# Patient Record
Sex: MALE | Race: WHITE | NOT HISPANIC OR LATINO | Employment: OTHER | URBAN - METROPOLITAN AREA
[De-identification: names, ages, dates, MRNs, and addresses within clinical notes are randomized per-mention and may not be internally consistent; named-entity substitution may affect disease eponyms.]

---

## 2021-01-15 DIAGNOSIS — Z23 NEED FOR VACCINATION: ICD-10-CM

## 2022-03-06 ENCOUNTER — HOSPITAL ENCOUNTER (INPATIENT)
Facility: MEDICAL CENTER | Age: 72
LOS: 4 days | DRG: 190 | End: 2022-03-11
Attending: EMERGENCY MEDICINE | Admitting: STUDENT IN AN ORGANIZED HEALTH CARE EDUCATION/TRAINING PROGRAM
Payer: MEDICARE

## 2022-03-06 ENCOUNTER — APPOINTMENT (OUTPATIENT)
Dept: RADIOLOGY | Facility: MEDICAL CENTER | Age: 72
DRG: 190 | End: 2022-03-06
Attending: EMERGENCY MEDICINE
Payer: MEDICARE

## 2022-03-06 DIAGNOSIS — I10 ESSENTIAL HYPERTENSION: ICD-10-CM

## 2022-03-06 DIAGNOSIS — J44.1 COPD EXACERBATION (HCC): ICD-10-CM

## 2022-03-06 DIAGNOSIS — J96.01 ACUTE RESPIRATORY FAILURE WITH HYPOXIA (HCC): ICD-10-CM

## 2022-03-06 DIAGNOSIS — E87.1 HYPONATREMIA: ICD-10-CM

## 2022-03-06 DIAGNOSIS — J44.1 ACUTE EXACERBATION OF CHRONIC OBSTRUCTIVE PULMONARY DISEASE (COPD) (HCC): ICD-10-CM

## 2022-03-06 DIAGNOSIS — R09.02 HYPOXIA: ICD-10-CM

## 2022-03-06 LAB
BASOPHILS # BLD AUTO: 0.2 % (ref 0–1.8)
BASOPHILS # BLD: 0.02 K/UL (ref 0–0.12)
EKG IMPRESSION: NORMAL
EOSINOPHIL # BLD AUTO: 0.03 K/UL (ref 0–0.51)
EOSINOPHIL NFR BLD: 0.3 % (ref 0–6.9)
ERYTHROCYTE [DISTWIDTH] IN BLOOD BY AUTOMATED COUNT: 43.2 FL (ref 35.9–50)
HCT VFR BLD AUTO: 47.9 % (ref 42–52)
HGB BLD-MCNC: 17 G/DL (ref 14–18)
IMM GRANULOCYTES # BLD AUTO: 0.06 K/UL (ref 0–0.11)
IMM GRANULOCYTES NFR BLD AUTO: 0.5 % (ref 0–0.9)
LACTATE BLD-SCNC: 3.1 MMOL/L (ref 0.5–2)
LYMPHOCYTES # BLD AUTO: 0.82 K/UL (ref 1–4.8)
LYMPHOCYTES NFR BLD: 7 % (ref 22–41)
MCH RBC QN AUTO: 31.7 PG (ref 27–33)
MCHC RBC AUTO-ENTMCNC: 35.5 G/DL (ref 33.7–35.3)
MCV RBC AUTO: 89.4 FL (ref 81.4–97.8)
MONOCYTES # BLD AUTO: 0.89 K/UL (ref 0–0.85)
MONOCYTES NFR BLD AUTO: 7.6 % (ref 0–13.4)
NEUTROPHILS # BLD AUTO: 9.87 K/UL (ref 1.82–7.42)
NEUTROPHILS NFR BLD: 84.4 % (ref 44–72)
NRBC # BLD AUTO: 0 K/UL
NRBC BLD-RTO: 0 /100 WBC
PLATELET # BLD AUTO: 241 K/UL (ref 164–446)
PMV BLD AUTO: 9.5 FL (ref 9–12.9)
RBC # BLD AUTO: 5.36 M/UL (ref 4.7–6.1)
WBC # BLD AUTO: 11.7 K/UL (ref 4.8–10.8)

## 2022-03-06 PROCEDURE — 87150 DNA/RNA AMPLIFIED PROBE: CPT

## 2022-03-06 PROCEDURE — 93005 ELECTROCARDIOGRAM TRACING: CPT | Performed by: EMERGENCY MEDICINE

## 2022-03-06 PROCEDURE — 80053 COMPREHEN METABOLIC PANEL: CPT

## 2022-03-06 PROCEDURE — 96375 TX/PRO/DX INJ NEW DRUG ADDON: CPT

## 2022-03-06 PROCEDURE — 87040 BLOOD CULTURE FOR BACTERIA: CPT | Mod: 91

## 2022-03-06 PROCEDURE — 99285 EMERGENCY DEPT VISIT HI MDM: CPT

## 2022-03-06 PROCEDURE — 85025 COMPLETE CBC W/AUTO DIFF WBC: CPT

## 2022-03-06 PROCEDURE — 83930 ASSAY OF BLOOD OSMOLALITY: CPT

## 2022-03-06 PROCEDURE — 83605 ASSAY OF LACTIC ACID: CPT

## 2022-03-06 PROCEDURE — 94660 CPAP INITIATION&MGMT: CPT

## 2022-03-06 PROCEDURE — 83880 ASSAY OF NATRIURETIC PEPTIDE: CPT

## 2022-03-06 PROCEDURE — 700111 HCHG RX REV CODE 636 W/ 250 OVERRIDE (IP): Performed by: EMERGENCY MEDICINE

## 2022-03-06 PROCEDURE — 83735 ASSAY OF MAGNESIUM: CPT

## 2022-03-06 PROCEDURE — 84100 ASSAY OF PHOSPHORUS: CPT

## 2022-03-06 PROCEDURE — 700101 HCHG RX REV CODE 250: Performed by: EMERGENCY MEDICINE

## 2022-03-06 PROCEDURE — 94640 AIRWAY INHALATION TREATMENT: CPT

## 2022-03-06 PROCEDURE — 87077 CULTURE AEROBIC IDENTIFY: CPT

## 2022-03-06 PROCEDURE — 36415 COLL VENOUS BLD VENIPUNCTURE: CPT

## 2022-03-06 PROCEDURE — 84145 PROCALCITONIN (PCT): CPT

## 2022-03-06 PROCEDURE — 84484 ASSAY OF TROPONIN QUANT: CPT

## 2022-03-06 RX ORDER — IPRATROPIUM BROMIDE AND ALBUTEROL SULFATE 2.5; .5 MG/3ML; MG/3ML
3 SOLUTION RESPIRATORY (INHALATION) ONCE
Status: COMPLETED | OUTPATIENT
Start: 2022-03-07 | End: 2022-03-06

## 2022-03-06 RX ORDER — METHYLPREDNISOLONE SODIUM SUCCINATE 125 MG/2ML
125 INJECTION, POWDER, LYOPHILIZED, FOR SOLUTION INTRAMUSCULAR; INTRAVENOUS ONCE
Status: DISCONTINUED | OUTPATIENT
Start: 2022-03-07 | End: 2022-03-06

## 2022-03-06 RX ORDER — METHYLPREDNISOLONE SODIUM SUCCINATE 125 MG/2ML
125 INJECTION, POWDER, LYOPHILIZED, FOR SOLUTION INTRAMUSCULAR; INTRAVENOUS ONCE
Status: COMPLETED | OUTPATIENT
Start: 2022-03-07 | End: 2022-03-06

## 2022-03-06 RX ADMIN — METHYLPREDNISOLONE SODIUM SUCCINATE 125 MG: 125 INJECTION, POWDER, FOR SOLUTION INTRAMUSCULAR; INTRAVENOUS at 23:38

## 2022-03-06 RX ADMIN — IPRATROPIUM BROMIDE AND ALBUTEROL SULFATE 3 ML: 2.5; .5 SOLUTION RESPIRATORY (INHALATION) at 23:41

## 2022-03-06 ASSESSMENT — PULMONARY FUNCTION TESTS: EPAP_CMH2O: 10

## 2022-03-07 ENCOUNTER — APPOINTMENT (OUTPATIENT)
Dept: CARDIOLOGY | Facility: MEDICAL CENTER | Age: 72
DRG: 190 | End: 2022-03-07
Attending: STUDENT IN AN ORGANIZED HEALTH CARE EDUCATION/TRAINING PROGRAM
Payer: MEDICARE

## 2022-03-07 ENCOUNTER — APPOINTMENT (OUTPATIENT)
Dept: RADIOLOGY | Facility: MEDICAL CENTER | Age: 72
DRG: 190 | End: 2022-03-07
Attending: EMERGENCY MEDICINE
Payer: MEDICARE

## 2022-03-07 PROBLEM — L30.9 DERMATITIS: Status: ACTIVE | Noted: 2022-03-07

## 2022-03-07 PROBLEM — J44.1 COPD EXACERBATION (HCC): Status: ACTIVE | Noted: 2022-03-07

## 2022-03-07 PROBLEM — I10 ESSENTIAL HYPERTENSION: Status: ACTIVE | Noted: 2022-03-07

## 2022-03-07 PROBLEM — G93.40 ENCEPHALOPATHY: Status: ACTIVE | Noted: 2022-03-07

## 2022-03-07 PROBLEM — E87.1 HYPONATREMIA: Status: ACTIVE | Noted: 2022-03-07

## 2022-03-07 PROBLEM — E66.9 OBESITY: Status: ACTIVE | Noted: 2022-03-07

## 2022-03-07 PROBLEM — J96.01 ACUTE RESPIRATORY FAILURE WITH HYPOXIA (HCC): Status: ACTIVE | Noted: 2022-03-07

## 2022-03-07 PROBLEM — F10.20 ALCOHOLISM (HCC): Status: ACTIVE | Noted: 2022-03-07

## 2022-03-07 LAB
ALBUMIN SERPL BCP-MCNC: 4.3 G/DL (ref 3.2–4.9)
ALBUMIN/GLOB SERPL: 1 G/DL
ALP SERPL-CCNC: 88 U/L (ref 30–99)
ALT SERPL-CCNC: 23 U/L (ref 2–50)
ANION GAP SERPL CALC-SCNC: 16 MMOL/L (ref 7–16)
ANION GAP SERPL CALC-SCNC: 17 MMOL/L (ref 7–16)
APPEARANCE UR: CLEAR
AST SERPL-CCNC: 43 U/L (ref 12–45)
BACTERIA #/AREA URNS HPF: NEGATIVE /HPF
BASE EXCESS BLDA CALC-SCNC: -4 MMOL/L (ref -4–3)
BASOPHILS # BLD AUTO: 0.1 % (ref 0–1.8)
BASOPHILS # BLD: 0.02 K/UL (ref 0–0.12)
BILIRUB SERPL-MCNC: 1.2 MG/DL (ref 0.1–1.5)
BILIRUB UR QL STRIP.AUTO: NEGATIVE
BODY TEMPERATURE: ABNORMAL CENTIGRADE
BUN SERPL-MCNC: 4 MG/DL (ref 8–22)
BUN SERPL-MCNC: 6 MG/DL (ref 8–22)
CALCIUM SERPL-MCNC: 8.9 MG/DL (ref 8.5–10.5)
CALCIUM SERPL-MCNC: 9.1 MG/DL (ref 8.5–10.5)
CHLORIDE SERPL-SCNC: 80 MMOL/L (ref 96–112)
CHLORIDE SERPL-SCNC: 81 MMOL/L (ref 96–112)
CHOLEST SERPL-MCNC: 121 MG/DL (ref 100–199)
CO2 SERPL-SCNC: 20 MMOL/L (ref 20–33)
CO2 SERPL-SCNC: 21 MMOL/L (ref 20–33)
COLOR UR: YELLOW
CREAT SERPL-MCNC: 0.36 MG/DL (ref 0.5–1.4)
CREAT SERPL-MCNC: 0.47 MG/DL (ref 0.5–1.4)
EOSINOPHIL # BLD AUTO: 0 K/UL (ref 0–0.51)
EOSINOPHIL NFR BLD: 0 % (ref 0–6.9)
EPI CELLS #/AREA URNS HPF: NEGATIVE /HPF
ERYTHROCYTE [DISTWIDTH] IN BLOOD BY AUTOMATED COUNT: 43 FL (ref 35.9–50)
EST. AVERAGE GLUCOSE BLD GHB EST-MCNC: 114 MG/DL
FLUAV RNA SPEC QL NAA+PROBE: NEGATIVE
FLUBV RNA SPEC QL NAA+PROBE: NEGATIVE
GLOBULIN SER CALC-MCNC: 4.1 G/DL (ref 1.9–3.5)
GLUCOSE SERPL-MCNC: 105 MG/DL (ref 65–99)
GLUCOSE SERPL-MCNC: 99 MG/DL (ref 65–99)
GLUCOSE UR STRIP.AUTO-MCNC: NEGATIVE MG/DL
HBA1C MFR BLD: 5.6 % (ref 4–5.6)
HCO3 BLDA-SCNC: 21 MMOL/L (ref 17–25)
HCT VFR BLD AUTO: 44.7 % (ref 42–52)
HDLC SERPL-MCNC: 68 MG/DL
HGB BLD-MCNC: 16 G/DL (ref 14–18)
HYALINE CASTS #/AREA URNS LPF: ABNORMAL /LPF
IMM GRANULOCYTES # BLD AUTO: 0.07 K/UL (ref 0–0.11)
IMM GRANULOCYTES NFR BLD AUTO: 0.5 % (ref 0–0.9)
KETONES UR STRIP.AUTO-MCNC: 15 MG/DL
LACTATE BLD-SCNC: 1 MMOL/L (ref 0.5–2)
LACTATE BLD-SCNC: 1.3 MMOL/L (ref 0.5–2)
LACTATE BLD-SCNC: 1.5 MMOL/L (ref 0.5–2)
LACTATE BLD-SCNC: 2 MMOL/L (ref 0.5–2)
LACTATE BLD-SCNC: 2.8 MMOL/L (ref 0.5–2)
LDLC SERPL CALC-MCNC: 46 MG/DL
LEUKOCYTE ESTERASE UR QL STRIP.AUTO: NEGATIVE
LV EJECT FRACT  99904: 70
LV EJECT FRACT MOD 2C 99903: 61.86
LV EJECT FRACT MOD 4C 99902: 73.59
LV EJECT FRACT MOD BP 99901: 68.18
LYMPHOCYTES # BLD AUTO: 0.23 K/UL (ref 1–4.8)
LYMPHOCYTES NFR BLD: 1.7 % (ref 22–41)
MAGNESIUM SERPL-MCNC: 1.8 MG/DL (ref 1.5–2.5)
MCH RBC QN AUTO: 31.8 PG (ref 27–33)
MCHC RBC AUTO-ENTMCNC: 35.8 G/DL (ref 33.7–35.3)
MCV RBC AUTO: 88.9 FL (ref 81.4–97.8)
MICRO URNS: ABNORMAL
MONOCYTES # BLD AUTO: 0.24 K/UL (ref 0–0.85)
MONOCYTES NFR BLD AUTO: 1.8 % (ref 0–13.4)
NEUTROPHILS # BLD AUTO: 13.02 K/UL (ref 1.82–7.42)
NEUTROPHILS NFR BLD: 95.9 % (ref 44–72)
NITRITE UR QL STRIP.AUTO: NEGATIVE
NRBC # BLD AUTO: 0 K/UL
NRBC BLD-RTO: 0 /100 WBC
NT-PROBNP SERPL IA-MCNC: 378 PG/ML (ref 0–125)
OSMOLALITY SERPL: 260 MOSM/KG H2O (ref 278–298)
OSMOLALITY UR: 294 MOSM/KG H2O (ref 300–900)
PCO2 BLDA: 37.7 MMHG (ref 26–37)
PH BLDA: 7.36 [PH] (ref 7.4–7.5)
PH UR STRIP.AUTO: 5.5 [PH] (ref 5–8)
PHOSPHATE SERPL-MCNC: 3.5 MG/DL (ref 2.5–4.5)
PLATELET # BLD AUTO: 230 K/UL (ref 164–446)
PMV BLD AUTO: 9.2 FL (ref 9–12.9)
PO2 BLDA: 175.5 MMHG (ref 64–87)
POTASSIUM SERPL-SCNC: 4.4 MMOL/L (ref 3.6–5.5)
POTASSIUM SERPL-SCNC: 4.4 MMOL/L (ref 3.6–5.5)
PROCALCITONIN SERPL-MCNC: <0.05 NG/ML
PROT SERPL-MCNC: 8.4 G/DL (ref 6–8.2)
PROT UR QL STRIP: 30 MG/DL
RBC # BLD AUTO: 5.03 M/UL (ref 4.7–6.1)
RBC # URNS HPF: ABNORMAL /HPF
RBC UR QL AUTO: NEGATIVE
RSV RNA SPEC QL NAA+PROBE: NEGATIVE
SAO2 % BLDA: 99.1 % (ref 93–99)
SARS-COV-2 RNA RESP QL NAA+PROBE: NOTDETECTED
SODIUM SERPL-SCNC: 117 MMOL/L (ref 135–145)
SODIUM SERPL-SCNC: 118 MMOL/L (ref 135–145)
SODIUM SERPL-SCNC: 118 MMOL/L (ref 135–145)
SODIUM SERPL-SCNC: 122 MMOL/L (ref 135–145)
SODIUM SERPL-SCNC: 122 MMOL/L (ref 135–145)
SODIUM UR-SCNC: <20 MMOL/L
SP GR UR STRIP.AUTO: 1.01
SPECIMEN SOURCE: NORMAL
TRIGL SERPL-MCNC: 35 MG/DL (ref 0–149)
TROPONIN T SERPL-MCNC: 26 NG/L (ref 6–19)
UROBILINOGEN UR STRIP.AUTO-MCNC: 0.2 MG/DL
WBC # BLD AUTO: 13.6 K/UL (ref 4.8–10.8)
WBC #/AREA URNS HPF: ABNORMAL /HPF

## 2022-03-07 PROCEDURE — 84295 ASSAY OF SERUM SODIUM: CPT

## 2022-03-07 PROCEDURE — 700111 HCHG RX REV CODE 636 W/ 250 OVERRIDE (IP): Performed by: STUDENT IN AN ORGANIZED HEALTH CARE EDUCATION/TRAINING PROGRAM

## 2022-03-07 PROCEDURE — 83935 ASSAY OF URINE OSMOLALITY: CPT

## 2022-03-07 PROCEDURE — 94640 AIRWAY INHALATION TREATMENT: CPT

## 2022-03-07 PROCEDURE — 770000 HCHG ROOM/CARE - INTERMEDIATE ICU *

## 2022-03-07 PROCEDURE — 93306 TTE W/DOPPLER COMPLETE: CPT | Mod: 26 | Performed by: INTERNAL MEDICINE

## 2022-03-07 PROCEDURE — 94664 DEMO&/EVAL PT USE INHALER: CPT

## 2022-03-07 PROCEDURE — 83605 ASSAY OF LACTIC ACID: CPT

## 2022-03-07 PROCEDURE — 700101 HCHG RX REV CODE 250: Performed by: STUDENT IN AN ORGANIZED HEALTH CARE EDUCATION/TRAINING PROGRAM

## 2022-03-07 PROCEDURE — C9803 HOPD COVID-19 SPEC COLLECT: HCPCS | Performed by: STUDENT IN AN ORGANIZED HEALTH CARE EDUCATION/TRAINING PROGRAM

## 2022-03-07 PROCEDURE — 700102 HCHG RX REV CODE 250 W/ 637 OVERRIDE(OP): Performed by: INTERNAL MEDICINE

## 2022-03-07 PROCEDURE — 700111 HCHG RX REV CODE 636 W/ 250 OVERRIDE (IP): Performed by: EMERGENCY MEDICINE

## 2022-03-07 PROCEDURE — 99223 1ST HOSP IP/OBS HIGH 75: CPT | Mod: 25,AI | Performed by: STUDENT IN AN ORGANIZED HEALTH CARE EDUCATION/TRAINING PROGRAM

## 2022-03-07 PROCEDURE — A9270 NON-COVERED ITEM OR SERVICE: HCPCS | Performed by: STUDENT IN AN ORGANIZED HEALTH CARE EDUCATION/TRAINING PROGRAM

## 2022-03-07 PROCEDURE — 700105 HCHG RX REV CODE 258: Performed by: STUDENT IN AN ORGANIZED HEALTH CARE EDUCATION/TRAINING PROGRAM

## 2022-03-07 PROCEDURE — 700111 HCHG RX REV CODE 636 W/ 250 OVERRIDE (IP): Performed by: HOSPITALIST

## 2022-03-07 PROCEDURE — 700102 HCHG RX REV CODE 250 W/ 637 OVERRIDE(OP): Performed by: HOSPITALIST

## 2022-03-07 PROCEDURE — 94669 MECHANICAL CHEST WALL OSCILL: CPT

## 2022-03-07 PROCEDURE — 700117 HCHG RX CONTRAST REV CODE 255: Performed by: STUDENT IN AN ORGANIZED HEALTH CARE EDUCATION/TRAINING PROGRAM

## 2022-03-07 PROCEDURE — 0241U HCHG SARS-COV-2 COVID-19 NFCT DS RESP RNA 4 TRGT MIC: CPT

## 2022-03-07 PROCEDURE — A9270 NON-COVERED ITEM OR SERVICE: HCPCS | Performed by: INTERNAL MEDICINE

## 2022-03-07 PROCEDURE — 93306 TTE W/DOPPLER COMPLETE: CPT

## 2022-03-07 PROCEDURE — 99291 CRITICAL CARE FIRST HOUR: CPT | Performed by: HOSPITALIST

## 2022-03-07 PROCEDURE — 80061 LIPID PANEL: CPT

## 2022-03-07 PROCEDURE — 87086 URINE CULTURE/COLONY COUNT: CPT

## 2022-03-07 PROCEDURE — 82803 BLOOD GASES ANY COMBINATION: CPT

## 2022-03-07 PROCEDURE — 84300 ASSAY OF URINE SODIUM: CPT

## 2022-03-07 PROCEDURE — 96365 THER/PROPH/DIAG IV INF INIT: CPT

## 2022-03-07 PROCEDURE — 700102 HCHG RX REV CODE 250 W/ 637 OVERRIDE(OP): Performed by: STUDENT IN AN ORGANIZED HEALTH CARE EDUCATION/TRAINING PROGRAM

## 2022-03-07 PROCEDURE — 83036 HEMOGLOBIN GLYCOSYLATED A1C: CPT

## 2022-03-07 PROCEDURE — 80048 BASIC METABOLIC PNL TOTAL CA: CPT

## 2022-03-07 PROCEDURE — 71045 X-RAY EXAM CHEST 1 VIEW: CPT

## 2022-03-07 PROCEDURE — 81001 URINALYSIS AUTO W/SCOPE: CPT

## 2022-03-07 PROCEDURE — 85025 COMPLETE CBC W/AUTO DIFF WBC: CPT

## 2022-03-07 PROCEDURE — 96366 THER/PROPH/DIAG IV INF ADDON: CPT

## 2022-03-07 PROCEDURE — A9270 NON-COVERED ITEM OR SERVICE: HCPCS | Performed by: HOSPITALIST

## 2022-03-07 RX ORDER — POLYETHYLENE GLYCOL 3350 17 G/17G
1 POWDER, FOR SOLUTION ORAL
Status: DISCONTINUED | OUTPATIENT
Start: 2022-03-07 | End: 2022-03-11 | Stop reason: HOSPADM

## 2022-03-07 RX ORDER — MULTIVIT-MIN/FA/LYCOPEN/LUTEIN .4-300-25
1 TABLET ORAL DAILY
COMMUNITY
End: 2022-09-12

## 2022-03-07 RX ORDER — BISACODYL 10 MG
10 SUPPOSITORY, RECTAL RECTAL
Status: DISCONTINUED | OUTPATIENT
Start: 2022-03-07 | End: 2022-03-11 | Stop reason: HOSPADM

## 2022-03-07 RX ORDER — FOLIC ACID 1 MG/1
1 TABLET ORAL EVERY EVENING
Status: COMPLETED | OUTPATIENT
Start: 2022-03-07 | End: 2022-03-10

## 2022-03-07 RX ORDER — LORAZEPAM 0.5 MG/1
0.5 TABLET ORAL EVERY 4 HOURS PRN
Status: DISCONTINUED | OUTPATIENT
Start: 2022-03-07 | End: 2022-03-10

## 2022-03-07 RX ORDER — GUAIFENESIN/DEXTROMETHORPHAN 100-10MG/5
10 SYRUP ORAL EVERY 6 HOURS PRN
Status: DISCONTINUED | OUTPATIENT
Start: 2022-03-07 | End: 2022-03-11 | Stop reason: HOSPADM

## 2022-03-07 RX ORDER — LORAZEPAM 2 MG/1
4 TABLET ORAL
Status: DISCONTINUED | OUTPATIENT
Start: 2022-03-07 | End: 2022-03-10

## 2022-03-07 RX ORDER — BUDESONIDE AND FORMOTEROL FUMARATE DIHYDRATE 160; 4.5 UG/1; UG/1
1 AEROSOL RESPIRATORY (INHALATION) EVERY 12 HOURS
Status: DISCONTINUED | OUTPATIENT
Start: 2022-03-07 | End: 2022-03-11 | Stop reason: HOSPADM

## 2022-03-07 RX ORDER — LORAZEPAM 2 MG/ML
0.5 INJECTION INTRAMUSCULAR EVERY 4 HOURS PRN
Status: DISCONTINUED | OUTPATIENT
Start: 2022-03-07 | End: 2022-03-10

## 2022-03-07 RX ORDER — CHOLECALCIFEROL (VITAMIN D3) 125 MCG
5 CAPSULE ORAL NIGHTLY PRN
Status: DISCONTINUED | OUTPATIENT
Start: 2022-03-07 | End: 2022-03-11 | Stop reason: HOSPADM

## 2022-03-07 RX ORDER — OXYCODONE HYDROCHLORIDE 5 MG/1
5 TABLET ORAL
Status: DISCONTINUED | OUTPATIENT
Start: 2022-03-07 | End: 2022-03-11 | Stop reason: HOSPADM

## 2022-03-07 RX ORDER — PANTOPRAZOLE SODIUM 40 MG/1
40 TABLET, DELAYED RELEASE ORAL DAILY
COMMUNITY
End: 2022-09-12

## 2022-03-07 RX ORDER — IPRATROPIUM BROMIDE AND ALBUTEROL SULFATE 2.5; .5 MG/3ML; MG/3ML
3 SOLUTION RESPIRATORY (INHALATION)
Status: DISCONTINUED | OUTPATIENT
Start: 2022-03-07 | End: 2022-03-07

## 2022-03-07 RX ORDER — LORAZEPAM 2 MG/ML
1.5 INJECTION INTRAMUSCULAR
Status: DISCONTINUED | OUTPATIENT
Start: 2022-03-07 | End: 2022-03-10

## 2022-03-07 RX ORDER — AMOXICILLIN 250 MG
2 CAPSULE ORAL 2 TIMES DAILY
Status: DISCONTINUED | OUTPATIENT
Start: 2022-03-07 | End: 2022-03-11 | Stop reason: HOSPADM

## 2022-03-07 RX ORDER — IPRATROPIUM BROMIDE AND ALBUTEROL SULFATE 2.5; .5 MG/3ML; MG/3ML
3 SOLUTION RESPIRATORY (INHALATION)
Status: DISCONTINUED | OUTPATIENT
Start: 2022-03-07 | End: 2022-03-10

## 2022-03-07 RX ORDER — ONDANSETRON 2 MG/ML
4 INJECTION INTRAMUSCULAR; INTRAVENOUS ONCE
Status: COMPLETED | OUTPATIENT
Start: 2022-03-07 | End: 2022-03-07

## 2022-03-07 RX ORDER — METHYLPREDNISOLONE SODIUM SUCCINATE 40 MG/ML
40 INJECTION, POWDER, LYOPHILIZED, FOR SOLUTION INTRAMUSCULAR; INTRAVENOUS DAILY
Status: DISCONTINUED | OUTPATIENT
Start: 2022-03-07 | End: 2022-03-07

## 2022-03-07 RX ORDER — AMLODIPINE BESYLATE 5 MG/1
5 TABLET ORAL DAILY
Status: ON HOLD | COMMUNITY
End: 2022-03-11 | Stop reason: SDUPTHER

## 2022-03-07 RX ORDER — ONDANSETRON 4 MG/1
4 TABLET, ORALLY DISINTEGRATING ORAL EVERY 4 HOURS PRN
Status: DISCONTINUED | OUTPATIENT
Start: 2022-03-07 | End: 2022-03-11 | Stop reason: HOSPADM

## 2022-03-07 RX ORDER — AZITHROMYCIN 250 MG/1
500 TABLET, FILM COATED ORAL EVERY EVENING
Status: COMPLETED | OUTPATIENT
Start: 2022-03-07 | End: 2022-03-09

## 2022-03-07 RX ORDER — LORAZEPAM 1 MG/1
1 TABLET ORAL EVERY 4 HOURS PRN
Status: DISCONTINUED | OUTPATIENT
Start: 2022-03-07 | End: 2022-03-10

## 2022-03-07 RX ORDER — METHYLPREDNISOLONE SODIUM SUCCINATE 40 MG/ML
40 INJECTION, POWDER, LYOPHILIZED, FOR SOLUTION INTRAMUSCULAR; INTRAVENOUS DAILY
Status: DISCONTINUED | OUTPATIENT
Start: 2022-03-07 | End: 2022-03-09

## 2022-03-07 RX ORDER — LISINOPRIL 10 MG/1
10 TABLET ORAL DAILY
Status: DISCONTINUED | OUTPATIENT
Start: 2022-03-07 | End: 2022-03-07

## 2022-03-07 RX ORDER — LORAZEPAM 2 MG/ML
2 INJECTION INTRAMUSCULAR
Status: DISCONTINUED | OUTPATIENT
Start: 2022-03-07 | End: 2022-03-10

## 2022-03-07 RX ORDER — SODIUM CHLORIDE 1 G/1
1 TABLET ORAL
Status: DISCONTINUED | OUTPATIENT
Start: 2022-03-07 | End: 2022-03-11 | Stop reason: HOSPADM

## 2022-03-07 RX ORDER — VITAMIN E 268 MG
360 CAPSULE ORAL DAILY
COMMUNITY
End: 2022-04-02

## 2022-03-07 RX ORDER — LORAZEPAM 2 MG/ML
0.5 INJECTION INTRAMUSCULAR ONCE
Status: DISPENSED | OUTPATIENT
Start: 2022-03-07 | End: 2022-03-08

## 2022-03-07 RX ORDER — LORAZEPAM 2 MG/1
2 TABLET ORAL
Status: DISCONTINUED | OUTPATIENT
Start: 2022-03-07 | End: 2022-03-10

## 2022-03-07 RX ORDER — LORAZEPAM 2 MG/ML
1 INJECTION INTRAMUSCULAR
Status: DISCONTINUED | OUTPATIENT
Start: 2022-03-07 | End: 2022-03-10

## 2022-03-07 RX ORDER — FUROSEMIDE 10 MG/ML
20 INJECTION INTRAMUSCULAR; INTRAVENOUS
Status: DISCONTINUED | OUTPATIENT
Start: 2022-03-07 | End: 2022-03-11 | Stop reason: HOSPADM

## 2022-03-07 RX ORDER — DEXMEDETOMIDINE HYDROCHLORIDE 4 UG/ML
.1-1.5 INJECTION INTRAVENOUS CONTINUOUS
Status: DISCONTINUED | OUTPATIENT
Start: 2022-03-07 | End: 2022-03-08

## 2022-03-07 RX ORDER — GAUZE BANDAGE 2" X 2"
100 BANDAGE TOPICAL EVERY EVENING
Status: COMPLETED | OUTPATIENT
Start: 2022-03-07 | End: 2022-03-10

## 2022-03-07 RX ORDER — HYDROMORPHONE HYDROCHLORIDE 1 MG/ML
0.25 INJECTION, SOLUTION INTRAMUSCULAR; INTRAVENOUS; SUBCUTANEOUS
Status: DISCONTINUED | OUTPATIENT
Start: 2022-03-07 | End: 2022-03-10

## 2022-03-07 RX ORDER — ONDANSETRON 2 MG/ML
4 INJECTION INTRAMUSCULAR; INTRAVENOUS EVERY 4 HOURS PRN
Status: DISCONTINUED | OUTPATIENT
Start: 2022-03-07 | End: 2022-03-11 | Stop reason: HOSPADM

## 2022-03-07 RX ORDER — ALBUTEROL SULFATE 90 UG/1
1-2 AEROSOL, METERED RESPIRATORY (INHALATION) EVERY 4 HOURS PRN
Status: ON HOLD | COMMUNITY
End: 2022-03-11 | Stop reason: SDUPTHER

## 2022-03-07 RX ORDER — AMMONIUM LACTATE 12 G/100G
LOTION TOPICAL 2 TIMES DAILY
Status: DISCONTINUED | OUTPATIENT
Start: 2022-03-07 | End: 2022-03-11 | Stop reason: HOSPADM

## 2022-03-07 RX ORDER — ACETAMINOPHEN 325 MG/1
650 TABLET ORAL EVERY 6 HOURS PRN
Status: DISCONTINUED | OUTPATIENT
Start: 2022-03-07 | End: 2022-03-11 | Stop reason: HOSPADM

## 2022-03-07 RX ORDER — LABETALOL HYDROCHLORIDE 5 MG/ML
10 INJECTION, SOLUTION INTRAVENOUS EVERY 4 HOURS PRN
Status: DISCONTINUED | OUTPATIENT
Start: 2022-03-07 | End: 2022-03-11 | Stop reason: HOSPADM

## 2022-03-07 RX ORDER — OXYCODONE HYDROCHLORIDE 5 MG/1
2.5 TABLET ORAL
Status: DISCONTINUED | OUTPATIENT
Start: 2022-03-07 | End: 2022-03-11 | Stop reason: HOSPADM

## 2022-03-07 RX ORDER — HYDROCHLOROTHIAZIDE 25 MG/1
25 TABLET ORAL DAILY
Status: DISCONTINUED | OUTPATIENT
Start: 2022-03-07 | End: 2022-03-07

## 2022-03-07 RX ADMIN — ONDANSETRON 4 MG: 2 INJECTION INTRAMUSCULAR; INTRAVENOUS at 01:22

## 2022-03-07 RX ADMIN — Medication: at 16:15

## 2022-03-07 RX ADMIN — DEXMEDETOMIDINE 0.2 MCG/KG/HR: 200 INJECTION, SOLUTION INTRAVENOUS at 01:22

## 2022-03-07 RX ADMIN — FOLIC ACID 1 MG: 1 TABLET ORAL at 17:04

## 2022-03-07 RX ADMIN — HUMAN ALBUMIN MICROSPHERES AND PERFLUTREN 3 ML: 10; .22 INJECTION, SOLUTION INTRAVENOUS at 12:59

## 2022-03-07 RX ADMIN — BUDESONIDE AND FORMOTEROL FUMARATE DIHYDRATE 1 PUFF: 160; 4.5 AEROSOL RESPIRATORY (INHALATION) at 17:04

## 2022-03-07 RX ADMIN — HYDROCHLOROTHIAZIDE 25 MG: 25 TABLET ORAL at 05:07

## 2022-03-07 RX ADMIN — ENOXAPARIN SODIUM 40 MG: 40 INJECTION SUBCUTANEOUS at 17:04

## 2022-03-07 RX ADMIN — IPRATROPIUM BROMIDE AND ALBUTEROL SULFATE 3 ML: 2.5; .5 SOLUTION RESPIRATORY (INHALATION) at 02:10

## 2022-03-07 RX ADMIN — METHYLPREDNISOLONE SODIUM SUCCINATE 40 MG: 40 INJECTION, POWDER, FOR SOLUTION INTRAMUSCULAR; INTRAVENOUS at 05:07

## 2022-03-07 RX ADMIN — SODIUM CHLORIDE 3 G: 900 INJECTION INTRAVENOUS at 23:34

## 2022-03-07 RX ADMIN — SODIUM CHLORIDE 1 G: 1 TABLET ORAL at 12:35

## 2022-03-07 RX ADMIN — Medication 100 MG: at 17:04

## 2022-03-07 RX ADMIN — FUROSEMIDE 20 MG: 10 INJECTION INTRAMUSCULAR; INTRAVENOUS at 16:15

## 2022-03-07 RX ADMIN — Medication: at 10:00

## 2022-03-07 RX ADMIN — GUAIFENESIN AND DEXTROMETHORPHAN 10 ML: 100; 10 SYRUP ORAL at 12:35

## 2022-03-07 RX ADMIN — IPRATROPIUM BROMIDE AND ALBUTEROL SULFATE 3 ML: 2.5; .5 SOLUTION RESPIRATORY (INHALATION) at 21:02

## 2022-03-07 RX ADMIN — Medication 5 MG: at 20:43

## 2022-03-07 RX ADMIN — IPRATROPIUM BROMIDE AND ALBUTEROL SULFATE 3 ML: 2.5; .5 SOLUTION RESPIRATORY (INHALATION) at 23:01

## 2022-03-07 RX ADMIN — THERA TABS 1 TABLET: TAB at 17:04

## 2022-03-07 RX ADMIN — AZITHROMYCIN DIHYDRATE 500 MG: 250 TABLET, FILM COATED ORAL at 17:04

## 2022-03-07 RX ADMIN — BUDESONIDE AND FORMOTEROL FUMARATE DIHYDRATE 1 PUFF: 160; 4.5 AEROSOL RESPIRATORY (INHALATION) at 08:45

## 2022-03-07 RX ADMIN — IPRATROPIUM BROMIDE AND ALBUTEROL SULFATE 3 ML: 2.5; .5 SOLUTION RESPIRATORY (INHALATION) at 18:27

## 2022-03-07 RX ADMIN — LORAZEPAM 0.5 MG: 2 INJECTION INTRAMUSCULAR; INTRAVENOUS at 21:18

## 2022-03-07 RX ADMIN — IPRATROPIUM BROMIDE AND ALBUTEROL SULFATE 3 ML: 2.5; .5 SOLUTION RESPIRATORY (INHALATION) at 10:23

## 2022-03-07 RX ADMIN — IPRATROPIUM BROMIDE AND ALBUTEROL SULFATE 3 ML: 2.5; .5 SOLUTION RESPIRATORY (INHALATION) at 06:30

## 2022-03-07 RX ADMIN — FUROSEMIDE 20 MG: 10 INJECTION INTRAMUSCULAR; INTRAVENOUS at 05:07

## 2022-03-07 RX ADMIN — SODIUM CHLORIDE 1 G: 1 TABLET ORAL at 16:15

## 2022-03-07 ASSESSMENT — LIFESTYLE VARIABLES
TOTAL SCORE: 2
ALCOHOL_USE: YES
ANXIETY: *
TREMOR: TREMOR NOT VISIBLE BUT CAN BE FELT, FINGERTIP TO FINGERTIP
ANXIETY: MILDLY ANXIOUS
HEADACHE, FULLNESS IN HEAD: NOT PRESENT
HAVE YOU EVER FELT YOU SHOULD CUT DOWN ON YOUR DRINKING: YES
VISUAL DISTURBANCES: NOT PRESENT
TOTAL SCORE: 9
AGITATION: SOMEWHAT MORE THAN NORMAL ACTIVITY
HEADACHE, FULLNESS IN HEAD: NOT PRESENT
SUBSTANCE_ABUSE: 1
CONSUMPTION TOTAL: INCOMPLETE
TOTAL SCORE: 4
ORIENTATION AND CLOUDING OF SENSORIUM: ORIENTED AND CAN DO SERIAL ADDITIONS
TREMOR: NO TREMOR
VISUAL DISTURBANCES: NOT PRESENT
NAUSEA AND VOMITING: NO NAUSEA AND NO VOMITING
VISUAL DISTURBANCES: NOT PRESENT
PAROXYSMAL SWEATS: NO SWEAT VISIBLE
NAUSEA AND VOMITING: NO NAUSEA AND NO VOMITING
NAUSEA AND VOMITING: NO NAUSEA AND NO VOMITING
HEADACHE, FULLNESS IN HEAD: NOT PRESENT
AGITATION: NORMAL ACTIVITY
AUDITORY DISTURBANCES: NOT PRESENT
ANXIETY: MODERATELY ANXIOUS OR GUARDED, SO ANXIETY IS INFERRED
ORIENTATION AND CLOUDING OF SENSORIUM: ORIENTED AND CAN DO SERIAL ADDITIONS
AUDITORY DISTURBANCES: NOT PRESENT
TOTAL SCORE: 2
EVER_SMOKED: YES
ORIENTATION AND CLOUDING OF SENSORIUM: ORIENTED AND CAN DO SERIAL ADDITIONS
EVER HAD A DRINK FIRST THING IN THE MORNING TO STEADY YOUR NERVES TO GET RID OF A HANGOVER: YES
AUDITORY DISTURBANCES: NOT PRESENT
AGITATION: MODERATELY FIDGETY AND RESTLESS
AGITATION: *
EVER FELT BAD OR GUILTY ABOUT YOUR DRINKING: NO
HEADACHE, FULLNESS IN HEAD: NOT PRESENT
ORIENTATION AND CLOUDING OF SENSORIUM: ORIENTED AND CAN DO SERIAL ADDITIONS
HAVE PEOPLE ANNOYED YOU BY CRITICIZING YOUR DRINKING: NO
ANXIETY: MILDLY ANXIOUS
DOES PATIENT WANT TO STOP DRINKING: NO
ON A TYPICAL DAY WHEN YOU DRINK ALCOHOL HOW MANY DRINKS DO YOU HAVE: 10
AVERAGE NUMBER OF DAYS PER WEEK YOU HAVE A DRINK CONTAINING ALCOHOL: 7
TOTAL SCORE: 2
PAROXYSMAL SWEATS: NO SWEAT VISIBLE
VISUAL DISTURBANCES: NOT PRESENT
PAROXYSMAL SWEATS: NO SWEAT VISIBLE
TREMOR: NO TREMOR
NAUSEA AND VOMITING: NO NAUSEA AND NO VOMITING
AUDITORY DISTURBANCES: NOT PRESENT
TOTAL SCORE: 1
TREMOR: NO TREMOR
PAROXYSMAL SWEATS: NO SWEAT VISIBLE
TOTAL SCORE: 2

## 2022-03-07 ASSESSMENT — ENCOUNTER SYMPTOMS
BLURRED VISION: 0
BRUISES/BLEEDS EASILY: 0
WHEEZING: 0
NAUSEA: 0
SINUS PAIN: 0
FEVER: 0
DIZZINESS: 0
ORTHOPNEA: 1
DEPRESSION: 0
DOUBLE VISION: 0
PALPITATIONS: 0
WHEEZING: 1
MUSCULOSKELETAL NEGATIVE: 1
EYES NEGATIVE: 1
HEADACHES: 0
BLOOD IN STOOL: 0
VOMITING: 0
COUGH: 1
INSOMNIA: 0
CONSTIPATION: 0
SHORTNESS OF BREATH: 1
SPUTUM PRODUCTION: 1
CHILLS: 0
WEAKNESS: 0
FOCAL WEAKNESS: 0
HEARTBURN: 0
NERVOUS/ANXIOUS: 1
NECK PAIN: 0
SORE THROAT: 0
NEUROLOGICAL NEGATIVE: 1
ABDOMINAL PAIN: 0
LOSS OF CONSCIOUSNESS: 0
CARDIOVASCULAR NEGATIVE: 1
FLANK PAIN: 0
DIARRHEA: 0
BACK PAIN: 0
GASTROINTESTINAL NEGATIVE: 1

## 2022-03-07 ASSESSMENT — PATIENT HEALTH QUESTIONNAIRE - PHQ9
2. FEELING DOWN, DEPRESSED, IRRITABLE, OR HOPELESS: NOT AT ALL
SUM OF ALL RESPONSES TO PHQ9 QUESTIONS 1 AND 2: 0
1. LITTLE INTEREST OR PLEASURE IN DOING THINGS: NOT AT ALL

## 2022-03-07 ASSESSMENT — COPD QUESTIONNAIRES
HAVE YOU SMOKED AT LEAST 100 CIGARETTES IN YOUR ENTIRE LIFE: YES
DO YOU EVER COUGH UP ANY MUCUS OR PHLEGM?: NO/ONLY WITH OCCASIONAL COLDS OR INFECTIONS
DURING THE PAST 4 WEEKS HOW MUCH DID YOU FEEL SHORT OF BREATH: NONE/LITTLE OF THE TIME
COPD SCREENING SCORE: 4

## 2022-03-07 ASSESSMENT — FIBROSIS 4 INDEX
FIB4 SCORE: 2.64
FIB4 SCORE: 2.77

## 2022-03-07 ASSESSMENT — PULMONARY FUNCTION TESTS: EPAP_CMH2O: 8

## 2022-03-07 ASSESSMENT — PAIN DESCRIPTION - PAIN TYPE
TYPE: ACUTE PAIN
TYPE: ACUTE PAIN

## 2022-03-07 NOTE — ED NOTES
Handoff report given to Cortney RUANO. Pt transported to [facility] with RN and tech on NRB with full O2 tank and on monitor.

## 2022-03-07 NOTE — ED PROVIDER NOTES
ED Provider Note        Primary care provider: Paolo Rubin M.D. (Inactive)    I verified that the patient was wearing a mask and I was wearing appropriate PPE every time I entered the room. The patient's mask was on the patient at all times during my encounter except for a brief view of the oropharynx.      CHIEF COMPLAINT  Chief Complaint   Patient presents with   • Shortness of Breath     BIBA for SOB. Per EMS complaint started at 2130. Pt's O2 was in the 80's for EMS. Hx of COPD.        HPI  Juan Manuel Bass is a 71 y.o. male who presents to the Emergency Department with chief complaint of shortness of breath.  Called by EMS for ground-level fall patient was found on the floor in his place of living skin tear to left shoulder is profoundly hypoxic O2 in the low 80s with sonorous respirations.  EMS placed him on BiPAP given 1 dose of albuterol neb patient had improved aeration in route but still having profound increased work of breathing.  States has been having trouble breathing for several days.  He denies fevers or chills he has had a cough he denies head injury or loss of consciousness during this fall history of present illness is otherwise limited by patient's respiratory distress and critical presentation.  Patient is able to verbalize that he is not sure if he would want to be placed on a breathing machine he would not want compressions for CPR.    REVIEW OF SYSTEMS  As per HPI otherwise limited by critical presentation/respiratory distress    PAST MEDICAL HISTORY   has a past medical history of Chronic airway obstruction, not elsewhere classified and Hypertension.    SURGICAL HISTORY  patient denies any surgical history    SOCIAL HISTORY  Social History     Tobacco Use   • Smoking status: Current Every Day Smoker     Packs/day: 1.00     Years: 4.00     Pack years: 4.00      Social History     Substance and Sexual Activity   Drug Use Not on file       FAMILY HISTORY  Non-Contributory    CURRENT  "MEDICATIONS  Home Medications    **Home medications have not yet been reviewed for this encounter**         ALLERGIES  Allergies   Allergen Reactions   • Tetanus Toxoid Hives       PHYSICAL EXAM  VITAL SIGNS: Pulse 98   Temp 36.3 °C (97.4 °F) (Temporal)   Resp (!) 42   Ht 1.778 m (5' 10\")   Wt (!) 136 kg (300 lb)   SpO2 99%   BMI 43.05 kg/m²   Pulse ox interpretation: I interpret this pulse ox as normal.  Constitutional: Alert and oriented x 3, Distress  HEENT: Atraumatic normocephalic, pupils are equal round, extraocular movements are intact. The nares is clear, external ears are normal, mouth shows moist mucous membranes  Neck: no obvious JVD or tracheal deviation  Cardiovascular: Regular rate and rhythm no murmur rub or gallop   Thorax & Lungs: No respiratory distress, no wheezes rales or rhonchi, No chest tenderness.   GI: Soft nontender nondistended positive bowel sounds, no peritoneal signs  Skin: Warm dry no obvious acute rash or lesion  Musculoskeletal: Moving all extremities with normal range strength, no acute  deformity  Neurologic: Cranial nerves III through XII are grossly intact, no sensory deficit, no cerebellar dysfunction   Psychiatric: Appropriate affect for situation at this time      DIAGNOSTIC STUDIES / PROCEDURES  LABS    Results for orders placed or performed during the hospital encounter of 03/06/22   LACTIC ACID   Result Value Ref Range    Lactic Acid 3.1 (H) 0.5 - 2.0 mmol/L   LACTIC ACID   Result Value Ref Range    Lactic Acid 2.8 (H) 0.5 - 2.0 mmol/L   CBC WITH DIFFERENTIAL   Result Value Ref Range    WBC 11.7 (H) 4.8 - 10.8 K/uL    RBC 5.36 4.70 - 6.10 M/uL    Hemoglobin 17.0 14.0 - 18.0 g/dL    Hematocrit 47.9 42.0 - 52.0 %    MCV 89.4 81.4 - 97.8 fL    MCH 31.7 27.0 - 33.0 pg    MCHC 35.5 (H) 33.7 - 35.3 g/dL    RDW 43.2 35.9 - 50.0 fL    Platelet Count 241 164 - 446 K/uL    MPV 9.5 9.0 - 12.9 fL    Neutrophils-Polys 84.40 (H) 44.00 - 72.00 %    Lymphocytes 7.00 (L) 22.00 - 41.00 " %    Monocytes 7.60 0.00 - 13.40 %    Eosinophils 0.30 0.00 - 6.90 %    Basophils 0.20 0.00 - 1.80 %    Immature Granulocytes 0.50 0.00 - 0.90 %    Nucleated RBC 0.00 /100 WBC    Neutrophils (Absolute) 9.87 (H) 1.82 - 7.42 K/uL    Lymphs (Absolute) 0.82 (L) 1.00 - 4.80 K/uL    Monos (Absolute) 0.89 (H) 0.00 - 0.85 K/uL    Eos (Absolute) 0.03 0.00 - 0.51 K/uL    Baso (Absolute) 0.02 0.00 - 0.12 K/uL    Immature Granulocytes (abs) 0.06 0.00 - 0.11 K/uL    NRBC (Absolute) 0.00 K/uL   COMP METABOLIC PANEL   Result Value Ref Range    Sodium 117 (LL) 135 - 145 mmol/L    Potassium 4.4 3.6 - 5.5 mmol/L    Chloride 80 (L) 96 - 112 mmol/L    Co2 21 20 - 33 mmol/L    Anion Gap 16.0 7.0 - 16.0    Glucose 99 65 - 99 mg/dL    Bun 4 (L) 8 - 22 mg/dL    Creatinine 0.47 (L) 0.50 - 1.40 mg/dL    Calcium 9.1 8.5 - 10.5 mg/dL    AST(SGOT) 43 12 - 45 U/L    ALT(SGPT) 23 2 - 50 U/L    Alkaline Phosphatase 88 30 - 99 U/L    Total Bilirubin 1.2 0.1 - 1.5 mg/dL    Albumin 4.3 3.2 - 4.9 g/dL    Total Protein 8.4 (H) 6.0 - 8.2 g/dL    Globulin 4.1 (H) 1.9 - 3.5 g/dL    A-G Ratio 1.0 g/dL   URINALYSIS    Specimen: Urine   Result Value Ref Range    Color Yellow     Character Clear     Specific Gravity 1.013 <1.035    Ph 5.5 5.0 - 8.0    Glucose Negative Negative mg/dL    Ketones 15 (A) Negative mg/dL    Protein 30 (A) Negative mg/dL    Bilirubin Negative Negative    Urobilinogen, Urine 0.2 Negative    Nitrite Negative Negative    Leukocyte Esterase Negative Negative    Occult Blood Negative Negative    Micro Urine Req Microscopic    Magnesium   Result Value Ref Range    Magnesium 1.8 1.5 - 2.5 mg/dL   Troponin   Result Value Ref Range    Troponin T 26 (H) 6 - 19 ng/L   proBrain Natriuretic Peptide, NT   Result Value Ref Range    NT-proBNP 378 (H) 0 - 125 pg/mL   Arterial Blood Gas   Result Value Ref Range    Ph 7.36 (L) 7.40 - 7.50    Pco2 37.7 (H) 26.0 - 37.0 mmHg    Po2 175.5 (H) 64.0 - 87.0 mmHg    O2 Saturation 99.1 (H) 93.0 - 99.0 %     Hco3 21 17 - 25 mmol/L    Base Excess -4 -4 - 3 mmol/L    Body Temp see below Centigrade   ESTIMATED GFR   Result Value Ref Range    GFR If African American >60 >60 mL/min/1.73 m 2    GFR If Non African American >60 >60 mL/min/1.73 m 2   URINE SODIUM RANDOM   Result Value Ref Range    Sodium, Urine -per volume <20 mmol/L   OSMOLALITY URINE   Result Value Ref Range    Osmolality Urine 294 (L) 300 - 900 mOsm/kg H2O   OSMOLALITY SERUM   Result Value Ref Range    Osmolality Serum 260 (L) 278 - 298 mOsm/kg H2O   PROCALCITONIN   Result Value Ref Range    Procalcitonin <0.05 <0.25 ng/mL   PHOSPHORUS   Result Value Ref Range    Phosphorus 3.5 2.5 - 4.5 mg/dL   URINE MICROSCOPIC (W/UA)   Result Value Ref Range    WBC 0-2 (A) /hpf    RBC 2-5 (A) /hpf    Bacteria Negative None /hpf    Epithelial Cells Negative /hpf    Hyaline Cast 0-2 /lpf   EKG   Result Value Ref Range    Report       [facility] Emergency Dept.    Test Date:  2022  Pt Name:    BRIDGETTE VARNER                  Department: ER  MRN:        [id number]                      Room:        02  Gender:     Male                         Technician: [id number]  :        1950                   Requested By:KIERRA GAFFNEY  Order #:    [id number]                    Reading MD: KIERRA GAFFNEY MD    Measurements  Intervals                                Axis  Rate:       99                           P:          -89  GA:         151                          QRS:        69  QRSD:       104                          T:          57  QT:         351  QTc:        450    Interpretive Statements  Limited by artifact  Sinus rhythm  RSR' in V1 or V2, right VCD or RVH  Compared to ECG 2011 18:28:39  No significant changes  Electronically Signed On 3-6-2022 23:44:25 PST by KIERRA GAFFNEY MD         All labs reviewed by me.      RADIOLOGY  DX-CHEST-PORTABLE (1 VIEW)   Final Result      1.  Faint bibasilar opacities consistent with  "atelectasis and or pneumonitis.      EC-ECHOCARDIOGRAM COMPLETE W/O CONT    (Results Pending)     Bedside echocardiogram shows normal ejection fractions no pulmonary B-lines.    COURSE & MEDICAL DECISION MAKING  Pertinent Labs & Imaging studies reviewed. (See chart for details)    11:42 PM - Patient seen and examined at bedside.     Patient noted to have slightly elevated blood pressure likely circumstantial secondary to presenting complaint. Referred to primary care physician for further evaluation.      Medical Decision Making: Patient presents in acute respiratory failure is on BiPAP by EMS.  Patient was showing vast improvement with BiPAP however was having a very difficult time tolerating it.  Patient was discontinued off BiPAP initiated on high flow oxygen.  His physical and history consistent with acute COPD.  Patient was given a DuoNeb given 125 Solu-Medrol.  His breathing is consistently improving he refuses to go back on BiPAP he has a sodium of 117.  I discussed at length with patient and patient request to be made DNR/DNI does not want to be intubated does not want chest compressions should his heart stop.  Patient is however still requiring high flow oxygen to maintain saturations and has dangerously low sodium.  I discussed this with intensivist Dr. Lau and patient has been deemed appropriate for the intermediate care unit.  Patient will be admitted under the care of Dr. Aiken.  Admitted in guarded condition.    /85   Pulse 82   Temp 36.3 °C (97.4 °F) (Temporal)   Resp (!) 21   Ht 1.778 m (5' 10\")   Wt (!) 135 kg (298 lb 8.1 oz)   SpO2 93%   BMI 42.83 kg/m²         FINAL IMPRESSION  1. Acute exacerbation of chronic obstructive pulmonary disease (COPD) (HCC) Active   2. Hyponatremia Active   3. Hypoxia Active   4. COPD exacerbation (HCC)    5.  Respiratory failure    This dictation has been created using voice recognition software and/or scribes. The accuracy of the dictation is limited " by the abilities of the software and the expertise of the scribes. I expect there may be some errors of grammar and possibly content. I made every attempt to manually correct the errors within my dictation. However, errors related to voice recognition software and/or scribes may still exist and should be interpreted within the appropriate context.

## 2022-03-07 NOTE — ED NOTES
"Pt vomited x1, RN and ERP to bedside. Pt again stating that he does not want to be intubated. Pt also states he does not want the high flow NC on either. Not pulling off at this time. Pt educated that it is helping him and that he could die without it. Pt states \"good\". ERP aware.   Orders for zofran obtained.   "

## 2022-03-07 NOTE — PROGRESS NOTES
Med rec completed per patient at bedside and patient's pharmacy, [facility] on [facility] ([phone]).  Allergies reviewed with patient.  No outpatient antibiotics in the last 30 days.    Per [facility], patient is prescribed Trelegy Ellipta 100-62.5-25 with directions to inhale 1 puff once per day. Patient reports using 2-3 puffs once per day depending on how short of breath he feels.

## 2022-03-07 NOTE — CONSULTS
Critical Care Consultation    Date of consult: 3/7/2022    Referring Physician  Archie Aiken D.O.    Reason for Consultation  Acute hypoxemic respiratory failure    History of Presenting Illness  71 y.o. male who presented 3/6/2022 with history of COPD, presented to ED with oxygen in the 70s required BiPAP initially however patient did not tolerate BiPAP, received Solu-Medrol and DuoNebs and breathing began to improve was consulted for significant work of breathing.  Patient was not tolerating BiPAP and was switched to high flow with oxygen of 93, still with significant work of breathing.  Patient vomited significantly after BiPAP was removed.     However discussion with ED physician after I been consulted patient stated he wanted to be DNR DNI.  When I went to discuss with patient explained to him that he had significant COPD, and low sodium, asked patient if he had worsening respiratory distress to the point he may die would he want to trial the BiPAP again.  Patient adamantly denied stating he does not want to wear that machine he was not comfortable and he would not do that and would rather be comfortable if he was worsening.     Discussed case with hospitalist, patient does have hyponatremia however no neurologic symptoms, likely hypervolemia (patient with protuberant belly and some edema that appears chronic in lower extremities, ?  Right heart failure with his significant COPD, and elevated BNP- likely lower than would be due to pts obesity ) versus SIADH urine lytes have been collected prior, feel appropriate for IMC admission at this time with every 4 hours sodium checks.  With treatment for COPD.  Agreed appropriate for IMC at this time    Consider palliative care and possible comfort care if respiratory status worsens significantly and patient not wanting to pursue BiPAP or intubation.     Code Status  DNAR/DNI    Review of Systems  ROS shortness of breath(improved since admitted), cough sputum  production.     Past Medical History   has a past medical history of Chronic airway obstruction, not elsewhere classified and Hypertension.    Surgical History   has no past surgical history on file.    Family History  family history includes Heart Disease in his father; No Known Problems in his mother.    Social History   reports that he quit smoking about 2 years ago. He has a 4.00 pack-year smoking history. He does not have any smokeless tobacco history on file. He reports previous alcohol use. He reports previous drug use.    Medications  Home Medications    **Home medications have not yet been reviewed for this encounter**       Current Facility-Administered Medications   Medication Dose Route Frequency Provider Last Rate Last Admin   • ipratropium-albuterol (DUONEB) nebulizer solution  3 mL Nebulization Q4HRS (RT) Aura Lau M.D.   3 mL at 03/07/22 0210   • ipratropium-albuterol (DUONEB) nebulizer solution  3 mL Nebulization Q2HRS PRN (RT) Aura Lau M.D.       • methylPREDNISolone (SOLU-MEDROL) 40 MG injection 40 mg  40 mg Intravenous DAILY Aura Lau M.D.       • dexmedetomidine (Precedex) 400 mcg/100mL D5W premix infusion  0.1-1.5 mcg/kg/hr Intravenous Continuous Aura Lau M.D. 6.8 mL/hr at 03/07/22 0258 0.2 mcg/kg/hr at 03/07/22 0258   • LORazepam (ATIVAN) injection 0.5 mg  0.5 mg Intravenous Once Ger Spicer M.D.       • fluticasone-salmeterol (Advair) 100-50 MCG/DOSE inhaler 1 Puff  1 Puff Inhalation Q12HRS Archie Aiken D.O.       • hydroCHLOROthiazide (HYDRODIURIL) tablet 25 mg  25 mg Oral DAILY Archie Aiken D.O.       • Respiratory Therapy Consult   Nebulization Continuous RT Archie Aiken D.O.       • senna-docusate (PERICOLACE or SENOKOT S) 8.6-50 MG per tablet 2 Tablet  2 Tablet Oral BID Archie Aiken D.O.        And   • polyethylene glycol/lytes (MIRALAX) PACKET 1 Packet  1 Packet Oral QDAY PRN Archie Aiken D.O.        And   •  magnesium hydroxide (MILK OF MAGNESIA) suspension 30 mL  30 mL Oral QDAY PRN Archie Aiken D.O.        And   • bisacodyl (DULCOLAX) suppository 10 mg  10 mg Rectal QDAY PRN Archie Aiken D.O.       • enoxaparin (LOVENOX) inj 40 mg  40 mg Subcutaneous DAILY Archie Aiken D.O.       • acetaminophen (Tylenol) tablet 650 mg  650 mg Oral Q6HRS PRN Archie Aiken D.O.       • Pharmacy Consult Request ...Pain Management Review 1 Each  1 Each Other PHARMACY TO DOSE Archie Aiken D.O.       • oxyCODONE immediate-release (ROXICODONE) tablet 2.5 mg  2.5 mg Oral Q3HRS PRN Archie Aiken D.O.        Or   • oxyCODONE immediate-release (ROXICODONE) tablet 5 mg  5 mg Oral Q3HRS PRN Archie Aiken D.O.        Or   • HYDROmorphone (Dilaudid) injection 0.25 mg  0.25 mg Intravenous Q3HRS PRN Archie Aiken D.O.       • labetalol (NORMODYNE/TRANDATE) injection 10 mg  10 mg Intravenous Q4HRS PRN Archie Aiken D.O.       • ondansetron (ZOFRAN) syringe/vial injection 4 mg  4 mg Intravenous Q4HRS PRN Archie Aiken D.O.       • ondansetron (ZOFRAN ODT) dispertab 4 mg  4 mg Oral Q4HRS PRN Archie iAken D.O.       • guaiFENesin dextromethorphan (ROBITUSSIN DM) 100-10 MG/5ML syrup 10 mL  10 mL Oral Q6HRS PRN Archie Aiken, D.O.       • LORazepam (ATIVAN) injection 0.5 mg  0.5 mg Intravenous Q4HRS PRN Archie Aiken, D.O.           Allergies  Allergies   Allergen Reactions   • Tetanus Toxoid Hives       Vital Signs last 24 hours  Temp:  [36.3 °C (97.4 °F)] 36.3 °C (97.4 °F)  Pulse:  [82-98] 82  Resp:  [19-58] 21  BP: (142-172)/(80-98) 150/85  SpO2:  [93 %-99 %] 93 %    Physical Exam  Physical Exam   Patient awake speaking in full sentences though is having some respiratory distress respiratory rate in the 30s  Decreased breath sounds in left lung base, wheezing throughout  Protuberant abdomen  Legs with some edema not pitting appears chronic venous stasis with hyperkeratosis    Fluids  No  intake or output data in the 24 hours ending 03/07/22 0321    Laboratory  Recent Results (from the past 48 hour(s))   LACTIC ACID    Collection Time: 03/06/22 11:30 PM   Result Value Ref Range    Lactic Acid 3.1 (H) 0.5 - 2.0 mmol/L   CBC WITH DIFFERENTIAL    Collection Time: 03/06/22 11:30 PM   Result Value Ref Range    WBC 11.7 (H) 4.8 - 10.8 K/uL    RBC 5.36 4.70 - 6.10 M/uL    Hemoglobin 17.0 14.0 - 18.0 g/dL    Hematocrit 47.9 42.0 - 52.0 %    MCV 89.4 81.4 - 97.8 fL    MCH 31.7 27.0 - 33.0 pg    MCHC 35.5 (H) 33.7 - 35.3 g/dL    RDW 43.2 35.9 - 50.0 fL    Platelet Count 241 164 - 446 K/uL    MPV 9.5 9.0 - 12.9 fL    Neutrophils-Polys 84.40 (H) 44.00 - 72.00 %    Lymphocytes 7.00 (L) 22.00 - 41.00 %    Monocytes 7.60 0.00 - 13.40 %    Eosinophils 0.30 0.00 - 6.90 %    Basophils 0.20 0.00 - 1.80 %    Immature Granulocytes 0.50 0.00 - 0.90 %    Nucleated RBC 0.00 /100 WBC    Neutrophils (Absolute) 9.87 (H) 1.82 - 7.42 K/uL    Lymphs (Absolute) 0.82 (L) 1.00 - 4.80 K/uL    Monos (Absolute) 0.89 (H) 0.00 - 0.85 K/uL    Eos (Absolute) 0.03 0.00 - 0.51 K/uL    Baso (Absolute) 0.02 0.00 - 0.12 K/uL    Immature Granulocytes (abs) 0.06 0.00 - 0.11 K/uL    NRBC (Absolute) 0.00 K/uL   COMP METABOLIC PANEL    Collection Time: 03/06/22 11:30 PM   Result Value Ref Range    Sodium 117 (LL) 135 - 145 mmol/L    Potassium 4.4 3.6 - 5.5 mmol/L    Chloride 80 (L) 96 - 112 mmol/L    Co2 21 20 - 33 mmol/L    Anion Gap 16.0 7.0 - 16.0    Glucose 99 65 - 99 mg/dL    Bun 4 (L) 8 - 22 mg/dL    Creatinine 0.47 (L) 0.50 - 1.40 mg/dL    Calcium 9.1 8.5 - 10.5 mg/dL    AST(SGOT) 43 12 - 45 U/L    ALT(SGPT) 23 2 - 50 U/L    Alkaline Phosphatase 88 30 - 99 U/L    Total Bilirubin 1.2 0.1 - 1.5 mg/dL    Albumin 4.3 3.2 - 4.9 g/dL    Total Protein 8.4 (H) 6.0 - 8.2 g/dL    Globulin 4.1 (H) 1.9 - 3.5 g/dL    A-G Ratio 1.0 g/dL   Magnesium    Collection Time: 03/06/22 11:30 PM   Result Value Ref Range    Magnesium 1.8 1.5 - 2.5 mg/dL   Troponin     Collection Time: 22 11:30 PM   Result Value Ref Range    Troponin T 26 (H) 6 - 19 ng/L   proBrain Natriuretic Peptide, NT    Collection Time: 22 11:30 PM   Result Value Ref Range    NT-proBNP 378 (H) 0 - 125 pg/mL   ESTIMATED GFR    Collection Time: 22 11:30 PM   Result Value Ref Range    GFR If African American >60 >60 mL/min/1.73 m 2    GFR If Non African American >60 >60 mL/min/1.73 m 2   OSMOLALITY SERUM    Collection Time: 22 11:30 PM   Result Value Ref Range    Osmolality Serum 260 (L) 278 - 298 mOsm/kg H2O   PROCALCITONIN    Collection Time: 22 11:30 PM   Result Value Ref Range    Procalcitonin <0.05 <0.25 ng/mL   PHOSPHORUS    Collection Time: 22 11:30 PM   Result Value Ref Range    Phosphorus 3.5 2.5 - 4.5 mg/dL   EKG    Collection Time: 22 11:36 PM   Result Value Ref Range    Report       [facility] Emergency Dept.    Test Date:  2022  Pt Name:    BRIDGETTE VARNER                  Department: ER  MRN:        [id number]                      Room:       [facility]  Gender:     Male                         Technician: [id number]  :        1950                   Requested By:KIERRA GAFFNEY  Order #:    [id number]                    Reading MD: KIERRA GAFFNEY MD    Measurements  Intervals                                Axis  Rate:       99                           P:          -89  CO:         151                          QRS:        69  QRSD:       104                          T:          57  QT:         351  QTc:        450    Interpretive Statements  Limited by artifact  Sinus rhythm  RSR' in V1 or V2, right VCD or RVH  Compared to ECG 2011 18:28:39  No significant changes  Electronically Signed On 3-6-2022 23:44:25 PST by KIERRA GAFFNEY MD     LACTIC ACID    Collection Time: 22 12:34 AM   Result Value Ref Range    Lactic Acid 2.8 (H) 0.5 - 2.0 mmol/L   Arterial Blood Gas    Collection Time: 22 12:34 AM    Result Value Ref Range    Ph 7.36 (L) 7.40 - 7.50    Pco2 37.7 (H) 26.0 - 37.0 mmHg    Po2 175.5 (H) 64.0 - 87.0 mmHg    O2 Saturation 99.1 (H) 93.0 - 99.0 %    Hco3 21 17 - 25 mmol/L    Base Excess -4 -4 - 3 mmol/L    Body Temp see below Centigrade   URINALYSIS    Collection Time: 03/07/22  2:33 AM    Specimen: Urine   Result Value Ref Range    Color Yellow     Character Clear     Specific Gravity 1.013 <1.035    Ph 5.5 5.0 - 8.0    Glucose Negative Negative mg/dL    Ketones 15 (A) Negative mg/dL    Protein 30 (A) Negative mg/dL    Bilirubin Negative Negative    Urobilinogen, Urine 0.2 Negative    Nitrite Negative Negative    Leukocyte Esterase Negative Negative    Occult Blood Negative Negative    Micro Urine Req Microscopic    URINE SODIUM RANDOM    Collection Time: 03/07/22  2:33 AM   Result Value Ref Range    Sodium, Urine -per volume <20 mmol/L   URINE MICROSCOPIC (W/UA)    Collection Time: 03/07/22  2:33 AM   Result Value Ref Range    WBC 0-2 (A) /hpf    RBC 2-5 (A) /hpf    Bacteria Negative None /hpf    Epithelial Cells Negative /hpf    Hyaline Cast 0-2 /lpf       Imaging  DX-CHEST-PORTABLE (1 VIEW)   Final Result      1.  Faint bibasilar opacities consistent with atelectasis and or pneumonitis.      EC-ECHOCARDIOGRAM COMPLETE W/O CONT    (Results Pending)       Assessment/Plan  COPD exacerbation  Significant COPD exacerbation requiring BiPAP initially, however patient did not tolerate BiPAP  Long conversation with patient he is DNR/DNI, and does not want a retrial BiPAP if respiratory status worsens  Understands that this means that we would transition to more of a comfort measures, patient was awake and alert and able to make own decisions, and showed understanding (normal pH on VBG)  Admit to IMC as patient with significant respiratory distress, but would not want BiPAP.   Continue Solu-Medrol, Zithromax, DuoNebs  Covid test ordered (patient not vaccinated)  Pro-Elia negative does not appear to have  pneumonia  Not significantly tachycardic doubt PE at this time.     Hyponatremia:  Patient does not appear hypovolemic, no signs of sepsis, and hypertensive  Legs with what appears to be chronic edema, with chronic venous stasis keratosis, chest x-ray with some evidence of mild edema, with possible pleural effusion versus atelectasis, and abdomen protuberant? Ascites  Patient possibly with right heart failure leading to fluid overload  BNP elevated at 378 (which in patients with obesity is often lower than actual number)  Urine sodium less than 20 so rules out SIADH.   Lasix 20 twice daily ordered-adjust per response by hospitalist  Every 4 hour sodiums, if trending in the correct direction and this is from fluid overload, can likely extend out time as this may be a slow improvement  -If worsening or neurologic symptoms and needs closer monitoring in ICU consider reconsult    Lactic acidosis  Lactic acid improving, likely due to respiratory distress hypoxia and continuous albuterol that was used previously.   No signs of sepsis pro-Elia negative      The patient remains critically ill.  Critical care time = 25 minutes in directly providing and coordinating critical care and extensive data review.  No time overlap and excludes procedures.

## 2022-03-07 NOTE — ED NOTES
Pt medicated per MAR. Pt remains on HFNC, RR in the 30-40s, labored breathing. On monitor, call light within reach.

## 2022-03-07 NOTE — PROGRESS NOTES
[facility] Medicine Daily Progress Note    Date of Service  3/7/2022    Chief Complaint  Juan Manuel Bass is a 71 y.o. male admitted 3/6/2022 with the patient reporting a fall at home, calling 911, found hypoxic    Hospital Course  This is a 71-year-old male with a past medical history of COPD, significant alcohol abuse, failure to thrive and poor overall medical care, reportedly had a fall at home, was calling EMS and was found with oxygenation in the 80s, EMS placed the patient on BiPAP and gave albuterol, had some improved aeration and rhabdo but arrived in the emergency room still with significant amount of dyspnea and increased work of breathing.  Patient stated that he had difficulty breathing for several days, he denied fevers or chills, the patient denied loss of consciousness, denied head injury, the patient was clear that he would not want chest compressions or intubation.  He carries a diagnosis of hypertension as well, he reports having quit tobacco 5 years ago.  Patient has a 40-pack-year history of tobacco use, the patient is unvaccinated against COVID-19.  He refuses vaccination.  The patient had some agitation was placed on a Precedex drip in the emergency room.  The patient was found with significant hyponatremia, does not appear to be symptomatic from that, he apparently is a heavy beer drinker.  Admits to up to 12 beers daily, admission medication reconciliation reviewed, question medical compliance, the patient with severe lower extremity dermatitis, unkempt feet and skin.  Patient with slight leukocytosis, chemistry with significant hyponatremia initially 117, improving/resolving lactic acidosis, hemoglobin A1c 5.6, appears to be hypervolemic hyponatremia.  COVID-19 negative, low procalcitonin.  Chest x-ray with faint basilar opacities, atelectasis likely, echocardiogram with ejection fraction of 70%, no significant other wall motion abnormality or valvular abnormalities noted.    Interval Problem  Update  Patient seen and examined today.  Data, Medication data reviewed.  Case discussed with nursing as available.  Plan of Care reviewed with patient and notified of changes.  3/7 the patient is afebrile, heart rate in the 80s to 90s, respiration in the mid 20s, the patient is on 40 L, 35% FiO2, blood pressure is in the 120s to 150s, initially in the morning on Precedex, the patient appears forgetful, he feels better he states, he does have some respiratory difficulty and mild productive cough, no respiratory distress though.    I have personally seen and examined the patient at bedside. I discussed the plan of care with patient.    Consultants/Specialty  critical care, pulmonary    Code Status  DNAR/DNI    Disposition  Patient is not medically cleared for discharge.   Anticipate discharge to to home with close outpatient follow-up.  I have placed the appropriate orders for post-discharge needs.    Review of Systems  Review of Systems   Constitutional: Positive for malaise/fatigue.   HENT: Negative.    Eyes: Negative.    Respiratory: Positive for cough and shortness of breath.    Cardiovascular: Negative.    Gastrointestinal: Negative.    Genitourinary: Negative.    Musculoskeletal: Negative.    Skin: Positive for rash.        Lower extremity dermatitis, poor skin care   Neurological: Negative.    Endo/Heme/Allergies: Negative.    Psychiatric/Behavioral: Positive for substance abuse. The patient is nervous/anxious.    All other systems reviewed and are negative.       Physical Exam  Temp:  [36.3 °C (97.4 °F)-36.6 °C (97.8 °F)] 36.6 °C (97.8 °F)  Pulse:  [76-98] 85  Resp:  [14-58] 20  BP: (135-172)/(67-98) 140/92  SpO2:  [91 %-99 %] 91 %    Physical Exam  Vitals and nursing note reviewed.   Constitutional:       Appearance: He is well-developed. He is morbidly obese. He is ill-appearing.      Interventions: Nasal cannula in place.      Comments: Elderly male, unkempt   HENT:      Head: Normocephalic.   Eyes:       Pupils: Pupils are equal, round, and reactive to light.   Neck:      Comments: Large neck circumference  Cardiovascular:      Rate and Rhythm: Normal rate and regular rhythm.      Heart sounds: Normal heart sounds.   Pulmonary:      Effort: Pulmonary effort is normal.      Breath sounds: Rhonchi and rales present.   Abdominal:      General: Bowel sounds are normal.      Palpations: Abdomen is soft.   Genitourinary:     Penis: Normal.       Rectum: Normal.   Musculoskeletal:         General: Normal range of motion.      Cervical back: Normal range of motion.   Skin:     General: Skin is warm.      Findings: Erythema and rash present.      Comments: Bilateral lower extremity dermatitis, flaking skin, poor nail care  Patient was resisting nurses to take of old bandages and assess   Neurological:      Mental Status: He is oriented to person, place, and time. He is lethargic.         Fluids    Intake/Output Summary (Last 24 hours) at 3/7/2022 0743  Last data filed at 3/7/2022 0600  Gross per 24 hour   Intake 300 ml   Output 900 ml   Net -600 ml       Laboratory  Recent Labs     03/06/22  2330 03/07/22  0355   WBC 11.7* 13.6*   RBC 5.36 5.03   HEMOGLOBIN 17.0 16.0   HEMATOCRIT 47.9 44.7   MCV 89.4 88.9   MCH 31.7 31.8   MCHC 35.5* 35.8*   RDW 43.2 43.0   PLATELETCT 241 230   MPV 9.5 9.2     Recent Labs     03/06/22  2330 03/07/22  0355   SODIUM 117* 118*   POTASSIUM 4.4 4.4   CHLORIDE 80* 81*   CO2 21 20   GLUCOSE 99 105*   BUN 4* 6*   CREATININE 0.47* 0.36*   CALCIUM 9.1 8.9             Recent Labs     03/07/22  0355   TRIGLYCERIDE 35   HDL 68   LDL 46       Imaging  EC-ECHOCARDIOGRAM COMPLETE W/ CONT   Final Result      DX-CHEST-PORTABLE (1 VIEW)   Final Result      1.  Faint bibasilar opacities consistent with atelectasis and or pneumonitis.           Assessment/Plan  * COPD exacerbation (HCC)- (present on admission)  Assessment & Plan  Pulmonary medicine consulting  BiPAP trial in ED and patient unable to tolerate,  transition to high flow nasal cannula  On Precedex/Ativan for agitation, wean as possible  Solu-Medrol 40 mg IV daily x5 days  RT, incentive spirometry  Bronchodilators  Robitussin-DM for cough with sputum production  Azithromycin      Acute respiratory failure with hypoxia (HCC)  Assessment & Plan  Secondary to COPD  Pulmonary consulting  Wean oxygen as possible  Currently on high flow nasal cannula, being titrated    Encephalopathy  Assessment & Plan  With some agitation,  Trial to wean off Precedex    Dermatitis  Assessment & Plan  Severe, bilateral lower extremities, wound care orders placed  Emollients  Consider topical steroids    Alcoholism (HCC)  Assessment & Plan  Vitamins, supportive care  Monitor for alcohol withdrawal    Hyponatremia- (present on admission)  Assessment & Plan  Lower specific gravity  Likely hypervolemic hyponatremia, question beer portal myranda  Continue with diuresis, strict ins and outs, daily weights  No mental status changes/tonic-clonic like activity however due to sodium level we will place seizure precautions      Essential hypertension- (present on admission)  Assessment & Plan  2 g sodium restricted diet  Reinstitute medication regimen as indicated, avoid medication contributing to hyponatremia   Diuresis    Obesity- (present on admission)  Assessment & Plan  Strong recommendation for follow-up outpatient PCP for lifestyle modification      Plan  Continue with aggressive pulmonary treatment including diuresis  Titrating high flow nasal cannula  Agitation control hopefully able to come off Precedex  Sodium correction slowly  Vitamin support  CIWA  Wound care  See orders  Medically complex high risk patient  Consider palliative care if the patient does not significantly improve    The patient is critically ill with acute severe respiratory failure requiring high flow nasal cannula which is being titrated actively, critical care time 35 minutes    VTE prophylaxis: enoxaparin  ppx    I have performed a physical exam and reviewed and updated ROS and Plan today (3/7/2022). In review of yesterday's note (3/6/2022), there are no changes except as documented above.      Please note that this dictation was created using voice recognition software. I have made every reasonable attempt to correct obvious errors, but I expect that there are errors of grammar and possibly context that I did not discover before finalizing the note.

## 2022-03-07 NOTE — ED NOTES
Lab called with critical result of  at 0028. Critical lab result read back to LAB.   Dr. Spicer notified of critical lab result at 0029.  Critical lab result read back by Dr. Spicer.

## 2022-03-07 NOTE — DISCHARGE PLANNING
Medical Social Work    Referral: Acute Medical Patient    Intervention: Pt is a 71 year old male brought in by WILLY from home for SOB.  Pt is Juan Manuel Shelia (: 1950.  Pt is alert and oriented at this time.  Per chart pt's emergency contact is his son, Jose ([phone]).    Plan: SW will follow as needed.

## 2022-03-07 NOTE — DIETARY
NUTRITION SERVICES: BMI - Pt with BMI >40 (=Body mass index is 42.83 kg/m².), Class III obesity. Weight loss counseling not appropriate in acute care setting.   RECOMMEND - If appropriate at DC please refer to outpatient services for weight management.

## 2022-03-07 NOTE — ASSESSMENT & PLAN NOTE
Unknown chronicity  Etiology unclear as urine lytes done 24hrs after admission making interpretation dificult  Drinks 6-8 beers and approx 1 litre water  Had been on HCTZ  Does appear to be responding to lasix and fluid restriction  DC IV lasix  Liberalize to 2 litre restriction  130

## 2022-03-07 NOTE — ASSESSMENT & PLAN NOTE
Reports he is on trelegy at home with PRN albuterol, however med rec appears inconsistent with this. PFT's pending 6/2022.  -Agree w/ tx for COPD exacerbation, IV steroids for now until O2 requirements and clinical status improves  -Add azithromycin  -Pulmonary toilet  -Mobilize as tolerated, up for all meals  -ICS/LABA/LAMA on discharge, likely w/ severe COPD and debility, anticipate SNF  -Outpatient pulmonary clinic follow-up with PFT's

## 2022-03-07 NOTE — CARE PLAN
The patient is Stable - Low risk of patient condition declining or worsening    Shift Goals  Clinical Goals: breath better    Progress made toward(s) clinical / shift goals:    Problem: Knowledge Deficit - Standard  Goal: Patient and family/care givers will demonstrate understanding of plan of care, disease process/condition, diagnostic tests and medications  Outcome: Not Progressing     Problem: Knowledge Deficit - COPD  Goal: Patient/significant other demonstrates understanding of disease process, utilization of the Action Plan, medications and discharge instruction  Outcome: Not Progressing     Problem: Risk for Infection - COPD  Goal: Patient will remain free from signs and symptoms of infection  Outcome: Not Progressing     Problem: Risk for Aspiration  Goal: Patient's risk for aspiration will be absent or decrease  Outcome: Not Progressing     Problem: Self Care  Goal: Patient will have the ability to perform ADLs independently or with assistance (bathe, groom, dress, toilet and feed)  Outcome: Not Progressing       Patient is not progressing towards the following goals:      Problem: Knowledge Deficit - Standard  Goal: Patient and family/care givers will demonstrate understanding of plan of care, disease process/condition, diagnostic tests and medications  Outcome: Not Progressing     Problem: Knowledge Deficit - COPD  Goal: Patient/significant other demonstrates understanding of disease process, utilization of the Action Plan, medications and discharge instruction  Outcome: Not Progressing     Problem: Risk for Infection - COPD  Goal: Patient will remain free from signs and symptoms of infection  Outcome: Not Progressing     Problem: Risk for Aspiration  Goal: Patient's risk for aspiration will be absent or decrease  Outcome: Not Progressing     Problem: Self Care  Goal: Patient will have the ability to perform ADLs independently or with assistance (bathe, groom, dress, toilet and feed)  Outcome: Not  Progressing

## 2022-03-07 NOTE — H&P
Hospital Medicine History & Physical Note    Date of Service  3/7/2022    Primary Care Physician  No primary care provider on file.    Consultants  Intensivist: Aura Lau M.D.     Code Status  DNAR/DNI    Chief Complaint  Chief Complaint   Patient presents with   • Shortness of Breath     BIBA for SOB. Per EMS complaint started at 2130. Pt's O2 was in the 80's for EMS. Hx of COPD.        History of Presenting Illness  Juan Manuel Bass is a 71 y.o. male who presented 3/6/2022 with complaints of for the past few hours.  Patient states he has a history of COPD and is not on home oxygen.  EMS arrived at his house and noted patient to be hypoxic with SPO2 in the 80s.  He admitted to dyspnea on exertion, cough with sputum production, dyspnea at rest.  He denies use of tobacco use currently and states he quit smoking several years ago.  He was trialed on BiPAP in ED and difficult to maintain on in ED and doesn't want to wear this. He was then placed on HFNC and tolerated better with adequate SPO2. He states he is not vaccinated for Covid 19 and refuses to get the vaccination. He admits to cough with sputum production however denies anosmia, ageusia, fevers, chills, constipation or diarrhea, melena, hematochezia, dysuria, hematuria, incoordination, vertigo, ischemic visual changes.    Vital signs at time of presentation were as follows: 97.4, 98, 42, 166/97, 99% BiPAP.  Patient was downgraded to 40 L on high flow nasal cannula with adequate SPO2.  He was given Precedex drip for some agitation and subsequently some Ativan for his tachypnea.    Chest x-ray performed and reveals faint bibasilar opacities consistent with atelectasis and/or pneumonitis.    Lactic acid initially 3.1, troponin of 26 and CBC reveals possible hemoconcentrated labs versus polycythemia with WBC of 11.7, hemoglobin 17.0/hematocrit 47.9 with MCV of 89.4 and platelet count of 241.    CMP reveals moderate to severe hyponatremia of 117 unknown chronicity  since last labs on chart since 2013.  Otherwise poor nutritional status consistent with low BUN/creatinine at 4 and 0.47 respectively.  Total protein elevated 8.4 otherwise relatively unremarkable.  proBNP elevated at 378.    Intensivist consulted for admission.  Hospitalist consulted for [facility] admission.  Patient agreeable to DO NOT RESUSCITATE DO NOT INTUBATE CODE STATUS at time of evaluation alert and oriented x4.  He will be optimized in ED and subsequent transferred to IM for further optimization of medical management.  Patient given steroids/breathing treatment in ED.    I discussed the plan of care with patient.    Review of Systems  Review of Systems   Constitutional: Negative for chills and fever.   HENT: Negative for congestion and sore throat.    Eyes: Negative for blurred vision and double vision.   Respiratory: Positive for cough, sputum production and shortness of breath. Negative for wheezing.    Cardiovascular: Negative for chest pain and palpitations.   Gastrointestinal: Negative for abdominal pain, blood in stool, constipation, diarrhea, heartburn, melena, nausea and vomiting.   Genitourinary: Negative for dysuria and frequency.   Musculoskeletal: Negative for back pain and neck pain.   Skin: Negative for itching and rash.   Neurological: Negative for focal weakness, loss of consciousness, weakness and headaches.   Endo/Heme/Allergies: Negative for environmental allergies. Does not bruise/bleed easily.   Psychiatric/Behavioral: Negative for depression. The patient does not have insomnia.        Past Medical History   has a past medical history of Chronic airway obstruction, not elsewhere classified and Hypertension.    Surgical History   has no past surgical history on file.     Family History  family history includes Heart Disease in his father; No Known Problems in his mother.   Family history reviewed with patient. There is no family history that is pertinent to the chief complaint.     Social  History   reports that he quit smoking about 2 years ago. He has a 4.00 pack-year smoking history. He does not have any smokeless tobacco history on file. He reports previous alcohol use. He reports previous drug use.    Allergies  Allergies   Allergen Reactions   • Tetanus Toxoid Hives       Medications  Prior to Admission Medications   Prescriptions Last Dose Informant Patient Reported? Taking?   fluticasone-salmeterol (ADVAIR) 100-50 MCG/DOSE AEPB   Yes No   Sig: Inhale 1 Puff by mouth every 12 hours.   hydrochlorothiazide (HYDRODIURIL) 25 MG TABS   Yes No   Sig: Take 25 mg by mouth every day.   hydrocodone-acetaminophen (NORCO) 7.5-325 MG per tablet   Yes No   Sig: Take 1-2 Tabs by mouth every 6 hours as needed.   ibuprofen (MOTRIN) 800 MG TABS   Yes No   Sig: Take 800 mg by mouth every 8 hours as needed.   lisinopril (PRINIVIL) 10 MG TABS   Yes No   Sig: Take 10 mg by mouth every day.      Facility-Administered Medications: None       Physical Exam  Temp:  [36.3 °C (97.4 °F)] 36.3 °C (97.4 °F)  Pulse:  [89-98] 97  Resp:  [27-58] 33  BP: (142-166)/(80-98) 142/98  SpO2:  [93 %-99 %] 94 %  Blood Pressure : 142/98   Temperature: 36.3 °C (97.4 °F)   Pulse: 97   Respiration: (!) 33   Pulse Oximetry: 94 %       Physical Exam  Vitals reviewed.   Constitutional:       General: He is in acute distress.      Appearance: He is obese. He is ill-appearing. He is not toxic-appearing or diaphoretic.   HENT:      Head: Normocephalic and atraumatic.      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  Mild bleeding from mass on pinna     Mouth/Throat:      Mouth: Mucous membranes are moist.      Pharynx: Oropharynx is clear. No oropharyngeal exudate or posterior oropharyngeal erythema.   Eyes:      General: No scleral icterus.     Extraocular Movements: Extraocular movements intact.      Conjunctiva/sclera: Conjunctivae normal.      Pupils: Pupils are equal,  round, and reactive to light.   Neck:      Vascular: No carotid bruit.   Cardiovascular:      Rate and Rhythm: Normal rate and regular rhythm.      Pulses: Normal pulses.      Heart sounds: Normal heart sounds. No murmur heard.    No friction rub. No gallop.   Pulmonary:      Effort: Respiratory distress present.      Breath sounds: Wheezing present. No rhonchi or rales.      Comments: Decreased breath sounds left lung base  Abdominal:      General: Bowel sounds are normal. There is no distension.      Palpations: Abdomen is soft. There is no mass.      Tenderness: There is no abdominal tenderness. There is no guarding or rebound.      Hernia: No hernia is present.      Comments: Protuberant abdomen, NABS x4 quadrants, no abdominal masses appreciated, no organomegaly, negative Rovsing, negative McBurney's, negative Pickering sign.   Musculoskeletal:         General: Normal range of motion.      Cervical back: Normal range of motion and neck supple. No muscular tenderness.      Right lower leg: No edema.      Left lower leg: No edema.   Lymphadenopathy:      Cervical: No cervical adenopathy.   Skin:     General: Skin is warm and dry.      Capillary Refill: Capillary refill takes less than 2 seconds.      Coloration: Skin is not pale.      Findings: No erythema or rash.      Comments: Extensive bilateral hyperkeratosis lower extremities with onychomycoses on nails   Neurological:      General: No focal deficit present.      Mental Status: He is alert and oriented to person, place, and time. Mental status is at baseline.      Cranial Nerves: No cranial nerve deficit.      Sensory: No sensory deficit.      Motor: No weakness.   Psychiatric:         Mood and Affect: Mood normal.         Behavior: Behavior normal.         Thought Content: Thought content normal.         Judgment: Judgment normal.         Laboratory:  Recent Labs     03/06/22  2330   WBC 11.7*   RBC 5.36   HEMOGLOBIN 17.0   HEMATOCRIT 47.9   MCV 89.4   MCH  31.7   MCHC 35.5*   RDW 43.2   PLATELETCT 241   MPV 9.5     Recent Labs     03/06/22  2330   SODIUM 117*   POTASSIUM 4.4   CHLORIDE 80*   CO2 21   GLUCOSE 99   BUN 4*   CREATININE 0.47*   CALCIUM 9.1     Recent Labs     03/06/22  2330   ALTSGPT 23   ASTSGOT 43   ALKPHOSPHAT 88   TBILIRUBIN 1.2   GLUCOSE 99         Recent Labs     03/06/22  2330   NTPROBNP 378*         Recent Labs     03/06/22  2330   TROPONINT 26*     I reviewed and interpreted the above twelve-lead EKG do appreciate artifact V1 and disagree with right bundle branch block appreciated per read.  Significant artifact V3 doubt STEMI, no other ischemic changes appreciated, sinus tachycardia appreciated and QTC within normal limits.  Good R wave progression in precordial leads.    Imaging:  DX-CHEST-PORTABLE (1 VIEW)   Final Result      1.  Faint bibasilar opacities consistent with atelectasis and or pneumonitis.      EC-ECHOCARDIOGRAM COMPLETE W/O CONT    (Results Pending)       I reviewed and interpreted the above chest x-ray and do appreciate bibasilar atelectasis as seen above.    Assessment/Plan:  I anticipate this patient will require at least two midnights for appropriate medical management, necessitating inpatient admission.    * COPD exacerbation (HCC)- (present on admission)  Assessment & Plan  Intensivist consulted and recommends IMCU admission  BiPAP trial in ED and patient unable to tolerate, transition to high flow nasal cannula  On Precedex/Ativan for agitation  Solu-Medrol 40 mg IV daily x5 days  Chest x-ray as above  incentive spirometer for bilateral atelectasis  Duo nebs ordered  Robitussin-DM for cough with sputum production      Hyponatremia- (present on admission)  Assessment & Plan  Urinalysis to determine specific gravity  No overt hypervolemia per physical examination  Urine electrolytes/urine sodium/urine osmolality to determine type of hyponatremia and suspect SIADH, hold lisinopril  Repeat BMP in a.m.  Goal not to increase  sodium levels greater than 4 to 6 mEq within 24-hour.  No mental status changes/tonic-clonic like activity however due to sodium level we will place seizure precautions      Essential hypertension- (present on admission)  Assessment & Plan  2 g sodium restricted diet  Held lisinopril secondary for hyponatremia  As needed antihypertensives ordered  Cardiac diet  Continue hydrochlorothiazide 25 mg p.o. daily    Obesity- (present on admission)  Assessment & Plan  Strong recommendation for follow-up outpatient PCP for lifestyle modification including diet and exercise regiment of at least 30 min of brisk cardiovascular exercise 3-5 times weekly.  Lipid panel ordered and pending  Hemoglobin A1c ordered and pending        VTE prophylaxis: enoxaparin ppx

## 2022-03-07 NOTE — CONSULTS
Pulmonary Consult Note    Date of consult: 3/7/2022  Resident: Juan R Burgos M.D.  Attending:  Dr. Martines    Referring Physician  Cody Hoffman M.D.    Reason for Consultation  Shortness of Breath (BIBA for SOB. Per EMS complaint started at 2130. Pt's O2 was in the 80's for EMS. Hx of COPD. )      History of Present Illness  Juan Manuel Bass is a 71 y.o. male with a PMHx significant for presumed COPD with pending PFT's, alcohol abuse, who presented on 3/6/2022 with shortness of breath for 1 day, admitted for severe hyponatremia and COPD exacerbation.    Patient states that he was in his usual state of health until 1 day prior to admission when he felt short of breath, and his cough began bringing up tan sputum (normally chronic cough without sputum). He has also had wheezing. He is compliant with his trelegy and normally uses albuterol inhaler about once per day, however last few days has been using 3-4. No chest pain, no sick contacts, no new pets, no fever, no chills. He does admit to mild orthopnea. Quit smoking ~5 years ago, 40 pack year history. There is documented 2 doses of COVID vaccination however patient denies ever being vaccinated. He reports his normal exercise tolerance is <1 block of ambulation    In ED placed on HFNC after refusal of BiPAP. Required some sedation for agitation.    In addition he states that he drinks 6+ beers per day, does not eat consistently and may go days without eating real food.    Code Status  DNR/DNI    Past Medical History   has a past medical history of Chronic airway obstruction, not elsewhere classified and Hypertension.    Past Surgical History   has no past surgical history on file.    Medications  Home Medications     Reviewed by Cortney Arellano R.N. (Registered Nurse) on 03/07/22 at 0604  Med List Status: Not Addressed   Medication Last Dose Status   fluticasone-salmeterol (ADVAIR) 100-50 MCG/DOSE AEPB 3/6/2022 Active   hydrochlorothiazide (HYDRODIURIL) 25 MG TABS  3/6/2022 Active   hydrocodone-acetaminophen (NORCO) 7.5-325 MG per tablet  Active   ibuprofen (MOTRIN) 800 MG TABS 3/6/2022 Active   lisinopril (PRINIVIL) 10 MG TABS 3/6/2022 Active                Allergies  Allergies   Allergen Reactions   • Tetanus Toxoid Hives       Social History   reports that he quit smoking about 2 years ago. He has a 4.00 pack-year smoking history. He does not have any smokeless tobacco history on file. He reports previous alcohol use. He reports previous drug use.     Family History  family history includes Heart Disease in his father; No Known Problems in his mother.    Review of Systems  Review of Systems   Constitutional: Negative for chills and fever.   HENT: Positive for congestion. Negative for sinus pain.    Eyes: Negative for blurred vision and double vision.   Respiratory: Positive for cough, sputum production, shortness of breath and wheezing.    Cardiovascular: Positive for orthopnea and leg swelling (chronic). Negative for chest pain and palpitations.   Gastrointestinal: Negative for abdominal pain, heartburn and vomiting.   Genitourinary: Negative for dysuria and flank pain.   Neurological: Negative for dizziness and loss of consciousness.       Vital Signs last 24 hours  Temp:  [36.3 °C (97.4 °F)-36.6 °C (97.8 °F)] 36.6 °C (97.8 °F)  Pulse:  [76-98] 85  Resp:  [14-58] 20  BP: (135-172)/(67-98) 140/92  SpO2:  [91 %-99 %] 91 %  O2 therapy: Pulse Oximetry: 91 %, O2 (LPM): 40, O2 Delivery Device: Heated High Flow Nasal Cannula    Fluids    Intake/Output Summary (Last 24 hours) at 3/7/2022 0946  Last data filed at 3/7/2022 0600  Gross per 24 hour   Intake 300 ml   Output 900 ml   Net -600 ml       Physical Exam  Physical Exam  Constitutional:       General: He is not in acute distress.     Appearance: He is obese. He is ill-appearing.   HENT:      Head: Normocephalic and atraumatic.      Left Ear: External ear normal.      Ears:      Comments: R ear lesion, chronic     Nose: Nose  normal. No congestion.      Mouth/Throat:      Mouth: Mucous membranes are moist.      Pharynx: Oropharynx is clear.   Eyes:      General: No scleral icterus.     Extraocular Movements: Extraocular movements intact.   Neck:      Comments: Left shoulder scab  Cardiovascular:      Rate and Rhythm: Regular rhythm. Tachycardia present.      Pulses: Normal pulses.      Heart sounds: No murmur heard.  Pulmonary:      Effort: Respiratory distress (pursed lip breathing) present.      Breath sounds: Rhonchi (significant rhonchi bilat) present. No wheezing or rales.      Comments: Diminished breath sounds bilaterally  Abdominal:      General: There is distension.      Tenderness: There is no abdominal tenderness. There is no guarding.   Musculoskeletal:         General: Swelling and deformity (bandaged shins bilat with skin changes) present.      Cervical back: Normal range of motion and neck supple. No rigidity.      Right lower leg: Edema present.      Left lower leg: Edema present.      Comments: Quite debilitated, unable to sit up from recumbent position   Skin:     General: Skin is warm and dry.      Capillary Refill: Capillary refill takes less than 2 seconds.   Neurological:      General: No focal deficit present.      Mental Status: He is oriented to person, place, and time.         Laboratory  Recent Labs     03/06/22 2330 03/07/22 0355   WBC 11.7* 13.6*   NEUTSPOLYS 84.40* 95.90*   LYMPHOCYTES 7.00* 1.70*   MONOCYTES 7.60 1.80   EOSINOPHILS 0.30 0.00   BASOPHILS 0.20 0.10   ASTSGOT 43  --    ALTSGPT 23  --    ALKPHOSPHAT 88  --    TBILIRUBIN 1.2  --      Recent Labs     03/06/22 2330 03/07/22 0355 03/07/22  0815   SODIUM 117* 118* 118*   POTASSIUM 4.4 4.4  --    CHLORIDE 80* 81*  --    CO2 21 20  --    BUN 4* 6*  --    CREATININE 0.47* 0.36*  --    MAGNESIUM 1.8  --   --    PHOSPHORUS 3.5  --   --    CALCIUM 9.1 8.9  --      Recent Labs     03/06/22 2330 03/07/22 0355   ALTSGPT 23  --    ASTSGOT 43  --     ALKPHOSPHAT 88  --    TBILIRUBIN 1.2  --    GLUCOSE 99 105*     Recent Labs     03/07/22  0034   [id number] 7.36*   [id number] 37.7*   BIQAZ077D 175.5*   KLGI0KOA 99.1*   ARTHCO3 21   ARTBE -4       Imaging  DX-CHEST-PORTABLE (1 VIEW)   Final Result      1.  Faint bibasilar opacities consistent with atelectasis and or pneumonitis.      EC-ECHOCARDIOGRAM COMPLETE W/O CONT    (Results Pending)       Problem List  Principal Problem:    COPD exacerbation (HCC) POA: Yes  Active Problems:    Obesity POA: Yes    Essential hypertension POA: Yes    Hyponatremia POA: Yes  Resolved Problems:    * No resolved hospital problems. *      Assessment and Plan    71M with significant smoking history admitted for hyponatremia and presumed COPD exacerbation. He has convincing history with increased cough, change in sputum color, worsening shortness of breath and weakness.    * COPD exacerbation (HCC)- (present on admission)  Assessment & Plan  Reports he is on trelegy at home with PRN albuterol, however med rec appears inconsistent with this. PFT's pending 6/2022.  -Agree w/ tx for COPD exacerbation, IV steroids for now until O2 requirements and clinical status improves  -Add azithromycin  -Pulmonary toilet  -Mobilize as tolerated, up for all meals  -ICS/LABA/LAMA on discharge, likely w/ severe COPD and debility, anticipate SNF  -Outpatient pulmonary clinic follow-up with PFT's    Hyponatremia- (present on admission)  Assessment & Plan  Likely beer potomania, +/- HCTZ?

## 2022-03-07 NOTE — PROGRESS NOTES
4 Eyes Skin Assessment Completed by Cortney RUANO , RN and ALDEN Bonilla.    Head Scab  Ears Redness and Discoloration and scab  Nose Redness  Mouth WDL  Neck Redness and Blanching  Breast/Chest Redness and Abrasion  Shoulder Blades Redness and Blanching , excoriated   Spine Bruising  (R) Arm/Elbow/Hand Bruising  (L) Arm/Elbow/Hand Bruising  Abdomen WDL  Groin Redness and Rash  Scrotum/Coccyx/Buttocks Redness and Moisture Fissure, Pt refused to be fully assessed, turn to side.   (R) Leg Redness, Scab, Swelling, Shiny, Weeping and Edema  (L) Leg Redness, Scab, Swelling, Shiny and Edema  (R) Heel/Foot/Toe Redness and Scab  (L) Heel/Foot/Toe Redness and Scab          Devices In Places ECG, Blood Pressure Cuff and Pulse Ox      Interventions In Place Pillows, Q2 Turns and Heels Loaded W/Pillows        Pictures Uploaded Into Epic N/A  Wound Consult Placed N/A  RN Wound Prevention Protocol Ordered Yes

## 2022-03-07 NOTE — PROGRESS NOTES
Narcisa from Lab called with critical result of Na 118 at 04:44. Critical lab result read back to Narcisa.   Dr. Lau notified of critical lab result at 0446.  Critical lab result read back by Dr. Lau.

## 2022-03-07 NOTE — ASSESSMENT & PLAN NOTE
Pulmonary medicine consulting  BiPAP trial in ED and now on HFNC  Start po steroid taper  Cont inhaled steroids  BD's and O2/RT protocols  O2 requirement improving daily

## 2022-03-07 NOTE — DISCHARGE PLANNING
Care Transition Team Discharge Planning    Anticipated Discharge Disposition: wait medical clearance to assess for d/c needs    Action: Lsw attended rounds. Pt lives alone and was admitted for ETOH w/d. Pt reports he drinks 8-10 beers per day. ER placed pt on high flow nasal canula. Pt had increased agitation and anxiety. Pt has old dirty dressings on his legs, and he has refused to allow the medical team to remove them. MD suggests they soak them, and a wound care team consult has been completed. Palliative order has been entered. Pt has and Echo pending.       Barriers to Discharge: medical stability    Plan: Lsw will continue to follow, and assist w/ d/c planning.

## 2022-03-08 LAB
ALBUMIN SERPL BCP-MCNC: 3.6 G/DL (ref 3.2–4.9)
ALBUMIN/GLOB SERPL: 1 G/DL
ALP SERPL-CCNC: 61 U/L (ref 30–99)
ALT SERPL-CCNC: 20 U/L (ref 2–50)
ANION GAP SERPL CALC-SCNC: 11 MMOL/L (ref 7–16)
AST SERPL-CCNC: 40 U/L (ref 12–45)
BASOPHILS # BLD AUTO: 0 % (ref 0–1.8)
BASOPHILS # BLD: 0 K/UL (ref 0–0.12)
BILIRUB SERPL-MCNC: 0.7 MG/DL (ref 0.1–1.5)
BUN SERPL-MCNC: 10 MG/DL (ref 8–22)
CALCIUM SERPL-MCNC: 8.9 MG/DL (ref 8.5–10.5)
CHLORIDE SERPL-SCNC: 83 MMOL/L (ref 96–112)
CO2 SERPL-SCNC: 27 MMOL/L (ref 20–33)
CREAT SERPL-MCNC: 0.52 MG/DL (ref 0.5–1.4)
EOSINOPHIL # BLD AUTO: 0 K/UL (ref 0–0.51)
EOSINOPHIL NFR BLD: 0 % (ref 0–6.9)
ERYTHROCYTE [DISTWIDTH] IN BLOOD BY AUTOMATED COUNT: 43.8 FL (ref 35.9–50)
GLOBULIN SER CALC-MCNC: 3.5 G/DL (ref 1.9–3.5)
GLUCOSE SERPL-MCNC: 144 MG/DL (ref 65–99)
HCT VFR BLD AUTO: 42.4 % (ref 42–52)
HGB BLD-MCNC: 14.9 G/DL (ref 14–18)
IMM GRANULOCYTES # BLD AUTO: 0.04 K/UL (ref 0–0.11)
IMM GRANULOCYTES NFR BLD AUTO: 0.5 % (ref 0–0.9)
LACTATE BLD-SCNC: 1.4 MMOL/L (ref 0.5–2)
LYMPHOCYTES # BLD AUTO: 0.52 K/UL (ref 1–4.8)
LYMPHOCYTES NFR BLD: 6.6 % (ref 22–41)
MAGNESIUM SERPL-MCNC: 1.9 MG/DL (ref 1.5–2.5)
MCH RBC QN AUTO: 31.6 PG (ref 27–33)
MCHC RBC AUTO-ENTMCNC: 35.1 G/DL (ref 33.7–35.3)
MCV RBC AUTO: 90 FL (ref 81.4–97.8)
MONOCYTES # BLD AUTO: 0.8 K/UL (ref 0–0.85)
MONOCYTES NFR BLD AUTO: 10.2 % (ref 0–13.4)
NEUTROPHILS # BLD AUTO: 6.5 K/UL (ref 1.82–7.42)
NEUTROPHILS NFR BLD: 82.7 % (ref 44–72)
NRBC # BLD AUTO: 0 K/UL
NRBC BLD-RTO: 0 /100 WBC
PHOSPHATE SERPL-MCNC: 3.5 MG/DL (ref 2.5–4.5)
PLATELET # BLD AUTO: 201 K/UL (ref 164–446)
PMV BLD AUTO: 9.5 FL (ref 9–12.9)
POTASSIUM SERPL-SCNC: 3.9 MMOL/L (ref 3.6–5.5)
PROT SERPL-MCNC: 7.1 G/DL (ref 6–8.2)
RBC # BLD AUTO: 4.71 M/UL (ref 4.7–6.1)
SODIUM SERPL-SCNC: 121 MMOL/L (ref 135–145)
SODIUM SERPL-SCNC: 123 MMOL/L (ref 135–145)
SODIUM SERPL-SCNC: 124 MMOL/L (ref 135–145)
SODIUM SERPL-SCNC: 126 MMOL/L (ref 135–145)
SODIUM SERPL-SCNC: 126 MMOL/L (ref 135–145)
SODIUM SERPL-SCNC: 127 MMOL/L (ref 135–145)
WBC # BLD AUTO: 7.9 K/UL (ref 4.8–10.8)

## 2022-03-08 PROCEDURE — 700102 HCHG RX REV CODE 250 W/ 637 OVERRIDE(OP): Performed by: INTERNAL MEDICINE

## 2022-03-08 PROCEDURE — 85025 COMPLETE CBC W/AUTO DIFF WBC: CPT

## 2022-03-08 PROCEDURE — 700111 HCHG RX REV CODE 636 W/ 250 OVERRIDE (IP): Performed by: STUDENT IN AN ORGANIZED HEALTH CARE EDUCATION/TRAINING PROGRAM

## 2022-03-08 PROCEDURE — 94640 AIRWAY INHALATION TREATMENT: CPT

## 2022-03-08 PROCEDURE — 84100 ASSAY OF PHOSPHORUS: CPT

## 2022-03-08 PROCEDURE — 770000 HCHG ROOM/CARE - INTERMEDIATE ICU *

## 2022-03-08 PROCEDURE — 99233 SBSQ HOSP IP/OBS HIGH 50: CPT | Performed by: HOSPITALIST

## 2022-03-08 PROCEDURE — A9270 NON-COVERED ITEM OR SERVICE: HCPCS | Performed by: STUDENT IN AN ORGANIZED HEALTH CARE EDUCATION/TRAINING PROGRAM

## 2022-03-08 PROCEDURE — A9270 NON-COVERED ITEM OR SERVICE: HCPCS | Performed by: HOSPITALIST

## 2022-03-08 PROCEDURE — 700102 HCHG RX REV CODE 250 W/ 637 OVERRIDE(OP): Performed by: STUDENT IN AN ORGANIZED HEALTH CARE EDUCATION/TRAINING PROGRAM

## 2022-03-08 PROCEDURE — 94669 MECHANICAL CHEST WALL OSCILL: CPT

## 2022-03-08 PROCEDURE — 83605 ASSAY OF LACTIC ACID: CPT

## 2022-03-08 PROCEDURE — A9270 NON-COVERED ITEM OR SERVICE: HCPCS | Performed by: INTERNAL MEDICINE

## 2022-03-08 PROCEDURE — 700101 HCHG RX REV CODE 250: Performed by: STUDENT IN AN ORGANIZED HEALTH CARE EDUCATION/TRAINING PROGRAM

## 2022-03-08 PROCEDURE — 84295 ASSAY OF SERUM SODIUM: CPT | Mod: 91

## 2022-03-08 PROCEDURE — 700102 HCHG RX REV CODE 250 W/ 637 OVERRIDE(OP): Performed by: HOSPITALIST

## 2022-03-08 PROCEDURE — 700105 HCHG RX REV CODE 258: Performed by: STUDENT IN AN ORGANIZED HEALTH CARE EDUCATION/TRAINING PROGRAM

## 2022-03-08 PROCEDURE — 80053 COMPREHEN METABOLIC PANEL: CPT

## 2022-03-08 PROCEDURE — 700111 HCHG RX REV CODE 636 W/ 250 OVERRIDE (IP): Performed by: HOSPITALIST

## 2022-03-08 PROCEDURE — 83735 ASSAY OF MAGNESIUM: CPT

## 2022-03-08 RX ADMIN — IPRATROPIUM BROMIDE AND ALBUTEROL SULFATE 3 ML: 2.5; .5 SOLUTION RESPIRATORY (INHALATION) at 13:05

## 2022-03-08 RX ADMIN — BENZOCAINE AND MENTHOL 1 LOZENGE: 15; 3.6 LOZENGE ORAL at 04:45

## 2022-03-08 RX ADMIN — Medication: at 17:09

## 2022-03-08 RX ADMIN — SODIUM CHLORIDE 3 G: 900 INJECTION INTRAVENOUS at 11:27

## 2022-03-08 RX ADMIN — IPRATROPIUM BROMIDE AND ALBUTEROL SULFATE 3 ML: 2.5; .5 SOLUTION RESPIRATORY (INHALATION) at 06:39

## 2022-03-08 RX ADMIN — SODIUM CHLORIDE 1 G: 1 TABLET ORAL at 16:56

## 2022-03-08 RX ADMIN — IPRATROPIUM BROMIDE AND ALBUTEROL SULFATE 3 ML: 2.5; .5 SOLUTION RESPIRATORY (INHALATION) at 09:50

## 2022-03-08 RX ADMIN — LORAZEPAM 0.5 MG: 2 INJECTION INTRAMUSCULAR; INTRAVENOUS at 21:52

## 2022-03-08 RX ADMIN — BUDESONIDE AND FORMOTEROL FUMARATE DIHYDRATE 1 PUFF: 160; 4.5 AEROSOL RESPIRATORY (INHALATION) at 16:55

## 2022-03-08 RX ADMIN — THERA TABS 1 TABLET: TAB at 16:56

## 2022-03-08 RX ADMIN — FUROSEMIDE 20 MG: 10 INJECTION INTRAMUSCULAR; INTRAVENOUS at 05:33

## 2022-03-08 RX ADMIN — SODIUM CHLORIDE 3 G: 900 INJECTION INTRAVENOUS at 16:56

## 2022-03-08 RX ADMIN — ENOXAPARIN SODIUM 40 MG: 40 INJECTION SUBCUTANEOUS at 05:33

## 2022-03-08 RX ADMIN — BUDESONIDE AND FORMOTEROL FUMARATE DIHYDRATE 1 PUFF: 160; 4.5 AEROSOL RESPIRATORY (INHALATION) at 05:33

## 2022-03-08 RX ADMIN — SODIUM CHLORIDE 3 G: 900 INJECTION INTRAVENOUS at 05:33

## 2022-03-08 RX ADMIN — IPRATROPIUM BROMIDE AND ALBUTEROL SULFATE 3 ML: 2.5; .5 SOLUTION RESPIRATORY (INHALATION) at 23:00

## 2022-03-08 RX ADMIN — AZITHROMYCIN DIHYDRATE 500 MG: 250 TABLET, FILM COATED ORAL at 16:55

## 2022-03-08 RX ADMIN — SODIUM CHLORIDE 3 G: 900 INJECTION INTRAVENOUS at 23:00

## 2022-03-08 RX ADMIN — FUROSEMIDE 20 MG: 10 INJECTION INTRAMUSCULAR; INTRAVENOUS at 16:56

## 2022-03-08 RX ADMIN — IPRATROPIUM BROMIDE AND ALBUTEROL SULFATE 3 ML: 2.5; .5 SOLUTION RESPIRATORY (INHALATION) at 18:34

## 2022-03-08 RX ADMIN — SODIUM CHLORIDE 1 G: 1 TABLET ORAL at 11:26

## 2022-03-08 RX ADMIN — Medication 100 MG: at 16:55

## 2022-03-08 RX ADMIN — FOLIC ACID 1 MG: 1 TABLET ORAL at 16:56

## 2022-03-08 RX ADMIN — Medication: at 05:34

## 2022-03-08 RX ADMIN — ENOXAPARIN SODIUM 40 MG: 40 INJECTION SUBCUTANEOUS at 16:55

## 2022-03-08 RX ADMIN — Medication 5 MG: at 19:41

## 2022-03-08 RX ADMIN — IPRATROPIUM BROMIDE AND ALBUTEROL SULFATE 3 ML: 2.5; .5 SOLUTION RESPIRATORY (INHALATION) at 01:54

## 2022-03-08 RX ADMIN — SODIUM CHLORIDE 1 G: 1 TABLET ORAL at 08:42

## 2022-03-08 RX ADMIN — METHYLPREDNISOLONE SODIUM SUCCINATE 40 MG: 40 INJECTION, POWDER, FOR SOLUTION INTRAMUSCULAR; INTRAVENOUS at 05:34

## 2022-03-08 ASSESSMENT — LIFESTYLE VARIABLES
ANXIETY: MILDLY ANXIOUS
TREMOR: NO TREMOR
AUDITORY DISTURBANCES: NOT PRESENT
AGITATION: SOMEWHAT MORE THAN NORMAL ACTIVITY
ANXIETY: *
AUDITORY DISTURBANCES: NOT PRESENT
HEADACHE, FULLNESS IN HEAD: NOT PRESENT
NAUSEA AND VOMITING: NO NAUSEA AND NO VOMITING
AGITATION: *
PAROXYSMAL SWEATS: BARELY PERCEPTIBLE SWEATING. PALMS MOIST
VISUAL DISTURBANCES: NOT PRESENT
HEADACHE, FULLNESS IN HEAD: NOT PRESENT
PAROXYSMAL SWEATS: BARELY PERCEPTIBLE SWEATING. PALMS MOIST
TOTAL SCORE: 4
NAUSEA AND VOMITING: NO NAUSEA AND NO VOMITING
PAROXYSMAL SWEATS: NO SWEAT VISIBLE
TOTAL SCORE: 2
NAUSEA AND VOMITING: NO NAUSEA AND NO VOMITING
PAROXYSMAL SWEATS: NO SWEAT VISIBLE
TOTAL SCORE: 4
ORIENTATION AND CLOUDING OF SENSORIUM: ORIENTED AND CAN DO SERIAL ADDITIONS
NAUSEA AND VOMITING: NO NAUSEA AND NO VOMITING
ORIENTATION AND CLOUDING OF SENSORIUM: ORIENTED AND CAN DO SERIAL ADDITIONS
AGITATION: *
VISUAL DISTURBANCES: NOT PRESENT
VISUAL DISTURBANCES: NOT PRESENT
TREMOR: NO TREMOR
AUDITORY DISTURBANCES: NOT PRESENT
ANXIETY: *
VISUAL DISTURBANCES: NOT PRESENT
HEADACHE, FULLNESS IN HEAD: NOT PRESENT
AGITATION: *
TREMOR: NO TREMOR
TOTAL SCORE: 6
ANXIETY: MILDLY ANXIOUS
ORIENTATION AND CLOUDING OF SENSORIUM: ORIENTED AND CAN DO SERIAL ADDITIONS
ORIENTATION AND CLOUDING OF SENSORIUM: ORIENTED AND CAN DO SERIAL ADDITIONS
HEADACHE, FULLNESS IN HEAD: NOT PRESENT
TREMOR: NO TREMOR
AUDITORY DISTURBANCES: NOT PRESENT

## 2022-03-08 ASSESSMENT — ENCOUNTER SYMPTOMS
COUGH: 1
SHORTNESS OF BREATH: 1
DOUBLE VISION: 0
SORE THROAT: 0
NAUSEA: 0
HEADACHES: 0
VOMITING: 0
DIZZINESS: 0
BLURRED VISION: 0
FEVER: 0
CHILLS: 0
PALPITATIONS: 0
ABDOMINAL PAIN: 0
LOSS OF CONSCIOUSNESS: 0
BACK PAIN: 0
DIARRHEA: 0

## 2022-03-08 ASSESSMENT — FIBROSIS 4 INDEX: FIB4 SCORE: 3.16

## 2022-03-08 ASSESSMENT — PAIN DESCRIPTION - PAIN TYPE
TYPE: ACUTE PAIN

## 2022-03-08 NOTE — ASSESSMENT & PLAN NOTE
Severe, bilateral lower extremities, wound care orders placed  Emollients  Consider topical steroids

## 2022-03-08 NOTE — DISCHARGE PLANNING
Care Transition Team Assessment  Pt states his support system is both of his neighbors on this face sheet as emergency contacts-Lloyd and Nelia Shook. Their numbers are listed on face sheet.    Lsw spoke to pt to acquire information enclosed. Pt lives home alone at a mobile home park, address verified on face sheet. Pt's supports are listed above and verified on face sheet.    Pt's PCP is Dr Wynn at [facility]. Lsw requested PFA please add the PCP to the face sheet.    Pt's preferred pharmacy is [facility] on [address] [facility] [address].    Pt's only DMe at home is breathing treatments.    Pt is fully independent w/ I/ADLs and drives his own vehicle.    Pt reports he has no substance abuse issues. He drinks 3-4 beers per day and has no need for rehab.        Information Source  Orientation Level: Oriented X4  Information Given By: Patient  Informant's Name: Juan Manuel  Who is responsible for making decisions for patient? : Patient    Readmission Evaluation  Is this a readmission?: No (last admission [facility], many yrs ago)    Elopement Risk  Legal Hold: No  Ambulatory or Self Mobile in Wheelchair: No-Not an Elopement Risk  Elopement Risk: Not at Risk for Elopement    Interdisciplinary Discharge Planning  Primary Care Physician: Dr Peterson Amin MD  Lives with - Patient's Self Care Capacity: Alone and Able to Care For Self  Support Systems: Friends / Neighbors  Housing / Facility: Mobile Home  Do You Take your Prescribed Medications Regularly: Yes  Durable Medical Equipment:  (breathing treatments at home)    Discharge Preparedness  What is your plan after discharge?: Uncertain - pending medical team collaboration  What are your discharge supports?: Other (comment) (neighbors Lloyd and Nelia Shook w/ #s on face sheet)  Prior Functional Level: Ambulatory,Drives Self,Independent with Activities of Daily Living,Independent with Medication Management    Functional Assesment  Prior Functional Level: Ambulatory,Drives Self,Independent  with Activities of Daily Living,Independent with Medication Management    Finances  Prescription Coverage:  (uses pharmacy [facility])    Vision / Hearing Impairment  Vision Impairment : No  Hearing Impairment : No              Domestic Abuse  Have you ever been the victim of abuse or violence?: No  Physical Abuse or Sexual Abuse: No  Verbal Abuse or Emotional Abuse: No  Possible Abuse/Neglect Reported to:: Not Applicable    Psychological Assessment  History of Substance Abuse:  (indicates drinks 3-4 beers daily, no abuse, no needs)  History of Psychiatric Problems: No         Anticipated Discharge Information  Discharge Disposition: Discharged to home/self care (01)  Discharge Address: home address: [address] [address]

## 2022-03-08 NOTE — PROGRESS NOTES
[facility] Medicine Daily Progress Note    Date of Service  3/8/2022    Chief Complaint  Juan Manuel Bass is a 71 y.o. male admitted 3/6/2022 with SOB    Hospital Course  70yo PMHx COPD, HTN  CC SOB per EMS 80s on RA at home.  In ED requiring HFNC.  Lab showing Na 117, Creat 0.47.  Admitted to [facility] on BiPAP and precedex with Dx of resp failure/COPD exacerbation    Interval Problem Update  Patient states he is feeling better today.  Feels like his breathing is almost back to normal.  Does note that is not as strong as he would consider is normal.  But denies any chest pain.  Does have a mild cough.    Sinus   -120s  On 30 L/ 30% HFNC    I have personally seen and examined the patient at bedside. I discussed the plan of care with patient, bedside RN and pharmacy.    Consultants/Specialty      Code Status  DNAR/DNI    Disposition  Patient is not medically cleared for discharge.   Anticipate discharge to to home with close outpatient follow-up.  I have placed the appropriate orders for post-discharge needs.    Review of Systems  Review of Systems   Constitutional: Negative for chills and fever.   HENT: Negative for nosebleeds and sore throat.    Eyes: Negative for blurred vision and double vision.   Respiratory: Positive for cough and shortness of breath.    Cardiovascular: Negative for chest pain, palpitations and leg swelling.   Gastrointestinal: Negative for abdominal pain, diarrhea, nausea and vomiting.   Genitourinary: Negative for dysuria and urgency.   Musculoskeletal: Negative for back pain.   Skin: Positive for rash.   Neurological: Negative for dizziness, loss of consciousness and headaches.        Physical Exam  Temp:  [35.9 °C (96.6 °F)-36.4 °C (97.5 °F)] 36.4 °C (97.5 °F)  Pulse:  [76-99] 88  Resp:  [13-30] 22  BP: (101-158)/(53-92) 123/63  SpO2:  [87 %-97 %] 95 %    Physical Exam  Vitals reviewed.   Constitutional:       General: He is not in acute distress.     Appearance: He is well-developed. He  is obese. He is not diaphoretic.   HENT:      Head: Normocephalic and atraumatic.   Eyes:      Conjunctiva/sclera: Conjunctivae normal.   Neck:      Vascular: No JVD.   Cardiovascular:      Rate and Rhythm: Normal rate.      Heart sounds: No murmur heard.    No gallop.   Pulmonary:      Effort: Pulmonary effort is normal. No respiratory distress.      Breath sounds: No stridor. Rales present. No wheezing.   Abdominal:      Palpations: Abdomen is soft.      Tenderness: There is no abdominal tenderness. There is no guarding or rebound.   Musculoskeletal:      Right lower leg: Edema present.      Left lower leg: Edema present.   Skin:     General: Skin is warm and dry.      Findings: Rash present.      Comments: Confluent macular rash with extensive lichenification B lower legs to proximal shins   Neurological:      Mental Status: He is alert and oriented to person, place, and time.   Psychiatric:         Thought Content: Thought content normal.         Fluids    Intake/Output Summary (Last 24 hours) at 3/8/2022 0621  Last data filed at 3/8/2022 0600  Gross per 24 hour   Intake 1013.56 ml   Output 1575 ml   Net -561.44 ml       Laboratory  Recent Labs     03/06/22  2330 03/07/22  0355 03/08/22  0226   WBC 11.7* 13.6* 7.9   RBC 5.36 5.03 4.71   HEMOGLOBIN 17.0 16.0 14.9   HEMATOCRIT 47.9 44.7 42.4   MCV 89.4 88.9 90.0   MCH 31.7 31.8 31.6   MCHC 35.5* 35.8* 35.1   RDW 43.2 43.0 43.8   PLATELETCT 241 230 201   MPV 9.5 9.2 9.5     Recent Labs     03/06/22  2330 03/07/22  0355 03/07/22  0815 03/07/22  1815 03/07/22  2221 03/08/22  0226   SODIUM 117* 118*   < > 122* 122* 121*   POTASSIUM 4.4 4.4  --   --   --  3.9   CHLORIDE 80* 81*  --   --   --  83*   CO2 21 20  --   --   --  27   GLUCOSE 99 105*  --   --   --  144*   BUN 4* 6*  --   --   --  10   CREATININE 0.47* 0.36*  --   --   --  0.52   CALCIUM 9.1 8.9  --   --   --  8.9    < > = values in this interval not displayed.             Recent Labs     03/07/22  3264    TRIGLYCERIDE 35   HDL 68   LDL 46       Imaging  EC-ECHOCARDIOGRAM COMPLETE W/ CONT   Final Result      DX-CHEST-PORTABLE (1 VIEW)   Final Result      1.  Faint bibasilar opacities consistent with atelectasis and or pneumonitis.           Assessment/Plan  * COPD exacerbation (HCC)- (present on admission)  Assessment & Plan  Pulmonary medicine consulting  BiPAP trial in ED and now on HFNC  Cont IV and inhaled steroids  BD's and O2/RT protocols      Acute respiratory failure with hypoxia (HCC)  Assessment & Plan  Secondary to COPD  Pulmonary consulting  Wean oxygen as possible  Currently on high flow nasal cannula, being titrated    Encephalopathy  Assessment & Plan  Pt is now relaxed, oriented and interactive  resolved    Dermatitis  Assessment & Plan  Severe, bilateral lower extremities, wound care orders placed  Emollients  Consider topical steroids    Alcoholism (HCC)  Assessment & Plan  Vitamins, supportive care  Monitor for alcohol withdrawal    Hyponatremia- (present on admission)  Assessment & Plan  Unknown chronicity  Etiology unclear as urine lytes done 24hrs after admission making interpretation dificult  Does appear to be responding to lasix and fluid restriction  Continue with diuresis, strict ins and outs, daily weights  126 at present so less likely to Sz      Essential hypertension- (present on admission)  Assessment & Plan  2 g sodium restricted diet  On HCTZ as outpt which has been stopped due to HypoNa      Obesity- (present on admission)  Assessment & Plan  Strong recommendation for follow-up outpatient PCP for lifestyle modification           VTE prophylaxis: enoxaparin ppx    I have performed a physical exam and reviewed and updated ROS and Plan today (3/8/2022). In review of yesterday's note (3/7/2022), there are no changes except as documented above.

## 2022-03-08 NOTE — PROGRESS NOTES
"Assumed care of PT A&O 4. Pt resting in bed with no signs of labored breathing. On 3L O2. Tele monitor in place, cardiac rhythm being monitored. Call light within reach, bed in lowest position, upper bed rails up. Pt was updated on plan of care for the day . Will continue to monitor.     Pt has been educated on importance of keeping O2 on, has removed O2 mask 3x since 1900. When educating pt on importance of keeping O2 on so that we do not have to go back to [facility], pt stated \"I don't care\"   "

## 2022-03-08 NOTE — CARE PLAN
Problem: Bronchoconstriction  Goal: Improve in air movement and diminished wheezing  Description: Target End Date:  2 to 3 days    1.  Implement inhaled treatments  2.  Evaluate and manage medication effects  Outcome: Progressing     Problem: Bronchopulmonary Hygiene  Goal: Increase mobilization of retained secretions  Description: Target End Date:  2 to 3 days    1.  Perform bronchopulmonary therapy as indicated by assessment  2.  Perform airway suctioning  3.  Perform actions to maintain patient airway  Outcome: Progressing     Problem: Humidified High Flow Nasal Cannula  Goal: Maintain adequate oxygenation dependent on patient condition  Description: Target End Date:  resolve prior to discharge or when underlying condition is resolved/stabilized    1.  Implement humidified high flow oxygen therapy  2.  Titrate high flow oxygen to maintain appropriate SpO2  Outcome: Progressing

## 2022-03-08 NOTE — CONSULTS
"Reason for PC Consult: Advance Care Planning    Consulted by: Dr. Lau    Assessment:  General:   71 y.o. male with history of COPD. Presented to ED on 3/6 with oxygen in the 70s required BiPAP initially however patient did not tolerate BiPAP. Patient was not tolerating BiPAP and was switched to high flow with oxygen, still with significant work of breathing. Per notes, discussion with ED physician after I been consulted patient stated he wanted to be DNR DNI. Pt was hyponatremic. Per Dr. Lau's note, \"right heart failure with his significant COPD, and elevated BNP- likely lower than would be due to pts obesity ) versus SIADH urine lytes have been collected prior, feel appropriate for IMC admission at this time with every 4 hours sodium checks.  With treatment for COPD.  Agreed appropriate for IMC at this time. Consider palliative care and possible comfort care if respiratory status worsens significantly and patient not wanting to pursue BiPAP or intubation.\"      Dyspnea: Yes  Last BM: 22  Pain: No  Depression: Mood appropriate for situation  Dementia: No    Spiritual:  Is Advent or spirituality important for coping with this illness? No  Has a  or spiritual provider visit been requested? No    Palliative Performance Scale: 60%    Advance Directive: created at this encounter.   DPOA: pt named his friend Lloyd as DPOA, Lloyd's wife Nelia as alternate, and friend Kennedi as second alternate.   POLST: created at this encounter. POLST reflects DNR, selective treatment, and artifical nutrition \"as a bridge to get better.\"     Code Status: DNR/DNI-confirmed during this discussion.    Social: pt lives at home alone. Pt's wife  a few years ago. Pt does not have relationship with his three adult children who live in [place]. Pt's support system includes his friends, Lloyd, Nelia, and Kennedi.     Outcome:  Introduced self and role of Palliative Care to patient at bedside. Assessed pt's understanding " "of current medical status, overall health picture, and options for future care. Pt has good insight into hospital admission and COPD. Pt says \"I know it'll kill me.\" Pt reports that his breathing feels better compared to admission. Pt does not wear baseline oxygen, but has noticed that shortness of breath impacts his daily function. He hasn't noticed a change in ADL tolerance, but pt isn't able to same amount of yard work or other activities. PC RN discussed the chronic and progressive nature of COPD, pt verbalizes understanding.     Explored pt's values, beliefs, and preferences in order to identify GOC. At this time, pt's friend Lloyd arries at bedside and joins discussion. Pt hopes to wean from HFNC and return home. Pt is comfortable living alone and doesn't feel he needs extra help at this point. Pt reports that his wife  and it has been a difficult time for him since her passing, however he has support from close friends in town. PC RN queries previously expressed healthcare wishes or healthcare documents such as an Advance Directive. Pt has not completed an AD in the past, but would like to do so. Pt named his friend Lloyd as DPOA, Lloyd's wife Nelia as alternate, and friend Kennedi as second alternate. Pt chose #2,3,5 on statements of desires, meaning that he would not want life-prolonging treatments to be provided, if there was no reasonable hope of recovery, long-term survival, or if the patient was in an irreversible coma. Patient would also want DPOA-HC to consider quality of life, relief of suffering, and preservation of function.     Discussed code status in detail including mechanical ventilation and what resuscitation looks like. Pt confirms prior decision of DNR/DNI. Pt states \"why would I want to anguish in a hospital?\" PC RN suggested POLST creation, pt is agreeable. POLST reflects DNR, selective treatment, and artifical nutrition \"as a bridge to get better.\" PC RN explains hospice as future " "care option while explaining POLST. PC RN describes the extra layer of support, symptom management, and focusing on quality of life. Pt is aware and thanks PC RN for information.     Spiritual visit offered, pt laughs and say \"my Evangel[employer] is [employer].\" Active listening, reflection, reminiscing, validation & normalization, and empathic support utilized throughout this encounter.  All questions answered.  PC contact information given.       Updated: CARLOS RUANO and Dr. Davies    Plan: PC RN will follow and support. AD to be notarized.     Recommendations: I do not recommend hospice as pt is pursuing selective treatment in hospital setting.     *Recommendation does not provide clinical appropriateness for hospice nor does it provide that the patient would or would not qualify for hospice services.*    Thank you for allowing Palliative Care to participate in this patient's care. Please feel free to call [phone] with any questions or concerns.  "

## 2022-03-09 LAB
BACTERIA UR CULT: NORMAL
MAGNESIUM SERPL-MCNC: 1.9 MG/DL (ref 1.5–2.5)
PHOSPHATE SERPL-MCNC: 2.8 MG/DL (ref 2.5–4.5)
SIGNIFICANT IND 70042: NORMAL
SITE SITE: NORMAL
SODIUM SERPL-SCNC: 129 MMOL/L (ref 135–145)
SOURCE SOURCE: NORMAL

## 2022-03-09 PROCEDURE — 94640 AIRWAY INHALATION TREATMENT: CPT

## 2022-03-09 PROCEDURE — 700111 HCHG RX REV CODE 636 W/ 250 OVERRIDE (IP): Performed by: HOSPITALIST

## 2022-03-09 PROCEDURE — 83735 ASSAY OF MAGNESIUM: CPT

## 2022-03-09 PROCEDURE — 700102 HCHG RX REV CODE 250 W/ 637 OVERRIDE(OP): Performed by: STUDENT IN AN ORGANIZED HEALTH CARE EDUCATION/TRAINING PROGRAM

## 2022-03-09 PROCEDURE — 700105 HCHG RX REV CODE 258: Performed by: STUDENT IN AN ORGANIZED HEALTH CARE EDUCATION/TRAINING PROGRAM

## 2022-03-09 PROCEDURE — 700101 HCHG RX REV CODE 250: Performed by: STUDENT IN AN ORGANIZED HEALTH CARE EDUCATION/TRAINING PROGRAM

## 2022-03-09 PROCEDURE — 94669 MECHANICAL CHEST WALL OSCILL: CPT

## 2022-03-09 PROCEDURE — A9270 NON-COVERED ITEM OR SERVICE: HCPCS | Performed by: HOSPITALIST

## 2022-03-09 PROCEDURE — A9270 NON-COVERED ITEM OR SERVICE: HCPCS | Performed by: STUDENT IN AN ORGANIZED HEALTH CARE EDUCATION/TRAINING PROGRAM

## 2022-03-09 PROCEDURE — 700102 HCHG RX REV CODE 250 W/ 637 OVERRIDE(OP): Performed by: HOSPITALIST

## 2022-03-09 PROCEDURE — 700111 HCHG RX REV CODE 636 W/ 250 OVERRIDE (IP): Performed by: STUDENT IN AN ORGANIZED HEALTH CARE EDUCATION/TRAINING PROGRAM

## 2022-03-09 PROCEDURE — 99233 SBSQ HOSP IP/OBS HIGH 50: CPT | Performed by: HOSPITALIST

## 2022-03-09 PROCEDURE — 700102 HCHG RX REV CODE 250 W/ 637 OVERRIDE(OP): Performed by: INTERNAL MEDICINE

## 2022-03-09 PROCEDURE — 84295 ASSAY OF SERUM SODIUM: CPT

## 2022-03-09 PROCEDURE — 84100 ASSAY OF PHOSPHORUS: CPT

## 2022-03-09 PROCEDURE — 770000 HCHG ROOM/CARE - INTERMEDIATE ICU *

## 2022-03-09 PROCEDURE — 94664 DEMO&/EVAL PT USE INHALER: CPT

## 2022-03-09 PROCEDURE — A9270 NON-COVERED ITEM OR SERVICE: HCPCS | Performed by: INTERNAL MEDICINE

## 2022-03-09 RX ORDER — PREDNISONE 20 MG/1
40 TABLET ORAL DAILY
Status: DISCONTINUED | OUTPATIENT
Start: 2022-03-09 | End: 2022-03-10

## 2022-03-09 RX ADMIN — BUDESONIDE AND FORMOTEROL FUMARATE DIHYDRATE 1 PUFF: 160; 4.5 AEROSOL RESPIRATORY (INHALATION) at 17:24

## 2022-03-09 RX ADMIN — IPRATROPIUM BROMIDE AND ALBUTEROL SULFATE 3 ML: 2.5; .5 SOLUTION RESPIRATORY (INHALATION) at 10:31

## 2022-03-09 RX ADMIN — SODIUM CHLORIDE 1 G: 1 TABLET ORAL at 08:23

## 2022-03-09 RX ADMIN — THERA TABS 1 TABLET: TAB at 17:25

## 2022-03-09 RX ADMIN — FUROSEMIDE 20 MG: 10 INJECTION INTRAMUSCULAR; INTRAVENOUS at 05:19

## 2022-03-09 RX ADMIN — Medication 100 MG: at 17:25

## 2022-03-09 RX ADMIN — AZITHROMYCIN DIHYDRATE 500 MG: 250 TABLET, FILM COATED ORAL at 17:25

## 2022-03-09 RX ADMIN — METHYLPREDNISOLONE SODIUM SUCCINATE 40 MG: 40 INJECTION, POWDER, FOR SOLUTION INTRAMUSCULAR; INTRAVENOUS at 05:19

## 2022-03-09 RX ADMIN — IPRATROPIUM BROMIDE AND ALBUTEROL SULFATE 3 ML: 2.5; .5 SOLUTION RESPIRATORY (INHALATION) at 06:42

## 2022-03-09 RX ADMIN — SENNOSIDES AND DOCUSATE SODIUM 2 TABLET: 50; 8.6 TABLET ORAL at 17:25

## 2022-03-09 RX ADMIN — ENOXAPARIN SODIUM 40 MG: 40 INJECTION SUBCUTANEOUS at 17:26

## 2022-03-09 RX ADMIN — PREDNISONE 40 MG: 20 TABLET ORAL at 17:24

## 2022-03-09 RX ADMIN — Medication 5 MG: at 19:04

## 2022-03-09 RX ADMIN — IPRATROPIUM BROMIDE AND ALBUTEROL SULFATE 3 ML: 2.5; .5 SOLUTION RESPIRATORY (INHALATION) at 22:20

## 2022-03-09 RX ADMIN — FUROSEMIDE 20 MG: 10 INJECTION INTRAMUSCULAR; INTRAVENOUS at 17:56

## 2022-03-09 RX ADMIN — IPRATROPIUM BROMIDE AND ALBUTEROL SULFATE 3 ML: 2.5; .5 SOLUTION RESPIRATORY (INHALATION) at 03:00

## 2022-03-09 RX ADMIN — Medication: at 05:19

## 2022-03-09 RX ADMIN — FOLIC ACID 1 MG: 1 TABLET ORAL at 17:24

## 2022-03-09 RX ADMIN — SODIUM CHLORIDE 1 G: 1 TABLET ORAL at 17:25

## 2022-03-09 RX ADMIN — Medication: at 17:26

## 2022-03-09 RX ADMIN — ENOXAPARIN SODIUM 40 MG: 40 INJECTION SUBCUTANEOUS at 05:19

## 2022-03-09 RX ADMIN — BUDESONIDE AND FORMOTEROL FUMARATE DIHYDRATE 1 PUFF: 160; 4.5 AEROSOL RESPIRATORY (INHALATION) at 05:20

## 2022-03-09 RX ADMIN — SODIUM CHLORIDE 3 G: 900 INJECTION INTRAVENOUS at 05:19

## 2022-03-09 RX ADMIN — SODIUM CHLORIDE 1 G: 1 TABLET ORAL at 12:15

## 2022-03-09 RX ADMIN — IPRATROPIUM BROMIDE AND ALBUTEROL SULFATE 3 ML: 2.5; .5 SOLUTION RESPIRATORY (INHALATION) at 18:08

## 2022-03-09 ASSESSMENT — LIFESTYLE VARIABLES
CONSUMPTION TOTAL: POSITIVE
VISUAL DISTURBANCES: NOT PRESENT
AGITATION: SOMEWHAT MORE THAN NORMAL ACTIVITY
HAVE YOU EVER FELT YOU SHOULD CUT DOWN ON YOUR DRINKING: YES
TOTAL SCORE: 2
VISUAL DISTURBANCES: NOT PRESENT
ANXIETY: MILDLY ANXIOUS
AGITATION: SOMEWHAT MORE THAN NORMAL ACTIVITY
ANXIETY: *
EVER FELT BAD OR GUILTY ABOUT YOUR DRINKING: NO
ON A TYPICAL DAY WHEN YOU DRINK ALCOHOL HOW MANY DRINKS DO YOU HAVE: 10
EVER HAD A DRINK FIRST THING IN THE MORNING TO STEADY YOUR NERVES TO GET RID OF A HANGOVER: YES
AUDITORY DISTURBANCES: NOT PRESENT
TOTAL SCORE: 3
TREMOR: NO TREMOR
TREMOR: NO TREMOR
TOTAL SCORE: 2
TOTAL SCORE: 2
PAROXYSMAL SWEATS: NO SWEAT VISIBLE
ORIENTATION AND CLOUDING OF SENSORIUM: ORIENTED AND CAN DO SERIAL ADDITIONS
AVERAGE NUMBER OF DAYS PER WEEK YOU HAVE A DRINK CONTAINING ALCOHOL: 7
AUDITORY DISTURBANCES: NOT PRESENT
ALCOHOL_USE: YES
HOW MANY TIMES IN THE PAST YEAR HAVE YOU HAD 5 OR MORE DRINKS IN A DAY: 365
TOTAL SCORE: 2
DOES PATIENT WANT TO STOP DRINKING: NO
HAVE PEOPLE ANNOYED YOU BY CRITICIZING YOUR DRINKING: NO
PAROXYSMAL SWEATS: NO SWEAT VISIBLE
NAUSEA AND VOMITING: NO NAUSEA AND NO VOMITING
NAUSEA AND VOMITING: NO NAUSEA AND NO VOMITING
ORIENTATION AND CLOUDING OF SENSORIUM: ORIENTED AND CAN DO SERIAL ADDITIONS
HEADACHE, FULLNESS IN HEAD: NOT PRESENT
HEADACHE, FULLNESS IN HEAD: NOT PRESENT

## 2022-03-09 ASSESSMENT — ENCOUNTER SYMPTOMS
HEADACHES: 0
DIZZINESS: 0
CHILLS: 0
BACK PAIN: 0
SORE THROAT: 0
NAUSEA: 0
DOUBLE VISION: 0
DIARRHEA: 0
VOMITING: 0
BLURRED VISION: 0
COUGH: 1
LOSS OF CONSCIOUSNESS: 0
SHORTNESS OF BREATH: 1
PALPITATIONS: 0
ABDOMINAL PAIN: 0
FEVER: 0

## 2022-03-09 ASSESSMENT — PAIN DESCRIPTION - PAIN TYPE
TYPE: ACUTE PAIN

## 2022-03-09 ASSESSMENT — FIBROSIS 4 INDEX: FIB4 SCORE: 3.16

## 2022-03-09 NOTE — DOCUMENTATION QUERY
"                                                                         [facility]                                                                       Query Response Note      PATIENT:               BRIDGETTE VARNER  ACCT #:                  [id number]  MRN:                     [id number]  :                      1950  ADMIT DATE:       3/6/2022 11:33 PM  DISCH DATE:          RESPONDING  PROVIDER #:        [id number]           QUERY TEXT:    Pulmonary edema is documented in the Pulmonary Consult on 3/7. Please further specify the chronicity and type:    NOTE:  If an appropriate response is not listed below, please respond with a new note.          The patient's Clinical Indicators include:  \"Acute hypoxemia respiratory failure- 2/2 pulmonary edema and COPD exacerbation\" is documented in the Pulmonary Consult on 3/7. CXR on admission noted \"Faint bibasilar opacities consistent with atelectasis and or pneumonitis.\" Troponin T: 26, NT-proBNP: 378 on admission. U/S Cardiac ECHO on admission noted mild LVH, normal LVSF, normal RVSF, normal regional wall motion, and an LVEF of 70%.     Treatments include: Lasix 20mg IV, CXR, and U/S Cardiac ECHO.    Risk factors include: dx Acute Respiratory Failure w/Hypoxia 2/2 AECOPD, dx Fluid Overload, and hx HTN + R-Heart Failure.    Thank you,  Joshua Diaz RN, BSN  Clinical   Connect via [employer]  Options provided:   -- Acute Pulmonary Edema, Non-Cardiogenic   -- Chronic Pulmonary Edema, Non-Cardiogenic   -- Other explanation of acuity and etiology, Please specify   -- Unable to determine      Query created by: Joshua Diaz on 3/9/2022 10:29 AM    RESPONSE TEXT:    Unable to determine          Electronically signed by:  FANI DIAZ MD 3/9/2022 3:52 PM              "

## 2022-03-09 NOTE — WOUND TEAM
[facility]  Inpatient Services  Wound and Skin Care Brief Evaluation    Admission Date: 3/6/2022     Last order of IP CONSULT TO WOUND CARE was found on 3/7/2022 from Hospital Encounter on 3/6/2022     HPI, PMH, SH: Reviewed    Chief Complaint   Patient presents with   • Shortness of Breath     BIBA for SOB. Per EMS complaint started at 2130. Pt's O2 was in the 80's for EMS. Hx of COPD.      Diagnosis: COPD exacerbation (HCC) [J44.1]    Unit where seen by Wound Team: [id number]     Wound consult placed regarding BLE. Chart and images reviewed. This discussed with bedside RN. This RN in to assess patient. Pt pleasant and agreeable. BLE red and flaking, amlactin already ordered. Non-selectively debrided with moist warm washcloth and no rinse foam soap. No pressure injuries or advanced wound care needs identified. Wound consult completed. No further follow up unless indicated and consulted.

## 2022-03-09 NOTE — CARE PLAN
The patient is Watcher - Medium risk of patient condition declining or worsening    Shift Goals  Clinical Goals: Keep O2 on  Patient Goals: Sleep  Family Goals: NA    Progress made toward(s) clinical / shift goals:    Problem: Knowledge Deficit - COPD  Goal: Patient/significant other demonstrates understanding of disease process, utilization of the Action Plan, medications and discharge instruction  Outcome: Progressing     Problem: Ineffective Airway Clearance  Goal: Patient will maintain patent airway with clear/clearing breath sounds  Outcome: Progressing       Patient is not progressing towards the following goals:

## 2022-03-09 NOTE — PROGRESS NOTES
"Hospital Medicine Daily Progress Note    Date of Service  3/9/2022    Chief Complaint  Juan Manuel Bass is a 71 y.o. male admitted 3/6/2022 with SOB    Hospital Course  70yo PMHx COPD, HTN  CC SOB per EMS 80s on RA at home.  In ED requiring HFNC.  Lab showing Na 117, Creat 0.47.  Admitted to [facility] on BiPAP and precedex with Dx of resp failure/COPD exacerbation    Interval Problem Update  Patient states he is feeling better today.  Breathing is \"okay\" however he notes that he does feel weaker than normal.  He did get up to the chair, and feels that he was able to do it on his own, but the RN notes that he needed some help with the safety.  He would normally be ambulatory through the house but not community distances.    Patient's friend and power of  was at bedside, discussed treatment plan and prognosis as well as discharge considerations with him.    Sinus   -120s  On 40 L/ 30% HFNC: Does not like mask  U OP 1.75 L, total -890 mL last 24 hours and 2.45 L from admission    I have personally seen and examined the patient at bedside. I discussed the plan of care with patient, bedside RN and pharmacy.    Consultants/Specialty      Code Status  DNAR/DNI    Disposition  Patient is not medically cleared for discharge.   Anticipate discharge to to home with close outpatient follow-up.  I have placed the appropriate orders for post-discharge needs.    Review of Systems  Review of Systems   Constitutional: Negative for chills and fever.   HENT: Negative for nosebleeds and sore throat.    Eyes: Negative for blurred vision and double vision.   Respiratory: Positive for cough and shortness of breath.    Cardiovascular: Negative for chest pain, palpitations and leg swelling.   Gastrointestinal: Negative for abdominal pain, diarrhea, nausea and vomiting.   Genitourinary: Negative for dysuria and urgency.   Musculoskeletal: Negative for back pain.   Skin: Positive for rash.   Neurological: Negative for dizziness, " loss of consciousness and headaches.        Physical Exam  Temp:  [36.1 °C (97 °F)-36.7 °C (98.1 °F)] 36.7 °C (98.1 °F)  Pulse:  [] 104  Resp:  [13-25] 23  BP: (112-160)/(61-97) 123/97  SpO2:  [91 %-97 %] 95 %    Physical Exam  Vitals reviewed.   Constitutional:       General: He is not in acute distress.     Appearance: He is well-developed. He is obese. He is not diaphoretic.   HENT:      Head: Normocephalic and atraumatic.   Eyes:      Conjunctiva/sclera: Conjunctivae normal.   Neck:      Vascular: No JVD.   Cardiovascular:      Rate and Rhythm: Normal rate.      Heart sounds: No murmur heard.    No gallop.   Pulmonary:      Effort: Pulmonary effort is normal. No respiratory distress.      Breath sounds: No stridor. No wheezing or rales.      Comments: No adventitial sounds however very poor air movement  Abdominal:      Palpations: Abdomen is soft.      Tenderness: There is no abdominal tenderness. There is no guarding or rebound.   Musculoskeletal:      Right lower leg: Edema present.      Left lower leg: Edema present.   Skin:     General: Skin is warm and dry.      Findings: Rash present.      Comments: Confluent macular rash with extensive lichenification B lower legs to proximal shins   Neurological:      Mental Status: He is alert and oriented to person, place, and time.   Psychiatric:         Thought Content: Thought content normal.         Fluids    Intake/Output Summary (Last 24 hours) at 3/9/2022 1558  Last data filed at 3/9/2022 1200  Gross per 24 hour   Intake 650 ml   Output 1500 ml   Net -850 ml       Laboratory  Recent Labs     03/06/22  2330 03/07/22  0355 03/08/22  0226   WBC 11.7* 13.6* 7.9   RBC 5.36 5.03 4.71   HEMOGLOBIN 17.0 16.0 14.9   HEMATOCRIT 47.9 44.7 42.4   MCV 89.4 88.9 90.0   MCH 31.7 31.8 31.6   MCHC 35.5* 35.8* 35.1   RDW 43.2 43.0 43.8   PLATELETCT 241 230 201   MPV 9.5 9.2 9.5     Recent Labs     03/06/22  2330 03/07/22  0355 03/07/22  0815 03/08/22  0226 03/08/22  0552  03/08/22  1805 03/08/22  2207 03/09/22  0525   SODIUM 117* 118*   < > 121*   < > 126* 127* 129*   POTASSIUM 4.4 4.4  --  3.9  --   --   --   --    CHLORIDE 80* 81*  --  83*  --   --   --   --    CO2 21 20  --  27  --   --   --   --    GLUCOSE 99 105*  --  144*  --   --   --   --    BUN 4* 6*  --  10  --   --   --   --    CREATININE 0.47* 0.36*  --  0.52  --   --   --   --    CALCIUM 9.1 8.9  --  8.9  --   --   --   --     < > = values in this interval not displayed.             Recent Labs     03/07/22  0355   TRIGLYCERIDE 35   HDL 68   LDL 46       Imaging  EC-ECHOCARDIOGRAM COMPLETE W/ CONT   Final Result      DX-CHEST-PORTABLE (1 VIEW)   Final Result      1.  Faint bibasilar opacities consistent with atelectasis and or pneumonitis.           Assessment/Plan  * COPD exacerbation (HCC)- (present on admission)  Assessment & Plan  Pulmonary medicine consulting  BiPAP trial in ED and now on HFNC  Cont IV and inhaled steroids  BD's and O2/RT protocols      Acute respiratory failure with hypoxia (HCC)  Assessment & Plan  Secondary to COPD  Pulmonary consulting  Wean oxygen as possible  Currently on high flow nasal cannula, being titrated    Encephalopathy  Assessment & Plan  Pt is now relaxed, oriented and interactive  resolved    Dermatitis  Assessment & Plan  Severe, bilateral lower extremities, wound care orders placed  Emollients  Consider topical steroids    Alcoholism (HCC)  Assessment & Plan  Vitamins, supportive care  Monitor for alcohol withdrawal    Hyponatremia- (present on admission)  Assessment & Plan  Unknown chronicity  Etiology unclear as urine lytes done 24hrs after admission making interpretation dificult  Does appear to be responding to lasix and fluid restriction  Continue with diuresis, strict ins and outs, daily weights  126 at present so less likely to Sz      Essential hypertension- (present on admission)  Assessment & Plan  2 g sodium restricted diet  On HCTZ as outpt which has been stopped due  to HypoNa      Obesity- (present on admission)  Assessment & Plan  Strong recommendation for follow-up outpatient PCP for lifestyle modification           VTE prophylaxis: enoxaparin ppx    I have performed a physical exam and reviewed and updated ROS and Plan today (3/9/2022). In review of yesterday's note (3/8/2022), there are no changes except as documented above.

## 2022-03-09 NOTE — CARE PLAN
Problem: Bronchopulmonary Hygiene  Goal: Increase mobilization of retained secretions  Description: Target End Date:  2 to 3 days    1.  Perform bronchopulmonary therapy as indicated by assessment  2.  Perform airway suctioning  3.  Perform actions to maintain patient airway  Outcome: Progressing     Problem: Humidified High Flow Nasal Cannula  Goal: Maintain adequate oxygenation dependent on patient condition  Description: Target End Date:  resolve prior to discharge or when underlying condition is resolved/stabilized    1.  Implement humidified high flow oxygen therapy  2.  Titrate high flow oxygen to maintain appropriate SpO2  Outcome: Progressing

## 2022-03-10 LAB
ANION GAP SERPL CALC-SCNC: 10 MMOL/L (ref 7–16)
BACTERIA BLD CULT: ABNORMAL
BUN SERPL-MCNC: 12 MG/DL (ref 8–22)
CALCIUM SERPL-MCNC: 8.8 MG/DL (ref 8.5–10.5)
CHLORIDE SERPL-SCNC: 90 MMOL/L (ref 96–112)
CO2 SERPL-SCNC: 30 MMOL/L (ref 20–33)
CREAT SERPL-MCNC: 0.41 MG/DL (ref 0.5–1.4)
GLUCOSE SERPL-MCNC: 145 MG/DL (ref 65–99)
MAGNESIUM SERPL-MCNC: 1.9 MG/DL (ref 1.5–2.5)
PHOSPHATE SERPL-MCNC: 3.1 MG/DL (ref 2.5–4.5)
POTASSIUM SERPL-SCNC: 3.7 MMOL/L (ref 3.6–5.5)
SIGNIFICANT IND 70042: ABNORMAL
SITE SITE: ABNORMAL
SODIUM SERPL-SCNC: 130 MMOL/L (ref 135–145)
SOURCE SOURCE: ABNORMAL

## 2022-03-10 PROCEDURE — A9270 NON-COVERED ITEM OR SERVICE: HCPCS | Performed by: HOSPITALIST

## 2022-03-10 PROCEDURE — 770001 HCHG ROOM/CARE - MED/SURG/GYN PRIV*

## 2022-03-10 PROCEDURE — 700111 HCHG RX REV CODE 636 W/ 250 OVERRIDE (IP): Performed by: HOSPITALIST

## 2022-03-10 PROCEDURE — 700102 HCHG RX REV CODE 250 W/ 637 OVERRIDE(OP): Performed by: INTERNAL MEDICINE

## 2022-03-10 PROCEDURE — 99232 SBSQ HOSP IP/OBS MODERATE 35: CPT | Performed by: HOSPITALIST

## 2022-03-10 PROCEDURE — A9270 NON-COVERED ITEM OR SERVICE: HCPCS | Performed by: STUDENT IN AN ORGANIZED HEALTH CARE EDUCATION/TRAINING PROGRAM

## 2022-03-10 PROCEDURE — A9270 NON-COVERED ITEM OR SERVICE: HCPCS | Performed by: INTERNAL MEDICINE

## 2022-03-10 PROCEDURE — 94669 MECHANICAL CHEST WALL OSCILL: CPT

## 2022-03-10 PROCEDURE — 700102 HCHG RX REV CODE 250 W/ 637 OVERRIDE(OP): Performed by: HOSPITALIST

## 2022-03-10 PROCEDURE — 84100 ASSAY OF PHOSPHORUS: CPT

## 2022-03-10 PROCEDURE — 700101 HCHG RX REV CODE 250: Performed by: HOSPITALIST

## 2022-03-10 PROCEDURE — 700111 HCHG RX REV CODE 636 W/ 250 OVERRIDE (IP): Performed by: STUDENT IN AN ORGANIZED HEALTH CARE EDUCATION/TRAINING PROGRAM

## 2022-03-10 PROCEDURE — 83735 ASSAY OF MAGNESIUM: CPT

## 2022-03-10 PROCEDURE — 80048 BASIC METABOLIC PNL TOTAL CA: CPT

## 2022-03-10 PROCEDURE — 700101 HCHG RX REV CODE 250: Performed by: STUDENT IN AN ORGANIZED HEALTH CARE EDUCATION/TRAINING PROGRAM

## 2022-03-10 PROCEDURE — 700102 HCHG RX REV CODE 250 W/ 637 OVERRIDE(OP): Performed by: STUDENT IN AN ORGANIZED HEALTH CARE EDUCATION/TRAINING PROGRAM

## 2022-03-10 PROCEDURE — 94640 AIRWAY INHALATION TREATMENT: CPT

## 2022-03-10 RX ORDER — IPRATROPIUM BROMIDE AND ALBUTEROL SULFATE 2.5; .5 MG/3ML; MG/3ML
3 SOLUTION RESPIRATORY (INHALATION)
Status: DISCONTINUED | OUTPATIENT
Start: 2022-03-10 | End: 2022-03-11 | Stop reason: HOSPADM

## 2022-03-10 RX ORDER — LORAZEPAM 2 MG/ML
0.5 INJECTION INTRAMUSCULAR ONCE
Status: COMPLETED | OUTPATIENT
Start: 2022-03-10 | End: 2022-03-10

## 2022-03-10 RX ORDER — PREDNISONE 20 MG/1
20 TABLET ORAL DAILY
Status: DISCONTINUED | OUTPATIENT
Start: 2022-03-11 | End: 2022-03-11 | Stop reason: HOSPADM

## 2022-03-10 RX ADMIN — SENNOSIDES AND DOCUSATE SODIUM 2 TABLET: 50; 8.6 TABLET ORAL at 06:00

## 2022-03-10 RX ADMIN — Medication: at 17:36

## 2022-03-10 RX ADMIN — IPRATROPIUM BROMIDE AND ALBUTEROL SULFATE 3 ML: 2.5; .5 SOLUTION RESPIRATORY (INHALATION) at 18:31

## 2022-03-10 RX ADMIN — FUROSEMIDE 20 MG: 10 INJECTION INTRAMUSCULAR; INTRAVENOUS at 06:00

## 2022-03-10 RX ADMIN — PREDNISONE 40 MG: 20 TABLET ORAL at 06:00

## 2022-03-10 RX ADMIN — BUDESONIDE AND FORMOTEROL FUMARATE DIHYDRATE 1 PUFF: 160; 4.5 AEROSOL RESPIRATORY (INHALATION) at 06:01

## 2022-03-10 RX ADMIN — SODIUM CHLORIDE 1 G: 1 TABLET ORAL at 17:42

## 2022-03-10 RX ADMIN — FUROSEMIDE 20 MG: 10 INJECTION INTRAMUSCULAR; INTRAVENOUS at 17:36

## 2022-03-10 RX ADMIN — SENNOSIDES AND DOCUSATE SODIUM 2 TABLET: 50; 8.6 TABLET ORAL at 17:37

## 2022-03-10 RX ADMIN — SODIUM CHLORIDE 1 G: 1 TABLET ORAL at 12:55

## 2022-03-10 RX ADMIN — LORAZEPAM 0.5 MG: 2 INJECTION INTRAMUSCULAR; INTRAVENOUS at 20:10

## 2022-03-10 RX ADMIN — IPRATROPIUM BROMIDE AND ALBUTEROL SULFATE 3 ML: 2.5; .5 SOLUTION RESPIRATORY (INHALATION) at 03:12

## 2022-03-10 RX ADMIN — OXYCODONE 2.5 MG: 5 TABLET ORAL at 19:37

## 2022-03-10 RX ADMIN — Medication 100 MG: at 17:37

## 2022-03-10 RX ADMIN — Medication 5 MG: at 19:38

## 2022-03-10 RX ADMIN — BUDESONIDE AND FORMOTEROL FUMARATE DIHYDRATE 1 PUFF: 160; 4.5 AEROSOL RESPIRATORY (INHALATION) at 17:35

## 2022-03-10 RX ADMIN — IPRATROPIUM BROMIDE AND ALBUTEROL SULFATE 3 ML: 2.5; .5 SOLUTION RESPIRATORY (INHALATION) at 06:13

## 2022-03-10 RX ADMIN — IPRATROPIUM BROMIDE AND ALBUTEROL SULFATE 3 ML: 2.5; .5 SOLUTION RESPIRATORY (INHALATION) at 09:38

## 2022-03-10 RX ADMIN — ENOXAPARIN SODIUM 40 MG: 40 INJECTION SUBCUTANEOUS at 06:01

## 2022-03-10 RX ADMIN — SODIUM CHLORIDE 1 G: 1 TABLET ORAL at 09:50

## 2022-03-10 RX ADMIN — ENOXAPARIN SODIUM 40 MG: 40 INJECTION SUBCUTANEOUS at 17:36

## 2022-03-10 RX ADMIN — Medication: at 06:01

## 2022-03-10 RX ADMIN — THERA TABS 1 TABLET: TAB at 17:37

## 2022-03-10 RX ADMIN — FOLIC ACID 1 MG: 1 TABLET ORAL at 17:37

## 2022-03-10 RX ADMIN — IPRATROPIUM BROMIDE AND ALBUTEROL SULFATE 3 ML: 2.5; .5 SOLUTION RESPIRATORY (INHALATION) at 14:46

## 2022-03-10 ASSESSMENT — ENCOUNTER SYMPTOMS
BACK PAIN: 0
DIARRHEA: 0
PALPITATIONS: 0
COUGH: 0
NAUSEA: 0
DOUBLE VISION: 0
BLURRED VISION: 0
FEVER: 0
LOSS OF CONSCIOUSNESS: 0
VOMITING: 0
ABDOMINAL PAIN: 0
CHILLS: 0
DIZZINESS: 0
HEADACHES: 0
SHORTNESS OF BREATH: 1

## 2022-03-10 ASSESSMENT — LIFESTYLE VARIABLES
AGITATION: NORMAL ACTIVITY
AUDITORY DISTURBANCES: NOT PRESENT
TOTAL SCORE: 0
VISUAL DISTURBANCES: NOT PRESENT
VISUAL DISTURBANCES: NOT PRESENT
PAROXYSMAL SWEATS: NO SWEAT VISIBLE
AUDITORY DISTURBANCES: NOT PRESENT
VISUAL DISTURBANCES: NOT PRESENT
ANXIETY: NO ANXIETY (AT EASE)
AUDITORY DISTURBANCES: NOT PRESENT
ORIENTATION AND CLOUDING OF SENSORIUM: ORIENTED AND CAN DO SERIAL ADDITIONS
AGITATION: NORMAL ACTIVITY
VISUAL DISTURBANCES: NOT PRESENT
TOTAL SCORE: 0
HEADACHE, FULLNESS IN HEAD: NOT PRESENT
TREMOR: NO TREMOR
ANXIETY: NO ANXIETY (AT EASE)
ANXIETY: NO ANXIETY (AT EASE)
ORIENTATION AND CLOUDING OF SENSORIUM: ORIENTED AND CAN DO SERIAL ADDITIONS
TOTAL SCORE: 0
TREMOR: NO TREMOR
HEADACHE, FULLNESS IN HEAD: NOT PRESENT
NAUSEA AND VOMITING: NO NAUSEA AND NO VOMITING
ORIENTATION AND CLOUDING OF SENSORIUM: ORIENTED AND CAN DO SERIAL ADDITIONS
NAUSEA AND VOMITING: NO NAUSEA AND NO VOMITING
HEADACHE, FULLNESS IN HEAD: NOT PRESENT
PAROXYSMAL SWEATS: NO SWEAT VISIBLE
NAUSEA AND VOMITING: NO NAUSEA AND NO VOMITING
AGITATION: NORMAL ACTIVITY
AGITATION: NORMAL ACTIVITY
TREMOR: NO TREMOR
NAUSEA AND VOMITING: NO NAUSEA AND NO VOMITING
PAROXYSMAL SWEATS: NO SWEAT VISIBLE
TREMOR: NO TREMOR
ANXIETY: NO ANXIETY (AT EASE)
TOTAL SCORE: 0
PAROXYSMAL SWEATS: NO SWEAT VISIBLE
AUDITORY DISTURBANCES: NOT PRESENT
HEADACHE, FULLNESS IN HEAD: NOT PRESENT
ORIENTATION AND CLOUDING OF SENSORIUM: ORIENTED AND CAN DO SERIAL ADDITIONS

## 2022-03-10 ASSESSMENT — PATIENT HEALTH QUESTIONNAIRE - PHQ9
1. LITTLE INTEREST OR PLEASURE IN DOING THINGS: NOT AT ALL
SUM OF ALL RESPONSES TO PHQ9 QUESTIONS 1 AND 2: 0
2. FEELING DOWN, DEPRESSED, IRRITABLE, OR HOPELESS: NOT AT ALL

## 2022-03-10 ASSESSMENT — PAIN DESCRIPTION - PAIN TYPE
TYPE: ACUTE PAIN

## 2022-03-10 ASSESSMENT — FIBROSIS 4 INDEX: FIB4 SCORE: 3.16

## 2022-03-10 NOTE — FACE TO FACE
"Face to Face Note  -  Durable Medical Equipment    Jose Corcoran D.O. - NPI: [id number]  I certify that this patient is under my care and that they had a durable medical equipment(DME)face to face encounter by myself that meets the physician DME face-to-face encounter requirements with this patient on:    Date of encounter:   Patient:                    MRN:                       YOB: 2022  Juan Manuel Bass  [id number]  1950     The encounter with the patient was in whole, or in part, for the following medical condition, which is the primary reason for durable medical equipment:  CHF and COPD    I certify that, based on my findings, the following durable medical equipment is medically necessary:  Oxygen.    HOME O2 Saturation Measurements:(Values must be present for Home Oxygen orders)  Room air sat at rest: 86  Room air sat with amb: 79  With liters of O2: 3, O2 sat at rest with O2: 92  With Liters of O2: 3, O2 sat with amb with O2 : 94  Is the patient mobile?: Yes    My Clinical findings support the need for the above equipment due to:  Hypoxia    Supporting Symptoms: The patient requires supplemental oxygen, as the following interventions have been tried with limited or no improvement: \"Bronchodilators and/or steroid inhalers, \"Positive expiratory pressure therapies, \"Oral and/or IV steroids and \"Ambulation with oximetry    If patient feels more short of breath, they can go up to 6 liters per minute and contact healthcare provider.  "

## 2022-03-10 NOTE — DISCHARGE PLANNING
Care Transition Team Discharge Planning    Anticipated Discharge Disposition: d/c home w/ O2    Action: Lsw spoke to MD and pt can d/c home today or tomorrow.    Pt will need O2 at d/c.    Benjiw researched pt's medical insurance in resource spreadsheet on  [employer]. It states the only contracted DME o2 company is [employer].    Lsw completed choice for [employer] and faxed to DPA. Lsw left original choice form on DPA's desk.      Lsw explained to DPa as above.    Lsw sent text to Md to request order and f2f. We need to get RN to walk and enter sats first.     Barriers to Discharge: order and face to face, referral faxed, acceptance, delivery, transport    Plan: LSw will continue to follow, and assist w/ d/c planning.

## 2022-03-10 NOTE — PROGRESS NOTES
Bedside report received from nurse. Assumed care of pt. Pt resting comfortably in bed. A/Ox4, VSS,  All needs met. POC reviewed and white board updated. Tele box on. Call light in reach. Bed locked in lowest position with 2 upper bed rails up. Mild new numbness in LLE. Pulses intact, cap refill<3s, sensation otherwise intact. Leg repositioned.

## 2022-03-10 NOTE — CARE PLAN
"The patient is Watcher - Medium risk of patient condition declining or worsening    Shift Goals  Clinical Goals: Wean O2  Patient Goals: \"Get to sleep at a reasonable time\"  Family Goals: NA    Progress made toward(s) clinical / shift goals:    Problem: Knowledge Deficit - Standard  Goal: Patient and family/care givers will demonstrate understanding of plan of care, disease process/condition, diagnostic tests and medications  Outcome: Progressing     Problem: Knowledge Deficit - COPD  Goal: Patient/significant other demonstrates understanding of disease process, utilization of the Action Plan, medications and discharge instruction  Outcome: Progressing       Patient is not progressing towards the following goals:      "

## 2022-03-10 NOTE — PROGRESS NOTES
[facility] Medicine Daily Progress Note    Date of Service  3/10/2022    Chief Complaint  Juan Manuel Bass is a 71 y.o. male admitted 3/6/2022 with SOB    Hospital Course  72yo PMHx COPD, HTN  CC SOB per EMS 80s on RA at home.  In ED requiring HFNC.  Lab showing Na 117, Creat 0.47.  Admitted to [facility] on BiPAP and precedex with Dx of resp failure/COPD exacerbation    Interval Problem Update  Patient states he is feeling better today.  No SOB at rest or when walking to the bathroom.  Cough is less as well    Sinus 80s  -150s  On 3 LNC  U OP 1.75 L, total -1025 mL last 24 hours and 3.45 L from admission    I have personally seen and examined the patient at bedside. I discussed the plan of care with patient, bedside RN and pharmacy.    Consultants/Specialty      Code Status  DNAR/DNI    Disposition  Patient is not medically cleared for discharge.   Anticipate discharge to to home with close outpatient follow-up.  I have placed the appropriate orders for post-discharge needs.    Review of Systems  Review of Systems   Constitutional: Negative for chills and fever.   Eyes: Negative for blurred vision and double vision.   Respiratory: Positive for shortness of breath. Negative for cough.    Cardiovascular: Negative for chest pain, palpitations and leg swelling.   Gastrointestinal: Negative for abdominal pain, diarrhea, nausea and vomiting.   Genitourinary: Negative for dysuria and urgency.   Musculoskeletal: Negative for back pain.   Skin: Positive for rash.   Neurological: Negative for dizziness, loss of consciousness and headaches.        Physical Exam  Temp:  [35.8 °C (96.5 °F)-36.7 °C (98.1 °F)] 35.8 °C (96.5 °F)  Pulse:  [] 84  Resp:  [13-27] 20  BP: (111-166)/(65-97) 133/76  SpO2:  [93 %-98 %] 97 %    Physical Exam  Vitals reviewed.   Constitutional:       General: He is not in acute distress.     Appearance: He is well-developed. He is obese. He is not diaphoretic.   HENT:      Head: Normocephalic and  atraumatic.   Eyes:      Conjunctiva/sclera: Conjunctivae normal.   Neck:      Vascular: No JVD.   Cardiovascular:      Rate and Rhythm: Normal rate.      Heart sounds: No murmur heard.    No gallop.   Pulmonary:      Effort: Pulmonary effort is normal. No respiratory distress.      Breath sounds: No stridor. No wheezing or rales.      Comments: No adventitial sounds however very poor air movement  Abdominal:      Palpations: Abdomen is soft.      Tenderness: There is no abdominal tenderness. There is no guarding or rebound.   Musculoskeletal:      Right lower leg: Edema present.      Left lower leg: Edema present.   Skin:     General: Skin is warm and dry.      Findings: Rash present.      Comments: Confluent macular rash with extensive lichenification B lower legs to proximal shins   Neurological:      Mental Status: He is alert and oriented to person, place, and time.      Gait: Gait normal.   Psychiatric:         Thought Content: Thought content normal.         Fluids    Intake/Output Summary (Last 24 hours) at 3/10/2022 1407  Last data filed at 3/10/2022 1300  Gross per 24 hour   Intake 760 ml   Output 2325 ml   Net -1565 ml       Laboratory  Recent Labs     03/08/22  0226   WBC 7.9   RBC 4.71   HEMOGLOBIN 14.9   HEMATOCRIT 42.4   MCV 90.0   MCH 31.6   MCHC 35.1   RDW 43.8   PLATELETCT 201   MPV 9.5     Recent Labs     03/08/22  0226 03/08/22  0552 03/08/22  2207 03/09/22  0525 03/10/22  0340   SODIUM 121*   < > 127* 129* 130*   POTASSIUM 3.9  --   --   --  3.7   CHLORIDE 83*  --   --   --  90*   CO2 27  --   --   --  30   GLUCOSE 144*  --   --   --  145*   BUN 10  --   --   --  12   CREATININE 0.52  --   --   --  0.41*   CALCIUM 8.9  --   --   --  8.8    < > = values in this interval not displayed.                   Imaging  EC-ECHOCARDIOGRAM COMPLETE W/ CONT   Final Result      DX-CHEST-PORTABLE (1 VIEW)   Final Result      1.  Faint bibasilar opacities consistent with atelectasis and or pneumonitis.            Assessment/Plan  * COPD exacerbation (HCC)- (present on admission)  Assessment & Plan  Pulmonary medicine consulting  BiPAP trial in ED and now on HFNC  Start po steroid taper  Cont inhaled steroids  BD's and O2/RT protocols  O2 requirement improving daily    Acute respiratory failure with hypoxia (HCC)  Assessment & Plan  Secondary to COPD  Pulmonary consulting  Wean oxygen as possible  Now on NC O2    Encephalopathy  Assessment & Plan  Pt is now relaxed, oriented and interactive  resolved    Dermatitis  Assessment & Plan  Severe, bilateral lower extremities, wound care orders placed  Emollients  Consider topical steroids    Alcoholism (HCC)  Assessment & Plan  Vitamins, supportive care  Monitor for alcohol withdrawal    Hyponatremia- (present on admission)  Assessment & Plan  Unknown chronicity  Etiology unclear as urine lytes done 24hrs after admission making interpretation dificult  Drinks 6-8 beers and approx 1 litre water  Had been on HCTZ  Does appear to be responding to lasix and fluid restriction  DC IV lasix  Liberalize to 2 litre restriction  130      Essential hypertension- (present on admission)  Assessment & Plan  2 g sodium restricted diet  On HCTZ as outpt which has been stopped due to HypoNa      Obesity- (present on admission)  Assessment & Plan  Strong recommendation for follow-up outpatient PCP for lifestyle modification           VTE prophylaxis: enoxaparin ppx    I have performed a physical exam and reviewed and updated ROS and Plan today (3/10/2022). In review of yesterday's note (3/9/2022), there are no changes except as documented above.

## 2022-03-10 NOTE — CARE PLAN
Problem: Knowledge Deficit - Standard  Goal: Patient and family/care givers will demonstrate understanding of plan of care, disease process/condition, diagnostic tests and medications  Outcome: Progressing     Problem: Knowledge Deficit - COPD  Goal: Patient/significant other demonstrates understanding of disease process, utilization of the Action Plan, medications and discharge instruction  Outcome: Progressing     Problem: Self Care  Goal: Patient will have the ability to perform ADLs independently or with assistance (bathe, groom, dress, toilet and feed)  Outcome: Progressing     Problem: Fall Risk  Goal: Patient will remain free from falls  Outcome: Progressing     Problem: Pain - Standard  Goal: Alleviation of pain or a reduction in pain to the patient’s comfort goal  Outcome: Progressing   The patient is Stable - Low risk of patient condition declining or worsening    Shift Goals  Clinical Goals: wean O2, diuresis,fluid restriction  Patient Goals: rest, bathe  Family Goals: na    Progress made toward(s) clinical / shift goals:  mobilize and wean O2, bathe    Patient is not progressing towards the following goals:

## 2022-03-10 NOTE — DISCHARGE PLANNING
Received Choice form at 1127  Agency/Facility Name: [facility]  Referral sent per Choice form at 1128    1510- Spoke To: Tosha  Agency/Facility Name: [facility]  Plan or Request: referral received and will go into processing soon.  Per Tosha, please update order.    9809- Spoke To: Tosha  Agency/Facility Name: [facility]  Plan or Request: Tosha has questions about bi pap and pt having a qualifying diagnosis.  Trevon horvath

## 2022-03-10 NOTE — DISCHARGE PLANNING
Care Transition Team Discharge Planning    Anticipated Discharge Disposition: d/c home w/ O2 tomorrow AM    Action: Lsw spoke to Md and pt's friend can p/u pt tomorrow, Fri 3.11.22 any time after 9am.    Lsw updated DPA as above, and requested we get DME [facility] to get us an ETA on delivery of DME.     Barriers to Discharge: receipt of portable o2 tank at bedside for d/c home Friday, tomorrow    Plan: Lsw will continue to follow, and assist w/ d/c planning.

## 2022-03-11 VITALS
WEIGHT: 293.21 LBS | BODY MASS INDEX: 41.98 KG/M2 | HEIGHT: 70 IN | HEART RATE: 98 BPM | OXYGEN SATURATION: 93 % | TEMPERATURE: 97.7 F | SYSTOLIC BLOOD PRESSURE: 133 MMHG | DIASTOLIC BLOOD PRESSURE: 90 MMHG | RESPIRATION RATE: 18 BRPM

## 2022-03-11 PROCEDURE — 700111 HCHG RX REV CODE 636 W/ 250 OVERRIDE (IP): Performed by: HOSPITALIST

## 2022-03-11 PROCEDURE — A9270 NON-COVERED ITEM OR SERVICE: HCPCS | Performed by: HOSPITALIST

## 2022-03-11 PROCEDURE — 94640 AIRWAY INHALATION TREATMENT: CPT

## 2022-03-11 PROCEDURE — A9270 NON-COVERED ITEM OR SERVICE: HCPCS | Performed by: STUDENT IN AN ORGANIZED HEALTH CARE EDUCATION/TRAINING PROGRAM

## 2022-03-11 PROCEDURE — 700102 HCHG RX REV CODE 250 W/ 637 OVERRIDE(OP): Performed by: HOSPITALIST

## 2022-03-11 PROCEDURE — 700101 HCHG RX REV CODE 250: Performed by: HOSPITALIST

## 2022-03-11 PROCEDURE — 94669 MECHANICAL CHEST WALL OSCILL: CPT

## 2022-03-11 PROCEDURE — 99239 HOSP IP/OBS DSCHRG MGMT >30: CPT | Performed by: HOSPITALIST

## 2022-03-11 PROCEDURE — 700102 HCHG RX REV CODE 250 W/ 637 OVERRIDE(OP): Performed by: STUDENT IN AN ORGANIZED HEALTH CARE EDUCATION/TRAINING PROGRAM

## 2022-03-11 PROCEDURE — 700111 HCHG RX REV CODE 636 W/ 250 OVERRIDE (IP): Performed by: STUDENT IN AN ORGANIZED HEALTH CARE EDUCATION/TRAINING PROGRAM

## 2022-03-11 RX ORDER — ALBUTEROL SULFATE 90 UG/1
1-2 AEROSOL, METERED RESPIRATORY (INHALATION) EVERY 4 HOURS PRN
Qty: 1 EACH | Refills: 10 | Status: SHIPPED | OUTPATIENT
Start: 2022-03-11

## 2022-03-11 RX ORDER — AMLODIPINE BESYLATE 5 MG/1
5 TABLET ORAL DAILY
Qty: 30 TABLET | Refills: 0 | Status: SHIPPED | OUTPATIENT
Start: 2022-03-11 | End: 2022-09-12

## 2022-03-11 RX ORDER — SODIUM CHLORIDE 1 G/1
1 TABLET ORAL
Qty: 90 TABLET | Refills: 3 | Status: ON HOLD | OUTPATIENT
Start: 2022-03-11 | End: 2022-03-28 | Stop reason: SDUPTHER

## 2022-03-11 RX ADMIN — Medication: at 05:11

## 2022-03-11 RX ADMIN — FUROSEMIDE 20 MG: 10 INJECTION INTRAMUSCULAR; INTRAVENOUS at 05:11

## 2022-03-11 RX ADMIN — SODIUM CHLORIDE 1 G: 1 TABLET ORAL at 08:20

## 2022-03-11 RX ADMIN — SENNOSIDES AND DOCUSATE SODIUM 2 TABLET: 50; 8.6 TABLET ORAL at 05:11

## 2022-03-11 RX ADMIN — IPRATROPIUM BROMIDE AND ALBUTEROL SULFATE 3 ML: 2.5; .5 SOLUTION RESPIRATORY (INHALATION) at 07:33

## 2022-03-11 RX ADMIN — ENOXAPARIN SODIUM 40 MG: 40 INJECTION SUBCUTANEOUS at 05:11

## 2022-03-11 RX ADMIN — PREDNISONE 20 MG: 20 TABLET ORAL at 05:11

## 2022-03-11 RX ADMIN — BUDESONIDE AND FORMOTEROL FUMARATE DIHYDRATE 1 PUFF: 160; 4.5 AEROSOL RESPIRATORY (INHALATION) at 05:11

## 2022-03-11 ASSESSMENT — PAIN DESCRIPTION - PAIN TYPE
TYPE: ACUTE PAIN

## 2022-03-11 NOTE — DISCHARGE PLANNING
Agency/Facility Name: [facility]  Plan or Request: per right fax, pt has a high deductible and will have to pay monthly out of pocket.  DPA to call Tosha winchester [facility] this morning regarding 02 and insurance .  Trevon Leon notified      0925- [facility] approved, 02 brought to bedside.

## 2022-03-11 NOTE — DISCHARGE SUMMARY
Discharge Summary    CHIEF COMPLAINT ON ADMISSION  Chief Complaint   Patient presents with   • Shortness of Breath     BIBA for SOB. Per EMS complaint started at 2130. Pt's O2 was in the 80's for EMS. Hx of COPD.        Reason for Admission  EMS     Admission Date  3/6/2022    CODE STATUS  Prior    HPI & HOSPITAL COURSE  This is a 71 y.o. male here with a previous medical history of nonoxygen dependent COPD, and hypertension.    Patient presented for evaluation on March 7 with complaint of shortness of breath.  He was brought in by EMS who found him to be 80% on room air when they found him.  In the ED, the patient required BiPAP to maintain saturations, and was therefore admitted to the [facility].  Initial labs included a lactic acid of 3.1, troponin XX 6, white blood cell count 1.7, hemoglobin 17.0.  Sodium was also significantly low at 117.  We do not have comparison labs prior to 2013 so unknown chronicity in terms of his hyponatremia.  Renal function was preserved with creatinine of 0.47, potassium is in the normal range.    In the IMCU, the patient was treated with IV steroids and aggressive bronchodilator therapy as well as some diuresis based on volume overload noted on his chest x-ray.  He responded well to these interventions, and on the day of discharge is down to 3 L nasal cannula.  Pulmonology did see the patient, and recommended patient follow-up for further evaluation of his COPD.    As regards his hyponatremia, his urine sodium came back as less than 20, osmolality 294, serum osmolality was in the range of 280.  As it happens the patient drinks about 6-8 beers daily, and another 1 or 2 quarts of water.  Patient does take hydrochlorothiazide as an outpatient, and have been taking it up until the day of admission therefore interpretation of these labs is compromised.  He was treated by stopping his hydrochlorothiazide, and placed on a 1500 mL fluid restriction with p.o. salt, and responded to these  interventions with sodium in the 130s on discharge.  This will need to be followed up.    On the day of discharge the patient is in good spirits and eager to go home.  He states he feels like he is breathing normally.  I did get up and walk with the patient into the hallway and back and he was able to do so independently.  He did maintain his saturations well on 3 L.  No notes on file    Therefore, he is discharged in good and stable condition to home with close outpatient follow-up.    The patient met 2-midnight criteria for an inpatient stay at the time of discharge.    Discharge Date  3/11/2022    FOLLOW UP ITEMS POST DISCHARGE  Follow up on Hyponatremia    DISCHARGE DIAGNOSES  Principal Problem:    COPD exacerbation (HCC) POA: Yes  Active Problems:    Obesity POA: Yes    Essential hypertension POA: Yes    Hyponatremia POA: Yes    Alcoholism (HCC) POA: Unknown    Dermatitis POA: Unknown    Encephalopathy POA: Unknown    Acute respiratory failure with hypoxia (HCC) POA: Unknown  Resolved Problems:    * No resolved hospital problems. *      FOLLOW UP  No future appointments.  Peterson Amin M.D.  [address] [id number] [phone]    Call in 1 week        MEDICATIONS ON DISCHARGE     Medication List      START taking these medications      Instructions   sodium chloride 1 GM Tabs  Commonly known as: SALT   Take 1 Tablet by mouth 3 times a day with meals.  Dose: 1 g        CONTINUE taking these medications      Instructions   albuterol 108 (90 Base) MCG/ACT Aers inhalation aerosol   Inhale 1-2 Puffs every four hours as needed for Shortness of Breath.  Dose: 1-2 Puff     amLODIPine 5 MG Tabs  Commonly known as: NORVASC   Take 1 Tablet by mouth every day.  Dose: 5 mg     Centrum Silver Adult 50+ Tabs   Take 1 Tablet by mouth every day.  Dose: 1 Tablet     pantoprazole 40 MG Tbec  Commonly known as: PROTONIX   Take 40 mg by mouth every day.  Dose: 40 mg     Trelegy Ellipta 100-62.5-25 MCG/INH  Aepb inhalation  Generic drug: Fluticasone-Umeclidin-Vilant   Inhale 2-3 Inhalation every morning.  Dose: 2-3 Inhalation     VITAMIN D3 PO   Take 1 Capsule by mouth every day.  Dose: 1 Capsule     vitamin E 180 MG (400 UNIT) Caps   Take 360 mg by mouth every day. 2 capsules = 360 mg (800 units).  Dose: 360 mg        STOP taking these medications    hydroCHLOROthiazide 25 MG Tabs  Commonly known as: HYDRODIURIL            Allergies  Allergies   Allergen Reactions   • Tetanus Toxoid Hives       DIET  No orders of the defined types were placed in this encounter.      ACTIVITY  As tolerated.  Weight bearing as tolerated    CONSULTATIONS      PROCEDURES      LABORATORY  Lab Results   Component Value Date    SODIUM 130 (L) 03/10/2022    POTASSIUM 3.7 03/10/2022    CHLORIDE 90 (L) 03/10/2022    CO2 30 03/10/2022    GLUCOSE 145 (H) 03/10/2022    BUN 12 03/10/2022    CREATININE 0.41 (L) 03/10/2022        Lab Results   Component Value Date    WBC 7.9 03/08/2022    HEMOGLOBIN 14.9 03/08/2022    HEMATOCRIT 42.4 03/08/2022    PLATELETCT 201 03/08/2022        Total time of the discharge process exceeds 36 minutes.  Most of that time spent with the pt reviewing DC instructions, medications and follow up issues

## 2022-03-11 NOTE — PROGRESS NOTES
Dc instructions provided to pt, pt to follow up with PCP  In one week. Pt signed instructions. Will be p icking up new meds on his way home today. Pt dc via wheelchair with CNA with his oxygen. ekg stickers and IVs removed.

## 2022-03-11 NOTE — DISCHARGE PLANNING
Care Transition Team Discharge Planning    Anticipated Discharge Disposition: d/c home w/ O2    Action: Lsw received update from DPA. The DMe o2 company indicates pt's medical insurance, [employer], has a high deductible for the O2. He will have to pay $150..00 per month.    Lsw sent text to MD to update as above and ask if pt would need to O2 for longer term or not.     No deductible has been paid this year, and pt owes $1,500.00 for his insurance this year.    Lsw met w/ pt at bedside to update as above, and provide pamphlet for inogen portable o2 if pt is able to afford this. The insurance will start all over the beginning of each year w/ deductible.    Barriers to Discharge: affordability d/t deductible needing to be paid down 1st    Plan: Lsw will continue to follow, and assist w/ d/c planning.

## 2022-03-11 NOTE — PROGRESS NOTES
12 hour chart check    Have a great shift!      MS    SR 70-80s  [facility]  rPVC  .20/.10/.36

## 2022-03-11 NOTE — PROGRESS NOTES
Oxygen tanks delivered to bedside. Pt called friend Lloyd to inquire about a ride home. Friend will be available to  pt tomorrow 3/11 at 0900.

## 2022-03-11 NOTE — DISCHARGE INSTRUCTIONS
Discharge Instructions    Discharged to home by car with friend. Discharged via wheelchair, hospital escort: Yes.  Special equipment needed: Oxygen    Be sure to schedule a follow-up appointment with your primary care doctor or any specialists as instructed.     Discharge Plan:   Influenza Vaccine Indication: Patient Refuses    I understand that a diet low in cholesterol, fat, and sodium is recommended for good health. Unless I have been given specific instructions below for another diet, I accept this instruction as my diet prescription.   Other diet: Cardiac      COPD Patient Discharge Instructions    • Diet  o Follow a low fat, low cholesterol, low salt diet unless instructed otherwise by your Doctor. Read the labels on the back of food products and track your intake of fat, cholesterol and salt.  • No smoking  o Discontinuing smoking will have the biggest impact on preventing progression of disease.  o To participate in [employer]’s Quit Tobacco Program, call [phone] or visit [email or web address]  • Oxygen  o If your doctor has order that you wear oxygen at home, it is important to wear it as ordered.  o Do not smoke, vape, or use e-cigarettes with oxygen on.  o Store in an appropriate location: upright in its delarosa or laid flat down, away from open flames and stoves.   o Do not use oil-based creams and moisturizers (ie: petroleum products, oil-based lip moisturizers) or aerosol sprays (ie: hair sprays or deodorants) when using your oxygen equipment.  o Be careful with tubing placement to prevent yourself and others from tripping.  • Medications  o Refer to your personalized Action Plan to manage your symptoms.  • Warning signs of an exacerbation  o Breathing fast and shallow, worsening shortness of breath (like you just finished exercising)  o Chest tightness  o Increases in sputum production  o Changes in sputum color  o Lower oxygen levels than baseline  • When to call your doctor  o If the warning  signs of an exacerbation do not improve  o Refer to your personalized Action Plan   • Pulmonary Rehab  o Your doctor has ordered you a referral to Pulmonary Rehab. Call [phone] to schedule an appointment  • Attend your follow-up appointment with your PCP and/or Pulmonologist  • Remote Monitoring: At the direction of the remote monitoring on-call provider, you may increase your oxygen by 2 liters above your baseline.     See the educational handout provided by your COPD Navigator for more information. This also explains more about COPD, symptoms of an exacerbation, and some of the tests that you have undergone.    · Is patient discharged on Warfarin / Coumadin?   · no      Depression / Suicide Risk    As you are discharged from this [facility] facility, it is important to learn how to keep safe from harming yourself.    Recognize the warning signs:  · Abrupt changes in personality, positive or negative- including increase in energy   · Giving away possessions  · Change in eating patterns- significant weight changes-  positive or negative  · Change in sleeping patterns- unable to sleep or sleeping all the time   · Unwillingness or inability to communicate  · Depression  · Unusual sadness, discouragement and loneliness  · Talk of wanting to die  · Neglect of personal appearance   · Rebelliousness- reckless behavior  · Withdrawal from people/activities they love  · Confusion- inability to concentrate     If you or a loved one observes any of these behaviors or has concerns about self-harm, here's what you can do:  · Talk about it- your feelings and reasons for harming yourself  · Remove any means that you might use to hurt yourself (examples: pills, rope, extension cords, firearm)  · Get professional help from the community (Mental Health, Substance Abuse, psychological counseling)  · Do not be alone:Call your Safe Contact- someone whom you trust who will be there for you.  · Call your local CRISIS HOTLINE [phone]  "or [phone]  · Call your local Children's Mobile Crisis Response Team [place] (530) 560-5038 or www.[employer]  · Call the toll free National Suicide Prevention Hotlines   · National Suicide Prevention Lifeline 908-539-[place]WV (7575)  · National Hope Line Network 800-SUICIDE (686-6047)        MY COPD ACTION PLAN     It is recommended that patients and physicians /healthcare providers complete this action plan together. This plan should be discussed at each physician visit and updated as needed.    The green, yellow and red zones show groups of symptoms of COPD. This list of symptoms is not comprehensive, and you may experience other symptoms. In the \"Actions\" column, your healthcare provider has recommended actions for you to take based on your symptoms.    Patient Name: Juan Manuel Bass   YOB: 1950   Last Updated on:     Green Zone:  I am doing well today Actions   •  Usual activitiy and exercise level •  Take daily medications   •  Usual amounts of cough and phlegm/mucus •  Use oxygen as prescribed   •  Sleep well at night •  Continue regular exercise/diet plan   •  Appetite is good •  At all times avoid cigarette smoke, inhaled irritants     Daily Medications (these medications are taken every day):   Fluticasone and Umeclidinium and Vilanterol (Trelogy) 1 Puff Once daily     Additional Information:  Please remember to RINSE MOUTH after taking this medicine    Yellow Zone:  I am having a bad day or a COPD flare Actions   •  More breathless than usual •  Continue daily medications   •  I have less energy for my daily activities •  Use quick relief inhaler as ordered   •  Increased or thicker phlegm/mucus •  Use oxygen as prescribed   •  Using quick relief inhaler/nebulizer more often •  Get plenty of rest   •  Swelling of ankles more than usual •  Use pursed lip breathing   •  More coughing than usual •  At all times avoid cigarette smoke, inhaled irritants   •  I feel like I have a \"chest " "cold\"   •  Poor sleep and my symptoms woke me up   •  My appetite is not good   •  My medicine is not helping    •  Call provider immediately if symptoms don’t improve     Continue daily medications, add rescue medications:   Albuterol 2 Puffs         Medications to be used during a flare up, (as Discussed with Provider):           Additional Information:  Take as directed by your Doctor and use the spacer    Red Zone:  I need urgent medical care Actions   •  Severe shortness of breath even at rest •  Call 911 or seek medical care immediately   •  Not able to do any activity because of breathing    •  Fever or shaking chills    •  Feeling confused or very drowsy     •  Chest pains    •  Coughing up blood            ABOUT YOUR VISIT TODAY  You have a viral syndrome which may include symptoms like muscle aches, fevers, chills, runny nose, cough, sneezing, sore throat, vomiting or diarrhea.  One of the potential viruses you may have is SARS-CoV-2, the virus that causes COVID-19.  You may also have a different viral infection such as the common cold or flu.  Most patients with COVID-19 have mild symptoms and recover on their own. Resting, staying hydrated, and sleeping are typically helpful.  You are well enough to go home and treat your symptoms with oral fluids, and over the counter medicines as needed for fevers, cough, pain, etc.        COVID-19 TESTING & RESULTS    • If COVID-19 testing was performed, the results will not be available for up to 4 days.  Please DO NOT CONTACT THE EMERGENCY DEPARTMENT, HOSPITAL OR CLINIC FOR RESULTS OF THIS TEST.  If you have MyChart, the results and accompanying instructions will be sent to you electronically.  • You should know that the nasopharyngeal SARS-COV-2 PCR test is 70-75% sensitive, however very specific, which means a risk for false negative result. This means you could test negative, but still have COVID-19.     o If your COVID-19 test is positive, you will be contacted " by a member of the [facility] facility COVID team with your results and will have the opportunity to set up a virtual (Zoom) visit to get your questions answered, work notes written, and address family concerns.      o If your COVID-19 test is negative, you will not necessarily be contacted directly by a [facility] provider but will be able to find the results online on [employer] ([email or web address]).      o If you have not received your result within 5 days, you can call the hotline at [phone] to receive your results.    o If your results are negative, you will receive a letter via [employer] with instructions about when to return to work,  or school.    ADVICE FOR YOU    • As advised by the Centers for Disease Control and Prevention (CDC), please isolate yourself for at least 10 days since the onset of your symptoms AND one day without a fever without the use of fever reducing medications AND with improved symptoms while awaiting your test results.  If your test results are negative AND you meet the criteria as listed in [employer], you may discontinue your isolation prior to 10 days.  • Restrict activities outside your home. Do not go to work, school, public areas, or restaurants. Avoid using public transportation, ride-sharing, or taxis.  • If your symptoms worsen or you need a return to work note please call the COVID line [phone].  • If you become sicker (even if your initial COVID test is negative), have difficulty breathing, or chest pain, or you are unable to eat or drink enough, or have severe vomiting, diarrhea or weakness, you may need to go to the Emergency Department or contact your clinic provider for re-evaluation.  o If you need care and are coming into the hospital or one of our clinics, inform the healthcare facility by phone that you have been tested for COVID-19 prior to entry.  o Put on a facemask before you enter the facility or before emergency services arrive if  you have called 911.    o Unless you have severe difficulty breathing you will be expected to wear your mask throughout your hospital/clinic visit in order to protect our staff and other employees.  • Postpone all elective medical appointments within your isolation period, including but not limited to physical therapy, dental, chiropractic, routine check-ups, etc., until cleared by the [facility] department, or your personal physician. If you have any upcoming elective appointments, please contact that office directly in order to cancel, reschedule or be evaluated for a potential virtual visit.    IF YOU LIVE WITH OTHERS    • Please have ALL household members quarantine until your test results are back.  They should not go into public, to  or school, or go to work.  If any of your household members need a work note,  please have them schedule a visit by calling the [facility] at [phone]. If you have further questions for your doctor, you may also schedule a visit with your Primary Care Physician.  • If living with others, try to have your own bathroom and bedroom if possible.  Please try and wipe down high-touch surfaces (counters, tabletops, doorknobs, bathroom fixtures, toilets, phones, keyboards, tablets, and bedside tables) at least twice a day. Avoid sharing personal household items. You should not share dishes, drinking glasses, cups, eating utensils, towels, or bedding with other people in your home. After using these items, they should be washed thoroughly with soap and water.  Also, clean any surfaces that may have blood, stool, or body fluids on them. Use a household cleaning spray or wipe, according to the label instructions. Labels contain instructions for safe and effective use of the cleaning product including precautions you should take when applying the product, such as wearing gloves and making sure you have good ventilation during use of the product.   • Clean your  hands often. Wash your hands often with soap and water for at least 20 seconds. If soap and water are not available, clean your hands with an alcohol-based hand  that contains at least 70% alcohol, covering all surfaces of your hands and rubbing them together until they feel dry. Soap and water should be used if hands are visibly dirty. Avoid touching your eyes, nose, and mouth with unwashed hands.   • Cover your coughs and sneezes.    Please see the resources below for more information    • CDC Corona Website https://www.cdc.gov/coronavirus/2019-ncov/index.html  • General Information https://www.cdc.gov/coronavirus/2019-ncov/faq.html   • [facility]: [phone]  • [facility] [phone]  • For asymptomatic testing for yourself and your family, go to [facility].care for testing at [facility].      ISOLATE  Please isolate yourself until you have received the results of your COVID test. If you are negative at that time (and your symptoms do not include loss of sense of taste or smell and you have had no close or household contacts with COVID-19) and have had at least one day of improved symptoms without the use of medications to reduce fever, you can leave isolation.  If your symptoms persist at the time of your negative covid test, please continue to isolate yourself and call the COVID line for further guidance.      Until you receive your COVID test results and have shown improvement in your symptoms, for your health and safety, we kindly advise you to postpone all elective medical appointments, including but not limited to physical therapy, dental, chiropractic, routine check-ups, etc., until cleared by the healthline, local health department, or your personal physician. If you have any upcoming elective appointments, please contact that office directly in order to cancel, reschedule or be evaluated for a potential virtual visit. (Staff: please check  "Appointment Desk and make notes if visits fall within isolation time-frame)    Please note-  If you had an exposure to someone who tested positive for COVID and then you developed symptoms, even with a negative test, you must \"presume\" you are positive and isolate for 10 days after your symptoms onset.     Please do not allow any visitors. Isolate yourself at your home. No social gatherings, no public places (I.e [employer],  centers,shopping or other public areas).  Do not shake hands. Please avoid close contact like hugging or kissing.  If living with others, try and consolidate to your own bathroom and bedroom if possible, try and wipe down hard surfaces twice a day.      Please call the COVID-19 hotline if you develop increased SOB or worsening symptoms or proceed to the Emergency Department.  Please let the Emergency Department, 1 providers or any other healthcare providers know that you have a COVID test pending before you seek care.    Please wear a mask in public after you have been taken off isolation, and please follow [facility] and [facility] guidelines for public masking.       "

## 2022-03-11 NOTE — PROGRESS NOTES
Bedside report received and patient care assumed. Pt is resting in bed, A&O4, with no complaints of pain, and is on 3L nasal canula. All fall precautions are in place, belongings at bedside table.  Pt was updated on POC, no questions or concerns. Pt educated on use of call light for assistance. Will continue to monitor.

## 2022-03-12 ENCOUNTER — APPOINTMENT (OUTPATIENT)
Dept: RADIOLOGY | Facility: MEDICAL CENTER | Age: 72
DRG: 189 | End: 2022-03-12
Attending: EMERGENCY MEDICINE
Payer: MEDICARE

## 2022-03-12 ENCOUNTER — HOSPITAL ENCOUNTER (INPATIENT)
Facility: MEDICAL CENTER | Age: 72
LOS: 4 days | DRG: 189 | End: 2022-03-17
Attending: EMERGENCY MEDICINE | Admitting: STUDENT IN AN ORGANIZED HEALTH CARE EDUCATION/TRAINING PROGRAM
Payer: MEDICARE
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
## 2022-03-12 DIAGNOSIS — R06.00 DYSPNEA, UNSPECIFIED TYPE: ICD-10-CM

## 2022-03-12 DIAGNOSIS — R53.81 DEBILITY: ICD-10-CM

## 2022-03-12 DIAGNOSIS — J96.22 ACUTE ON CHRONIC RESPIRATORY FAILURE WITH HYPOXIA AND HYPERCAPNIA (HCC): ICD-10-CM

## 2022-03-12 DIAGNOSIS — J96.21 ACUTE ON CHRONIC RESPIRATORY FAILURE WITH HYPOXIA AND HYPERCAPNIA (HCC): ICD-10-CM

## 2022-03-12 DIAGNOSIS — R65.10 SIRS (SYSTEMIC INFLAMMATORY RESPONSE SYNDROME) (HCC): ICD-10-CM

## 2022-03-12 DIAGNOSIS — J44.1 COPD EXACERBATION (HCC): ICD-10-CM

## 2022-03-12 DIAGNOSIS — K21.9 GASTROESOPHAGEAL REFLUX DISEASE, UNSPECIFIED WHETHER ESOPHAGITIS PRESENT: ICD-10-CM

## 2022-03-12 DIAGNOSIS — G93.40 ENCEPHALOPATHY: ICD-10-CM

## 2022-03-12 LAB
ALBUMIN SERPL BCP-MCNC: 3.9 G/DL (ref 3.2–4.9)
ALBUMIN/GLOB SERPL: 1.2 G/DL
ALP SERPL-CCNC: 63 U/L (ref 30–99)
ALT SERPL-CCNC: 59 U/L (ref 2–50)
ANION GAP SERPL CALC-SCNC: 13 MMOL/L (ref 7–16)
AST SERPL-CCNC: 68 U/L (ref 12–45)
BACTERIA BLD CULT: NORMAL
BASOPHILS # BLD AUTO: 0.1 % (ref 0–1.8)
BASOPHILS # BLD: 0.02 K/UL (ref 0–0.12)
BILIRUB SERPL-MCNC: 1.7 MG/DL (ref 0.1–1.5)
BUN SERPL-MCNC: 14 MG/DL (ref 8–22)
CALCIUM SERPL-MCNC: 9.5 MG/DL (ref 8.5–10.5)
CHLORIDE SERPL-SCNC: 85 MMOL/L (ref 96–112)
CO2 SERPL-SCNC: 27 MMOL/L (ref 20–33)
CREAT SERPL-MCNC: 0.55 MG/DL (ref 0.5–1.4)
EKG IMPRESSION: NORMAL
EOSINOPHIL # BLD AUTO: 0.15 K/UL (ref 0–0.51)
EOSINOPHIL NFR BLD: 1.1 % (ref 0–6.9)
ERYTHROCYTE [DISTWIDTH] IN BLOOD BY AUTOMATED COUNT: 45.4 FL (ref 35.9–50)
GLOBULIN SER CALC-MCNC: 3.3 G/DL (ref 1.9–3.5)
GLUCOSE SERPL-MCNC: 90 MG/DL (ref 65–99)
HCT VFR BLD AUTO: 49.2 % (ref 42–52)
HGB BLD-MCNC: 16.6 G/DL (ref 14–18)
IMM GRANULOCYTES # BLD AUTO: 0.09 K/UL (ref 0–0.11)
IMM GRANULOCYTES NFR BLD AUTO: 0.7 % (ref 0–0.9)
LACTATE BLD-SCNC: 1.9 MMOL/L (ref 0.5–2)
LYMPHOCYTES # BLD AUTO: 1.19 K/UL (ref 1–4.8)
LYMPHOCYTES NFR BLD: 8.8 % (ref 22–41)
MAGNESIUM SERPL-MCNC: 1.7 MG/DL (ref 1.5–2.5)
MCH RBC QN AUTO: 31.2 PG (ref 27–33)
MCHC RBC AUTO-ENTMCNC: 33.7 G/DL (ref 33.7–35.3)
MCV RBC AUTO: 92.5 FL (ref 81.4–97.8)
MONOCYTES # BLD AUTO: 1.04 K/UL (ref 0–0.85)
MONOCYTES NFR BLD AUTO: 7.7 % (ref 0–13.4)
NEUTROPHILS # BLD AUTO: 10.96 K/UL (ref 1.82–7.42)
NEUTROPHILS NFR BLD: 81.6 % (ref 44–72)
NRBC # BLD AUTO: 0 K/UL
NRBC BLD-RTO: 0 /100 WBC
PLATELET # BLD AUTO: 237 K/UL (ref 164–446)
PMV BLD AUTO: 9.4 FL (ref 9–12.9)
POTASSIUM SERPL-SCNC: 3.5 MMOL/L (ref 3.6–5.5)
PROCALCITONIN SERPL-MCNC: <0.05 NG/ML
PROT SERPL-MCNC: 7.2 G/DL (ref 6–8.2)
RBC # BLD AUTO: 5.32 M/UL (ref 4.7–6.1)
SIGNIFICANT IND 70042: NORMAL
SITE SITE: NORMAL
SODIUM SERPL-SCNC: 125 MMOL/L (ref 135–145)
SOURCE SOURCE: NORMAL
TROPONIN T SERPL-MCNC: 31 NG/L (ref 6–19)
WBC # BLD AUTO: 13.5 K/UL (ref 4.8–10.8)

## 2022-03-12 PROCEDURE — 85025 COMPLETE CBC W/AUTO DIFF WBC: CPT

## 2022-03-12 PROCEDURE — 81003 URINALYSIS AUTO W/O SCOPE: CPT

## 2022-03-12 PROCEDURE — 36415 COLL VENOUS BLD VENIPUNCTURE: CPT

## 2022-03-12 PROCEDURE — 99285 EMERGENCY DEPT VISIT HI MDM: CPT

## 2022-03-12 PROCEDURE — 83605 ASSAY OF LACTIC ACID: CPT

## 2022-03-12 PROCEDURE — 71045 X-RAY EXAM CHEST 1 VIEW: CPT

## 2022-03-12 PROCEDURE — 80053 COMPREHEN METABOLIC PANEL: CPT

## 2022-03-12 PROCEDURE — 83735 ASSAY OF MAGNESIUM: CPT

## 2022-03-12 PROCEDURE — 93005 ELECTROCARDIOGRAM TRACING: CPT

## 2022-03-12 PROCEDURE — 87040 BLOOD CULTURE FOR BACTERIA: CPT

## 2022-03-12 PROCEDURE — 84484 ASSAY OF TROPONIN QUANT: CPT

## 2022-03-12 PROCEDURE — 84145 PROCALCITONIN (PCT): CPT

## 2022-03-12 PROCEDURE — 85610 PROTHROMBIN TIME: CPT

## 2022-03-12 RX ORDER — SODIUM CHLORIDE 9 MG/ML
1000 INJECTION, SOLUTION INTRAVENOUS ONCE
Status: DISCONTINUED | OUTPATIENT
Start: 2022-03-12 | End: 2022-03-12

## 2022-03-12 ASSESSMENT — FIBROSIS 4 INDEX: FIB4 SCORE: 3.16

## 2022-03-13 ENCOUNTER — APPOINTMENT (OUTPATIENT)
Dept: RADIOLOGY | Facility: MEDICAL CENTER | Age: 72
DRG: 189 | End: 2022-03-13
Attending: INTERNAL MEDICINE
Payer: MEDICARE

## 2022-03-13 PROBLEM — A41.9 SEPSIS (HCC): Status: ACTIVE | Noted: 2022-03-13

## 2022-03-13 PROBLEM — J96.20 ACUTE ON CHRONIC RESPIRATORY FAILURE (HCC): Status: ACTIVE | Noted: 2022-03-07

## 2022-03-13 LAB
ANION GAP SERPL CALC-SCNC: 10 MMOL/L (ref 7–16)
ANION GAP SERPL CALC-SCNC: 11 MMOL/L (ref 7–16)
ANION GAP SERPL CALC-SCNC: 12 MMOL/L (ref 7–16)
APPEARANCE UR: CLEAR
BILIRUB UR QL STRIP.AUTO: NEGATIVE
BUN SERPL-MCNC: 15 MG/DL (ref 8–22)
BUN SERPL-MCNC: 15 MG/DL (ref 8–22)
BUN SERPL-MCNC: 17 MG/DL (ref 8–22)
CALCIUM SERPL-MCNC: 9 MG/DL (ref 8.5–10.5)
CALCIUM SERPL-MCNC: 9.1 MG/DL (ref 8.5–10.5)
CALCIUM SERPL-MCNC: 9.3 MG/DL (ref 8.5–10.5)
CHLORIDE SERPL-SCNC: 88 MMOL/L (ref 96–112)
CHLORIDE SERPL-SCNC: 89 MMOL/L (ref 96–112)
CHLORIDE SERPL-SCNC: 90 MMOL/L (ref 96–112)
CO2 SERPL-SCNC: 26 MMOL/L (ref 20–33)
CO2 SERPL-SCNC: 27 MMOL/L (ref 20–33)
CO2 SERPL-SCNC: 29 MMOL/L (ref 20–33)
COLOR UR: YELLOW
CREAT SERPL-MCNC: 0.51 MG/DL (ref 0.5–1.4)
CREAT SERPL-MCNC: 0.56 MG/DL (ref 0.5–1.4)
CREAT SERPL-MCNC: 0.62 MG/DL (ref 0.5–1.4)
GLUCOSE SERPL-MCNC: 181 MG/DL (ref 65–99)
GLUCOSE SERPL-MCNC: 200 MG/DL (ref 65–99)
GLUCOSE SERPL-MCNC: 224 MG/DL (ref 65–99)
GLUCOSE UR STRIP.AUTO-MCNC: NEGATIVE MG/DL
INR PPP: 1.05 (ref 0.87–1.13)
KETONES UR STRIP.AUTO-MCNC: NEGATIVE MG/DL
LEUKOCYTE ESTERASE UR QL STRIP.AUTO: NEGATIVE
MICRO URNS: NORMAL
NITRITE UR QL STRIP.AUTO: NEGATIVE
PH UR STRIP.AUTO: 7 [PH] (ref 5–8)
POTASSIUM SERPL-SCNC: 3.9 MMOL/L (ref 3.6–5.5)
POTASSIUM SERPL-SCNC: 4.1 MMOL/L (ref 3.6–5.5)
POTASSIUM SERPL-SCNC: 4.1 MMOL/L (ref 3.6–5.5)
PROT UR QL STRIP: NEGATIVE MG/DL
PROTHROMBIN TIME: 13.4 SEC (ref 12–14.6)
RBC UR QL AUTO: NEGATIVE
SODIUM SERPL-SCNC: 127 MMOL/L (ref 135–145)
SODIUM SERPL-SCNC: 127 MMOL/L (ref 135–145)
SODIUM SERPL-SCNC: 128 MMOL/L (ref 135–145)
SP GR UR STRIP.AUTO: 1
UROBILINOGEN UR STRIP.AUTO-MCNC: 0.2 MG/DL

## 2022-03-13 PROCEDURE — 71260 CT THORAX DX C+: CPT | Mod: ME

## 2022-03-13 PROCEDURE — 70450 CT HEAD/BRAIN W/O DYE: CPT | Mod: MG

## 2022-03-13 PROCEDURE — A9270 NON-COVERED ITEM OR SERVICE: HCPCS | Performed by: STUDENT IN AN ORGANIZED HEALTH CARE EDUCATION/TRAINING PROGRAM

## 2022-03-13 PROCEDURE — 97161 PT EVAL LOW COMPLEX 20 MIN: CPT

## 2022-03-13 PROCEDURE — 94760 N-INVAS EAR/PLS OXIMETRY 1: CPT

## 2022-03-13 PROCEDURE — 700101 HCHG RX REV CODE 250: Performed by: EMERGENCY MEDICINE

## 2022-03-13 PROCEDURE — 99223 1ST HOSP IP/OBS HIGH 75: CPT | Mod: AI | Performed by: STUDENT IN AN ORGANIZED HEALTH CARE EDUCATION/TRAINING PROGRAM

## 2022-03-13 PROCEDURE — 700105 HCHG RX REV CODE 258: Performed by: EMERGENCY MEDICINE

## 2022-03-13 PROCEDURE — 99221 1ST HOSP IP/OBS SF/LOW 40: CPT | Performed by: INTERNAL MEDICINE

## 2022-03-13 PROCEDURE — 700102 HCHG RX REV CODE 250 W/ 637 OVERRIDE(OP): Performed by: STUDENT IN AN ORGANIZED HEALTH CARE EDUCATION/TRAINING PROGRAM

## 2022-03-13 PROCEDURE — 700111 HCHG RX REV CODE 636 W/ 250 OVERRIDE (IP): Performed by: EMERGENCY MEDICINE

## 2022-03-13 PROCEDURE — 770020 HCHG ROOM/CARE - TELE (206)

## 2022-03-13 PROCEDURE — 36415 COLL VENOUS BLD VENIPUNCTURE: CPT

## 2022-03-13 PROCEDURE — 94640 AIRWAY INHALATION TREATMENT: CPT

## 2022-03-13 PROCEDURE — 96374 THER/PROPH/DIAG INJ IV PUSH: CPT

## 2022-03-13 PROCEDURE — 80048 BASIC METABOLIC PNL TOTAL CA: CPT | Mod: 91

## 2022-03-13 PROCEDURE — 700111 HCHG RX REV CODE 636 W/ 250 OVERRIDE (IP): Performed by: STUDENT IN AN ORGANIZED HEALTH CARE EDUCATION/TRAINING PROGRAM

## 2022-03-13 PROCEDURE — 700101 HCHG RX REV CODE 250: Performed by: STUDENT IN AN ORGANIZED HEALTH CARE EDUCATION/TRAINING PROGRAM

## 2022-03-13 PROCEDURE — 96375 TX/PRO/DX INJ NEW DRUG ADDON: CPT

## 2022-03-13 PROCEDURE — 93005 ELECTROCARDIOGRAM TRACING: CPT | Performed by: INTERNAL MEDICINE

## 2022-03-13 PROCEDURE — 700117 HCHG RX CONTRAST REV CODE 255: Performed by: EMERGENCY MEDICINE

## 2022-03-13 RX ORDER — ONDANSETRON 4 MG/1
4 TABLET, ORALLY DISINTEGRATING ORAL EVERY 4 HOURS PRN
Status: DISCONTINUED | OUTPATIENT
Start: 2022-03-13 | End: 2022-03-13

## 2022-03-13 RX ORDER — DOXYCYCLINE 100 MG/1
100 TABLET ORAL EVERY 12 HOURS
Status: DISCONTINUED | OUTPATIENT
Start: 2022-03-13 | End: 2022-03-13

## 2022-03-13 RX ORDER — IPRATROPIUM BROMIDE AND ALBUTEROL SULFATE 2.5; .5 MG/3ML; MG/3ML
3 SOLUTION RESPIRATORY (INHALATION)
Status: DISCONTINUED | OUTPATIENT
Start: 2022-03-13 | End: 2022-03-13

## 2022-03-13 RX ORDER — CEFTRIAXONE 2 G/1
2 INJECTION, POWDER, FOR SOLUTION INTRAMUSCULAR; INTRAVENOUS ONCE
Status: COMPLETED | OUTPATIENT
Start: 2022-03-13 | End: 2022-03-13

## 2022-03-13 RX ORDER — METHYLPREDNISOLONE SODIUM SUCCINATE 125 MG/2ML
125 INJECTION, POWDER, LYOPHILIZED, FOR SOLUTION INTRAMUSCULAR; INTRAVENOUS ONCE
Status: COMPLETED | OUTPATIENT
Start: 2022-03-13 | End: 2022-03-13

## 2022-03-13 RX ORDER — PREDNISONE 20 MG/1
20 TABLET ORAL DAILY
Status: DISCONTINUED | OUTPATIENT
Start: 2022-03-13 | End: 2022-03-13

## 2022-03-13 RX ORDER — POLYETHYLENE GLYCOL 3350 17 G/17G
1 POWDER, FOR SOLUTION ORAL
Status: DISCONTINUED | OUTPATIENT
Start: 2022-03-13 | End: 2022-03-13

## 2022-03-13 RX ORDER — AMOXICILLIN 250 MG
2 CAPSULE ORAL 2 TIMES DAILY
Status: DISCONTINUED | OUTPATIENT
Start: 2022-03-13 | End: 2022-03-13

## 2022-03-13 RX ORDER — PREDNISONE 20 MG/1
40 TABLET ORAL DAILY
Status: DISCONTINUED | OUTPATIENT
Start: 2022-03-14 | End: 2022-03-15

## 2022-03-13 RX ORDER — LABETALOL HYDROCHLORIDE 5 MG/ML
10 INJECTION, SOLUTION INTRAVENOUS EVERY 4 HOURS PRN
Status: DISCONTINUED | OUTPATIENT
Start: 2022-03-13 | End: 2022-03-17 | Stop reason: HOSPADM

## 2022-03-13 RX ORDER — HYDROCHLOROTHIAZIDE 25 MG/1
25 TABLET ORAL DAILY
Status: ON HOLD | COMMUNITY
End: 2022-03-14

## 2022-03-13 RX ORDER — METOPROLOL TARTRATE 1 MG/ML
5 INJECTION, SOLUTION INTRAVENOUS
Status: DISCONTINUED | OUTPATIENT
Start: 2022-03-13 | End: 2022-03-17 | Stop reason: HOSPADM

## 2022-03-13 RX ORDER — OXYCODONE HYDROCHLORIDE 5 MG/1
5 TABLET ORAL
Status: DISCONTINUED | OUTPATIENT
Start: 2022-03-13 | End: 2022-03-17 | Stop reason: HOSPADM

## 2022-03-13 RX ORDER — IPRATROPIUM BROMIDE AND ALBUTEROL SULFATE 2.5; .5 MG/3ML; MG/3ML
3 SOLUTION RESPIRATORY (INHALATION)
Status: DISCONTINUED | OUTPATIENT
Start: 2022-03-13 | End: 2022-03-14

## 2022-03-13 RX ORDER — GUAIFENESIN/DEXTROMETHORPHAN 100-10MG/5
10 SYRUP ORAL EVERY 6 HOURS PRN
Status: DISCONTINUED | OUTPATIENT
Start: 2022-03-13 | End: 2022-03-17 | Stop reason: HOSPADM

## 2022-03-13 RX ORDER — BUDESONIDE AND FORMOTEROL FUMARATE DIHYDRATE 160; 4.5 UG/1; UG/1
1 AEROSOL RESPIRATORY (INHALATION) EVERY 12 HOURS
Status: DISCONTINUED | OUTPATIENT
Start: 2022-03-13 | End: 2022-03-13

## 2022-03-13 RX ORDER — SODIUM CHLORIDE 1 G/1
1 TABLET ORAL
Status: DISCONTINUED | OUTPATIENT
Start: 2022-03-13 | End: 2022-03-17 | Stop reason: HOSPADM

## 2022-03-13 RX ORDER — ONDANSETRON 2 MG/ML
4 INJECTION INTRAMUSCULAR; INTRAVENOUS EVERY 4 HOURS PRN
Status: DISCONTINUED | OUTPATIENT
Start: 2022-03-13 | End: 2022-03-13

## 2022-03-13 RX ORDER — AZITHROMYCIN 250 MG/1
500 TABLET, FILM COATED ORAL DAILY
Status: COMPLETED | OUTPATIENT
Start: 2022-03-14 | End: 2022-03-15

## 2022-03-13 RX ORDER — BISACODYL 10 MG
10 SUPPOSITORY, RECTAL RECTAL
Status: DISCONTINUED | OUTPATIENT
Start: 2022-03-13 | End: 2022-03-13

## 2022-03-13 RX ORDER — ONDANSETRON 2 MG/ML
4 INJECTION INTRAMUSCULAR; INTRAVENOUS EVERY 4 HOURS PRN
Status: DISCONTINUED | OUTPATIENT
Start: 2022-03-13 | End: 2022-03-17 | Stop reason: HOSPADM

## 2022-03-13 RX ORDER — POLYETHYLENE GLYCOL 3350 17 G/17G
1 POWDER, FOR SOLUTION ORAL
Status: DISCONTINUED | OUTPATIENT
Start: 2022-03-13 | End: 2022-03-17 | Stop reason: HOSPADM

## 2022-03-13 RX ORDER — BISACODYL 10 MG
10 SUPPOSITORY, RECTAL RECTAL
Status: DISCONTINUED | OUTPATIENT
Start: 2022-03-13 | End: 2022-03-17 | Stop reason: HOSPADM

## 2022-03-13 RX ORDER — AZITHROMYCIN 500 MG/5ML
500 INJECTION, POWDER, LYOPHILIZED, FOR SOLUTION INTRAVENOUS ONCE
Status: COMPLETED | OUTPATIENT
Start: 2022-03-13 | End: 2022-03-13

## 2022-03-13 RX ORDER — AZITHROMYCIN 500 MG/1
500 INJECTION, POWDER, LYOPHILIZED, FOR SOLUTION INTRAVENOUS ONCE
Status: DISCONTINUED | OUTPATIENT
Start: 2022-03-13 | End: 2022-03-13

## 2022-03-13 RX ORDER — AMOXICILLIN 250 MG
2 CAPSULE ORAL 2 TIMES DAILY
Status: DISCONTINUED | OUTPATIENT
Start: 2022-03-13 | End: 2022-03-17 | Stop reason: HOSPADM

## 2022-03-13 RX ORDER — ACETAMINOPHEN 325 MG/1
650 TABLET ORAL EVERY 6 HOURS PRN
Status: DISCONTINUED | OUTPATIENT
Start: 2022-03-13 | End: 2022-03-17 | Stop reason: HOSPADM

## 2022-03-13 RX ORDER — OXYCODONE HYDROCHLORIDE 5 MG/1
2.5 TABLET ORAL
Status: DISCONTINUED | OUTPATIENT
Start: 2022-03-13 | End: 2022-03-17 | Stop reason: HOSPADM

## 2022-03-13 RX ORDER — FUROSEMIDE 10 MG/ML
20 INJECTION INTRAMUSCULAR; INTRAVENOUS
Status: DISCONTINUED | OUTPATIENT
Start: 2022-03-13 | End: 2022-03-13

## 2022-03-13 RX ORDER — POTASSIUM CHLORIDE 20 MEQ/1
40 TABLET, EXTENDED RELEASE ORAL ONCE
Status: COMPLETED | OUTPATIENT
Start: 2022-03-13 | End: 2022-03-13

## 2022-03-13 RX ORDER — AMMONIUM LACTATE 12 G/100G
LOTION TOPICAL 2 TIMES DAILY
Status: DISCONTINUED | OUTPATIENT
Start: 2022-03-13 | End: 2022-03-17 | Stop reason: HOSPADM

## 2022-03-13 RX ORDER — CHOLECALCIFEROL (VITAMIN D3) 125 MCG
5 CAPSULE ORAL NIGHTLY PRN
Status: DISCONTINUED | OUTPATIENT
Start: 2022-03-13 | End: 2022-03-17 | Stop reason: HOSPADM

## 2022-03-13 RX ORDER — FLUTICASONE PROPIONATE 44 UG/1
2 AEROSOL, METERED RESPIRATORY (INHALATION) DAILY
Status: DISCONTINUED | OUTPATIENT
Start: 2022-03-13 | End: 2022-03-17 | Stop reason: HOSPADM

## 2022-03-13 RX ORDER — ACETAMINOPHEN 325 MG/1
650 TABLET ORAL EVERY 6 HOURS PRN
Status: DISCONTINUED | OUTPATIENT
Start: 2022-03-13 | End: 2022-03-13

## 2022-03-13 RX ORDER — ONDANSETRON 4 MG/1
4 TABLET, ORALLY DISINTEGRATING ORAL EVERY 4 HOURS PRN
Status: DISCONTINUED | OUTPATIENT
Start: 2022-03-13 | End: 2022-03-17 | Stop reason: HOSPADM

## 2022-03-13 RX ADMIN — IPRATROPIUM BROMIDE AND ALBUTEROL SULFATE 3 ML: 2.5; .5 SOLUTION RESPIRATORY (INHALATION) at 20:36

## 2022-03-13 RX ADMIN — Medication 5 MG: at 21:36

## 2022-03-13 RX ADMIN — IPRATROPIUM BROMIDE AND ALBUTEROL SULFATE 3 ML: 2.5; .5 SOLUTION RESPIRATORY (INHALATION) at 08:00

## 2022-03-13 RX ADMIN — AZITHROMYCIN MONOHYDRATE 500 MG: 500 INJECTION, POWDER, LYOPHILIZED, FOR SOLUTION INTRAVENOUS at 03:44

## 2022-03-13 RX ADMIN — Medication: at 16:51

## 2022-03-13 RX ADMIN — UMECLIDINIUM BROMIDE AND VILANTEROL TRIFENATATE 1 PUFF: 62.5; 25 POWDER RESPIRATORY (INHALATION) at 05:36

## 2022-03-13 RX ADMIN — IPRATROPIUM BROMIDE AND ALBUTEROL SULFATE 3 ML: 2.5; .5 SOLUTION RESPIRATORY (INHALATION) at 11:08

## 2022-03-13 RX ADMIN — POTASSIUM CHLORIDE 40 MEQ: 1500 TABLET, EXTENDED RELEASE ORAL at 03:23

## 2022-03-13 RX ADMIN — IPRATROPIUM BROMIDE AND ALBUTEROL SULFATE 3 ML: 2.5; .5 SOLUTION RESPIRATORY (INHALATION) at 22:56

## 2022-03-13 RX ADMIN — IOHEXOL 100 ML: 350 INJECTION, SOLUTION INTRAVENOUS at 00:30

## 2022-03-13 RX ADMIN — SODIUM CHLORIDE 1 G: 1 TABLET ORAL at 05:39

## 2022-03-13 RX ADMIN — FUROSEMIDE 20 MG: 10 INJECTION, SOLUTION INTRAMUSCULAR; INTRAVENOUS at 05:47

## 2022-03-13 RX ADMIN — SENNOSIDES AND DOCUSATE SODIUM 2 TABLET: 50; 8.6 TABLET ORAL at 05:39

## 2022-03-13 RX ADMIN — SODIUM CHLORIDE 1 G: 1 TABLET ORAL at 16:48

## 2022-03-13 RX ADMIN — DOXYCYCLINE 100 MG: 100 TABLET, FILM COATED ORAL at 01:36

## 2022-03-13 RX ADMIN — OXYCODONE 5 MG: 5 TABLET ORAL at 05:39

## 2022-03-13 RX ADMIN — SODIUM CHLORIDE 1 G: 1 TABLET ORAL at 13:25

## 2022-03-13 RX ADMIN — IPRATROPIUM BROMIDE AND ALBUTEROL SULFATE 3 ML: 2.5; .5 SOLUTION RESPIRATORY (INHALATION) at 15:54

## 2022-03-13 RX ADMIN — Medication 5 MG: at 05:39

## 2022-03-13 RX ADMIN — Medication: at 05:41

## 2022-03-13 RX ADMIN — IPRATROPIUM BROMIDE AND ALBUTEROL SULFATE 3 ML: 2.5; .5 SOLUTION RESPIRATORY (INHALATION) at 01:33

## 2022-03-13 RX ADMIN — PREDNISONE 20 MG: 20 TABLET ORAL at 08:28

## 2022-03-13 RX ADMIN — FLUTICASONE PROPIONATE 88 MCG: 44 AEROSOL, METERED RESPIRATORY (INHALATION) at 05:34

## 2022-03-13 RX ADMIN — ENOXAPARIN SODIUM 150 MG: 150 INJECTION SUBCUTANEOUS at 03:25

## 2022-03-13 RX ADMIN — OXYCODONE 5 MG: 5 TABLET ORAL at 21:36

## 2022-03-13 RX ADMIN — CEFTRIAXONE SODIUM 2 G: 2 INJECTION, POWDER, FOR SOLUTION INTRAMUSCULAR; INTRAVENOUS at 01:33

## 2022-03-13 RX ADMIN — METHYLPREDNISOLONE SODIUM SUCCINATE 125 MG: 125 INJECTION, POWDER, FOR SOLUTION INTRAMUSCULAR; INTRAVENOUS at 01:33

## 2022-03-13 ASSESSMENT — COPD QUESTIONNAIRES
DURING THE PAST 4 WEEKS HOW MUCH DID YOU FEEL SHORT OF BREATH: SOME OF THE TIME
COPD SCREENING SCORE: 6
DO YOU EVER COUGH UP ANY MUCUS OR PHLEGM?: NO/ONLY WITH OCCASIONAL COLDS OR INFECTIONS
HAVE YOU SMOKED AT LEAST 100 CIGARETTES IN YOUR ENTIRE LIFE: YES

## 2022-03-13 ASSESSMENT — COGNITIVE AND FUNCTIONAL STATUS - GENERAL
MOBILITY SCORE: 17
HELP NEEDED FOR BATHING: A LITTLE
CLIMB 3 TO 5 STEPS WITH RAILING: A LOT
TOILETING: A LITTLE
STANDING UP FROM CHAIR USING ARMS: A LOT
WALKING IN HOSPITAL ROOM: A LITTLE
DRESSING REGULAR LOWER BODY CLOTHING: A LITTLE
SUGGESTED CMS G CODE MODIFIER MOBILITY: CK
MOBILITY SCORE: 18
MOVING TO AND FROM BED TO CHAIR: A LOT
DAILY ACTIVITIY SCORE: 20
TURNING FROM BACK TO SIDE WHILE IN FLAT BAD: A LOT
MOVING TO AND FROM BED TO CHAIR: A LOT
SUGGESTED CMS G CODE MODIFIER MOBILITY: CK
DRESSING REGULAR UPPER BODY CLOTHING: A LITTLE
CLIMB 3 TO 5 STEPS WITH RAILING: A LITTLE
TURNING FROM BACK TO SIDE WHILE IN FLAT BAD: A LITTLE
SUGGESTED CMS G CODE MODIFIER DAILY ACTIVITY: CJ

## 2022-03-13 ASSESSMENT — PAIN DESCRIPTION - PAIN TYPE
TYPE: ACUTE PAIN

## 2022-03-13 ASSESSMENT — LIFESTYLE VARIABLES
TREMOR: NO TREMOR
EVER FELT BAD OR GUILTY ABOUT YOUR DRINKING: NO
HOW MANY TIMES IN THE PAST YEAR HAVE YOU HAD 5 OR MORE DRINKS IN A DAY: 200
HAVE YOU EVER FELT YOU SHOULD CUT DOWN ON YOUR DRINKING: NO
HEADACHE, FULLNESS IN HEAD: NOT PRESENT
AUDITORY DISTURBANCES: NOT PRESENT
NAUSEA AND VOMITING: NO NAUSEA AND NO VOMITING
CONSUMPTION TOTAL: POSITIVE
DOES PATIENT WANT TO STOP DRINKING: NO
TOTAL SCORE: 0
PAROXYSMAL SWEATS: NO SWEAT VISIBLE
VISUAL DISTURBANCES: NOT PRESENT
EVER HAD A DRINK FIRST THING IN THE MORNING TO STEADY YOUR NERVES TO GET RID OF A HANGOVER: YES
TOTAL SCORE: 1
ORIENTATION AND CLOUDING OF SENSORIUM: ORIENTED AND CAN DO SERIAL ADDITIONS
ALCOHOL_USE: YES
AGITATION: NORMAL ACTIVITY
ANXIETY: NO ANXIETY (AT EASE)
HAVE PEOPLE ANNOYED YOU BY CRITICIZING YOUR DRINKING: NO
ON A TYPICAL DAY WHEN YOU DRINK ALCOHOL HOW MANY DRINKS DO YOU HAVE: 6
TOTAL SCORE: 1
EVER_SMOKED: YES
TOTAL SCORE: 1
AVERAGE NUMBER OF DAYS PER WEEK YOU HAVE A DRINK CONTAINING ALCOHOL: 7

## 2022-03-13 ASSESSMENT — ENCOUNTER SYMPTOMS
MUSCULOSKELETAL NEGATIVE: 1
PSYCHIATRIC NEGATIVE: 1
BACK PAIN: 1
CARDIOVASCULAR NEGATIVE: 1
GASTROINTESTINAL NEGATIVE: 1
FALLS: 1
SHORTNESS OF BREATH: 1
COUGH: 1
WEAKNESS: 1
EYES NEGATIVE: 1
WHEEZING: 1

## 2022-03-13 ASSESSMENT — GAIT ASSESSMENTS
ASSISTIVE DEVICE: FRONT WHEEL WALKER
DISTANCE (FEET): 15
GAIT LEVEL OF ASSIST: CONTACT GUARD ASSIST
DEVIATION: INCREASED BASE OF SUPPORT

## 2022-03-13 ASSESSMENT — FIBROSIS 4 INDEX: FIB4 SCORE: 2.65

## 2022-03-13 NOTE — ED PROVIDER NOTES
ED Provider Note    CHIEF COMPLAINT  Chief Complaint   Patient presents with   • Shortness of Breath     Pt came to the ER from home c/o SOB. Per EMS, pt was just recently discharged from [facility] with a home O2 at 3L NC continuously. Pt currently at 4L NC satting at 96%. EMS also stated that pt is unable to care for himself at home. Pt lives in a camper/ trailer alone. Pt denies any pain at this time. Pt AOX4.   • Fall       HPI  Juan Manuel Bass is a 71 y.o. male who presents for evaluation after falling from his chair in his home tonight and being unable to get up off of the ground due to shortness of breath and generalized weakness.  Patient notes he was sitting in his chair and he does not know exactly how he fell.  He notes no chest pain but notes shortness of breath which has worsened since his recent discharge yesterday after being treated for a COPD exacerbation for approximately 5 days.  Patient notes he is on home oxygen continuously and feels like he can no longer take care of himself at home as he lives alone.    REVIEW OF SYSTEMS  Constitutional: No fevers or chills  Skin: Multiple areas of bruising to back, flank, shoulder, and buttocks  HEENT: No sore throat, runny nose, sores, trouble swallowing, trouble speaking.  Neck: No neck pain, stiffness, or masses.  Chest: No pain or rashes  Pulm: Shortness of breath with cough.  No pain with deep inspiration  Gastrointestinal: No nausea, vomiting, diarrhea, or abdominal pain.  Genitourinary: No dysuria or hematuria  Musculoskeletal: No pain, new swelling, weakness  Neurologic: No numbness or tingling to extremities.  No confusion or disorientation.  Heme: No bleeding or bruising problems.   Immuno: No hx of recurrent infections    PAST FAM HISTORY  Family History   Problem Relation Age of Onset   • No Known Problems Mother    • Heart Disease Father        PAST MEDICAL HISTORY   has a past medical history of Chronic airway obstruction, not elsewhere classified and  "Hypertension.    SOCIAL HISTORY  Social History     Tobacco Use   • Smoking status: Former Smoker     Packs/day: 1.00     Years: 4.00     Pack years: 4.00     Quit date: 3/7/2020     Years since quittin.0   • Smokeless tobacco: Not on file   Substance and Sexual Activity   • Alcohol use: Not Currently   • Drug use: Not Currently   • Sexual activity: Not on file       SURGICAL HISTORY  patient denies any surgical history    CURRENT MEDICATIONS  Home Medications     Reviewed by Leidy Rizzo R.N. (Registered Nurse) on 22 at 2126  Med List Status: Partial   Medication Last Dose Status   albuterol 108 (90 Base) MCG/ACT Aero Soln inhalation aerosol  Active   amLODIPine (NORVASC) 5 MG Tab  Active   Cholecalciferol (VITAMIN D3 PO)  Active   Fluticasone-Umeclidin-Vilant (TRELEGY ELLIPTA) 100-62.5-25 MCG/INH AEROSOL POWDER, BREATH ACTIVATED inhalation  Active   Multiple Vitamins-Minerals (CENTRUM SILVER ADULT 50+) Tab  Active   pantoprazole (PROTONIX) 40 MG Tablet Delayed Response  Active   sodium chloride (SALT) 1 GM Tab  Active   vitamin E 180 MG (400 UNIT) Cap  Active                ALLERGIES  Allergies   Allergen Reactions   • Tetanus Toxoid Hives       PHYSICAL EXAM  VITAL SIGNS: /82   Pulse 95   Temp 36.3 °C (97.3 °F) (Temporal)   Resp 20   Ht 1.778 m (5' 10\")   Wt (!) 136 kg (300 lb)   SpO2 97%   BMI 43.05 kg/m²    Gen: Awake, appears tired but otherwise alert and appropriate.  Breathing heavily.  HEENT: No signs of trauma, Bilateral external ears normal, Nose normal. Conjunctiva normal, Non-icteric.   Neck:  No tenderness, Supple, No masses  Lymphatic: No cervical lymphadenopathy noted.   Cardiovascular: Tachycardia with irregular rhythm, no murmurs.  Capillary refill less than 3 seconds to all extremities, 2+ distal pulses.  Thorax & Lungs: Diminished breath sounds bilaterally, tachypnea  Abdomen: Bowel sounds normal, Soft, No tenderness, No masses, No pulsatile masses. No Guarding or " rebound  Skin: Warm, Dry, No erythema, No rash noted to exposed areas.   Back: No specific tenderness but multiple large areas of ecchymosis as pictured below.  Extremities: Intact distal pulses, brawny edema, flaking skin, with erythema as pictured below.  Neurologic: Alert , no facial droop, grossly normal coordination and strength to upper extremities.  Limited ability to move lower extremities due to debility and obesity  Psychiatric: Affect pleasant                  LABS  Results for orders placed or performed during the hospital encounter of 03/12/22   CBC with Differential   Result Value Ref Range    WBC 13.5 (H) 4.8 - 10.8 K/uL    RBC 5.32 4.70 - 6.10 M/uL    Hemoglobin 16.6 14.0 - 18.0 g/dL    Hematocrit 49.2 42.0 - 52.0 %    MCV 92.5 81.4 - 97.8 fL    MCH 31.2 27.0 - 33.0 pg    MCHC 33.7 33.7 - 35.3 g/dL    RDW 45.4 35.9 - 50.0 fL    Platelet Count 237 164 - 446 K/uL    MPV 9.4 9.0 - 12.9 fL    Neutrophils-Polys 81.60 (H) 44.00 - 72.00 %    Lymphocytes 8.80 (L) 22.00 - 41.00 %    Monocytes 7.70 0.00 - 13.40 %    Eosinophils 1.10 0.00 - 6.90 %    Basophils 0.10 0.00 - 1.80 %    Immature Granulocytes 0.70 0.00 - 0.90 %    Nucleated RBC 0.00 /100 WBC    Neutrophils (Absolute) 10.96 (H) 1.82 - 7.42 K/uL    Lymphs (Absolute) 1.19 1.00 - 4.80 K/uL    Monos (Absolute) 1.04 (H) 0.00 - 0.85 K/uL    Eos (Absolute) 0.15 0.00 - 0.51 K/uL    Baso (Absolute) 0.02 0.00 - 0.12 K/uL    Immature Granulocytes (abs) 0.09 0.00 - 0.11 K/uL    NRBC (Absolute) 0.00 K/uL   Comp Metabolic Panel   Result Value Ref Range    Sodium 125 (L) 135 - 145 mmol/L    Potassium 3.5 (L) 3.6 - 5.5 mmol/L    Chloride 85 (L) 96 - 112 mmol/L    Co2 27 20 - 33 mmol/L    Anion Gap 13.0 7.0 - 16.0    Glucose 90 65 - 99 mg/dL    Bun 14 8 - 22 mg/dL    Creatinine 0.55 0.50 - 1.40 mg/dL    Calcium 9.5 8.5 - 10.5 mg/dL    AST(SGOT) 68 (H) 12 - 45 U/L    ALT(SGPT) 59 (H) 2 - 50 U/L    Alkaline Phosphatase 63 30 - 99 U/L    Total Bilirubin 1.7 (H) 0.1 - 1.5  mg/dL    Albumin 3.9 3.2 - 4.9 g/dL    Total Protein 7.2 6.0 - 8.2 g/dL    Globulin 3.3 1.9 - 3.5 g/dL    A-G Ratio 1.2 g/dL   LACTIC ACID   Result Value Ref Range    Lactic Acid 1.9 0.5 - 2.0 mmol/L   PROCALCITONIN   Result Value Ref Range    Procalcitonin <0.05 <0.25 ng/mL   MAGNESIUM   Result Value Ref Range    Magnesium 1.7 1.5 - 2.5 mg/dL   TROPONIN   Result Value Ref Range    Troponin T 31 (H) 6 - 19 ng/L   ESTIMATED GFR   Result Value Ref Range    GFR If African American >60 >60 mL/min/1.73 m 2    GFR If Non African American >60 >60 mL/min/1.73 m 2   EKG   Result Value Ref Range    Report       [facility] Emergency Dept.    Test Date:  2022  Pt Name:    BRIDGETTE VARNER                  Department: ER  MRN:        [id number]                      Room:       [facility]  Gender:     Male                         Technician: [id number]  :        1950                   Requested By:ER TRIAGE PROTOCOL  Order #:    [id number]                    Reading MD:    Measurements  Intervals                                Axis  Rate:       107                          P:  OH:                                      QRS:        50  QRSD:       96                           T:          32  QT:         348  QTc:        465    Interpretive Statements  ATRIAL FIBRILLATION  RSR' IN V1 OR V2, RIGHT VCD OR RVH  Compared to ECG 2022 23:36:16  Sinus rhythm no longer present         RADIOLOGY  CT-CHEST,ABDOMEN,PELVIS WITH   Final Result      1.  No evidence of thoracic, abdominal or pelvic organ injury.      2.  Bibasilar atelectasis with minimal right pleural effusion.      3.   Fatty liver.      4.  Possible gallstones.      5.  Diverticulosis.      DX-CHEST-PORTABLE (1 VIEW)   Final Result      Bibasilar atelectasis.          COURSE & MEDICAL DECISION MAKING  Patient arrives from home after falling and being unable to get up.  Patient is unclear about the cause of his fall however he is noted to be extremely  debilitated and unable to even sit up on his own in the [facility].  This was also noted in his previous admission and discharge note.  Patient had to call EMS due to increasing shortness of breath and inability to move off of the floor.  He notably has rather impressive areas of ecchymosis to the right posterior shoulder, right flank, and buttocks.  He is not clear on when this happened however it was not present on his last admission.  It does not appear to be causing him distress and he states the areas are nontender.  Will perform screening labs and chest x-ray but will likely need to scan him to ensure that there is no internal injuries or retroperitoneal injuries from his fall.  Regardless of these results, the patient will clearly need to be in a rehab facility or skilled nursing as he is unsafe to go home.  Patient states understanding of this and agrees with this plan.    12:33 AM  Patient is requiring more supplemental oxygen in the form of a Ventimask at this point.  Unclear if this represents worsening of COPD or a new onset pneumonia.  Patient does meet criteria for SIRS and he does have a possible source of infection in the lungs.  Patient will be treated empirically with antibiotics and admitted to the hospital service.  He does not appear to have any internal injuries from his fall.    FINAL IMPRESSION  1. SIRS (systemic inflammatory response syndrome) (HCC)    2. Fall, initial encounter    3. Dyspnea, unspecified type    4. Debility        Electronically signed by: Mohit Nails M.D., 3/12/2022 9:30 PM

## 2022-03-13 NOTE — PROGRESS NOTES
[facility] Medicine Daily Progress Note    Date of Service  3/13/2022    Chief Complaint  Juan Manuel Bass is a 71 y.o. male admitted 3/12/2022 with shortness of breath and fall.    Hospital Course  This is a 70 y/o M with PMHX of COPD, Alcohol dependence who was admitted on 3/113/22 after presenting to ER for SOB and fall. He was recently here in the hops[facility]ital for hyponatremia and COPD exacerbation and was discharged home on 3/11/22 with home O2. Now patient comes back after having a fall at home and feeling SOB. Here in ER found to have a sodium of 125 and as admitted for further treatment     Interval Problem Update  Patient seen and examined, sitting up in chair for breakfast. Still feeling SOB, and weak, no acute evens overnight   Hyponatremia cont on fluid restriction and salt tabs  Monitor on tele , ct head pendng as patient not sure if he lost conscious  PT/OT hari      I have personally seen and examined the patient at bedside. I discussed the plan of care with patient and bedside RN.    Consultants/Specialty  None     Code Status  DNAR/DNI    Disposition  Patient is not medically cleared for discharge.   Anticipate discharge to TBD .  I have placed the appropriate orders for post-discharge needs.    Review of Systems  ROS     Physical Exam  Temp:  [36.3 °C (97.3 °F)] 36.3 °C (97.3 °F)  Pulse:  [] 85  Resp:  [18-23] 20  BP: (124-150)/(78-99) 127/84  SpO2:  [91 %-97 %] 91 %    Physical Exam    Fluids    Intake/Output Summary (Last 24 hours) at 3/13/2022 1137  Last data filed at 3/13/2022 0900  Gross per 24 hour   Intake 360 ml   Output 3620 ml   Net -3260 ml       Laboratory  Recent Labs     03/12/22 2129   WBC 13.5*   RBC 5.32   HEMOGLOBIN 16.6   HEMATOCRIT 49.2   MCV 92.5   MCH 31.2   MCHC 33.7   RDW 45.4   PLATELETCT 237   MPV 9.4     Recent Labs     03/12/22 2129 03/13/22  0933   SODIUM 125* 128*   POTASSIUM 3.5* 4.1   CHLORIDE 85* 88*   CO2 27 29   GLUCOSE 90 224*   BUN 14 15   CREATININE 0.55 0.56    CALCIUM 9.5 9.3     Recent Labs     03/12/22 2129   INR 1.05               Imaging  CT-CHEST,ABDOMEN,PELVIS WITH   Final Result      1.  No evidence of thoracic, abdominal or pelvic organ injury.      2.  Bibasilar atelectasis with minimal right pleural effusion.      3.   Fatty liver.      4.  Possible gallstones.      5.  Diverticulosis.      DX-CHEST-PORTABLE (1 VIEW)   Final Result      Bibasilar atelectasis.      CT-HEAD W/O    (Results Pending)        Assessment/Plan  * Hyponatremia- (present on admission)  Assessment & Plan  Na 125 on arrival   continue  On slat tablets and also fluid restriction   Monitor with q6 hr bmp     Sepsis (HCC)- (present on admission)  Assessment & Plan  This is Sepsis Present on admission  SIRS criteria identified on my evaluation include: Leukocytosis, with WBC greater than 12,000  Source is unk  Sepsis protocol initiated  Fluid resuscitation ordered per protocol  IV antibiotics           Acute on chronic respiratory failure (HCC)  Assessment & Plan  Oxygen per protocol  duonebs  RT  Incentive spirometry  Ambulate with assistance    Essential hypertension- (present on admission)  Assessment & Plan  Continue his outpatient regimen   Antihypertensives prn    COPD exacerbation (HCC)- (present on admission)  Assessment & Plan  - prednisone 40 mg  - Duonebs and O2 therapy as needed  - Incentive spirometry  - Azithro 500mg PO qd       VTE prophylaxis: enoxaparin ppx    I have performed a physical exam and reviewed and updated ROS and Plan today (3/13/2022). In review of yesterday's note (3/12/2022), there are no changes except as documented above.

## 2022-03-13 NOTE — ED NOTES
Pt sleeping at this time. No apparent distress noted with equal rise and fall of chest wall. VS stable and will continue to monitor pt.

## 2022-03-13 NOTE — ED NOTES
Handoff report given to Edin RUANO.    Pt transferred to Room T813/02 with RN in stable condition.

## 2022-03-13 NOTE — ED NOTES
"Pt resting quietly in [facility]. Noted moderate work of breathing but pt stated breathing is \"okay\" with supplemental O2 at 4L NC. Pt denies any pain at this time. Noted BLE redness and swelling. Noted bruising on left side abdominal area and small skin tear on right arm; pt unable to recall where he got them from. Pt is currently in no apparent distress. VS stable and will continue to monitor pt.  "

## 2022-03-13 NOTE — ED NOTES
Pt sleeping but easily woken up with verbal stimuli. Noted mild work of breathing; however, no apparent distress noted at this time. Pt denies any new complaints. VS stable and will continue to monitor pt.

## 2022-03-13 NOTE — PROGRESS NOTES
4 Eyes Skin Assessment Completed by ALDEN Jesus and ALDEN Jesus.    Head Bruising and Redness  Ears Redness - Scab inner Right Ear  Nose Redness  Mouth WDL  Neck Redness and Bruising  Breast/Chest Bruising, Excoriation and Edema  Shoulder Blades WDL  Spine Redness  (R) Arm/Elbow/Hand Bruising and Abrasion  (L) Arm/Elbow/Hand Bruising  Abdomen Redness and Bruising  Groin Redness  Scrotum/Coccyx/Buttocks Redness  (R) Leg Redness and Bruising  (L) Leg Redness and Bruising  (R) Heel/Foot/Toe Redness, Discoloration, Boggy, Swelling and Rash  (L) Heel/Foot/Toe Redness, Boggy, Swelling, Scab and Edema          Devices In Places Tele Box, Pulse Ox and Oxy Mask      Interventions In Place Barrier Cream and Pressure Redistribution Mattress    Possible Skin Injury Yes    Pictures Uploaded Into Epic No, needs to be completed  Wound Consult Placed N/A  RN Wound Prevention Protocol Ordered No

## 2022-03-13 NOTE — PROGRESS NOTES
Monitor Summary     Rhythm: SR    Rate:  84    Ectopy: N/A    Monitor Strip     AL .14  QRS .07  QT .33

## 2022-03-13 NOTE — ASSESSMENT & PLAN NOTE
Hyponatremia with a sodium of 125 on admission, suspect this is partly due to his alcohol use   Has improved with fluid restriction and salt tabs  -Sodium stable >130  -We will liberate fluid restriction as he does appears dehydrated and may be having symptoms of orthostatic hypotension, now improved  -Continue to monitor

## 2022-03-13 NOTE — ASSESSMENT & PLAN NOTE
Recent admission for COPD exacerbation, this does appear to be improved  -On 2 to 3 L of supplemental oxygen  - prednisone 40 mg x5 days  - Duonebs and O2 therapy as needed  - Incentive spirometry  -Completed 3-day course of azithromycin

## 2022-03-13 NOTE — CONSULTS
Pulmonary Consultation    Date of consult: 3/13/2022    Referring Physician  Kurt Calle M.D.    Reason for Consultation  COPD exacerbation    History of Presenting Illness  71 y.o. male who presented 3/12/2022 with worsening SOB and weakness  Hx of obesity BMI 42, O2 dependence (new since 3/11 discharge - 3 L)  with clinical dx of COPD, etoh abuse 8 beers a day  Quit smoking 5 yrs ago 40 pk year  He was discharged from the hospital last week on 3/11  for COPD exacerbation (No prev PFTs). Last visit required ICU stay as needed bipap.    This admission pt says he tripped in his RV and could not lift himself up so he called his friend who called an ambulance. He was a little SOB but could not describe.   on admission and RR 22 with 4 l oxygen to maintain sats 95%  Na was 125    Code Status  DNAR/DNI    Review of Systems  Review of Systems   Constitutional: Positive for malaise/fatigue.   HENT: Negative.    Eyes: Negative.    Respiratory: Positive for cough, shortness of breath and wheezing.    Cardiovascular: Negative.    Gastrointestinal: Negative.    Genitourinary: Negative.    Musculoskeletal: Negative.    Skin: Negative.    Neurological: Positive for weakness.   Endo/Heme/Allergies: Negative.    Psychiatric/Behavioral: Negative.        Past Medical History   has a past medical history of Chronic airway obstruction, not elsewhere classified and Hypertension.    Surgical History   has no past surgical history on file.    Family History  family history includes Heart Disease in his father; No Known Problems in his mother.    Social History   reports that he quit smoking about 2 years ago. He has a 4.00 pack-year smoking history. He does not have any smokeless tobacco history on file. He reports previous alcohol use. He reports previous drug use.    Medications  Home Medications     Reviewed by Castillo Richardson (Pharmacy Tech) on 03/13/22 at 0128  Med List Status: Complete   Medication Last Dose Status    albuterol 108 (90 Base) MCG/ACT Aero Soln inhalation aerosol 3/12/2022 Active   amLODIPine (NORVASC) 5 MG Tab 3/12/2022 Active   Ascorbic Acid (VITAMIN C PO) 3/12/2022 Active   Cholecalciferol (VITAMIN D3 PO) 3/12/2022 Active   Fluticasone-Umeclidin-Vilant (TRELEGY ELLIPTA) 100-62.5-25 MCG/INH AEROSOL POWDER, BREATH ACTIVATED inhalation 3/12/2022 Active   hydroCHLOROthiazide (HYDRODIURIL) 25 MG Tab 3/12/2022 Active   Multiple Vitamins-Minerals (CENTRUM SILVER ADULT 50+) Tab 3/12/2022 Active   pantoprazole (PROTONIX) 40 MG Tablet Delayed Response 3/12/2022 Active   sodium chloride (SALT) 1 GM Tab 3/12/2022 Active   vitamin E 180 MG (400 UNIT) Cap 3/12/2022 Active              Current Facility-Administered Medications   Medication Dose Route Frequency Provider Last Rate Last Admin   • ammonium lactate (LAC-HYDRIN) 12 % lotion   Topical BID Lindsay Rees M.D.   Given at 03/13/22 0541   • predniSONE (DELTASONE) tablet 20 mg  20 mg Oral DAILY Lindsay Rees M.D.   20 mg at 03/13/22 0828   • senna-docusate (PERICOLACE or SENOKOT S) 8.6-50 MG per tablet 2 Tablet  2 Tablet Oral BID Lindsay Rees M.D.   2 Tablet at 03/13/22 0539    And   • polyethylene glycol/lytes (MIRALAX) PACKET 1 Packet  1 Packet Oral QDAY PRN Lindsay Rese M.D.        And   • magnesium hydroxide (MILK OF MAGNESIA) suspension 30 mL  30 mL Oral QDAY PRN Lindsay Rees M.D.        And   • bisacodyl (DULCOLAX) suppository 10 mg  10 mg Rectal QDAY PRN Lindsay Rees M.D.       • sodium chloride (SALT) tablet 1 g  1 g Oral TID WITH MEALS Lindsay Rees M.D.   1 g at 03/13/22 0539   • acetaminophen (Tylenol) tablet 650 mg  650 mg Oral Q6HRS PRN Lindsay C. Cabrera, M.D.       • benzocaine-menthol (Cepacol) lozenge 1 Lozenge  1 Lozenge Mouth/Throat Q2HRS PRN Lindsay Rees M.D.       • guaiFENesin dextromethorphan (ROBITUSSIN DM) 100-10 MG/5ML syrup 10 mL  10 mL Oral Q6HRS PRN Lindsay Rees M.D.       • labetalol  (NORMODYNE/TRANDATE) injection 10 mg  10 mg Intravenous Q4HRS PRN Lindsay Rees M.D.       • melatonin tablet 5 mg  5 mg Oral HS PRN Lindsay Rees M.D.   5 mg at 03/13/22 0539   • ondansetron (ZOFRAN ODT) dispertab 4 mg  4 mg Oral Q4HRS PRN Lindsay Rees M.D.       • ondansetron (ZOFRAN) syringe/vial injection 4 mg  4 mg Intravenous Q4HRS PRN Lindsay Rees M.D.       • oxyCODONE immediate-release (ROXICODONE) tablet 2.5 mg  2.5 mg Oral Q3HRS PRN Lindsay Rees M.D.        Or   • oxyCODONE immediate-release (ROXICODONE) tablet 5 mg  5 mg Oral Q3HRS PRN Lindsay Rees M.D.   5 mg at 03/13/22 0539   • Respiratory Therapy Consult   Nebulization Continuous RT Lindsay Rees M.D.       • ipratropium-albuterol (DUONEB) nebulizer solution  3 mL Nebulization Q4HRS (RT) Lindsay Rees M.D.   3 mL at 03/13/22 0800   • ipratropium-albuterol (DUONEB) nebulizer solution  3 mL Nebulization Q2HRS PRN (RT) Lindsay Rees M.D.       • cefTRIAXone (Rocephin) syringe 2 g  2 g Intravenous Q24HRS Lindsay Rees M.D.       • enoxaparin (LOVENOX) injection 150 mg  1 mg/kg Subcutaneous Q12HRS Lindsay Rees M.D.   150 mg at 03/13/22 0325   • Metoprolol Tartrate (LOPRESSOR) injection 5 mg  5 mg Intravenous Q5 MIN PRN Lindsay Rees M.D.       • umeclidinium-vilanterol (ANORO ELLIPTA) inhaler 1 Puff  1 Puff Inhalation DAILY Lindsay Rees M.D.   1 Puff at 03/13/22 0536   • fluticasone (FLOVENT HFA) 44 MCG/ACT inhaler 88 mcg  2 Puff Inhalation DAILY Lindsay Rees M.D.   88 mcg at 03/13/22 0534   • doxycycline monohydrate (ADOXA) tablet 100 mg  100 mg Oral Q12HRS Lindsay Rees M.D.           Allergies  Allergies   Allergen Reactions   • Tetanus Toxoid Hives       Vital Signs last 24 hours  Temp:  [36.3 °C (97.3 °F)] 36.3 °C (97.3 °F)  Pulse:  [] 90  Resp:  [18-23] 20  BP: (124-150)/(78-99) 127/84  SpO2:  [91 %-97 %] 91 %    Physical Exam  Physical Exam  Constitutional:       Appearance:  He is obese.      Comments: Pursed lip breathing    HENT:      Mouth/Throat:      Mouth: Mucous membranes are moist.   Eyes:      Extraocular Movements: Extraocular movements intact.      Pupils: Pupils are equal, round, and reactive to light.   Cardiovascular:      Rate and Rhythm: Normal rate.   Pulmonary:      Breath sounds: No wheezing or rales.      Comments: Diminished b/l but =  Skin:     Findings: Erythema present.      Comments: Erythema b/l lower extremities from shin down with desquamation of his skin   Neurological:      General: No focal deficit present.      Mental Status: He is oriented to person, place, and time.   Psychiatric:         Mood and Affect: Mood normal.         Behavior: Behavior normal.         Thought Content: Thought content normal.         Judgment: Judgment normal.         Fluids    Intake/Output Summary (Last 24 hours) at 3/13/2022 1051  Last data filed at 3/13/2022 0900  Gross per 24 hour   Intake 360 ml   Output 3620 ml   Net -3260 ml       Laboratory  Recent Results (from the past 48 hour(s))   CBC with Differential    Collection Time: 03/12/22  9:29 PM   Result Value Ref Range    WBC 13.5 (H) 4.8 - 10.8 K/uL    RBC 5.32 4.70 - 6.10 M/uL    Hemoglobin 16.6 14.0 - 18.0 g/dL    Hematocrit 49.2 42.0 - 52.0 %    MCV 92.5 81.4 - 97.8 fL    MCH 31.2 27.0 - 33.0 pg    MCHC 33.7 33.7 - 35.3 g/dL    RDW 45.4 35.9 - 50.0 fL    Platelet Count 237 164 - 446 K/uL    MPV 9.4 9.0 - 12.9 fL    Neutrophils-Polys 81.60 (H) 44.00 - 72.00 %    Lymphocytes 8.80 (L) 22.00 - 41.00 %    Monocytes 7.70 0.00 - 13.40 %    Eosinophils 1.10 0.00 - 6.90 %    Basophils 0.10 0.00 - 1.80 %    Immature Granulocytes 0.70 0.00 - 0.90 %    Nucleated RBC 0.00 /100 WBC    Neutrophils (Absolute) 10.96 (H) 1.82 - 7.42 K/uL    Lymphs (Absolute) 1.19 1.00 - 4.80 K/uL    Monos (Absolute) 1.04 (H) 0.00 - 0.85 K/uL    Eos (Absolute) 0.15 0.00 - 0.51 K/uL    Baso (Absolute) 0.02 0.00 - 0.12 K/uL    Immature Granulocytes (abs)  0.09 0.00 - 0.11 K/uL    NRBC (Absolute) 0.00 K/uL   Comp Metabolic Panel    Collection Time: 03/12/22  9:29 PM   Result Value Ref Range    Sodium 125 (L) 135 - 145 mmol/L    Potassium 3.5 (L) 3.6 - 5.5 mmol/L    Chloride 85 (L) 96 - 112 mmol/L    Co2 27 20 - 33 mmol/L    Anion Gap 13.0 7.0 - 16.0    Glucose 90 65 - 99 mg/dL    Bun 14 8 - 22 mg/dL    Creatinine 0.55 0.50 - 1.40 mg/dL    Calcium 9.5 8.5 - 10.5 mg/dL    AST(SGOT) 68 (H) 12 - 45 U/L    ALT(SGPT) 59 (H) 2 - 50 U/L    Alkaline Phosphatase 63 30 - 99 U/L    Total Bilirubin 1.7 (H) 0.1 - 1.5 mg/dL    Albumin 3.9 3.2 - 4.9 g/dL    Total Protein 7.2 6.0 - 8.2 g/dL    Globulin 3.3 1.9 - 3.5 g/dL    A-G Ratio 1.2 g/dL   PROCALCITONIN    Collection Time: 03/12/22  9:29 PM   Result Value Ref Range    Procalcitonin <0.05 <0.25 ng/mL   MAGNESIUM    Collection Time: 03/12/22  9:29 PM   Result Value Ref Range    Magnesium 1.7 1.5 - 2.5 mg/dL   TROPONIN    Collection Time: 03/12/22  9:29 PM   Result Value Ref Range    Troponin T 31 (H) 6 - 19 ng/L   ESTIMATED GFR    Collection Time: 03/12/22  9:29 PM   Result Value Ref Range    GFR If African American >60 >60 mL/min/1.73 m 2    GFR If Non African American >60 >60 mL/min/1.73 m 2   Prothrombin time (INR)    Collection Time: 03/12/22  9:29 PM   Result Value Ref Range    PT 13.4 12.0 - 14.6 sec    INR 1.05 0.87 - 1.13   Urinalysis    Collection Time: 03/12/22  9:29 PM    Specimen: Blood   Result Value Ref Range    Color Yellow     Character Clear     Specific Gravity 1.004 <1.035    Ph 7.0 5.0 - 8.0    Glucose Negative Negative mg/dL    Ketones Negative Negative mg/dL    Protein Negative Negative mg/dL    Bilirubin Negative Negative    Urobilinogen, Urine 0.2 Negative    Nitrite Negative Negative    Leukocyte Esterase Negative Negative    Occult Blood Negative Negative    Micro Urine Req see below    EKG    Collection Time: 03/12/22  9:45 PM   Result Value Ref Range    Report       [facility]  Emergency Dept.    Test Date:  2022  Pt Name:    BRIDGETTE VARNER                  Department: ER  MRN:        [id number]                      Room:        20  Gender:     Male                         Technician: [id number]  :        1950                   Requested By:ER TRIAGE PROTOCOL  Order #:    [id number]                    Reading MD:    Measurements  Intervals                                Axis  Rate:       107                          P:  ME:                                      QRS:        50  QRSD:       96                           T:          32  QT:         348  QTc:        465    Interpretive Statements  ATRIAL FIBRILLATION  RSR' IN V1 OR V2, RIGHT VCD OR RVH  Compared to ECG 2022 23:36:16  Sinus rhythm no longer present     LACTIC ACID    Collection Time: 22  9:59 PM   Result Value Ref Range    Lactic Acid 1.9 0.5 - 2.0 mmol/L   Basic Metabolic Panel    Collection Time: 22  9:33 AM   Result Value Ref Range    Sodium 128 (L) 135 - 145 mmol/L    Potassium 4.1 3.6 - 5.5 mmol/L    Chloride 88 (L) 96 - 112 mmol/L    Co2 29 20 - 33 mmol/L    Glucose 224 (H) 65 - 99 mg/dL    Bun 15 8 - 22 mg/dL    Creatinine 0.56 0.50 - 1.40 mg/dL    Calcium 9.3 8.5 - 10.5 mg/dL    Anion Gap 11.0 7.0 - 16.0   ESTIMATED GFR    Collection Time: 22  9:33 AM   Result Value Ref Range    GFR If African American >60 >60 mL/min/1.73 m 2    GFR If Non African American >60 >60 mL/min/1.73 m 2       Imaging  CXR personally reviewed which showed b/l atelectasis at the bases  CT scan personally reviewed which showed small left pleural effusion, b/l atelectasis and also subtle emphysematous changes    Assessment/Plan  #COPD  Unfortunately this is a readmission as was admitted 3/6 to 3/11 with COPD exacerbation  This admission appears to be due to weakness and hyponatremia  Not clear that he is currently in exacerbation as pt states his breathing at baseline  Pt unable to self care and quite weak  Na is being  corrected and also PT/OT  His O2 requirement is at 4 l but likely can titrate down to previous discharge of 3l to target sats 89-92%    #Clinical MICHOACANO  Does have STOP BANG score of 5 high risk of MICHOACANO  With underlying emphysema 40% overlap with MICHOACANO  Naps and has excessive daytime somnolence  Will need a sleep study  Trial of autocpap nocturnally may help his overall dyspnea - will order  Reviewed with patient

## 2022-03-13 NOTE — H&P
Hospital Medicine History & Physical Note    Date of Service  3/13/2022    Primary Care Physician  Peterson Amin M.D.    Consultants      Code Status  Prior    Chief Complaint  Chief Complaint   Patient presents with   • Shortness of Breath     Pt came to the ER from home c/o SOB. Per EMS, pt was just recently discharged from [facility] with a home O2 at 3L NC continuously. Pt currently at 4L NC satting at 96%. EMS also stated that pt is unable to care for himself at home. Pt lives in a camper/ trailer alone. Pt denies any pain at this time. Pt AOX4.   • Fall       History of Presenting Illness  Juan Manuel Bass is a 71 y.o. morbidly obese male with a past medical hx of oxygen dependent COPD, EtOH dependence and drinks 8 beers daily who presented 3/12/2022 following DC from [facility] on 02/11/22 with increasing shortness of breath and inability to care for himself.  Patient seen to have diffuse ecchymosis throughout his back and does not known how the ecchymosis occurred, but states that he fell at home and was unable to get up for hours following the fall due to weakness and shortness of breath.     Notable findings include: , RR 22, requiring 4L oxygen NC, Na 125, K 3.5, AST/ALT 68/59, T. Bili 1.7, Troponin 31, WBC 13.5    DC on the 11th FEB    CT abd showing: .  No evidence of thoracic, abdominal or pelvic organ injury.     2.  Bibasilar atelectasis with minimal right pleural effusion.     3.   Fatty liver.     4.  Possible gallstones.     5.  Diverticulosis.    Chest xray showing: Bibasilar atelectasis.    EKG showing Afib with RVR      I discussed the plan of care with patient.    Review of Systems  Review of Systems   Respiratory: Positive for shortness of breath.    Musculoskeletal: Positive for back pain, falls and joint pain.   All other systems reviewed and are negative.      Past Medical History   has a past medical history of Chronic airway obstruction, not elsewhere classified and Hypertension. ETOH  dependence    Surgical History   has no past surgical history on file.     Family History  family history includes Heart Disease in his father; No Known Problems in his mother.   Family history reviewed with patient. There is no family history that is pertinent to the chief complaint.     Social History   reports that he quit smoking about 2 years ago. He has a 4.00 pack-year smoking history. He does not have any smokeless tobacco history on file. He reports previous alcohol use. He reports previous drug use.    Allergies  Allergies   Allergen Reactions   • Tetanus Toxoid Hives       Medications  Prior to Admission Medications   Prescriptions Last Dose Informant Patient Reported? Taking?   Cholecalciferol (VITAMIN D3 PO)  Patient Yes No   Sig: Take 1 Capsule by mouth every day.   Fluticasone-Umeclidin-Vilant (TRELEGY ELLIPTA) 100-62.5-25 MCG/INH AEROSOL POWDER, BREATH ACTIVATED inhalation   No No   Sig: Inhale 2-3 Inhalation every morning.   Multiple Vitamins-Minerals (CENTRUM SILVER ADULT 50+) Tab  Patient Yes No   Sig: Take 1 Tablet by mouth every day.   albuterol 108 (90 Base) MCG/ACT Aero Soln inhalation aerosol   No No   Sig: Inhale 1-2 Puffs every four hours as needed for Shortness of Breath.   amLODIPine (NORVASC) 5 MG Tab   No No   Sig: Take 1 Tablet by mouth every day.   pantoprazole (PROTONIX) 40 MG Tablet Delayed Response  Patient's Home Pharmacy Yes No   Sig: Take 40 mg by mouth every day.   sodium chloride (SALT) 1 GM Tab   No No   Sig: Take 1 Tablet by mouth 3 times a day with meals.   vitamin E 180 MG (400 UNIT) Cap  Patient Yes No   Sig: Take 360 mg by mouth every day. 2 capsules = 360 mg (800 units).      Facility-Administered Medications: None       Physical Exam  Temp:  [36.3 °C (97.3 °F)] 36.3 °C (97.3 °F)  Pulse:  [] 90  Resp:  [20-23] 23  BP: (138-150)/(82-99) 144/92  SpO2:  [94 %-97 %] 95 %  Blood Pressure : 144/92   Temperature: 36.3 °C (97.3 °F)   Pulse: 90   Respiration: (!) 23    Pulse Oximetry: 95 %       Physical Exam     Constitutional: acute on chronic respiratory failure  HENT: Normocephalic, no obvious evidence of acute trauma.  Eyes: No scleral icterus. Normal conjunctiva   Neck: Comfortable movement without any obvious restriction in the range of motion.  Cardiovascular: Upon ascultation I appreciate a regular heart rhythm and a normal rate with no murmurs, rubs or gallops  Thorax & Lungs: acute on chronic respiratory distress. Reduced breath sounds in all lung fields.   there is no obvious chest wall tenderness. I appreciate reduced air movement throughout.   Abdomen: The abdomen is not visibly distended. Upon palpation, I find it to be without tenderness.  No mass appreciated.  Skin: ecchymosis diffusely throughout back  Extremities/Musculoskeletal: no lower extremity edema with no asymmetry.  Neurologic: Alert & oriented. No focal deficits observed.   Psychiatric: Normal affect appropriate for the clinical situation.    Laboratory:  Recent Labs     03/12/22 2129   WBC 13.5*   RBC 5.32   HEMOGLOBIN 16.6   HEMATOCRIT 49.2   MCV 92.5   MCH 31.2   MCHC 33.7   RDW 45.4   PLATELETCT 237   MPV 9.4     Recent Labs     03/10/22  0340 03/12/22  2129   SODIUM 130* 125*   POTASSIUM 3.7 3.5*   CHLORIDE 90* 85*   CO2 30 27   GLUCOSE 145* 90   BUN 12 14   CREATININE 0.41* 0.55   CALCIUM 8.8 9.5     Recent Labs     03/10/22  0340 03/12/22  2129   ALTSGPT  --  59*   ASTSGOT  --  68*   ALKPHOSPHAT  --  63   TBILIRUBIN  --  1.7*   GLUCOSE 145* 90         No results for input(s): NTPROBNP in the last 72 hours.      Recent Labs     03/12/22 2129   TROPONINT 31*       Imaging:  CT-CHEST,ABDOMEN,PELVIS WITH   Final Result      1.  No evidence of thoracic, abdominal or pelvic organ injury.      2.  Bibasilar atelectasis with minimal right pleural effusion.      3.   Fatty liver.      4.  Possible gallstones.      5.  Diverticulosis.      DX-CHEST-PORTABLE (1 VIEW)   Final Result      Bibasilar  atelectasis.          Assessment/Plan:  I anticipate this patient will require at least two midnights for appropriate medical management, necessitating inpatient admission.    Sepsis (HCC)- (present on admission)  Assessment & Plan  This is Sepsis Present on admission  SIRS criteria identified on my evaluation include: Leukocytosis, with WBC greater than 12,000  Source is unk  Sepsis protocol initiated  Fluid resuscitation ordered per protocol  IV antibiotics         Acute on chronic respiratory failure (HCC)  Assessment & Plan  Oxygen per protocol  duonebs  RT  Incentive spirometry  Ambulate with assistance    Hyponatremia- (present on admission)  Assessment & Plan  Na 125 on arrival   continue  To monitor    Essential hypertension- (present on admission)  Assessment & Plan  Continue home meds  Antihypertensives prn    COPD exacerbation (HCC)- (present on admission)  Assessment & Plan  - Solumedrol 125mg x1 -> 40mg BID   - Duonebs and O2 therapy as needed  - Incentive spirometry  - Azithro 500mg PO qd      VTE prophylaxis: SCDs/TEDs

## 2022-03-13 NOTE — ASSESSMENT & PLAN NOTE
Positive SIRS on admission without overt signs of infectious etiology, likely reactive to fall and shortness of breath  No antibiotics  SIRS has since resolved

## 2022-03-13 NOTE — THERAPY
"Physical Therapy   Initial Evaluation     Patient Name: Juan Manuel Bass  Age:  71 y.o., Sex:  male  Medical Record #: [id number]  Today's Date: 3/13/2022     Precautions  Precautions: Fall Risk    Assessment  Patient is 71 y.o. male that presented to acute with complaint of SOB and inability to care for himself. PMHx significant for obesity, COPD, ETOH. He presented to PT with decreased activity tolerance which is limiting his ability to safely perform mobility at [facility]. He mobilized as detailed below. He was primarily limited by SOB and increased WOB and resulting fatigue. At current level recommend placement. Will follow.    Plan    Recommend Physical Therapy 2 times per week until therapy goals are met for the following treatments:  Bed Mobility, Community Re-integration, Equipment, Gait Training, Manual Therapy, Neuro Re-Education / Balance, Self Care/Home Evaluation, Stair Training, Therapeutic Activities, and Therapeutic Exercises    DC Equipment Recommendations: Unable to determine at this time,Front-Wheel Walker  Discharge Recommendations: Recommend post-acute placement for additional physical therapy services prior to discharge home       Subjective    \"I was down for over an hour. Did you see that bruise on my back? It took a few people to get me up.\"     Objective       03/13/22 0952   Total Time Spent   Total Time Spent (Mins) 21   Charge Group   PT Evaluation PT Evaluation Low   Initial Contact Note    Initial Contact Note Order Received and Verified, Physical Therapy Evaluation in Progress with Full Report to Follow.   Precautions   Precautions Fall Risk   Vitals   O2 (LPM) 3.5   O2 Delivery Device Silicone Nasal Cannula   Pain 0 - 10 Group   Therapist Pain Assessment   (no pain complaint during session)   Prior Living Situation   Prior Services None   Housing / Facility Motor Home   Steps Into Home 3   Steps In Home 1   Rail   (grab bar at door)   Equipment Owned None   Lives with - Patient's Self Care " Capacity Alone and Able to Care For Self   Comments Patient reported several friends live nearby but with limited ability to assist   Prior Level of Functional Mobility   Bed Mobility Independent   Transfer Status Independent   Ambulation Independent   Distance Ambulation (Feet)   (community)   Assistive Devices Used None   Stairs Independent   History of Falls   History of Falls Yes  (prior to admit)   Cognition    Cognition / Consciousness WDL   Level of Consciousness Alert   Comments pleasant, cooperative, poor insight   Passive ROM Lower Body   Passive ROM Lower Body WDL   Comments not formally tested, WFL for mobility   Active ROM Lower Body    Active ROM Lower Body  WDL   Comments not formally tested, WFL for mobility   Strength Lower Body   Lower Body Strength  WDL   Comments as above   Sensation Lower Body   Lower Extremity Sensation   Not Tested   Lower Body Muscle Tone   Lower Body Muscle Tone  WDL   Neurological Concerns   Neurological Concerns No   Coordination Lower Body    Coordination Lower Body  WDL   Balance Assessment   Sitting Balance (Static) Fair +   Sitting Balance (Dynamic) Fair   Standing Balance (Static) Fair   Standing Balance (Dynamic) Fair   Weight Shift Sitting Fair   Weight Shift Standing Fair   Comments with FWW, no overt LOB   Gait Analysis   Gait Level Of Assist Contact Guard Assist   Assistive Device Front Wheel Walker   Distance (Feet) 15   # of Times Distance was Traveled 2   Deviation Increased Base Of Support  (forward flexed posture)   # of Stairs Climbed 0   Weight Bearing Status no restrictions   Vision Deficits Impacting Mobility NT   Comments limited by fatigue and increased WOB   Bed Mobility    Supine to Sit   (NT, in chair prior to session)   Sit to Supine Supervised   Scooting Supervised  (with bed features)   Functional Mobility   Sit to Stand Supervised   Bed, Chair, Wheelchair Transfer Supervised   Toilet Transfers   (stood at toilet)   Transfer Method Stand Step    How much difficulty does the patient currently have...   Turning over in bed (including adjusting bedclothes, sheets and blankets)? 2   Sitting down on and standing up from a chair with arms (e.g., wheelchair, bedside commode, etc.) 4   Moving from lying on back to sitting on the side of the bed? 2   How much help from another person does the patient currently need...   Moving to and from a bed to a chair (including a wheelchair)? 4   Need to walk in a hospital room? 3   Climbing 3-5 steps with a railing? 2   6 clicks Mobility Score 17   Activity Tolerance   Sitting in Chair in chair prior to session   Sitting Edge of Bed 3 min   Standing 5-6 min   Comments limited by SOB/increased WOB   Edema / Skin Assessment   Edema / Skin  Not Assessed   Patient / Family Goals    Patient / Family Goal #1 go home   Short Term Goals    Short Term Goal # 1 Patient will move supine<>sitting without bed features with supervision within 6tx in order to get in/out of bed   Short Term Goal # 2 Patient will ambulate 50ft with supervision within 6tx in order to access environment   Short Term Goal # 3 Patient will ascend/descend 3 steps with supervision within 6tx in order to enter/exit home   Education Group   Education Provided Role of Physical Therapist   Role of Physical Therapist Patient Response Patient;Acceptance;Explanation;Verbal Demonstration   Problem List    Problems Impaired Bed Mobility;Impaired Transfers;Impaired Ambulation;Decreased Activity Tolerance   Anticipated Discharge Equipment and Recommendations   DC Equipment Recommendations Unable to determine at this time;Front-Wheel Walker   Discharge Recommendations Recommend post-acute placement for additional physical therapy services prior to discharge home   Interdisciplinary Plan of Care Collaboration   IDT Collaboration with  Nursing   Patient Position at End of Therapy In Bed;Call Light within Reach;Tray Table within Reach;Phone within Reach   Collaboration Comments  RN aware of visit, response   Session Information   Date / Session Number  3/13-1 (1/2, 3/19)

## 2022-03-13 NOTE — ASSESSMENT & PLAN NOTE
Acute on chronic respiratory failure secondary to COPD, this is improved, currently does not appear to be in acute exacerbation, complete the 5-day prednisone course that was ordered  Pulmonary does suspect underlying sleep apnea, will need outpatient follow-up  michelle  RT  Incentive spirometry  Ambulate with assistance

## 2022-03-13 NOTE — CARE PLAN
Problem: Knowledge Deficit - Standard  Goal: Patient and family/care givers will demonstrate understanding of plan of care, disease process/condition, diagnostic tests and medications  Outcome: Progressing     Problem: Knowledge Deficit - COPD  Goal: Patient/significant other demonstrates understanding of disease process, utilization of the Action Plan, medications and discharge instruction  Outcome: Progressing

## 2022-03-13 NOTE — RESPIRATORY CARE
COPD EDUCATION by COPD CLINICAL EDUCATOR  3/13/2022 at 4:19 PM by Melissa Galicia RRT     Patient seen for COPD exacerbation with 30 day readmission. IP Pulmonary team to see. Discussion included: determination of the factors that led to their current hospital admission, reiteration of the Action Plan and steps they can take to avoid an exacerbation, proper medication technique, and importance of obtaining a doctors appointment when they start feeling ill. Patient agrees that he might need to go to IP Rehab or a SNF upon discharge to regain his strength. Reminded patient that he needs to take care of his breathing so he can get his strength back and get back home.     COPD Screen  COPD Risk Screening  Do you have a history of COPD?: Yes  Do you have a Pulmonologist?: No  COPD Population Screener  During the past 4 weeks, how much did you feel short of breath?: Some of the time  Do you ever cough up any mucus or phlegm?: No/only with occasional colds or infections  In the past 12 months, you do less than you used to because of your breathing problems: Agree  Have you smoked at least 100 cigarettes in your entire life?: Yes  How old are you?: 60+  COPD Screening Score: 6  COPD Coordinator Not Recommended: Yes    COPD Assessment  COPD Clinical Specialists ONLY  COPD Education Initiated: Yes--30 Day Readmission (IP Pulmonary notified, seen at bedside, went over respiratory meds and using O2, may need to go to IP Rehab or SNF upon discharge.)    PFT Results    No results found for: PFT    Meds to Beds  Would the patient like to opt in for Bedside Medication Delivery at Discharge?: Yes, interested     MY COPD ACTION PLAN     It is recommended that patients and physicians /healthcare providers complete this action plan together. This plan should be discussed at each physician visit and updated as needed.    The green, yellow and red zones show groups of symptoms of COPD. This list of symptoms is not comprehensive, and you may  "experience other symptoms. In the \"Actions\" column, your healthcare provider has recommended actions for you to take based on your symptoms.    Patient Name: Juan Manuel Bass   YOB: 1950   Last Updated on:     Green Zone:  I am doing well today Actions   •  Usual activitiy and exercise level •  Take daily medications   •  Usual amounts of cough and phlegm/mucus •  Use oxygen as prescribed   •  Sleep well at night •  Continue regular exercise/diet plan   •  Appetite is good •  At all times avoid cigarette smoke, inhaled irritants     Daily Medications (these medications are taken every day):   Fluticasone and Umeclidinium and Vilanterol (Trelogy) 1 Puff Once daily     Additional Information:  Please remember to RINSE MOUTH after taking this medicine    Yellow Zone:  I am having a bad day or a COPD flare Actions   •  More breathless than usual •  Continue daily medications   •  I have less energy for my daily activities •  Use quick relief inhaler as ordered   •  Increased or thicker phlegm/mucus •  Use oxygen as prescribed   •  Using quick relief inhaler/nebulizer more often •  Get plenty of rest   •  Swelling of ankles more than usual •  Use pursed lip breathing   •  More coughing than usual •  At all times avoid cigarette smoke, inhaled irritants   •  I feel like I have a \"chest cold\"   •  Poor sleep and my symptoms woke me up   •  My appetite is not good   •  My medicine is not helping    •  Call provider immediately if symptoms don’t improve     Continue daily medications, add rescue medications:   Albuterol 2 Puffs         Medications to be used during a flare up, (as Discussed with Provider):           Additional Information:  Take as directed by your Doctor and use the spacer    Red Zone:  I need urgent medical care Actions   •  Severe shortness of breath even at rest •  Call 911 or seek medical care immediately   •  Not able to do any activity because of breathing    •  Fever or shaking chills    •  " Feeling confused or very drowsy     •  Chest pains    •  Coughing up blood

## 2022-03-14 ENCOUNTER — PATIENT OUTREACH (OUTPATIENT)
Dept: HEALTH INFORMATION MANAGEMENT | Facility: OTHER | Age: 72
End: 2022-03-14
Payer: MEDICARE

## 2022-03-14 LAB
ALBUMIN SERPL BCP-MCNC: 3.6 G/DL (ref 3.2–4.9)
ALBUMIN/GLOB SERPL: 1.3 G/DL
ALP SERPL-CCNC: 51 U/L (ref 30–99)
ALT SERPL-CCNC: 45 U/L (ref 2–50)
ANION GAP SERPL CALC-SCNC: 12 MMOL/L (ref 7–16)
ANION GAP SERPL CALC-SCNC: 8 MMOL/L (ref 7–16)
ANION GAP SERPL CALC-SCNC: 8 MMOL/L (ref 7–16)
ANION GAP SERPL CALC-SCNC: 9 MMOL/L (ref 7–16)
AST SERPL-CCNC: 38 U/L (ref 12–45)
BASOPHILS # BLD AUTO: 0.1 % (ref 0–1.8)
BASOPHILS # BLD: 0.01 K/UL (ref 0–0.12)
BILIRUB SERPL-MCNC: 1.2 MG/DL (ref 0.1–1.5)
BUN SERPL-MCNC: 15 MG/DL (ref 8–22)
BUN SERPL-MCNC: 15 MG/DL (ref 8–22)
BUN SERPL-MCNC: 16 MG/DL (ref 8–22)
BUN SERPL-MCNC: 16 MG/DL (ref 8–22)
CALCIUM SERPL-MCNC: 8.9 MG/DL (ref 8.5–10.5)
CALCIUM SERPL-MCNC: 9.1 MG/DL (ref 8.5–10.5)
CALCIUM SERPL-MCNC: 9.3 MG/DL (ref 8.5–10.5)
CALCIUM SERPL-MCNC: 9.3 MG/DL (ref 8.5–10.5)
CHLORIDE SERPL-SCNC: 90 MMOL/L (ref 96–112)
CHLORIDE SERPL-SCNC: 90 MMOL/L (ref 96–112)
CHLORIDE SERPL-SCNC: 92 MMOL/L (ref 96–112)
CHLORIDE SERPL-SCNC: 92 MMOL/L (ref 96–112)
CHOLEST SERPL-MCNC: 124 MG/DL (ref 100–199)
CO2 SERPL-SCNC: 29 MMOL/L (ref 20–33)
CO2 SERPL-SCNC: 31 MMOL/L (ref 20–33)
CO2 SERPL-SCNC: 34 MMOL/L (ref 20–33)
CO2 SERPL-SCNC: 34 MMOL/L (ref 20–33)
CREAT SERPL-MCNC: 0.54 MG/DL (ref 0.5–1.4)
CREAT SERPL-MCNC: 0.58 MG/DL (ref 0.5–1.4)
CREAT SERPL-MCNC: 0.63 MG/DL (ref 0.5–1.4)
CREAT SERPL-MCNC: 0.67 MG/DL (ref 0.5–1.4)
EKG IMPRESSION: NORMAL
EOSINOPHIL # BLD AUTO: 0.01 K/UL (ref 0–0.51)
EOSINOPHIL NFR BLD: 0.1 % (ref 0–6.9)
ERYTHROCYTE [DISTWIDTH] IN BLOOD BY AUTOMATED COUNT: 46.4 FL (ref 35.9–50)
GLOBULIN SER CALC-MCNC: 2.8 G/DL (ref 1.9–3.5)
GLUCOSE SERPL-MCNC: 127 MG/DL (ref 65–99)
GLUCOSE SERPL-MCNC: 129 MG/DL (ref 65–99)
GLUCOSE SERPL-MCNC: 132 MG/DL (ref 65–99)
GLUCOSE SERPL-MCNC: 145 MG/DL (ref 65–99)
HCT VFR BLD AUTO: 43.6 % (ref 42–52)
HDLC SERPL-MCNC: 54 MG/DL
HGB BLD-MCNC: 14.5 G/DL (ref 14–18)
IMM GRANULOCYTES # BLD AUTO: 0.04 K/UL (ref 0–0.11)
IMM GRANULOCYTES NFR BLD AUTO: 0.5 % (ref 0–0.9)
LDLC SERPL CALC-MCNC: 60 MG/DL
LYMPHOCYTES # BLD AUTO: 1.1 K/UL (ref 1–4.8)
LYMPHOCYTES NFR BLD: 13.1 % (ref 22–41)
MCH RBC QN AUTO: 30.9 PG (ref 27–33)
MCHC RBC AUTO-ENTMCNC: 33.3 G/DL (ref 33.7–35.3)
MCV RBC AUTO: 93 FL (ref 81.4–97.8)
MONOCYTES # BLD AUTO: 1.18 K/UL (ref 0–0.85)
MONOCYTES NFR BLD AUTO: 14.1 % (ref 0–13.4)
NEUTROPHILS # BLD AUTO: 6.04 K/UL (ref 1.82–7.42)
NEUTROPHILS NFR BLD: 72.1 % (ref 44–72)
NRBC # BLD AUTO: 0 K/UL
NRBC BLD-RTO: 0 /100 WBC
PLATELET # BLD AUTO: 220 K/UL (ref 164–446)
PMV BLD AUTO: 9.6 FL (ref 9–12.9)
POTASSIUM SERPL-SCNC: 3.2 MMOL/L (ref 3.6–5.5)
POTASSIUM SERPL-SCNC: 3.8 MMOL/L (ref 3.6–5.5)
POTASSIUM SERPL-SCNC: 4 MMOL/L (ref 3.6–5.5)
POTASSIUM SERPL-SCNC: 4.1 MMOL/L (ref 3.6–5.5)
PROT SERPL-MCNC: 6.4 G/DL (ref 6–8.2)
RBC # BLD AUTO: 4.69 M/UL (ref 4.7–6.1)
SODIUM SERPL-SCNC: 130 MMOL/L (ref 135–145)
SODIUM SERPL-SCNC: 131 MMOL/L (ref 135–145)
SODIUM SERPL-SCNC: 134 MMOL/L (ref 135–145)
SODIUM SERPL-SCNC: 134 MMOL/L (ref 135–145)
TRIGL SERPL-MCNC: 52 MG/DL (ref 0–149)
WBC # BLD AUTO: 8.4 K/UL (ref 4.8–10.8)

## 2022-03-14 PROCEDURE — A9270 NON-COVERED ITEM OR SERVICE: HCPCS | Performed by: STUDENT IN AN ORGANIZED HEALTH CARE EDUCATION/TRAINING PROGRAM

## 2022-03-14 PROCEDURE — 97165 OT EVAL LOW COMPLEX 30 MIN: CPT

## 2022-03-14 PROCEDURE — 700102 HCHG RX REV CODE 250 W/ 637 OVERRIDE(OP): Performed by: INTERNAL MEDICINE

## 2022-03-14 PROCEDURE — 93010 ELECTROCARDIOGRAM REPORT: CPT | Performed by: INTERNAL MEDICINE

## 2022-03-14 PROCEDURE — 99232 SBSQ HOSP IP/OBS MODERATE 35: CPT | Performed by: INTERNAL MEDICINE

## 2022-03-14 PROCEDURE — 94669 MECHANICAL CHEST WALL OSCILL: CPT

## 2022-03-14 PROCEDURE — A9270 NON-COVERED ITEM OR SERVICE: HCPCS | Performed by: INTERNAL MEDICINE

## 2022-03-14 PROCEDURE — 80053 COMPREHEN METABOLIC PANEL: CPT

## 2022-03-14 PROCEDURE — 36415 COLL VENOUS BLD VENIPUNCTURE: CPT

## 2022-03-14 PROCEDURE — 700101 HCHG RX REV CODE 250: Performed by: STUDENT IN AN ORGANIZED HEALTH CARE EDUCATION/TRAINING PROGRAM

## 2022-03-14 PROCEDURE — 770020 HCHG ROOM/CARE - TELE (206)

## 2022-03-14 PROCEDURE — 94760 N-INVAS EAR/PLS OXIMETRY 1: CPT

## 2022-03-14 PROCEDURE — 80048 BASIC METABOLIC PNL TOTAL CA: CPT

## 2022-03-14 PROCEDURE — 700111 HCHG RX REV CODE 636 W/ 250 OVERRIDE (IP): Performed by: INTERNAL MEDICINE

## 2022-03-14 PROCEDURE — 94640 AIRWAY INHALATION TREATMENT: CPT

## 2022-03-14 PROCEDURE — 80061 LIPID PANEL: CPT

## 2022-03-14 PROCEDURE — 85025 COMPLETE CBC W/AUTO DIFF WBC: CPT

## 2022-03-14 PROCEDURE — 700102 HCHG RX REV CODE 250 W/ 637 OVERRIDE(OP): Performed by: STUDENT IN AN ORGANIZED HEALTH CARE EDUCATION/TRAINING PROGRAM

## 2022-03-14 RX ORDER — IPRATROPIUM BROMIDE AND ALBUTEROL SULFATE 2.5; .5 MG/3ML; MG/3ML
3 SOLUTION RESPIRATORY (INHALATION)
Status: DISCONTINUED | OUTPATIENT
Start: 2022-03-14 | End: 2022-03-16

## 2022-03-14 RX ORDER — PREDNISONE 20 MG/1
40 TABLET ORAL DAILY
Qty: 6 TABLET | Refills: 0 | Status: SHIPPED
Start: 2022-03-14 | End: 2022-03-17

## 2022-03-14 RX ORDER — AZITHROMYCIN 250 MG/1
TABLET, FILM COATED ORAL
Status: ACTIVE
Start: 2022-03-14 | End: 2022-03-14

## 2022-03-14 RX ORDER — IPRATROPIUM BROMIDE AND ALBUTEROL SULFATE 2.5; .5 MG/3ML; MG/3ML
3 SOLUTION RESPIRATORY (INHALATION)
Status: DISCONTINUED | OUTPATIENT
Start: 2022-03-14 | End: 2022-03-17 | Stop reason: HOSPADM

## 2022-03-14 RX ADMIN — Medication: at 05:07

## 2022-03-14 RX ADMIN — Medication 5 MG: at 21:06

## 2022-03-14 RX ADMIN — ENOXAPARIN SODIUM 40 MG: 40 INJECTION SUBCUTANEOUS at 05:03

## 2022-03-14 RX ADMIN — IPRATROPIUM BROMIDE AND ALBUTEROL SULFATE 3 ML: 2.5; .5 SOLUTION RESPIRATORY (INHALATION) at 12:24

## 2022-03-14 RX ADMIN — IPRATROPIUM BROMIDE AND ALBUTEROL SULFATE 3 ML: 2.5; .5 SOLUTION RESPIRATORY (INHALATION) at 17:10

## 2022-03-14 RX ADMIN — UMECLIDINIUM BROMIDE AND VILANTEROL TRIFENATATE 1 PUFF: 62.5; 25 POWDER RESPIRATORY (INHALATION) at 05:06

## 2022-03-14 RX ADMIN — SODIUM CHLORIDE 1 G: 1 TABLET ORAL at 09:03

## 2022-03-14 RX ADMIN — ENOXAPARIN SODIUM 40 MG: 40 INJECTION SUBCUTANEOUS at 16:41

## 2022-03-14 RX ADMIN — SENNOSIDES AND DOCUSATE SODIUM 2 TABLET: 50; 8.6 TABLET ORAL at 05:03

## 2022-03-14 RX ADMIN — PREDNISONE 40 MG: 20 TABLET ORAL at 09:04

## 2022-03-14 RX ADMIN — SODIUM CHLORIDE 1 G: 1 TABLET ORAL at 14:04

## 2022-03-14 RX ADMIN — FLUTICASONE PROPIONATE 88 MCG: 44 AEROSOL, METERED RESPIRATORY (INHALATION) at 05:07

## 2022-03-14 RX ADMIN — AZITHROMYCIN DIHYDRATE 500 MG: 250 TABLET, FILM COATED ORAL at 04:53

## 2022-03-14 RX ADMIN — IPRATROPIUM BROMIDE AND ALBUTEROL SULFATE 3 ML: 2.5; .5 SOLUTION RESPIRATORY (INHALATION) at 21:20

## 2022-03-14 RX ADMIN — SENNOSIDES AND DOCUSATE SODIUM 2 TABLET: 50; 8.6 TABLET ORAL at 16:41

## 2022-03-14 RX ADMIN — SODIUM CHLORIDE 1 G: 1 TABLET ORAL at 16:41

## 2022-03-14 RX ADMIN — Medication: at 16:41

## 2022-03-14 RX ADMIN — IPRATROPIUM BROMIDE AND ALBUTEROL SULFATE 3 ML: 2.5; .5 SOLUTION RESPIRATORY (INHALATION) at 08:23

## 2022-03-14 ASSESSMENT — ENCOUNTER SYMPTOMS
VOMITING: 0
HEADACHES: 0
DOUBLE VISION: 0
CHILLS: 0
ABDOMINAL PAIN: 0
PHOTOPHOBIA: 0
MYALGIAS: 0
NECK PAIN: 0
PALPITATIONS: 0
NAUSEA: 0
DIZZINESS: 1
COUGH: 0

## 2022-03-14 ASSESSMENT — COGNITIVE AND FUNCTIONAL STATUS - GENERAL
SUGGESTED CMS G CODE MODIFIER DAILY ACTIVITY: CK
HELP NEEDED FOR BATHING: A LITTLE
PERSONAL GROOMING: A LITTLE
DAILY ACTIVITIY SCORE: 19
TOILETING: A LITTLE
DRESSING REGULAR UPPER BODY CLOTHING: A LITTLE
DRESSING REGULAR LOWER BODY CLOTHING: A LITTLE

## 2022-03-14 ASSESSMENT — FIBROSIS 4 INDEX: FIB4 SCORE: 1.83

## 2022-03-14 ASSESSMENT — ACTIVITIES OF DAILY LIVING (ADL): TOILETING: INDEPENDENT

## 2022-03-14 NOTE — DOCUMENTATION QUERY
[facility]                                                                       Query Response Note      PATIENT:               BRIDGETTE VARNER  ACCT #:                  [id number]  MRN:                     [id number]  :                      1950  ADMIT DATE:       3/12/2022 9:13 PM  DISCH DATE:          RESPONDING  PROVIDER #:        [id number]           QUERY TEXT:    The diagnosis of sepsis has been documented in the H/P and progress note without a source of infection.  The only SIRS criteria present is the elevated WBC. Please provide additional clinical indicators/SIRS criteria supportive of the documented diagnosis of sepsis.     Note: If an appropriate response is not listed below, please respond with a new note.          The patient's Clinical Indicators include:  ED- 71 y.o. male who presents for evaluation after falling from his chair in his home tonight   - on  home oxygen continuously   - 3/12 WBC 13.5  - SIRS, dyspnea, debility    H/P- T36.3, P 90, R 23, /92, O2 95%  - acute on chronic respiratory failure  - Sepsis POA- leukocytosis, WBC greater than 12K, source is unknown      Thank you,  T. Gerri Boss RN, BSN, CCDS  Connect via Voalte  Options provided:   -- Sepsis was determined to not exist.   -- Sepsis was present on admission, with supporting evidence of ______ (please specify).   -- Unable to clinically determine.   -- Other (please specify) ___________________________      Query created by: Francoise Boss on 3/14/2022 10:43 AM    RESPONSE TEXT:    Sepsis was determined to not exist.          Electronically signed by:  CRISTA LÓPEZ MD 3/14/2022 11:47 AM

## 2022-03-14 NOTE — THERAPY
"Occupational Therapy   Initial Evaluation     Patient Name: Juan Manuel Bass  Age:  71 y.o., Sex:  male  Medical Record #: [id number]  Today's Date: 3/14/2022     Precautions  Precautions: (P) Fall Risk    Assessment  Patient is 71 y.o. male who presents to acute for SOB and GLF in his trailer, EMTs stated that pt was unable to care for himself. Pt required min A for toileting, standing grooming and functional mobility w/ FWW. Pt appears to have poor insight into deficits. Recommend post acute placement.     Plan    Recommend Occupational Therapy 3 times per week until therapy goals are met for the following treatments:  Adaptive Equipment, Neuro Re-Education / Balance, Self Care/Activities of Daily Living, Therapeutic Activities, and Therapeutic Exercises.    DC Equipment Recommendations: (P) Unable to determine at this time  Discharge Recommendations: (P) Recommend post-acute placement for additional occupational therapy services prior to discharge home     Subjective    \"I am usually a pretty independent clint\"     Objective       03/14/22 1152   Total Time Spent   Total Time Spent (Mins) 24   Charge Group   OT Evaluation OT Evaluation Low   Initial Contact Note    Initial Contact Note Order Received and Verified, Occupational Therapy Evaluation in Progress with Full Report to Follow.   Prior Living Situation   Prior Services None   Housing / Facility Motor Home   Bathroom Set up Bathtub / Shower Combination   Equipment Owned None   Lives with - Patient's Self Care Capacity Alone and Able to Care For Self   Comments Pt reports he has one set of neighbors who help him with groceries. Per chart those neighbors are the ones who found him on the floor.   Prior Level of ADL Function   Self Feeding Independent   Grooming / Hygiene Independent   Bathing Independent   Dressing Independent   Toileting Independent   Prior Level of IADL Function   Medication Management Independent   Laundry Independent   Kitchen Mobility Independent "   Finances Independent   Home Management Independent   Shopping Requires Assist   Prior Level Of Mobility Independent Without Device in Community;Independent Without Device in Home   Driving / Transportation Driving Independent   Occupation (Pre-Hospital Vocational) Not Employed   Precautions   Precautions Fall Risk   Vitals   O2 (LPM) 3   O2 Delivery Device Nasal Cannula   Vitals Comments desaturated to the low 80s when mobilizing to the bathroom, inreased to 4L for mobilization, once back in chair was >90% on 3L   Pain 0 - 10 Group   Therapist Pain Assessment Post Activity Pain Same as Prior to Activity;Nurse Notified  (no c/o pain during session)   Cognition    Cognition / Consciousness WDL   Level of Consciousness Alert   Safety Awareness Impaired;Impulsive   Comments pleasent, cooperative, poor insight into deficits   Active ROM Upper Body   Active ROM Upper Body  WDL   Strength Upper Body   Upper Body Strength  WDL   Coordination Upper Body   Coordination WDL   Balance Assessment   Sitting Balance (Static) Fair   Sitting Balance (Dynamic) Fair   Standing Balance (Static) Fair   Standing Balance (Dynamic) Fair -   Weight Shift Sitting Fair   Weight Shift Standing Fair   Comments w/ FWW   Bed Mobility    Supine to Sit Supervised   Sit to Supine   (NT in chair post)   Scooting Supervised   ADL Assessment   Grooming Standing;Contact Guard Assist   Lower Body Dressing Minimal Assist   Toileting Minimal Assist  (standing voiding at toilet)   How much help from another person does the patient currently need...   Putting on and taking off regular lower body clothing? 3   Bathing (including washing, rinsing, and drying)? 3   Toileting, which includes using a toilet, bedpan, or urinal? 3   Putting on and taking off regular upper body clothing? 3   Taking care of personal grooming such as brushing teeth? 3   Eating meals? 4   6 Clicks Daily Activity Score 19   Functional Mobility   Sit to Stand Minimal Assist   Bed,  Chair, Wheelchair Transfer Minimal Assist   Transfer Method Stand Step   Mobility within room and bathroom w/ FWW   Activity Tolerance   Sitting in Chair 10+ min (up post)   Sitting Edge of Bed 5 min   Standing 8 min total   Comments limited by SOB   Patient / Family Goals   Patient / Family Goal #1 To be independent again   Short Term Goals   Short Term Goal # 1 Pt will demo LB dressing w/ SPV   Short Term Goal # 2 Pt will demo standing grooming w/ SPV   Short Term Goal # 3 Pt will demo toilet txf w/ SPV   Education Group   Role of Occupational Therapist Patient Response Patient;Acceptance;Demonstration;Explanation;Action Demonstration;Verbal Demonstration   Problem List   Problem List Decreased Active Daily Living Skills;Decreased Homemaking Skills;Decreased Functional Mobility;Decreased Activity Tolerance;Impaired Postural Control / Balance;Safety Awareness Deficits / Cognition   Anticipated Discharge Equipment and Recommendations   DC Equipment Recommendations Unable to determine at this time   Discharge Recommendations Recommend post-acute placement for additional occupational therapy services prior to discharge home   Interdisciplinary Plan of Care Collaboration   IDT Collaboration with  Nursing   Patient Position at End of Therapy Seated;Chair Alarm On;Call Light within Reach;Tray Table within Reach;Phone within Reach;Bed Alarm On   Collaboration Comments report given   Session Information   Date / Session Number  3/14, 1 (1/3, 3/20)   Priority 2

## 2022-03-14 NOTE — DISCHARGE PLANNING
Received Choice form at 0945  Agency/Facility Name: [facility]  Referral sent per Choice form @ [phone]  Ag[facility]   Spoke To: Sravani  Outcome: Pt is accepted at both [facility] however no beds will be available at Health system until Wednesday or possibly later in the week. DELIO Howard verified Pt would like to wait for original choice of Health system to have available bed.    DPA updated liazaida Lassiter who will contact this DPA with updates regarding SNF bed status.

## 2022-03-14 NOTE — PROGRESS NOTES
Assuming patient care of T- 813. Report received bedside by Chema RUANO. Patient visually assessed and updated on POC.     Bed is locked and in lowest position. Bed alarm checked for proper functioning and is currently on.  All obstacles clear from floor and personal belongings at bedside.    RN call button is w/in reach and education provided on proper use.     Medications, labs and orders reviewed.

## 2022-03-14 NOTE — DISCHARGE PLANNING
Anticipated Discharge Disposition: SNF    Action: LSW spoke with pt about SNF placement. Pt agreeable with sending referrals to whichever facilities are contracted with his insurance, however his first choice is [facility] and second choice is [facility].    LSW faxed SNF choice to Lars GAMBOA.    Addendum @[phone]  Pt accepted at [facility] and [facility]. LSW met with pt at bedside to provide update. Pt reported his first choice is for [facility] and he requested [facility] submit for insurance auth. LSW notified Lars GAMBOA.    Barriers to Discharge: SNF insurance auth and bed.    Plan: Care coordination will follow up with SNF referrals.    Care Transition Team Assessment    LSW met with pt at bedside to complete assessment. Pt A&Ox4 and able to verify the information on the face sheet. Pt lives alone in a trailer that has 3 steps to enter and two steps within the home. Pt is independent with most ADLs and IADLs. Pt's main support are his friends and JEAN-PAUL Bailey. Lloyd assists the pt with shopping, transportation, and occasionally brings the pt meals. Pt uses 3L O2 at baseline supplied by [employer].    Pt is retired and receives SSI of $1,484/month. Pt reported his space rent is $500/month. Pt has a history of etoh and drinks 8 beers/day. No MH concerns. Pt has ACP documents scanned into his chart.    Information Source  Orientation Level: Oriented X4  Information Given By: Patient  Informant's Name: Juan Manuel Bass  Who is responsible for making decisions for patient? : Patient    Readmission Evaluation  Is this a readmission?: Yes - unplanned readmission    Elopement Risk  Legal Hold: No    Interdisciplinary Discharge Planning  Lives with - Patient's Self Care Capacity: Alone and Able to Care For Self  Patient or legal guardian wants to designate a caregiver: No  Housing / Facility: Motor Home  Prior Services: None  Durable Medical Equipment: Home Oxygen    Discharge Preparedness  What is your plan after  discharge?: Skilled nursing facility  What are your discharge supports?: Other (comment) (friends)  Prior Functional Level: Ambulatory,Needs Assist with Activities of Daily Living,Independent with Medication Management  Difficulity with ADLs: None  Difficulity with IADLs: Driving    Functional Assesment  Prior Functional Level: Ambulatory,Needs Assist with Activities of Daily Living,Independent with Medication Management    Finances  Financial Barriers to Discharge: No  Prescription Coverage: Yes    Advance Directive  Advance Directive?: DPOA for Health Care,POLST  Durable Power of  Name and Contact : Maurice Lopez    Domestic Abuse  Have you ever been the victim of abuse or violence?: No  Physical Abuse or Sexual Abuse: No  Verbal Abuse or Emotional Abuse: No  Possible Abuse/Neglect Reported to:: Not Applicable    Psychological Assessment  History of Substance Abuse: Alcohol  History of Psychiatric Problems: No  Non-compliant with Treatment: No  Newly Diagnosed Illness: No    Discharge Risks or Barriers  Discharge risks or barriers?: Lives alone, no community support,Substance abuse  Patient risk factors: Lives alone and no community support,Substance abuse    Anticipated Discharge Information  Discharge Disposition: D/T to SNF with Medicare cert in anticipation of skilled care (03)

## 2022-03-14 NOTE — DISCHARGE PLANNING
Agency/Facility Name: [facility]  Spoke To: Sravani  Outcome: Per Sravani, there are no beds available today, and she said theyd most likely be available until next Wednesday 3/23.      @[id number]  Agency/Facility Name: [facility]  Spoke To: Sravani  Outcome: Per Sravani, she can start the insurance auth. For the pt.

## 2022-03-14 NOTE — DIETARY
NUTRITION SERVICES: BMI - Pt with BMI >40 (=Body mass index is 42.13 kg/m².), Class III obesity. Weight loss counseling not appropriate in acute care setting. RECOMMEND - If appropriate at DC please refer to outpatient nutrition services for weight management.     RD available PRN.

## 2022-03-14 NOTE — PROGRESS NOTES
Nelia, patient's next door neighbor called and gave information.  Patient lives alone and has been relying on Nelia and her  more and more recently. Frequent falls due to ETOH abuse. She voiced concern that both her and spouse work and cannot supervise Juan Manuel. States he uses a roller chair to get around in his mobile home.

## 2022-03-14 NOTE — PROGRESS NOTES
[facility] Medicine Daily Progress Note    Date of Service  3/14/2022    Chief Complaint  Juan Manuel Bass is a 71 y.o. male admitted 3/12/2022 with shortness of breath and fall.    Hospital Course  This is a 70 y/o M with PMHX of COPD, Alcohol dependence who was admitted on 3/113/22 after presenting to ER for SOB and fall. He was recently here in the hops[p[ital for hyponatremia and COPD exacerbation and was discharged home on 3/11/22 with home O2. Now patient comes back after having a fall at home and feeling SOB. Here in ER found to have a sodium of 125 and as admitted for further treatment     Interval Problem Update  Patient seen and examined, feels weak and dizzy this AM  weak, no acute evens overnight   Hyponatremia cont on fluid restriction and salt tabs improving   PT/OT hari      I have personally seen and examined the patient at bedside. I discussed the plan of care with patient and bedside RN.    Consultants/Specialty  None     Code Status  DNAR/DNI    Disposition  Patient is not medically cleared for discharge.   Anticipate discharge to to skilled nursing facility.  I have placed the appropriate orders for post-discharge needs.    Review of Systems  Review of Systems   Constitutional: Positive for malaise/fatigue. Negative for chills.   Eyes: Negative for double vision and photophobia.   Respiratory: Negative for cough.    Cardiovascular: Negative for chest pain and palpitations.   Gastrointestinal: Negative for abdominal pain, nausea and vomiting.   Genitourinary: Negative for dysuria, frequency and urgency.   Musculoskeletal: Negative for myalgias and neck pain.   Neurological: Positive for dizziness. Negative for headaches.        Physical Exam  Temp:  [36.1 °C (97 °F)-36.8 °C (98.2 °F)] 36.8 °C (98.2 °F)  Pulse:  [75-99] 84  Resp:  [18-21] 18  BP: (116-144)/() 130/74  SpO2:  [88 %-95 %] 94 %    Physical Exam  Vitals and nursing note reviewed.   Constitutional:       Appearance: He is obese.       Comments: Pursed lip breathing    HENT:      Mouth/Throat:      Mouth: Mucous membranes are moist.   Eyes:      General: No scleral icterus.        Right eye: No discharge.         Left eye: No discharge.      Extraocular Movements: Extraocular movements intact.      Pupils: Pupils are equal, round, and reactive to light.   Cardiovascular:      Rate and Rhythm: Normal rate.   Pulmonary:      Breath sounds: No wheezing or rales.      Comments: Diminished b/l but =  Abdominal:      General: There is no distension.      Tenderness: There is no abdominal tenderness. There is no guarding or rebound.   Skin:     Comments: Erythema b/l lower extremities from shin down with desquamation of his skin   Neurological:      General: No focal deficit present.      Mental Status: He is oriented to person, place, and time.   Psychiatric:         Mood and Affect: Mood normal.         Behavior: Behavior normal.         Thought Content: Thought content normal.         Judgment: Judgment normal.         Fluids    Intake/Output Summary (Last 24 hours) at 3/14/2022 1337  Last data filed at 3/14/2022 1200  Gross per 24 hour   Intake 650 ml   Output 875 ml   Net -225 ml       Laboratory  Recent Labs     03/12/22 2129 03/14/22  0021   WBC 13.5* 8.4   RBC 5.32 4.69*   HEMOGLOBIN 16.6 14.5   HEMATOCRIT 49.2 43.6   MCV 92.5 93.0   MCH 31.2 30.9   MCHC 33.7 33.3*   RDW 45.4 46.4   PLATELETCT 237 220   MPV 9.4 9.6     Recent Labs     03/14/22  0021 03/14/22  0548 03/14/22  1146   SODIUM 130* 134* 134*   POTASSIUM 3.2* 3.8 4.0   CHLORIDE 90* 92* 92*   CO2 31 34* 34*   GLUCOSE 145* 129* 127*   BUN 16 15 16   CREATININE 0.58 0.63 0.67   CALCIUM 8.9 9.3 9.3     Recent Labs     03/12/22 2129   INR 1.05         Recent Labs     03/14/22  0021   TRIGLYCERIDE 52   HDL 54   LDL 60       Imaging  CT-HEAD W/O   Final Result      1.  No evidence of acute cerebral infarction, hemorrhage or mass lesion.   2.  Odontogenic disease.      CT-CHEST,ABDOMEN,PELVIS  WITH   Final Result      1.  No evidence of thoracic, abdominal or pelvic organ injury.      2.  Bibasilar atelectasis with minimal right pleural effusion.      3.   Fatty liver.      4.  Possible gallstones.      5.  Diverticulosis.      DX-CHEST-PORTABLE (1 VIEW)   Final Result      Bibasilar atelectasis.           Assessment/Plan  * Hyponatremia- (present on admission)  Assessment & Plan  continue  On slat tablets and also fluid restriction   Monitor with q6 hr bmp   Improving     Sepsis (HCC)  Assessment & Plan  Positive SIRS signs less likely sepsis         Acute on chronic respiratory failure (HCC)  Assessment & Plan  Oxygen per protocol  duonebs  RT  Incentive spirometry  Ambulate with assistance    Essential hypertension- (present on admission)  Assessment & Plan  Continue his outpatient regimen   Antihypertensives prn    COPD exacerbation (HCC)- (present on admission)  Assessment & Plan  - prednisone 40 mg  - Duonebs and O2 therapy as needed  - Incentive spirometry  - Azithro 500mg PO qd       VTE prophylaxis: enoxaparin ppx    I have performed a physical exam and reviewed and updated ROS and Plan today (3/14/2022). In review of yesterday's note (3/13/2022), there are no changes except as documented above.

## 2022-03-14 NOTE — PROGRESS NOTES
Monitor Summary     Rhythm: SR    Rate:      Ectopy: PAC    Monitor Strip     ID .17  QRS .08  QT .34

## 2022-03-14 NOTE — DISCHARGE SUMMARY
Discharge Summary    CHIEF COMPLAINT ON ADMISSION  Chief Complaint   Patient presents with   • Shortness of Breath     Pt came to the ER from home c/o SOB. Per EMS, pt was just recently discharged from [facility] with a home O2 at 3L NC continuously. Pt currently at 4L NC satting at 96%. EMS also stated that pt is unable to care for himself at home. Pt lives in a camper/ trailer alone. Pt denies any pain at this time. Pt AOX4.   • Fall       Reason for Admission  Sepsis (HCC)     CODE STATUS  DNAR/DNI    HPI & HOSPITAL COURSE  This is a 71-year-old male with past medical history of chronic obstructive pulmonary disease, hypertension and alcohol dependence who was recently hospitalized for COPD exacerbation and discharged home with home health he presented back to the hospital on 3/12/2022 with continued shortness of breath, weakness and ground-level fall.  On admission patient was found to have hyponatremia with a sodium of 125.  He was requiring 2 to 3 L of oxygen which was consistent with his baseline on his previous discharge.  Patient was started on fluid restriction and salt tabs for treatment of his hyponatremia as this was thought to be secondary to hypervolemia.  His sodium has since improved.  He was seen by physical and Occupational Therapy both of which recommended postacute therapy.  Patient will be transferred to skilled nursing facility for ongoing therapy needs.  He is to maintain on salt tablets, sodium has been stable.  He continues to require oxygen which he may need up upon discharge he will need pulmonary follow-up.    The patient met 2-midnight criteria for an inpatient stay at the time of discharge.      FOLLOW UP ITEMS POST DISCHARGE  Follow sodium  Therapy needs  Respiratory needs, recommend follow-up with pulmonology for PFTs and possible sleep study    DISCHARGE DIAGNOSES  Principal Problem:    Hyponatremia POA: Yes  Active Problems:    COPD exacerbation (HCC) POA: Yes    Essential hypertension  POA: Yes    Alcoholism (HCC) POA: Yes    Acute on chronic respiratory failure (HCC) POA: Unknown  Resolved Problems:    * No resolved hospital problems. *      FOLLOW UP  No future appointments.  Peterson Amin M.D.  [address] [address]  [place] [id number] [phone]    Call  Please call your primary care provider to schedule a hospital follow up. Thank you.     [id number] [facility]  [place] [id number]  [phone]          MEDICATIONS ON DISCHARGE     Medication List      START taking these medications      Instructions   melatonin 5 mg Tabs   Take 1 Tablet by mouth at bedtime as needed (for sleep).  Dose: 5 mg     omeprazole 20 MG delayed-release capsule  Start taking on: March 18, 2022  Commonly known as: PRILOSEC   Take 1 Capsule by mouth every day.  Dose: 20 mg     predniSONE 20 MG Tabs  Commonly known as: DELTASONE   Take 2 Tablets by mouth every day for 1 day.  Dose: 40 mg        CONTINUE taking these medications      Instructions   albuterol 108 (90 Base) MCG/ACT Aers inhalation aerosol   Inhale 1-2 Puffs every four hours as needed for Shortness of Breath.  Dose: 1-2 Puff     amLODIPine 5 MG Tabs  Commonly known as: NORVASC   Take 1 Tablet by mouth every day.  Dose: 5 mg     Centrum Silver Adult 50+ Tabs   Take 1 Tablet by mouth every day.  Dose: 1 Tablet     pantoprazole 40 MG Tbec  Commonly known as: PROTONIX   Take 40 mg by mouth every day.  Dose: 40 mg     sodium chloride 1 GM Tabs  Commonly known as: SALT   Take 1 Tablet by mouth 3 times a day with meals.  Dose: 1 g     Trelegy Ellipta 100-62.5-25 MCG/INH Aepb inhalation  Generic drug: Fluticasone-Umeclidin-Vilant   Inhale 2-3 Inhalation every morning.  Dose: 2-3 Inhalation     VITAMIN C PO   Take 1 Tablet by mouth every day.  Dose: 1 Tablet     VITAMIN D3 PO   Take 1 Capsule by mouth every day.  Dose: 1 Capsule     vitamin E 180 MG (400 UNIT) Caps   Take 360 mg by mouth every day. 2 capsules = 360 mg (800  units).  Dose: 360 mg        STOP taking these medications    hydroCHLOROthiazide 25 MG Tabs  Commonly known as: HYDRODIURIL            Allergies  Allergies   Allergen Reactions   • Tetanus Toxoid Hives       DIET  Orders Placed This Encounter   Procedures   • Diet Order Diet: Cardiac; Fluid modifications: (optional): 2000 ml Fluid Restriction     Standing Status:   Standing     Number of Occurrences:   1     Order Specific Question:   Diet:     Answer:   Cardiac [6]     Order Specific Question:   Fluid modifications: (optional)     Answer:   2000 ml Fluid Restriction [11]       ACTIVITY  As tolerated.  Weight bearing as tolerated    LINES, DRAINS, AND WOUNDS  This is an automated list. Peripheral IVs will be removed prior to discharge.  Peripheral IV 03/12/22 18 G Right Antecubital (Active)   Site Assessment Leaking;Intact 03/13/22 2100   Dressing Type Transparent 03/13/22 2100   Line Status Flushed;Scrubbed the hub prior to access;Saline locked;Blood return noted 03/13/22 2100   Dressing Status Clean;Dry;Intact 03/13/22 2100   Dressing Intervention Initial dressing 03/13/22 2100   Date Primary Tubing Changed 03/13/22 03/13/22 0900   Date Secondary Tubing Changed 03/13/22 03/13/22 0900   [facility] Grading [facility] 0 03/13/22 2100   Phlebitis Scale ([facility] Only) 0 03/13/22 2100          Peripheral IV 03/12/22 18 G Right Antecubital (Active)   Site Assessment Leaking;Intact 03/13/22 2100   Dressing Type Transparent 03/13/22 2100   Line Status Flushed;Scrubbed the hub prior to access;Saline locked;Blood return noted 03/13/22 2100   Dressing Status Clean;Dry;Intact 03/13/22 2100   Dressing Intervention Initial dressing 03/13/22 2100   Date Primary Tubing Changed 03/13/22 03/13/22 0900   Date Secondary Tubing Changed 03/13/22 03/13/22 0900   Infiltration Grading ([facility]) 0 03/13/22 2100   Ph[facility]bitis Scale [facility]) 0 03/13/22 2100               MENTAL STATUS ON TRANSFER     Patient is alert and  oriented x3 at his cognitive baseline       CONSULTATIONS  Pulmonology     PROCEDURES  None     LABORATORY  Lab Results   Component Value Date    SODIUM 132 (L) 03/17/2022    POTASSIUM 3.7 03/17/2022    CHLORIDE 95 (L) 03/17/2022    CO2 28 03/17/2022    GLUCOSE 112 (H) 03/17/2022    BUN 11 03/17/2022    CREATININE 0.49 (L) 03/17/2022        Lab Results   Component Value Date    WBC 8.4 03/14/2022    HEMOGLOBIN 14.5 03/14/2022    HEMATOCRIT 43.6 03/14/2022    PLATELETCT 220 03/14/2022        Total time of the discharge process exceeds 35 minutes.

## 2022-03-15 LAB
ANION GAP SERPL CALC-SCNC: 10 MMOL/L (ref 7–16)
ANION GAP SERPL CALC-SCNC: 11 MMOL/L (ref 7–16)
ANION GAP SERPL CALC-SCNC: 8 MMOL/L (ref 7–16)
BUN SERPL-MCNC: 14 MG/DL (ref 8–22)
BUN SERPL-MCNC: 15 MG/DL (ref 8–22)
BUN SERPL-MCNC: 16 MG/DL (ref 8–22)
CALCIUM SERPL-MCNC: 8.9 MG/DL (ref 8.5–10.5)
CALCIUM SERPL-MCNC: 8.9 MG/DL (ref 8.5–10.5)
CALCIUM SERPL-MCNC: 9.5 MG/DL (ref 8.5–10.5)
CHLORIDE SERPL-SCNC: 91 MMOL/L (ref 96–112)
CHLORIDE SERPL-SCNC: 92 MMOL/L (ref 96–112)
CHLORIDE SERPL-SCNC: 93 MMOL/L (ref 96–112)
CO2 SERPL-SCNC: 30 MMOL/L (ref 20–33)
CO2 SERPL-SCNC: 31 MMOL/L (ref 20–33)
CO2 SERPL-SCNC: 32 MMOL/L (ref 20–33)
CREAT SERPL-MCNC: 0.47 MG/DL (ref 0.5–1.4)
CREAT SERPL-MCNC: 0.49 MG/DL (ref 0.5–1.4)
CREAT SERPL-MCNC: 0.55 MG/DL (ref 0.5–1.4)
GFR SERPLBLD CREATININE-BSD FMLA CKD-EPI: 106 ML/MIN/1.73 M 2
GFR SERPLBLD CREATININE-BSD FMLA CKD-EPI: 109 ML/MIN/1.73 M 2
GFR SERPLBLD CREATININE-BSD FMLA CKD-EPI: 111 ML/MIN/1.73 M 2
GLUCOSE SERPL-MCNC: 119 MG/DL (ref 65–99)
GLUCOSE SERPL-MCNC: 131 MG/DL (ref 65–99)
GLUCOSE SERPL-MCNC: 139 MG/DL (ref 65–99)
POTASSIUM SERPL-SCNC: 3.4 MMOL/L (ref 3.6–5.5)
POTASSIUM SERPL-SCNC: 3.7 MMOL/L (ref 3.6–5.5)
POTASSIUM SERPL-SCNC: 4.3 MMOL/L (ref 3.6–5.5)
PROCALCITONIN SERPL-MCNC: 0.05 NG/ML
SODIUM SERPL-SCNC: 131 MMOL/L (ref 135–145)
SODIUM SERPL-SCNC: 133 MMOL/L (ref 135–145)
SODIUM SERPL-SCNC: 134 MMOL/L (ref 135–145)

## 2022-03-15 PROCEDURE — A9270 NON-COVERED ITEM OR SERVICE: HCPCS | Performed by: INTERNAL MEDICINE

## 2022-03-15 PROCEDURE — 700102 HCHG RX REV CODE 250 W/ 637 OVERRIDE(OP): Performed by: STUDENT IN AN ORGANIZED HEALTH CARE EDUCATION/TRAINING PROGRAM

## 2022-03-15 PROCEDURE — 94669 MECHANICAL CHEST WALL OSCILL: CPT

## 2022-03-15 PROCEDURE — 770001 HCHG ROOM/CARE - MED/SURG/GYN PRIV*

## 2022-03-15 PROCEDURE — 700101 HCHG RX REV CODE 250: Performed by: INTERNAL MEDICINE

## 2022-03-15 PROCEDURE — 94640 AIRWAY INHALATION TREATMENT: CPT

## 2022-03-15 PROCEDURE — 80048 BASIC METABOLIC PNL TOTAL CA: CPT | Mod: 91

## 2022-03-15 PROCEDURE — 700102 HCHG RX REV CODE 250 W/ 637 OVERRIDE(OP): Performed by: INTERNAL MEDICINE

## 2022-03-15 PROCEDURE — 36415 COLL VENOUS BLD VENIPUNCTURE: CPT

## 2022-03-15 PROCEDURE — 99232 SBSQ HOSP IP/OBS MODERATE 35: CPT | Performed by: STUDENT IN AN ORGANIZED HEALTH CARE EDUCATION/TRAINING PROGRAM

## 2022-03-15 PROCEDURE — A9270 NON-COVERED ITEM OR SERVICE: HCPCS | Performed by: STUDENT IN AN ORGANIZED HEALTH CARE EDUCATION/TRAINING PROGRAM

## 2022-03-15 PROCEDURE — 700111 HCHG RX REV CODE 636 W/ 250 OVERRIDE (IP): Performed by: INTERNAL MEDICINE

## 2022-03-15 PROCEDURE — 84145 PROCALCITONIN (PCT): CPT

## 2022-03-15 RX ORDER — OMEPRAZOLE 20 MG/1
20 CAPSULE, DELAYED RELEASE ORAL DAILY
Status: DISCONTINUED | OUTPATIENT
Start: 2022-03-15 | End: 2022-03-17 | Stop reason: HOSPADM

## 2022-03-15 RX ORDER — PANTOPRAZOLE SODIUM 40 MG/1
40 TABLET, DELAYED RELEASE ORAL DAILY
Status: DISCONTINUED | OUTPATIENT
Start: 2022-03-15 | End: 2022-03-15

## 2022-03-15 RX ORDER — AMLODIPINE BESYLATE 5 MG/1
5 TABLET ORAL DAILY
Status: DISCONTINUED | OUTPATIENT
Start: 2022-03-15 | End: 2022-03-17 | Stop reason: HOSPADM

## 2022-03-15 RX ORDER — POTASSIUM CHLORIDE 20 MEQ/1
40 TABLET, EXTENDED RELEASE ORAL ONCE
Status: COMPLETED | OUTPATIENT
Start: 2022-03-15 | End: 2022-03-15

## 2022-03-15 RX ORDER — PREDNISONE 20 MG/1
40 TABLET ORAL DAILY
Status: DISCONTINUED | OUTPATIENT
Start: 2022-03-16 | End: 2022-03-17 | Stop reason: HOSPADM

## 2022-03-15 RX ADMIN — IPRATROPIUM BROMIDE AND ALBUTEROL SULFATE 3 ML: 2.5; .5 SOLUTION RESPIRATORY (INHALATION) at 20:17

## 2022-03-15 RX ADMIN — Medication: at 17:47

## 2022-03-15 RX ADMIN — POTASSIUM CHLORIDE 40 MEQ: 1500 TABLET, EXTENDED RELEASE ORAL at 08:20

## 2022-03-15 RX ADMIN — PREDNISONE 40 MG: 20 TABLET ORAL at 08:20

## 2022-03-15 RX ADMIN — IPRATROPIUM BROMIDE AND ALBUTEROL SULFATE 3 ML: 2.5; .5 SOLUTION RESPIRATORY (INHALATION) at 10:37

## 2022-03-15 RX ADMIN — SODIUM CHLORIDE 1 G: 1 TABLET ORAL at 16:19

## 2022-03-15 RX ADMIN — POLYETHYLENE GLYCOL 3350 1 PACKET: 17 POWDER, FOR SOLUTION ORAL at 11:15

## 2022-03-15 RX ADMIN — ENOXAPARIN SODIUM 40 MG: 40 INJECTION SUBCUTANEOUS at 17:45

## 2022-03-15 RX ADMIN — ENOXAPARIN SODIUM 40 MG: 40 INJECTION SUBCUTANEOUS at 04:20

## 2022-03-15 RX ADMIN — UMECLIDINIUM BROMIDE AND VILANTEROL TRIFENATATE 1 PUFF: 62.5; 25 POWDER RESPIRATORY (INHALATION) at 04:20

## 2022-03-15 RX ADMIN — OMEPRAZOLE 20 MG: 20 CAPSULE, DELAYED RELEASE ORAL at 16:19

## 2022-03-15 RX ADMIN — IPRATROPIUM BROMIDE AND ALBUTEROL SULFATE 3 ML: 2.5; .5 SOLUTION RESPIRATORY (INHALATION) at 16:11

## 2022-03-15 RX ADMIN — SODIUM CHLORIDE 1 G: 1 TABLET ORAL at 08:21

## 2022-03-15 RX ADMIN — OXYCODONE 5 MG: 5 TABLET ORAL at 20:34

## 2022-03-15 RX ADMIN — Medication 5 MG: at 20:32

## 2022-03-15 RX ADMIN — FLUTICASONE PROPIONATE 88 MCG: 44 AEROSOL, METERED RESPIRATORY (INHALATION) at 04:20

## 2022-03-15 RX ADMIN — Medication: at 04:20

## 2022-03-15 RX ADMIN — AMLODIPINE BESYLATE 5 MG: 5 TABLET ORAL at 16:19

## 2022-03-15 RX ADMIN — SODIUM CHLORIDE 1 G: 1 TABLET ORAL at 11:15

## 2022-03-15 RX ADMIN — SENNOSIDES AND DOCUSATE SODIUM 2 TABLET: 50; 8.6 TABLET ORAL at 17:45

## 2022-03-15 RX ADMIN — AZITHROMYCIN DIHYDRATE 500 MG: 250 TABLET, FILM COATED ORAL at 04:20

## 2022-03-15 RX ADMIN — SENNOSIDES AND DOCUSATE SODIUM 2 TABLET: 50; 8.6 TABLET ORAL at 04:21

## 2022-03-15 ASSESSMENT — ENCOUNTER SYMPTOMS
NERVOUS/ANXIOUS: 0
VOMITING: 0
WEAKNESS: 1
NAUSEA: 0
COUGH: 0
DEPRESSION: 0
FEVER: 0
ABDOMINAL PAIN: 0
SHORTNESS OF BREATH: 1
BLURRED VISION: 0
SORE THROAT: 0
DOUBLE VISION: 0
MYALGIAS: 0
WHEEZING: 1
DIZZINESS: 1
HEADACHES: 0

## 2022-03-15 ASSESSMENT — PAIN DESCRIPTION - PAIN TYPE: TYPE: ACUTE PAIN

## 2022-03-15 NOTE — DISCHARGE PLANNING
Agency/Facility Name: [facility]  Outcome: Left a voicemail, waiting for a call back.    Agency/Facility Name: [facility]  Outcome: Left a voicemail regarding a request to start process to obtain insurance auth. While waiting for a bed.    @[id number]  Agency/Facility Name: [facility]  Spoke To: Sravani  Outcome: Per Sravani, there are no beds available until Friday at the earliest. SNF will not begin process to obtain auth. Due to bed availability.     @[id number]  Sravani spoke with pt regarding placement at [facility] and will update DPA and LSW on status of auth.

## 2022-03-15 NOTE — FLOWSHEET NOTE
Respiratory Therapy Update    Interdisciplinary Plan of Care-Goals (Indications)  Bronchodilator Indications: History / Diagnosis (03/14/22 2227)       $ PEP/CPT Performed: PEP / Flutter (03/15/22 1040)     $ SVN Performed: Yes (03/15/22 1040)    Cough: Congested;Non Productive;Strong (03/15/22 1608)  Sputum Amount: Unable to Evaluate (03/15/22 1608)  Sputum Color: Unable to Evaluate (03/15/22 1608)  Sputum Consistency: Unable to Evaluate (03/15/22 1608)                  O2 (LPM): 3 (03/15/22 1608)       Breath Sounds  RUL Breath Sounds: Crackles;Expiratory Wheezes (03/15/22 1608)  RML Breath Sounds: Diminished;Crackles (03/15/22 1608)  RLL Breath Sounds: Diminished (03/15/22 1608)  QUE Breath Sounds: Crackles;Expiratory Wheezes (03/15/22 1608)  LLL Breath Sounds: Diminished (03/15/22 1608)      Events/Summary/Plan: SVN/Flutter done in fowlers and tolerated tx well. (03/15/22 1608)

## 2022-03-15 NOTE — CARE PLAN
The patient is Stable - Low risk of patient condition declining or worsening    Shift Goals  Clinical Goals: monitor lab values  Patient Goals: comfrot  Family Goals: na    Progress made toward(s) clinical / shift goals:  Yes    Problem: Knowledge Deficit - Standard  Goal: Patient and family/care givers will demonstrate understanding of plan of care, disease process/condition, diagnostic tests and medications  Outcome: Progressing     Problem: Self Care  Goal: Patient will have the ability to perform ADLs independently or with assistance (bathe, groom, dress, toilet and feed)  Outcome: Progressing     Problem: Optimal Care for Alcohol Withdrawal  Goal: Optimal Care for the alcohol withdrawal patient  Outcome: Progressing     Problem: Respiratory  Goal: Patient will achieve/maintain optimum respiratory ventilation and gas exchange  Outcome: Progressing     Problem: Pain - Standard  Goal: Alleviation of pain or a reduction in pain to the patient’s comfort goal  Outcome: Progressing     Problem: Fall Risk  Goal: Patient will remain free from falls  Outcome: Progressing       Patient is not progressing towards the following goals:

## 2022-03-15 NOTE — PROGRESS NOTES
Monitor Summary  Rhythm: SR/ST  Rate:   Ectopy: F PVC, R Coup, R Bigem, R Trigem  .12 / .06 / .33

## 2022-03-15 NOTE — DOCUMENTATION QUERY
"                                                                         [facility]                                                                       Query Response Note      PATIENT:               BRIDGETTE VARNER  ACCT #:                  [id number]  MRN:                     [id number]  :                      1950  ADMIT DATE:       3/6/2022 11:33 PM  DISCH DATE:        3/11/2022 10:02 AM  RESPONDING  PROVIDER #:        [id number]           QUERY TEXT:    Encephalopathy is documented in the Progress Notes on 3/7. Please specify type.    NOTE:  If an appropriate response is not listed below, please respond with a new note.    The patient's Clinical Indicators include:  \"Encephalopathy- with some agitation, trial to wean off Precedex\" is documented in the Progress Notes on 3/7. WBC: 11.7, Na: 117, Cl: 80, BUN/Cr: 4/0.47, Lactic Acid: 3.1, and RR: 42 on admission.    Treatments include: Unasyn, Azithromycin, Ativan, and Precedex.    Risk factors include: dx Acute Respiratory Failure w/Hypoxia 2/2 AECOPD, dx Hyponatremia, dx Acidosis, and hx EtOH Dependence.    Thank you,  Joshua Diaz RN, BSN  Clinical   Connect via [employer]  Options provided:   -- Metabolic Encephalopathy   -- Other type of encephalopathy, Please specify the type of encephalopathy present   -- Unable to determine      Query created by: Joshua Diaz on 3/9/2022 10:25 AM    RESPONSE TEXT:    Metabolic Encephalopathy          Electronically signed by:  SCOTT AU MD 3/15/2022 4:12 PM              "

## 2022-03-15 NOTE — PROGRESS NOTES
Patient ambulated to bathroom with assistance on 3L NC. Increased work of breathing noted, spo2 monitored and noted to be 50% with a poor waveform. Mild complaints of shortness of breath. Pulse ox exchanged while patient on edge of bed, increased o2 to face mask 10L. Improved spo2, and resolved shortness of breath. Educated patient on slow, intentional movements while continuing to breath during ambulation. Patient acknowledges understanding.

## 2022-03-15 NOTE — HOSPITAL COURSE
This is a 71-year-old male with past medical history of chronic obstructive pulmonary disease, hypertension and alcohol dependence who was recently hospitalized for COPD exacerbation and discharged home with home health he presented back to the hospital on 3/12/2022 with continued shortness of breath, weakness and ground-level fall.  On admission patient was found to have hyponatremia with a sodium of 125.  He was requiring 2 to 3 L of oxygen which was consistent with his baseline on his previous discharge.  Patient was started on fluid restriction and salt tabs for treatment of his hyponatremia as this was thought to be secondary to hypervolemia.  His sodium has since improved.  He was seen by physical and Occupational Therapy both of which recommended postacute therapy which has been ordered and pending.

## 2022-03-15 NOTE — PROGRESS NOTES
Hospital Medicine Daily Progress Note    Date of Service  3/15/2022    Chief Complaint  Juan Manuel Bass is a 71 y.o. male admitted 3/12/2022 with shortness of breath, ground-level fall due to weakness    Hospital Course  This is a 71-year-old male with past medical history of chronic obstructive pulmonary disease, hypertension and alcohol dependence who was recently hospitalized for COPD exacerbation and discharged home with home health he presented back to the hospital on 3/12/2022 with continued shortness of breath, weakness and ground-level fall.  On admission patient was found to have hyponatremia with a sodium of 125.  He was requiring 2 to 3 L of oxygen which was consistent with his baseline on his previous discharge.  Patient was started on fluid restriction and salt tabs for treatment of his hyponatremia as this was thought to be secondary to hypervolemia.  His sodium has since improved.  He was seen by physical and Occupational Therapy both of which recommended postacute therapy which has been ordered and pending.      Interval Problem Update  Patient seen and examined at bedside he is awake and alert sitting in bedside chair he has complaints of dizziness and feeling like he is going to pass out when he stands up.  He still has some shortness of breath that is worse with exertion but otherwise is breathing comfortably at rest.  Patient's vitals are stable he is afebrile he is currently saturating greater than 90% on 3 L of supplemental oxygen  -K low at 3.4, replaced  -Sodium is stable at 134  -Continue 5-day prednisone course for COPD exacerbation  -We will check orthostatics  -We will liberate fluid restriction given his improvement in sodium and overall dehydrated appearance    Patient is pending transfer to skilled nursing facility at this time he is medically clear    I have personally seen and examined the patient at bedside. I discussed the plan of care with patient, bedside RN, charge RN and case  manager.    Consultants/Specialty  pulmonary    Code Status  DNAR/DNI    Disposition  Patient is medically cleared for discharge.   Anticipate discharge to to skilled nursing facility.  I have placed the appropriate orders for post-discharge needs.    Review of Systems  Review of Systems   Constitutional: Positive for malaise/fatigue. Negative for fever.   HENT: Negative for congestion and sore throat.    Eyes: Negative for blurred vision and double vision.   Respiratory: Positive for shortness of breath and wheezing. Negative for cough.    Cardiovascular: Negative for chest pain.   Gastrointestinal: Negative for abdominal pain, nausea and vomiting.   Genitourinary: Negative for dysuria, frequency and urgency.   Musculoskeletal: Positive for joint pain. Negative for myalgias.   Neurological: Positive for dizziness and weakness. Negative for headaches.   Psychiatric/Behavioral: Negative for depression. The patient is not nervous/anxious.         Physical Exam  Temp:  [36.2 °C (97.1 °F)-36.9 °C (98.5 °F)] 36.3 °C (97.3 °F)  Pulse:  [62-92] 72  Resp:  [16-18] 18  BP: (133-154)/(73-86) 142/83  SpO2:  [89 %-97 %] 89 %    Physical Exam  Vitals and nursing note reviewed.   Constitutional:       Appearance: He is obese. He is ill-appearing. He is not toxic-appearing.   HENT:      Head: Normocephalic and atraumatic.      Mouth/Throat:      Mouth: Mucous membranes are dry.   Eyes:      Extraocular Movements: Extraocular movements intact.      Conjunctiva/sclera: Conjunctivae normal.   Cardiovascular:      Rate and Rhythm: Normal rate and regular rhythm.      Heart sounds: No murmur heard.  Pulmonary:      Effort: Pulmonary effort is normal. No respiratory distress.      Comments: Coarse breath sounds, diminished in bases  Abdominal:      General: Bowel sounds are normal. There is distension.      Palpations: Abdomen is soft.   Musculoskeletal:      Right lower leg: Edema present.      Left lower leg: Edema present.   Skin:      Findings: Bruising (Significant bruising to left shoulder and left flank) and erythema (Bilateral shins) present.   Neurological:      General: No focal deficit present.      Mental Status: He is alert and oriented to person, place, and time.   Psychiatric:         Mood and Affect: Mood normal.         Behavior: Behavior normal.         Thought Content: Thought content normal.         Fluids    Intake/Output Summary (Last 24 hours) at 3/15/2022 1253  Last data filed at 3/15/2022 1000  Gross per 24 hour   Intake --   Output 250 ml   Net -250 ml       Laboratory  Recent Labs     03/12/22 2129 03/14/22  0021   WBC 13.5* 8.4   RBC 5.32 4.69*   HEMOGLOBIN 16.6 14.5   HEMATOCRIT 49.2 43.6   MCV 92.5 93.0   MCH 31.2 30.9   MCHC 33.7 33.3*   RDW 45.4 46.4   PLATELETCT 237 220   MPV 9.4 9.6     Recent Labs     03/14/22  1801 03/15/22  0059 03/15/22  0637   SODIUM 131* 133* 134*   POTASSIUM 4.1 3.7 3.4*   CHLORIDE 90* 92* 93*   CO2 29 31 30   GLUCOSE 132* 131* 119*   BUN 15 16 14   CREATININE 0.54 0.49* 0.47*   CALCIUM 9.1 8.9 8.9     Recent Labs     03/12/22 2129   INR 1.05         Recent Labs     03/14/22  0021   TRIGLYCERIDE 52   HDL 54   LDL 60       Imaging  CT-HEAD W/O   Final Result      1.  No evidence of acute cerebral infarction, hemorrhage or mass lesion.   2.  Odontogenic disease.      CT-CHEST,ABDOMEN,PELVIS WITH   Final Result      1.  No evidence of thoracic, abdominal or pelvic organ injury.      2.  Bibasilar atelectasis with minimal right pleural effusion.      3.   Fatty liver.      4.  Possible gallstones.      5.  Diverticulosis.      DX-CHEST-PORTABLE (1 VIEW)   Final Result      Bibasilar atelectasis.           Assessment/Plan  * Hyponatremia- (present on admission)  Assessment & Plan  Hyponatremia with a sodium of 125 on admission, suspect this is partly due to his alcohol use   Has improved with fluid restriction and salt tabs  -Sodium is stable at 134  -We will liberate fluid restriction as he  does appears dehydrated and may be having symptoms of orthostatic hypotension  -Continue to monitor    Sepsis ([facility])  Assessment & Plan  Positive SIRS on admission without overt signs of infectious etiology, likely reactive to fall and shortness of breath  No antibiotics  SIRS has since resolved        Acute on chronic respiratory failure ([facility])  Assessment & Plan  Acute on chronic respiratory failure secondary to COPD, currently does not appear to be in acute exacerbation however will complete the 5-day prednisone course that was ordered  Pulmonary does suspect underlying sleep apnea, will need outpatient follow-up  duonebs  RT  Incentive spirometry  Ambulate with assistance    Alcoholism ([facility])- (present on admission)  Assessment & Plan  History of alcohol use drinks approximately 8 beers daily  -No signs of alcohol withdrawal    Essential hypertension- (present on admission)  Assessment & Plan  Blood pressure is stable continue  Resume home amlodipine  Antihypertensives prn    COPD exacerbation ([facility])- (present on admission)  Assessment & Plan  Recent admission for COPD exacerbation, this does appear to be improved  -On 2 to 3 L of supplemental oxygen  - prednisone 40 mg x5 days  - Duonebs and O2 therapy as needed  - Incentive spirometry  -Completed 3-day course of azithromycin       VTE prophylaxis: enoxaparin ppx    I have performed a physical exam and reviewed and updated ROS and Plan today (3/15/2022). In review of yesterday's note (3/14/2022), there are no changes except as documented above.

## 2022-03-15 NOTE — FLOWSHEET NOTE
Respiratory Therapy Update    Interdisciplinary Plan of Care-Goals (Indications)  Bronchodilator Indications: History / Diagnosis (03/13/22 0100)       $ PEP/CPT Performed: PEP / Flutter (03/14/22 1706)     $ SVN Performed: Yes (03/14/22 1706)    Cough: Congested;Non Productive;Strong (03/14/22 1706)  Sputum Amount: Unable to Evaluate (03/14/22 1706)  Sputum Color: Unable to Evaluate (03/14/22 1224)  Sputum Consistency: Unable to Evaluate (03/14/22 1224)                  O2 (LPM): 3 (03/14/22 1706)       Breath Sounds  RUL Breath Sounds: Crackles;Expiratory Wheezes (03/14/22 1706)  RML Breath Sounds: Crackles;Expiratory Wheezes (03/14/22 1706)  RLL Breath Sounds: Diminished;Fine Crackles (03/14/22 1706)  QUE Breath Sounds: Crackles;Expiratory Wheezes (03/14/22 1706)  LLL Breath Sounds: Diminished (03/14/22 1706)      Events/Summary/Plan: SVN with flutter done in fowlers. (03/14/22 1706)

## 2022-03-16 LAB
ANION GAP SERPL CALC-SCNC: 8 MMOL/L (ref 7–16)
BUN SERPL-MCNC: 13 MG/DL (ref 8–22)
CALCIUM SERPL-MCNC: 8.9 MG/DL (ref 8.5–10.5)
CHLORIDE SERPL-SCNC: 93 MMOL/L (ref 96–112)
CO2 SERPL-SCNC: 30 MMOL/L (ref 20–33)
CREAT SERPL-MCNC: 0.58 MG/DL (ref 0.5–1.4)
GFR SERPLBLD CREATININE-BSD FMLA CKD-EPI: 104 ML/MIN/1.73 M 2
GLUCOSE SERPL-MCNC: 109 MG/DL (ref 65–99)
POTASSIUM SERPL-SCNC: 4.1 MMOL/L (ref 3.6–5.5)
SODIUM SERPL-SCNC: 131 MMOL/L (ref 135–145)

## 2022-03-16 PROCEDURE — 80048 BASIC METABOLIC PNL TOTAL CA: CPT

## 2022-03-16 PROCEDURE — 700111 HCHG RX REV CODE 636 W/ 250 OVERRIDE (IP): Performed by: INTERNAL MEDICINE

## 2022-03-16 PROCEDURE — A9270 NON-COVERED ITEM OR SERVICE: HCPCS | Performed by: STUDENT IN AN ORGANIZED HEALTH CARE EDUCATION/TRAINING PROGRAM

## 2022-03-16 PROCEDURE — 700111 HCHG RX REV CODE 636 W/ 250 OVERRIDE (IP): Performed by: STUDENT IN AN ORGANIZED HEALTH CARE EDUCATION/TRAINING PROGRAM

## 2022-03-16 PROCEDURE — 700102 HCHG RX REV CODE 250 W/ 637 OVERRIDE(OP): Performed by: STUDENT IN AN ORGANIZED HEALTH CARE EDUCATION/TRAINING PROGRAM

## 2022-03-16 PROCEDURE — 94640 AIRWAY INHALATION TREATMENT: CPT

## 2022-03-16 PROCEDURE — 94669 MECHANICAL CHEST WALL OSCILL: CPT

## 2022-03-16 PROCEDURE — 36415 COLL VENOUS BLD VENIPUNCTURE: CPT

## 2022-03-16 PROCEDURE — 700101 HCHG RX REV CODE 250: Performed by: INTERNAL MEDICINE

## 2022-03-16 PROCEDURE — 99232 SBSQ HOSP IP/OBS MODERATE 35: CPT | Performed by: STUDENT IN AN ORGANIZED HEALTH CARE EDUCATION/TRAINING PROGRAM

## 2022-03-16 PROCEDURE — 770001 HCHG ROOM/CARE - MED/SURG/GYN PRIV*

## 2022-03-16 RX ADMIN — IPRATROPIUM BROMIDE AND ALBUTEROL SULFATE 3 ML: 2.5; .5 SOLUTION RESPIRATORY (INHALATION) at 10:25

## 2022-03-16 RX ADMIN — IPRATROPIUM BROMIDE AND ALBUTEROL SULFATE 3 ML: 2.5; .5 SOLUTION RESPIRATORY (INHALATION) at 06:36

## 2022-03-16 RX ADMIN — ENOXAPARIN SODIUM 40 MG: 40 INJECTION SUBCUTANEOUS at 16:54

## 2022-03-16 RX ADMIN — IPRATROPIUM BROMIDE AND ALBUTEROL SULFATE 3 ML: 2.5; .5 SOLUTION RESPIRATORY (INHALATION) at 13:35

## 2022-03-16 RX ADMIN — Medication 5 MG: at 20:44

## 2022-03-16 RX ADMIN — PREDNISONE 40 MG: 20 TABLET ORAL at 08:12

## 2022-03-16 RX ADMIN — AMLODIPINE BESYLATE 5 MG: 5 TABLET ORAL at 06:05

## 2022-03-16 RX ADMIN — ACETAMINOPHEN 650 MG: 325 TABLET, FILM COATED ORAL at 11:03

## 2022-03-16 RX ADMIN — Medication: at 17:06

## 2022-03-16 RX ADMIN — UMECLIDINIUM BROMIDE AND VILANTEROL TRIFENATATE 1 PUFF: 62.5; 25 POWDER RESPIRATORY (INHALATION) at 06:08

## 2022-03-16 RX ADMIN — SODIUM CHLORIDE 1 G: 1 TABLET ORAL at 11:03

## 2022-03-16 RX ADMIN — SENNOSIDES AND DOCUSATE SODIUM 2 TABLET: 50; 8.6 TABLET ORAL at 06:08

## 2022-03-16 RX ADMIN — ENOXAPARIN SODIUM 40 MG: 40 INJECTION SUBCUTANEOUS at 06:08

## 2022-03-16 RX ADMIN — FLUTICASONE PROPIONATE 88 MCG: 44 AEROSOL, METERED RESPIRATORY (INHALATION) at 06:09

## 2022-03-16 RX ADMIN — OMEPRAZOLE 20 MG: 20 CAPSULE, DELAYED RELEASE ORAL at 06:05

## 2022-03-16 RX ADMIN — SODIUM CHLORIDE 1 G: 1 TABLET ORAL at 08:12

## 2022-03-16 RX ADMIN — SENNOSIDES AND DOCUSATE SODIUM 2 TABLET: 50; 8.6 TABLET ORAL at 16:54

## 2022-03-16 RX ADMIN — OXYCODONE 5 MG: 5 TABLET ORAL at 20:44

## 2022-03-16 RX ADMIN — ACETAMINOPHEN 650 MG: 325 TABLET, FILM COATED ORAL at 17:06

## 2022-03-16 RX ADMIN — SODIUM CHLORIDE 1 G: 1 TABLET ORAL at 16:54

## 2022-03-16 RX ADMIN — Medication: at 06:08

## 2022-03-16 ASSESSMENT — ENCOUNTER SYMPTOMS
COUGH: 0
ABDOMINAL PAIN: 0
WEAKNESS: 1
HEADACHES: 0
MYALGIAS: 0
DOUBLE VISION: 0
NERVOUS/ANXIOUS: 0
NAUSEA: 0
FEVER: 0
SHORTNESS OF BREATH: 1
WHEEZING: 1
SORE THROAT: 0
BLURRED VISION: 0
DEPRESSION: 0
VOMITING: 0
DIZZINESS: 0

## 2022-03-16 ASSESSMENT — FIBROSIS 4 INDEX: FIB4 SCORE: 1.83

## 2022-03-16 ASSESSMENT — PAIN DESCRIPTION - PAIN TYPE: TYPE: ACUTE PAIN

## 2022-03-16 NOTE — PROGRESS NOTES
[facility] Medicine Daily Progress Note    Date of Service  3/16/2022    Chief Complaint  Juan Manuel Bass is a 71 y.o. male admitted 3/12/2022 with shortness of breath, ground-level fall due to weakness    Hospital Course  This is a 71-year-old male with past medical history of chronic obstructive pulmonary disease, hypertension and alcohol dependence who was recently hospitalized for COPD exacerbation and discharged home with home health he presented back to the hospital on 3/12/2022 with continued shortness of breath, weakness and ground-level fall.  On admission patient was found to have hyponatremia with a sodium of 125.  He was requiring 2 to 3 L of oxygen which was consistent with his baseline on his previous discharge.  Patient was started on fluid restriction and salt tabs for treatment of his hyponatremia as this was thought to be secondary to hypervolemia.  His sodium has since improved.  He was seen by physical and Occupational Therapy both of which recommended postacute therapy which has been ordered and pending.      Interval Problem Update  Patient seen and examined at bedside, awake and alert, eating breakfast, no complaints. incrased SOB with activity but this is improving, dizziness also improved   - orthostatic vitals normal   - O2 needs stable ~3L   -Sodium at 131  -Continue 5-day prednisone course for COPD exacerbation  -liberate fluid restriction to 2L, pt does feel dehydrated and Na is stabilized, 1L fluid restriction will be hard to maintain for pt at home as well.     Patient is pending transfer to skilled nursing facility at this time he is medically clear    I have personally seen and examined the patient at bedside. I discussed the plan of care with patient, bedside RN, charge RN and .    Consultants/Specialty  pulmonary    Code Status  DNAR/DNI    Disposition  Patient is medically cleared for discharge.   Anticipate discharge to to skilled nursing facility.  I have placed the  appropriate orders for post-discharge needs.    Review of Systems  Review of Systems   Constitutional: Positive for malaise/fatigue. Negative for fever.   HENT: Negative for congestion and sore throat.    Eyes: Negative for blurred vision and double vision.   Respiratory: Positive for shortness of breath and wheezing. Negative for cough.    Cardiovascular: Negative for chest pain.   Gastrointestinal: Negative for abdominal pain, nausea and vomiting.   Genitourinary: Negative for dysuria, frequency and urgency.   Musculoskeletal: Positive for joint pain. Negative for myalgias.   Neurological: Positive for weakness. Negative for dizziness and headaches.   Psychiatric/Behavioral: Negative for depression. The patient is not nervous/anxious.         Physical Exam  Temp:  [36.3 °C (97.3 °F)-36.7 °C (98 °F)] 36.6 °C (97.9 °F)  Pulse:  [66-93] 93  Resp:  [16-20] 18  BP: (136-143)/(73-86) 136/73  SpO2:  [89 %-95 %] 91 %    Physical Exam  Vitals and nursing note reviewed.   Constitutional:       Appearance: He is obese. He is ill-appearing. He is not toxic-appearing.   HENT:      Head: Normocephalic and atraumatic.      Mouth/Throat:      Mouth: Mucous membranes are dry.   Eyes:      Extraocular Movements: Extraocular movements intact.      Conjunctiva/sclera: Conjunctivae normal.   Cardiovascular:      Rate and Rhythm: Normal rate and regular rhythm.      Heart sounds: No murmur heard.  Pulmonary:      Effort: Pulmonary effort is normal. No respiratory distress.      Comments: Coarse breath sounds, diminished in bases  Abdominal:      General: Bowel sounds are normal. There is distension.      Palpations: Abdomen is soft.   Musculoskeletal:      Right lower leg: Edema present.      Left lower leg: Edema present.   Skin:     Findings: Bruising (Significant bruising to left shoulder and left flank) and erythema (Bilateral shins) present.   Neurological:      General: No focal deficit present.      Mental Status: He is alert  and oriented to person, place, and time.   Psychiatric:         Mood and Affect: Mood normal.         Behavior: Behavior normal.         Thought Content: Thought content normal.         Fluids    Intake/Output Summary (Last 24 hours) at 3/16/2022 1027  Last data filed at 3/16/2022 0100  Gross per 24 hour   Intake --   Output 550 ml   Net -550 ml       Laboratory  Recent Labs     03/14/22  0021   WBC 8.4   RBC 4.69*   HEMOGLOBIN 14.5   HEMATOCRIT 43.6   MCV 93.0   MCH 30.9   MCHC 33.3*   RDW 46.4   PLATELETCT 220   MPV 9.6     Recent Labs     03/15/22  0637 03/15/22  1156 03/16/22  0121   SODIUM 134* 131* 131*   POTASSIUM 3.4* 4.3 4.1   CHLORIDE 93* 91* 93*   CO2 30 32 30   GLUCOSE 119* 139* 109*   BUN 14 15 13   CREATININE 0.47* 0.55 0.58   CALCIUM 8.9 9.5 8.9             Recent Labs     03/14/22  0021   TRIGLYCERIDE 52   HDL 54   LDL 60       Imaging  CT-HEAD W/O   Final Result      1.  No evidence of acute cerebral infarction, hemorrhage or mass lesion.   2.  Odontogenic disease.      CT-CHEST,ABDOMEN,PELVIS WITH   Final Result      1.  No evidence of thoracic, abdominal or pelvic organ injury.      2.  Bibasilar atelectasis with minimal right pleural effusion.      3.   Fatty liver.      4.  Possible gallstones.      5.  Diverticulosis.      DX-CHEST-PORTABLE (1 VIEW)   Final Result      Bibasilar atelectasis.           Assessment/Plan  * Hyponatremia- (present on admission)  Assessment & Plan  Hyponatremia with a sodium of 125 on admission, suspect this is partly due to his alcohol use   Has improved with fluid restriction and salt tabs  -Sodium stable >130  -We will liberate fluid restriction as he does appears dehydrated and may be having symptoms of orthostatic hypotension, now improved  -Continue to monitor    Sepsis ([facility])  Assessment & Plan  Positive SIRS on admission without overt signs of infectious etiology, likely reactive to fall and shortness of breath  No antibiotics  SIRS has since resolved    Acute  on chronic respiratory failure ([facility])  Assessment & Plan  Acute on chronic respiratory failure secondary to COPD, this is improved, currently does not appear to be in acute exacerbation, complete the 5-day prednisone course that was ordered  Pulmonary does suspect underlying sleep apnea, will need outpatient follow-up  duonebs  RT  Incentive spirometry  Ambulate with assistance    Alcoholism ([facility])- (present on admission)  Assessment & Plan  History of alcohol use drinks approximately 8 beers daily  -No signs of alcohol withdrawal    Essential hypertension- (present on admission)  Assessment & Plan  Blood pressure is stable continue  Resume home amlodipine  Antihypertensives prn    COPD exacerbation ([facility])- (present on admission)  Assessment & Plan  Recent admission for COPD exacerbation, this does appear to be improved  -On 2 to 3 L of supplemental oxygen  - prednisone 40 mg x5 days  - Duonebs and O2 therapy as needed  - Incentive spirometry  -Completed 3-day course of azithromycin       VTE prophylaxis: enoxaparin ppx    I have performed a physical exam and reviewed and updated ROS and Plan today (3/16/2022). In review of yesterday's note (3/15/2022), there are no changes except as documented above.

## 2022-03-16 NOTE — CARE PLAN
The patient is Stable - Low risk of patient condition declining or worsening    Shift Goals  Clinical Goals: Get to chair every meal  Patient Goals: Be more comfortabl  Family Goals: na    Progress made toward(s) clinical / shift goals:  Patient got out of bed frequently after calling and sat to the chair.    Patient is not progressing towards the following goals:

## 2022-03-16 NOTE — DOCUMENTATION QUERY
[facility]                                                                       Query Response Note      PATIENT:               BRIDGETTE VARNER  ACCT #:                  [id number]  MRN:                     [id number]  :                      1950  ADMIT DATE:       3/12/2022 9:13 PM  DISCH DATE:          RESPONDING  PROVIDER #:        [id number]           QUERY TEXT:    Respiratory Failure is documented in the Medical Record. Please specify the type.  NOTE:  If an appropriate response is not listed below, please respond with a new note.    The patient's Clinical Indicators include:  3/12 ED- home O2 at 3L NC continuously. Pt currently at 4L NC satting at 96%.   - former smoker    3/12 H/P- 71 y.o. morbidly obese male with a past medical hx of oxygen dependent COPD  - COPD exacerbation, POA  - Bibasilar atelectasis with minimal right pleural effusion    3/13 PUL- Clinical MICHOACANO. With underlying emphysema 40% overlap with MICHOACANO     3/13 SpO2:  [91 %-97 %] 91 %   3/14 SpO2:  [88 %-95 %] 94 %       Thank you,  TGal Boss RN, BSN, CCDS  Connect via Voalte  Options provided:   -- Acute on chronic respiratory failure with hypoxia   -- Acute on chronic respiratory failure with hypercapnia   -- Other: please specify   -- Unable to determine      Query created by: Francoise Boss on 3/16/2022 8:16 AM    RESPONSE TEXT:    Acute on chronic respiratory failure with hypoxia          Electronically signed by:  HUBER BRADLEY MD 3/16/2022 9:29 AM

## 2022-03-16 NOTE — DISCHARGE PLANNING
Agency[facility]Facility Name: [facility]  Spoke To: Sravani  Outcome: Auth is still pending, Sravani to contact this DPA with updates regarding obtaining auth and bed availability.

## 2022-03-16 NOTE — PROGRESS NOTES
Assumed care at 1900, bedside report received from Oumou RUANO. Pt. Is not on the monitor. Initial assessment completed, orders reviewed, call light within reach, bed alarm is in use, and hourly rounding in place. POC addressed with patient, no additional questions at this time.

## 2022-03-16 NOTE — CARE PLAN
Problem: Knowledge Deficit - COPD  Goal: Patient/significant other demonstrates understanding of disease process, utilization of the Action Plan, medications and discharge instruction  Outcome: Progressing     Problem: Risk for Infection - COPD  Goal: Patient will remain free from signs and symptoms of infection  Outcome: Progressing     Problem: Skin Integrity  Goal: Skin integrity is maintained or improved  Outcome: Progressing     Problem: Pain - Standard  Goal: Alleviation of pain or a reduction in pain to the patient’s comfort goal  Outcome: Progressing     Problem: Fall Risk  Goal: Patient will remain free from falls  Outcome: Progressing   The patient is Stable - Low risk of patient condition declining or worsening    Shift Goals  Clinical Goals: Get to chair every meal  Patient Goals: Be more comfortabl  Family Goals: na    Progress made toward(s) clinical / shift goals:  Patient medicated per MAR for pain. Patient resting comfortably in bed.     Patient is not progressing towards the following goals:

## 2022-03-17 VITALS
SYSTOLIC BLOOD PRESSURE: 146 MMHG | BODY MASS INDEX: 40.15 KG/M2 | DIASTOLIC BLOOD PRESSURE: 95 MMHG | RESPIRATION RATE: 18 BRPM | WEIGHT: 280.43 LBS | HEIGHT: 70 IN | OXYGEN SATURATION: 92 % | TEMPERATURE: 97.7 F | HEART RATE: 77 BPM

## 2022-03-17 LAB
ANION GAP SERPL CALC-SCNC: 9 MMOL/L (ref 7–16)
BUN SERPL-MCNC: 11 MG/DL (ref 8–22)
CALCIUM SERPL-MCNC: 8.9 MG/DL (ref 8.5–10.5)
CHLORIDE SERPL-SCNC: 95 MMOL/L (ref 96–112)
CO2 SERPL-SCNC: 28 MMOL/L (ref 20–33)
CREAT SERPL-MCNC: 0.49 MG/DL (ref 0.5–1.4)
GFR SERPLBLD CREATININE-BSD FMLA CKD-EPI: 109 ML/MIN/1.73 M 2
GLUCOSE SERPL-MCNC: 112 MG/DL (ref 65–99)
POTASSIUM SERPL-SCNC: 3.7 MMOL/L (ref 3.6–5.5)
SODIUM SERPL-SCNC: 132 MMOL/L (ref 135–145)

## 2022-03-17 PROCEDURE — 700111 HCHG RX REV CODE 636 W/ 250 OVERRIDE (IP): Performed by: INTERNAL MEDICINE

## 2022-03-17 PROCEDURE — 700102 HCHG RX REV CODE 250 W/ 637 OVERRIDE(OP): Performed by: STUDENT IN AN ORGANIZED HEALTH CARE EDUCATION/TRAINING PROGRAM

## 2022-03-17 PROCEDURE — 700111 HCHG RX REV CODE 636 W/ 250 OVERRIDE (IP): Performed by: STUDENT IN AN ORGANIZED HEALTH CARE EDUCATION/TRAINING PROGRAM

## 2022-03-17 PROCEDURE — 80048 BASIC METABOLIC PNL TOTAL CA: CPT

## 2022-03-17 PROCEDURE — A9270 NON-COVERED ITEM OR SERVICE: HCPCS | Performed by: STUDENT IN AN ORGANIZED HEALTH CARE EDUCATION/TRAINING PROGRAM

## 2022-03-17 PROCEDURE — 36415 COLL VENOUS BLD VENIPUNCTURE: CPT

## 2022-03-17 PROCEDURE — 97116 GAIT TRAINING THERAPY: CPT

## 2022-03-17 PROCEDURE — 97530 THERAPEUTIC ACTIVITIES: CPT

## 2022-03-17 PROCEDURE — 94640 AIRWAY INHALATION TREATMENT: CPT

## 2022-03-17 PROCEDURE — 99239 HOSP IP/OBS DSCHRG MGMT >30: CPT | Performed by: STUDENT IN AN ORGANIZED HEALTH CARE EDUCATION/TRAINING PROGRAM

## 2022-03-17 PROCEDURE — 97535 SELF CARE MNGMENT TRAINING: CPT

## 2022-03-17 RX ORDER — PREDNISONE 20 MG/1
40 TABLET ORAL DAILY
Qty: 1 TABLET | Refills: 0 | Status: SHIPPED
Start: 2022-03-17 | End: 2022-03-18

## 2022-03-17 RX ORDER — CHOLECALCIFEROL (VITAMIN D3) 125 MCG
5 CAPSULE ORAL NIGHTLY PRN
Qty: 30 TABLET | Status: ON HOLD
Start: 2022-03-17 | End: 2022-03-24

## 2022-03-17 RX ORDER — OMEPRAZOLE 20 MG/1
20 CAPSULE, DELAYED RELEASE ORAL DAILY
Qty: 30 CAPSULE | Status: ON HOLD
Start: 2022-03-18 | End: 2022-03-24

## 2022-03-17 RX ADMIN — SENNOSIDES AND DOCUSATE SODIUM 2 TABLET: 50; 8.6 TABLET ORAL at 04:04

## 2022-03-17 RX ADMIN — ENOXAPARIN SODIUM 40 MG: 40 INJECTION SUBCUTANEOUS at 04:04

## 2022-03-17 RX ADMIN — PREDNISONE 40 MG: 20 TABLET ORAL at 09:00

## 2022-03-17 RX ADMIN — AMLODIPINE BESYLATE 5 MG: 5 TABLET ORAL at 04:04

## 2022-03-17 RX ADMIN — SODIUM CHLORIDE 1 G: 1 TABLET ORAL at 11:10

## 2022-03-17 RX ADMIN — SODIUM CHLORIDE 1 G: 1 TABLET ORAL at 07:42

## 2022-03-17 RX ADMIN — FLUTICASONE PROPIONATE 88 MCG: 44 AEROSOL, METERED RESPIRATORY (INHALATION) at 04:02

## 2022-03-17 RX ADMIN — Medication: at 04:04

## 2022-03-17 RX ADMIN — OMEPRAZOLE 20 MG: 20 CAPSULE, DELAYED RELEASE ORAL at 04:04

## 2022-03-17 RX ADMIN — UMECLIDINIUM BROMIDE AND VILANTEROL TRIFENATATE 1 PUFF: 62.5; 25 POWDER RESPIRATORY (INHALATION) at 04:02

## 2022-03-17 ASSESSMENT — GAIT ASSESSMENTS
ASSISTIVE DEVICE: FRONT WHEEL WALKER
GAIT LEVEL OF ASSIST: SUPERVISED
DISTANCE (FEET): 15
DEVIATION: BRADYKINETIC;INCREASED BASE OF SUPPORT

## 2022-03-17 ASSESSMENT — COGNITIVE AND FUNCTIONAL STATUS - GENERAL
SUGGESTED CMS G CODE MODIFIER DAILY ACTIVITY: CK
TURNING FROM BACK TO SIDE WHILE IN FLAT BAD: A LITTLE
DRESSING REGULAR LOWER BODY CLOTHING: A LOT
DRESSING REGULAR UPPER BODY CLOTHING: A LITTLE
TOILETING: A LITTLE
MOBILITY SCORE: 18
HELP NEEDED FOR BATHING: A LITTLE
WALKING IN HOSPITAL ROOM: A LITTLE
MOVING FROM LYING ON BACK TO SITTING ON SIDE OF FLAT BED: A LITTLE
PERSONAL GROOMING: A LITTLE
STANDING UP FROM CHAIR USING ARMS: A LITTLE
MOVING TO AND FROM BED TO CHAIR: A LITTLE
CLIMB 3 TO 5 STEPS WITH RAILING: A LITTLE
DAILY ACTIVITIY SCORE: 18
SUGGESTED CMS G CODE MODIFIER MOBILITY: CK

## 2022-03-17 NOTE — CARE PLAN
The patient is Stable - Low risk of patient condition declining or worsening    Shift Goals  Clinical Goals: promote sleep, manage pain  Patient Goals: pain control  Family Goals: na    Progress made toward(s) clinical / shift goals:    Problem: Knowledge Deficit - Standard  Goal: Patient and family/care givers will demonstrate understanding of plan of care, disease process/condition, diagnostic tests and medications  Outcome: Progressing     Problem: Skin Integrity  Goal: Skin integrity is maintained or improved  Outcome: Progressing     Problem: Pain - Standard  Goal: Alleviation of pain or a reduction in pain to the patient’s comfort goal  Outcome: Progressing       Patient is not progressing towards the following goals:

## 2022-03-17 NOTE — CARE PLAN
The patient is Stable - Low risk of patient condition declining or worsening    Shift Goals  Clinical Goals: Get to chair every meal  Patient Goals: Prepare to discharge  Family Goals: na    Progress made toward(s) clinical / shift goals:  Patient out of bed to chair frequently, at a minimum for every meal.    Patient is not progressing towards the following goals:      Problem: Impaired Gas Exchange  Goal: Patient will demonstrate improved ventilation and adequate oxygenation and participate in treatment regimen within the level of ability/situation.  Outcome: Not Progressing     Continued increased work of breathing and use of oxygen.

## 2022-03-17 NOTE — PROGRESS NOTES
Bedside report received from night shift RN. Assumed care at 0700. Pt is A&Ox4. Pt is in bed. Pt denies pain at this time. Pt was updated on plan of care. Pt has call light, personal belongings, and bedside table within reach. Bed is in the lowest position and bed alarm is on. Will continue to monitor

## 2022-03-17 NOTE — DISCHARGE PLANNING
Agency/Facility Name: [facility]  Spoke To: Lindsay  Outcome: Auth has been obtained and Pt will transfer to [facility] today pending transportation, Lindsay to contact this DPA with transport times for this afternoon.     RN CM notified     1035-  Agency/Facility Name: [facility]  Spoke To: Lindsay  Outcome: [facility] has created 1700 transport time via [employer]'. SSN is incorrect in [employer] web site however Lindsay will send correction form.      RN CM notified

## 2022-03-17 NOTE — THERAPY
"Physical Therapy   Daily Treatment     Patient Name: Juan Manuel Bass  Age:  71 y.o., Sex:  male  Medical Record #: [id number]  Today's Date: 3/17/2022     Precautions  Precautions: Fall Risk    Assessment    Pt progressing as expected. Pt required spv for bed mobility, STS, transfers and ambulating 15ft x3 with FWW. Pt required multiple standing rest breaks 2/2 SOB, however, self initiated as good activity pacing and awareness of decreased activity tolerance. Pt very motivated to participate in therapy, performed seated LAQ, marches, and ankle pumps once up in chair. Continue to recommend placement. Will continue to follow while in house    Plan    Continue current treatment plan.    DC Equipment Recommendations: Unable to determine at this time  Discharge Recommendations: Recommend post-acute placement for additional physical therapy services prior to discharge home      Subjective    \"That felt so good to get up\"      Objective     03/17/22 1458   Total Time Spent   Total Time Spent (Mins) 23   Charge Group   Charges  Yes   PT Gait Training 1  (10 min)   PT Therapeutic Activities 1  (13 min)   Precautions   Precautions Fall Risk   Vitals   O2 (LPM) 3   O2 Delivery Device Silicone Nasal Cannula   Vitals Comments Desat to 86% with ambulation, returned within 1 min standing rest break. Pt with good demo of activity pacing and pursed lip breathing   Pain 0 - 10 Group   Therapist Pain Assessment 0;Post Activity Pain Same as Prior to Activity;Nurse Notified   Cognition    Cognition / Consciousness WDL   Level of Consciousness Alert   Comments Pleasant and cooperative. Motivated to ambulate   Balance   Sitting Balance (Static) Fair   Sitting Balance (Dynamic) Fair   Standing Balance (Static) Fair   Standing Balance (Dynamic) Fair -   Weight Shift Sitting Fair   Weight Shift Standing Fair   Skilled Intervention Verbal Cuing   Comments w/ FWW   Gait Analysis   Gait Level Of Assist Supervised   Assistive Device Front Wheel Walker "   Distance (Feet) 15   # of Times Distance was Traveled 3   Deviation Bradykinetic;Increased Base Of Support   # of Stairs Climbed 0   Weight Bearing Status no restrictions   Skilled Intervention Verbal Cuing   Comments limited 2/2 fatigue. Required multiple standing rest breaks 2/2 c/o SOB   Bed Mobility    Supine to Sit Supervised   Sit to Supine   (NT, up in chair post)   Scooting Supervised   Rolling Supervised   Skilled Intervention Verbal Cuing   Comments HOB raised   Functional Mobility   Sit to Stand Supervised   Bed, Chair, Wheelchair Transfer Supervised   Transfer Method Stand Step   Mobility w/ FWW in room   Skilled Intervention Verbal Cuing   How much difficulty does the patient currently have...   Turning over in bed (including adjusting bedclothes, sheets and blankets)? 3   Sitting down on and standing up from a chair with arms (e.g., wheelchair, bedside commode, etc.) 3   Moving from lying on back to sitting on the side of the bed? 3   How much help from another person does the patient currently need...   Moving to and from a bed to a chair (including a wheelchair)? 3   Need to walk in a hospital room? 3   Climbing 3-5 steps with a railing? 3   6 clicks Mobility Score 18   Activity Tolerance   Sitting in Chair >10 min, up post   Sitting Edge of Bed 5 min total   Standing 10 min   Comments limited by SOB   Short Term Goals    Short Term Goal # 1 Patient will move supine<>sitting without bed features with supervision within 6tx in order to get in/out of bed   Goal Outcome # 1 goal not met   Short Term Goal # 2 Patient will ambulate 50ft with supervision within 6tx in order to access environment   Goal Outcome # 2 Goal not met   Short Term Goal # 3 Patient will ascend/descend 3 steps with supervision within 6tx in order to enter/exit home   Goal Outcome # 3 Goal not met   Education Group   Education Provided Role of Physical Therapist   Role of Physical Therapist Patient Response  Patient;Acceptance;Explanation;Verbal Demonstration   Anticipated Discharge Equipment and Recommendations   DC Equipment Recommendations Unable to determine at this time   Discharge Recommendations Recommend post-acute placement for additional physical therapy services prior to discharge home   Interdisciplinary Plan of Care Collaboration   IDT Collaboration with  Nursing   Patient Position at End of Therapy Seated;Chair Alarm On;Call Light within Reach;Tray Table within Reach;Phone within Reach   Collaboration Comments RN updated   Session Information   Date / Session Number  3/17 2 (2/2, 3/19)

## 2022-03-17 NOTE — CARE PLAN
Problem: Knowledge Deficit - Standard  Goal: Patient and family/care givers will demonstrate understanding of plan of care, disease process/condition, diagnostic tests and medications  Outcome: Progressing  Note: Pt's whiteboard is updated, pt has been updated on POC, all questions have been answered at this time       Problem: Fall Risk  Goal: Patient will remain free from falls  Outcome: Progressing  Note: Bed locked in lowest position. Bed alarm on. Treaded socks in use. Call light and belongings within reach. Pt educated to call for assistance. Pt verbalized understanding. Hourly rounding in place.    The patient is Stable - Low risk of patient condition declining or worsening    Shift Goals  Clinical Goals: promote sleep, manage pain  Patient Goals: pain control  Family Goals: na    Progress made toward(s) clinical / shift goals:  ambulation     Patient is not progressing towards the following goals:

## 2022-03-17 NOTE — THERAPY
"Occupational Therapy  Daily Treatment     Patient Name: Juan Manuel Bass  Age:  71 y.o., Sex:  male  Medical Record #: [id number]  Today's Date: 3/17/2022     Precautions  Precautions: (P) Fall Risk    Assessment    Pt seen for OT tx session. Pt making progress towards acute OT goals. Pt required max A for LB dressing, SBA for standing grooming, and min A for toileting. Pt continues to demo impaired balance, functional mobility, and activity tolerance impacting functional independence. Will continue to follow.     Plan    Continue current treatment plan.    DC Equipment Recommendations: (P) Unable to determine at this time  Discharge Recommendations: (P) Recommend post-acute placement for additional occupational therapy services prior to discharge home    Subjective    \"My oxygen is wound up tighter than a   clock!\"     Objective       03/17/22 1001   Total Time Spent   Total Time Spent (Mins) 24   Treatment Charges   Charges Yes   OT Self Care / ADL 2   Precautions   Precautions Fall Risk   Pain 0 - 10 Group   Therapist Pain Assessment Post Activity Pain Same as Prior to Activity;Nurse Notified  (no c/o pain during session)   Cognition    Cognition / Consciousness WDL   Level of Consciousness Alert   Safety Awareness Impaired;Impulsive   Comments pleasent, cooperative, motivated   Active ROM Upper Body   Active ROM Upper Body  WDL   Strength Upper Body   Upper Body Strength  WDL   Balance   Sitting Balance (Static) Fair   Sitting Balance (Dynamic) Fair   Standing Balance (Static) Fair -   Standing Balance (Dynamic) Fair -   Weight Shift Sitting Fair   Weight Shift Standing Fair   Skilled Intervention Verbal Cuing   Comments w/ FWW   Bed Mobility    Supine to Sit   (NT in chair pre/post)   Scooting Supervised   Skilled Intervention Tactile Cuing;Verbal Cuing;Facilitation   Activities of Daily Living   Grooming Standby Assist;Standing  (oral care and washed hands at sink)   Upper Body Dressing Supervision   Lower Body " Dressing Maximal Assist  (socks)   Toileting Minimal Assist  (standing voiding)   Skilled Intervention Tactile Cuing;Verbal Cuing;Facilitation   How much help from another person does the patient currently need...   Putting on and taking off regular lower body clothing? 2   Bathing (including washing, rinsing, and drying)? 3   Toileting, which includes using a toilet, bedpan, or urinal? 3   Putting on and taking off regular upper body clothing? 3   Taking care of personal grooming such as brushing teeth? 3   Eating meals? 4   6 Clicks Daily Activity Score 18   Functional Mobility   Sit to Stand Standby Assist   Bed, Chair, Wheelchair Transfer Contact Guard Assist   Transfer Method Stand Step   Mobility within room and bathroom w/ FWW   Skilled Intervention Verbal Cuing;Tactile Cuing   Activity Tolerance   Sitting in Chair 20+ min (up pre/post)   Sitting Edge of Bed NT   Standing 12 min total   Patient / Family Goals   Patient / Family Goal #1 To be independent again   Goal #1 Outcome Progressing as expected   Short Term Goals   Short Term Goal # 1 Pt will demo LB dressing w/ SPV   Goal Outcome # 1 Progressing as expected   Short Term Goal # 2 Pt will demo standing grooming w/ SPV   Goal Outcome # 2 Progressing as expected   Short Term Goal # 3 Pt will demo toilet txf w/ SPV   Goal Outcome # 3 Progressing as expected   Education Group   Role of Occupational Therapist Patient Response Patient;Acceptance;Demonstration;Explanation;Action Demonstration;Verbal Demonstration   Anticipated Discharge Equipment and Recommendations   DC Equipment Recommendations Unable to determine at this time   Discharge Recommendations Recommend post-acute placement for additional occupational therapy services prior to discharge home   Interdisciplinary Plan of Care Collaboration   IDT Collaboration with  Nursing   Patient Position at End of Therapy Seated;Chair Alarm On;Bed Alarm On;Call Light within Reach;Tray Table within Reach;Phone  within Reach   Collaboration Comments report given   Session Information   Date / Session Number  3/17, 2 (2/3, 3/20)   Priority 2

## 2022-03-17 NOTE — DISCHARGE INSTRUCTIONS
Discharge Instructions    Discharged to [place] by medical transportation with self. Discharged via ambulance, hospital escort: Yes.  Special equipment needed: Not Applicable    Be sure to schedule a follow-up appointment with your primary care doctor or any specialists as instructed.     Discharge Plan:   Diet Plan: Discussed  Activity Level: Discussed  Confirmed Follow up Appointment: Patient to Call and Schedule Appointment  Confirmed Symptoms Management: Discussed  Medication Reconciliation Updated: Yes  Influenza Vaccine Indication: Not indicated: Previously immunized this influenza season and > 8 years of age    I understand that a diet low in cholesterol, fat, and sodium is recommended for good health. Unless I have been given specific instructions below for another diet, I accept this instruction as my diet prescription.   Other diet: Cardiac    Special Instructions: None    · Is patient discharged on Warfarin / Coumadin?   No     Depression / Suicide Risk    As you are discharged from this [facility] facility, it is important to learn how to keep safe from harming yourself.    Recognize the warning signs:  · Abrupt changes in personality, positive or negative- including increase in energy   · Giving away possessions  · Change in eating patterns- significant weight changes-  positive or negative  · Change in sleeping patterns- unable to sleep or sleeping all the time   · Unwillingness or inability to communicate  · Depression  · Unusual sadness, discouragement and loneliness  · Talk of wanting to die  · Neglect of personal appearance   · Rebelliousness- reckless behavior  · Withdrawal from people/activities they love  · Confusion- inability to concentrate     If you or a loved one observes any of these behaviors or has concerns about self-harm, here's what you can do:  · Talk about it- your feelings and reasons for harming yourself  · Remove any means that you might use to hurt yourself (examples:  pills, rope, extension cords, firearm)  · Get professional help from the community (Mental Health, Substance Abuse, psychological counseling)  · Do not be alone:Call your Safe Contact- someone whom you trust who will be there for you.  · Call your local CRISIS HOTLINE 669-3315 or 986-985-5537  · Call your local Children's Mobile Crisis Response Team [place] (051) 085-8718 or www.[employer]Trib  · Call the toll free National Suicide Prevention Hotlines   · National Suicide Prevention Lifeline 924-224-UIDG (6104)  · National Hope Line Network 800-SUICIDE (008-8275)

## 2022-03-17 NOTE — DISCHARGE PLANNING
Anticipated Discharge Disposition: SNF    Action: Has a bed at [facility] with transport arranged at 1700 by [employer]. COBRA packet completed and signed.ALDEN Bowen notified. Left a voicemail for friend, Lloyd and spoke to other friend Nelia to let them know when and where patient is going.     Barriers to Discharge: none.     Plan: DC to SNF at 1700.

## 2022-03-17 NOTE — PROGRESS NOTES
Bedside report received from ALDEN Coello. Patient is alert and oriented x  4, 3 L O2 via NC, medical status. POC discussed. Fall precautions are in place. Call light is in reach.

## 2022-03-18 LAB
BACTERIA BLD CULT: NORMAL
SIGNIFICANT IND 70042: NORMAL
SITE SITE: NORMAL
SOURCE SOURCE: NORMAL

## 2022-03-23 ENCOUNTER — HOSPITAL ENCOUNTER (INPATIENT)
Facility: MEDICAL CENTER | Age: 72
LOS: 5 days | DRG: 641 | End: 2022-03-28
Attending: EMERGENCY MEDICINE | Admitting: STUDENT IN AN ORGANIZED HEALTH CARE EDUCATION/TRAINING PROGRAM
Payer: MEDICARE

## 2022-03-23 ENCOUNTER — APPOINTMENT (OUTPATIENT)
Dept: RADIOLOGY | Facility: MEDICAL CENTER | Age: 72
DRG: 641 | End: 2022-03-23
Attending: EMERGENCY MEDICINE
Payer: MEDICARE

## 2022-03-23 DIAGNOSIS — E87.1 HYPONATREMIA: ICD-10-CM

## 2022-03-23 DIAGNOSIS — R53.1 WEAKNESS: ICD-10-CM

## 2022-03-23 DIAGNOSIS — R53.81 DEBILITY: ICD-10-CM

## 2022-03-23 DIAGNOSIS — M25.561 ACUTE PAIN OF BOTH KNEES: ICD-10-CM

## 2022-03-23 DIAGNOSIS — K21.9 GASTROESOPHAGEAL REFLUX DISEASE, UNSPECIFIED WHETHER ESOPHAGITIS PRESENT: ICD-10-CM

## 2022-03-23 DIAGNOSIS — R09.02 HYPOXEMIA: ICD-10-CM

## 2022-03-23 DIAGNOSIS — M25.562 ACUTE PAIN OF BOTH KNEES: ICD-10-CM

## 2022-03-23 LAB
ALBUMIN SERPL BCP-MCNC: 3.5 G/DL (ref 3.2–4.9)
ALBUMIN/GLOB SERPL: 1.2 G/DL
ALP SERPL-CCNC: 71 U/L (ref 30–99)
ALT SERPL-CCNC: 55 U/L (ref 2–50)
ANION GAP SERPL CALC-SCNC: 15 MMOL/L (ref 7–16)
APPEARANCE UR: CLEAR
AST SERPL-CCNC: 34 U/L (ref 12–45)
BASOPHILS # BLD AUTO: 0.1 % (ref 0–1.8)
BASOPHILS # BLD: 0.01 K/UL (ref 0–0.12)
BILIRUB SERPL-MCNC: 0.6 MG/DL (ref 0.1–1.5)
BILIRUB UR QL STRIP.AUTO: NEGATIVE
BUN SERPL-MCNC: 5 MG/DL (ref 8–22)
CALCIUM SERPL-MCNC: 8.2 MG/DL (ref 8.5–10.5)
CHLORIDE SERPL-SCNC: 90 MMOL/L (ref 96–112)
CO2 SERPL-SCNC: 19 MMOL/L (ref 20–33)
COLOR UR: YELLOW
CREAT SERPL-MCNC: 0.59 MG/DL (ref 0.5–1.4)
EKG IMPRESSION: NORMAL
EOSINOPHIL # BLD AUTO: 0.05 K/UL (ref 0–0.51)
EOSINOPHIL NFR BLD: 0.6 % (ref 0–6.9)
ERYTHROCYTE [DISTWIDTH] IN BLOOD BY AUTOMATED COUNT: 47 FL (ref 35.9–50)
GFR SERPLBLD CREATININE-BSD FMLA CKD-EPI: 103 ML/MIN/1.73 M 2
GLOBULIN SER CALC-MCNC: 2.9 G/DL (ref 1.9–3.5)
GLUCOSE SERPL-MCNC: 99 MG/DL (ref 65–99)
GLUCOSE UR STRIP.AUTO-MCNC: NEGATIVE MG/DL
HCT VFR BLD AUTO: 39.8 % (ref 42–52)
HGB BLD-MCNC: 13.5 G/DL (ref 14–18)
IMM GRANULOCYTES # BLD AUTO: 0.07 K/UL (ref 0–0.11)
IMM GRANULOCYTES NFR BLD AUTO: 0.8 % (ref 0–0.9)
KETONES UR STRIP.AUTO-MCNC: NEGATIVE MG/DL
LEUKOCYTE ESTERASE UR QL STRIP.AUTO: NEGATIVE
LIPASE SERPL-CCNC: 23 U/L (ref 11–82)
LYMPHOCYTES # BLD AUTO: 0.79 K/UL (ref 1–4.8)
LYMPHOCYTES NFR BLD: 9 % (ref 22–41)
MAGNESIUM SERPL-MCNC: 1.8 MG/DL (ref 1.5–2.5)
MCH RBC QN AUTO: 31.5 PG (ref 27–33)
MCHC RBC AUTO-ENTMCNC: 33.9 G/DL (ref 33.7–35.3)
MCV RBC AUTO: 93 FL (ref 81.4–97.8)
MICRO URNS: NORMAL
MONOCYTES # BLD AUTO: 0.71 K/UL (ref 0–0.85)
MONOCYTES NFR BLD AUTO: 8.1 % (ref 0–13.4)
NEUTROPHILS # BLD AUTO: 7.14 K/UL (ref 1.82–7.42)
NEUTROPHILS NFR BLD: 81.4 % (ref 44–72)
NITRITE UR QL STRIP.AUTO: NEGATIVE
NRBC # BLD AUTO: 0 K/UL
NRBC BLD-RTO: 0 /100 WBC
OSMOLALITY SERPL: 281 MOSM/KG H2O (ref 278–298)
PH UR STRIP.AUTO: 5.5 [PH] (ref 5–8)
PHOSPHATE SERPL-MCNC: 2.3 MG/DL (ref 2.5–4.5)
PLATELET # BLD AUTO: 238 K/UL (ref 164–446)
PMV BLD AUTO: 9.5 FL (ref 9–12.9)
POTASSIUM SERPL-SCNC: 4 MMOL/L (ref 3.6–5.5)
PROT SERPL-MCNC: 6.4 G/DL (ref 6–8.2)
PROT UR QL STRIP: NEGATIVE MG/DL
RBC # BLD AUTO: 4.28 M/UL (ref 4.7–6.1)
RBC UR QL AUTO: NEGATIVE
SODIUM SERPL-SCNC: 124 MMOL/L (ref 135–145)
SP GR UR STRIP.AUTO: 1.01
UROBILINOGEN UR STRIP.AUTO-MCNC: 0.2 MG/DL
WBC # BLD AUTO: 8.8 K/UL (ref 4.8–10.8)

## 2022-03-23 PROCEDURE — 700102 HCHG RX REV CODE 250 W/ 637 OVERRIDE(OP): Performed by: STUDENT IN AN ORGANIZED HEALTH CARE EDUCATION/TRAINING PROGRAM

## 2022-03-23 PROCEDURE — 93005 ELECTROCARDIOGRAM TRACING: CPT | Performed by: EMERGENCY MEDICINE

## 2022-03-23 PROCEDURE — 36415 COLL VENOUS BLD VENIPUNCTURE: CPT

## 2022-03-23 PROCEDURE — 99223 1ST HOSP IP/OBS HIGH 75: CPT | Mod: AI | Performed by: STUDENT IN AN ORGANIZED HEALTH CARE EDUCATION/TRAINING PROGRAM

## 2022-03-23 PROCEDURE — 99285 EMERGENCY DEPT VISIT HI MDM: CPT

## 2022-03-23 PROCEDURE — 770004 HCHG ROOM/CARE - ONCOLOGY PRIVATE *

## 2022-03-23 PROCEDURE — 85025 COMPLETE CBC W/AUTO DIFF WBC: CPT

## 2022-03-23 PROCEDURE — 80053 COMPREHEN METABOLIC PANEL: CPT

## 2022-03-23 PROCEDURE — 83735 ASSAY OF MAGNESIUM: CPT

## 2022-03-23 PROCEDURE — 83690 ASSAY OF LIPASE: CPT

## 2022-03-23 PROCEDURE — A9270 NON-COVERED ITEM OR SERVICE: HCPCS | Performed by: STUDENT IN AN ORGANIZED HEALTH CARE EDUCATION/TRAINING PROGRAM

## 2022-03-23 PROCEDURE — 84100 ASSAY OF PHOSPHORUS: CPT

## 2022-03-23 PROCEDURE — 83930 ASSAY OF BLOOD OSMOLALITY: CPT

## 2022-03-23 PROCEDURE — 81003 URINALYSIS AUTO W/O SCOPE: CPT

## 2022-03-23 PROCEDURE — 71045 X-RAY EXAM CHEST 1 VIEW: CPT

## 2022-03-23 RX ORDER — POLYETHYLENE GLYCOL 3350 17 G/17G
1 POWDER, FOR SOLUTION ORAL
Status: DISCONTINUED | OUTPATIENT
Start: 2022-03-23 | End: 2022-03-28 | Stop reason: HOSPADM

## 2022-03-23 RX ORDER — FLUTICASONE PROPIONATE 44 UG/1
2 AEROSOL, METERED RESPIRATORY (INHALATION) DAILY
Status: DISCONTINUED | OUTPATIENT
Start: 2022-03-24 | End: 2022-03-28 | Stop reason: HOSPADM

## 2022-03-23 RX ORDER — IPRATROPIUM BROMIDE AND ALBUTEROL SULFATE 2.5; .5 MG/3ML; MG/3ML
3 SOLUTION RESPIRATORY (INHALATION)
Status: DISCONTINUED | OUTPATIENT
Start: 2022-03-23 | End: 2022-03-28 | Stop reason: HOSPADM

## 2022-03-23 RX ORDER — AMOXICILLIN 250 MG
2 CAPSULE ORAL 2 TIMES DAILY
Status: DISCONTINUED | OUTPATIENT
Start: 2022-03-23 | End: 2022-03-28 | Stop reason: HOSPADM

## 2022-03-23 RX ORDER — SODIUM CHLORIDE 1 G/1
1 TABLET ORAL
Status: DISCONTINUED | OUTPATIENT
Start: 2022-03-24 | End: 2022-03-25

## 2022-03-23 RX ORDER — BISACODYL 10 MG
10 SUPPOSITORY, RECTAL RECTAL
Status: DISCONTINUED | OUTPATIENT
Start: 2022-03-23 | End: 2022-03-28 | Stop reason: HOSPADM

## 2022-03-23 RX ORDER — ONDANSETRON 2 MG/ML
4 INJECTION INTRAMUSCULAR; INTRAVENOUS EVERY 4 HOURS PRN
Status: DISCONTINUED | OUTPATIENT
Start: 2022-03-23 | End: 2022-03-28 | Stop reason: HOSPADM

## 2022-03-23 RX ORDER — ONDANSETRON 4 MG/1
4 TABLET, ORALLY DISINTEGRATING ORAL EVERY 4 HOURS PRN
Status: DISCONTINUED | OUTPATIENT
Start: 2022-03-23 | End: 2022-03-28 | Stop reason: HOSPADM

## 2022-03-23 RX ORDER — CHOLECALCIFEROL (VITAMIN D3) 125 MCG
5 CAPSULE ORAL NIGHTLY PRN
Status: DISCONTINUED | OUTPATIENT
Start: 2022-03-23 | End: 2022-03-28 | Stop reason: HOSPADM

## 2022-03-23 RX ORDER — ACETAMINOPHEN 325 MG/1
650 TABLET ORAL EVERY 6 HOURS PRN
Status: DISCONTINUED | OUTPATIENT
Start: 2022-03-23 | End: 2022-03-28 | Stop reason: HOSPADM

## 2022-03-23 RX ORDER — AMLODIPINE BESYLATE 5 MG/1
5 TABLET ORAL DAILY
Status: DISCONTINUED | OUTPATIENT
Start: 2022-03-24 | End: 2022-03-28 | Stop reason: HOSPADM

## 2022-03-23 RX ORDER — OMEPRAZOLE 20 MG/1
20 CAPSULE, DELAYED RELEASE ORAL DAILY
Status: DISCONTINUED | OUTPATIENT
Start: 2022-03-24 | End: 2022-03-28 | Stop reason: HOSPADM

## 2022-03-23 RX ORDER — LABETALOL HYDROCHLORIDE 5 MG/ML
10 INJECTION, SOLUTION INTRAVENOUS EVERY 4 HOURS PRN
Status: DISCONTINUED | OUTPATIENT
Start: 2022-03-23 | End: 2022-03-28 | Stop reason: HOSPADM

## 2022-03-23 RX ADMIN — SENNOSIDES AND DOCUSATE SODIUM 2 TABLET: 50; 8.6 TABLET ORAL at 22:45

## 2022-03-23 ASSESSMENT — FIBROSIS 4 INDEX
FIB4 SCORE: 1.83
FIB4 SCORE: 1.37

## 2022-03-24 ENCOUNTER — APPOINTMENT (OUTPATIENT)
Dept: RADIOLOGY | Facility: MEDICAL CENTER | Age: 72
DRG: 641 | End: 2022-03-24
Attending: STUDENT IN AN ORGANIZED HEALTH CARE EDUCATION/TRAINING PROGRAM
Payer: MEDICARE

## 2022-03-24 LAB
AMPHET UR QL SCN: NEGATIVE
ANION GAP SERPL CALC-SCNC: 11 MMOL/L (ref 7–16)
ANION GAP SERPL CALC-SCNC: 13 MMOL/L (ref 7–16)
BARBITURATES UR QL SCN: NEGATIVE
BASOPHILS # BLD AUTO: 0.1 % (ref 0–1.8)
BASOPHILS # BLD: 0.01 K/UL (ref 0–0.12)
BENZODIAZ UR QL SCN: NEGATIVE
BUN SERPL-MCNC: 5 MG/DL (ref 8–22)
BUN SERPL-MCNC: 5 MG/DL (ref 8–22)
BZE UR QL SCN: NEGATIVE
CALCIUM SERPL-MCNC: 8.2 MG/DL (ref 8.5–10.5)
CALCIUM SERPL-MCNC: 8.5 MG/DL (ref 8.5–10.5)
CANNABINOIDS UR QL SCN: NEGATIVE
CHLORIDE SERPL-SCNC: 91 MMOL/L (ref 96–112)
CHLORIDE SERPL-SCNC: 93 MMOL/L (ref 96–112)
CO2 SERPL-SCNC: 23 MMOL/L (ref 20–33)
CO2 SERPL-SCNC: 25 MMOL/L (ref 20–33)
CREAT SERPL-MCNC: 0.5 MG/DL (ref 0.5–1.4)
CREAT SERPL-MCNC: 0.57 MG/DL (ref 0.5–1.4)
EOSINOPHIL # BLD AUTO: 0.11 K/UL (ref 0–0.51)
EOSINOPHIL NFR BLD: 1.2 % (ref 0–6.9)
ERYTHROCYTE [DISTWIDTH] IN BLOOD BY AUTOMATED COUNT: 46.5 FL (ref 35.9–50)
GFR SERPLBLD CREATININE-BSD FMLA CKD-EPI: 104 ML/MIN/1.73 M 2
GFR SERPLBLD CREATININE-BSD FMLA CKD-EPI: 109 ML/MIN/1.73 M 2
GLUCOSE BLD STRIP.AUTO-MCNC: 115 MG/DL (ref 65–99)
GLUCOSE SERPL-MCNC: 106 MG/DL (ref 65–99)
GLUCOSE SERPL-MCNC: 95 MG/DL (ref 65–99)
HCT VFR BLD AUTO: 38.3 % (ref 42–52)
HGB BLD-MCNC: 13.3 G/DL (ref 14–18)
IMM GRANULOCYTES # BLD AUTO: 0.06 K/UL (ref 0–0.11)
IMM GRANULOCYTES NFR BLD AUTO: 0.6 % (ref 0–0.9)
LYMPHOCYTES # BLD AUTO: 1.05 K/UL (ref 1–4.8)
LYMPHOCYTES NFR BLD: 11 % (ref 22–41)
MCH RBC QN AUTO: 31.4 PG (ref 27–33)
MCHC RBC AUTO-ENTMCNC: 34.7 G/DL (ref 33.7–35.3)
MCV RBC AUTO: 90.5 FL (ref 81.4–97.8)
METHADONE UR QL SCN: NEGATIVE
MONOCYTES # BLD AUTO: 0.86 K/UL (ref 0–0.85)
MONOCYTES NFR BLD AUTO: 9 % (ref 0–13.4)
NEUTROPHILS # BLD AUTO: 7.47 K/UL (ref 1.82–7.42)
NEUTROPHILS NFR BLD: 78.1 % (ref 44–72)
NRBC # BLD AUTO: 0 K/UL
NRBC BLD-RTO: 0 /100 WBC
OPIATES UR QL SCN: NEGATIVE
OSMOLALITY UR: 101 MOSM/KG H2O (ref 300–900)
OXYCODONE UR QL SCN: NEGATIVE
PCP UR QL SCN: NEGATIVE
PLATELET # BLD AUTO: 246 K/UL (ref 164–446)
PMV BLD AUTO: 9.7 FL (ref 9–12.9)
POTASSIUM SERPL-SCNC: 3.7 MMOL/L (ref 3.6–5.5)
POTASSIUM SERPL-SCNC: 3.8 MMOL/L (ref 3.6–5.5)
PROPOXYPH UR QL SCN: NEGATIVE
RBC # BLD AUTO: 4.23 M/UL (ref 4.7–6.1)
SODIUM SERPL-SCNC: 127 MMOL/L (ref 135–145)
SODIUM SERPL-SCNC: 129 MMOL/L (ref 135–145)
SODIUM UR-SCNC: <20 MMOL/L
WBC # BLD AUTO: 9.6 K/UL (ref 4.8–10.8)

## 2022-03-24 PROCEDURE — 700111 HCHG RX REV CODE 636 W/ 250 OVERRIDE (IP): Performed by: STUDENT IN AN ORGANIZED HEALTH CARE EDUCATION/TRAINING PROGRAM

## 2022-03-24 PROCEDURE — 82962 GLUCOSE BLOOD TEST: CPT

## 2022-03-24 PROCEDURE — 84300 ASSAY OF URINE SODIUM: CPT

## 2022-03-24 PROCEDURE — 700102 HCHG RX REV CODE 250 W/ 637 OVERRIDE(OP): Performed by: INTERNAL MEDICINE

## 2022-03-24 PROCEDURE — 80048 BASIC METABOLIC PNL TOTAL CA: CPT

## 2022-03-24 PROCEDURE — 85025 COMPLETE CBC W/AUTO DIFF WBC: CPT

## 2022-03-24 PROCEDURE — 99232 SBSQ HOSP IP/OBS MODERATE 35: CPT | Performed by: INTERNAL MEDICINE

## 2022-03-24 PROCEDURE — 700102 HCHG RX REV CODE 250 W/ 637 OVERRIDE(OP): Performed by: STUDENT IN AN ORGANIZED HEALTH CARE EDUCATION/TRAINING PROGRAM

## 2022-03-24 PROCEDURE — 83935 ASSAY OF URINE OSMOLALITY: CPT

## 2022-03-24 PROCEDURE — 73560 X-RAY EXAM OF KNEE 1 OR 2: CPT | Mod: RT

## 2022-03-24 PROCEDURE — 36415 COLL VENOUS BLD VENIPUNCTURE: CPT

## 2022-03-24 PROCEDURE — A9270 NON-COVERED ITEM OR SERVICE: HCPCS | Performed by: STUDENT IN AN ORGANIZED HEALTH CARE EDUCATION/TRAINING PROGRAM

## 2022-03-24 PROCEDURE — 80307 DRUG TEST PRSMV CHEM ANLYZR: CPT

## 2022-03-24 PROCEDURE — A9270 NON-COVERED ITEM OR SERVICE: HCPCS | Performed by: INTERNAL MEDICINE

## 2022-03-24 PROCEDURE — 770004 HCHG ROOM/CARE - ONCOLOGY PRIVATE *

## 2022-03-24 RX ORDER — MORPHINE SULFATE 4 MG/ML
4 INJECTION INTRAVENOUS EVERY 4 HOURS PRN
Status: DISCONTINUED | OUTPATIENT
Start: 2022-03-24 | End: 2022-03-28 | Stop reason: HOSPADM

## 2022-03-24 RX ORDER — KETOROLAC TROMETHAMINE 30 MG/ML
15 INJECTION, SOLUTION INTRAMUSCULAR; INTRAVENOUS ONCE
Status: DISCONTINUED | OUTPATIENT
Start: 2022-03-24 | End: 2022-03-24

## 2022-03-24 RX ORDER — HYDROCHLOROTHIAZIDE 25 MG/1
25 TABLET ORAL DAILY
Status: ON HOLD | COMMUNITY
End: 2022-03-24

## 2022-03-24 RX ORDER — POTASSIUM CHLORIDE 20 MEQ/1
20 TABLET, EXTENDED RELEASE ORAL ONCE
Status: COMPLETED | OUTPATIENT
Start: 2022-03-24 | End: 2022-03-24

## 2022-03-24 RX ORDER — HYDROCODONE BITARTRATE AND ACETAMINOPHEN 5; 325 MG/1; MG/1
1-2 TABLET ORAL EVERY 6 HOURS PRN
Status: DISCONTINUED | OUTPATIENT
Start: 2022-03-24 | End: 2022-03-28 | Stop reason: HOSPADM

## 2022-03-24 RX ADMIN — SODIUM CHLORIDE 1 G: 1 TABLET ORAL at 17:40

## 2022-03-24 RX ADMIN — ENOXAPARIN SODIUM 40 MG: 40 INJECTION SUBCUTANEOUS at 05:17

## 2022-03-24 RX ADMIN — POTASSIUM CHLORIDE 20 MEQ: 1500 TABLET, EXTENDED RELEASE ORAL at 09:49

## 2022-03-24 RX ADMIN — AMLODIPINE BESYLATE 5 MG: 5 TABLET ORAL at 05:16

## 2022-03-24 RX ADMIN — HYDROCODONE BITARTRATE AND ACETAMINOPHEN 2 TABLET: 5; 325 TABLET ORAL at 13:34

## 2022-03-24 RX ADMIN — MORPHINE SULFATE 4 MG: 4 INJECTION INTRAVENOUS at 02:30

## 2022-03-24 RX ADMIN — FLUTICASONE PROPIONATE 88 MCG: 44 AEROSOL, METERED RESPIRATORY (INHALATION) at 05:17

## 2022-03-24 RX ADMIN — SENNOSIDES AND DOCUSATE SODIUM 2 TABLET: 50; 8.6 TABLET ORAL at 17:40

## 2022-03-24 RX ADMIN — OMEPRAZOLE 20 MG: 20 CAPSULE, DELAYED RELEASE ORAL at 05:16

## 2022-03-24 RX ADMIN — HYDROCODONE BITARTRATE AND ACETAMINOPHEN 2 TABLET: 5; 325 TABLET ORAL at 05:16

## 2022-03-24 RX ADMIN — SODIUM CHLORIDE 1 G: 1 TABLET ORAL at 09:49

## 2022-03-24 RX ADMIN — SODIUM CHLORIDE 1 G: 1 TABLET ORAL at 13:26

## 2022-03-24 RX ADMIN — HYDROCODONE BITARTRATE AND ACETAMINOPHEN 2 TABLET: 5; 325 TABLET ORAL at 19:48

## 2022-03-24 RX ADMIN — Medication 5 MG: at 00:06

## 2022-03-24 RX ADMIN — UMECLIDINIUM BROMIDE AND VILANTEROL TRIFENATATE 1 PUFF: 62.5; 25 POWDER RESPIRATORY (INHALATION) at 05:17

## 2022-03-24 ASSESSMENT — ENCOUNTER SYMPTOMS
COUGH: 0
DIZZINESS: 0
GASTROINTESTINAL NEGATIVE: 1
DIARRHEA: 0
PSYCHIATRIC NEGATIVE: 1
NERVOUS/ANXIOUS: 0
FEVER: 0
SENSORY CHANGE: 0
HEARTBURN: 0
NAUSEA: 0
SHORTNESS OF BREATH: 0
COUGH: 1
WEAKNESS: 1
ORTHOPNEA: 0
HALLUCINATIONS: 0
BRUISES/BLEEDS EASILY: 1
EYES NEGATIVE: 1
CONSTIPATION: 0
SPEECH CHANGE: 0
CHILLS: 0
SPUTUM PRODUCTION: 0
LOSS OF CONSCIOUSNESS: 0
CARDIOVASCULAR NEGATIVE: 1
BLURRED VISION: 0
VOMITING: 0
NECK PAIN: 0
EYE PAIN: 0
SORE THROAT: 0
MYALGIAS: 1
PALPITATIONS: 0
WEAKNESS: 0
SEIZURES: 0
ABDOMINAL PAIN: 0
SHORTNESS OF BREATH: 1
EYE DISCHARGE: 0
SINUS PAIN: 0
HEADACHES: 0
FALLS: 1
FOCAL WEAKNESS: 0
DEPRESSION: 0

## 2022-03-24 ASSESSMENT — LIFESTYLE VARIABLES
DOES PATIENT WANT TO STOP DRINKING: NO
EVER_SMOKED: YES
TOTAL SCORE: 2
HAVE YOU EVER FELT YOU SHOULD CUT DOWN ON YOUR DRINKING: YES
ON A TYPICAL DAY WHEN YOU DRINK ALCOHOL HOW MANY DRINKS DO YOU HAVE: 5
EVER HAD A DRINK FIRST THING IN THE MORNING TO STEADY YOUR NERVES TO GET RID OF A HANGOVER: YES
HAVE PEOPLE ANNOYED YOU BY CRITICIZING YOUR DRINKING: NO
CONSUMPTION TOTAL: POSITIVE
ALCOHOL_USE: YES
HOW MANY TIMES IN THE PAST YEAR HAVE YOU HAD 5 OR MORE DRINKS IN A DAY: 365
TOTAL SCORE: 2
AVERAGE NUMBER OF DAYS PER WEEK YOU HAVE A DRINK CONTAINING ALCOHOL: 7
TOTAL SCORE: 2
EVER FELT BAD OR GUILTY ABOUT YOUR DRINKING: NO

## 2022-03-24 ASSESSMENT — COPD QUESTIONNAIRES
DURING THE PAST 4 WEEKS HOW MUCH DID YOU FEEL SHORT OF BREATH: SOME OF THE TIME
COPD SCREENING SCORE: 9
HAVE YOU SMOKED AT LEAST 100 CIGARETTES IN YOUR ENTIRE LIFE: YES
DO YOU EVER COUGH UP ANY MUCUS OR PHLEGM?: YES, EVERY DAY

## 2022-03-24 ASSESSMENT — PATIENT HEALTH QUESTIONNAIRE - PHQ9
2. FEELING DOWN, DEPRESSED, IRRITABLE, OR HOPELESS: NOT AT ALL
1. LITTLE INTEREST OR PLEASURE IN DOING THINGS: NOT AT ALL
SUM OF ALL RESPONSES TO PHQ9 QUESTIONS 1 AND 2: 0

## 2022-03-24 ASSESSMENT — COGNITIVE AND FUNCTIONAL STATUS - GENERAL
SUGGESTED CMS G CODE MODIFIER DAILY ACTIVITY: CK
DRESSING REGULAR LOWER BODY CLOTHING: A LOT
MOVING TO AND FROM BED TO CHAIR: A LOT
STANDING UP FROM CHAIR USING ARMS: A LITTLE
WALKING IN HOSPITAL ROOM: A LITTLE
DAILY ACTIVITIY SCORE: 16
MOVING FROM LYING ON BACK TO SITTING ON SIDE OF FLAT BED: A LOT
MOBILITY SCORE: 15
SUGGESTED CMS G CODE MODIFIER MOBILITY: CK
HELP NEEDED FOR BATHING: A LOT
TURNING FROM BACK TO SIDE WHILE IN FLAT BAD: A LITTLE
DRESSING REGULAR UPPER BODY CLOTHING: A LOT
CLIMB 3 TO 5 STEPS WITH RAILING: A LOT
TOILETING: A LOT

## 2022-03-24 ASSESSMENT — PAIN DESCRIPTION - PAIN TYPE
TYPE: ACUTE PAIN

## 2022-03-24 NOTE — ASSESSMENT & PLAN NOTE
Secondary to age and medical related, recently discharged with recommendation to go to SNF and patient stayed only 1 or 2 days prior to leaving.  PT OT consult, recommended home with home health.   consult discussed discharge options with patient, awaiting home health acceptance.

## 2022-03-24 NOTE — PROGRESS NOTES
"[facility] Medicine Daily Progress Note    Date of Service  3/24/2022    Chief Complaint  Juan Manuel Bass is a 71 y.o. male admitted 3/23/2022 with Shortness of breath, weakness    Hospital Course  No notes on file    Mr. Juan Manuel Bass is a 71 y.o. male with a history of COPD on 3L, hypertension, alcohol abuse who presented on 3/23/2022 with shortness of breath, lower extremity swelling, failure to thrive, ground-level fall.  The patient was recently admitted to [facility] from 3/12-17/2022 for a COPD exacerbation and hyponatremia.  He was discharged to [facility] only spent 2 days there because \"they did not do anything\" in terms of rehab.  The patient left the SNF against medical advice to live in his RV by himself.  He has had frequent falls and is unable to care for himself.  The patient fell getting up from his chair prior to admission.  He denies loss of consciousness or head trauma.  He was on the ground for about an hour until his neighbor found him and called EMS.  EMS felt that his living situation was unacceptable as the floor is about to cave in.  On presentation he was found to have hyponatremia and started on fluid restriction and salt tabs.  CXR was concerning for atelectasis.    Interval Problem Update  Patient was seen and examined at bedside.  I have personally reviewed and interpreted vitals, labs, and imaging.    3/24.  Afebrile.  Has been tachypneic but this is improved.  On 4 L nasal cannula.  Denies fevers, chills, chest pains.  States he is short of breath when he moves.  Complains of knee pain and back pain from his fall.  PT/OT eval pending    I have personally seen and examined the patient at bedside. I discussed the plan of care with patient, bedside RN and .    Consultants/Specialty  None    Code Status  DNAR/DNI    Disposition  Patient is not medically cleared for discharge.   Anticipate discharge to to skilled nursing facility, but just left AMA from SNF  I have placed the " appropriate orders for post-discharge needs.    Review of Systems  Review of Systems   Constitutional: Negative for chills and fever.   HENT: Negative for congestion and sore throat.    Eyes: Negative for blurred vision.   Respiratory: Positive for shortness of breath. Negative for cough.    Cardiovascular: Negative for chest pain, palpitations and leg swelling.   Gastrointestinal: Negative for abdominal pain, constipation, diarrhea, nausea and vomiting.   Genitourinary: Negative for dysuria, frequency and urgency.   Musculoskeletal: Positive for falls, joint pain and myalgias.   Skin: Negative for rash.   Neurological: Negative for dizziness, weakness and headaches.   Psychiatric/Behavioral: Negative for depression. The patient is not nervous/anxious.    All other systems reviewed and are negative.       Physical Exam  Temp:  [36.3 °C (97.4 °F)-37 °C (98.6 °F)] 36.3 °C (97.4 °F)  Pulse:  [83-95] 89  Resp:  [22-33] 22  BP: (124-152)/(73-92) 124/77  SpO2:  [95 %-97 %] 95 %    Physical Exam  Vitals and nursing note reviewed.   Constitutional:       Appearance: Normal appearance. He is obese. He is ill-appearing.   HENT:      Head: Normocephalic.      Nose: Nose normal.      Mouth/Throat:      Mouth: Mucous membranes are moist.      Pharynx: Oropharynx is clear.   Eyes:      Extraocular Movements: Extraocular movements intact.      Conjunctiva/sclera: Conjunctivae normal.   Cardiovascular:      Rate and Rhythm: Normal rate and regular rhythm.      Pulses: Normal pulses.      Heart sounds: Normal heart sounds. No murmur heard.    No friction rub. No gallop.   Pulmonary:      Effort: Pulmonary effort is normal. Tachypnea present. No respiratory distress.      Breath sounds: Wheezing present. No rales.   Chest:      Chest wall: No tenderness.   Abdominal:      General: Abdomen is flat. Bowel sounds are normal. There is no distension.      Palpations: Abdomen is soft. There is no mass.      Tenderness: There is no  abdominal tenderness. There is no guarding.   Musculoskeletal:         General: Normal range of motion.      Cervical back: Normal range of motion and neck supple.      Right lower leg: Edema present.      Left lower leg: Edema present.   Skin:     General: Skin is warm.      Capillary Refill: Capillary refill takes less than 2 seconds.      Findings: Bruising present.      Comments: Chronic venous stasis   Neurological:      General: No focal deficit present.      Mental Status: He is alert and oriented to person, place, and time. Mental status is at baseline.      Cranial Nerves: No cranial nerve deficit.      Motor: No weakness.   Psychiatric:         Mood and Affect: Mood normal.         Behavior: Behavior normal.         Fluids    Intake/Output Summary (Last 24 hours) at 3/24/2022 0703  Last data filed at 3/24/2022 0550  Gross per 24 hour   Intake 296 ml   Output --   Net 296 ml       Laboratory  Recent Labs     03/23/22 2020 03/24/22  0040   WBC 8.8 9.6   RBC 4.28* 4.23*   HEMOGLOBIN 13.5* 13.3*   HEMATOCRIT 39.8* 38.3*   MCV 93.0 90.5   MCH 31.5 31.4   MCHC 33.9 34.7   RDW 47.0 46.5   PLATELETCT 238 246   MPV 9.5 9.7     Recent Labs     03/23/22 2020 03/24/22  0040 03/24/22  0601   SODIUM 124* 127* 129*   POTASSIUM 4.0 3.7 3.8   CHLORIDE 90* 91* 93*   CO2 19* 23 25   GLUCOSE 99 95 106*   BUN 5* 5* 5*   CREATININE 0.59 0.57 0.50   CALCIUM 8.2* 8.2* 8.5                   Imaging  DX-KNEE 2- RIGHT   Final Result      Soft tissue swelling. Preserved knee joint. No acute osseous abnormality.      DX-CHEST-PORTABLE (1 VIEW)   Final Result      Subsegmental, linear bibasilar opacities are present, these are likely atelectasis.           Assessment/Plan  * Hyponatremia- (present on admission)  Assessment & Plan  Admission Sodium: 124, up to 129.  Monitor BMp daily.  Check serum osm, urine osm, urine sodium, alcohol, urine drug screen.  Continue fluid restriction and salt tabs.     Debility- (present on  admission)  Assessment & Plan  Secondary to age and medical related, recently discharged with recommendation to go to SNF and patient stayed only 1 or 2 days prior to leaving.  PT OT consult.   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  Not in acute exacerbation.  DuoNebs as needed, oxygen per guidelines.  Continuous pulse ox, RT consult, incentive spirometry    Alcoholism (HCC)- (present on admission)  Assessment & Plan  Monitor for signs of withdrawal    Essential hypertension- (present on admission)  Assessment & Plan  Continue amlodipine    Obesity- (present on admission)  Assessment & Plan  Body mass index is 40.02 kg/m².  Counseled about diet and exercise       VTE prophylaxis: enoxaparin ppx    I have performed a physical exam and reviewed and updated ROS and Plan today (3/24/2022). In review of yesterday's note (3/23/2022), there are no changes except as documented above.

## 2022-03-24 NOTE — DIETARY
NUTRITION SERVICES: BMI - Pt with BMI >40 (=Body mass index is 40.02 kg/m².), Class III obesity.   Per MD notes, patient with some possible volume overload.  Actual BMI may be <40.    Weight loss counseling not appropriate in acute care setting. RECOMMEND - If appropriate at DC please refer to outpatient nutrition services for weight management.

## 2022-03-24 NOTE — HOSPITAL COURSE
"Mr. Bass is a 72 year old male who presented following ground level fall with increasing weakness and back pain.     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    Patient alerted EMS when he fell out of his chair and was unable to get up off the floor. He denies any head injury or loss of consciousness. He does complain of right knee pain and left back and flank pain from fall. He sustained multiple abrasions and large area of bruising to left flank and back area. Otherwise, patient denies any acute issues. Patient was recently discharged to [place] following admission for hyponatremia, COPD exacerbation and weakness. He subsequently left AMA from [facility] stating \"they didn't do anything for me\". Patient will require PT/OT evaluation and recommendations.     In the ED patient found to be hyponatremic at 124. He was mildly tachypneic and hypertensive. CBC and CMP unremarkable. UA negative. Chest x-ray with atelectasis. EKG NSR. Patient admitted for hyponatremia and increasing weakness.   "

## 2022-03-24 NOTE — PROGRESS NOTES
"[facility] Medicine Daily Progress Note    Date of Service  3/24/2022    Chief Complaint  Juan Manuel Bass is a 71 y.o. male admitted 3/23/2022 with GLF, increasing weakness and back pain.     Hospital Course  Mr. Bass is a 72 year old male who presented following ground level fall with increasing weakness and back pain.     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    Patient alerted EMS when he fell out of his chair and was unable to get up off the floor. He denies any head injury or loss of consciousness. He does complain of right knee pain and left back and flank pain from fall. He sustained multiple abrasions and large area of bruising to left flank and back area. Otherwise, patient denies any acute issues. Patient was recently discharged to [facility] following admission for hyponatremia, COPD exacerbation and weakness. He subsequently left AMA from [facility] stating \"they didn't do anything for me\". Patient will require PT/OT evaluation and recommendations.     In the ED patient found to be hyponatremic at 124. He was mildly tachypneic and hypertensive. CBC and CMP unremarkable. UA negative. Chest x-ray with atelectasis. EKG NSR. Patient admitted for hyponatremia and increasing weakness.       Interval Problem Update  3/24: Patient seen and examined. VSS, on home 3-4 L NC. Patient's sodium has returned to baseline, 129. Patient states pain in right knee and left back. Right knee appears swollen, x-rays shows no bony abnormalities. Patient also with large area of bruising on left back/flank area, will have nursing bill to monitor for increase in size. Patient also has chronic flaking and peeling of skin of BLE. Patient states that he was on the floor for an hour before he was able to reach his phone and call EMS. He states the he stood up and just fell, he remembers the event and has no loss of consciousness. He states the he left AMA from SNF " "following his last admission as they \"they didn't do anything for me\". Explained to patient that we will have to await PT/OT evaluation for their recommendation on if he will be able to go home with HH or will require SNF placement. Patent states that he just \"wants someone to check on me every once in a while\". States he is independent with transfers and ADLs.     I have personally seen and examined the patient at bedside. I discussed the plan of care with patient, bedside RN and charge RN.    Consultants/Specialty  None    Code Status  DNAR/DNI    Disposition  Patient is medically cleared pending PT/OT for discharge.   Anticipate discharge to to home with organized home healthcare and close outpatient follow-up.  I have placed the appropriate orders for post-discharge needs.    Review of Systems  Review of Systems   Constitutional: Positive for malaise/fatigue. Negative for chills and fever.   HENT: Negative.    Eyes: Negative.    Respiratory: Positive for cough and shortness of breath. Negative for sputum production.    Cardiovascular: Negative.  Negative for chest pain and palpitations.   Gastrointestinal: Negative.  Negative for abdominal pain, heartburn, nausea and vomiting.   Genitourinary: Negative.    Musculoskeletal: Positive for falls and joint pain.        Right knee.   Skin:        Skin on BLE peeling and flaky.    Neurological: Positive for weakness. Negative for dizziness, focal weakness and headaches.   Endo/Heme/Allergies: Bruises/bleeds easily.   Psychiatric/Behavioral: Negative.         Physical Exam  Temp:  [36.3 °C (97.4 °F)-37 °C (98.6 °F)] 36.5 °C (97.7 °F)  Pulse:  [83-95] 91  Resp:  [22-33] 22  BP: (124-152)/(73-92) 126/78  SpO2:  [93 %-97 %] 93 %    Physical Exam  Vitals and nursing note reviewed.   Constitutional:       Appearance: Normal appearance. He is obese.   HENT:      Head: Normocephalic.      Nose: Nose normal.      Mouth/Throat:      Mouth: Mucous membranes are moist.   Eyes:     "  Extraocular Movements: Extraocular movements intact.   Cardiovascular:      Rate and Rhythm: Normal rate and regular rhythm.      Pulses: Normal pulses.      Heart sounds: Normal heart sounds.   Pulmonary:      Effort: Pulmonary effort is normal.      Breath sounds: Normal breath sounds.   Abdominal:      General: There is no distension.      Palpations: Abdomen is soft.      Tenderness: There is no abdominal tenderness.   Musculoskeletal:         General: Swelling and tenderness present.      Cervical back: Normal range of motion.      Comments: Right knee.   Skin:     Capillary Refill: Capillary refill takes 2 to 3 seconds.      Findings: Bruising present.   Neurological:      General: No focal deficit present.      Mental Status: He is alert and oriented to person, place, and time. Mental status is at baseline.      Motor: Weakness present.         Fluids    Intake/Output Summary (Last 24 hours) at 3/24/2022 1003  Last data filed at 3/24/2022 0735  Gross per 24 hour   Intake 536 ml   Output --   Net 536 ml       Laboratory  Recent Labs     03/23/22  2020 03/24/22  0040   WBC 8.8 9.6   RBC 4.28* 4.23*   HEMOGLOBIN 13.5* 13.3*   HEMATOCRIT 39.8* 38.3*   MCV 93.0 90.5   MCH 31.5 31.4   MCHC 33.9 34.7   RDW 47.0 46.5   PLATELETCT 238 246   MPV 9.5 9.7     Recent Labs     03/23/22 2020 03/24/22  0040 03/24/22  0601   SODIUM 124* 127* 129*   POTASSIUM 4.0 3.7 3.8   CHLORIDE 90* 91* 93*   CO2 19* 23 25   GLUCOSE 99 95 106*   BUN 5* 5* 5*   CREATININE 0.59 0.57 0.50   CALCIUM 8.2* 8.2* 8.5                   Imaging  DX-KNEE 2- RIGHT   Final Result      Soft tissue swelling. Preserved knee joint. No acute osseous abnormality.      DX-CHEST-PORTABLE (1 VIEW)   Final Result      Subsegmental, linear bibasilar opacities are present, these are likely atelectasis.           Assessment/Plan  * Hyponatremia- (present on admission)  Assessment & Plan  Admission Sodium: 124, up to 129.  Monitor BMp daily.  Check serum osm, urine  osm, urine sodium, alcohol, urine drug screen.  Continue fluid restriction and salt tabs.     Debility- (present on admission)  Assessment & Plan  Secondary to age and medical related, recently discharged with recommendation to go to SNF and patient stayed only 1 or 2 days prior to leaving.  PT OT consult.   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  Not in acute exacerbation.  DuoNebs as needed, oxygen per guidelines.  Continuous pulse ox, RT consult, incentive spirometry    Alcoholism (HCC)- (present on admission)  Assessment & Plan  Monitor for signs of withdrawal    Essential hypertension- (present on admission)  Assessment & Plan  Continue amlodipine    Obesity- (present on admission)  Assessment & Plan  Body mass index is 40.02 kg/m².  Counseled about diet and exercise       VTE prophylaxis: enoxaparin ppx    I have performed a physical exam and reviewed and updated ROS and Plan today (3/24/2022). In review of yesterday's note (3/23/2022), there are no changes except as documented above.

## 2022-03-24 NOTE — ASSESSMENT & PLAN NOTE
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  Not in acute exacerbation.  DuoNebs as needed, oxygen per guidelines.  Continuous pulse ox, RT consult, incentive spirometry

## 2022-03-24 NOTE — ED NOTES
Pt voided 200cc of straw colored urine via urinal. Sample sent to lab. Pt turned and repositioned for comfort, tolerated well.

## 2022-03-24 NOTE — PROGRESS NOTES
4 Eyes Skin Assessment Completed by ALDEN Sanchez and ALDEN Shen.    Head WDL  Ears Scab in R ear  Nose WDL  Mouth WDL  Neck WDL  Breast/Chest WDL  Shoulder Blades WDL  Spine WDL  (R) Arm/Elbow/Hand Bruising and Scab  (L) Arm/Elbow/Hand Bruising and Scab  Abdomen WDL  Groin WDL  Scrotum/Coccyx/Buttocks WDL  (R) Leg Redness, Scab, Bruising and Edema Flaky  (L) Leg Redness, Scab, Bruising and Edema Flaky  (R) Heel/Foot/Toe Redness and Edema Flaky  (L) Heel/Foot/Toe Redness and Edema Flaky          Devices In Places Pulse Ox and Nasal Cannula      Interventions In Place Gray Ear Foams, Waffle Overlay, Pillows and Pressure Redistribution Mattress    Possible Skin Injury No    Pictures Uploaded Into Epic Yes  Wound Consult Placed N/A  RN Wound Prevention Protocol Ordered No
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
## 2022-03-24 NOTE — CARE PLAN
Orientation: Alert and oriented times four.  HNT: WDL  Respiratory: 4L NC O2 baseline   O2: 4L [place].   Cardiac: S1S2  GI: Voiding.   : Audible and active bowel sounds  IV: PIV.Clean dry and intact. Flushed.   Skin: Bruising to left back. Outlined by this RN.   VS: Stable  Oob: Pt/Ot consulted.    Pain: Pt c/o of pain 10/10. Norco decreases pain 7/10 pain which he said is tolerable and his goal.   Patient safety: Hourly rounding completed. Call bell within reach and pt educated on assistance. Pt remained free from falls and injury.     12 hour cc complete       Problem: Knowledge Deficit - Standard  Goal: Patient and family/care givers will demonstrate understanding of plan of care, disease process/condition, diagnostic tests and medications  Outcome: Progressing     Problem: Fall Risk  Goal: Patient will remain free from falls  Outcome: Progressing     Problem: Pain - Standard  Goal: Alleviation of pain or a reduction in pain to the patient’s comfort goal  Outcome: Progressing   The patient is Watcher - Medium risk of patient condition declining or worsening    Shift Goals  Clinical Goals: Admit profile  Patient Goals: Sleep    Progress made toward(s) clinical / shift goals:  Pain control    Patient is not progressing towards the following goals:

## 2022-03-24 NOTE — PROGRESS NOTES
Med rec completed historically and home pharmacy ([facility])  Allergies reviewed  No PO antibiotics in the last 30 days

## 2022-03-24 NOTE — H&P
Hospital Medicine History & Physical Note    Date of Service  3/23/2022    Primary Care Physician  Peterson Amin M.D.    Consultants  None    Code Status  Full Code    Chief Complaint  Chief Complaint   Patient presents with   • Shortness of Breath   • Weakness     Inability to care for self   • Back Pain       History of Presenting Illness  Juan Manuel Bass is a 71 y.o. 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  Patient reports he fell at his chair today, denies head injury denies loss of consciousness.  He sustained an injury to his right knee.  He has been having multiple falls and has evidence of bruises in various stages of healing on his spine.  He has no new focal weakness or numbness, no new dyspnea or cough, no chest pain, nausea vomiting, abdominal pain, dysuria or diarrhea.  He was endorsing some urinary hesitancy while in the ED.  Of note he was recently discharged from our hospital after being admitted from 3/12->3/17 for hyponatremia, COPD exacerbation.  He was treated with salt tabs, fluid restriction, antibiotics and prednisone with improvement of his symptoms.  PT OT recommended skilled nursing facility for continued rehabilitation and patient was discharged to SNF, seems the patient only stayed for 2 days before leaving.  Weakness has been progressive, no aggravating or alleviating factors.    In the ED he is afebrile, normal pulse, he is tachypneic and mildly hypertensive, his CBC without leukocytosis, CMP showed hyponatremia 124, Cl 90, normal renal function, AST/ALT mildly elevated 34/55, UA not infectious, chest x-ray shows subsegmental linear bibasilar opacities likely atelectasis.  EKG shows sinus rhythm with multiple PVCs, incomplete right bundle branch block. Patient subsequently referred to hospitalist for admission for  hyponatremia.    I discussed the plan of care with patient, bedside RN and pharmacy.    Review of Systems  Review of Systems   Constitutional: Negative for chills and fever.   HENT: Negative for congestion and sinus pain.    Eyes: Negative for pain and discharge.   Respiratory: Negative for cough and shortness of breath.    Cardiovascular: Negative for chest pain and orthopnea.   Gastrointestinal: Negative for abdominal pain, diarrhea, nausea and vomiting.   Genitourinary: Negative for dysuria and urgency.   Musculoskeletal: Positive for falls. Negative for neck pain.   Neurological: Positive for weakness. Negative for sensory change, speech change, focal weakness, seizures and loss of consciousness.   Psychiatric/Behavioral: Negative for hallucinations. The patient is not nervous/anxious.        Past Medical History   has a past medical history of Chronic airway obstruction, not elsewhere classified and Hypertension.    Surgical History   has no past surgical history on file.     Family History  family history includes Heart Disease in his father; No Known Problems in his mother.       Social History   reports that he quit smoking about 2 years ago. He has a 4.00 pack-year smoking history. He does not have any smokeless tobacco history on file. He reports previous alcohol use. He reports previous drug use.    Allergies  Allergies   Allergen Reactions   • Tetanus Toxoid Hives       Medications  Prior to Admission Medications   Prescriptions Last Dose Informant Patient Reported? Taking?   Ascorbic Acid (VITAMIN C PO)  Patient Yes No   Sig: Take 1 Tablet by mouth every day.   Cholecalciferol (VITAMIN D3 PO)  Patient Yes No   Sig: Take 1 Capsule by mouth every day.   Fluticasone-Umeclidin-Vilant (TRELEGY ELLIPTA) 100-62.5-25 MCG/INH AEROSOL POWDER, BREATH ACTIVATED inhalation  Patient No No   Sig: Inhale 2-3 Inhalation every morning.   Multiple Vitamins-Minerals (CENTRUM SILVER ADULT 50+) Tab  Patient Yes No   Sig:  Take 1 Tablet by mouth every day.   albuterol 108 (90 Base) MCG/ACT Aero Soln inhalation aerosol  Patient No No   Sig: Inhale 1-2 Puffs every four hours as needed for Shortness of Breath.   amLODIPine (NORVASC) 5 MG Tab  Patient No No   Sig: Take 1 Tablet by mouth every day.   melatonin 5 mg Tab   No No   Sig: Take 1 Tablet by mouth at bedtime as needed (for sleep).   omeprazole (PRILOSEC) 20 MG delayed-release capsule   No No   Sig: Take 1 Capsule by mouth every day.   pantoprazole (PROTONIX) 40 MG Tablet Delayed Response  Patient Yes No   Sig: Take 40 mg by mouth every day.   sodium chloride (SALT) 1 GM Tab  Patient No No   Sig: Take 1 Tablet by mouth 3 times a day with meals.   vitamin E 180 MG (400 UNIT) Cap  Patient Yes No   Sig: Take 360 mg by mouth every day. 2 capsules = 360 mg (800 units).      Facility-Administered Medications: None       Physical Exam  Temp:  [37 °C (98.6 °F)] 37 °C (98.6 °F)  Pulse:  [83-95] 93  Resp:  [25-33] 33  BP: (136-152)/(73-88) 152/88  SpO2:  [95 %-97 %] 97 %  Blood Pressure : 152/88   Temperature: 37 °C (98.6 °F)   Pulse: 93   Respiration: (!) 33   Pulse Oximetry: 97 %       Physical Exam  Vitals and nursing note reviewed.   Constitutional:       General: He is not in acute distress.     Appearance: He is obese. He is not toxic-appearing.      Comments: Obese 71-year-old male appears older than stated age alert and conversant only able to speak about 5 word sentences before becoming breathless, no distress, nontoxic appearing   HENT:      Head: Normocephalic and atraumatic.      Nose: Nose normal. No rhinorrhea.      Mouth/Throat:      Mouth: Mucous membranes are moist.      Pharynx: Oropharynx is clear.   Eyes:      General: No scleral icterus.     Extraocular Movements: Extraocular movements intact.      Conjunctiva/sclera: Conjunctivae normal.      Pupils: Pupils are equal, round, and reactive to light.   Cardiovascular:      Rate and Rhythm: Normal rate and regular rhythm.       Pulses: Normal pulses.   Pulmonary:      Effort: Pulmonary effort is normal. No respiratory distress.      Breath sounds: Rales (bibasilar) present. No wheezing or rhonchi.      Comments: Pursed lip breathing, low work of breathing, poor inspiratory effort, poor cough  Abdominal:      General: Bowel sounds are normal.      Palpations: Abdomen is soft.      Tenderness: There is abdominal tenderness (mild generalized). There is no guarding or rebound.      Comments: Protuberant abdomen    Musculoskeletal:         General: Tenderness (right knee mildly tender) present. No deformity. Normal range of motion.      Cervical back: Normal range of motion and neck supple. No rigidity or tenderness.      Right lower leg: No edema.      Left lower leg: No edema.   Skin:     General: Skin is warm and dry.      Capillary Refill: Capillary refill takes less than 2 seconds.      Findings: Bruising and erythema present.      Comments: Redness to b/l lower extremities consistent with venous stasis dermatitis, extensive dried crusting and flaking skin, scattered bruises, right knee without erythema good range of motion   Neurological:      General: No focal deficit present.      Mental Status: He is alert and oriented to person, place, and time. Mental status is at baseline.      Cranial Nerves: No cranial nerve deficit.      Sensory: No sensory deficit.      Motor: No weakness.      Coordination: Coordination normal.   Psychiatric:         Mood and Affect: Mood normal.         Behavior: Behavior normal.         Thought Content: Thought content normal.         Judgment: Judgment normal.         Laboratory:  Recent Labs     03/23/22 2020   WBC 8.8   RBC 4.28*   HEMOGLOBIN 13.5*   HEMATOCRIT 39.8*   MCV 93.0   MCH 31.5   MCHC 33.9   RDW 47.0   PLATELETCT 238   MPV 9.5     Recent Labs     03/23/22 2020   SODIUM 124*   POTASSIUM 4.0   CHLORIDE 90*   CO2 19*   GLUCOSE 99   BUN 5*   CREATININE 0.59   CALCIUM 8.2*     Recent Labs      03/23/22 2020   ALTSGPT 55*   ASTSGOT 34   ALKPHOSPHAT 71   TBILIRUBIN 0.6   LIPASE 23   GLUCOSE 99         No results for input(s): NTPROBNP in the last 72 hours.      No results for input(s): TROPONINT in the last 72 hours.    Imaging:  DX-CHEST-PORTABLE (1 VIEW)   Final Result      Subsegmental, linear bibasilar opacities are present, these are likely atelectasis.          X-Ray:  I have personally reviewed the images and compared with prior images. and My impression is: Linear bibasilar opacities likely atelectasis  EKG:  I have personally reviewed the images and compared with prior images. and My impression is: Sinus rhythm, PVCs, incomplete right bundle branch block,    Assessment/Plan:  I anticipate this patient will require at least two midnights for appropriate medical management, necessitating inpatient admission.    * Hyponatremia- (present on admission)  Assessment & Plan  Admission Sodium: 124   Monitor closely Q6 BMP  No more then 8-10 meq correction in 24hrs to prevent CPM target Na ~132 3/24 @20:00  Check serum osm, urine osm, urine sodium, alcohol, urine drug screen.  No masses noted on CXR          Debility- (present on admission)  Assessment & Plan  Secondary to age and medical related, recently discharged with recommendation to go to SNF and patient stayed only 1 or 2 days prior to leaving.  PT OT consult.   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  Not in acute exacerbation.  DuoNebs as needed, oxygen per guidelines.  Continuous pulse ox, RT consult, incentive spirometry    Alcoholism (HCC)- (present on admission)  Assessment & Plan  Check serum alcohol, monitor for signs of withdrawal    Essential hypertension- (present on admission)  Assessment & Plan  Continue  amlodipine    Obesity- (present on admission)  Assessment & Plan  Strong recommendation for follow-up outpatient PCP for lifestyle modification including diet and exercise regiment of at least 30 min of brisk cardiovascular exercise 3-5 times weekly.      VTE prophylaxis: enoxaparin ppx

## 2022-03-24 NOTE — DISCHARGE PLANNING
Care Transition Team Assessment    CM met with the patient, introduced self, and explained role.  Demographics and insurance information verified.  The patient lives alone in an RV and he states he was indep in all ADLS without the use of adaptive equipment.  He states he has the support of his neighbors Nicolle Amato and Kennedi Castillo.  (He did not have their contact numbers).  The patient has a hx of SNF at [facility].  He denies a hx of HHC.  He has oxygen but he cannot remember the name of the company.  Per chart review, referrals were sent out to [facility] on previous admission.  The patient states he has portable tanks and a concentrator.  The patient states he obtains his rx from [facility] on [address]. PT/OT evals are pending.  The patient states he will not go to a SNF but he is open to HHC. The patient will need transportation upon d/c. CM will continue to follow for post acute recommendations.      Information Source  Orientation Level: Oriented X4  Information Given By: Patient  Who is responsible for making decisions for patient? : Patient         Elopement Risk  Legal Hold: No  Ambulatory or Self Mobile in Wheelchair: No-Not an Elopement Risk  Elopement Risk: Not at Risk for Elopement    Interdisciplinary Discharge Planning  Lives with - Patient's Self Care Capacity: Alone and Unable to Care For Self  Patient or legal guardian wants to designate a caregiver: No  Support Systems: Friends / Neighbors  Housing / Facility: Motor Home  Durable Medical Equipment: Home Oxygen    Discharge Preparedness  What is your plan after discharge?: Home health care  What are your discharge supports?: Other (comment) (sofi)  Prior Functional Level: Independent with Activities of Daily Living  Difficulity with ADLs: Walking    Functional Assesment  Prior Functional Level: Independent with Activities of Daily Living    Finances  Financial Barriers to Discharge: No  Prescription Coverage: Yes    Vision / Hearing  Impairment  Vision Impairment : Yes  Right Eye Vision: Impaired,Wears Glasses  Left Eye Vision: Impaired,Wears Glasses  Hearing Impairment : No              Domestic Abuse  Have you ever been the victim of abuse or violence?: No  Physical Abuse or Sexual Abuse: No  Verbal Abuse or Emotional Abuse: No  Possible Abuse/Neglect Reported to:: Not Applicable         Discharge Risks or Barriers  Discharge risks or barriers?: No    Anticipated Discharge Information  Discharge Disposition: D/T to home under HHA care in anticipation of covered skilled care (06)  Discharge Address: [address] [place] [address]  Discharge Contact Phone Number: [phone]

## 2022-03-24 NOTE — ED PROVIDER NOTES
"ED Provider Note    ER PROVIDER NOTE          CHIEF COMPLAINT  Chief Complaint   Patient presents with   • Shortness of Breath   • Weakness     Inability to care for self   • Back Pain       HPI  Juan Manuel Bass is a 71 y.o. male who presents to the emergency department complaining of fall and generalized weakness.  Patient reports he was recently discharged from the hospital, he slipped out of his chair tonight, and was unable to get back up.  He reports no focal weakness or numbness, just some generalized weakness.  He reports no new shortness of breath but does have some chronic shortness of breath.  No chest pain, no abdominal pain.  He does have some left sided back pain after prior fall, no changes in this and it seems to be getting somewhat better.  No fevers or chills or cough or congestion.  He does state that he is having difficulty urinating but feels the urge to go.  No incontinence, no bowel incontinence or retention    Patient was discharged from the hospital recently to rehab facility although he states he only stayed there for 2 days before he \"had to leave\"    REVIEW OF SYSTEMS  Pertinent positives include weakness. Pertinent negatives include no headache. See HPI for details. All other systems reviewed and are negative.    PAST MEDICAL HISTORY   has a past medical history of Chronic airway obstruction, not elsewhere classified and Hypertension.    SURGICAL HISTORY  patient denies any surgical history    FAMILY HISTORY  Family History   Problem Relation Age of Onset   • No Known Problems Mother    • Heart Disease Father        SOCIAL HISTORY  Social History     Socioeconomic History   • Marital status:    Tobacco Use   • Smoking status: Former Smoker     Packs/day: 1.00     Years: 4.00     Pack years: 4.00     Quit date: 3/7/2020     Years since quittin.0   Substance and Sexual Activity   • Alcohol use: Not Currently   • Drug use: Not Currently      Social History     Substance and Sexual " "Activity   Drug Use Not Currently       CURRENT MEDICATIONS  Home Medications    **Home medications have not yet been reviewed for this encounter**         ALLERGIES  Allergies   Allergen Reactions   • Tetanus Toxoid Hives       PHYSICAL EXAM  VITAL SIGNS: /79   Pulse 95   Temp 37 °C (98.6 °F) (Temporal)   Resp (!) 25   Ht 1.778 m (5' 10\")   Wt (!) 127 kg (279 lb 15.8 oz)   SpO2 95%   BMI 40.17 kg/m²   Pulse ox interpretation: Patient is on 4 L oxygen    Constitutional: Alert in no apparent distress.  HENT: No signs of trauma, Bilateral external ears normal, Nose normal.   Eyes: Pupils are equal and reactive, Conjunctiva normal, Non-icteric.   Neck: Normal range of motion, No tenderness, Supple, No stridor.   Cardiovascular: Regular rate and rhythm, no murmurs.   Thorax & Lungs: Normal breath sounds, No respiratory distress, No wheezing, No chest tenderness.   Abdomen: Bowel sounds normal, Soft, No tenderness, No masses, No pulsatile masses. No peritoneal signs.  Skin: Warm, Dry, No erythema, No rash.   Back: Ecchymosis over the left back no bony tenderness, No CVA tenderness.   Extremities: Intact distal pulses, 1+ BLLE edema with healing wounds to his legs,  No cyanosis, Negative Yuki's sign.  Musculoskeletal: Good range of motion in all major joints. No tenderness to palpation or major deformities noted.   Neurologic: Alert , Normal motor function, Normal sensory function, No focal deficits noted.   Psychiatric: Affect normal, Judgment normal, Mood normal.     DIAGNOSTIC STUDIES / PROCEDURES    EKG  Results for orders placed or performed during the hospital encounter of 03/23/22   CBC WITH DIFFERENTIAL   Result Value Ref Range    WBC 8.8 4.8 - 10.8 K/uL    RBC 4.28 (L) 4.70 - 6.10 M/uL    Hemoglobin 13.5 (L) 14.0 - 18.0 g/dL    Hematocrit 39.8 (L) 42.0 - 52.0 %    MCV 93.0 81.4 - 97.8 fL    MCH 31.5 27.0 - 33.0 pg    MCHC 33.9 33.7 - 35.3 g/dL    RDW 47.0 35.9 - 50.0 fL    Platelet Count 238 164 - " 446 K/uL    MPV 9.5 9.0 - 12.9 fL    Neutrophils-Polys 81.40 (H) 44.00 - 72.00 %    Lymphocytes 9.00 (L) 22.00 - 41.00 %    Monocytes 8.10 0.00 - 13.40 %    Eosinophils 0.60 0.00 - 6.90 %    Basophils 0.10 0.00 - 1.80 %    Immature Granulocytes 0.80 0.00 - 0.90 %    Nucleated RBC 0.00 /100 WBC    Neutrophils (Absolute) 7.14 1.82 - 7.42 K/uL    Lymphs (Absolute) 0.79 (L) 1.00 - 4.80 K/uL    Monos (Absolute) 0.71 0.00 - 0.85 K/uL    Eos (Absolute) 0.05 0.00 - 0.51 K/uL    Baso (Absolute) 0.01 0.00 - 0.12 K/uL    Immature Granulocytes (abs) 0.07 0.00 - 0.11 K/uL    NRBC (Absolute) 0.00 K/uL   COMP METABOLIC PANEL   Result Value Ref Range    Sodium 124 (L) 135 - 145 mmol/L    Potassium 4.0 3.6 - 5.5 mmol/L    Chloride 90 (L) 96 - 112 mmol/L    Co2 19 (L) 20 - 33 mmol/L    Anion Gap 15.0 7.0 - 16.0    Glucose 99 65 - 99 mg/dL    Bun 5 (L) 8 - 22 mg/dL    Creatinine 0.59 0.50 - 1.40 mg/dL    Calcium 8.2 (L) 8.5 - 10.5 mg/dL    AST(SGOT) 34 12 - 45 U/L    ALT(SGPT) 55 (H) 2 - 50 U/L    Alkaline Phosphatase 71 30 - 99 U/L    Total Bilirubin 0.6 0.1 - 1.5 mg/dL    Albumin 3.5 3.2 - 4.9 g/dL    Total Protein 6.4 6.0 - 8.2 g/dL    Globulin 2.9 1.9 - 3.5 g/dL    A-G Ratio 1.2 g/dL   LIPASE   Result Value Ref Range    Lipase 23 11 - 82 U/L   MAGNESIUM   Result Value Ref Range    Magnesium 1.8 1.5 - 2.5 mg/dL   ESTIMATED GFR   Result Value Ref Range    GFR (CKD-EPI) 103 >60 mL/min/1.73 m 2   EKG (NOW)   Result Value Ref Range    Report       [facility] Emergency Dept.    Test Date:  2022  Pt Name:    BRIDGETTE VARNER                  Department: ER  MRN:        [id number]                      Room:       [facility]  Gender:     Male                         Technician: [id number]  :        1950                   Requested By:HUMERA BEYER  Order #:    [id number]                    Reading MD:    Measurements  Intervals                                Axis  Rate:       98                           P:           0  VA:         148                          QRS:        21  QRSD:       106                          T:          24  QT:         356  QTc:        455    Interpretive Statements  SINUS RHYTHM  MULTIPLE PREMATURE COMPLEXES, VENT & SUPRAVEN  INCOMPLETE RIGHT BUNDLE BRANCH BLOCK  ARTIFACT IN LEAD(S) I,II,aVR,aVL,aVF  Compared to ECG 03/13/2022 23:50:46  Incomplete right bundle-branch block now present  Ventricular premature complex(es) no longer present           RADIOLOGY  DX-CHEST-PORTABLE (1 VIEW)   Final Result      Subsegmental, linear bibasilar opacities are present, these are likely atelectasis.        The radiologist's interpretation of all radiological studies have been reviewed and images independently viewed by me.    COURSE & MEDICAL DECISION MAKING  Nursing notes, VS, PMSFHx reviewed in chart.    8:26 PM Patient seen and examined at bedside. Ordered for CBC, CMP, urinalysis, x-ray, ECG, bladder scan to evaluate his symptoms.     10:03 PM  Patient is reevaluated, updated on all results, plan for hospitalization to which he is agreeable    I discussed the case with hospitalist for admission        Decision Making:  This is a 71 y.o. male presenting with generalized weakness, and is found to be once again profoundly hyponatremic.  It is down to 124 from 131, this does appear to be more volume overloaded based on his exam.  As such he will be hospitalized for further care and treatment.  Patient also continues to be hypoxemic and given report from EMS certainly his home environment is suspect for his ability to care for himself.  Patient has been placed in rehab before although it sounds like he left of his own accord    Patient is admitted in guarded condition    FINAL IMPRESSION  1. Hyponatremia    2. Weakness    3. Hypoxemia          The note accurately reflects work and decisions made by me.  Paolo Streeter M.D.  3/23/2022  10:23 PM

## 2022-03-24 NOTE — CARE PLAN
The patient is Stable - Low risk of patient condition declining or worsening    Shift Goals  Clinical Goals: Admit profile  Patient Goals: Sleep    Progress made toward(s) clinical / shift goals: Able to complete patient's admit profile tonight, patient is now sleeping    Problem: Knowledge Deficit - Standard  Goal: Patient and family/care givers will demonstrate understanding of plan of care, disease process/condition, diagnostic tests and medications  Outcome: Progressing     Problem: Fall Risk  Goal: Patient will remain free from falls  Outcome: Progressing     Problem: Pain - Standard  Goal: Alleviation of pain or a reduction in pain to the patient’s comfort goal  Outcome: Progressing

## 2022-03-25 LAB
ANION GAP SERPL CALC-SCNC: 10 MMOL/L (ref 7–16)
BASOPHILS # BLD AUTO: 0.1 % (ref 0–1.8)
BASOPHILS # BLD: 0.01 K/UL (ref 0–0.12)
BUN SERPL-MCNC: 5 MG/DL (ref 8–22)
CALCIUM SERPL-MCNC: 8.8 MG/DL (ref 8.5–10.5)
CHLORIDE SERPL-SCNC: 98 MMOL/L (ref 96–112)
CO2 SERPL-SCNC: 27 MMOL/L (ref 20–33)
CREAT SERPL-MCNC: 0.54 MG/DL (ref 0.5–1.4)
EOSINOPHIL # BLD AUTO: 0.2 K/UL (ref 0–0.51)
EOSINOPHIL NFR BLD: 2.7 % (ref 0–6.9)
ERYTHROCYTE [DISTWIDTH] IN BLOOD BY AUTOMATED COUNT: 50 FL (ref 35.9–50)
GFR SERPLBLD CREATININE-BSD FMLA CKD-EPI: 106 ML/MIN/1.73 M 2
GLUCOSE SERPL-MCNC: 113 MG/DL (ref 65–99)
HCT VFR BLD AUTO: 42.7 % (ref 42–52)
HGB BLD-MCNC: 14.3 G/DL (ref 14–18)
IMM GRANULOCYTES # BLD AUTO: 0.04 K/UL (ref 0–0.11)
IMM GRANULOCYTES NFR BLD AUTO: 0.5 % (ref 0–0.9)
LYMPHOCYTES # BLD AUTO: 1.19 K/UL (ref 1–4.8)
LYMPHOCYTES NFR BLD: 16.3 % (ref 22–41)
MAGNESIUM SERPL-MCNC: 2 MG/DL (ref 1.5–2.5)
MCH RBC QN AUTO: 31.5 PG (ref 27–33)
MCHC RBC AUTO-ENTMCNC: 33.5 G/DL (ref 33.7–35.3)
MCV RBC AUTO: 94.1 FL (ref 81.4–97.8)
MONOCYTES # BLD AUTO: 0.71 K/UL (ref 0–0.85)
MONOCYTES NFR BLD AUTO: 9.8 % (ref 0–13.4)
NEUTROPHILS # BLD AUTO: 5.13 K/UL (ref 1.82–7.42)
NEUTROPHILS NFR BLD: 70.6 % (ref 44–72)
NRBC # BLD AUTO: 0 K/UL
NRBC BLD-RTO: 0 /100 WBC
OSMOLALITY SERPL: 278 MOSM/KG H2O (ref 278–298)
PHOSPHATE SERPL-MCNC: 3.6 MG/DL (ref 2.5–4.5)
PLATELET # BLD AUTO: 266 K/UL (ref 164–446)
PMV BLD AUTO: 9.7 FL (ref 9–12.9)
POTASSIUM SERPL-SCNC: 4.4 MMOL/L (ref 3.6–5.5)
RBC # BLD AUTO: 4.54 M/UL (ref 4.7–6.1)
SODIUM SERPL-SCNC: 135 MMOL/L (ref 135–145)
TSH SERPL DL<=0.005 MIU/L-ACNC: 2.59 UIU/ML (ref 0.38–5.33)
WBC # BLD AUTO: 7.3 K/UL (ref 4.8–10.8)

## 2022-03-25 PROCEDURE — 36415 COLL VENOUS BLD VENIPUNCTURE: CPT

## 2022-03-25 PROCEDURE — 99232 SBSQ HOSP IP/OBS MODERATE 35: CPT | Performed by: INTERNAL MEDICINE

## 2022-03-25 PROCEDURE — 700102 HCHG RX REV CODE 250 W/ 637 OVERRIDE(OP): Performed by: STUDENT IN AN ORGANIZED HEALTH CARE EDUCATION/TRAINING PROGRAM

## 2022-03-25 PROCEDURE — 83930 ASSAY OF BLOOD OSMOLALITY: CPT

## 2022-03-25 PROCEDURE — 97535 SELF CARE MNGMENT TRAINING: CPT

## 2022-03-25 PROCEDURE — 770004 HCHG ROOM/CARE - ONCOLOGY PRIVATE *

## 2022-03-25 PROCEDURE — 84443 ASSAY THYROID STIM HORMONE: CPT

## 2022-03-25 PROCEDURE — A9270 NON-COVERED ITEM OR SERVICE: HCPCS | Performed by: STUDENT IN AN ORGANIZED HEALTH CARE EDUCATION/TRAINING PROGRAM

## 2022-03-25 PROCEDURE — 700111 HCHG RX REV CODE 636 W/ 250 OVERRIDE (IP): Performed by: STUDENT IN AN ORGANIZED HEALTH CARE EDUCATION/TRAINING PROGRAM

## 2022-03-25 PROCEDURE — 80048 BASIC METABOLIC PNL TOTAL CA: CPT

## 2022-03-25 PROCEDURE — 85025 COMPLETE CBC W/AUTO DIFF WBC: CPT

## 2022-03-25 PROCEDURE — 97165 OT EVAL LOW COMPLEX 30 MIN: CPT

## 2022-03-25 PROCEDURE — 84100 ASSAY OF PHOSPHORUS: CPT

## 2022-03-25 PROCEDURE — 97161 PT EVAL LOW COMPLEX 20 MIN: CPT

## 2022-03-25 PROCEDURE — 83735 ASSAY OF MAGNESIUM: CPT

## 2022-03-25 RX ORDER — SODIUM CHLORIDE 1 G/1
1 TABLET ORAL DAILY
Status: DISCONTINUED | OUTPATIENT
Start: 2022-03-26 | End: 2022-03-28 | Stop reason: HOSPADM

## 2022-03-25 RX ADMIN — AMLODIPINE BESYLATE 5 MG: 5 TABLET ORAL at 04:36

## 2022-03-25 RX ADMIN — SENNOSIDES AND DOCUSATE SODIUM 2 TABLET: 50; 8.6 TABLET ORAL at 04:36

## 2022-03-25 RX ADMIN — ENOXAPARIN SODIUM 40 MG: 40 INJECTION SUBCUTANEOUS at 04:36

## 2022-03-25 RX ADMIN — UMECLIDINIUM BROMIDE AND VILANTEROL TRIFENATATE 1 PUFF: 62.5; 25 POWDER RESPIRATORY (INHALATION) at 04:36

## 2022-03-25 RX ADMIN — HYDROCODONE BITARTRATE AND ACETAMINOPHEN 2 TABLET: 5; 325 TABLET ORAL at 21:23

## 2022-03-25 RX ADMIN — OMEPRAZOLE 20 MG: 20 CAPSULE, DELAYED RELEASE ORAL at 04:36

## 2022-03-25 RX ADMIN — FLUTICASONE PROPIONATE 88 MCG: 44 AEROSOL, METERED RESPIRATORY (INHALATION) at 04:37

## 2022-03-25 RX ADMIN — HYDROCODONE BITARTRATE AND ACETAMINOPHEN 2 TABLET: 5; 325 TABLET ORAL at 11:41

## 2022-03-25 RX ADMIN — HYDROCODONE BITARTRATE AND ACETAMINOPHEN 2 TABLET: 5; 325 TABLET ORAL at 04:36

## 2022-03-25 RX ADMIN — ACETAMINOPHEN 650 MG: 325 TABLET, FILM COATED ORAL at 15:11

## 2022-03-25 RX ADMIN — SODIUM CHLORIDE 1 G: 1 TABLET ORAL at 08:20

## 2022-03-25 ASSESSMENT — ENCOUNTER SYMPTOMS
HEADACHES: 0
EYES NEGATIVE: 1
CONSTIPATION: 0
COUGH: 1
PSYCHIATRIC NEGATIVE: 1
WEAKNESS: 0
BRUISES/BLEEDS EASILY: 1
CHILLS: 0
ABDOMINAL PAIN: 0
DIARRHEA: 0
BLURRED VISION: 0
PALPITATIONS: 0
WEAKNESS: 1
CARDIOVASCULAR NEGATIVE: 1
NERVOUS/ANXIOUS: 0
FEVER: 0
HEARTBURN: 0
DIZZINESS: 0
MYALGIAS: 1
FOCAL WEAKNESS: 0
DEPRESSION: 0
SPUTUM PRODUCTION: 0
SORE THROAT: 0
NAUSEA: 0
SHORTNESS OF BREATH: 0
FALLS: 1
COUGH: 0
VOMITING: 0
SHORTNESS OF BREATH: 1
GASTROINTESTINAL NEGATIVE: 1

## 2022-03-25 ASSESSMENT — PAIN DESCRIPTION - PAIN TYPE
TYPE: ACUTE PAIN

## 2022-03-25 ASSESSMENT — COGNITIVE AND FUNCTIONAL STATUS - GENERAL
DAILY ACTIVITIY SCORE: 18
STANDING UP FROM CHAIR USING ARMS: A LITTLE
DRESSING REGULAR UPPER BODY CLOTHING: A LITTLE
PERSONAL GROOMING: A LITTLE
MOVING TO AND FROM BED TO CHAIR: A LOT
SUGGESTED CMS G CODE MODIFIER MOBILITY: CK
MOBILITY SCORE: 16
WALKING IN HOSPITAL ROOM: A LITTLE
DRESSING REGULAR LOWER BODY CLOTHING: A LITTLE
SUGGESTED CMS G CODE MODIFIER DAILY ACTIVITY: CK
CLIMB 3 TO 5 STEPS WITH RAILING: A LITTLE
HELP NEEDED FOR BATHING: A LITTLE
TOILETING: A LITTLE
MOVING FROM LYING ON BACK TO SITTING ON SIDE OF FLAT BED: A LOT
EATING MEALS: A LITTLE
TURNING FROM BACK TO SIDE WHILE IN FLAT BAD: A LITTLE

## 2022-03-25 ASSESSMENT — GAIT ASSESSMENTS
GAIT LEVEL OF ASSIST: CONTACT GUARD ASSIST
ASSISTIVE DEVICE: FRONT WHEEL WALKER
DEVIATION: INCREASED BASE OF SUPPORT;SHUFFLED GAIT
DISTANCE (FEET): 100

## 2022-03-25 NOTE — CARE PLAN
The patient is Stable - Low risk of patient condition declining or worsening    Shift Goals  Clinical Goals: Fluid restrict, monitor labs  Patient Goals: Sleep, pain control    Progress made toward(s) clinical / shift goals: See note below regarding pain control. Patient has not gotten much sleep so far this shift. Will monitor for midnight labs to see sodium levels. Patient frustrated about fluid restrictions, but is compliant (still has some left to get til midnight)    Problem: Knowledge Deficit - Standard  Goal: Patient and family/care givers will demonstrate understanding of plan of care, disease process/condition, diagnostic tests and medications  Outcome: Progressing     Problem: Fall Risk  Goal: Patient will remain free from falls  Outcome: Progressing     Problem: Pain - Standard  Goal: Alleviation of pain or a reduction in pain to the patient’s comfort goal  Outcome: Progressing  Note: Patient reports his pain being a 9 out of 10, only wants to take norco's. Claims morphine doesn't work for him at all

## 2022-03-25 NOTE — PROGRESS NOTES
"[facility] Medicine Daily Progress Note    Date of Service  3/25/2022    Chief Complaint  Juan Manuel Bass is a 71 y.o. male admitted 3/23/2022 with Shortness of breath, weakness    Hospital Course  Mr. Bass is a 72 year old male who presented following ground level fall with increasing weakness and back pain.     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    Patient alerted EMS when he fell out of his chair and was unable to get up off the floor. He denies any head injury or loss of consciousness. He does complain of right knee pain and left back and flank pain from fall. He sustained multiple abrasions and large area of bruising to left flank and back area. Otherwise, patient denies any acute issues. Patient was recently discharged to [facility] following admission for hyponatremia, COPD exacerbation and weakness. He subsequently left AMA from [facility] stating \"they didn't do anything for me\". Patient will require PT/OT evaluation and recommendations.     In the ED patient found to be hyponatremic at 124. He was mildly tachypneic and hypertensive. CBC and CMP unremarkable. UA negative. Chest x-ray with atelectasis. EKG NSR. Patient admitted for hyponatremia and increasing weakness.       Mr. Juan Manuel Bass is a 71 y.o. male with a history of COPD on 3L, hypertension, alcohol abuse who presented on 3/23/2022 with shortness of breath, lower extremity swelling, failure to thrive, ground-level fall.  The patient was recently admitted to [facility] from 3/12-17/2022 for a COPD exacerbation and hyponatremia.  He was discharged to [facility] will only spent 2 days there because \"they did not do anything\" in terms of rehab.  The patient left the SNF against medical advice to live in his RV by himself.  He has had frequent falls and is unable to care for himself.  The patient fell getting up from his chair prior to admission.  He denies loss of consciousness or head " trauma.  He was on the ground for about an hour until his neighbor found him and called EMS.  EMS felt that his living situation was unacceptable as the floor is about to cave in.  On presentation he was found to have hyponatremia and started on fluid restriction and salt tabs.  CXR was concerning for atelectasis.    Interval Problem Update  Patient was seen and examined at bedside.  I have personally reviewed and interpreted vitals, labs, and imaging.    3/24.  Afebrile.  Has been tachypneic but this is improved.  On 4 L nasal cannula.  Denies fevers, chills, chest pains.  States he is short of breath when he moves.  Complains of knee pain and back pain from his fall.  PT/OT eval pending   3/25.  Afebrile.  Tachypnea has resolved.  On 4 L nasal cannula.  Denies fevers, chills, chest pains.  Sodium has normalized.  Upset about fluid restriction.  Does not want placement.  HH ordered.      I have personally seen and examined the patient at bedside. I discussed the plan of care with patient, bedside RN and .    Consultants/Specialty  None    Code Status  DNAR/DNI    Disposition  Patient is not medically cleared for discharge.   Anticipate discharge to to skilled nursing facility, but just left AMA from SNF  I have placed the appropriate orders for post-discharge needs.    Review of Systems  Review of Systems   Constitutional: Negative for chills and fever.   HENT: Negative for congestion and sore throat.    Eyes: Negative for blurred vision.   Respiratory: Positive for shortness of breath. Negative for cough.    Cardiovascular: Negative for chest pain, palpitations and leg swelling.   Gastrointestinal: Negative for abdominal pain, constipation, diarrhea, nausea and vomiting.   Genitourinary: Negative for dysuria, frequency and urgency.   Musculoskeletal: Positive for falls, joint pain and myalgias.   Skin: Negative for rash.   Neurological: Negative for dizziness, weakness and headaches.    Psychiatric/Behavioral: Negative for depression. The patient is not nervous/anxious.    All other systems reviewed and are negative.       Physical Exam  Temp:  [36.1 °C (97 °F)-36.9 °C (98.5 °F)] 36.6 °C (97.9 °F)  Pulse:  [77-92] 77  Resp:  [18-22] 20  BP: (111-130)/(70-78) 130/72  SpO2:  [92 %-97 %] 92 %    Physical Exam  Vitals and nursing note reviewed.   Constitutional:       Appearance: Normal appearance. He is obese. He is ill-appearing.   HENT:      Head: Normocephalic.      Nose: Nose normal.      Mouth/Throat:      Mouth: Mucous membranes are moist.      Pharynx: Oropharynx is clear.   Eyes:      Extraocular Movements: Extraocular movements intact.      Conjunctiva/sclera: Conjunctivae normal.   Cardiovascular:      Rate and Rhythm: Normal rate and regular rhythm.      Pulses: Normal pulses.      Heart sounds: Normal heart sounds. No murmur heard.    No friction rub. No gallop.   Pulmonary:      Effort: Pulmonary effort is normal. Tachypnea present. No respiratory distress.      Breath sounds: Wheezing present. No rales.   Chest:      Chest wall: No tenderness.   Abdominal:      General: Abdomen is flat. Bowel sounds are normal. There is no distension.      Palpations: Abdomen is soft. There is no mass.      Tenderness: There is no abdominal tenderness. There is no guarding.   Musculoskeletal:         General: Normal range of motion.      Cervical back: Normal range of motion and neck supple.      Right lower leg: Edema present.      Left lower leg: Edema present.   Skin:     General: Skin is warm.      Capillary Refill: Capillary refill takes less than 2 seconds.      Findings: Bruising present.      Comments: Chronic venous stasis   Neurological:      General: No focal deficit present.      Mental Status: He is alert and oriented to person, place, and time. Mental status is at baseline.      Cranial Nerves: No cranial nerve deficit.      Motor: No weakness.   Psychiatric:         Mood and Affect: Mood  normal.         Behavior: Behavior normal.         Fluids    Intake/Output Summary (Last 24 hours) at 3/25/2022 0604  Last data filed at 3/25/2022 0402  Gross per 24 hour   Intake 1440 ml   Output 800 ml   Net 640 ml       Laboratory  Recent Labs     03/23/22 2020 03/24/22  0040   WBC 8.8 9.6   RBC 4.28* 4.23*   HEMOGLOBIN 13.5* 13.3*   HEMATOCRIT 39.8* 38.3*   MCV 93.0 90.5   MCH 31.5 31.4   MCHC 33.9 34.7   RDW 47.0 46.5   PLATELETCT 238 246   MPV 9.5 9.7     Recent Labs     03/23/22 2020 03/24/22  0040 03/24/22  0601   SODIUM 124* 127* 129*   POTASSIUM 4.0 3.7 3.8   CHLORIDE 90* 91* 93*   CO2 19* 23 25   GLUCOSE 99 95 106*   BUN 5* 5* 5*   CREATININE 0.59 0.57 0.50   CALCIUM 8.2* 8.2* 8.5                   Imaging  DX-KNEE 2- RIGHT   Final Result      Soft tissue swelling. Preserved knee joint. No acute osseous abnormality.      DX-CHEST-PORTABLE (1 VIEW)   Final Result      Subsegmental, linear bibasilar opacities are present, these are likely atelectasis.           Assessment/Plan  * Hyponatremia- (present on admission)  Assessment & Plan  Admission Sodium: 124, back to normal at 135.  Monitor BMP daily.  Continue fluid restriction and salt tabs.     Debility- (present on admission)  Assessment & Plan  Secondary to age and medical related, recently discharged with recommendation to go to SNF and patient stayed only 1 or 2 days prior to leaving.  PT OT consult.   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  Not in acute exacerbation.  DuoNebs as needed, oxygen per guidelines.  Continuous pulse ox, RT consult, incentive spirometry    Alcoholism (HCC)- (present on admission)  Assessment & Plan  Monitor for signs of withdrawal    Essential hypertension- (present on admission)  Assessment &  Plan  Continue amlodipine    Obesity- (present on admission)  Assessment & Plan  Body mass index is 40.02 kg/m².  Counseled about diet and exercise       VTE prophylaxis: enoxaparin ppx    I have performed a physical exam and reviewed and updated ROS and Plan today (3/25/2022). In review of yesterday's note (3/24/2022), there are no changes except as documented above.

## 2022-03-25 NOTE — PROGRESS NOTES
"[facility] Medicine Daily Progress Note    Date of Service  3/25/2022    Chief Complaint  Juan Manuel Bass is a 71 y.o. male admitted 3/23/2022 with GLF, increasing weakness and back pain.     Hospital Course  Mr. Bass is a 72 year old male who presented following ground level fall with increasing weakness and back pain.     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    Patient alerted EMS when he fell out of his chair and was unable to get up off the floor. He denies any head injury or loss of consciousness. He does complain of right knee pain and left back and flank pain from fall. He sustained multiple abrasions and large area of bruising to left flank and back area. Otherwise, patient denies any acute issues. Patient was recently discharged to [facility] following admission for hyponatremia, COPD exacerbation and weakness. He subsequently left AMA from [facility] stating \"they didn't do anything for me\". Patient will require PT/OT evaluation and recommendations.     In the ED patient found to be hyponatremic at 124. He was mildly tachypneic and hypertensive. CBC and CMP unremarkable. UA negative. Chest x-ray with atelectasis. EKG NSR. Patient admitted for hyponatremia and increasing weakness.       Interval Problem Update  3/24: Patient seen and examined. VSS, on home 3-4 L NC. Patient's sodium has returned to baseline, 129. Patient states pain in right knee and left back. Right knee appears swollen, x-rays shows no bony abnormalities. Patient also with large area of bruising on left back/flank area, will have nursing bill to monitor for increase in size. Patient also has chronic flaking and peeling of skin of BLE. Patient states that he was on the floor for an hour before he was able to reach his phone and call EMS. He states the he stood up and just fell, he remembers the event and has no loss of consciousness. He states the he left AMA from SNF " "following his last admission as they \"they didn't do anything for me\". Explained to patient that we will have to await PT/OT evaluation for their recommendation on if he will be able to go home with HH or will require SNF placement. Patent states that he just \"wants someone to check on me every once in a while\". States he is independent with transfers and ADLs.     3/25: Patient seen and examined. VSS, on home 3 L. Patient's sodium has normalized at 135. Patient upset regarding fluid restriction, will reevaluate. Patient also requesting that staff put lotion on his legs, will speak with RN. Awaiting PT/OT evaluation and recommendation. Patient currently not waiting SNF placement but will accept home health.     I have personally seen and examined the patient at bedside. I discussed the plan of care with patient, bedside RN and charge RN.    Consultants/Specialty  None    Code Status  DNAR/DNI    Disposition  Patient is medically cleared pending PT/OT for discharge.   Anticipate discharge to to home with organized home healthcare and close outpatient follow-up.  I have placed the appropriate orders for post-discharge needs.    Review of Systems  Review of Systems   Constitutional: Negative for chills, fever and malaise/fatigue.   HENT: Negative.    Eyes: Negative.    Respiratory: Positive for cough. Negative for sputum production and shortness of breath.    Cardiovascular: Negative.  Negative for chest pain and palpitations.   Gastrointestinal: Negative.  Negative for abdominal pain, heartburn, nausea and vomiting.   Genitourinary: Negative.    Musculoskeletal: Positive for falls and joint pain.        Right knee.   Skin:        Skin on BLE peeling and flaky.    Neurological: Positive for weakness. Negative for dizziness, focal weakness and headaches.   Endo/Heme/Allergies: Bruises/bleeds easily.   Psychiatric/Behavioral: Negative.         Physical Exam  Temp:  [36.1 °C (97 °F)-36.9 °C (98.5 °F)] 36.2 °C (97.2 " °F)  Pulse:  [77-92] 91  Resp:  [18-20] 18  BP: (111-135)/(70-88) 135/88  SpO2:  [92 %-97 %] 94 %    Physical Exam  Vitals and nursing note reviewed.   Constitutional:       Appearance: Normal appearance. He is obese.   HENT:      Head: Normocephalic.      Nose: Nose normal.      Mouth/Throat:      Mouth: Mucous membranes are moist.   Eyes:      Extraocular Movements: Extraocular movements intact.   Cardiovascular:      Rate and Rhythm: Normal rate and regular rhythm.      Pulses: Normal pulses.      Heart sounds: Normal heart sounds.   Pulmonary:      Effort: Pulmonary effort is normal.      Breath sounds: Normal breath sounds.   Abdominal:      General: There is no distension.      Palpations: Abdomen is soft.      Tenderness: There is no abdominal tenderness.   Musculoskeletal:         General: Swelling and tenderness present.      Cervical back: Normal range of motion.      Comments: Right knee.   Skin:     Capillary Refill: Capillary refill takes 2 to 3 seconds.      Findings: Bruising present.   Neurological:      General: No focal deficit present.      Mental Status: He is alert and oriented to person, place, and time. Mental status is at baseline.      Motor: Weakness present.         Fluids    Intake/Output Summary (Last 24 hours) at 3/25/2022 1041  Last data filed at 3/25/2022 0402  Gross per 24 hour   Intake 1200 ml   Output 800 ml   Net 400 ml       Laboratory  Recent Labs     03/23/22 2020 03/24/22  0040 03/25/22  0740   WBC 8.8 9.6 7.3   RBC 4.28* 4.23* 4.54*   HEMOGLOBIN 13.5* 13.3* 14.3   HEMATOCRIT 39.8* 38.3* 42.7   MCV 93.0 90.5 94.1   MCH 31.5 31.4 31.5   MCHC 33.9 34.7 33.5*   RDW 47.0 46.5 50.0   PLATELETCT 238 246 266   MPV 9.5 9.7 9.7     Recent Labs     03/24/22  0040 03/24/22  0601 03/25/22  0740   SODIUM 127* 129* 135   POTASSIUM 3.7 3.8 4.4   CHLORIDE 91* 93* 98   CO2 23 25 27   GLUCOSE 95 106* 113*   BUN 5* 5* 5*   CREATININE 0.57 0.50 0.54   CALCIUM 8.2* 8.5 8.8                    Imaging  DX-KNEE 2- RIGHT   Final Result      Soft tissue swelling. Preserved knee joint. No acute osseous abnormality.      DX-CHEST-PORTABLE (1 VIEW)   Final Result      Subsegmental, linear bibasilar opacities are present, these are likely atelectasis.           Assessment/Plan  * Hyponatremia- (present on admission)  Assessment & Plan  Admission Sodium: 124, back to normal at 135.  Monitor BMP daily.  Continue fluid restriction and salt tabs.     Debility- (present on admission)  Assessment & Plan  Secondary to age and medical related, recently discharged with recommendation to go to SNF and patient stayed only 1 or 2 days prior to leaving.  PT OT consult.   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  Not in acute exacerbation.  DuoNebs as needed, oxygen per guidelines.  Continuous pulse ox, RT consult, incentive spirometry    Alcoholism (HCC)- (present on admission)  Assessment & Plan  Monitor for signs of withdrawal    Essential hypertension- (present on admission)  Assessment & Plan  Continue amlodipine    Obesity- (present on admission)  Assessment & Plan  Body mass index is 40.02 kg/m².  Counseled about diet and exercise       VTE prophylaxis: enoxaparin ppx    I have performed a physical exam and reviewed and updated ROS and Plan today (3/25/2022). In review of yesterday's note (3/24/2022), there are no changes except as documented above.

## 2022-03-25 NOTE — PROGRESS NOTES
Admission notes reviewed at request of COPD team for an all cause 30 day readmissio from his 3/11 COPD exacerbation admission. Patient currently admitted for hyponatremia and fall. No e/o COPD exacerbation. Needs routine outpatient follow-up with pulmonary and sleep medicine.         Caroline Dumont MD  Pulmonary and Critical Care Medicine  [facility]

## 2022-03-25 NOTE — CARE PLAN
Decrease warfarin dose to 2.5 mg Tue, Sat & 5 mg Mon, Wed, Thurs, Fri, Sun (30 mg/week).  Recheck INR in 1 week.         Orientation: Alert and oriented times four.  HNT: WDL  Respiratory: 4L NC O2 baseline. Rhonchi.  O2: 4L [place]ren[place].   Cardiac: S1S2. DP pulses palpable. Edema BLE.   GI: Voiding.   : Audible and active bowel sounds. One BM today.   IV: PIV.Clean dry and intact. Flushed.   Skin: Bruising to left back. Outlined by this RN. Dry flakey BLE legs.   VS: Stable  Oob: oob with stand by assistance. Pt ambulated to the bathroom. Pt following.   Pain: Pt c/o of pain 10/10. Norco decreases pain 7/10 pain which he said is tolerable and his goal. Pt refuses morphine states it does nothing for him. Pt preferred tylenol and Norco.  Patient safety: Hourly rounding completed. Call bell within reach and pt educated on assistance. Pt remained free from falls and injury.      12 hour cc complete      Problem: Knowledge Deficit - Standard  Goal: Patient and family/care givers will demonstrate understanding of plan of care, disease process/condition, diagnostic tests and medications  Outcome: Progressing     Problem: Fall Risk  Goal: Patient will remain free from falls  Outcome: Progressing     Problem: Pain - Standard  Goal: Alleviation of pain or a reduction in pain to the patient’s comfort goal  Outcome: Progressing     Problem: Skin Integrity  Goal: Skin integrity is maintained or improved  Outcome: Progressing   The patient is Watcher - Medium risk of patient condition declining or worsening    Shift Goals  Clinical Goals: Fluid restrict, monitor labs  Patient Goals: Sleep, pain control    Progress made toward(s) clinical / shift goals:  Pain control    Patient is not progressing towards the following goals:

## 2022-03-25 NOTE — THERAPY
"Physical Therapy   Initial Evaluation     Patient Name: Juan Manuel Bass  Age:  71 y.o., Sex:  male  Medical Record #: [id number]  Today's Date: 3/25/2022     Precautions  Precautions: Fall Risk  Comments: 3L O2 baseline    Assessment  Patient is 71 y.o. male who was admitted with hyponatremia s/p fall at home. Pt was recently admitted and dc'ed to a SNF, where he left after a few days per chart. 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 Pt received in a chair at bedside and eager to ambulate for therapy evaluation. Pt presents with generalized weakness and impaired balance, necessitating need for a FWW for safety with ambulation and transfers. Pt was able to tolerate walking 100ft and completed stair training in a 6\" step x2 with CGA. Pt will benefit from further PT in house to progress and is safe to ambulate with nursing staff as well. Recommending HHPT and a FWW for return home as pt is declining SNF.    Plan    Recommend Physical Therapy 3 times per week until therapy goals are met for the following treatments:  Bed Mobility, Gait Training, Neuro Re-Education / Balance, Stair Training, Therapeutic Activities, and Therapeutic Exercises    DC Equipment Recommendations: Front-Wheel Walker  Discharge Recommendations: Recommend home health for continued physical therapy services       Subjective    \"I'm going to go home from here\"     Objective       03/25/22 1106   Initial Contact Note    Initial Contact Note Order Received and Verified, Physical Therapy Evaluation in Progress with Full Report to Follow.   Precautions   Precautions Fall Risk   Comments 3L O2 baseline   Vitals   O2 (LPM) 3   O2 Delivery Device Silicone Nasal Cannula   Vitals Comments VSS throughout session.   Pain 0 - 10 Group   Therapist Pain Assessment   (Pre-medicated, denied acute pain)   Prior Living Situation   Prior Services Home-Independent  (recently at SNF and left after a couple days)   Housing / Facility Motor Home   Lives with - " Patient's Self Care Capacity Alone and Unable to Care For Self   Comments Pt reports living in a motor home with 3STE and 2 steps to the bedroom. Pt lives alone. No prior use of AD. Reports that he pays a neighbor to assist him at times.   Prior Level of Functional Mobility   Comments Independent PTA, although having trouble with ambulation due to weakness.   History of Falls   History of Falls Yes   Date of Last Fall   (reason for admit)   Cognition    Cognition / Consciousness WDL   Level of Consciousness Alert   Passive ROM Lower Body   Comments WFL   Strength Lower Body   Comments Grossly generalized weakness, likely baseline level. No buckling in standing. Used FWW for safety/support.   Balance Assessment   Sitting Balance (Static) Good   Sitting Balance (Dynamic) Good   Standing Balance (Static) Fair +   Standing Balance (Dynamic) Fair   Weight Shift Sitting Good   Weight Shift Standing Fair   Comments FWW for stability in standing   Gait Analysis   Gait Level Of Assist Contact Guard Assist   Assistive Device Front Wheel Walker   Distance (Feet) 100   # of Times Distance was Traveled 1   Deviation Increased Base Of Support;Shuffled Gait   Comments Provided cues for upright posture, taking breaks as needed, and managing energy level to know when to turn around and head back to the room.   Bed Mobility    Comments Received and returned to chair. Reports supervision, suspect HOB elevated during transfer.   Functional Mobility   Sit to Stand Standby Assist   Bed, Chair, Wheelchair Transfer Standby Assist   Transfer Method Stand Step   How much difficulty does the patient currently have...   Turning over in bed (including adjusting bedclothes, sheets and blankets)? 3   Sitting down on and standing up from a chair with arms (e.g., wheelchair, bedside commode, etc.) 2   Moving from lying on back to sitting on the side of the bed? 2   How much help from another person does the patient currently need...   Moving to and  "from a bed to a chair (including a wheelchair)? 3   Need to walk in a hospital room? 3   Climbing 3-5 steps with a railing? 3   6 clicks Mobility Score 16   Activity Tolerance   Comments Tolerated ambulation >10 min in standing. Received in and returned to chair at bedside.   Patient / Family Goals    Patient / Family Goal #1 \"to go home\"   Short Term Goals    Short Term Goal # 1 Pt will perform sup<>sit with supervision in 6 visits.   Short Term Goal # 2 Pt will perform transfers with FWW and supervision in 6 visits.   Short Term Goal # 3 Pt will ambulate 150ft with FWW and supervision in 6 visits.   Education Group   Education Provided Role of Physical Therapist   Role of Physical Therapist Patient Response Patient;Acceptance;Explanation;Verbal Demonstration   Problem List    Problems Impaired Ambulation;Functional Strength Deficit;Decreased Activity Tolerance   Anticipated Discharge Equipment and Recommendations   DC Equipment Recommendations Front-Wheel Walker   Discharge Recommendations Recommend home health for continued physical therapy services   Interdisciplinary Plan of Care Collaboration   IDT Collaboration with  Nursing;Occupational Therapist   Patient Position at End of Therapy Seated;Chair Alarm On;Call Light within Reach;Tray Table within Reach;Phone within Reach   Collaboration Comments brook WHITING   Session Information   Date / Session Number  3/25- 1(1/3, 3/31)     "

## 2022-03-25 NOTE — DOCUMENTATION QUERY
"                                                                         [facility]                                                                       Query Response Note      PATIENT:               BRIDGETTE VARNER  ACCT #:                  [id number]  MRN:                     [id number]  :                      1950  ADMIT DATE:       3/23/2022 8:12 PM  DISCH DATE:          RESPONDING  PROVIDER #:        [id number]           QUERY TEXT:    Chronic home 02 use is documented in the Medical Record. Please further specify the condition this patient has    NOTE:  If an appropriate response is not listed below, please respond with a new note.    If you have any questions please contact:  Patti Nguyễn RN CDI [facility]  [email or web address]  Patti Nguyễn [address] [employer]      The patient's Clinical Indicators include:  71 year old male  with COPD on 3L home O2, admitted after a fall for hyponatremia.    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  Not in acute exacerbation\"    RN flowsheets pt on 4L with sat's in mid 90's    Risk factors: COPD on home O2  Treatment 4L  Options provided:   -- Chronic Respiratory Failure patient close to baseline oxygen requirements   -- Acute on chronic respiratory failure   -- Unable to determine      Query created by: Patti Nguyễn on 3/24/2022 2:02 PM    RESPONSE TEXT:    Chronic Respiratory Failure patient close to baseline oxygen requirements          Electronically signed by:  CK ODOM MD 3/25/2022 10:31 AM              "

## 2022-03-25 NOTE — FACE TO FACE
Face to Face Note  -  Durable Medical Equipment    Raghavendra Beatty D.O. - NPI: [id number]  I certify that this patient is under my care and that they have had a durable medical equipment(DME)face to face encounter by myself that meets the physician DME face-to-face encounter requirements with this patient on:    Date of encounter:   Patient:                    MRN:                       YOB: 2022  Juan Manuel Bass  [id number]  1950     The encounter with the patient was in whole, or in part, for the following medical condition, which is the primary reason for durable medical equipment:  COPD    I certify that, based on my findings, the following durable medical equipment is medically necessary:  Walkers.    HOME O2 Saturation Measurements:(Values must be present for Home Oxygen orders)         ,     ,         My Clinical findings support the need for the above equipment due to:  Abnormal Gait    Supporting Symptoms:      ------------------------------------------------------------------------------------------------------------------    Face to Face Supporting Documentation - Home Health    The encounter with this patient was in whole or in part the primary reason for home health admission.    Date of encounter:   Patient:                    MRN:                       YOB: 2022  Juan Manuel Bass  [id number]  1950     Home health to see patient for:  Skilled Nursing care for assessment, interventions & education, Physical Therapy evaluation and treatment and Occupational therapy evaluation and treatment    Skilled need for:  Recent Deterioration of Health Status COPD, Falls      Homebound evidenced status by:  Needs the assistance of another person in order to leave the home. Leaving home must require a considerable and taxing effort. There must exist a normal inability to leave the home.    Community Physician to provide follow up care: Peterson Amin M.D.     Optional  Interventions    Wound information & treatment:    Home Infusion Therapy orders:    Line/Drain/Airway:    I certify the face to face encounter for this home care referral meets the CMS requirements and the encounter/clinical assessment with the patient was, in whole, or in part, for the medical condition(s) listed above, which is the primary reason for home health care. Based on my clinical findings: the service(s) are medically necessary, support the need for home health care, and the homebound criteria are met.  I certify that this patient has had a face to face encounter by myself.  Raghavendra Beatty D.O. - NPI: [id number]    *Debility, frailty and advanced age in the absence of an acute deterioration or exacerbation of a condition do not qualify a patient for home health.

## 2022-03-25 NOTE — THERAPY
"Occupational Therapy   Initial Evaluation     Patient Name: Juan Manuel Bass  Age:  71 y.o., Sex:  male  Medical Record #: [id number]  Today's Date: 3/25/2022     Precautions  Precautions: Fall Risk    Assessment  Patient at / near baseline. DC OT    Plan    Recommend Occupational Therapy for Evaluation only.    DC Equipment Recommendations: Front-Wheel Walker (reports SC won't fit in bathroom)  Discharge Recommendations: Recommend home health for continued occupational therapy services        Objective       03/25/22 1131   Prior Living Situation   Comments motor home, alone, 3 NIALL and 2 to access bedroom, single HR. no AE at baseline. mod I ADLs and mobility, Neighbor is paid to assist with LB ADLs and IADLs as needed. 3 L o2 at baseline.   Balance Assessment   Comments FWW, self reports \"at home I stand and wait so I don't get dizzy and drop\"   ADL Assessment   Comments cued for task modifications due to bruising   Functional Mobility   Mobility FWW, has office chair he says he sit in to do most ADLs and for lesiure. \"It's right next to the kitchen and the bathroom\"   Patient / Family Goals   Patient / Family Goal #1 eval only             "

## 2022-03-26 LAB
ANION GAP SERPL CALC-SCNC: 11 MMOL/L (ref 7–16)
BASOPHILS # BLD AUTO: 0.2 % (ref 0–1.8)
BASOPHILS # BLD: 0.01 K/UL (ref 0–0.12)
BUN SERPL-MCNC: 7 MG/DL (ref 8–22)
CALCIUM SERPL-MCNC: 8.5 MG/DL (ref 8.5–10.5)
CHLORIDE SERPL-SCNC: 97 MMOL/L (ref 96–112)
CO2 SERPL-SCNC: 22 MMOL/L (ref 20–33)
CREAT SERPL-MCNC: 0.66 MG/DL (ref 0.5–1.4)
EOSINOPHIL # BLD AUTO: 0.26 K/UL (ref 0–0.51)
EOSINOPHIL NFR BLD: 4 % (ref 0–6.9)
ERYTHROCYTE [DISTWIDTH] IN BLOOD BY AUTOMATED COUNT: 49.7 FL (ref 35.9–50)
GFR SERPLBLD CREATININE-BSD FMLA CKD-EPI: 100 ML/MIN/1.73 M 2
GLUCOSE SERPL-MCNC: 106 MG/DL (ref 65–99)
HCT VFR BLD AUTO: 39.4 % (ref 42–52)
HGB BLD-MCNC: 13.2 G/DL (ref 14–18)
IMM GRANULOCYTES # BLD AUTO: 0.04 K/UL (ref 0–0.11)
IMM GRANULOCYTES NFR BLD AUTO: 0.6 % (ref 0–0.9)
LYMPHOCYTES # BLD AUTO: 1.03 K/UL (ref 1–4.8)
LYMPHOCYTES NFR BLD: 15.8 % (ref 22–41)
MAGNESIUM SERPL-MCNC: 1.9 MG/DL (ref 1.5–2.5)
MCH RBC QN AUTO: 31.4 PG (ref 27–33)
MCHC RBC AUTO-ENTMCNC: 33.5 G/DL (ref 33.7–35.3)
MCV RBC AUTO: 93.6 FL (ref 81.4–97.8)
MONOCYTES # BLD AUTO: 0.55 K/UL (ref 0–0.85)
MONOCYTES NFR BLD AUTO: 8.5 % (ref 0–13.4)
NEUTROPHILS # BLD AUTO: 4.61 K/UL (ref 1.82–7.42)
NEUTROPHILS NFR BLD: 70.9 % (ref 44–72)
NRBC # BLD AUTO: 0 K/UL
NRBC BLD-RTO: 0 /100 WBC
OSMOLALITY SERPL: 275 MOSM/KG H2O (ref 278–298)
PHOSPHATE SERPL-MCNC: 3.6 MG/DL (ref 2.5–4.5)
PLATELET # BLD AUTO: 246 K/UL (ref 164–446)
PMV BLD AUTO: 9.9 FL (ref 9–12.9)
POTASSIUM SERPL-SCNC: 4.1 MMOL/L (ref 3.6–5.5)
RBC # BLD AUTO: 4.21 M/UL (ref 4.7–6.1)
SODIUM SERPL-SCNC: 130 MMOL/L (ref 135–145)
WBC # BLD AUTO: 6.5 K/UL (ref 4.8–10.8)

## 2022-03-26 PROCEDURE — 94664 DEMO&/EVAL PT USE INHALER: CPT

## 2022-03-26 PROCEDURE — A9270 NON-COVERED ITEM OR SERVICE: HCPCS | Performed by: STUDENT IN AN ORGANIZED HEALTH CARE EDUCATION/TRAINING PROGRAM

## 2022-03-26 PROCEDURE — 99232 SBSQ HOSP IP/OBS MODERATE 35: CPT | Performed by: INTERNAL MEDICINE

## 2022-03-26 PROCEDURE — 80048 BASIC METABOLIC PNL TOTAL CA: CPT

## 2022-03-26 PROCEDURE — 84100 ASSAY OF PHOSPHORUS: CPT

## 2022-03-26 PROCEDURE — 700102 HCHG RX REV CODE 250 W/ 637 OVERRIDE(OP): Performed by: INTERNAL MEDICINE

## 2022-03-26 PROCEDURE — 700111 HCHG RX REV CODE 636 W/ 250 OVERRIDE (IP): Performed by: STUDENT IN AN ORGANIZED HEALTH CARE EDUCATION/TRAINING PROGRAM

## 2022-03-26 PROCEDURE — 770004 HCHG ROOM/CARE - ONCOLOGY PRIVATE *

## 2022-03-26 PROCEDURE — A9270 NON-COVERED ITEM OR SERVICE: HCPCS | Performed by: INTERNAL MEDICINE

## 2022-03-26 PROCEDURE — 36415 COLL VENOUS BLD VENIPUNCTURE: CPT

## 2022-03-26 PROCEDURE — 83735 ASSAY OF MAGNESIUM: CPT

## 2022-03-26 PROCEDURE — 83930 ASSAY OF BLOOD OSMOLALITY: CPT

## 2022-03-26 PROCEDURE — 85025 COMPLETE CBC W/AUTO DIFF WBC: CPT

## 2022-03-26 PROCEDURE — 700102 HCHG RX REV CODE 250 W/ 637 OVERRIDE(OP): Performed by: STUDENT IN AN ORGANIZED HEALTH CARE EDUCATION/TRAINING PROGRAM

## 2022-03-26 RX ADMIN — UMECLIDINIUM BROMIDE AND VILANTEROL TRIFENATATE 1 PUFF: 62.5; 25 POWDER RESPIRATORY (INHALATION) at 05:33

## 2022-03-26 RX ADMIN — ENOXAPARIN SODIUM 40 MG: 40 INJECTION SUBCUTANEOUS at 05:32

## 2022-03-26 RX ADMIN — HYDROCODONE BITARTRATE AND ACETAMINOPHEN 2 TABLET: 5; 325 TABLET ORAL at 07:53

## 2022-03-26 RX ADMIN — OMEPRAZOLE 20 MG: 20 CAPSULE, DELAYED RELEASE ORAL at 05:33

## 2022-03-26 RX ADMIN — HYDROCODONE BITARTRATE AND ACETAMINOPHEN 2 TABLET: 5; 325 TABLET ORAL at 21:48

## 2022-03-26 RX ADMIN — ACETAMINOPHEN 650 MG: 325 TABLET, FILM COATED ORAL at 02:32

## 2022-03-26 RX ADMIN — AMLODIPINE BESYLATE 5 MG: 5 TABLET ORAL at 05:33

## 2022-03-26 RX ADMIN — SENNOSIDES AND DOCUSATE SODIUM 2 TABLET: 50; 8.6 TABLET ORAL at 18:31

## 2022-03-26 RX ADMIN — SODIUM CHLORIDE 1 G: 1 TABLET ORAL at 05:32

## 2022-03-26 RX ADMIN — HYDROCODONE BITARTRATE AND ACETAMINOPHEN 2 TABLET: 5; 325 TABLET ORAL at 15:34

## 2022-03-26 RX ADMIN — FLUTICASONE PROPIONATE 88 MCG: 44 AEROSOL, METERED RESPIRATORY (INHALATION) at 05:33

## 2022-03-26 ASSESSMENT — ENCOUNTER SYMPTOMS
BRUISES/BLEEDS EASILY: 1
RESPIRATORY NEGATIVE: 1
DIZZINESS: 0
VOMITING: 0
GASTROINTESTINAL NEGATIVE: 1
CONSTITUTIONAL NEGATIVE: 1
MYALGIAS: 1
DIARRHEA: 0
CONSTIPATION: 0
BLURRED VISION: 0
PALPITATIONS: 0
EYES NEGATIVE: 1
FEVER: 0
HEADACHES: 0
WEAKNESS: 1
SHORTNESS OF BREATH: 1
FALLS: 1
NAUSEA: 0
SORE THROAT: 0
ABDOMINAL PAIN: 0
CARDIOVASCULAR NEGATIVE: 1
PSYCHIATRIC NEGATIVE: 1
DEPRESSION: 0
COUGH: 0
WEAKNESS: 0
NERVOUS/ANXIOUS: 0
CHILLS: 0

## 2022-03-26 ASSESSMENT — PAIN DESCRIPTION - PAIN TYPE
TYPE: ACUTE PAIN

## 2022-03-26 ASSESSMENT — FIBROSIS 4 INDEX: FIB4 SCORE: 1.32

## 2022-03-26 NOTE — DISCHARGE PLANNING
Received Choice form at 1710 3/25  [facility]  Referral sent per Choice form @ 0879 3/26

## 2022-03-26 NOTE — RESPIRATORY CARE
COPD EDUCATION by COPD CLINICAL EDUCATOR  (Phone: [phone])  3/26/2022 at 3:40 PM by Purvi Mcintyre RRT     Patient was interviewed by Respiratory Education team for COPD program. Patient refused COPD program. He was recently here and has been readmitted. Requested IP Pulmonary to review. He has our information packet about lung disease. We discussed his readmission and upcoming referrals. Provided contact information for Pulmonary groups in town.. His insurance is not with in [id number].     COPD Screen  COPD Risk Screening  Do you have a history of COPD?: Yes  Do you have a Pulmonologist?: No  COPD Population Screener  During the past 4 weeks, how much did you feel short of breath?: Some of the time  Do you ever cough up any mucus or phlegm?: Yes, every day  In the past 12 months, you do less than you used to because of your breathing problems: Strongly agree  Have you smoked at least 100 cigarettes in your entire life?: Yes  How old are you?: 60+  COPD Screening Score: 9    COPD Assessment  COPD Clinical Specialists ONLY  COPD Education Initiated: Yes--Short Intervention (Readmission NOT for COPD here for low Na and GLF need SNF but left last AMA Review of meds and Oxygen)  DME Company: Preferred DME  DME Equipment Type: Oxygen 3 lpm  Physician Name: BRIAN Amin provided ph # for pt to followup.  Pulmonologist Name: Has referrral for [facility] or [facility] gave Numbers  Referrals Initiated: Yes  Pulmonary Rehab:  (has information)  Smoking Cessation: No (quit >2 y)  Hospice: N/A  Home Health Care:  (was at SNF will need F/U pending)  [facility]iatric Specialty Group: N/A  Dispatch Health: Declined (gave flyer to consider)  Is this a COPD exacerbation patient?: No  $ Demo/Eval of SVN's, MDI's and Aerosols: Yes (review all his meds and delivery Oxygen safety)    PFT Results    No results found for: PFT    Meds to Beds  Would the patient like to opt in for Bedside Medication Delivery at Discharge?: Yes,  "interested     MY COPD ACTION PLAN     It is recommended that patients and physicians /healthcare providers complete this action plan together. This plan should be discussed at each physician visit and updated as needed.    The green, yellow and red zones show groups of symptoms of COPD. This list of symptoms is not comprehensive, and you may experience other symptoms. In the \"Actions\" column, your healthcare provider has recommended actions for you to take based on your symptoms.    Patient Name: Juan Manuel Bass   YOB: 1950   Last Updated on:     Green Zone:  I am doing well today Actions   •  Usual activitiy and exercise level •  Take daily medications   •  Usual amounts of cough and phlegm/mucus •  Use oxygen as prescribed   •  Sleep well at night •  Continue regular exercise/diet plan   •  Appetite is good •  At all times avoid cigarette smoke, inhaled irritants     Daily Medications (these medications are taken every day):   Fluticasone and Umeclidinium and Vilanterol (Trelogy) 1 Puff Once daily     Additional Information:  Please remember to RINSE MOUTH after taking this medicine    Yellow Zone:  I am having a bad day or a COPD flare Actions   •  More breathless than usual •  Continue daily medications   •  I have less energy for my daily activities •  Use quick relief inhaler as ordered   •  Increased or thicker phlegm/mucus •  Use oxygen as prescribed   •  Using quick relief inhaler/nebulizer more often •  Get plenty of rest   •  Swelling of ankles more than usual •  Use pursed lip breathing   •  More coughing than usual •  At all times avoid cigarette smoke, inhaled irritants   •  I feel like I have a \"chest cold\"   •  Poor sleep and my symptoms woke me up   •  My appetite is not good   •  My medicine is not helping    •  Call provider immediately if symptoms don’t improve     Continue daily medications, add rescue medications:   Albuterol 2 Puffs         Medications to be used during a flare up, " (as Discussed with Provider):           Additional Information:  Take as directed by your Doctor and use the spacer    Red Zone:  I need urgent medical care Actions   •  Severe shortness of breath even at rest •  Call 911 or seek medical care immediately   •  Not able to do any activity because of breathing    •  Fever or shaking chills    •  Feeling confused or very drowsy     •  Chest pains    •  Coughing up blood

## 2022-03-26 NOTE — PROGRESS NOTES
"[facility] Medicine Daily Progress Note    Date of Service  3/26/2022    Chief Complaint  Juan Manuel Bass is a 71 y.o. male admitted 3/23/2022 with GLF, increasing weakness and back pain..    Hospital Course  Mr. Bass is a 72 year old male who presented following ground level fall with increasing weakness and back pain.     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    Patient alerted EMS when he fell out of his chair and was unable to get up off the floor. He denies any head injury or loss of consciousness. He does complain of right knee pain and left back and flank pain from fall. He sustained multiple abrasions and large area of bruising to left flank and back area. Otherwise, patient denies any acute issues. Patient was recently discharged to [facility] following admission for hyponatremia, COPD exacerbation and weakness. He subsequently left AMA from [facility] stating \"they didn't do anything for me\". Patient will require PT/OT evaluation and recommendations.     In the ED patient found to be hyponatremic at 124. He was mildly tachypneic and hypertensive. CBC and CMP unremarkable. UA negative. Chest x-ray with atelectasis. EKG NSR. Patient admitted for hyponatremia and increasing weakness.       Interval Problem Update  3/24: Patient seen and examined. VSS, on home 3-4 L NC. Patient's sodium has returned to baseline, 129. Patient states pain in right knee and left back. Right knee appears swollen, x-rays shows no bony abnormalities. Patient also with large area of bruising on left back/flank area, will have nursing bill to monitor for increase in size. Patient also has chronic flaking and peeling of skin of BLE. Patient states that he was on the floor for an hour before he was able to reach his phone and call EMS. He states the he stood up and just fell, he remembers the event and has no loss of consciousness. He states the he left AMA from SNF " "following his last admission as they \"they didn't do anything for me\". Explained to patient that we will have to await PT/OT evaluation for their recommendation on if he will be able to go home with HH or will require SNF placement. Patent states that he just \"wants someone to check on me every once in a while\". States he is independent with transfers and ADLs.      3/25: Patient seen and examined. VSS, on home 3 L. Patient's sodium has normalized at 135. Patient upset regarding fluid restriction, will reevaluate. Patient also requesting that staff put lotion on his legs, will speak with RN. Awaiting PT/OT evaluation and recommendation. Patient currently not waiting SNF placement but will accept home health.     3/26: Patient seen and examined. VSS, remains on home 3 L. Patient's sodium at 130 this morning which is baseline for patient. PT/OT recommending home with home health and walker. There was some concern from EMS regarding safety of the floor in patient's mobile home, per patient the floor is \"soft\" which is normal. Case management is working on arranging this. Once home health is arranged, patient is medically cleared to discharge home.     I have personally seen and examined the patient at bedside. I discussed the plan of care with patient, family and bedside RN.    Consultants/Specialty  None    Code Status  DNAR/DNI    Disposition  Patient is medically cleared for discharge.   Anticipate discharge to to home with organized home healthcare and close outpatient follow-up.  I have placed the appropriate orders for post-discharge needs.    Review of Systems  Review of Systems   Constitutional: Negative.    HENT: Negative.    Eyes: Negative.    Respiratory: Negative.    Cardiovascular: Negative.    Gastrointestinal: Negative.    Genitourinary: Negative.    Musculoskeletal: Positive for joint pain.        Right knee   Skin:        BLE skin flaking and peeling.   Neurological: Positive for weakness. "   Endo/Heme/Allergies: Bruises/bleeds easily.   Psychiatric/Behavioral: Negative.         Physical Exam  Temp:  [36.2 °C (97.1 °F)-36.7 °C (98.1 °F)] 36.7 °C (98.1 °F)  Pulse:  [80-87] 83  Resp:  [16-19] 18  BP: (132-149)/(64-80) 149/64  SpO2:  [95 %-98 %] 98 %    Physical Exam  Vitals and nursing note reviewed.   Constitutional:       Appearance: Normal appearance. He is obese.   HENT:      Mouth/Throat:      Mouth: Mucous membranes are moist.   Eyes:      Extraocular Movements: Extraocular movements intact.   Cardiovascular:      Rate and Rhythm: Normal rate and regular rhythm.      Pulses: Normal pulses.      Heart sounds: Normal heart sounds.   Pulmonary:      Effort: Pulmonary effort is normal.      Breath sounds: Normal breath sounds.   Abdominal:      Palpations: Abdomen is soft.   Musculoskeletal:         General: Swelling and tenderness present.      Cervical back: Normal range of motion.      Comments: Right knee.   Skin:     General: Skin is warm.      Capillary Refill: Capillary refill takes 2 to 3 seconds.      Findings: Bruising present.      Comments: BLE peeling and flaky.  Left back and flank bruising.    Neurological:      General: No focal deficit present.      Mental Status: He is alert and oriented to person, place, and time. Mental status is at baseline.   Psychiatric:         Mood and Affect: Mood normal.         Thought Content: Thought content normal.         Fluids    Intake/Output Summary (Last 24 hours) at 3/26/2022 1034  Last data filed at 3/26/2022 0600  Gross per 24 hour   Intake 400 ml   Output 600 ml   Net -200 ml       Laboratory  Recent Labs     03/24/22  0040 03/25/22  0740 03/26/22  0055   WBC 9.6 7.3 6.5   RBC 4.23* 4.54* 4.21*   HEMOGLOBIN 13.3* 14.3 13.2*   HEMATOCRIT 38.3* 42.7 39.4*   MCV 90.5 94.1 93.6   MCH 31.4 31.5 31.4   MCHC 34.7 33.5* 33.5*   RDW 46.5 50.0 49.7   PLATELETCT 246 266 246   MPV 9.7 9.7 9.9     Recent Labs     03/24/22  0601 03/25/22  0740 03/26/22  0055    SODIUM 129* 135 130*   POTASSIUM 3.8 4.4 4.1   CHLORIDE 93* 98 97   CO2 25 27 22   GLUCOSE 106* 113* 106*   BUN 5* 5* 7*   CREATININE 0.50 0.54 0.66   CALCIUM 8.5 8.8 8.5                   Imaging  DX-KNEE 2- RIGHT   Final Result      Soft tissue swelling. Preserved knee joint. No acute osseous abnormality.      DX-CHEST-PORTABLE (1 VIEW)   Final Result      Subsegmental, linear bibasilar opacities are present, these are likely atelectasis.           Assessment/Plan  * Hyponatremia- (present on admission)  Assessment & Plan  Returned to baseline.  Monitor BMP daily.  Continue fluid restriction and salt tabs.     Debility- (present on admission)  Assessment & Plan  Secondary to age and medical related, recently discharged with recommendation to go to SNF and patient stayed only 1 or 2 days prior to leaving.  PT OT consult, recommended home with home health.   consult discussed discharge options with patient, awaiting home health acceptance.     Acute on chronic respiratory failure (HCC)- (present on admission)  Assessment & Plan  Secondary to COPD and suspected MICHOACANO, on 3 L home O2 chronically, currently on 3 L with good oxygen saturations.  Not in acute exacerbation.  DuoNebs as needed, oxygen per guidelines.  Continuous pulse ox, RT consult, incentive spirometry    Alcoholism (HCC)- (present on admission)  Assessment & Plan  Monitor for signs of withdrawal    Essential hypertension- (present on admission)  Assessment & Plan  Continue amlodipine    Obesity- (present on admission)  Assessment & Plan  Body mass index is 40.02 kg/m².  Counseled about diet and exercise       VTE prophylaxis: enoxaparin ppx    I have performed a physical exam and reviewed and updated ROS and Plan today (3/26/2022). In review of yesterday's note (3/25/2022), there are no changes except as documented above.

## 2022-03-26 NOTE — DISCHARGE PLANNING
Anticipated Discharge Disposition: home with [facility]    Action: choice obtained and sent to the DPA  (Patient chose [facility])    Barriers to Discharge: medical stability    Plan: weekend CM to f/u on the referral for [facility],

## 2022-03-26 NOTE — PROGRESS NOTES
"[facility] Medicine Daily Progress Note    Date of Service  3/26/2022    Chief Complaint  Juan Manuel Bass is a 71 y.o. male admitted 3/23/2022 with Shortness of breath, weakness    Hospital Course      Mr. Juan Manuel Bass is a 71 y.o. male with a history of COPD on 3L, hypertension, alcohol abuse who presented on 3/23/2022 with shortness of breath, lower extremity swelling, failure to thrive, ground-level fall.  The patient was recently admitted to [facility] from 3/12-17/2022 for a COPD exacerbation and hyponatremia.  He was discharged to [facility] only spent 2 days there because \"they did not do anything\" in terms of rehab.  The patient left the SNF against medical advice to live in his RV by himself.  He has had frequent falls and is unable to care for himself.  The patient fell getting up from his chair prior to admission.  He denies loss of consciousness or head trauma.  He was on the ground for about an hour until his neighbor found him and called EMS.  EMS felt that his living situation was unacceptable as the floor is about to cave in.  On presentation he was found to have hyponatremia and started on fluid restriction and salt tabs.  CXR was concerning for atelectasis.  Knee xray showed swelling, but no acute osseous abnormality.    Interval Problem Update  Patient was seen and examined at bedside.  I have personally reviewed and interpreted vitals, labs, and imaging.    3/24.  Afebrile.  Has been tachypneic but this is improved.  On 4 L nasal cannula.  Denies fevers, chills, chest pains.  States he is short of breath when he moves.  Complains of knee pain and back pain from his fall.  PT/OT eval pending   3/25.  Afebrile.  Tachypnea has resolved.  On 4 L nasal cannula.  Denies fevers, chills, chest pains.  Sodium has normalized.  Upset about fluid restriction.  Does not want placement.  HH ordered.    3/26.  Afebrile.  Stable vitals.  On 2-3 L nasal cannula.  Sodium 130, which is his baseline.  Denies fever, " "chills, chest pain, shortness of breath.  Reports persistent knee pain, left flank pain.  Ecchymosis is improved.  States that he has a \"soft floor\" and his RV is livable.  He wants to go home with home health.    I have personally seen and examined the patient at bedside. I discussed the plan of care with patient, bedside RN and .    Consultants/Specialty  None    Code Status  DNAR/DNI    Disposition  Patient is not medically cleared for discharge.   Anticipate discharge to to skilled nursing facility, but just left AMA from SNF  I have placed the appropriate orders for post-discharge needs.    Review of Systems  Review of Systems   Constitutional: Negative for chills and fever.   HENT: Negative for congestion and sore throat.    Eyes: Negative for blurred vision.   Respiratory: Positive for shortness of breath. Negative for cough.    Cardiovascular: Negative for chest pain, palpitations and leg swelling.   Gastrointestinal: Negative for abdominal pain, constipation, diarrhea, nausea and vomiting.   Genitourinary: Negative for dysuria, frequency and urgency.   Musculoskeletal: Positive for falls, joint pain and myalgias.   Skin: Negative for rash.   Neurological: Negative for dizziness, weakness and headaches.   Psychiatric/Behavioral: Negative for depression. The patient is not nervous/anxious.    All other systems reviewed and are negative.       Physical Exam  Temp:  [36.2 °C (97.1 °F)-36.7 °C (98.1 °F)] 36.7 °C (98.1 °F)  Pulse:  [80-87] 83  Resp:  [16-19] 18  BP: (132-149)/(64-80) 149/64  SpO2:  [95 %-98 %] 98 %    Physical Exam  Vitals and nursing note reviewed.   Constitutional:       Appearance: Normal appearance. He is obese. He is ill-appearing.   HENT:      Head: Normocephalic.      Nose: Nose normal.      Mouth/Throat:      Mouth: Mucous membranes are moist.      Pharynx: Oropharynx is clear.   Eyes:      Extraocular Movements: Extraocular movements intact.      Conjunctiva/sclera: " Conjunctivae normal.   Cardiovascular:      Rate and Rhythm: Normal rate and regular rhythm.      Pulses: Normal pulses.      Heart sounds: Normal heart sounds. No murmur heard.    No friction rub. No gallop.   Pulmonary:      Effort: Pulmonary effort is normal. Tachypnea present. No respiratory distress.      Breath sounds: Wheezing present. No rales.   Chest:      Chest wall: No tenderness.   Abdominal:      General: Abdomen is flat. Bowel sounds are normal. There is no distension.      Palpations: Abdomen is soft. There is no mass.      Tenderness: There is no abdominal tenderness. There is no guarding.   Musculoskeletal:         General: Normal range of motion.      Cervical back: Normal range of motion and neck supple.      Right lower leg: Edema present.      Left lower leg: Edema present.   Skin:     General: Skin is warm.      Capillary Refill: Capillary refill takes less than 2 seconds.      Findings: Bruising present.      Comments: Chronic venous stasis   Neurological:      General: No focal deficit present.      Mental Status: He is alert and oriented to person, place, and time. Mental status is at baseline.      Cranial Nerves: No cranial nerve deficit.      Motor: No weakness.   Psychiatric:         Mood and Affect: Mood normal.         Behavior: Behavior normal.         Fluids    Intake/Output Summary (Last 24 hours) at 3/26/2022 0816  Last data filed at 3/26/2022 0600  Gross per 24 hour   Intake 400 ml   Output 600 ml   Net -200 ml       Laboratory  Recent Labs     03/24/22  0040 03/25/22  0740 03/26/22  0055   WBC 9.6 7.3 6.5   RBC 4.23* 4.54* 4.21*   HEMOGLOBIN 13.3* 14.3 13.2*   HEMATOCRIT 38.3* 42.7 39.4*   MCV 90.5 94.1 93.6   MCH 31.4 31.5 31.4   MCHC 34.7 33.5* 33.5*   RDW 46.5 50.0 49.7   PLATELETCT 246 266 246   MPV 9.7 9.7 9.9     Recent Labs     03/24/22  0601 03/25/22  0740 03/26/22  0055   SODIUM 129* 135 130*   POTASSIUM 3.8 4.4 4.1   CHLORIDE 93* 98 97   CO2 25 27 22   GLUCOSE 106*  113* 106*   BUN 5* 5* 7*   CREATININE 0.50 0.54 0.66   CALCIUM 8.5 8.8 8.5                   Imaging  DX-KNEE 2- RIGHT   Final Result      Soft tissue swelling. Preserved knee joint. No acute osseous abnormality.      DX-CHEST-PORTABLE (1 VIEW)   Final Result      Subsegmental, linear bibasilar opacities are present, these are likely atelectasis.           Assessment/Plan  * Hyponatremia- (present on admission)  Assessment & Plan  Returned to baseline.  Monitor BMP daily.  Continue fluid restriction and salt tabs.     Debility- (present on admission)  Assessment & Plan  Secondary to age and medical related, recently discharged with recommendation to go to SNF and patient stayed only 1 or 2 days prior to leaving.  PT OT consult, recommended home with home health.   consult discussed discharge options with patient, awaiting home health acceptance.     Acute on chronic respiratory failure (HCC)- (present on admission)  Assessment & Plan  Secondary to COPD and suspected MICHOACANO, on 3 L home O2 chronically, currently on 3 L with good oxygen saturations.  Not in acute exacerbation.  DuoNebs as needed, oxygen per guidelines.  Continuous pulse ox, RT consult, incentive spirometry    Alcoholism (HCC)- (present on admission)  Assessment & Plan  Monitor for signs of withdrawal    Essential hypertension- (present on admission)  Assessment & Plan  Continue amlodipine    Obesity- (present on admission)  Assessment & Plan  Body mass index is 39.86 kg/m².  Counseled about diet and exercise       VTE prophylaxis: enoxaparin ppx    I have performed a physical exam and reviewed and updated ROS and Plan today (3/26/2022). In review of yesterday's note (3/25/2022), there are no changes except as documented above.

## 2022-03-26 NOTE — CARE PLAN
Orientation: Alert and oriented times four.  HNT: WDL  Respiratory: 2L NC O2 baseline. Rhonchi.  O2: 2L [place].   Cardiac: S1S2. DP pulses palpable. Edema BLE.   GI: Voiding.   : Audible and active bowel sounds.   IV: PIV.Clean dry and intact. Flushed.   Skin: Bruising to left back. Dry flakey BLE legs.   VS: Stable  Oob: oob with stand by assistance. Pt ambulated to the bathroom. Pt following.   Pain: Pt c/o of pain 10/10. Norco decreases pain 7/10 pain which he said is tolerable and his goal. Pt refuses morphine states it does nothing for him. Pt preferred tylenol and Norco.  Patient safety: Hourly rounding completed. Call bell within reach and pt educated on assistance. Pt remained free from falls and injury.       Problem: Knowledge Deficit - Standard  Goal: Patient and family/care givers will demonstrate understanding of plan of care, disease process/condition, diagnostic tests and medications  Outcome: Progressing     Problem: Fall Risk  Goal: Patient will remain free from falls  Outcome: Progressing     Problem: Pain - Standard  Goal: Alleviation of pain or a reduction in pain to the patient’s comfort goal  Outcome: Progressing     Problem: Skin Integrity  Goal: Skin integrity is maintained or improved  Outcome: Progressing   The patient is Watcher - Medium risk of patient condition declining or worsening    Shift Goals  Clinical Goals: safety, monitor labs, fluid restriction  Patient Goals: Rest, pain control    Progress made toward(s) clinical / shift goals: weAN o2    Patient is not progressing towards the following goals:

## 2022-03-26 NOTE — CARE PLAN
The patient is Watcher - Medium risk of patient condition declining or worsening    Shift Goals  Clinical Goals: safety, monitor labs, fluid restriction  Patient Goals: Rest, pain control    Progress made toward(s) clinical / shift goals:    Patient A&Ox4. Up with one person assist, fall precautions in place. PIV CDI with blood return noted, saline locked. Patient on 3 L O2 via nasal cannula tolerating well,  on and sounding. Patient voiding in urinal. Patient updated on plan. Call light and belongings within reach. Hourly rounding in place.   Problem: Knowledge Deficit - Standard  Goal: Patient and family/care givers will demonstrate understanding of plan of care, disease process/condition, diagnostic tests and medications  Outcome: Progressing  Note: Patient A&Ox4. Patient updated on plan, patient agreeable to plan. All questions answered at this time.   Problem: Fall Risk  Goal: Patient will remain free from falls  Outcome: Progressing  Note: Patient educated on fall risk. Bed alarm on and sounding. Patient calling for assistance appropriately at this time. Patients room close to nurses station, nonskid socks in use. Call light and belongings within reach. Hourly rounding in place.   Problem: Pain - Standard  Goal: Alleviation of pain or a reduction in pain to the patient’s comfort goal  Outcome: Progressing  Note: Patient reports pain. Medicated per MAR. Patient reports relief with intervention. Applied ice to knee, patient reports some relief with intervention.   Problem: Skin Integrity  Goal: Skin integrity is maintained or improved  Outcome: Progressing  Note: Bilateral lower extremity edema noted, with redness, dryness, and cracking. Patient turning self in bed Q2 hours, legs elevated on pillow. Pillows in use for support and positioning.      Patient is not progressing towards the following goals:

## 2022-03-27 LAB
ANION GAP SERPL CALC-SCNC: 11 MMOL/L (ref 7–16)
BASOPHILS # BLD AUTO: 0.2 % (ref 0–1.8)
BASOPHILS # BLD: 0.01 K/UL (ref 0–0.12)
BUN SERPL-MCNC: 6 MG/DL (ref 8–22)
CALCIUM SERPL-MCNC: 8.6 MG/DL (ref 8.5–10.5)
CHLORIDE SERPL-SCNC: 98 MMOL/L (ref 96–112)
CO2 SERPL-SCNC: 26 MMOL/L (ref 20–33)
CREAT SERPL-MCNC: 0.48 MG/DL (ref 0.5–1.4)
EOSINOPHIL # BLD AUTO: 0.22 K/UL (ref 0–0.51)
EOSINOPHIL NFR BLD: 4 % (ref 0–6.9)
ERYTHROCYTE [DISTWIDTH] IN BLOOD BY AUTOMATED COUNT: 50.3 FL (ref 35.9–50)
GFR SERPLBLD CREATININE-BSD FMLA CKD-EPI: 110 ML/MIN/1.73 M 2
GLUCOSE SERPL-MCNC: 117 MG/DL (ref 65–99)
HCT VFR BLD AUTO: 40.3 % (ref 42–52)
HGB BLD-MCNC: 13.2 G/DL (ref 14–18)
IMM GRANULOCYTES # BLD AUTO: 0.03 K/UL (ref 0–0.11)
IMM GRANULOCYTES NFR BLD AUTO: 0.5 % (ref 0–0.9)
LYMPHOCYTES # BLD AUTO: 0.78 K/UL (ref 1–4.8)
LYMPHOCYTES NFR BLD: 14 % (ref 22–41)
MAGNESIUM SERPL-MCNC: 1.9 MG/DL (ref 1.5–2.5)
MCH RBC QN AUTO: 31.4 PG (ref 27–33)
MCHC RBC AUTO-ENTMCNC: 32.8 G/DL (ref 33.7–35.3)
MCV RBC AUTO: 95.7 FL (ref 81.4–97.8)
MONOCYTES # BLD AUTO: 0.52 K/UL (ref 0–0.85)
MONOCYTES NFR BLD AUTO: 9.4 % (ref 0–13.4)
NEUTROPHILS # BLD AUTO: 4 K/UL (ref 1.82–7.42)
NEUTROPHILS NFR BLD: 71.9 % (ref 44–72)
NRBC # BLD AUTO: 0 K/UL
NRBC BLD-RTO: 0 /100 WBC
OSMOLALITY SERPL: 275 MOSM/KG H2O (ref 278–298)
PHOSPHATE SERPL-MCNC: 3.4 MG/DL (ref 2.5–4.5)
PLATELET # BLD AUTO: 245 K/UL (ref 164–446)
PMV BLD AUTO: 10.2 FL (ref 9–12.9)
POTASSIUM SERPL-SCNC: 4.3 MMOL/L (ref 3.6–5.5)
RBC # BLD AUTO: 4.21 M/UL (ref 4.7–6.1)
SODIUM SERPL-SCNC: 135 MMOL/L (ref 135–145)
WBC # BLD AUTO: 5.6 K/UL (ref 4.8–10.8)

## 2022-03-27 PROCEDURE — 80048 BASIC METABOLIC PNL TOTAL CA: CPT

## 2022-03-27 PROCEDURE — A9270 NON-COVERED ITEM OR SERVICE: HCPCS | Performed by: INTERNAL MEDICINE

## 2022-03-27 PROCEDURE — 84100 ASSAY OF PHOSPHORUS: CPT

## 2022-03-27 PROCEDURE — 83930 ASSAY OF BLOOD OSMOLALITY: CPT

## 2022-03-27 PROCEDURE — 770004 HCHG ROOM/CARE - ONCOLOGY PRIVATE *

## 2022-03-27 PROCEDURE — 83735 ASSAY OF MAGNESIUM: CPT

## 2022-03-27 PROCEDURE — 85025 COMPLETE CBC W/AUTO DIFF WBC: CPT

## 2022-03-27 PROCEDURE — A9270 NON-COVERED ITEM OR SERVICE: HCPCS | Performed by: STUDENT IN AN ORGANIZED HEALTH CARE EDUCATION/TRAINING PROGRAM

## 2022-03-27 PROCEDURE — 36415 COLL VENOUS BLD VENIPUNCTURE: CPT

## 2022-03-27 PROCEDURE — 99232 SBSQ HOSP IP/OBS MODERATE 35: CPT | Performed by: INTERNAL MEDICINE

## 2022-03-27 PROCEDURE — 700102 HCHG RX REV CODE 250 W/ 637 OVERRIDE(OP): Performed by: INTERNAL MEDICINE

## 2022-03-27 PROCEDURE — 700111 HCHG RX REV CODE 636 W/ 250 OVERRIDE (IP): Performed by: STUDENT IN AN ORGANIZED HEALTH CARE EDUCATION/TRAINING PROGRAM

## 2022-03-27 PROCEDURE — 700102 HCHG RX REV CODE 250 W/ 637 OVERRIDE(OP): Performed by: STUDENT IN AN ORGANIZED HEALTH CARE EDUCATION/TRAINING PROGRAM

## 2022-03-27 RX ADMIN — OMEPRAZOLE 20 MG: 20 CAPSULE, DELAYED RELEASE ORAL at 05:37

## 2022-03-27 RX ADMIN — SENNOSIDES AND DOCUSATE SODIUM 2 TABLET: 50; 8.6 TABLET ORAL at 16:15

## 2022-03-27 RX ADMIN — UMECLIDINIUM BROMIDE AND VILANTEROL TRIFENATATE 1 PUFF: 62.5; 25 POWDER RESPIRATORY (INHALATION) at 05:36

## 2022-03-27 RX ADMIN — HYDROCODONE BITARTRATE AND ACETAMINOPHEN 2 TABLET: 5; 325 TABLET ORAL at 05:40

## 2022-03-27 RX ADMIN — SODIUM CHLORIDE 1 G: 1 TABLET ORAL at 05:37

## 2022-03-27 RX ADMIN — HYDROCODONE BITARTRATE AND ACETAMINOPHEN 2 TABLET: 5; 325 TABLET ORAL at 17:22

## 2022-03-27 RX ADMIN — ENOXAPARIN SODIUM 40 MG: 40 INJECTION SUBCUTANEOUS at 05:36

## 2022-03-27 RX ADMIN — AMLODIPINE BESYLATE 5 MG: 5 TABLET ORAL at 05:37

## 2022-03-27 RX ADMIN — FLUTICASONE PROPIONATE 88 MCG: 44 AEROSOL, METERED RESPIRATORY (INHALATION) at 05:36

## 2022-03-27 RX ADMIN — ACETAMINOPHEN 650 MG: 325 TABLET, FILM COATED ORAL at 00:34

## 2022-03-27 RX ADMIN — SENNOSIDES AND DOCUSATE SODIUM 2 TABLET: 50; 8.6 TABLET ORAL at 05:37

## 2022-03-27 RX ADMIN — HYDROCODONE BITARTRATE AND ACETAMINOPHEN 2 TABLET: 5; 325 TABLET ORAL at 23:35

## 2022-03-27 ASSESSMENT — PAIN DESCRIPTION - PAIN TYPE
TYPE: ACUTE PAIN

## 2022-03-27 ASSESSMENT — ENCOUNTER SYMPTOMS
NERVOUS/ANXIOUS: 0
NAUSEA: 0
CARDIOVASCULAR NEGATIVE: 1
RESPIRATORY NEGATIVE: 1
PALPITATIONS: 0
EYES NEGATIVE: 1
CONSTIPATION: 0
CHILLS: 0
SHORTNESS OF BREATH: 1
WEAKNESS: 0
VOMITING: 0
DIZZINESS: 0
HEADACHES: 0
ABDOMINAL PAIN: 0
FALLS: 1
COUGH: 0
CONSTITUTIONAL NEGATIVE: 1
DIARRHEA: 0
GASTROINTESTINAL NEGATIVE: 1
BRUISES/BLEEDS EASILY: 1
SHORTNESS OF BREATH: 0
SORE THROAT: 0
DEPRESSION: 0
WEAKNESS: 1
MYALGIAS: 1
PSYCHIATRIC NEGATIVE: 1
BLURRED VISION: 0
FEVER: 0

## 2022-03-27 NOTE — PROGRESS NOTES
"[facility] Medicine Daily Progress Note    Date of Service  3/27/2022    Chief Complaint  Juan Manuel Bass is a 71 y.o. male admitted 3/23/2022 with Shortness of breath, weakness    Hospital Course  Mr. Juan Manuel Bass is a 71 y.o. male with a history of COPD on 3L, hypertension, alcohol abuse who presented on 3/23/2022 with shortness of breath, lower extremity swelling, failure to thrive, ground-level fall.  The patient was recently admitted to [facility] from 3/12-17/2022 for a COPD exacerbation and hyponatremia.  He was discharged to [facility] only spent 2 days there because \"they did not do anything\" in terms of rehab.  The patient left the SNF against medical advice to live in his RV by himself.  He has had frequent falls and is unable to care for himself.  The patient fell getting up from his chair prior to admission.  He denies loss of consciousness or head trauma.  He was on the ground for about an hour until his neighbor found him and called EMS.  EMS felt that his living situation was unacceptable as the floor is about to cave in.  On presentation he was found to have hyponatremia and started on fluid restriction and salt tabs.  CXR was concerning for atelectasis.  Knee xray showed swelling, but no acute osseous abnormality.    Interval Problem Update  Patient was seen and examined at bedside.  I have personally reviewed and interpreted vitals, labs, and imaging.    3/24.  Afebrile.  Has been tachypneic but this is improved.  On 4 L nasal cannula.  Denies fevers, chills, chest pains.  States he is short of breath when he moves.  Complains of knee pain and back pain from his fall.  PT/OT eval pending   3/25.  Afebrile.  Tachypnea has resolved.  On 4 L nasal cannula.  Denies fevers, chills, chest pains.  Sodium has normalized.  Upset about fluid restriction.  Does not want placement.  HH ordered.    3/26.  Afebrile.  Stable vitals.  On 2-3 L nasal cannula.  Sodium 130, which is his baseline.  Denies fever, chills, " "chest pain, shortness of breath.  Reports persistent knee pain, left flank pain.  Ecchymosis is improved.  States that he has a \"soft floor\" and his RV is livable.  He wants to go home with home health.  3/27.  Afebrile.  Stable vitals.  On room air.  Denies fevers, chills, chest pains, shortness of breath.  Ecchymosis on left leg continues to improve.  Sodium 135.  Patient would like removal of fluid restriction.  Counseled extensively about salt and fluid intake.    I have personally seen and examined the patient at bedside. I discussed the plan of care with patient, bedside RN and .    Consultants/Specialty  None    Code Status  DNAR/DNI    Disposition  Patient is not medically cleared for discharge.   Anticipate discharge to to skilled nursing facility, but just left AMA from SNF  I have placed the appropriate orders for post-discharge needs.    Review of Systems  Review of Systems   Constitutional: Negative for chills and fever.   HENT: Negative for congestion and sore throat.    Eyes: Negative for blurred vision.   Respiratory: Positive for shortness of breath. Negative for cough.    Cardiovascular: Negative for chest pain, palpitations and leg swelling.   Gastrointestinal: Negative for abdominal pain, constipation, diarrhea, nausea and vomiting.   Genitourinary: Negative for dysuria, frequency and urgency.   Musculoskeletal: Positive for falls, joint pain and myalgias.   Skin: Negative for rash.   Neurological: Negative for dizziness, weakness and headaches.   Psychiatric/Behavioral: Negative for depression. The patient is not nervous/anxious.    All other systems reviewed and are negative.       Physical Exam  Temp:  [36.3 °C (97.3 °F)-36.7 °C (98.1 °F)] 36.7 °C (98 °F)  Pulse:  [73-88] 88  Resp:  [18-20] 20  BP: (121-149)/(60-77) 124/60  SpO2:  [95 %-98 %] 96 %    Physical Exam  Vitals and nursing note reviewed.   Constitutional:       Appearance: Normal appearance. He is obese. He is " ill-appearing.   HENT:      Head: Normocephalic.      Nose: Nose normal.      Mouth/Throat:      Mouth: Mucous membranes are moist.      Pharynx: Oropharynx is clear.   Eyes:      Extraocular Movements: Extraocular movements intact.      Conjunctiva/sclera: Conjunctivae normal.   Cardiovascular:      Rate and Rhythm: Normal rate and regular rhythm.      Pulses: Normal pulses.      Heart sounds: Normal heart sounds. No murmur heard.    No friction rub. No gallop.   Pulmonary:      Effort: Pulmonary effort is normal. Tachypnea present. No respiratory distress.      Breath sounds: Wheezing present. No rales.   Chest:      Chest wall: No tenderness.   Abdominal:      General: Abdomen is flat. Bowel sounds are normal. There is no distension.      Palpations: Abdomen is soft. There is no mass.      Tenderness: There is no abdominal tenderness. There is no guarding.   Musculoskeletal:         General: Normal range of motion.      Cervical back: Normal range of motion and neck supple.      Right lower leg: Edema present.      Left lower leg: Edema present.   Skin:     General: Skin is warm.      Capillary Refill: Capillary refill takes less than 2 seconds.      Findings: Bruising present.      Comments: Chronic venous stasis   Neurological:      General: No focal deficit present.      Mental Status: He is alert and oriented to person, place, and time. Mental status is at baseline.      Cranial Nerves: No cranial nerve deficit.      Motor: No weakness.   Psychiatric:         Mood and Affect: Mood normal.         Behavior: Behavior normal.         Fluids    Intake/Output Summary (Last 24 hours) at 3/27/2022 0652  Last data filed at 3/27/2022 0352  Gross per 24 hour   Intake 420 ml   Output 1050 ml   Net -630 ml       Laboratory  Recent Labs     03/25/22  0740 03/26/22  0055   WBC 7.3 6.5   RBC 4.54* 4.21*   HEMOGLOBIN 14.3 13.2*   HEMATOCRIT 42.7 39.4*   MCV 94.1 93.6   MCH 31.5 31.4   MCHC 33.5* 33.5*   RDW 50.0 49.7    PLATELETCT 266 246   MPV 9.7 9.9     Recent Labs     03/25/22  0740 03/26/22  0055   SODIUM 135 130*   POTASSIUM 4.4 4.1   CHLORIDE 98 97   CO2 27 22   GLUCOSE 113* 106*   BUN 5* 7*   CREATININE 0.54 0.66   CALCIUM 8.8 8.5                   Imaging  DX-KNEE 2- RIGHT   Final Result      Soft tissue swelling. Preserved knee joint. No acute osseous abnormality.      DX-CHEST-PORTABLE (1 VIEW)   Final Result      Subsegmental, linear bibasilar opacities are present, these are likely atelectasis.           Assessment/Plan  * Hyponatremia- (present on admission)  Assessment & Plan  Returned to baseline.  Monitor BMP daily.  Continue fluid restriction and salt tabs.     Debility- (present on admission)  Assessment & Plan  Secondary to age and medical related, recently discharged with recommendation to go to SNF and patient stayed only 1 or 2 days prior to leaving.  PT OT consult, recommended home with home health.   consult discussed discharge options with patient, awaiting home health acceptance.     Acute on chronic respiratory failure (HCC)- (present on admission)  Assessment & Plan  Secondary to COPD and suspected MICHOACANO, on 3 L home O2 chronically, currently on 3 L with good oxygen saturations.  Not in acute exacerbation.  DuoNebs as needed, oxygen per guidelines.  Continuous pulse ox, RT consult, incentive spirometry    Alcoholism (HCC)- (present on admission)  Assessment & Plan  Monitor for signs of withdrawal    Essential hypertension- (present on admission)  Assessment & Plan  Continue amlodipine    Obesity- (present on admission)  Assessment & Plan  Body mass index is 39.86 kg/m².  Counseled about diet and exercise       VTE prophylaxis: enoxaparin ppx    I have performed a physical exam and reviewed and updated ROS and Plan today (3/27/2022). In review of yesterday's note (3/26/2022), there are no changes except as documented above.

## 2022-03-27 NOTE — PROGRESS NOTES
"[facility] Medicine Daily Progress Note    Date of Service  3/27/2022    Chief Complaint  Juan Manuel Bass is a 71 y.o. male admitted 3/23/2022 with GLF, increasing weakness and back pain..    Hospital Course  Mr. Bass is a 72 year old male who presented following ground level fall with increasing weakness and back pain.     Patient has past medical history of oxygen dependent COPD on 3 L, hypertension, ETOH, and suspected MICHOACNAO.    Patient alerted EMS when he fell out of his chair and was unable to get up off the floor. He denies any head injury or loss of consciousness. He does complain of right knee pain and left back and flank pain from fall. He sustained multiple abrasions and large area of bruising to left flank and back area. Otherwise, patient denies any acute issues. Patient was recently discharged to [facility] following admission for hyponatremia, COPD exacerbation and weakness. He subsequently left AMA from [facility] stating \"they didn't do anything for me\". Patient will require PT/OT evaluation and recommendations.     In the ED patient found to be hyponatremic at 124. He was mildly tachypneic and hypertensive. CBC and CMP unremarkable. UA negative. Chest x-ray with atelectasis. EKG NSR. Patient admitted for hyponatremia and increasing weakness.       Interval Problem Update  3/24: Patient seen and examined. VSS, on home 3-4 L NC. Patient's sodium has returned to baseline, 129. Patient states pain in right knee and left back. Right knee appears swollen, x-rays shows no bony abnormalities. Patient also with large area of bruising on left back/flank area, will have nursing bill to monitor for increase in size. Patient also has chronic flaking and peeling of skin of BLE. Patient states that he was on the floor for an hour before he was able to reach his phone and call EMS. He states the he stood up and just fell, he remembers the event and has no loss of consciousness. He states the he left AMA from SNF " "following his last admission as they \"they didn't do anything for me\". Explained to patient that we will have to await PT/OT evaluation for their recommendation on if he will be able to go home with HH or will require SNF placement. Patent states that he just \"wants someone to check on me every once in a while\". States he is independent with transfers and ADLs.      3/25: Patient seen and examined. VSS, on home 3 L. Patient's sodium has normalized at 135. Patient upset regarding fluid restriction, will reevaluate. Patient also requesting that staff put lotion on his legs, will speak with RN. Awaiting PT/OT evaluation and recommendation. Patient currently not waiting SNF placement but will accept home health.     3/26: Patient seen and examined. VSS, remains on home 3 L. Patient's sodium at 130 this morning which is baseline for patient. PT/OT recommending home with home health and walker. There was some concern from EMS regarding safety of the floor in patient's mobile home, per patient the floor is \"soft\" which is normal. Case management is working on arranging this. Once home health is arranged, patient is medically cleared to discharge home.     3/27: Patient seen and examined. VSS, remains on home 3 L. Patient's sodium 135 this AM. Patient requesting liberation of fluid restriction, will look into this. Awaiting acceptance from home health to discharge home.     I have personally seen and examined the patient at bedside. I discussed the plan of care with patient, family and bedside RN.    Consultants/Specialty  None    Code Status  DNAR/DNI    Disposition  Patient is medically cleared for discharge.   Anticipate discharge to to home with organized home healthcare and close outpatient follow-up.  I have placed the appropriate orders for post-discharge needs.    Review of Systems  Review of Systems   Constitutional: Negative.  Negative for chills and fever.   HENT: Negative.    Eyes: Negative.    Respiratory: " Negative.  Negative for cough and shortness of breath.    Cardiovascular: Negative.  Negative for chest pain and palpitations.   Gastrointestinal: Negative.  Negative for abdominal pain and nausea.   Genitourinary: Negative.    Musculoskeletal: Positive for joint pain.        Right knee   Skin:        BLE skin flaking and peeling.   Neurological: Positive for weakness.   Endo/Heme/Allergies: Bruises/bleeds easily.   Psychiatric/Behavioral: Negative.         Physical Exam  Temp:  [36.1 °C (97 °F)-36.7 °C (98 °F)] 36.1 °C (97 °F)  Pulse:  [73-88] 73  Resp:  [18-20] 18  BP: (116-124)/(60-77) 116/69  SpO2:  [95 %-97 %] 95 %    Physical Exam  Vitals and nursing note reviewed.   Constitutional:       Appearance: Normal appearance. He is obese.   HENT:      Mouth/Throat:      Mouth: Mucous membranes are moist.   Eyes:      Extraocular Movements: Extraocular movements intact.   Cardiovascular:      Rate and Rhythm: Normal rate and regular rhythm.      Pulses: Normal pulses.      Heart sounds: Normal heart sounds.   Pulmonary:      Effort: Pulmonary effort is normal.      Breath sounds: Normal breath sounds.   Abdominal:      Palpations: Abdomen is soft.   Musculoskeletal:         General: Swelling and tenderness present.      Cervical back: Normal range of motion.      Comments: Right knee.   Skin:     General: Skin is warm.      Capillary Refill: Capillary refill takes 2 to 3 seconds.      Findings: Bruising present.      Comments: BLE peeling and flaky.  Left back and flank bruising.    Neurological:      General: No focal deficit present.      Mental Status: He is alert and oriented to person, place, and time. Mental status is at baseline.   Psychiatric:         Mood and Affect: Mood normal.         Thought Content: Thought content normal.         Fluids    Intake/Output Summary (Last 24 hours) at 3/27/2022 1035  Last data filed at 3/27/2022 1000  Gross per 24 hour   Intake 660 ml   Output 1050 ml   Net -390 ml        Laboratory  Recent Labs     03/25/22  0740 03/26/22  0055 03/27/22  0652   WBC 7.3 6.5 5.6   RBC 4.54* 4.21* 4.21*   HEMOGLOBIN 14.3 13.2* 13.2*   HEMATOCRIT 42.7 39.4* 40.3*   MCV 94.1 93.6 95.7   MCH 31.5 31.4 31.4   MCHC 33.5* 33.5* 32.8*   RDW 50.0 49.7 50.3*   PLATELETCT 266 246 245   MPV 9.7 9.9 10.2     Recent Labs     03/25/22  0740 03/26/22  0055 03/27/22  0652   SODIUM 135 130* 135   POTASSIUM 4.4 4.1 4.3   CHLORIDE 98 97 98   CO2 27 22 26   GLUCOSE 113* 106* 117*   BUN 5* 7* 6*   CREATININE 0.54 0.66 0.48*   CALCIUM 8.8 8.5 8.6                   Imaging  DX-KNEE 2- RIGHT   Final Result      Soft tissue swelling. Preserved knee joint. No acute osseous abnormality.      DX-CHEST-PORTABLE (1 VIEW)   Final Result      Subsegmental, linear bibasilar opacities are present, these are likely atelectasis.           Assessment/Plan  * Hyponatremia- (present on admission)  Assessment & Plan  Returned to baseline.  Monitor BMP daily.  Continue fluid restriction and salt tabs.   Consider further liberation of fluid restriction.     Debility- (present on admission)  Assessment & Plan  Secondary to age and medical related, recently discharged with recommendation to go to SNF and patient stayed only 1 or 2 days prior to leaving.  PT OT consult, recommended home with home health.   consult discussed discharge options with patient, awaiting home health acceptance.     Acute on chronic respiratory failure (HCC)- (present on admission)  Assessment & Plan  Secondary to COPD and suspected MICHOACANO, on 3 L home O2 chronically, currently on 3 L with good oxygen saturations.  Not in acute exacerbation.  DuoNebs as needed, oxygen per guidelines.  Continuous pulse ox, RT consult, incentive spirometry    Alcoholism (HCC)- (present on admission)  Assessment & Plan  Monitor for signs of withdrawal    Essential hypertension- (present on admission)  Assessment & Plan  Continue amlodipine    Obesity- (present on  admission)  Assessment & Plan  Body mass index is 39.86 kg/m².  Counseled about diet and exercise       VTE prophylaxis: enoxaparin ppx    I have performed a physical exam and reviewed and updated ROS and Plan today (3/27/2022). In review of yesterday's note (3/26/2022), there are no changes except as documented above.

## 2022-03-28 VITALS
WEIGHT: 277.78 LBS | RESPIRATION RATE: 18 BRPM | SYSTOLIC BLOOD PRESSURE: 140 MMHG | HEIGHT: 70 IN | OXYGEN SATURATION: 95 % | BODY MASS INDEX: 39.77 KG/M2 | TEMPERATURE: 98.4 F | DIASTOLIC BLOOD PRESSURE: 82 MMHG | HEART RATE: 90 BPM

## 2022-03-28 PROBLEM — M25.569 KNEE PAIN: Status: ACTIVE | Noted: 2022-03-28

## 2022-03-28 PROBLEM — K21.9 GERD (GASTROESOPHAGEAL REFLUX DISEASE): Status: ACTIVE | Noted: 2022-03-28

## 2022-03-28 PROCEDURE — 700102 HCHG RX REV CODE 250 W/ 637 OVERRIDE(OP): Performed by: INTERNAL MEDICINE

## 2022-03-28 PROCEDURE — 99239 HOSP IP/OBS DSCHRG MGMT >30: CPT | Performed by: INTERNAL MEDICINE

## 2022-03-28 PROCEDURE — A9270 NON-COVERED ITEM OR SERVICE: HCPCS | Performed by: STUDENT IN AN ORGANIZED HEALTH CARE EDUCATION/TRAINING PROGRAM

## 2022-03-28 PROCEDURE — 700111 HCHG RX REV CODE 636 W/ 250 OVERRIDE (IP): Performed by: STUDENT IN AN ORGANIZED HEALTH CARE EDUCATION/TRAINING PROGRAM

## 2022-03-28 PROCEDURE — 700102 HCHG RX REV CODE 250 W/ 637 OVERRIDE(OP): Performed by: STUDENT IN AN ORGANIZED HEALTH CARE EDUCATION/TRAINING PROGRAM

## 2022-03-28 PROCEDURE — A9270 NON-COVERED ITEM OR SERVICE: HCPCS | Performed by: INTERNAL MEDICINE

## 2022-03-28 RX ORDER — HYDROCODONE BITARTRATE AND ACETAMINOPHEN 5; 325 MG/1; MG/1
1 TABLET ORAL EVERY 6 HOURS PRN
Qty: 20 TABLET | Refills: 0 | Status: SHIPPED | OUTPATIENT
Start: 2022-03-28 | End: 2022-04-02

## 2022-03-28 RX ORDER — SODIUM CHLORIDE 1 G/1
1 TABLET ORAL DAILY
Qty: 30 TABLET | Refills: 0 | Status: SHIPPED | OUTPATIENT
Start: 2022-03-28 | End: 2022-09-12

## 2022-03-28 RX ADMIN — FLUTICASONE PROPIONATE 88 MCG: 44 AEROSOL, METERED RESPIRATORY (INHALATION) at 05:25

## 2022-03-28 RX ADMIN — SODIUM CHLORIDE 1 G: 1 TABLET ORAL at 05:24

## 2022-03-28 RX ADMIN — OMEPRAZOLE 20 MG: 20 CAPSULE, DELAYED RELEASE ORAL at 05:24

## 2022-03-28 RX ADMIN — ENOXAPARIN SODIUM 40 MG: 40 INJECTION SUBCUTANEOUS at 05:24

## 2022-03-28 RX ADMIN — UMECLIDINIUM BROMIDE AND VILANTEROL TRIFENATATE 1 PUFF: 62.5; 25 POWDER RESPIRATORY (INHALATION) at 05:25

## 2022-03-28 RX ADMIN — AMLODIPINE BESYLATE 5 MG: 5 TABLET ORAL at 05:24

## 2022-03-28 RX ADMIN — ACETAMINOPHEN 650 MG: 325 TABLET, FILM COATED ORAL at 05:24

## 2022-03-28 ASSESSMENT — ENCOUNTER SYMPTOMS
FEVER: 0
DIZZINESS: 1
NAUSEA: 0
WEAKNESS: 1
CARDIOVASCULAR NEGATIVE: 1
BRUISES/BLEEDS EASILY: 1
CONSTITUTIONAL NEGATIVE: 1
GASTROINTESTINAL NEGATIVE: 1
COUGH: 0
RESPIRATORY NEGATIVE: 1
ABDOMINAL PAIN: 0
SHORTNESS OF BREATH: 0
EYES NEGATIVE: 1
PSYCHIATRIC NEGATIVE: 1
CHILLS: 0
PALPITATIONS: 0

## 2022-03-28 ASSESSMENT — PAIN DESCRIPTION - PAIN TYPE
TYPE: ACUTE PAIN

## 2022-03-28 NOTE — PROGRESS NOTES
"[facility] Medicine Daily Progress Note    Date of Service  3/28/2022    Chief Complaint  Juan Manuel Bass is a 71 y.o. male admitted 3/23/2022 with GLF, increasing weakness and back pain..    Hospital Course  Mr. Bass is a 72 year old male who presented following ground level fall with increasing weakness and back pain.     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    Patient alerted EMS when he fell out of his chair and was unable to get up off the floor. He denies any head injury or loss of consciousness. He does complain of right knee pain and left back and flank pain from fall. He sustained multiple abrasions and large area of bruising to left flank and back area. Otherwise, patient denies any acute issues. Patient was recently discharged to [facility] [place] following admission for hyponatremia, COPD exacerbation and weakness. He subsequently left AMA from [facility] stating \"they didn't do anything for me\". Patient will require PT/OT evaluation and recommendations.     In the ED patient found to be hyponatremic at 124. He was mildly tachypneic and hypertensive. CBC and CMP unremarkable. UA negative. Chest x-ray with atelectasis. EKG NSR. Patient admitted for hyponatremia and increasing weakness.       Interval Problem Update  3/24: Patient seen and examined. VSS, on home 3-4 L NC. Patient's sodium has returned to baseline, 129. Patient states pain in right knee and left back. Right knee appears swollen, x-rays shows no bony abnormalities. Patient also with large area of bruising on left back/flank area, will have nursing bill to monitor for increase in size. Patient also has chronic flaking and peeling of skin of BLE. Patient states that he was on the floor for an hour before he was able to reach his phone and call EMS. He states the he stood up and just fell, he remembers the event and has no loss of consciousness. He states the he left AMA from SNF " "following his last admission as they \"they didn't do anything for me\". Explained to patient that we will have to await PT/OT evaluation for their recommendation on if he will be able to go home with HH or will require SNF placement. Patent states that he just \"wants someone to check on me every once in a while\". States he is independent with transfers and ADLs.      3/25: Patient seen and examined. VSS, on home 3 L. Patient's sodium has normalized at 135. Patient upset regarding fluid restriction, will reevaluate. Patient also requesting that staff put lotion on his legs, will speak with RN. Awaiting PT/OT evaluation and recommendation. Patient currently not waiting SNF placement but will accept home health.     3/26: Patient seen and examined. VSS, remains on home 3 L. Patient's sodium at 130 this morning which is baseline for patient. PT/OT recommending home with home health and walker. There was some concern from EMS regarding safety of the floor in patient's mobile home, per patient the floor is \"soft\" which is normal. Case management is working on arranging this. Once home health is arranged, patient is medically cleared to discharge home.     3/27: Patient seen and examined. VSS, remains on home 3 L. Patient's sodium 135 this AM. Patient requesting liberation of fluid restriction, will look into this. Awaiting acceptance from home health to discharge home.     3/28: Patient seen and examined. Patient states that he had a dizzy spell last night transferring from the chair to the bed and felt that he almost \"collapsed\". Spoke at length regarding safety at home and recommendations for SNF placement. Patient continues to adamantly decline SNF placement and would like to go home with HH. Will discuss with case management.    I have personally seen and examined the patient at bedside. I discussed the plan of care with patient, family and bedside RN.    Consultants/Specialty  None    Code " Status  DNAR/DNI    Disposition  Patient is medically cleared for discharge.   Anticipate discharge to to home with organized home healthcare and close outpatient follow-up.  I have placed the appropriate orders for post-discharge needs.    Review of Systems  Review of Systems   Constitutional: Negative.  Negative for chills and fever.   HENT: Negative.    Eyes: Negative.    Respiratory: Negative.  Negative for cough and shortness of breath.    Cardiovascular: Negative.  Negative for chest pain and palpitations.   Gastrointestinal: Negative.  Negative for abdominal pain and nausea.   Genitourinary: Negative.    Musculoskeletal: Positive for joint pain.        Right knee   Skin:        BLE skin flaking and peeling.   Neurological: Positive for dizziness and weakness.   Endo/Heme/Allergies: Bruises/bleeds easily.   Psychiatric/Behavioral: Negative.         Physical Exam  Temp:  [36.6 °C (97.8 °F)-36.7 °C (98 °F)] 36.7 °C (98 °F)  Pulse:  [83-95] 83  Resp:  [18-20] 18  BP: (126-138)/(59-81) 131/59  SpO2:  [93 %-99 %] 93 %    Physical Exam  Vitals and nursing note reviewed.   Constitutional:       Appearance: Normal appearance. He is obese.   HENT:      Mouth/Throat:      Mouth: Mucous membranes are moist.   Eyes:      Extraocular Movements: Extraocular movements intact.   Cardiovascular:      Rate and Rhythm: Normal rate and regular rhythm.      Pulses: Normal pulses.      Heart sounds: Normal heart sounds.   Pulmonary:      Effort: Pulmonary effort is normal.      Breath sounds: Normal breath sounds.   Abdominal:      Palpations: Abdomen is soft.   Musculoskeletal:         General: Swelling and tenderness present.      Cervical back: Normal range of motion.      Comments: Right knee.   Skin:     General: Skin is warm.      Capillary Refill: Capillary refill takes 2 to 3 seconds.      Findings: Bruising present.      Comments: BLE peeling and flaky.  Left back and flank bruising, resolving.   Neurological:       General: No focal deficit present.      Mental Status: He is alert and oriented to person, place, and time. Mental status is at baseline.   Psychiatric:         Mood and Affect: Mood normal.         Thought Content: Thought content normal.         Fluids    Intake/Output Summary (Last 24 hours) at 3/28/2022 1008  Last data filed at 3/28/2022 0538  Gross per 24 hour   Intake 220 ml   Output 1300 ml   Net -1080 ml       Laboratory  Recent Labs     03/26/22 0055 03/27/22  0652   WBC 6.5 5.6   RBC 4.21* 4.21*   HEMOGLOBIN 13.2* 13.2*   HEMATOCRIT 39.4* 40.3*   MCV 93.6 95.7   MCH 31.4 31.4   MCHC 33.5* 32.8*   RDW 49.7 50.3*   PLATELETCT 246 245   MPV 9.9 10.2     Recent Labs     03/26/22 0055 03/27/22  0652   SODIUM 130* 135   POTASSIUM 4.1 4.3   CHLORIDE 97 98   CO2 22 26   GLUCOSE 106* 117*   BUN 7* 6*   CREATININE 0.66 0.48*   CALCIUM 8.5 8.6                   Imaging  DX-KNEE 2- RIGHT   Final Result      Soft tissue swelling. Preserved knee joint. No acute osseous abnormality.      DX-CHEST-PORTABLE (1 VIEW)   Final Result      Subsegmental, linear bibasilar opacities are present, these are likely atelectasis.           Assessment/Plan  * Hyponatremia- (present on admission)  Assessment & Plan  Returned to baseline.  Monitor BMP daily.  Continue fluid restriction and salt tabs.       Debility- (present on admission)  Assessment & Plan  Secondary to age and medical related, recently discharged with recommendation to go to SNF and patient stayed only 1 or 2 days prior to leaving.  PT OT consult, recommended home with home health.   consult discussed discharge options with patient, awaiting home health acceptance.     Acute on chronic respiratory failure (HCC)- (present on admission)  Assessment & Plan  Secondary to COPD and suspected MICHOACANO, on 3 L home O2 chronically, currently on 3 L with good oxygen saturations.  Not in acute exacerbation.  DuoNebs as needed, oxygen per guidelines.  Continuous pulse  ox, RT consult, incentive spirometry    Alcoholism (HCC)- (present on admission)  Assessment & Plan  Monitor for signs of withdrawal    Essential hypertension- (present on admission)  Assessment & Plan  Continue amlodipine    Obesity- (present on admission)  Assessment & Plan  Body mass index is 39.86 kg/m².  Counseled about diet and exercise       VTE prophylaxis: enoxaparin ppx    I have performed a physical exam and reviewed and updated ROS and Plan today (3/28/2022). In review of yesterday's note (3/27/2022), there are no changes except as documented above.

## 2022-03-28 NOTE — DISCHARGE PLANNING
DC Transport Scheduled    Received request at: 1524     Transport Company Scheduled:  [employer]      Scheduled Date: 03/28/2022  Scheduled Time: 1800    Destination: [address]     Notified care team of scheduled transport via Voalte.     If there are any changes needed to the DC transportation scheduled, please contact [facility] at ext. [phone] between the hours of 2465-3568 Mon-Fri. If outside those hours, contact the ED Case Manager at ext. [phone].

## 2022-03-28 NOTE — DISCHARGE PLANNING
Agency/Facility Name: [facility]   Spoke To: Amy  Outcome: Will have [place] in admissions call this DPA back.

## 2022-03-28 NOTE — DISCHARGE INSTRUCTIONS
Discharge Instructions per ILEANA Stahl.O.    DIET: Diet Order Diet: Regular; Fluid modifications: (optional): 2000 ml Fluid Restriction    ACTIVITY: As tolerated    A proper diet that is low in grease, fat, and salt, along with 30 minutes of exercise per day will lead to weight loss, and better controlled blood sugar and blood pressure.    DIAGNOSIS: Hyponatremia    Follow up with your Primary Care Provider Peterson Amin M.D. as scheduled or sooner if your symptoms persist or worsen.  Return to Emergency Room for sever chest pain, shortness of breath, signs of a stroke, or any other emergencies.

## 2022-03-28 NOTE — CARE PLAN
The patient is Stable - Low risk of patient condition declining or worsening    Shift Goals  Clinical Goals: Safety, pain control  Patient Goals: Rest, pain control    Progress made toward(s) clinical / shift goals:    Patient A&Ox4. Patient up x1 with FWW, patient refusing mobilization at this time, fall precautions in place. PIV CDI with no blood return noted, saline locked. Patient reports pain, ice packs applied and medicated per MAR. Patient voiding in urinal. 2 L fluid restriction in place. Patient updated on plan. Hourly rounding in place. Call light, personal belongings, and urinal within reach.   Problem: Fall Risk  Goal: Patient will remain free from falls  Outcome: Progressing  Note: Patient educated on fall risk. Call light and belongings within reach. Bed alarm on and sounding. Patient calling appropriately for assistance. Fall precautions in place. Hourly rounding in place.    Problem: Pain - Standard  Goal: Alleviation of pain or a reduction in pain to the patient’s comfort goal  Outcome: Progressing  Note: Patient reports pain ice back applied, patient medicated per MAR. Patient reports relief with interventions. Hourly rounding in place.    Problem: Skin Integrity  Goal: Skin integrity is maintained or improved  Outcome: Progressing  Note: BLE edema noted, skin dry and flaking. Pillows in use to elevate BLE.      Patient is not progressing towards the following goals:

## 2022-03-28 NOTE — CARE PLAN
The patient is Stable - Low risk of patient condition declining or worsening    Shift Goals  Clinical Goals: Safety, pain control  Patient Goals: Rest, pain control    Progress made toward(s) clinical / shift goals:    Problem: Knowledge Deficit - Standard  Goal: Patient and family/care givers will demonstrate understanding of plan of care, disease process/condition, diagnostic tests and medications  Outcome: Progressing     Problem: Fall Risk  Goal: Patient will remain free from falls  Outcome: Progressing     Problem: Pain - Standard  Goal: Alleviation of pain or a reduction in pain to the patient’s comfort goal  Outcome: Progressing     Problem: Skin Integrity  Goal: Skin integrity is maintained or improved  Outcome: Progressing       Patient is not progressing towards the following goals:

## 2022-03-28 NOTE — DISCHARGE SUMMARY
"Discharge Summary    CHIEF COMPLAINT ON ADMISSION  Chief Complaint   Patient presents with   • Shortness of Breath   • Weakness     Inability to care for self   • Back Pain       Reason for Admission  ems     Admission Date  3/23/2022    CODE STATUS  DNAR/DNI    HPI & HOSPITAL COURSE  Mr. Juan Manuel Bass is a 71 y.o. male with a history of COPD on 3L, hypertension, alcohol abuse who presented on 3/23/2022 with shortness of breath, lower extremity swelling, failure to thrive, ground-level fall.  The patient was recently admitted to [facility] from 3/12-17/2022 for a COPD exacerbation and hyponatremia.  He was discharged to [facility] only spent 2 days there because \"they did not do anything\" in terms of rehab.  The patient left the SNF against medical advice to live in his RV by himself.  He has had frequent falls and is unable to care for himself.  The patient fell getting up from his chair prior to admission with resulting significant knee pain and left flank pain.  He denies loss of consciousness or head trauma.  He was on the ground for about an hour until his neighbor found him and called EMS.  EMS felt that his living situation was unacceptable as the floor is about to cave in.  On presentation he was found to have hyponatremia and started on fluid restriction and salt tabs.  CXR was concerning for atelectasis.  Knee xray showed swelling, but no acute osseous abnormality.  PT/OT recommended home health.  Hyponatremia resolved.  Medically stable to discharge home with home health and walker.  Follow-up with primary care as outpatient.      Therefore, he is discharged in fair and stable condition to home with organized home healthcare and close outpatient follow-up.    The patient met 2-midnight criteria for an inpatient stay at the time of discharge.    Discharge Date  3/28/2022    FOLLOW UP ITEMS POST DISCHARGE  None    DISCHARGE DIAGNOSES  Principal Problem:    Hyponatremia POA: Yes  Active Problems:    Obesity " POA: Yes    Essential hypertension POA: Yes    Alcoholism (HCC) POA: Yes    Acute on chronic respiratory failure (HCC) POA: Yes    Debility POA: Yes    Knee pain POA: Unknown    GERD (gastroesophageal reflux disease) POA: Unknown  Resolved Problems:    * No resolved hospital problems. *      FOLLOW UP  [facility]  [address] [facility]  [place] [id number] [phone]        Peterson Amin M.D.  [address] [id number] [phone]            MEDICATIONS ON DISCHARGE     Medication List      START taking these medications      Instructions   HYDROcodone-acetaminophen 5-325 MG Tabs per tablet  Commonly known as: NORCO   Take 1 Tablet by mouth every 6 hours as needed (severe pain) for up to 5 days.  Dose: 1 Tablet        CHANGE how you take these medications      Instructions   sodium chloride 1 GM Tabs  What changed: when to take this  Commonly known as: SALT   Take 1 Tablet by mouth every day.  Dose: 1 g        CONTINUE taking these medications      Instructions   albuterol 108 (90 Base) MCG/ACT Aers inhalation aerosol   Inhale 1-2 Puffs every four hours as needed for Shortness of Breath.  Dose: 1-2 Puff     amLODIPine 5 MG Tabs  Commonly known as: NORVASC   Take 1 Tablet by mouth every day.  Dose: 5 mg     Centrum Silver Adult 50+ Tabs   Take 1 Tablet by mouth every day.  Dose: 1 Tablet     pantoprazole 40 MG Tbec  Commonly known as: PROTONIX   Take 40 mg by mouth every day.  Dose: 40 mg     Trelegy Ellipta 100-62.5-25 MCG/INH Aepb inhalation  Generic drug: Fluticasone-Umeclidin-Vilant   Inhale 2-3 Inhalation every morning.  Dose: 2-3 Inhalation     VITAMIN C PO   Take 1 Tablet by mouth every day.  Dose: 1 Tablet     VITAMIN D3 PO   Take 1 Capsule by mouth every day.  Dose: 1 Capsule     vitamin E 180 MG (400 UNIT) Caps   Take 360 mg by mouth every day. 2 capsules = 360 mg (800 units).  Dose: 360 mg            Allergies  Allergies   Allergen Reactions   • Tetanus Toxoid  Hives       DIET  Orders Placed This Encounter   Procedures   • Diet Order Diet: Regular; Fluid modifications: (optional): 2000 ml Fluid Restriction     Standing Status:   Standing     Number of Occurrences:   1     Order Specific Question:   Diet:     Answer:   Regular [1]     Order Specific Question:   Fluid modifications: (optional)     Answer:   2000 ml Fluid Restriction [11]       ACTIVITY  As tolerated.  Weight bearing as tolerated    CONSULTATIONS  None    PROCEDURES  None    LABORATORY  Lab Results   Component Value Date    SODIUM 135 03/27/2022    POTASSIUM 4.3 03/27/2022    CHLORIDE 98 03/27/2022    CO2 26 03/27/2022    GLUCOSE 117 (H) 03/27/2022    BUN 6 (L) 03/27/2022    CREATININE 0.48 (L) 03/27/2022        Lab Results   Component Value Date    WBC 5.6 03/27/2022    HEMOGLOBIN 13.2 (L) 03/27/2022    HEMATOCRIT 40.3 (L) 03/27/2022    PLATELETCT 245 03/27/2022        I discussed medications and side effects with the patient.  I discussed prognosis and importance of medical compliance with the patient.  I counseled the patient about diet, exercise, weight loss, smoking cessation, and life style modifications.  All questions and concerns have been addressed.  Total time of the discharge process was 37 minutes.

## 2022-03-28 NOTE — DISCHARGE PLANNING
1521: CM spoke with Juan of [facility] and he confirmed acceptance of the referral for a walker.  He is aware the patient is dc'ing today and it will be delivered today.  CM faxed over the request for transportation to [facility] for [facility].  Approved service approval obtained.  The patient is secured with HHC with [facility] and will receive a walker from [facility].      1444: Referral for DME with [facility] is pending.     1405: HHC is secured with [facility].  Choice faxed to the DPA to send referral for walker to [facility].  CM will arrange for transportation to home.     Anticipated Discharge Disposition: HHC    Action: NAZ sent the DPA a request to f/u on the referral for HHC.     Barriers to Discharge: home health.  NAZ was informed the patient cannot d/c without HHC secured.     Plan: CM will f/u on the referral for HHC.

## 2022-03-29 NOTE — PROGRESS NOTES
Discharge Instructions    Discharged to home by medical transportation with self. Discharged via wheelchair, hospital escort: Yes.  Special equipment needed: Oxygen    Be sure to schedule a follow-up appointment with your primary care doctor or any specialists as instructed.     Discharge Plan:   Influenza Vaccine Indication: Patient Refuses    I understand that a diet low in cholesterol, fat, and sodium is recommended for good health. Unless I have been given specific instructions below for another diet, I accept this instruction as my diet prescription.   Other diet: regular    Special Instructions: None    · Is patient discharged on Warfarin / Coumadin?   No     Depression / Suicide Risk    As you are discharged from this Critical access hospital facility, it is important to learn how to keep safe from harming yourself.    Recognize the warning signs:  · Abrupt changes in personality, positive or negative- including increase in energy   · Giving away possessions  · Change in eating patterns- significant weight changes-  positive or negative  · Change in sleeping patterns- unable to sleep or sleeping all the time   · Unwillingness or inability to communicate  · Depression  · Unusual sadness, discouragement and loneliness  · Talk of wanting to die  · Neglect of personal appearance   · Rebelliousness- reckless behavior  · Withdrawal from people/activities they love  · Confusion- inability to concentrate     If you or a loved one observes any of these behaviors or has concerns about self-harm, here's what you can do:  · Talk about it- your feelings and reasons for harming yourself  · Remove any means that you might use to hurt yourself (examples: pills, rope, extension cords, firearm)  · Get professional help from the community (Mental Health, Substance Abuse, psychological counseling)  · Do not be alone:Call your Safe Contact- someone whom you trust who will be there for you.  · Call your local CRISIS HOTLINE 293-9995 or  450-022-7356  · Call your local Children's Mobile Crisis Response Team [place] (327) 918-7404 or www.Marquee.AllSchoolStuff.com  · Call the toll free National Suicide Prevention Hotlines   · National Suicide Prevention Lifeline 919-767-GRQW (4593)  · National [employer] Interactive Line Network 800-SUICIDE (324-3115)

## 2022-03-29 NOTE — DISCHARGE PLANNING
1815 and DME walker has not arrived. This RN called [facility] and spoke with Kathryn to get an ETA on walker delivery. Per CM note, it was confirmed with Juan from [facility]. Kathryn stated Joaquina had been trying to get a hold of patient with no success and they need to talk to him first before delivery can be made. Kathryn stated patient has to call and ask to speak with Joaquina to confirm delivery. Confirmed with Kathryn that walker can be delivered at patients home so he does not miss his ride with [employer]. Number dialed for patient and patient spoke with Joaquina to set up delivery of walker. Per Joaquina pt cannot have walker delivered until he pays overdue balance from oxygen. Pt did not have a credit card on him and Joaquina stated he needed to call in the morning and they would then deliver the walker tomorrow.     This RN spoke with DELIO, Jennie Reeves, who suggested the order be placed through [employer] for a FWW and she will cancel the order through [facility]. This will ensure patient is safe at home with a walker, pt lives alone in a RV. Charge RN placed order in Epic. This RN obtained the FWW from [employer] on T3. This RN sized walker to patients height. Pt left with [employer] to be transported home with FWW

## 2022-03-29 NOTE — PROGRESS NOTES
Patient discharged, reviewed instructions. Informed about pain medication at [facility]. IV removed. Denied any questions

## 2022-03-29 NOTE — DISCHARGE PLANNING
3/28/22 @ 6721    DPA received a voice message from Tosha stating she needed to speak to the pt regarding money owed for O2 and get paid before she could process the walker.      Agency/Facility Name: [facility]  Spoke To: Tosha  Outcome: DPA called and stated the pt discharged yesterday.  Tosha was able to reach the pt and discuss payment for O2.  Pt did receive a walker from [facility].  Jimbo will disregard the walker referral.

## 2022-04-02 ENCOUNTER — HOSPITAL ENCOUNTER (INPATIENT)
Facility: MEDICAL CENTER | Age: 72
LOS: 3 days | DRG: 640 | End: 2022-04-06
Attending: EMERGENCY MEDICINE | Admitting: INTERNAL MEDICINE
Payer: MEDICARE

## 2022-04-02 ENCOUNTER — APPOINTMENT (OUTPATIENT)
Dept: RADIOLOGY | Facility: MEDICAL CENTER | Age: 72
DRG: 640 | End: 2022-04-02
Attending: EMERGENCY MEDICINE
Payer: MEDICARE

## 2022-04-02 DIAGNOSIS — J44.1 COPD EXACERBATION (HCC): ICD-10-CM

## 2022-04-02 DIAGNOSIS — J96.21 ACUTE ON CHRONIC RESPIRATORY FAILURE WITH HYPOXIA AND HYPERCAPNIA (HCC): ICD-10-CM

## 2022-04-02 DIAGNOSIS — E86.0 DEHYDRATION: ICD-10-CM

## 2022-04-02 DIAGNOSIS — E87.1 HYPONATREMIA: ICD-10-CM

## 2022-04-02 DIAGNOSIS — R53.81 DEBILITY: ICD-10-CM

## 2022-04-02 DIAGNOSIS — J44.1 ACUTE EXACERBATION OF CHRONIC OBSTRUCTIVE PULMONARY DISEASE (COPD) (HCC): ICD-10-CM

## 2022-04-02 DIAGNOSIS — I10 ESSENTIAL HYPERTENSION: ICD-10-CM

## 2022-04-02 DIAGNOSIS — J96.21 ACUTE ON CHRONIC RESPIRATORY FAILURE WITH HYPOXIA (HCC): ICD-10-CM

## 2022-04-02 DIAGNOSIS — J96.22 ACUTE ON CHRONIC RESPIRATORY FAILURE WITH HYPOXIA AND HYPERCAPNIA (HCC): ICD-10-CM

## 2022-04-02 DIAGNOSIS — R06.02 SHORTNESS OF BREATH: ICD-10-CM

## 2022-04-02 LAB
ALBUMIN SERPL BCP-MCNC: 3.7 G/DL (ref 3.2–4.9)
ALBUMIN/GLOB SERPL: 1.3 G/DL
ALP SERPL-CCNC: 86 U/L (ref 30–99)
ALT SERPL-CCNC: 15 U/L (ref 2–50)
ANION GAP SERPL CALC-SCNC: 12 MMOL/L (ref 7–16)
APPEARANCE UR: CLEAR
AST SERPL-CCNC: 24 U/L (ref 12–45)
BASOPHILS # BLD AUTO: 0.2 % (ref 0–1.8)
BASOPHILS # BLD: 0.01 K/UL (ref 0–0.12)
BILIRUB SERPL-MCNC: 0.4 MG/DL (ref 0.1–1.5)
BILIRUB UR QL STRIP.AUTO: NEGATIVE
BUN SERPL-MCNC: 4 MG/DL (ref 8–22)
CALCIUM SERPL-MCNC: 8.4 MG/DL (ref 8.5–10.5)
CHLORIDE SERPL-SCNC: 80 MMOL/L (ref 96–112)
CO2 SERPL-SCNC: 22 MMOL/L (ref 20–33)
COLOR UR: YELLOW
CREAT SERPL-MCNC: 0.47 MG/DL (ref 0.5–1.4)
CRP SERPL HS-MCNC: 2.47 MG/DL (ref 0–0.75)
D DIMER PPP IA.FEU-MCNC: 2.77 UG/ML (FEU) (ref 0–0.5)
EKG IMPRESSION: NORMAL
EOSINOPHIL # BLD AUTO: 0.14 K/UL (ref 0–0.51)
EOSINOPHIL NFR BLD: 2.6 % (ref 0–6.9)
ERYTHROCYTE [DISTWIDTH] IN BLOOD BY AUTOMATED COUNT: 47.2 FL (ref 35.9–50)
GFR SERPLBLD CREATININE-BSD FMLA CKD-EPI: 111 ML/MIN/1.73 M 2
GLOBULIN SER CALC-MCNC: 2.9 G/DL (ref 1.9–3.5)
GLUCOSE SERPL-MCNC: 93 MG/DL (ref 65–99)
GLUCOSE UR STRIP.AUTO-MCNC: NEGATIVE MG/DL
HCT VFR BLD AUTO: 38 % (ref 42–52)
HGB BLD-MCNC: 13.1 G/DL (ref 14–18)
IMM GRANULOCYTES # BLD AUTO: 0.06 K/UL (ref 0–0.11)
IMM GRANULOCYTES NFR BLD AUTO: 1.1 % (ref 0–0.9)
INR PPP: 0.96 (ref 0.87–1.13)
KETONES UR STRIP.AUTO-MCNC: NEGATIVE MG/DL
LACTATE BLD-SCNC: 1.6 MMOL/L (ref 0.5–2)
LEUKOCYTE ESTERASE UR QL STRIP.AUTO: NEGATIVE
LIPASE SERPL-CCNC: 15 U/L (ref 11–82)
LYMPHOCYTES # BLD AUTO: 1.17 K/UL (ref 1–4.8)
LYMPHOCYTES NFR BLD: 22 % (ref 22–41)
MAGNESIUM SERPL-MCNC: 1.7 MG/DL (ref 1.5–2.5)
MCH RBC QN AUTO: 31.8 PG (ref 27–33)
MCHC RBC AUTO-ENTMCNC: 34.5 G/DL (ref 33.7–35.3)
MCV RBC AUTO: 92.2 FL (ref 81.4–97.8)
MICRO URNS: NORMAL
MONOCYTES # BLD AUTO: 0.68 K/UL (ref 0–0.85)
MONOCYTES NFR BLD AUTO: 12.8 % (ref 0–13.4)
NEUTROPHILS # BLD AUTO: 3.26 K/UL (ref 1.82–7.42)
NEUTROPHILS NFR BLD: 61.3 % (ref 44–72)
NITRITE UR QL STRIP.AUTO: NEGATIVE
NRBC # BLD AUTO: 0 K/UL
NRBC BLD-RTO: 0 /100 WBC
NT-PROBNP SERPL IA-MCNC: 263 PG/ML (ref 0–125)
PH UR STRIP.AUTO: 5.5 [PH] (ref 5–8)
PLATELET # BLD AUTO: 224 K/UL (ref 164–446)
PMV BLD AUTO: 9.5 FL (ref 9–12.9)
POTASSIUM SERPL-SCNC: 4.6 MMOL/L (ref 3.6–5.5)
PROCALCITONIN SERPL-MCNC: <0.05 NG/ML
PROT SERPL-MCNC: 6.6 G/DL (ref 6–8.2)
PROT UR QL STRIP: NEGATIVE MG/DL
PROTHROMBIN TIME: 12.5 SEC (ref 12–14.6)
RBC # BLD AUTO: 4.12 M/UL (ref 4.7–6.1)
RBC UR QL AUTO: NEGATIVE
SODIUM SERPL-SCNC: 114 MMOL/L (ref 135–145)
SP GR UR STRIP.AUTO: 1
UROBILINOGEN UR STRIP.AUTO-MCNC: 0.2 MG/DL
WBC # BLD AUTO: 5.3 K/UL (ref 4.8–10.8)

## 2022-04-02 PROCEDURE — 99291 CRITICAL CARE FIRST HOUR: CPT

## 2022-04-02 PROCEDURE — 87040 BLOOD CULTURE FOR BACTERIA: CPT

## 2022-04-02 PROCEDURE — 93005 ELECTROCARDIOGRAM TRACING: CPT

## 2022-04-02 PROCEDURE — 81003 URINALYSIS AUTO W/O SCOPE: CPT

## 2022-04-02 PROCEDURE — 83735 ASSAY OF MAGNESIUM: CPT

## 2022-04-02 PROCEDURE — 83605 ASSAY OF LACTIC ACID: CPT

## 2022-04-02 PROCEDURE — 85610 PROTHROMBIN TIME: CPT

## 2022-04-02 PROCEDURE — 85025 COMPLETE CBC W/AUTO DIFF WBC: CPT

## 2022-04-02 PROCEDURE — 80053 COMPREHEN METABOLIC PANEL: CPT

## 2022-04-02 PROCEDURE — 36415 COLL VENOUS BLD VENIPUNCTURE: CPT

## 2022-04-02 PROCEDURE — 83880 ASSAY OF NATRIURETIC PEPTIDE: CPT

## 2022-04-02 PROCEDURE — 86140 C-REACTIVE PROTEIN: CPT

## 2022-04-02 PROCEDURE — 84145 PROCALCITONIN (PCT): CPT

## 2022-04-02 PROCEDURE — A9270 NON-COVERED ITEM OR SERVICE: HCPCS | Performed by: EMERGENCY MEDICINE

## 2022-04-02 PROCEDURE — 96365 THER/PROPH/DIAG IV INF INIT: CPT

## 2022-04-02 PROCEDURE — 83690 ASSAY OF LIPASE: CPT

## 2022-04-02 PROCEDURE — 700111 HCHG RX REV CODE 636 W/ 250 OVERRIDE (IP): Performed by: EMERGENCY MEDICINE

## 2022-04-02 PROCEDURE — 93005 ELECTROCARDIOGRAM TRACING: CPT | Performed by: EMERGENCY MEDICINE

## 2022-04-02 PROCEDURE — 700102 HCHG RX REV CODE 250 W/ 637 OVERRIDE(OP): Performed by: EMERGENCY MEDICINE

## 2022-04-02 PROCEDURE — 71045 X-RAY EXAM CHEST 1 VIEW: CPT

## 2022-04-02 PROCEDURE — 85379 FIBRIN DEGRADATION QUANT: CPT

## 2022-04-02 PROCEDURE — 700105 HCHG RX REV CODE 258: Performed by: EMERGENCY MEDICINE

## 2022-04-02 RX ORDER — AZITHROMYCIN 250 MG/1
500 TABLET, FILM COATED ORAL ONCE
Status: COMPLETED | OUTPATIENT
Start: 2022-04-02 | End: 2022-04-02

## 2022-04-02 RX ADMIN — AZITHROMYCIN MONOHYDRATE 500 MG: 250 TABLET ORAL at 22:49

## 2022-04-02 RX ADMIN — SODIUM CHLORIDE 3 G: 900 INJECTION INTRAVENOUS at 22:50

## 2022-04-02 ASSESSMENT — FIBROSIS 4 INDEX: FIB4 SCORE: 1.33

## 2022-04-03 ENCOUNTER — APPOINTMENT (OUTPATIENT)
Dept: RADIOLOGY | Facility: MEDICAL CENTER | Age: 72
DRG: 640 | End: 2022-04-03
Attending: EMERGENCY MEDICINE
Payer: MEDICARE

## 2022-04-03 PROBLEM — Z71.89 ADVANCED CARE PLANNING/COUNSELING DISCUSSION: Status: ACTIVE | Noted: 2022-04-03

## 2022-04-03 PROBLEM — H93.90 EAR LESION: Status: ACTIVE | Noted: 2022-04-03

## 2022-04-03 PROBLEM — R09.89 SUSPECTED PULMONARY EMBOLISM: Status: ACTIVE | Noted: 2022-04-03

## 2022-04-03 LAB
ALBUMIN SERPL BCP-MCNC: 3.6 G/DL (ref 3.2–4.9)
ALBUMIN/GLOB SERPL: 1.2 G/DL
ALP SERPL-CCNC: 86 U/L (ref 30–99)
ALT SERPL-CCNC: 15 U/L (ref 2–50)
AMPHET UR QL SCN: NEGATIVE
ANION GAP SERPL CALC-SCNC: 11 MMOL/L (ref 7–16)
ANION GAP SERPL CALC-SCNC: 13 MMOL/L (ref 7–16)
ANION GAP SERPL CALC-SCNC: 14 MMOL/L (ref 7–16)
ANION GAP SERPL CALC-SCNC: 14 MMOL/L (ref 7–16)
ANION GAP SERPL CALC-SCNC: 15 MMOL/L (ref 7–16)
AST SERPL-CCNC: 21 U/L (ref 12–45)
BARBITURATES UR QL SCN: NEGATIVE
BENZODIAZ UR QL SCN: NEGATIVE
BILIRUB SERPL-MCNC: 0.5 MG/DL (ref 0.1–1.5)
BUN SERPL-MCNC: 3 MG/DL (ref 8–22)
BUN SERPL-MCNC: 4 MG/DL (ref 8–22)
BUN SERPL-MCNC: 4 MG/DL (ref 8–22)
BUN SERPL-MCNC: 5 MG/DL (ref 8–22)
BUN SERPL-MCNC: 6 MG/DL (ref 8–22)
BZE UR QL SCN: NEGATIVE
CALCIUM SERPL-MCNC: 8.3 MG/DL (ref 8.5–10.5)
CALCIUM SERPL-MCNC: 8.4 MG/DL (ref 8.5–10.5)
CALCIUM SERPL-MCNC: 8.6 MG/DL (ref 8.5–10.5)
CALCIUM SERPL-MCNC: 8.7 MG/DL (ref 8.5–10.5)
CALCIUM SERPL-MCNC: 8.7 MG/DL (ref 8.5–10.5)
CANNABINOIDS UR QL SCN: NEGATIVE
CHLORIDE SERPL-SCNC: 85 MMOL/L (ref 96–112)
CHLORIDE SERPL-SCNC: 87 MMOL/L (ref 96–112)
CHLORIDE SERPL-SCNC: 88 MMOL/L (ref 96–112)
CHLORIDE SERPL-SCNC: 90 MMOL/L (ref 96–112)
CHLORIDE SERPL-SCNC: 90 MMOL/L (ref 96–112)
CO2 SERPL-SCNC: 21 MMOL/L (ref 20–33)
CO2 SERPL-SCNC: 22 MMOL/L (ref 20–33)
CO2 SERPL-SCNC: 23 MMOL/L (ref 20–33)
CREAT SERPL-MCNC: 0.4 MG/DL (ref 0.5–1.4)
CREAT SERPL-MCNC: 0.48 MG/DL (ref 0.5–1.4)
CREAT SERPL-MCNC: 0.48 MG/DL (ref 0.5–1.4)
CREAT SERPL-MCNC: 0.58 MG/DL (ref 0.5–1.4)
CREAT SERPL-MCNC: 0.62 MG/DL (ref 0.5–1.4)
ETHANOL BLD-MCNC: 98.6 MG/DL (ref 0–10)
GFR SERPLBLD CREATININE-BSD FMLA CKD-EPI: 102 ML/MIN/1.73 M 2
GFR SERPLBLD CREATININE-BSD FMLA CKD-EPI: 104 ML/MIN/1.73 M 2
GFR SERPLBLD CREATININE-BSD FMLA CKD-EPI: 110 ML/MIN/1.73 M 2
GFR SERPLBLD CREATININE-BSD FMLA CKD-EPI: 110 ML/MIN/1.73 M 2
GFR SERPLBLD CREATININE-BSD FMLA CKD-EPI: 116 ML/MIN/1.73 M 2
GLOBULIN SER CALC-MCNC: 2.9 G/DL (ref 1.9–3.5)
GLUCOSE SERPL-MCNC: 203 MG/DL (ref 65–99)
GLUCOSE SERPL-MCNC: 227 MG/DL (ref 65–99)
GLUCOSE SERPL-MCNC: 257 MG/DL (ref 65–99)
GLUCOSE SERPL-MCNC: 86 MG/DL (ref 65–99)
GLUCOSE SERPL-MCNC: 95 MG/DL (ref 65–99)
LACTATE BLD-SCNC: 2.1 MMOL/L (ref 0.5–2)
METHADONE UR QL SCN: NEGATIVE
OPIATES UR QL SCN: POSITIVE
OSMOLALITY UR: 125 MOSM/KG H2O (ref 300–900)
OXYCODONE UR QL SCN: NEGATIVE
PCP UR QL SCN: NEGATIVE
POTASSIUM SERPL-SCNC: 4.3 MMOL/L (ref 3.6–5.5)
POTASSIUM SERPL-SCNC: 4.6 MMOL/L (ref 3.6–5.5)
POTASSIUM SERPL-SCNC: 4.7 MMOL/L (ref 3.6–5.5)
POTASSIUM SERPL-SCNC: 5.4 MMOL/L (ref 3.6–5.5)
POTASSIUM SERPL-SCNC: 5.6 MMOL/L (ref 3.6–5.5)
PROPOXYPH UR QL SCN: NEGATIVE
PROT SERPL-MCNC: 6.5 G/DL (ref 6–8.2)
SODIUM SERPL-SCNC: 120 MMOL/L (ref 135–145)
SODIUM SERPL-SCNC: 121 MMOL/L (ref 135–145)
SODIUM SERPL-SCNC: 123 MMOL/L (ref 135–145)
SODIUM SERPL-SCNC: 126 MMOL/L (ref 135–145)
SODIUM SERPL-SCNC: 127 MMOL/L (ref 135–145)
SODIUM UR-SCNC: <20 MMOL/L

## 2022-04-03 PROCEDURE — 71275 CT ANGIOGRAPHY CHEST: CPT | Mod: ME

## 2022-04-03 PROCEDURE — 80048 BASIC METABOLIC PNL TOTAL CA: CPT | Mod: 91

## 2022-04-03 PROCEDURE — 80307 DRUG TEST PRSMV CHEM ANLYZR: CPT

## 2022-04-03 PROCEDURE — 94760 N-INVAS EAR/PLS OXIMETRY 1: CPT

## 2022-04-03 PROCEDURE — 700111 HCHG RX REV CODE 636 W/ 250 OVERRIDE (IP): Performed by: STUDENT IN AN ORGANIZED HEALTH CARE EDUCATION/TRAINING PROGRAM

## 2022-04-03 PROCEDURE — A9270 NON-COVERED ITEM OR SERVICE: HCPCS | Performed by: HOSPITALIST

## 2022-04-03 PROCEDURE — 700117 HCHG RX CONTRAST REV CODE 255: Performed by: EMERGENCY MEDICINE

## 2022-04-03 PROCEDURE — 700102 HCHG RX REV CODE 250 W/ 637 OVERRIDE(OP): Performed by: STUDENT IN AN ORGANIZED HEALTH CARE EDUCATION/TRAINING PROGRAM

## 2022-04-03 PROCEDURE — 770000 HCHG ROOM/CARE - INTERMEDIATE ICU *

## 2022-04-03 PROCEDURE — 84300 ASSAY OF URINE SODIUM: CPT

## 2022-04-03 PROCEDURE — 82077 ASSAY SPEC XCP UR&BREATH IA: CPT

## 2022-04-03 PROCEDURE — A9270 NON-COVERED ITEM OR SERVICE: HCPCS | Performed by: STUDENT IN AN ORGANIZED HEALTH CARE EDUCATION/TRAINING PROGRAM

## 2022-04-03 PROCEDURE — 94640 AIRWAY INHALATION TREATMENT: CPT

## 2022-04-03 PROCEDURE — 83605 ASSAY OF LACTIC ACID: CPT

## 2022-04-03 PROCEDURE — 83935 ASSAY OF URINE OSMOLALITY: CPT

## 2022-04-03 PROCEDURE — 99223 1ST HOSP IP/OBS HIGH 75: CPT | Mod: AI | Performed by: STUDENT IN AN ORGANIZED HEALTH CARE EDUCATION/TRAINING PROGRAM

## 2022-04-03 PROCEDURE — 87086 URINE CULTURE/COLONY COUNT: CPT

## 2022-04-03 PROCEDURE — 700101 HCHG RX REV CODE 250: Performed by: STUDENT IN AN ORGANIZED HEALTH CARE EDUCATION/TRAINING PROGRAM

## 2022-04-03 PROCEDURE — 94669 MECHANICAL CHEST WALL OSCILL: CPT

## 2022-04-03 PROCEDURE — 700105 HCHG RX REV CODE 258

## 2022-04-03 PROCEDURE — 700105 HCHG RX REV CODE 258: Performed by: HOSPITALIST

## 2022-04-03 PROCEDURE — 700111 HCHG RX REV CODE 636 W/ 250 OVERRIDE (IP): Performed by: HOSPITALIST

## 2022-04-03 PROCEDURE — 80053 COMPREHEN METABOLIC PANEL: CPT

## 2022-04-03 PROCEDURE — 700102 HCHG RX REV CODE 250 W/ 637 OVERRIDE(OP): Performed by: HOSPITALIST

## 2022-04-03 RX ORDER — HYDROMORPHONE HYDROCHLORIDE 1 MG/ML
0.5 INJECTION, SOLUTION INTRAMUSCULAR; INTRAVENOUS; SUBCUTANEOUS
Status: DISCONTINUED | OUTPATIENT
Start: 2022-04-03 | End: 2022-04-06 | Stop reason: HOSPADM

## 2022-04-03 RX ORDER — BISACODYL 10 MG
10 SUPPOSITORY, RECTAL RECTAL
Status: DISCONTINUED | OUTPATIENT
Start: 2022-04-03 | End: 2022-04-06 | Stop reason: HOSPADM

## 2022-04-03 RX ORDER — AZITHROMYCIN 500 MG/5ML
500 INJECTION, POWDER, LYOPHILIZED, FOR SOLUTION INTRAVENOUS EVERY 24 HOURS
Status: DISCONTINUED | OUTPATIENT
Start: 2022-04-03 | End: 2022-04-03

## 2022-04-03 RX ORDER — KETOROLAC TROMETHAMINE 30 MG/ML
30 INJECTION, SOLUTION INTRAMUSCULAR; INTRAVENOUS ONCE
Status: COMPLETED | OUTPATIENT
Start: 2022-04-03 | End: 2022-04-03

## 2022-04-03 RX ORDER — OXYCODONE HYDROCHLORIDE 5 MG/1
5 TABLET ORAL
Status: DISCONTINUED | OUTPATIENT
Start: 2022-04-03 | End: 2022-04-03

## 2022-04-03 RX ORDER — IPRATROPIUM BROMIDE AND ALBUTEROL SULFATE 2.5; .5 MG/3ML; MG/3ML
3 SOLUTION RESPIRATORY (INHALATION)
Status: DISCONTINUED | OUTPATIENT
Start: 2022-04-03 | End: 2022-04-06 | Stop reason: HOSPADM

## 2022-04-03 RX ORDER — FLUTICASONE PROPIONATE 44 UG/1
2 AEROSOL, METERED RESPIRATORY (INHALATION) DAILY
Status: DISCONTINUED | OUTPATIENT
Start: 2022-04-03 | End: 2022-04-04

## 2022-04-03 RX ORDER — OXYCODONE HYDROCHLORIDE 10 MG/1
10 TABLET ORAL ONCE
Status: COMPLETED | OUTPATIENT
Start: 2022-04-03 | End: 2022-04-03

## 2022-04-03 RX ORDER — HYDRALAZINE HYDROCHLORIDE 20 MG/ML
10 INJECTION INTRAMUSCULAR; INTRAVENOUS EVERY 4 HOURS PRN
Status: DISCONTINUED | OUTPATIENT
Start: 2022-04-03 | End: 2022-04-06 | Stop reason: HOSPADM

## 2022-04-03 RX ORDER — ACETAMINOPHEN 500 MG
500 TABLET ORAL 3 TIMES DAILY
Status: DISCONTINUED | OUTPATIENT
Start: 2022-04-03 | End: 2022-04-06 | Stop reason: HOSPADM

## 2022-04-03 RX ORDER — IPRATROPIUM BROMIDE AND ALBUTEROL SULFATE 2.5; .5 MG/3ML; MG/3ML
3 SOLUTION RESPIRATORY (INHALATION)
Status: DISCONTINUED | OUTPATIENT
Start: 2022-04-03 | End: 2022-04-04

## 2022-04-03 RX ORDER — IPRATROPIUM BROMIDE AND ALBUTEROL SULFATE 2.5; .5 MG/3ML; MG/3ML
3 SOLUTION RESPIRATORY (INHALATION)
Status: DISCONTINUED | OUTPATIENT
Start: 2022-04-03 | End: 2022-04-03

## 2022-04-03 RX ORDER — DEXTROSE MONOHYDRATE 50 MG/ML
INJECTION, SOLUTION INTRAVENOUS ONCE
Status: COMPLETED | OUTPATIENT
Start: 2022-04-03 | End: 2022-04-03

## 2022-04-03 RX ORDER — ONDANSETRON 2 MG/ML
4 INJECTION INTRAMUSCULAR; INTRAVENOUS EVERY 4 HOURS PRN
Status: DISCONTINUED | OUTPATIENT
Start: 2022-04-03 | End: 2022-04-06 | Stop reason: HOSPADM

## 2022-04-03 RX ORDER — METHYLPREDNISOLONE SODIUM SUCCINATE 125 MG/2ML
62.5 INJECTION, POWDER, LYOPHILIZED, FOR SOLUTION INTRAMUSCULAR; INTRAVENOUS EVERY 12 HOURS
Status: DISCONTINUED | OUTPATIENT
Start: 2022-04-03 | End: 2022-04-03

## 2022-04-03 RX ORDER — GUAIFENESIN/DEXTROMETHORPHAN 100-10MG/5
10 SYRUP ORAL EVERY 6 HOURS PRN
Status: DISCONTINUED | OUTPATIENT
Start: 2022-04-03 | End: 2022-04-06 | Stop reason: HOSPADM

## 2022-04-03 RX ORDER — PREDNISONE 20 MG/1
40 TABLET ORAL DAILY
Status: DISCONTINUED | OUTPATIENT
Start: 2022-04-04 | End: 2022-04-06 | Stop reason: HOSPADM

## 2022-04-03 RX ORDER — ONDANSETRON 4 MG/1
4 TABLET, ORALLY DISINTEGRATING ORAL EVERY 4 HOURS PRN
Status: DISCONTINUED | OUTPATIENT
Start: 2022-04-03 | End: 2022-04-06 | Stop reason: HOSPADM

## 2022-04-03 RX ORDER — CHOLECALCIFEROL (VITAMIN D3) 125 MCG
5 CAPSULE ORAL NIGHTLY
Status: DISCONTINUED | OUTPATIENT
Start: 2022-04-03 | End: 2022-04-06 | Stop reason: HOSPADM

## 2022-04-03 RX ORDER — ALBUTEROL SULFATE 90 UG/1
1-2 AEROSOL, METERED RESPIRATORY (INHALATION) EVERY 4 HOURS PRN
Status: DISCONTINUED | OUTPATIENT
Start: 2022-04-03 | End: 2022-04-03

## 2022-04-03 RX ORDER — AMLODIPINE BESYLATE 10 MG/1
5 TABLET ORAL DAILY
Status: DISCONTINUED | OUTPATIENT
Start: 2022-04-03 | End: 2022-04-06 | Stop reason: HOSPADM

## 2022-04-03 RX ORDER — OXYCODONE HYDROCHLORIDE 5 MG/1
2.5 TABLET ORAL
Status: DISCONTINUED | OUTPATIENT
Start: 2022-04-03 | End: 2022-04-03

## 2022-04-03 RX ORDER — SODIUM CHLORIDE 9 MG/ML
500 INJECTION, SOLUTION INTRAVENOUS ONCE
Status: COMPLETED | OUTPATIENT
Start: 2022-04-03 | End: 2022-04-03

## 2022-04-03 RX ORDER — ACETAMINOPHEN 325 MG/1
650 TABLET ORAL EVERY 6 HOURS PRN
Status: DISCONTINUED | OUTPATIENT
Start: 2022-04-03 | End: 2022-04-06 | Stop reason: HOSPADM

## 2022-04-03 RX ORDER — OXYCODONE HYDROCHLORIDE 5 MG/1
5 TABLET ORAL
Status: DISCONTINUED | OUTPATIENT
Start: 2022-04-03 | End: 2022-04-06 | Stop reason: HOSPADM

## 2022-04-03 RX ORDER — HYDROMORPHONE HYDROCHLORIDE 1 MG/ML
0.25 INJECTION, SOLUTION INTRAMUSCULAR; INTRAVENOUS; SUBCUTANEOUS
Status: DISCONTINUED | OUTPATIENT
Start: 2022-04-03 | End: 2022-04-03

## 2022-04-03 RX ORDER — AMOXICILLIN 250 MG
2 CAPSULE ORAL 2 TIMES DAILY
Status: DISCONTINUED | OUTPATIENT
Start: 2022-04-03 | End: 2022-04-06 | Stop reason: HOSPADM

## 2022-04-03 RX ORDER — POLYETHYLENE GLYCOL 3350 17 G/17G
1 POWDER, FOR SOLUTION ORAL
Status: DISCONTINUED | OUTPATIENT
Start: 2022-04-03 | End: 2022-04-06 | Stop reason: HOSPADM

## 2022-04-03 RX ORDER — METHYLPREDNISOLONE SODIUM SUCCINATE 125 MG/2ML
62.5 INJECTION, POWDER, LYOPHILIZED, FOR SOLUTION INTRAMUSCULAR; INTRAVENOUS EVERY 12 HOURS
Status: COMPLETED | OUTPATIENT
Start: 2022-04-03 | End: 2022-04-03

## 2022-04-03 RX ORDER — PREDNISONE 20 MG/1
40 TABLET ORAL DAILY
Status: DISCONTINUED | OUTPATIENT
Start: 2022-04-04 | End: 2022-04-03

## 2022-04-03 RX ORDER — OXYCODONE HYDROCHLORIDE 10 MG/1
10 TABLET ORAL
Status: DISCONTINUED | OUTPATIENT
Start: 2022-04-03 | End: 2022-04-06 | Stop reason: HOSPADM

## 2022-04-03 RX ADMIN — IPRATROPIUM BROMIDE AND ALBUTEROL SULFATE 3 ML: .5; 3 SOLUTION RESPIRATORY (INHALATION) at 13:21

## 2022-04-03 RX ADMIN — IPRATROPIUM BROMIDE AND ALBUTEROL SULFATE 3 ML: .5; 3 SOLUTION RESPIRATORY (INHALATION) at 20:54

## 2022-04-03 RX ADMIN — AMLODIPINE BESYLATE 5 MG: 10 TABLET ORAL at 05:25

## 2022-04-03 RX ADMIN — Medication 5 MG: at 20:48

## 2022-04-03 RX ADMIN — OXYCODONE 5 MG: 5 TABLET ORAL at 09:16

## 2022-04-03 RX ADMIN — METHYLPREDNISOLONE SODIUM SUCCINATE 62.5 MG: 125 INJECTION, POWDER, FOR SOLUTION INTRAMUSCULAR; INTRAVENOUS at 05:27

## 2022-04-03 RX ADMIN — FLUTICASONE PROPIONATE 88 MCG: 44 AEROSOL, METERED RESPIRATORY (INHALATION) at 05:55

## 2022-04-03 RX ADMIN — GUAIFENESIN SYRUP AND DEXTROMETHORPHAN 10 ML: 100; 10 SYRUP ORAL at 17:41

## 2022-04-03 RX ADMIN — IPRATROPIUM BROMIDE AND ALBUTEROL SULFATE 3 ML: .5; 3 SOLUTION RESPIRATORY (INHALATION) at 10:19

## 2022-04-03 RX ADMIN — SENNOSIDES AND DOCUSATE SODIUM 2 TABLET: 50; 8.6 TABLET ORAL at 05:26

## 2022-04-03 RX ADMIN — OXYCODONE HYDROCHLORIDE 10 MG: 10 TABLET ORAL at 20:49

## 2022-04-03 RX ADMIN — HYDROMORPHONE HYDROCHLORIDE 0.5 MG: 1 INJECTION, SOLUTION INTRAMUSCULAR; INTRAVENOUS; SUBCUTANEOUS at 14:33

## 2022-04-03 RX ADMIN — IPRATROPIUM BROMIDE AND ALBUTEROL SULFATE 3 ML: .5; 3 SOLUTION RESPIRATORY (INHALATION) at 06:55

## 2022-04-03 RX ADMIN — ACETAMINOPHEN 500 MG: 500 TABLET ORAL at 20:49

## 2022-04-03 RX ADMIN — SENNOSIDES AND DOCUSATE SODIUM 2 TABLET: 50; 8.6 TABLET ORAL at 18:00

## 2022-04-03 RX ADMIN — ENOXAPARIN SODIUM 40 MG: 40 INJECTION SUBCUTANEOUS at 05:30

## 2022-04-03 RX ADMIN — ACETAMINOPHEN 500 MG: 500 TABLET ORAL at 10:01

## 2022-04-03 RX ADMIN — OXYCODONE HYDROCHLORIDE 10 MG: 10 TABLET ORAL at 12:15

## 2022-04-03 RX ADMIN — DEXTROSE MONOHYDRATE: 50 INJECTION, SOLUTION INTRAVENOUS at 09:30

## 2022-04-03 RX ADMIN — SODIUM CHLORIDE 500 ML: 9 INJECTION, SOLUTION INTRAVENOUS at 00:18

## 2022-04-03 RX ADMIN — METHYLPREDNISOLONE SODIUM SUCCINATE 62.5 MG: 125 INJECTION, POWDER, FOR SOLUTION INTRAMUSCULAR; INTRAVENOUS at 18:00

## 2022-04-03 RX ADMIN — IPRATROPIUM BROMIDE AND ALBUTEROL SULFATE 3 ML: .5; 3 SOLUTION RESPIRATORY (INHALATION) at 23:37

## 2022-04-03 RX ADMIN — Medication 5 MG: at 03:19

## 2022-04-03 RX ADMIN — ACETAMINOPHEN 500 MG: 500 TABLET ORAL at 14:33

## 2022-04-03 RX ADMIN — HYDROMORPHONE HYDROCHLORIDE 0.25 MG: 1 INJECTION, SOLUTION INTRAMUSCULAR; INTRAVENOUS; SUBCUTANEOUS at 10:53

## 2022-04-03 RX ADMIN — IOHEXOL 80 ML: 350 INJECTION, SOLUTION INTRAVENOUS at 03:00

## 2022-04-03 RX ADMIN — KETOROLAC TROMETHAMINE 30 MG: 30 INJECTION, SOLUTION INTRAMUSCULAR at 10:02

## 2022-04-03 RX ADMIN — OXYCODONE HYDROCHLORIDE 10 MG: 10 TABLET ORAL at 17:40

## 2022-04-03 RX ADMIN — AZITHROMYCIN FOR INJECTION INJECTION, POWDER, LYOPHILIZED, FOR SOLUTION 500 MG: 500 INJECTION INTRAVENOUS at 10:01

## 2022-04-03 ASSESSMENT — LIFESTYLE VARIABLES
EVER FELT BAD OR GUILTY ABOUT YOUR DRINKING: NO
ON A TYPICAL DAY WHEN YOU DRINK ALCOHOL HOW MANY DRINKS DO YOU HAVE: 7
HAVE PEOPLE ANNOYED YOU BY CRITICIZING YOUR DRINKING: NO
CONSUMPTION TOTAL: POSITIVE
HAVE YOU EVER FELT YOU SHOULD CUT DOWN ON YOUR DRINKING: YES
TOTAL SCORE: 2
HOW MANY TIMES IN THE PAST YEAR HAVE YOU HAD 5 OR MORE DRINKS IN A DAY: 365
DOES PATIENT WANT TO STOP DRINKING: NO
ALCOHOL_USE: YES
TOTAL SCORE: 2
TOTAL SCORE: 2
EVER HAD A DRINK FIRST THING IN THE MORNING TO STEADY YOUR NERVES TO GET RID OF A HANGOVER: YES
AVERAGE NUMBER OF DAYS PER WEEK YOU HAVE A DRINK CONTAINING ALCOHOL: 7

## 2022-04-03 ASSESSMENT — ENCOUNTER SYMPTOMS
DIARRHEA: 0
NECK PAIN: 0
CONSTIPATION: 0
WHEEZING: 0
LOSS OF CONSCIOUSNESS: 0
INSOMNIA: 0
PALPITATIONS: 0
VOMITING: 0
DEPRESSION: 0
BLURRED VISION: 0
FEVER: 0
BACK PAIN: 0
HEADACHES: 0
BLOOD IN STOOL: 0
BRUISES/BLEEDS EASILY: 0
FOCAL WEAKNESS: 0
CHILLS: 0
HEARTBURN: 0
WEAKNESS: 0
FALLS: 1
SHORTNESS OF BREATH: 1
NAUSEA: 0
ABDOMINAL PAIN: 0
DOUBLE VISION: 0
SORE THROAT: 0
COUGH: 0

## 2022-04-03 ASSESSMENT — PAIN DESCRIPTION - PAIN TYPE
TYPE: ACUTE PAIN
TYPE: ACUTE PAIN
TYPE: CHRONIC PAIN
TYPE: CHRONIC PAIN;ACUTE PAIN

## 2022-04-03 ASSESSMENT — PATIENT HEALTH QUESTIONNAIRE - PHQ9
SUM OF ALL RESPONSES TO PHQ9 QUESTIONS 1 AND 2: 0
1. LITTLE INTEREST OR PLEASURE IN DOING THINGS: NOT AT ALL
2. FEELING DOWN, DEPRESSED, IRRITABLE, OR HOPELESS: NOT AT ALL

## 2022-04-03 ASSESSMENT — FIBROSIS 4 INDEX: FIB4 SCORE: 1.96

## 2022-04-03 NOTE — PROGRESS NOTES
"Critical Care Medicine   Brief IMCU Acceptance Note    Date of service: 4/3/2022  Time: 1:29 AM    Notified by ERP of 71-year-old male with recurrent hyponatremia.  Sodium is 114.  Patient was admitted early in March of this year for a similar presentation consisting of shortness of breath due to his COPD exacerbation and concommitment hyponatremia.  He was treated with volume restriction and salt tabs with normalization of his sodium.  He was discharged to [facility] however left after 2 days because he did did not feel he was receiving rehabilitation. He again presented on 3/23 for a another COPD exacerbation and hyponatremia.  Again the patient was treated with correction of his sodium and discharged home due to refusal to be placed in skilled nurse facility.  Today the patient fell to the ground and \"could not breathe\" therefore he was brought to the ER and treatment was initiated for his COPD.  I was contacted due to the severity of his hyponatremia for possible IMCU admission.  On evaluation of the patient he has no neurologic changes and denies any at home.  I agree with IMCU placement under the hospitalist service.  Recommend slow correction of sodium at 6 to 8 mmol/L every 24 hours.  Recommend continued medical management of his COPD including steroids and bronchodilators.    [facility] critical [facility] will remain available if needed for any changes.    JuanR Paniagua Jr., D.O.  [facility]    "

## 2022-04-03 NOTE — ED NOTES
Bedside handoff report to CIC RN. Pt transferred out of ED at this time with CIC RN via gurney. Pt is A&Ox4, with stable vital signs and no apparent distress upon transfer. Pt transferred on tele monitor and 3L O2 with adequate O2 volume in bed tank. All paperwork and personal belongings sent with pt to [facility].

## 2022-04-03 NOTE — ED NOTES
Narcisa from Lab called with critical result of Sodium 114 at 2307. Critical lab result read back to Narcisa.   Dr. Denney notified of critical lab result.

## 2022-04-03 NOTE — ED NOTES
Med rec updated and complete. Allergies reviewed. Confirmed name and date of birth.  Pt reports no antibiotic use in last 30 days.  Interviewed pt at bedside.        Home pharmacy [facility] [phone]

## 2022-04-03 NOTE — ASSESSMENT & PLAN NOTE
Continue supplemental O2 to maintain SPO2 greater than 88%  Baseline home oxygen 3 L  CT negative for PE  Secondary to COPD exacerbation

## 2022-04-03 NOTE — CARE PLAN
The patient is Stable - Low risk of patient condition declining or worsening    Shift Goals  Clinical Goals: slow correction of Na, stable vitals, rest  Patient Goals: ALEKSANDAR  Family Goals: ALEKSANDAR    Progress made toward(s) clinical / shift goals:    Problem: Risk for Infection - COPD  Goal: Patient will remain free from signs and symptoms of infection  Outcome: Progressing     Problem: Impaired Gas Exchange  Goal: Patient will demonstrate improved ventilation and adequate oxygenation and participate in treatment regimen within the level of ability/situation.  Outcome: Progressing     Problem: Fall Risk  Goal: Patient will remain free from falls  Outcome: Progressing       Patient is not progressing towards the following goals:  Problem: Knowledge Deficit - Standard  Goal: Patient and family/care givers will demonstrate understanding of plan of care, disease process/condition, diagnostic tests and medications  Outcome: Not Met     Problem: Knowledge Deficit - COPD  Goal: Patient/significant other demonstrates understanding of disease process, utilization of the Action Plan, medications and discharge instruction  Outcome: Not Met     Problem: Self Care  Goal: Patient will have the ability to perform ADLs independently or with assistance (bathe, groom, dress, toilet and feed)  Outcome: Not Met

## 2022-04-03 NOTE — ASSESSMENT & PLAN NOTE
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

## 2022-04-03 NOTE — PROGRESS NOTES
Patient seen and examined today.  Data, Medication data reviewed.  Case discussed with nursing as available.  Plan of Care reviewed with patient and notified of changes.  4/3 the patient feels better, complains of knee and left flank pain, s/p fall  Sodium is correcting too fast, given some free water back  Adjusted pain medication  Full follow-up tomorrow

## 2022-04-03 NOTE — ED NOTES
Pt resting comfortably in [facility]. Call light within reach.    Notified phlebotomist to obtain second set of blood cultures.

## 2022-04-03 NOTE — ASSESSMENT & PLAN NOTE
IV Solu-Medrol has been de-escalated to prednisone 40 mg x 4 days for total of 5 days systemic steroids  Status post Azithromycin   Duo nebs as needed  Continue home medication regimen

## 2022-04-03 NOTE — ASSESSMENT & PLAN NOTE
Presenting sodium was severely low at 114.  Osmolality calculated initially at 234.4 and consistent with hypotonic hypervolemic hyponatremia  Fluid restriction   Likely secondary to beer Potomania,water and unable to quantify daily amount.  Outpatient follow up

## 2022-04-03 NOTE — PROGRESS NOTES
4 Eyes Skin Assessment Completed by ALDEN Grider and ALDEN Paul.    Head WDL  Ears WDL, lesion noted on right ear.  Nose WDL  Mouth WDL  Neck WDL  Breast/Chest WDL  Shoulder Blades WDL  Spine WDL  (R) Arm/Elbow/Hand Bruising and Scab  (L) Arm/Elbow/Hand WDL  Abdomen WDL  Groin WDL  Scrotum/Coccyx/Buttocks Redness and Blanching, swollen scrotum  (R) Leg Redness, Rash, Swelling and Edema  (L) Leg Redness, Rash, Swelling and Edema  (R) Heel/Foot/Toe Redness, Blanching, Swelling and Rash  (L) Heel/Foot/Toe Redness, Blanching, Swelling and Rash          Devices In Places ECG, Blood Pressure Cuff, Pulse Ox, [place], SCD's and Nasal Cannula      Interventions In Place Gray Ear Foams, Sacral Mepilex, Pillows and Low Air Loss Mattress    Possible Skin Injury Yes    Pictures Uploaded Into Epic Yes  Wound Consult Placed N/A  RN Wound Prevention Protocol Ordered Yes

## 2022-04-03 NOTE — ED TRIAGE NOTES
"Chief Complaint   Patient presents with   • Shortness of Breath     Pt states he fell today at home to the ground, called 911 for a lift assist as he couldn't get off the ground. Pt states he fell because he \"couldn't breath.\" PT sounds crackly and junky and appears chronically unwell. Pt normally wears 3L of O2 at home and states he hasn't needed to increase his oxygen. Pt has history of COPD and he \"thinks its getting worse.\"        EMS to triage for above complaint. Pt denies hitting his head, -LOC, -blood thinners.  Educated on triage process, encourage to inform staff of any changes.     /101   Pulse 89   Temp 36.6 °C (97.8 °F) (Temporal)   Resp 18   Ht 1.778 m (5' 10\")   Wt (!) 126 kg (277 lb 12.5 oz)   SpO2 94%   BMI 39.86 kg/m²     "

## 2022-04-03 NOTE — ED PROVIDER NOTES
"  ED Provider Note    CHIEF COMPLAINT  Chief Complaint   Patient presents with   • Shortness of Breath     Pt states he fell today at home to the ground, called 911 for a lift assist as he couldn't get off the ground. Pt states he fell because he \"couldn't breath.\" PT sounds crackly and junky and appears chronically unwell. Pt normally wears 3L of O2 at home and states he hasn't needed to increase his oxygen. Pt has history of COPD and he \"thinks its getting worse.\"        HPI    Primary care provider: Peterson Amin M.D.  Means of arrival: EMS  History obtained from: Patient  History limited by: NOTHING    Juan Manuel Bass is a 71 y.o. male who presents with dyspnea.  The patient has been feeling short of breath over hospital days, he has a known history of COPD and is normally on 3 L of supplemental oxygen at baseline.  He has been feeling more short of breath the last few days with exertion especially.  He has been turning up his oxygen at home, and today he had a fall to the ground.  Denies symptoms.  Denies head trauma.  No chest pain, he just feels short of breath even at rest at this point even on increased oxygen levels.  No fevers.  Has been vaccinated against COVID-19 with no known close COVID contacts.  Many prior episodes that are attributed to COPD flares.  No alleviating measures noted prior to arrival other than increasing his oxygenation via nasal cannula.    REVIEW OF SYSTEMS  Constitutional: Negative for fever or chills.  Positive for fatigue and fall.  HENT: Negative for rhinorrhea or sore throat.    Eyes: Negative for vision changes or discharge.   Respiratory: Positive for cough and shortness of breath.    Cardiovascular: Negative for chest pain or syncope.   Gastrointestinal: Negative for nausea, vomiting, or abdominal pain.   Genitourinary: Negative for dysuria or flank pain.   Musculoskeletal: Negative for back pain or joint pain.   Skin: Negative for itching or rash.   Neurological: Negative for " "sensory or motor changes.   See HPI for further details. All other systems are negative.     PAST MEDICAL HISTORY   has a past medical history of Chronic airway obstruction, not elsewhere classified and Hypertension.    PAST FAMILY HISTORY  Family History   Problem Relation Age of Onset   • No Known Problems Mother    • Heart Disease Father        SOCIAL HISTORY  Social History     Tobacco Use   • Smoking status: Former Smoker     Packs/day: 1.00     Years: 4.00     Pack years: 4.00     Quit date: 3/7/2020     Years since quittin.0   • Smokeless tobacco: Never Used   Substance and Sexual Activity   • Alcohol use: Not Currently   • Drug use: Not Currently   • Sexual activity: Not on file       SURGICAL HISTORY  patient denies any surgical history    CURRENT MEDICATIONS  Home Medications     Reviewed by Castillo Burch (Pharmacy Tech) on 22 at 2240  Med List Status: Complete   Medication Last Dose Status   albuterol 108 (90 Base) MCG/ACT Aero Soln inhalation aerosol 2022 Active   amLODIPine (NORVASC) 5 MG Tab 2022 Active   Fluticasone-Umeclidin-Vilant (TRELEGY ELLIPTA) 100-62.5-25 MCG/INH AEROSOL POWDER, BREATH ACTIVATED inhalation 2022 Active   Multiple Vitamins-Minerals (CENTRUM SILVER ADULT 50+) Tab 2022 Active   pantoprazole (PROTONIX) 40 MG Tablet Delayed Response 2022 Active   sodium chloride (SALT) 1 GM Tab 2022 Active                ALLERGIES  Allergies   Allergen Reactions   • Tetanus Toxoid Hives       PHYSICAL EXAM  VITAL SIGNS: /109   Pulse 80   Temp 36.2 °C (97.1 °F) (Temporal)   Resp 20   Ht 1.778 m (5' 10\")   Wt (!) 131 kg (289 lb 3.9 oz)   SpO2 95%   BMI 41.50 kg/m²    Pulse ox interpretation: On supplemental oxygen via NC, I interpret this pulse ox as normal.  Constitutional: Chronically ill-appearing sitting up in mild distress  HEENT: Normocephalic, atraumatic. Posterior pharynx clear, mucous membranes dry, nasal cannula in place.  Eyes:  EOMI. Normal " sclerae.  Neck: Supple, nontender.  Chest/Pulmonary: Very diminished to ausculation bilaterally, occasional coarse breath sounds and scattered expiratory wheezes.  Cardiovascular: Regular rate and rhythm, subtle systolic murmur.   Abdomen: Soft, nontender; no rebound, guarding, or masses.  Back: No CVA or midline tenderness.   Musculoskeletal: No deformity, trace symmetric lower extremity edema present.  Neuro: Clear speech, normal coordination, no focal weakness.  Psych: Normal mood and affect.  Skin: No rashes, warm and dry.      DIAGNOSTIC STUDIES / PROCEDURES    LABS & EKG  Results for orders placed or performed during the hospital encounter of 04/02/22   CBC with Differential   Result Value Ref Range    WBC 5.3 4.8 - 10.8 K/uL    RBC 4.12 (L) 4.70 - 6.10 M/uL    Hemoglobin 13.1 (L) 14.0 - 18.0 g/dL    Hematocrit 38.0 (L) 42.0 - 52.0 %    MCV 92.2 81.4 - 97.8 fL    MCH 31.8 27.0 - 33.0 pg    MCHC 34.5 33.7 - 35.3 g/dL    RDW 47.2 35.9 - 50.0 fL    Platelet Count 224 164 - 446 K/uL    MPV 9.5 9.0 - 12.9 fL    Neutrophils-Polys 61.30 44.00 - 72.00 %    Lymphocytes 22.00 22.00 - 41.00 %    Monocytes 12.80 0.00 - 13.40 %    Eosinophils 2.60 0.00 - 6.90 %    Basophils 0.20 0.00 - 1.80 %    Immature Granulocytes 1.10 (H) 0.00 - 0.90 %    Nucleated RBC 0.00 /100 WBC    Neutrophils (Absolute) 3.26 1.82 - 7.42 K/uL    Lymphs (Absolute) 1.17 1.00 - 4.80 K/uL    Monos (Absolute) 0.68 0.00 - 0.85 K/uL    Eos (Absolute) 0.14 0.00 - 0.51 K/uL    Baso (Absolute) 0.01 0.00 - 0.12 K/uL    Immature Granulocytes (abs) 0.06 0.00 - 0.11 K/uL    NRBC (Absolute) 0.00 K/uL   Comp Metabolic Panel   Result Value Ref Range    Sodium 114 (LL) 135 - 145 mmol/L    Potassium 4.6 3.6 - 5.5 mmol/L    Chloride 80 (L) 96 - 112 mmol/L    Co2 22 20 - 33 mmol/L    Anion Gap 12.0 7.0 - 16.0    Glucose 93 65 - 99 mg/dL    Bun 4 (L) 8 - 22 mg/dL    Creatinine 0.47 (L) 0.50 - 1.40 mg/dL    Calcium 8.4 (L) 8.5 - 10.5 mg/dL    AST(SGOT) 24 12 - 45 U/L     ALT(SGPT) 15 2 - 50 U/L    Alkaline Phosphatase 86 30 - 99 U/L    Total Bilirubin 0.4 0.1 - 1.5 mg/dL    Albumin 3.7 3.2 - 4.9 g/dL    Total Protein 6.6 6.0 - 8.2 g/dL    Globulin 2.9 1.9 - 3.5 g/dL    A-G Ratio 1.3 g/dL   Lipase   Result Value Ref Range    Lipase 15 11 - 82 U/L   Lactic Acid   Result Value Ref Range    Lactic Acid 1.6 0.5 - 2.0 mmol/L   Lactic Acid   Result Value Ref Range    Lactic Acid 2.1 (H) 0.5 - 2.0 mmol/L   Blood Culture    Specimen: Peripheral; Blood   Result Value Ref Range    Significant Indicator NEG     Source BLD     Site PERIPHERAL     Culture Result No growth after 5 days of incubation.    Blood Culture    Specimen: Peripheral; Blood   Result Value Ref Range    Significant Indicator NEG     Source BLD     Site PERIPHERAL     Culture Result No growth after 5 days of incubation.    Urinalysis    Specimen: Urine   Result Value Ref Range    Color Yellow     Character Clear     Specific Gravity 1.005 <1.035    Ph 5.5 5.0 - 8.0    Glucose Negative Negative mg/dL    Ketones Negative Negative mg/dL    Protein Negative Negative mg/dL    Bilirubin Negative Negative    Urobilinogen, Urine 0.2 Negative    Nitrite Negative Negative    Leukocyte Esterase Negative Negative    Occult Blood Negative Negative    Micro Urine Req see below    Urine Culture (NEW)    Specimen: Urine   Result Value Ref Range    Significant Indicator NEG     Source UR     Site -     Culture Result No growth at 48 hours.    D-Dimer   Result Value Ref Range    D-Dimer Screen 2.77 (H) 0.00 - 0.50 ug/mL (FEU)   CRP Quantitative (Non-Cardiac)   Result Value Ref Range    Stat C-Reactive Protein 2.47 (H) 0.00 - 0.75 mg/dL   Procalcitonin   Result Value Ref Range    Procalcitonin <0.05 <0.25 ng/mL   Magnesium   Result Value Ref Range    Magnesium 1.7 1.5 - 2.5 mg/dL   proBrain Natriuretic Peptide, NT   Result Value Ref Range    NT-proBNP 263 (H) 0 - 125 pg/mL   Prothrombin Time   Result Value Ref Range    PT 12.5 12.0 - 14.6 sec     INR 0.96 0.87 - 1.13   ESTIMATED GFR   Result Value Ref Range    GFR (CKD-EPI) 111 >60 mL/min/1.73 m 2   CMP   Result Value Ref Range    Sodium 120 (L) 135 - 145 mmol/L    Potassium 4.6 3.6 - 5.5 mmol/L    Chloride 85 (L) 96 - 112 mmol/L    Co2 22 20 - 33 mmol/L    Anion Gap 13.0 7.0 - 16.0    Glucose 95 65 - 99 mg/dL    Bun 4 (L) 8 - 22 mg/dL    Creatinine 0.48 (L) 0.50 - 1.40 mg/dL    Calcium 8.3 (L) 8.5 - 10.5 mg/dL    AST(SGOT) 21 12 - 45 U/L    ALT(SGPT) 15 2 - 50 U/L    Alkaline Phosphatase 86 30 - 99 U/L    Total Bilirubin 0.5 0.1 - 1.5 mg/dL    Albumin 3.6 3.2 - 4.9 g/dL    Total Protein 6.5 6.0 - 8.2 g/dL    Globulin 2.9 1.9 - 3.5 g/dL    A-G Ratio 1.2 g/dL   URINE SODIUM RANDOM   Result Value Ref Range    Sodium, Urine -per volume <20 mmol/L   OSMOLALITY URINE   Result Value Ref Range    Osmolality Urine 125 (L) 300 - 900 mOsm/kg H2O   URINE DRUG SCREEN   Result Value Ref Range    Amphetamines Urine Negative Negative    Barbiturates Negative Negative    Benzodiazepines Negative Negative    Cocaine Metabolite Negative Negative    Methadone Negative Negative    Opiates Positive (A) Negative    Oxycodone Negative Negative    Phencyclidine -Pcp Negative Negative    Propoxyphene Negative Negative    Cannabinoid Metab Negative Negative   Basic Metabolic Panel   Result Value Ref Range    Sodium 126 (L) 135 - 145 mmol/L    Potassium 4.3 3.6 - 5.5 mmol/L    Chloride 90 (L) 96 - 112 mmol/L    Co2 21 20 - 33 mmol/L    Glucose 86 65 - 99 mg/dL    Bun 3 (L) 8 - 22 mg/dL    Creatinine 0.40 (L) 0.50 - 1.40 mg/dL    Calcium 8.6 8.5 - 10.5 mg/dL    Anion Gap 15.0 7.0 - 16.0   DIAGNOSTIC ALCOHOL   Result Value Ref Range    Diagnostic Alcohol 98.6 (H) 0.0 - 10.0 mg/dL   ESTIMATED GFR   Result Value Ref Range    GFR (CKD-EPI) 110 >60 mL/min/1.73 m 2   Basic Metabolic Panel   Result Value Ref Range    Sodium 127 (L) 135 - 145 mmol/L    Potassium 4.7 3.6 - 5.5 mmol/L    Chloride 90 (L) 96 - 112 mmol/L    Co2 23 20 - 33 mmol/L     Glucose 203 (H) 65 - 99 mg/dL    Bun 4 (L) 8 - 22 mg/dL    Creatinine 0.48 (L) 0.50 - 1.40 mg/dL    Calcium 8.7 8.5 - 10.5 mg/dL    Anion Gap 14.0 7.0 - 16.0   ESTIMATED GFR   Result Value Ref Range    GFR (CKD-EPI) 116 >60 mL/min/1.73 m 2   Basic Metabolic Panel   Result Value Ref Range    Sodium 121 (L) 135 - 145 mmol/L    Potassium 5.4 3.6 - 5.5 mmol/L    Chloride 88 (L) 96 - 112 mmol/L    Co2 22 20 - 33 mmol/L    Glucose 257 (H) 65 - 99 mg/dL    Bun 5 (L) 8 - 22 mg/dL    Creatinine 0.62 0.50 - 1.40 mg/dL    Calcium 8.4 (L) 8.5 - 10.5 mg/dL    Anion Gap 11.0 7.0 - 16.0   ESTIMATED GFR   Result Value Ref Range    GFR (CKD-EPI) 110 >60 mL/min/1.73 m 2   ESTIMATED GFR   Result Value Ref Range    GFR (CKD-EPI) 102 >60 mL/min/1.73 m 2   Basic Metabolic Panel   Result Value Ref Range    Sodium 123 (L) 135 - 145 mmol/L    Potassium 5.6 (H) 3.6 - 5.5 mmol/L    Chloride 87 (L) 96 - 112 mmol/L    Co2 22 20 - 33 mmol/L    Glucose 227 (H) 65 - 99 mg/dL    Bun 6 (L) 8 - 22 mg/dL    Creatinine 0.58 0.50 - 1.40 mg/dL    Calcium 8.7 8.5 - 10.5 mg/dL    Anion Gap 14.0 7.0 - 16.0   ESTIMATED GFR   Result Value Ref Range    GFR (CKD-EPI) 104 >60 mL/min/1.73 m 2   CBC with Differential   Result Value Ref Range    WBC 4.8 4.8 - 10.8 K/uL    RBC 4.25 (L) 4.70 - 6.10 M/uL    Hemoglobin 13.5 (L) 14.0 - 18.0 g/dL    Hematocrit 38.8 (L) 42.0 - 52.0 %    MCV 91.3 81.4 - 97.8 fL    MCH 31.8 27.0 - 33.0 pg    MCHC 34.8 33.7 - 35.3 g/dL    RDW 47.8 35.9 - 50.0 fL    Platelet Count 227 164 - 446 K/uL    MPV 9.5 9.0 - 12.9 fL    Neutrophils-Polys 89.70 (H) 44.00 - 72.00 %    Lymphocytes 8.90 (L) 22.00 - 41.00 %    Monocytes 1.20 0.00 - 13.40 %    Eosinophils 0.00 0.00 - 6.90 %    Basophils 0.00 0.00 - 1.80 %    Immature Granulocytes 0.20 0.00 - 0.90 %    Nucleated RBC 0.00 /100 WBC    Neutrophils (Absolute) 4.34 1.82 - 7.42 K/uL    Lymphs (Absolute) 0.43 (L) 1.00 - 4.80 K/uL    Monos (Absolute) 0.06 0.00 - 0.85 K/uL    Eos (Absolute) 0.00  0.00 - 0.51 K/uL    Baso (Absolute) 0.00 0.00 - 0.12 K/uL    Immature Granulocytes (abs) 0.01 0.00 - 0.11 K/uL    NRBC (Absolute) 0.00 K/uL   Comp Metabolic Panel   Result Value Ref Range    Sodium 125 (L) 135 - 145 mmol/L    Potassium 4.9 3.6 - 5.5 mmol/L    Chloride 91 (L) 96 - 112 mmol/L    Co2 24 20 - 33 mmol/L    Anion Gap 10.0 7.0 - 16.0    Glucose 233 (H) 65 - 99 mg/dL    Bun 5 (L) 8 - 22 mg/dL    Creatinine 0.49 (L) 0.50 - 1.40 mg/dL    Calcium 8.9 8.5 - 10.5 mg/dL    AST(SGOT) 22 12 - 45 U/L    ALT(SGPT) 17 2 - 50 U/L    Alkaline Phosphatase 83 30 - 99 U/L    Total Bilirubin 0.4 0.1 - 1.5 mg/dL    Albumin 3.7 3.2 - 4.9 g/dL    Total Protein 6.9 6.0 - 8.2 g/dL    Globulin 3.2 1.9 - 3.5 g/dL    A-G Ratio 1.2 g/dL   MAGNESIUM   Result Value Ref Range    Magnesium 2.0 1.5 - 2.5 mg/dL   PHOSPHORUS   Result Value Ref Range    Phosphorus 2.8 2.5 - 4.5 mg/dL   proBrain Natriuretic Peptide, NT   Result Value Ref Range    NT-proBNP 695 (H) 0 - 125 pg/mL   ESTIMATED GFR   Result Value Ref Range    GFR (CKD-EPI) 109 >60 mL/min/1.73 m 2   Basic Metabolic Panel   Result Value Ref Range    Sodium 129 (L) 135 - 145 mmol/L    Potassium 4.7 3.6 - 5.5 mmol/L    Chloride 91 (L) 96 - 112 mmol/L    Co2 27 20 - 33 mmol/L    Glucose 195 (H) 65 - 99 mg/dL    Bun 6 (L) 8 - 22 mg/dL    Creatinine 0.45 (L) 0.50 - 1.40 mg/dL    Calcium 9.3 8.5 - 10.5 mg/dL    Anion Gap 11.0 7.0 - 16.0   ESTIMATED GFR   Result Value Ref Range    GFR (CKD-EPI) 112 >60 mL/min/1.73 m 2   Basic Metabolic Panel   Result Value Ref Range    Sodium 125 (L) 135 - 145 mmol/L    Potassium 4.7 3.6 - 5.5 mmol/L    Chloride 90 (L) 96 - 112 mmol/L    Co2 25 20 - 33 mmol/L    Glucose 218 (H) 65 - 99 mg/dL    Bun 6 (L) 8 - 22 mg/dL    Creatinine 0.48 (L) 0.50 - 1.40 mg/dL    Calcium 9.2 8.5 - 10.5 mg/dL    Anion Gap 10.0 7.0 - 16.0   ESTIMATED GFR   Result Value Ref Range    GFR (CKD-EPI) 110 >60 mL/min/1.73 m 2   Basic Metabolic Panel   Result Value Ref Range    Sodium  128 (L) 135 - 145 mmol/L    Potassium 4.6 3.6 - 5.5 mmol/L    Chloride 92 (L) 96 - 112 mmol/L    Co2 27 20 - 33 mmol/L    Glucose 147 (H) 65 - 99 mg/dL    Bun 6 (L) 8 - 22 mg/dL    Creatinine 0.47 (L) 0.50 - 1.40 mg/dL    Calcium 8.7 8.5 - 10.5 mg/dL    Anion Gap 9.0 7.0 - 16.0   CBC WITHOUT DIFFERENTIAL   Result Value Ref Range    WBC 8.5 4.8 - 10.8 K/uL    RBC 4.01 (L) 4.70 - 6.10 M/uL    Hemoglobin 12.6 (L) 14.0 - 18.0 g/dL    Hematocrit 38.2 (L) 42.0 - 52.0 %    MCV 95.3 81.4 - 97.8 fL    MCH 31.4 27.0 - 33.0 pg    MCHC 33.0 (L) 33.7 - 35.3 g/dL    RDW 51.6 (H) 35.9 - 50.0 fL    Platelet Count 205 164 - 446 K/uL    MPV 9.1 9.0 - 12.9 fL   Basic Metabolic Panel   Result Value Ref Range    Sodium 129 (L) 135 - 145 mmol/L    Potassium 4.3 3.6 - 5.5 mmol/L    Chloride 94 (L) 96 - 112 mmol/L    Co2 28 20 - 33 mmol/L    Glucose 115 (H) 65 - 99 mg/dL    Bun 5 (L) 8 - 22 mg/dL    Creatinine 0.47 (L) 0.50 - 1.40 mg/dL    Calcium 8.4 (L) 8.5 - 10.5 mg/dL    Anion Gap 7.0 7.0 - 16.0   MAGNESIUM   Result Value Ref Range    Magnesium 2.0 1.5 - 2.5 mg/dL   PHOSPHORUS   Result Value Ref Range    Phosphorus 3.0 2.5 - 4.5 mg/dL   proBrain Natriuretic Peptide, NT   Result Value Ref Range    NT-proBNP 541 (H) 0 - 125 pg/mL   ESTIMATED GFR   Result Value Ref Range    GFR (CKD-EPI) 111 >60 mL/min/1.73 m 2   ESTIMATED GFR   Result Value Ref Range    GFR (CKD-EPI) 111 >60 mL/min/1.73 m 2   EKG   Result Value Ref Range    Report       [facility] Emergency Dept.    Test Date:  2022  Pt Name:    BRIDGETTE VARNER                  Department: ER  MRN:        [id number]                      Room:  Gender:     Male                         Technician: [id number]  :        1950                   Requested By:ER TRIAGE PROTOCOL  Order #:    [id number]                    Reading MD: Selvin Denney MD    Measurements  Intervals                                Axis  Rate:       83                           P:          24  OR:          204                          QRS:        17  QRSD:       101                          T:          20  QT:         368  QTc:        434    Interpretive Statements  Sinus rhythm  Abnormal R-wave progression, early transition  Inferior infarct, old  Compared to ECG 2022 21:01:24  Myocardial infarct finding now present  Incomplete right bundle-branch block no longer present  No STEMI  Electronically Signed On 2022 21:57:04 PDT by Selvin Denney MD     EKG   Result Value Ref Range    Report       [facility] Emergency Dept.    Test Date:  2022  Pt Name:    BRIDGETTE VARNER                  Department: ER  MRN:        [id number]                      Room:       [facility]  Gender:     Male                         Technician: [id number]  :        1950                   Requested By:ER TRIAGE PROTOCOL  Order #:    [id number]                    Reading MD: Selvin Denney MD    Measurements  Intervals                                Axis  Rate:       83                           P:          45  CA:         208                          QRS:        26  QRSD:       98                           T:          21  QT:         360  QTc:        423    Interpretive Statements  SINUS RHYTHM  EARLY PRECORDIAL R/S TRANSITION  BASELINE WANDER IN LEAD(S) V3  Compared to ECG 2022 21:34:05  Myocardial infarct finding no longer present  No STEMI, limited by motion  Electronically Signed On 2022 19:19:02 PDT by Selvin Denney MD           RADIOLOGY  CT-CTA CHEST PULMONARY ARTERY W/ RECONS   Final Result      1.  No CT evidence for pulmonary emboli.   2.  Mild bilateral atelectasis.   3.  No pneumonia or pneumothorax.            DX-CHEST-PORTABLE (1 VIEW)   Final Result      1.  Mild bibasilar atelectasis.   2.  No lobar pneumonia or pulmonary edema.   3.  Stable cardiomegaly.          COURSE & MEDICAL DECISION MAKING    This is a 71 y.o. male who presents with dyspnea. Increased o2 requirement,  h/o copd.    Differential Diagnosis includes but is not limited to:  Pneumonia, sepsis, copd, PE    ED Course:  This is a pleasant but unfortunate 71-year-old male with increasing dyspnea, history of COPD and chronic respiratory failure requiring extra oxygen.  Coarse lung sounds plan sepsis protocol, parenteral steroids and antibiotics, and probable admission.    Labs concerning for hyponatremia, could be Legionella so I have already covered with azithromycin for any atypicals.  Although he does not have diarrhea.  Hemodynamically fairly stable other than his hypoxia, but dimer is quite elevated, CTA ordered no evidence of PE.  Patient feeling better after treatments, plan hospitalization, discussed with hospitalist will kindly admit for further work-up and treatment.  Patient hemodynamically stable for admission in guarded condition.    Medications   ampicillin/sulbactam (UNASYN) 3 g in  mL IVPB (0 g Intravenous Stopped 4/2/22 2320)   azithromycin (ZITHROMAX) tablet 500 mg (500 mg Oral Given 4/2/22 2249)   NS (BOLUS) infusion 500 mL (0 mL Intravenous Stopped 4/3/22 0210)   methylPREDNISolone sod succ (SOLU-MEDROL) 125 MG injection 62.5 mg (62.5 mg Intravenous Given 4/3/22 1800)   iohexol (OMNIPAQUE) 350 mg/mL (IV) (80 mL Intravenous Given 4/3/22 0300)   dextrose 5% infusion (0 mL Intravenous Stopped 4/3/22 1000)   ketorolac (TORADOL) injection 30 mg (30 mg Intravenous Given 4/3/22 1002)   oxyCODONE immediate release (ROXICODONE) tablet 10 mg (10 mg Oral Given 4/3/22 1215)       FINAL IMPRESSION  1. Acute on chronic respiratory failure with hypoxia (HCC)    2. Shortness of breath    3. Acute on chronic respiratory failure with hypoxia and hypercapnia (HCC)    4. COPD exacerbation (HCC)    5. Debility    6. Essential hypertension    7. Acute exacerbation of chronic obstructive pulmonary disease (COPD) (HCC)    8. Hyponatremia    9. Dehydration          -ADMIT-    Pertinent Labs & Imaging studies reviewed and  verified by myself, as well as nursing notes and the patient's past medical, family, and social histories (See chart for details).    Portions of this record were made with voice recognition software.  Despite my review, spelling/grammar/context errors may still remain.  Interpretation of this chart should be taken in this context.    Electronically signed by Selvin Denney M.D. on 4/8/2022 at 7:23 PM.

## 2022-04-03 NOTE — H&P
"Hospital Medicine History & Physical Note    Date of Service  4/3/2022    Primary Care Physician  Peterson Amin M.D.    Consultants  Intensivist: Dr. Paniagua    Code Status  Full Code    Chief Complaint  Chief Complaint   Patient presents with   • Shortness of Breath     Pt states he fell today at home to the ground, called 911 for a lift assist as he couldn't get off the ground. Pt states he fell because he \"couldn't breath.\" PT sounds crackly and junky and appears chronically unwell. Pt normally wears 3L of O2 at home and states he hasn't needed to increase his oxygen. Pt has history of COPD and he \"thinks its getting worse.\"        History of Presenting Illness  Juan Manuel Bass is a 71 y.o. male who presented 4/2/2022 with complaints of ground-level fall and shortness of breath.  He contacted 911 as he fell and unable to get off the ground.  He states that he falls very frequently but also drinks between 5-6 [employer] light beers each day and excess amount of water per patient.  He also admits to drinking excessive amounts of fluids and has a history of COPD exacerbation/hyponatremia per his last admission in March 2022.  At that time he was placed on fluid restriction and salt tabs with resolution of hyponatremia.  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  He states he likely fell today from alcohol intoxication denies presyncopal syndrome such as incoordination, vertigo, lightheadedness, diplopia/blurred vision or headache/chest pain.  He does admit to shortness of breath which is chronic for him and on 3 L nasal cannula at home.  States he is fully vaccinated gets COVID-19 and admits to shortness of breath but denies the following: Anosmia, ageusia, fever, chills, cough with sputum production, diarrhea, myalgias.  Also denies nausea, vomiting, constipation, melena, hematochezia, dysuria, hematuria, incoordination vertigo, syncope, loss of " consciousness.  Denies hitting his head with his fall earlier today.    Vital signs at time of presentation were as follows: 97.8, 89, 18, 154/101, 94% 2 L nasal cannula.    Chest x-ray performed and reveals mild bibasilar atelectasis, no lobar pneumonia or pulmonary edema appreciated and stable cardiomegaly.    CBC reveals mild normocytic anemia and otherwise unremarkable.  Lactic acid within normal limits, proBNP at 263 and magnesium within normal limits.  CRP minimally elevated 2.47, lipase of 15 and CMP reveals hyponatremia of 114, hypochloremia of 80, BUN 4, creatinine 0.47 consistent with poor nutritional status and mild hypocalcemia of 8.4.  D-dimer elevated 2.77 and subsequent CTA ordered and currently pending.  UDS/alcohol level, serial BMP, urine sodium/urine osmolality and serum osmolality ordered and pending.  Procalcitonin negative.  Twelve-lead EKG reveals sinus rhythm with early repolarization otherwise no ischemic changes and good QTC and R wave progression.    Intensivist consulted for admission.  Hospitalist consulted for admission.   patient will be admitted to [facility].  He agrees to DO NOT RESUSCITATE DO NOT INTUBATE CODE STATUS at time of evaluation and alert and oriented x3.  He will be optimized in ED and subsequently transferred to [facility] medical bernal floor for further optimization of medical management.    I discussed the plan of care with patient.    Review of Systems  Review of Systems   Constitutional: Positive for malaise/fatigue. Negative for chills and fever.   HENT: Negative for congestion and sore throat.    Eyes: Negative for blurred vision and double vision.   Respiratory: Positive for shortness of breath. Negative for cough and wheezing.    Cardiovascular: Positive for leg swelling. Negative for chest pain and palpitations.   Gastrointestinal: Negative for abdominal pain, blood in stool, constipation, diarrhea, heartburn, melena, nausea and vomiting.   Genitourinary: Negative for  dysuria and frequency.   Musculoskeletal: Positive for falls and joint pain. Negative for back pain and neck pain.   Skin: Negative for itching and rash.   Neurological: Negative for focal weakness, loss of consciousness, weakness and headaches.   Endo/Heme/Allergies: Negative for environmental allergies. Does not bruise/bleed easily.   Psychiatric/Behavioral: Negative for depression. The patient does not have insomnia.        Past Medical History   has a past medical history of Chronic airway obstruction, not elsewhere classified and Hypertension.    Surgical History   has no past surgical history on file.     Family History  family history includes Heart Disease in his father; No Known Problems in his mother.   Family history reviewed with patient. There is no family history that is pertinent to the chief complaint.     Social History   reports that he quit smoking about 2 years ago. He has a 4.00 pack-year smoking history. He has never used smokeless tobacco. He reports previous alcohol use. He reports previous drug use.    Allergies  Allergies   Allergen Reactions   • Tetanus Toxoid Hives       Medications  Prior to Admission Medications   Prescriptions Last Dose Informant Patient Reported? Taking?   Fluticasone-Umeclidin-Vilant (TRELEGY ELLIPTA) 100-62.5-25 MCG/INH AEROSOL POWDER, BREATH ACTIVATED inhalation 4/2/2022 at 1630 Patient No No   Sig: Inhale 2-3 Inhalation every morning.   Multiple Vitamins-Minerals (CENTRUM SILVER ADULT 50+) Tab 4/2/2022 at 1000 Patient Yes No   Sig: Take 1 Tablet by mouth every day.   albuterol 108 (90 Base) MCG/ACT Aero Soln inhalation aerosol 4/2/2022 at 1630 Patient No No   Sig: Inhale 1-2 Puffs every four hours as needed for Shortness of Breath.   amLODIPine (NORVASC) 5 MG Tab 4/2/2022 at 0930 Patient No No   Sig: Take 1 Tablet by mouth every day.   pantoprazole (PROTONIX) 40 MG Tablet Delayed Response 4/2/2022 at 1000 Patient Yes No   Sig: Take 40 mg by mouth every day.    sodium chloride (SALT) 1 GM Tab 4/2/2022 at 1000 Patient No No   Sig: Take 1 Tablet by mouth every day.      Facility-Administered Medications: None       Physical Exam  Temp:  [36.6 °C (97.8 °F)] 36.6 °C (97.8 °F)  Pulse:  [82-89] 85  Resp:  [18-32] 32  BP: (133-154)/() 141/86  SpO2:  [91 %-94 %] 94 %  Blood Pressure : 141/86   Temperature: 36.6 °C (97.8 °F)   Pulse: 85   Respiration: (!) 32   Pulse Oximetry: 94 %       Physical Exam  Vitals reviewed.   Constitutional:       Appearance: Normal appearance. He is obese. He is not toxic-appearing or diaphoretic.   HENT:      Head: Normocephalic and atraumatic.      Ears:      Comments: Right ear pinna concern for squamous cell carcinoma     Mouth/Throat:      Mouth: Mucous membranes are moist.      Pharynx: Oropharynx is clear. No oropharyngeal exudate or posterior oropharyngeal erythema.   Eyes:      General: No scleral icterus.     Extraocular Movements: Extraocular movements intact.      Conjunctiva/sclera: Conjunctivae normal.      Pupils: Pupils are equal, round, and reactive to light.   Neck:      Vascular: No carotid bruit.   Cardiovascular:      Rate and Rhythm: Normal rate and regular rhythm.      Pulses: Normal pulses.      Heart sounds: Normal heart sounds. No murmur heard.    No friction rub. No gallop.   Pulmonary:      Effort: Pulmonary effort is normal.      Breath sounds: Normal breath sounds. No wheezing, rhonchi or rales.   Abdominal:      General: Bowel sounds are normal. There is no distension.      Palpations: Abdomen is soft. There is no mass.      Tenderness: There is no abdominal tenderness. There is no guarding or rebound.      Hernia: No hernia is present.      Comments: Protuberant abdomen, NABS x4, no abdominal masses appreciated, no tenderness to palpation rebound tenderness, negative Pickering's, negative Rovsing, negative McBurney's point.   Musculoskeletal:         General: Swelling present. Normal range of motion.      Cervical  back: Normal range of motion and neck supple. No muscular tenderness.      Right lower leg: Edema (+1 bilateral lower extremity edema) present.      Left lower leg: Edema present.   Lymphadenopathy:      Cervical: No cervical adenopathy.   Skin:     General: Skin is warm and dry.      Capillary Refill: Capillary refill takes less than 2 seconds.      Coloration: Skin is not pale.      Findings: No erythema or rash.      Comments: Hyperkeratosis bilateral lower extremities  Onychomycosis bilateral lower extremities   Neurological:      General: No focal deficit present.      Mental Status: He is alert and oriented to person, place, and time. Mental status is at baseline.      Cranial Nerves: No cranial nerve deficit.      Sensory: No sensory deficit.      Motor: No weakness.   Psychiatric:         Mood and Affect: Mood normal.         Behavior: Behavior normal.         Thought Content: Thought content normal.         Judgment: Judgment normal.         Laboratory:  Recent Labs     04/02/22 2152   WBC 5.3   RBC 4.12*   HEMOGLOBIN 13.1*   HEMATOCRIT 38.0*   MCV 92.2   MCH 31.8   MCHC 34.5   RDW 47.2   PLATELETCT 224   MPV 9.5     Recent Labs     04/02/22 2152   SODIUM 114*   POTASSIUM 4.6   CHLORIDE 80*   CO2 22   GLUCOSE 93   BUN 4*   CREATININE 0.47*   CALCIUM 8.4*     Recent Labs     04/02/22 2152 04/02/22  2222   ALTSGPT 15  --    ASTSGOT 24  --    ALKPHOSPHAT 86  --    TBILIRUBIN 0.4  --    LIPASE  --  15   GLUCOSE 93  --      Recent Labs     04/02/22  2152   INR 0.96     Recent Labs     04/02/22  2222   NTPROBNP 263*     I reviewed and interpreted the above twelve-lead EKG and do appreciate Q waves lead III, inverted T waves lead III no S1, sinus rhythm with early repolarization good QTC and R wave progression in precordial leads.  No right bundle branch block appreciated or sinus tachycardia.        No results for input(s): TROPONINT in the last 72 hours.    Imaging:  DX-CHEST-PORTABLE (1 VIEW)   Final Result       1.  Mild bibasilar atelectasis.   2.  No lobar pneumonia or pulmonary edema.   3.  Stable cardiomegaly.      CT-CTA CHEST PULMONARY ARTERY W/ RECONS    (Results Pending)           Assessment/Plan:  I anticipate this patient will require at least two midnights for appropriate medical management, necessitating inpatient admission.    * Hyponatremia- (present on admission)  Assessment & Plan  Osmolality calculated at 234.4 and consistent with hypotonic hypervolemic hyponatremia  Fluid restriction at this time  Likely secondary to beer Potomania, EtOH level ordered and pending, in conjunction with psychogenic polydipsia as he states he drinks several cups of water and unable to quantify daily amount.  No indication for hypertonic saline infusion at this time without focal neurologic deficits and we will place on seizure precautions and monitor with serial BMP every 4 hour x6 occurrences with goal of not increasing serum sodium greater than 6 to 8 mEq within 24-hour timeframe  Urine electrolytes/urine osmolality ordered and pending      Acute on chronic respiratory failure (HCC)- (present on admission)  Assessment & Plan  Continue supplemental O2 to maintain SPO2 greater than 88%  Baseline home oxygen 3 L  Possible pulmonary embolism with D-dimer elevated and pending CTA      COPD exacerbation (HCC)- (present on admission)  Assessment & Plan  Solu-Medrol 62.5 mg IV twice daily x1 day then prednisone 40 mg x 4 days for total of 5 days systemic steroids  Azithromycin for anti-inflammatory effect  Duo nebs as needed  Continue home medication regimen  Chest x-ray as seen above      Suspected pulmonary embolism- (present on admission)  Assessment & Plan  D-dimer elevated and currently pending CTA  If positive for PE we will initiate systemic anticoagulation and obtain lower extremity Dopplers      Ear lesion- (present on admission)  Assessment & Plan  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 consider  inpatient versus outpatient consultation for excision/biopsy    Debility- (present on admission)  Assessment & Plan  Consider  consultation as patient has difficulties with ADLs and per chart review does not have optimal living conditions and may require placement  Physical therapy ordered    Alcoholism (HCC)- (present on admission)  Assessment & Plan  Cessation counseling commenced at bedside and recommend follow-up in outpatient setting with rehabilitation program    Essential hypertension- (present on admission)  Assessment & Plan  Chart review reveals patient has history of hydrochlorothiazide however not on current medication regiment list  Continue amlodipine 5 mg p.o. daily  As needed antihypertensives on board    Obesity- (present on admission)  Assessment & Plan  Strong recommendation for follow-up outpatient PCP for lifestyle modification including diet and exercise regiment of at least 30 min of brisk cardiovascular exercise 3-5 times weekly.  Recent A1c and lipid panel in chart no indication to repeat at this time    Advanced care planning/counseling discussion- (present on admission)  Assessment & Plan  20-minute discussion at bedside discussing diagnosis/treatment plan and prognosis  CODE STATUS obtained at bedside  Recommend  consultation for potential placement as well as PT      VTE prophylaxis: enoxaparin ppx

## 2022-04-04 PROBLEM — R09.89 SUSPECTED PULMONARY EMBOLISM: Status: RESOLVED | Noted: 2022-04-03 | Resolved: 2022-04-04

## 2022-04-04 LAB
ALBUMIN SERPL BCP-MCNC: 3.7 G/DL (ref 3.2–4.9)
ALBUMIN/GLOB SERPL: 1.2 G/DL
ALP SERPL-CCNC: 83 U/L (ref 30–99)
ALT SERPL-CCNC: 17 U/L (ref 2–50)
ANION GAP SERPL CALC-SCNC: 10 MMOL/L (ref 7–16)
ANION GAP SERPL CALC-SCNC: 10 MMOL/L (ref 7–16)
ANION GAP SERPL CALC-SCNC: 11 MMOL/L (ref 7–16)
AST SERPL-CCNC: 22 U/L (ref 12–45)
BASOPHILS # BLD AUTO: 0 % (ref 0–1.8)
BASOPHILS # BLD: 0 K/UL (ref 0–0.12)
BILIRUB SERPL-MCNC: 0.4 MG/DL (ref 0.1–1.5)
BUN SERPL-MCNC: 5 MG/DL (ref 8–22)
BUN SERPL-MCNC: 6 MG/DL (ref 8–22)
BUN SERPL-MCNC: 6 MG/DL (ref 8–22)
CALCIUM SERPL-MCNC: 8.9 MG/DL (ref 8.5–10.5)
CALCIUM SERPL-MCNC: 9.2 MG/DL (ref 8.5–10.5)
CALCIUM SERPL-MCNC: 9.3 MG/DL (ref 8.5–10.5)
CHLORIDE SERPL-SCNC: 90 MMOL/L (ref 96–112)
CHLORIDE SERPL-SCNC: 91 MMOL/L (ref 96–112)
CHLORIDE SERPL-SCNC: 91 MMOL/L (ref 96–112)
CO2 SERPL-SCNC: 24 MMOL/L (ref 20–33)
CO2 SERPL-SCNC: 25 MMOL/L (ref 20–33)
CO2 SERPL-SCNC: 27 MMOL/L (ref 20–33)
CREAT SERPL-MCNC: 0.45 MG/DL (ref 0.5–1.4)
CREAT SERPL-MCNC: 0.48 MG/DL (ref 0.5–1.4)
CREAT SERPL-MCNC: 0.49 MG/DL (ref 0.5–1.4)
EOSINOPHIL # BLD AUTO: 0 K/UL (ref 0–0.51)
EOSINOPHIL NFR BLD: 0 % (ref 0–6.9)
ERYTHROCYTE [DISTWIDTH] IN BLOOD BY AUTOMATED COUNT: 47.8 FL (ref 35.9–50)
GFR SERPLBLD CREATININE-BSD FMLA CKD-EPI: 109 ML/MIN/1.73 M 2
GFR SERPLBLD CREATININE-BSD FMLA CKD-EPI: 110 ML/MIN/1.73 M 2
GFR SERPLBLD CREATININE-BSD FMLA CKD-EPI: 112 ML/MIN/1.73 M 2
GLOBULIN SER CALC-MCNC: 3.2 G/DL (ref 1.9–3.5)
GLUCOSE SERPL-MCNC: 195 MG/DL (ref 65–99)
GLUCOSE SERPL-MCNC: 218 MG/DL (ref 65–99)
GLUCOSE SERPL-MCNC: 233 MG/DL (ref 65–99)
HCT VFR BLD AUTO: 38.8 % (ref 42–52)
HGB BLD-MCNC: 13.5 G/DL (ref 14–18)
IMM GRANULOCYTES # BLD AUTO: 0.01 K/UL (ref 0–0.11)
IMM GRANULOCYTES NFR BLD AUTO: 0.2 % (ref 0–0.9)
LYMPHOCYTES # BLD AUTO: 0.43 K/UL (ref 1–4.8)
LYMPHOCYTES NFR BLD: 8.9 % (ref 22–41)
MAGNESIUM SERPL-MCNC: 2 MG/DL (ref 1.5–2.5)
MCH RBC QN AUTO: 31.8 PG (ref 27–33)
MCHC RBC AUTO-ENTMCNC: 34.8 G/DL (ref 33.7–35.3)
MCV RBC AUTO: 91.3 FL (ref 81.4–97.8)
MONOCYTES # BLD AUTO: 0.06 K/UL (ref 0–0.85)
MONOCYTES NFR BLD AUTO: 1.2 % (ref 0–13.4)
NEUTROPHILS # BLD AUTO: 4.34 K/UL (ref 1.82–7.42)
NEUTROPHILS NFR BLD: 89.7 % (ref 44–72)
NRBC # BLD AUTO: 0 K/UL
NRBC BLD-RTO: 0 /100 WBC
NT-PROBNP SERPL IA-MCNC: 695 PG/ML (ref 0–125)
PHOSPHATE SERPL-MCNC: 2.8 MG/DL (ref 2.5–4.5)
PLATELET # BLD AUTO: 227 K/UL (ref 164–446)
PMV BLD AUTO: 9.5 FL (ref 9–12.9)
POTASSIUM SERPL-SCNC: 4.7 MMOL/L (ref 3.6–5.5)
POTASSIUM SERPL-SCNC: 4.7 MMOL/L (ref 3.6–5.5)
POTASSIUM SERPL-SCNC: 4.9 MMOL/L (ref 3.6–5.5)
PROT SERPL-MCNC: 6.9 G/DL (ref 6–8.2)
RBC # BLD AUTO: 4.25 M/UL (ref 4.7–6.1)
SODIUM SERPL-SCNC: 125 MMOL/L (ref 135–145)
SODIUM SERPL-SCNC: 125 MMOL/L (ref 135–145)
SODIUM SERPL-SCNC: 129 MMOL/L (ref 135–145)
WBC # BLD AUTO: 4.8 K/UL (ref 4.8–10.8)

## 2022-04-04 PROCEDURE — 94669 MECHANICAL CHEST WALL OSCILL: CPT

## 2022-04-04 PROCEDURE — 700101 HCHG RX REV CODE 250: Performed by: STUDENT IN AN ORGANIZED HEALTH CARE EDUCATION/TRAINING PROGRAM

## 2022-04-04 PROCEDURE — 80053 COMPREHEN METABOLIC PANEL: CPT

## 2022-04-04 PROCEDURE — A9270 NON-COVERED ITEM OR SERVICE: HCPCS | Performed by: HOSPITALIST

## 2022-04-04 PROCEDURE — 94640 AIRWAY INHALATION TREATMENT: CPT

## 2022-04-04 PROCEDURE — 99232 SBSQ HOSP IP/OBS MODERATE 35: CPT | Performed by: HOSPITALIST

## 2022-04-04 PROCEDURE — 84100 ASSAY OF PHOSPHORUS: CPT

## 2022-04-04 PROCEDURE — 700102 HCHG RX REV CODE 250 W/ 637 OVERRIDE(OP): Performed by: HOSPITALIST

## 2022-04-04 PROCEDURE — 700111 HCHG RX REV CODE 636 W/ 250 OVERRIDE (IP): Performed by: STUDENT IN AN ORGANIZED HEALTH CARE EDUCATION/TRAINING PROGRAM

## 2022-04-04 PROCEDURE — 97162 PT EVAL MOD COMPLEX 30 MIN: CPT

## 2022-04-04 PROCEDURE — 99221 1ST HOSP IP/OBS SF/LOW 40: CPT | Mod: GC | Performed by: INTERNAL MEDICINE

## 2022-04-04 PROCEDURE — 80048 BASIC METABOLIC PNL TOTAL CA: CPT

## 2022-04-04 PROCEDURE — 83880 ASSAY OF NATRIURETIC PEPTIDE: CPT

## 2022-04-04 PROCEDURE — A9270 NON-COVERED ITEM OR SERVICE: HCPCS | Performed by: STUDENT IN AN ORGANIZED HEALTH CARE EDUCATION/TRAINING PROGRAM

## 2022-04-04 PROCEDURE — 700102 HCHG RX REV CODE 250 W/ 637 OVERRIDE(OP): Performed by: STUDENT IN AN ORGANIZED HEALTH CARE EDUCATION/TRAINING PROGRAM

## 2022-04-04 PROCEDURE — 85025 COMPLETE CBC W/AUTO DIFF WBC: CPT

## 2022-04-04 PROCEDURE — 94760 N-INVAS EAR/PLS OXIMETRY 1: CPT

## 2022-04-04 PROCEDURE — 700101 HCHG RX REV CODE 250: Performed by: HOSPITALIST

## 2022-04-04 PROCEDURE — 83735 ASSAY OF MAGNESIUM: CPT

## 2022-04-04 PROCEDURE — 770001 HCHG ROOM/CARE - MED/SURG/GYN PRIV*

## 2022-04-04 RX ORDER — IPRATROPIUM BROMIDE AND ALBUTEROL SULFATE 2.5; .5 MG/3ML; MG/3ML
3 SOLUTION RESPIRATORY (INHALATION)
Status: DISCONTINUED | OUTPATIENT
Start: 2022-04-04 | End: 2022-04-06 | Stop reason: HOSPADM

## 2022-04-04 RX ORDER — FLUTICASONE PROPIONATE 44 UG/1
2 AEROSOL, METERED RESPIRATORY (INHALATION)
Status: DISCONTINUED | OUTPATIENT
Start: 2022-04-04 | End: 2022-04-06 | Stop reason: HOSPADM

## 2022-04-04 RX ADMIN — IPRATROPIUM BROMIDE AND ALBUTEROL SULFATE 3 ML: .5; 3 SOLUTION RESPIRATORY (INHALATION) at 07:41

## 2022-04-04 RX ADMIN — FLUTICASONE PROPIONATE 88 MCG: 44 AEROSOL, METERED RESPIRATORY (INHALATION) at 19:37

## 2022-04-04 RX ADMIN — IPRATROPIUM BROMIDE AND ALBUTEROL SULFATE 3 ML: .5; 3 SOLUTION RESPIRATORY (INHALATION) at 11:01

## 2022-04-04 RX ADMIN — ACETAMINOPHEN 500 MG: 500 TABLET ORAL at 19:46

## 2022-04-04 RX ADMIN — PREDNISONE 40 MG: 20 TABLET ORAL at 05:31

## 2022-04-04 RX ADMIN — AMLODIPINE BESYLATE 5 MG: 10 TABLET ORAL at 05:31

## 2022-04-04 RX ADMIN — SENNOSIDES AND DOCUSATE SODIUM 2 TABLET: 50; 8.6 TABLET ORAL at 05:31

## 2022-04-04 RX ADMIN — OXYCODONE HYDROCHLORIDE 10 MG: 10 TABLET ORAL at 19:46

## 2022-04-04 RX ADMIN — IPRATROPIUM BROMIDE AND ALBUTEROL SULFATE 3 ML: .5; 2.5 SOLUTION RESPIRATORY (INHALATION) at 19:37

## 2022-04-04 RX ADMIN — ACETAMINOPHEN 500 MG: 500 TABLET ORAL at 09:45

## 2022-04-04 RX ADMIN — FLUTICASONE PROPIONATE 88 MCG: 44 AEROSOL, METERED RESPIRATORY (INHALATION) at 05:30

## 2022-04-04 RX ADMIN — RIVAROXABAN 10 MG: 10 TABLET, FILM COATED ORAL at 18:06

## 2022-04-04 RX ADMIN — ACETAMINOPHEN 500 MG: 500 TABLET ORAL at 15:04

## 2022-04-04 RX ADMIN — IPRATROPIUM BROMIDE AND ALBUTEROL SULFATE 3 ML: .5; 3 SOLUTION RESPIRATORY (INHALATION) at 15:51

## 2022-04-04 RX ADMIN — IPRATROPIUM BROMIDE AND ALBUTEROL SULFATE 3 ML: .5; 3 SOLUTION RESPIRATORY (INHALATION) at 03:16

## 2022-04-04 RX ADMIN — Medication 5 MG: at 19:46

## 2022-04-04 ASSESSMENT — ENCOUNTER SYMPTOMS
PALPITATIONS: 0
COUGH: 1
PND: 0
DEPRESSION: 0
STRIDOR: 0
LOSS OF CONSCIOUSNESS: 0
NEUROLOGICAL NEGATIVE: 1
WEAKNESS: 0
EYES NEGATIVE: 1
GASTROINTESTINAL NEGATIVE: 1
FEVER: 0
WEIGHT LOSS: 0
SEIZURES: 0
ORTHOPNEA: 0
HEADACHES: 0
CHILLS: 0
DIAPHORESIS: 0
SORE THROAT: 0
HEMOPTYSIS: 0
SENSORY CHANGE: 0
TREMORS: 0
FOCAL WEAKNESS: 0
SHORTNESS OF BREATH: 1
DIZZINESS: 1
SPUTUM PRODUCTION: 1
SPEECH CHANGE: 0
SINUS PAIN: 0
TINGLING: 0
MYALGIAS: 1
NERVOUS/ANXIOUS: 1
WHEEZING: 1
CLAUDICATION: 0

## 2022-04-04 ASSESSMENT — PAIN DESCRIPTION - PAIN TYPE
TYPE: ACUTE PAIN
TYPE: ACUTE PAIN
TYPE: CHRONIC PAIN
TYPE: ACUTE PAIN

## 2022-04-04 ASSESSMENT — COGNITIVE AND FUNCTIONAL STATUS - GENERAL
MOVING TO AND FROM BED TO CHAIR: A LITTLE
CLIMB 3 TO 5 STEPS WITH RAILING: A LOT
SUGGESTED CMS G CODE MODIFIER MOBILITY: CK
MOBILITY SCORE: 17
TURNING FROM BACK TO SIDE WHILE IN FLAT BAD: A LITTLE
STANDING UP FROM CHAIR USING ARMS: A LITTLE
WALKING IN HOSPITAL ROOM: A LITTLE
MOVING FROM LYING ON BACK TO SITTING ON SIDE OF FLAT BED: A LITTLE

## 2022-04-04 ASSESSMENT — GAIT ASSESSMENTS
DEVIATION: DECREASED BASE OF SUPPORT
ASSISTIVE DEVICE: FRONT WHEEL WALKER
GAIT LEVEL OF ASSIST: CONTACT GUARD ASSIST
DISTANCE (FEET): 50

## 2022-04-04 ASSESSMENT — LIFESTYLE VARIABLES: SUBSTANCE_ABUSE: 1

## 2022-04-04 NOTE — PROGRESS NOTES
12 hour chart check    Have a great shift!      MS    SR/ST 80-110s  rPVC  rPAC  .18/.08/.40    Strips not uploading

## 2022-04-04 NOTE — DIETARY
NUTRITION SERVICES: BMI - Pt with BMI >40 (=Body mass index is 42.45 kg/m².), Class III obesity. Weight loss counseling not appropriate in acute care setting.   RECOMMEND - If appropriate at DC please refer to outpatient  services for weight management.

## 2022-04-04 NOTE — CARE PLAN
The patient is Watcher - Medium risk of patient condition declining or worsening    Shift Goals  Clinical Goals: increase serum Na  Patient Goals: pain control  Family Goals: n/a    Problem: Knowledge Deficit - COPD  Goal: Patient/significant other demonstrates understanding of disease process, utilization of the Action Plan, medications and discharge instruction  Outcome: Progressing     Problem: Ineffective Airway Clearance  Goal: Patient will maintain patent airway with clear/clearing breath sounds  Outcome: Progressing     Problem: Skin Integrity  Goal: Skin integrity is maintained or improved  Outcome: Progressing     Problem: Fall Risk  Goal: Patient will remain free from falls  Outcome: Progressing

## 2022-04-04 NOTE — CONSULTS
"Pulmonary Consultation    Date of Service: 4/4/2022    Date of Admission:  4/2/2022  9:31 PM    Consulting Physician: Cody Hoffman M.D.    Chief Complaint:  Shortness of Breath (Pt states he fell today at home to the ground, called 911 for a lift assist as he couldn't get off the ground. Pt states he fell because he \"couldn't breath.\" PT sounds crackly and junky and appears chronically unwell. Pt normally wears 3L of O2 at home and states he hasn't needed to increase his oxygen. Pt has history of COPD and he \"thinks its getting worse.\" )      History of Present Illness: Juan Manuel Bass is a 71 y.o. male with a past medical history of COPD and alcohol abuse, who presented on 4/2/2022 after complaining of a ground level fall and shortness of breath. Patient states that he stood up from a seated position and suddently became short of breath, causing him to fall. Does not know if he had any LOC. He was found down for about one hour until EMS arrived. Patient states that he had a similar incident several weeks ago, resulting in another hospitlization. He does have a significant drinking history. States that he has 5-6 beers a day. Patient does have a history of COPD. States that he uses 3L at home. Denies any worsening shortness of breath, coughing, sputum production or wheezing. He is currently using his baseline oxygen requirement of 3L. Patient does believe that his fall was likely secondary to excessive alchohol intake. Patient denies any presyncopal symptoms. There was no dizziness, vertgo, light headedness, or vision changes. Pulmonary was consulted to evaluate the patient for possible COPD exacerbation.     Review of Systems   Constitutional: Negative for chills, diaphoresis, fever, malaise/fatigue and weight loss.   HENT: Negative for congestion, ear discharge, ear pain, hearing loss, nosebleeds, sinus pain, sore throat and tinnitus.    Respiratory: Positive for cough, sputum production, shortness of breath " "and wheezing. Negative for hemoptysis and stridor.    Cardiovascular: Negative for chest pain, palpitations, orthopnea, claudication, leg swelling and PND.   Neurological: Positive for dizziness. Negative for tingling, tremors, sensory change, speech change, focal weakness, seizures, loss of consciousness, weakness and headaches.   Psychiatric/Behavioral: Positive for substance abuse. Negative for depression.   All other systems reviewed and are negative.      Home Medications     Reviewed by Castillo Burch (Pharmacy Tech) on 22 at 2240  Med List Status: Complete   Medication Last Dose Status   albuterol 108 (90 Base) MCG/ACT Aero Soln inhalation aerosol 2022 Active   amLODIPine (NORVASC) 5 MG Tab 2022 Active   Fluticasone-Umeclidin-Vilant (TRELEGY ELLIPTA) 100-62.5-25 MCG/INH AEROSOL POWDER, BREATH ACTIVATED inhalation 2022 Active   Multiple Vitamins-Minerals (CENTRUM SILVER ADULT 50+) Tab 2022 Active   pantoprazole (PROTONIX) 40 MG Tablet Delayed Response 2022 Active   sodium chloride (SALT) 1 GM Tab 2022 Active                Social History     Tobacco Use   • Smoking status: Former Smoker     Packs/day: 1.00     Years: 4.00     Pack years: 4.00     Quit date: 3/7/2020     Years since quittin.0   • Smokeless tobacco: Never Used   Substance Use Topics   • Alcohol use: Not Currently   • Drug use: Not Currently        Past Medical History:   Diagnosis Date   • Chronic airway obstruction, not elsewhere classified    • Hypertension        No past surgical history on file.    Allergies: Tetanus toxoid    Family History   Problem Relation Age of Onset   • No Known Problems Mother    • Heart Disease Father        Vitals:    22 2114 22 2122 22 2200 22 2302   Height:  1.778 m (5' 10\")     Weight:  (!) 126 kg (277 lb 12.5 oz)     Weight % change since last entry.:  0 %     BP: 154/101  143/84 133/81   Pulse: 89  82 84   BMI (Calculated):  39.86     Resp: 18  (!) " 28 (!) 25   Temp: 36.6 °C (97.8 °F)      TempSrc: Temporal       04/03/22 0000 04/03/22 0259 04/03/22 0400 04/03/22 0600   Height:       Weight:  (!) 134 kg (295 lb 13.7 oz)     Weight % change since last entry.:  6.51 %     BP: 141/86 134/95 152/72 144/76   Pulse: 85 98 93 (!) 101   BMI (Calculated):       Resp: (!) 32 19 13 (!) 30   Temp:  36.1 °C (97 °F) 36.1 °C (97 °F)    TempSrc:  Temporal Temporal     04/03/22 0655 04/03/22 0800 04/03/22 1000 04/03/22 1019   Height:       Weight:       Weight % change since last entry.:       BP:  128/79 138/81    Pulse: (!) 101 (!) 112 (!) 110 (!) 110   BMI (Calculated):       Resp: (!) 22 (!) 27 18 19   Temp:       TempSrc:        04/03/22 1200 04/03/22 1321 04/03/22 1400 04/03/22 1600   Height:       Weight:       Weight % change since last entry.:       BP: 143/82  149/86 142/84   Pulse: (!) 104 (!) 105 (!) 110 (!) 115   BMI (Calculated):       Resp: (!) 23 (!) 27 (!) 25 16   Temp: 36.3 °C (97.3 °F)  36.3 °C (97.4 °F)    TempSrc: Temporal  Temporal     04/03/22 1800 04/03/22 2000 04/03/22 2055 04/03/22 2200   Height:       Weight:       Weight % change since last entry.:       BP: 135/79 136/88  141/78   Pulse: 97 88 90 91   BMI (Calculated):       Resp: (!) 21 20 (!) 24 17   Temp:  36.6 °C (97.8 °F)  36.7 °C (98 °F)   TempSrc:  Temporal  Temporal    04/03/22 2339 04/04/22 0000 04/04/22 0200 04/04/22 0317   Height:       Weight:       Weight % change since last entry.:       BP:  140/69 138/66    Pulse: 100 (!) 101 89 90   BMI (Calculated):       Resp: (!) 22 19 14 18   Temp:  36.7 °C (98 °F) 36.8 °C (98.2 °F)    TempSrc:  Temporal Temporal     04/04/22 0400 04/04/22 0600 04/04/22 0741 04/04/22 0800   Height:       Weight:       Weight % change since last entry.:       BP: 115/66 135/74  153/78   Pulse: 89 89 96 (!) 105   BMI (Calculated):       Resp: 15 18 20 17   Temp: 36.7 °C (98 °F) 36.8 °C (98.3 °F)  36.7 °C (98.1 °F)   TempSrc: Temporal Temporal  Temporal    04/04/22  0900 04/04/22 1000 04/04/22 1101 04/04/22 1200   Height:       Weight:       Weight % change since last entry.:       BP: 148/79 147/70  129/69   Pulse: (!) 109 (!) 104 98 99   BMI (Calculated):       Resp: 14 15 16    Temp:       TempSrc:           Physical Examination  General: NAD  CVS: RRR, S1 and S2 present  Respiratory: Mild wheezing throughout bilateral lung fields  Abdomen/: soft, NT, ND  Extremities: no lower extremity edema      Intake/Output Summary (Last 24 hours) at 4/4/2022 1514  Last data filed at 4/4/2022 1300  Gross per 24 hour   Intake 1710 ml   Output 3475 ml   Net -1765 ml       Recent Labs     04/02/22 2152 04/03/22 0213 04/04/22 0039   WBC 5.3  --  4.8   NEUTSPOLYS 61.30  --  89.70*   LYMPHOCYTES 22.00  --  8.90*   MONOCYTES 12.80  --  1.20   EOSINOPHILS 2.60  --  0.00   BASOPHILS 0.20  --  0.00   ASTSGOT 24 21 22   ALTSGPT 15 15 17   ALKPHOSPHAT 86 86 83   TBILIRUBIN 0.4 0.5 0.4     Recent Labs     04/02/22 2222 04/03/22 0213 04/03/22 1845 04/04/22 0039 04/04/22  1150   SODIUM  --    < > 123* 125* 129*   POTASSIUM  --    < > 5.6* 4.9 4.7   CHLORIDE  --    < > 87* 91* 91*   CO2  --    < > 22 24 27   BUN  --    < > 6* 5* 6*   CREATININE  --    < > 0.58 0.49* 0.45*   MAGNESIUM 1.7  --   --  2.0  --    PHOSPHORUS  --   --   --  2.8  --    CALCIUM  --    < > 8.7 8.9 9.3    < > = values in this interval not displayed.     Recent Labs     04/02/22 2152 04/02/22 2222 04/03/22 0213 04/03/22  0604 04/03/22  1845 04/04/22  0039 04/04/22  1150   ALTSGPT 15  --  15  --   --  17  --    ASTSGOT 24  --  21  --   --  22  --    ALKPHOSPHAT 86  --  86  --   --  83  --    TBILIRUBIN 0.4  --  0.5  --   --  0.4  --    LIPASE  --  15  --   --   --   --   --    GLUCOSE 93  --  95   < > 227* 233* 195*    < > = values in this interval not displayed.         CT-CTA CHEST PULMONARY ARTERY W/ RECONS   Final Result      1.  No CT evidence for pulmonary emboli.   2.  Mild bilateral atelectasis.   3.  No  pneumonia or pneumothorax.            DX-CHEST-PORTABLE (1 VIEW)   Final Result      1.  Mild bibasilar atelectasis.   2.  No lobar pneumonia or pulmonary edema.   3.  Stable cardiomegaly.          Patient Active Problem List   Diagnosis   • COPD exacerbation (HCC)   • Obesity   • Essential hypertension   • Hyponatremia   • Alcoholism (HCC)   • Dermatitis   • Encephalopathy   • Acute on chronic respiratory failure (HCC)   • Sepsis (HCC)   • Debility   • Knee pain   • GERD (gastroesophageal reflux disease)   • Advanced care planning/counseling discussion   • Ear lesion       Assessment and Plan:    Patient is a 71 y.o male w/PMH of COPD, admitted after he experienced a ground level fall, likely secondary to alcohol abuse. Patient was also admitted for acute on chronic respiratory failure, and possible COPD exacerbation. Pulmonology was consulted. Patient was seen this morning. This is not an acute COPD exacerbation. Patients fall likely 2/2 history of alcohol abuse.     Plan:   Patient currently on appropriate inhaler therapy.   Continue Duonebs, Adro Ellipta, and Fluticasone inhaler  Patient will need to establish care with Pulmonology clinic as outpatient.   Recommend that the patient get a carotid ultrasound     Kendall Donis M.D., MD  [facility]

## 2022-04-04 NOTE — PROGRESS NOTES
Bedside report received and patient care assumed. Pt is resting in bed, A&O4, with no complaints of pain, and is on 4L nasal canula.  All fall precautions are in place, belongings at bedside table.  Pt was updated on POC, no questions or concerns. Pt educated on use of call light for assistance. Will continue to monitor.

## 2022-04-04 NOTE — RESPIRATORY CARE
"COPD EDUCATION by COPD CLINICAL EDUCATOR  4/4/2022  at  2:43 PM by Mary Meza, RRT     Patient interviewed by COPD education team.  Patient unable to participate in full program.  A short intervention has been conducted. Patient stated he already has  A comprehensive packet including information about COPD, types of treatments to manage their disease and safe home Oxygen usage.     COPD Screen  COPD Risk Screening  Do you have a history of COPD?: Yes    COPD Assessment  COPD Clinical Specialists ONLY  COPD Education Initiated: Yes--Short Intervention  DME Company: pt believes it is Preferred  DME Equipment Type: oxygen, concentrator  Physician Name: Peterson Amin  Pulmonologist Name: Pulmoanry list given to pt. He left msg at [facility]  Referrals Initiated: Yes  Pulmonary Rehab: Yes (Md placed at admit)  Smoking Cessation: N/A  Palliative Care:  (palliative seen on last admit 3 weeks ago, not ordeed or seen on this visit)  Hospice: N/A  Home Health Care:  (TBD)  [facility] Outreach: N/A  [facility]: N/A  Dispatch Health: Yes (established after last admit)  Private In-Home Care Agency: N/A  Is this a COPD exacerbation patient?:  (IP pulmonary pending)  $ Demo/Eval of SVN's, MDI's and Aerosols:  (pt refused spacer and flutter got one last admit 3 weeks ago)  (OP) Pulmonary Function Testing:  (pt stated he has nebver had one done before, recommended one)    PFT Results    No results found for: PFT    Meds to Beds  Would the patient like to opt in for Bedside Medication Delivery at Discharge?: Yes, interested     MY COPD ACTION PLAN     It is recommended that patients and physicians /healthcare providers complete this action plan together. This plan should be discussed at each physician visit and updated as needed.    The green, yellow and red zones show groups of symptoms of COPD. This list of symptoms is not comprehensive, and you may experience other symptoms. In the \"Actions\" column, " "your healthcare provider has recommended actions for you to take based on your symptoms.    Patient Name: Juan Manuel Bass   YOB: 1950   Last Updated on: 4/4/2022  2:42 PM   Green Zone:  I am doing well today Actions   •  Usual activitiy and exercise level •  Take daily medications   •  Usual amounts of cough and phlegm/mucus •  Use oxygen as prescribed   •  Sleep well at night •  Continue regular exercise/diet plan   •  Appetite is good •  At all times avoid cigarette smoke, inhaled irritants     Daily Medications (these medications are taken every day):   Fluticasone and Umeclidinium and Vilanterol (Trelogy) 1 Puff Once daily     Additional Information:  Please remember to RINSE MOUTH after taking this medicine    Yellow Zone:  I am having a bad day or a COPD flare Actions   •  More breathless than usual •  Continue daily medications   •  I have less energy for my daily activities •  Use quick relief inhaler as ordered   •  Increased or thicker phlegm/mucus •  Use oxygen as prescribed   •  Using quick relief inhaler/nebulizer more often •  Get plenty of rest   •  Swelling of ankles more than usual •  Use pursed lip breathing   •  More coughing than usual •  At all times avoid cigarette smoke, inhaled irritants   •  I feel like I have a \"chest cold\"   •  Poor sleep and my symptoms woke me up   •  My appetite is not good   •  My medicine is not helping    •  Call provider immediately if symptoms don’t improve     Continue daily medications, add rescue medications:   Albuterol 2 Puffs         Medications to be used during a flare up, (as Discussed with Provider):           Additional Information:  Take as directed by your Doctor and use the spacer    Red Zone:  I need urgent medical care Actions   •  Severe shortness of breath even at rest •  Call 911 or seek medical care immediately   •  Not able to do any activity because of breathing    •  Fever or shaking chills    •  Feeling confused or very drowsy   "   •  Chest pains    •  Coughing up blood

## 2022-04-04 NOTE — PROGRESS NOTES
Pt reported to this RN that around 1300 yesterday day shift, one of the EVS workers yanked on his [place] cath and pulled hard. Pt reported feeling sharp pain, but then fell asleep and neglected to tell day RN. This morning, this RN observed pink tinged urine in his Gamboa cath and a small clot in the catheter. This Rn noted that Gamboa was still in place and draining adequately. Pt educated on importance of timely report to RN. Dr. Aiken notified.

## 2022-04-04 NOTE — PROGRESS NOTES
"Hospital Medicine Daily Progress Note    Date of Service  4/4/2022    Chief Complaint  Juan Manuel Bass is a 71 y.o. male admitted 4/2/2022 with Pt stating he fell  at home to the ground, called 911 for a lift assist as he couldn't get off the ground. Pt states he fell because he \"couldn't breath.\" PT sounds crackly and junky and appears chronically unwell. Pt normally wears 3L of O2 at home and states he hasn't needed to increase his oxygen. Pt has history of COPD and he \"thinks its getting worse.\"    Hospital Course  Juan Manuel Bass is a 71 y.o. male who presented 4/2/2022 with complaints of ground-level fall and shortness of breath.  The patient resides in a trailer, He contacted 911 as he fell and unable to get off the ground.  He states that he falls very frequently but also drinks between 5-6 Coors light beers each day and excess amount of water per patient.  He also admits to drinking excessive amounts of fluids and has a history of COPD exacerbation/hyponatremia per his last admission in March 2022.  At that time he was placed on fluid restriction and salt tabs with resolution of hyponatremia.  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 He states he likely fell today from alcohol intoxication denies presyncopal syndrome such as incoordination, vertigo, lightheadedness, diplopia/blurred vision or headache/chest pain.  He does admit to shortness of breath which is chronic for him and on 3 L nasal cannula at home.  States he is fully vaccinated gets COVID-19 and admits to shortness of breath but denies the following: Anosmia, ageusia, fever, chills, cough with sputum production, diarrhea, myalgias.  Also denies nausea, vomiting, constipation, melena, hematochezia, dysuria, hematuria, incoordination vertigo, syncope, loss of consciousness.  Denies hitting his head with his fall earlier today.  The patient post fall complaining of left-sided " posterior rib cage pain, CT of the chest did not show any obvious fractures, as well as bilateral knee bruising.  Interval Problem Update  Patient seen and examined today.  Data, Medication data reviewed.  Case discussed with nursing as available.  Plan of Care reviewed with patient and notified of changes.  4/4 patient feels better, he is ready for breakfast he states, he is on 3 L nasal cannula oxygen, he is slightly tachycardic at 100, afebrile, normotensive, his laboratory data is reviewed, CBC fairly unremarkable, his sodium is slightly increasing slowly after the fairly fast job yesterday,  He has difficulty getting around, PT OT is to work with him to evaluate for additional needs    I have personally seen and examined the patient at bedside. I discussed the plan of care with patient.  Bedside RN    Consultants/Specialty  critical care on intake    Code Status  DNAR/DNI    Disposition  Patient is not medically cleared for discharge.   Anticipate discharge to to home with close outpatient follow-up.  I have placed the appropriate orders for post-discharge needs.    Review of Systems  Review of Systems   Constitutional: Positive for malaise/fatigue.   HENT: Negative.    Eyes: Negative.    Respiratory: Positive for shortness of breath.    Cardiovascular: Positive for chest pain and leg swelling.   Gastrointestinal: Negative.    Genitourinary: Negative.    Musculoskeletal: Positive for joint pain and myalgias.   Skin: Negative.    Neurological: Negative.    Endo/Heme/Allergies: Negative.    Psychiatric/Behavioral: Positive for substance abuse. The patient is nervous/anxious.    All other systems reviewed and are negative.       Physical Exam  Temp:  [36.3 °C (97.4 °F)-36.8 °C (98.3 °F)] 36.7 °C (98.1 °F)  Pulse:  [] 98  Resp:  [14-27] 16  BP: (115-153)/(66-88) 147/70  SpO2:  [90 %-95 %] 94 %    Physical Exam  Vitals and nursing note reviewed.   Constitutional:       Appearance: He is well-developed. He is  ill-appearing.   HENT:      Head: Normocephalic.   Eyes:      Pupils: Pupils are equal, round, and reactive to light.   Neck:      Comments: Large neck circumference  Cardiovascular:      Rate and Rhythm: Normal rate and regular rhythm.      Heart sounds: Normal heart sounds.      Comments: Left posterior chest wall bruising  Pulmonary:      Effort: Pulmonary effort is normal.      Breath sounds: Rhonchi present.   Abdominal:      General: Bowel sounds are normal.      Palpations: Abdomen is soft.   Genitourinary:     Penis: Normal.       Rectum: Normal.   Musculoskeletal:         General: Swelling, tenderness and signs of injury present. Normal range of motion.      Cervical back: Normal range of motion.   Skin:     General: Skin is warm.   Neurological:      Mental Status: He is alert and oriented to person, place, and time.         Fluids    Intake/Output Summary (Last 24 hours) at 4/4/2022 1244  Last data filed at 4/4/2022 0600  Gross per 24 hour   Intake 670 ml   Output 2950 ml   Net -2280 ml       Laboratory  Recent Labs     04/02/22 2152 04/04/22  0039   WBC 5.3 4.8   RBC 4.12* 4.25*   HEMOGLOBIN 13.1* 13.5*   HEMATOCRIT 38.0* 38.8*   MCV 92.2 91.3   MCH 31.8 31.8   MCHC 34.5 34.8   RDW 47.2 47.8   PLATELETCT 224 227   MPV 9.5 9.5     Recent Labs     04/03/22  1330 04/03/22  1845 04/04/22  0039   SODIUM 121* 123* 125*   POTASSIUM 5.4 5.6* 4.9   CHLORIDE 88* 87* 91*   CO2 22 22 24   GLUCOSE 257* 227* 233*   BUN 5* 6* 5*   CREATININE 0.62 0.58 0.49*   CALCIUM 8.4* 8.7 8.9     Recent Labs     04/02/22  2152   INR 0.96               Imaging  CT-CTA CHEST PULMONARY ARTERY W/ RECONS   Final Result      1.  No CT evidence for pulmonary emboli.   2.  Mild bilateral atelectasis.   3.  No pneumonia or pneumothorax.            DX-CHEST-PORTABLE (1 VIEW)   Final Result      1.  Mild bibasilar atelectasis.   2.  No lobar pneumonia or pulmonary edema.   3.  Stable cardiomegaly.           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 consider outpatient consultation for excision/biopsy    Advanced care planning/counseling discussion- (present on admission)  Assessment & Plan  Patient stated DNR/DNI wishes  Recommend  consultation for potential placement as well as PT    Knee pain- (present on admission)  Assessment & Plan  Acute on chronic, s/p fall  No obvious deformity, the patient able to ambulate    Debility- (present on admission)  Assessment & Plan  Consider  consultation as patient has difficulties with ADLs and per chart review does not have optimal living conditions and may require placement  Physical therapy ordered    Acute on chronic respiratory failure (HCC)- (present on admission)  Assessment & Plan  Continue supplemental O2 to maintain SPO2 greater than 88%  Baseline home oxygen 3 L  CT negative for PE      Alcoholism (HCC)- (present on admission)  Assessment & Plan  Cessation advised    Essential hypertension- (present on admission)  Assessment & Plan  Chart review reveals patient has history of hydrochlorothiazide however not on current medication regiment list  Continue amlodipine 5 mg p.o. daily  As needed antihypertensives on board    Obesity- (present on admission)  Assessment & Plan  Strong recommendation for follow-up outpatient PCP for lifestyle modification including diet and exercise regiment of at least 30 min of brisk cardiovascular exercise 3-5 times weekly.  Recent A1c and lipid panel in chart no  indication to repeat at this time    COPD exacerbation (HCC)- (present on admission)  Assessment & Plan  Solu-Medrol 62.5 mg IV twice daily x1 day then prednisone 40 mg x 4 days for total of 5 days systemic steroids  Azithromycin for anti-inflammatory effect  Duo nebs as needed  Continue home medication regimen         Plan  Continue with sodium correction, follow-up sodium levels ordered  COPD treatment  Mobilization, PT OT eval  Discharge disposition evaluation  Pain control  See orders  Medically complex high risk patient    VTE prophylaxis: Xarelto PPx    I have performed a physical exam and reviewed and updated ROS and Plan today (4/4/2022). In review of yesterday's note (4/3/2022), there are no changes except as documented above.      Please note that this dictation was created using voice recognition software. I have made every reasonable attempt to correct obvious errors, but I expect that there are errors of grammar and possibly context that I did not discover before finalizing the note.

## 2022-04-04 NOTE — CARE PLAN
Problem: Bronchoconstriction  Goal: Improve in air movement and diminished wheezing  Description: Target End Date:  2 to 3 days    1.  Implement inhaled treatments  2.  Evaluate and manage medication effects  Outcome: Progressing  Flowsheets (Taken 4/4/2022 1656)  Bronchodilator Goals/Outcome: Patient at Stable Baseline  Bronchodilator Indications: History / Diagnosis     Problem: Bronchopulmonary Hygiene  Goal: Increase mobilization of retained secretions  Description: Target End Date:  2 to 3 days    1.  Perform bronchopulmonary therapy as indicated by assessment  2.  Perform airway suctioning  3.  Perform actions to maintain patient airway  Outcome: Progressing  Flowsheets (Taken 4/4/2022 1656)  Bronchopulmonary Hygiene Indications: Patient with Chronic Pulmonary Disease on Home Frequency  Bronchopulmonary Hygiene Outcomes: Patient at stable baseline       Respiratory Update    Treatment modality: Duoneb, Flutter  Frequency: QID    Pt on home reg of 3L NC.    Pt tolerating current treatments well with no adverse reactions.

## 2022-04-05 LAB
ANION GAP SERPL CALC-SCNC: 7 MMOL/L (ref 7–16)
ANION GAP SERPL CALC-SCNC: 9 MMOL/L (ref 7–16)
BACTERIA UR CULT: NORMAL
BUN SERPL-MCNC: 5 MG/DL (ref 8–22)
BUN SERPL-MCNC: 6 MG/DL (ref 8–22)
CALCIUM SERPL-MCNC: 8.4 MG/DL (ref 8.5–10.5)
CALCIUM SERPL-MCNC: 8.7 MG/DL (ref 8.5–10.5)
CHLORIDE SERPL-SCNC: 92 MMOL/L (ref 96–112)
CHLORIDE SERPL-SCNC: 94 MMOL/L (ref 96–112)
CO2 SERPL-SCNC: 27 MMOL/L (ref 20–33)
CO2 SERPL-SCNC: 28 MMOL/L (ref 20–33)
CREAT SERPL-MCNC: 0.47 MG/DL (ref 0.5–1.4)
CREAT SERPL-MCNC: 0.47 MG/DL (ref 0.5–1.4)
ERYTHROCYTE [DISTWIDTH] IN BLOOD BY AUTOMATED COUNT: 51.6 FL (ref 35.9–50)
GFR SERPLBLD CREATININE-BSD FMLA CKD-EPI: 111 ML/MIN/1.73 M 2
GFR SERPLBLD CREATININE-BSD FMLA CKD-EPI: 111 ML/MIN/1.73 M 2
GLUCOSE SERPL-MCNC: 115 MG/DL (ref 65–99)
GLUCOSE SERPL-MCNC: 147 MG/DL (ref 65–99)
HCT VFR BLD AUTO: 38.2 % (ref 42–52)
HGB BLD-MCNC: 12.6 G/DL (ref 14–18)
MAGNESIUM SERPL-MCNC: 2 MG/DL (ref 1.5–2.5)
MCH RBC QN AUTO: 31.4 PG (ref 27–33)
MCHC RBC AUTO-ENTMCNC: 33 G/DL (ref 33.7–35.3)
MCV RBC AUTO: 95.3 FL (ref 81.4–97.8)
NT-PROBNP SERPL IA-MCNC: 541 PG/ML (ref 0–125)
PHOSPHATE SERPL-MCNC: 3 MG/DL (ref 2.5–4.5)
PLATELET # BLD AUTO: 205 K/UL (ref 164–446)
PMV BLD AUTO: 9.1 FL (ref 9–12.9)
POTASSIUM SERPL-SCNC: 4.3 MMOL/L (ref 3.6–5.5)
POTASSIUM SERPL-SCNC: 4.6 MMOL/L (ref 3.6–5.5)
RBC # BLD AUTO: 4.01 M/UL (ref 4.7–6.1)
SIGNIFICANT IND 70042: NORMAL
SITE SITE: NORMAL
SODIUM SERPL-SCNC: 128 MMOL/L (ref 135–145)
SODIUM SERPL-SCNC: 129 MMOL/L (ref 135–145)
SOURCE SOURCE: NORMAL
WBC # BLD AUTO: 8.5 K/UL (ref 4.8–10.8)

## 2022-04-05 PROCEDURE — 700102 HCHG RX REV CODE 250 W/ 637 OVERRIDE(OP): Performed by: STUDENT IN AN ORGANIZED HEALTH CARE EDUCATION/TRAINING PROGRAM

## 2022-04-05 PROCEDURE — 700101 HCHG RX REV CODE 250: Performed by: HOSPITALIST

## 2022-04-05 PROCEDURE — A9270 NON-COVERED ITEM OR SERVICE: HCPCS | Performed by: STUDENT IN AN ORGANIZED HEALTH CARE EDUCATION/TRAINING PROGRAM

## 2022-04-05 PROCEDURE — 84100 ASSAY OF PHOSPHORUS: CPT

## 2022-04-05 PROCEDURE — 80048 BASIC METABOLIC PNL TOTAL CA: CPT

## 2022-04-05 PROCEDURE — 94640 AIRWAY INHALATION TREATMENT: CPT

## 2022-04-05 PROCEDURE — 700111 HCHG RX REV CODE 636 W/ 250 OVERRIDE (IP): Performed by: STUDENT IN AN ORGANIZED HEALTH CARE EDUCATION/TRAINING PROGRAM

## 2022-04-05 PROCEDURE — 99232 SBSQ HOSP IP/OBS MODERATE 35: CPT | Performed by: HOSPITALIST

## 2022-04-05 PROCEDURE — 770001 HCHG ROOM/CARE - MED/SURG/GYN PRIV*

## 2022-04-05 PROCEDURE — 94669 MECHANICAL CHEST WALL OSCILL: CPT

## 2022-04-05 PROCEDURE — 83880 ASSAY OF NATRIURETIC PEPTIDE: CPT

## 2022-04-05 PROCEDURE — A9270 NON-COVERED ITEM OR SERVICE: HCPCS | Performed by: HOSPITALIST

## 2022-04-05 PROCEDURE — 700102 HCHG RX REV CODE 250 W/ 637 OVERRIDE(OP): Performed by: HOSPITALIST

## 2022-04-05 PROCEDURE — 83735 ASSAY OF MAGNESIUM: CPT

## 2022-04-05 PROCEDURE — 85027 COMPLETE CBC AUTOMATED: CPT

## 2022-04-05 RX ADMIN — RIVAROXABAN 10 MG: 10 TABLET, FILM COATED ORAL at 17:52

## 2022-04-05 RX ADMIN — PREDNISONE 40 MG: 20 TABLET ORAL at 06:00

## 2022-04-05 RX ADMIN — ACETAMINOPHEN 500 MG: 500 TABLET ORAL at 16:24

## 2022-04-05 RX ADMIN — IPRATROPIUM BROMIDE AND ALBUTEROL SULFATE 3 ML: .5; 2.5 SOLUTION RESPIRATORY (INHALATION) at 07:35

## 2022-04-05 RX ADMIN — FLUTICASONE PROPIONATE 88 MCG: 44 AEROSOL, METERED RESPIRATORY (INHALATION) at 07:37

## 2022-04-05 RX ADMIN — OXYCODONE HYDROCHLORIDE 5 MG: 5 TABLET ORAL at 20:31

## 2022-04-05 RX ADMIN — IPRATROPIUM BROMIDE AND ALBUTEROL SULFATE 3 ML: .5; 2.5 SOLUTION RESPIRATORY (INHALATION) at 14:26

## 2022-04-05 RX ADMIN — AMLODIPINE BESYLATE 5 MG: 10 TABLET ORAL at 05:59

## 2022-04-05 RX ADMIN — Medication 5 MG: at 20:31

## 2022-04-05 RX ADMIN — IPRATROPIUM BROMIDE AND ALBUTEROL SULFATE 3 ML: .5; 2.5 SOLUTION RESPIRATORY (INHALATION) at 10:51

## 2022-04-05 RX ADMIN — ACETAMINOPHEN 500 MG: 500 TABLET ORAL at 20:31

## 2022-04-05 RX ADMIN — ACETAMINOPHEN 500 MG: 500 TABLET ORAL at 09:52

## 2022-04-05 RX ADMIN — IPRATROPIUM BROMIDE AND ALBUTEROL SULFATE 3 ML: .5; 2.5 SOLUTION RESPIRATORY (INHALATION) at 19:52

## 2022-04-05 RX ADMIN — FLUTICASONE PROPIONATE 88 MCG: 44 AEROSOL, METERED RESPIRATORY (INHALATION) at 19:52

## 2022-04-05 ASSESSMENT — ENCOUNTER SYMPTOMS
FEVER: 0
CHILLS: 0
ROS GI COMMENTS: TOLERATING A DIET
SHORTNESS OF BREATH: 0

## 2022-04-05 ASSESSMENT — PAIN DESCRIPTION - PAIN TYPE
TYPE: ACUTE PAIN
TYPE: CHRONIC PAIN
TYPE: ACUTE PAIN
TYPE: CHRONIC PAIN
TYPE: ACUTE PAIN

## 2022-04-05 ASSESSMENT — FIBROSIS 4 INDEX: FIB4 SCORE: 1.67

## 2022-04-05 NOTE — PROGRESS NOTES
[facility] Medicine Daily Progress Note    Date of Service  4/5/2022    Chief Complaint  Juan Manuel Bass is a 71 y.o. male admitted 4/2/2022 with shortness of breath    Hospital Course  Mr. Bass is a 71 y.o. male who presented 4/2/2022 with complaints of ground-level fall and shortness of breath.  The patient resides in a trailer, He contacted 911 as he fell and unable to get off the ground.  He states that he falls very frequently but also drinks between 5-6 Coors light beers each day and excess amount of water per patient.  He also admits to drinking excessive amounts of fluids and has a history of COPD exacerbation/hyponatremia per his last admission in March 2022.  At that time he was placed on fluid restriction and salt tabs with resolution of hyponatremia.  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 He states he likely fell today from alcohol intoxication denies presyncopal syndrome such as incoordination, vertigo, lightheadedness, diplopia/blurred vision or headache/chest pain.  He does admit to shortness of breath which is chronic for him and on 3 L nasal cannula at home.  States he is fully vaccinated against COVID-19. His sodium on admit was 114.    Interval Problem Update  4/4 patient feels better, he is ready for breakfast he states, he is on 3 L nasal cannula oxygen, he is slightly tachycardic at 100, afebrile, normotensive, his laboratory data is reviewed, CBC fairly unremarkable, his sodium is slightly increasing slowly after the fairly fast job yesterday,  He has difficulty getting around, PT OT is to work with him to evaluate for additional needs  4/5: Mr. Bass was evaluated and examined in the [facility]. His sodium is 129 today. He remains on 3 liters nasal cannula oxygen. He has been ambulate outside the room with his nurse. He is very adamant that he will not go to a SNF despite his weakness. Home Health has been ordered.    I  have personally seen and examined the patient at bedside. I discussed the plan of care with bedside RN.    Consultants/Specialty  pulmonary    Code Status  DNAR/DNI    Disposition  Patient is not medically cleared for discharge.   Anticipate discharge to to home with close outpatient follow-up on 4/6.  I have placed the appropriate orders for post-discharge needs.    Review of Systems  Review of Systems   Constitutional: Negative for chills and fever.   Respiratory: Negative for shortness of breath.    Cardiovascular: Negative for chest pain.   Gastrointestinal:        Tolerating a diet   All other systems reviewed and are negative.       Physical Exam  Temp:  [35.8 °C (96.5 °F)-36.6 °C (97.8 °F)] 36.4 °C (97.5 °F)  Pulse:  [] 91  Resp:  [14-20] 20  BP: (127-148)/(69-79) 127/70  SpO2:  [94 %-99 %] 94 %    Physical Exam  Vitals and nursing note reviewed.   Constitutional:       General: He is not in acute distress.     Appearance: He is obese.   HENT:      Head: Normocephalic and atraumatic.      Mouth/Throat:      Mouth: Mucous membranes are dry.      Pharynx: Oropharynx is clear.   Eyes:      General: No scleral icterus.     Conjunctiva/sclera: Conjunctivae normal.   Pulmonary:      Breath sounds: No rales.      Comments: 3 liters nasal cannula oxygen  Abdominal:      General: There is distension.      Tenderness: There is no abdominal tenderness.   Musculoskeletal:      Cervical back: Normal range of motion and neck supple.      Right lower leg: No edema.      Left lower leg: No edema.   Skin:     General: Skin is warm and dry.      Coloration: Skin is pale.   Neurological:      General: No focal deficit present.      Mental Status: He is alert and oriented to person, place, and time.   Psychiatric:         Mood and Affect: Mood normal.         Behavior: Behavior normal.         Fluids    Intake/Output Summary (Last 24 hours) at 4/5/2022 0839  Last data filed at 4/5/2022 0700  Gross per 24 hour   Intake 1460  ml   Output 1600 ml   Net -140 ml       Laboratory  Recent Labs     04/02/22  2152 04/04/22  0039 04/05/22  0610   WBC 5.3 4.8 8.5   RBC 4.12* 4.25* 4.01*   HEMOGLOBIN 13.1* 13.5* 12.6*   HEMATOCRIT 38.0* 38.8* 38.2*   MCV 92.2 91.3 95.3   MCH 31.8 31.8 31.4   MCHC 34.5 34.8 33.0*   RDW 47.2 47.8 51.6*   PLATELETCT 224 227 205   MPV 9.5 9.5 9.1     Recent Labs     04/04/22  1807 04/05/22  0030 04/05/22  0610   SODIUM 125* 128* 129*   POTASSIUM 4.7 4.6 4.3   CHLORIDE 90* 92* 94*   CO2 25 27 28   GLUCOSE 218* 147* 115*   BUN 6* 6* 5*   CREATININE 0.48* 0.47* 0.47*   CALCIUM 9.2 8.7 8.4*     Recent Labs     04/02/22 2152   INR 0.96               Imaging  CT-CTA CHEST PULMONARY ARTERY W/ RECONS   Final Result      1.  No CT evidence for pulmonary emboli.   2.  Mild bilateral atelectasis.   3.  No pneumonia or pneumothorax.            DX-CHEST-PORTABLE (1 VIEW)   Final Result      1.  Mild bibasilar atelectasis.   2.  No lobar pneumonia or pulmonary edema.   3.  Stable cardiomegaly.           Assessment/Plan  * Hyponatremia- (present on admission)  Assessment & Plan  Presenting sodium was severely low at 114.  Osmolality calculated initially at 234.4 and consistent with hypotonic hypervolemic hyponatremia  Fluid restriction   Likely secondary to beer Potomania,water and unable to quantify daily amount.  Outpatient follow up      COPD exacerbation (HCC)- (present on admission)  Assessment & Plan  IV Solu-Medrol has been de-escalated to prednisone 40 mg x 4 days for total of 5 days systemic steroids  Status post Azithromycin   Duo nebs as needed  Continue home medication regimen        Advanced care planning/counseling discussion- (present on admission)  Assessment & Plan  Patient stated DNR/DNI wishes      Knee pain- (present on admission)  Assessment & Plan  Acute on chronic, s/p fall  No obvious deformity, the patient able to ambulate    Debility- (present on admission)  Assessment & Plan  He has refused post-acute  placement  Home health ordered    Acute on chronic respiratory failure (HCC)- (present on admission)  Assessment & Plan  Continue supplemental O2 to maintain SPO2 greater than 88%  Baseline home oxygen 3 L  CT negative for PE  Secondary to COPD exacerbation      Alcoholism (HCC)- (present on admission)  Assessment & Plan  Cessation advised    Essential hypertension- (present on admission)  Assessment & Plan  Continue amlodipine 5 mg p.o. 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 In review of yesterday's note (4/4/2022), there are no changes except as documented above.

## 2022-04-05 NOTE — FACE TO FACE
Face to Face Supporting Documentation - Home Health    The encounter with this patient was in whole or in part the primary reason for home health admission.    Date of encounter:   Patient:                    MRN:                       YOB: 2022  Juan Manuel Bass  [id number]  1950     Home health to see patient for:  Skilled Nursing care for assessment, interventions & education    Skilled need for:  Exacerbation of Chronic Disease State COPD    Skilled nursing interventions to include:  Comment: home safety eval and medication management    Homebound status evidenced by:  Need the aid of supportive devices such as crutches, canes, wheelchairs or walkers. Leaving home requires a considerable and taxing effort. There is a normal inability to leave the home.    Community Physician to provide follow up care: Peterson Amin M.D.     Optional Interventions? No      I certify the face to face encounter for this home health care referral meets the CMS requirements and the encounter/clinical assessment with the patient was, in whole, or in part, for the medical condition(s) listed above, which is the primary reason for home health care. Based on my clinical findings: the service(s) are medically necessary, support the need for home health care, and the homebound criteria are met.  I certify that this patient has had a face to face encounter by myself.  Jose Merritt M.D. - NPI: [id number]

## 2022-04-05 NOTE — THERAPY
Physical Therapy   Initial Evaluation     Patient Name: Juan Manuel Bass  Age:  71 y.o., Sex:  male  Medical Record #: [id number]  Today's Date: 4/4/2022     Precautions  Precautions: Fall Risk    Assessment  Patient is 71 y.o. male with a diagnosis of of ground-level fall and shortness of breath.  He contacted 911 as he fell and unable to get off the ground.  He states that he falls very frequently but also drinks between 5-6 Coors light beers each day and excess amount of water per patient.  Additional factors influencing patient status / progress : today, pt needed min A to EOB, min A transfers and cg during ambulation x 50 feet with 3 standing rests to catch breath. Pt is fragile with decreased activity tolerance. At baseline, pt reports rarely leaving home, neighbors do laundry and grocery shopping. PT to follow. .      Plan    Recommend Physical Therapy 3 times per week until therapy goals are met for the following treatments:  Bed Mobility, Gait Training, Neuro Re-Education / Balance, Stair Training, and Therapeutic Activities    DC Equipment Recommendations: Unable to determine at this time  Discharge Recommendations: Recommend post-acute placement for additional physical therapy services prior to discharge home       Objective       04/04/22 1505   Total Time Spent   Total Time Spent (Mins) 35   Charge Group   PT Evaluation PT Evaluation Mod   Initial Contact Note    Initial Contact Note Order Received and Verified, Physical Therapy Evaluation in Progress with Full Report to Follow.   Precautions   Precautions Fall Risk   Vitals   O2 (LPM) 3   O2 Delivery Device Silicone Nasal Cannula   Pain 0 - 10 Group   Therapist Pain Assessment During Activity;Nurse Notified;0   Prior Living Situation   Prior Services None   Housing / Facility Mobile Home   Steps Into Home 3   Steps In Home 1   Rail Right Rail (Steps into Home)   Equipment Owned Front-Wheel Walker   Lives with - Patient's Self Care Capacity Alone and Able to  "Care For Self   Prior Level of Functional Mobility   Bed Mobility Independent   Transfer Status Independent   Ambulation Independent   Distance Ambulation (Feet)   (household, rarely leaves home)   Assistive Devices Used Front-Wheel Walker   Stairs Independent   Comments friends do the grocery shopping, \"zora\" does laundry.   History of Falls   History of Falls Yes   Date of Last Fall   (2 recent falls, no date given)   Cognition    Level of Consciousness Alert   Active ROM Lower Body    Active ROM Lower Body  WDL   Strength Lower Body   Lower Body Strength  X   Comments functional for ambulation, lacks endurance.   Balance Assessment   Sitting Balance (Static) Fair   Sitting Balance (Dynamic) Fair -   Standing Balance (Static) Fair -   Standing Balance (Dynamic) Fair -   Weight Shift Sitting Fair   Weight Shift Standing Fair   Comments with FWW   Gait Analysis   Gait Level Of Assist Contact Guard Assist   Assistive Device Front Wheel Walker   Distance (Feet) 50   # of Times Distance was Traveled 1   Deviation Decreased Base Of Support  (SOB, standing rests to control)   # of Stairs Climbed 0   Weight Bearing Status no restrictions   Bed Mobility    Supine to Sit Minimal Assist   Sit to Supine Minimal Assist   Scooting Supervised   Rolling Minimum Assist to Lt.   Functional Mobility   Sit to Stand Minimal Assist   Bed, Chair, Wheelchair Transfer Minimal Assist   Transfer Method Stand Pivot   How much difficulty does the patient currently have...   Turning over in bed (including adjusting bedclothes, sheets and blankets)? 3   Sitting down on and standing up from a chair with arms (e.g., wheelchair, bedside commode, etc.) 3   Moving from lying on back to sitting on the side of the bed? 3   How much help from another person does the patient currently need...   Moving to and from a bed to a chair (including a wheelchair)? 3   Need to walk in a hospital room? 3   Climbing 3-5 steps with a railing? 2   6 clicks Mobility " Score 17   Activity Tolerance   Standing 5   Short Term Goals    Short Term Goal # 1 Pt will perform bed mobilty with flat bed, no rail with supervision in 6 visits.   Short Term Goal # 2 Pt will transfer with supervision in 6 visits to improve functional indep   Short Term Goal # 3 Pt will ambulate x 125 feet using FWW with sueprvision in 6 visits to improve functional indep.   Short Term Goal # 4 Pt wll negotiate 3 stairs with superviison in 6 vistis to access home.   Education Group   Education Provided Role of Physical Therapist   Role of Physical Therapist Patient Response Patient;Acceptance;Explanation;Verbal Demonstration   Problem List    Problems Impaired Bed Mobility;Impaired Transfers;Impaired Ambulation;Decreased Activity Tolerance   Anticipated Discharge Equipment and Recommendations   DC Equipment Recommendations Unable to determine at this time   Discharge Recommendations Recommend post-acute placement for additional physical therapy services prior to discharge home   Interdisciplinary Plan of Care Collaboration   IDT Collaboration with  Nursing   Patient Position at End of Therapy In Bed;Call Light within Reach;Tray Table within Reach;Phone within Reach   Collaboration Comments nsg updated   Session Information   Date / Session Number  4/4-1 (1/3, 4/10)

## 2022-04-06 VITALS
SYSTOLIC BLOOD PRESSURE: 148 MMHG | DIASTOLIC BLOOD PRESSURE: 109 MMHG | OXYGEN SATURATION: 95 % | BODY MASS INDEX: 41.41 KG/M2 | HEIGHT: 70 IN | HEART RATE: 80 BPM | TEMPERATURE: 97.1 F | WEIGHT: 289.24 LBS | RESPIRATION RATE: 20 BRPM

## 2022-04-06 PROCEDURE — A9270 NON-COVERED ITEM OR SERVICE: HCPCS | Performed by: HOSPITALIST

## 2022-04-06 PROCEDURE — 94640 AIRWAY INHALATION TREATMENT: CPT

## 2022-04-06 PROCEDURE — A9270 NON-COVERED ITEM OR SERVICE: HCPCS | Performed by: STUDENT IN AN ORGANIZED HEALTH CARE EDUCATION/TRAINING PROGRAM

## 2022-04-06 PROCEDURE — 700102 HCHG RX REV CODE 250 W/ 637 OVERRIDE(OP): Performed by: STUDENT IN AN ORGANIZED HEALTH CARE EDUCATION/TRAINING PROGRAM

## 2022-04-06 PROCEDURE — 94669 MECHANICAL CHEST WALL OSCILL: CPT

## 2022-04-06 PROCEDURE — 700101 HCHG RX REV CODE 250: Performed by: HOSPITALIST

## 2022-04-06 PROCEDURE — 97116 GAIT TRAINING THERAPY: CPT

## 2022-04-06 PROCEDURE — 700111 HCHG RX REV CODE 636 W/ 250 OVERRIDE (IP): Performed by: STUDENT IN AN ORGANIZED HEALTH CARE EDUCATION/TRAINING PROGRAM

## 2022-04-06 PROCEDURE — 99239 HOSP IP/OBS DSCHRG MGMT >30: CPT | Performed by: HOSPITALIST

## 2022-04-06 PROCEDURE — 700102 HCHG RX REV CODE 250 W/ 637 OVERRIDE(OP): Performed by: HOSPITALIST

## 2022-04-06 RX ADMIN — PREDNISONE 40 MG: 20 TABLET ORAL at 04:59

## 2022-04-06 RX ADMIN — IPRATROPIUM BROMIDE AND ALBUTEROL SULFATE 3 ML: .5; 2.5 SOLUTION RESPIRATORY (INHALATION) at 06:53

## 2022-04-06 RX ADMIN — AMLODIPINE BESYLATE 5 MG: 10 TABLET ORAL at 05:00

## 2022-04-06 RX ADMIN — OXYCODONE HYDROCHLORIDE 10 MG: 10 TABLET ORAL at 01:06

## 2022-04-06 RX ADMIN — UMECLIDINIUM BROMIDE AND VILANTEROL TRIFENATATE 1 PUFF: 62.5; 25 POWDER RESPIRATORY (INHALATION) at 08:57

## 2022-04-06 RX ADMIN — FLUTICASONE PROPIONATE 88 MCG: 44 AEROSOL, METERED RESPIRATORY (INHALATION) at 08:58

## 2022-04-06 ASSESSMENT — COGNITIVE AND FUNCTIONAL STATUS - GENERAL
TURNING FROM BACK TO SIDE WHILE IN FLAT BAD: UNABLE
SUGGESTED CMS G CODE MODIFIER MOBILITY: CJ
MOBILITY SCORE: 21

## 2022-04-06 ASSESSMENT — PAIN DESCRIPTION - PAIN TYPE
TYPE: CHRONIC PAIN
TYPE: CHRONIC PAIN

## 2022-04-06 ASSESSMENT — GAIT ASSESSMENTS
GAIT LEVEL OF ASSIST: SUPERVISED
ASSISTIVE DEVICE: FRONT WHEEL WALKER
DISTANCE (FEET): 200

## 2022-04-06 ASSESSMENT — FIBROSIS 4 INDEX: FIB4 SCORE: 1.85

## 2022-04-06 NOTE — THERAPY
Physical Therapy   Daily Treatment     Patient Name: Juan Manuel Bass  Age:  71 y.o., Sex:  male  Medical Record #: [id number]  Today's Date: 4/6/2022          Assessment    Rec'd pt alert, agreeable to work w/ PT.  Anxious to d/c home.  He is able to mobilize at spv level and ambulate w/ a fww.  No acute PT needs.  Recommend oob/amb prn w/ nsg and fww.  He denies the need to perform stairs prior to d/c, stating they won't be an issue.    DC Equipment Recommendations: Unable to determine at this time  Discharge Recommendations: Recommend home health for continued physical therapy services        Objective       04/06/22 0816   Balance   Sitting Balance (Static) Fair +   Sitting Balance (Dynamic) Fair +   Standing Balance (Static) Fair +   Standing Balance (Dynamic) Fair +   Weight Shift Sitting Good   Weight Shift Standing Good   Comments w/ fww   Gait Analysis   Gait Level Of Assist Supervised   Assistive Device Front Wheel Walker   Distance (Feet) 200   # of Stairs Climbed   (denies the need)   Bed Mobility    Supine to Sit Supervised   Sit to Supine Supervised   Functional Mobility   Sit to Stand Supervised   Bed, Chair, Wheelchair Transfer Supervised   Short Term Goals    Short Term Goal # 1 Pt will perform bed mobilty with flat bed, no rail with supervision in 6 visits.   Goal Outcome # 1 Goal met   Short Term Goal # 2 Pt will transfer with supervision in 6 visits to improve functional indep   Goal Outcome # 2 Goal met   Short Term Goal # 3 Pt will ambulate x 125 feet using FWW with sueprvision in 6 visits to improve functional indep.   Goal Outcome # 3 Goal met   Short Term Goal # 4 Pt wll negotiate 3 stairs with superviison in 6 vistis to access home.   Goal Outcome # 4   (Pt denies the need prior to d/c, stating they wont be an issue)   Anticipated Discharge Equipment and Recommendations   DC Equipment Recommendations Unable to determine at this time   Discharge Recommendations Recommend home health for continued  physical therapy services

## 2022-04-06 NOTE — DISCHARGE INSTRUCTIONS
Discharge Instructions    Discharged to home by car with friend. Discharged via wheelchair, hospital escort: Yes.  Special equipment needed: Oxygen and Walker    Be sure to schedule a follow-up appointment with your primary care doctor or any specialists as instructed.     Discharge Plan:        I understand that a diet low in cholesterol, fat, and sodium is recommended for good health. Unless I have been given specific instructions below for another diet, I accept this instruction as my diet prescription.   Other diet: Cardiac healthy    Special Instructions: None    · Is patient discharged on Warfarin / Coumadin?   No     Hyponatremia  Hyponatremia is when the amount of salt (sodium) in your blood is too low. When salt levels are low, your body may take in extra water. This can cause swelling throughout the body. The swelling often affects the brain.  What are the causes?  This condition may be caused by:  · Certain medical problems or conditions.  · Vomiting a lot.  · Having watery poop (diarrhea) often.  · Certain medicines or illegal drugs.  · Not having enough water in the body (dehydration).  · Drinking too much water.  · Eating a diet that is low in salt.  · Large burns on your body.  · Too much sweating.  What increases the risk?  You are more likely to get this condition if you:  · Have long-term (chronic) kidney disease.  · Have heart failure.  · Have a medical condition that causes you to have watery poop often.  · Do very hard exercises.  · Take medicines that affect the amount of salt is in your blood.  What are the signs or symptoms?  Symptoms of this condition include:  · Headache.  · Feeling like you may vomit (nausea).  · Vomiting.  · Being very tired (lethargic).  · Muscle weakness and cramps.  · Not wanting to eat as much as normal (loss of appetite).  · Feeling weak or light-headed.  Severe symptoms of this condition include:  · Confusion.  · Feeling restless (agitation).  · Having a fast heart  rate.  · Passing out (fainting).  · Seizures.  · Coma.  How is this treated?  Treatment for this condition depends on the cause. Treatment may include:  · Getting fluids through an IV tube that is put into one of your veins.  · Taking medicines to fix the salt levels in your blood. If medicines are causing the problem, your medicines will need to be changed.  · Limiting how much water or fluid you take in.  · Monitoring in the hospital to watch your symptoms.  Follow these instructions at home:    · Take over-the-counter and prescription medicines only as told by your doctor. Many medicines can make this condition worse. Talk with your doctor about any medicines that you are taking.  · Eat and drink exactly as you are told by your doctor.  ? Eat only the foods you are told to eat.  ? Limit how much fluid you take.  · Do not drink alcohol.  · Keep all follow-up visits as told by your doctor. This is important.  Contact a doctor if:  · You feel more like you may vomit.  · You feel more tired.  · Your headache gets worse.  · You feel more confused.  · You feel weaker.  · Your symptoms go away and then they come back.  · You have trouble following the diet instructions.  Get help right away if:  · You have a seizure.  · You pass out.  · You keep having watery poop.  · You keep vomiting.  Summary  · Hyponatremia is when the amount of salt in your blood is too low.  · When salt levels are low, you can have swelling throughout the body. The swelling mostly affects the brain.  · Treatment depends on the cause. Treatment may include getting IV fluids, medicines, or not drinking as much fluid.  This information is not intended to replace advice given to you by your health care provider. Make sure you discuss any questions you have with your health care provider.  Document Released: 08/29/2012 Document Revised: 03/05/2020 Document Reviewed: 11/21/2019  Elsevier Patient Education © 2020 [employer]      Depression / Suicide  Risk    As you are discharged from this [facility] facility, it is important to learn how to keep safe from harming yourself.    Recognize the warning signs:  · Abrupt changes in personality, positive or negative- including increase in energy   · Giving away possessions  · Change in eating patterns- significant weight changes-  positive or negative  · Change in sleeping patterns- unable to sleep or sleeping all the time   · Unwillingness or inability to communicate  · Depression  · Unusual sadness, discouragement and loneliness  · Talk of wanting to die  · Neglect of personal appearance   · Rebelliousness- reckless behavior  · Withdrawal from people/activities they love  · Confusion- inability to concentrate     If you or a loved one observes any of these behaviors or has concerns about self-harm, here's what you can do:  · Talk about it- your feelings and reasons for harming yourself  · Remove any means that you might use to hurt yourself (examples: pills, rope, extension cords, firearm)  · Get professional help from the community (Mental Health, Substance Abuse, psychological counseling)  · Do not be alone:Call your Safe Contact- someone whom you trust who will be there for you.  · Call your local CRISIS HOTLINE 119-8256 or 384-710-4433  · Call your local Children's Mobile Crisis Response Team [place] (125) 513-6262 or www.[employer]  · Call the toll free National Suicide Prevention Hotlines   · National Suicide Prevention Lifeline 590-354-TSST (4258)  · National Hope Line Network 800-SUICIDE (427-7983)

## 2022-04-06 NOTE — DISCHARGE PLANNING
Care Transition Team Discharge Planning    Anticipated Discharge Disposition: d/c home w/     Action: Lsw noted pt has d/mayra recently from [facility] w/ [facility]. Lsw sent choice to DPA who will ask HH to resume as pt has just d/mayra today.     Barriers to Discharge: none    Plan: pt has d/mayra home to resume  w/ [facility]

## 2022-04-06 NOTE — DISCHARGE SUMMARY
"Discharge Summary    CHIEF COMPLAINT ON ADMISSION  Chief Complaint   Patient presents with   • Shortness of Breath     Pt states he fell today at home to the ground, called 911 for a lift assist as he couldn't get off the ground. Pt states he fell because he \"couldn't breath.\" PT sounds crackly and junky and appears chronically unwell. Pt normally wears 3L of O2 at home and states he hasn't needed to increase his oxygen. Pt has history of COPD and he \"thinks its getting worse.\"        Reason for Admission  GLF     Admission Date  4/2/2022    CODE STATUS  DNAR/DNI    HPI & HOSPITAL COURSE  This is a 71 y.o. male here with shortness of breath.   Mr. Bass is a 71 y.o. male who presented 4/2/2022 with complaints of ground-level fall and shortness of breath.  The patient resides in a trailer, He contacted 911 as he fell and unable to get off the ground.  He states that he falls very frequently but also drinks between 5-6 Coors light beers each day and excess amount of water per patient.  He also admits to drinking excessive amounts of fluids and has a history of COPD exacerbation/hyponatremia per his last admission in March 2022.  At that time he was placed on fluid restriction and salt tabs with resolution of hyponatremia.  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 He states he likely fell today from alcohol intoxication denies presyncopal syndrome such as incoordination, vertigo, lightheadedness, diplopia/blurred vision or headache/chest pain.  He does admit to shortness of breath which is chronic for him and on 3 L nasal cannula at home.  States he is fully vaccinated against COVID-19. His sodium on admit was 114.  During his hospitalization, his sodium levels came up accordingly with volume restriction.  Given his history of oxygen dependent COPD with cough and shortness of breath, pulmonology was consulted and felt that this was not a COPD " exacerbation.  Sodium has maintained in the high 120s was 129 on 4/5/2022.  This appears to be about his baseline.  We had a long discussion about cessation of alcohol specifically the amount of beer that he was drinking.  He is on home salt tabs and can continue these at discharge.  He has been evaluated by therapies and is stable for discharge home.  He has a friend that is coming to pick him up with his home oxygen.  I have ordered home health.    Therefore, he is discharged in good and stable condition to home with close outpatient follow-up.    The patient met 2-midnight criteria for an inpatient stay at the time of discharge.    Discharge Date  4/6    FOLLOW UP ITEMS POST DISCHARGE  No alcohol  He will call Dr. Amin for a prompt hospital follow up appointment and outpatient labs.     DISCHARGE DIAGNOSES  Principal Problem:    Hyponatremia POA: Yes  Active Problems:    COPD exacerbation (HCC) POA: Yes    Obesity POA: Yes    Essential hypertension POA: Yes    Alcoholism (HCC) POA: Yes    Acute on chronic respiratory failure (HCC) POA: Yes    Debility POA: Yes    Knee pain POA: Yes    Advanced care planning/counseling discussion POA: Yes    Ear lesion POA: Yes  Resolved Problems:    Suspected pulmonary embolism POA: Yes      FOLLOW UP  No future appointments.  Peterson Amin M.D.  [address] [id number] [phone]    Schedule an appointment as soon as possible for a visit        MEDICATIONS ON DISCHARGE     Medication List      CONTINUE taking these medications      Instructions   albuterol 108 (90 Base) MCG/ACT Aers inhalation aerosol   Inhale 1-2 Puffs every four hours as needed for Shortness of Breath.  Dose: 1-2 Puff     amLODIPine 5 MG Tabs  Commonly known as: NORVASC   Take 1 Tablet by mouth every day.  Dose: 5 mg     Centrum Silver Adult 50+ Tabs   Take 1 Tablet by mouth every day.  Dose: 1 Tablet     pantoprazole 40 MG Tbec  Commonly known as: PROTONIX   Take 40 mg by mouth  every day.  Dose: 40 mg     sodium chloride 1 GM Tabs  Commonly known as: SALT   Take 1 Tablet by mouth every day.  Dose: 1 g     Trelegy Ellipta 100-62.5-25 MCG/INH Aepb inhalation  Generic drug: Fluticasone-Umeclidin-Vilant   Inhale 2-3 Inhalation every morning.  Dose: 2-3 Inhalation            Allergies  Allergies   Allergen Reactions   • Tetanus Toxoid Hives       DIET  Orders Placed This Encounter   Procedures   • Diet Order Diet: Cardiac; Second Modifier: (optional): 2 Gram Sodium; Fluid modifications: (optional): 1500 ml Fluid Restriction     Standing Status:   Standing     Number of Occurrences:   1     Order Specific Question:   Diet:     Answer:   Cardiac [6]     Order Specific Question:   Second Modifier: (optional)     Answer:   2 Gram Sodium [7]     Order Specific Question:   Fluid modifications: (optional)     Answer:   1500 ml Fluid Restriction [9]       ACTIVITY  As tolerated    CONSULTATIONS  pulmonology    PROCEDURES  Echo:     CONCLUSIONS  No prior study is available for comparison.   The left ventricular ejection fraction is visually estimated to be 70%.  Normal regional wall motion.  No significant valvular disease.     LABORATORY  Lab Results   Component Value Date    SODIUM 129 (L) 04/05/2022    POTASSIUM 4.3 04/05/2022    CHLORIDE 94 (L) 04/05/2022    CO2 28 04/05/2022    GLUCOSE 115 (H) 04/05/2022    BUN 5 (L) 04/05/2022    CREATININE 0.47 (L) 04/05/2022      TSH 2.5  Lab Results   Component Value Date    WBC 8.5 04/05/2022    HEMOGLOBIN 12.6 (L) 04/05/2022    HEMATOCRIT 38.2 (L) 04/05/2022    PLATELETCT 205 04/05/2022    imaging studies:  CT-CTA CHEST PULMONARY ARTERY W/ RECONS   Final Result      1.  No CT evidence for pulmonary emboli.   2.  Mild bilateral atelectasis.   3.  No pneumonia or pneumothorax.            DX-CHEST-PORTABLE (1 VIEW)   Final Result      1.  Mild bibasilar atelectasis.   2.  No lobar pneumonia or pulmonary edema.   3.  Stable cardiomegaly.            Total time of  the discharge process exceeds 34 minutes.

## 2022-04-06 NOTE — DISCHARGE PLANNING
1130- Referral sent per choice to [facility].  1400- per [place]  approved, DPA sent d/c summary.

## 2022-04-06 NOTE — CARE PLAN
Problem: Bronchoconstriction  Goal: Improve in air movement and diminished wheezing  Description: Target End Date:  2 to 3 days    1.  Implement inhaled treatments  2.  Evaluate and manage medication effects  Outcome: Progressing  Flowsheets (Taken 4/4/2022 1656 by Oscar Moore, EDIE)  Bronchodilator Goals/Outcome: Patient at Stable Baseline  Bronchodilator Indications: History / Diagnosis

## 2022-04-07 LAB
BACTERIA BLD CULT: NORMAL
SIGNIFICANT IND 70042: NORMAL
SITE SITE: NORMAL
SOURCE SOURCE: NORMAL

## 2022-04-08 LAB
BACTERIA BLD CULT: NORMAL
EKG IMPRESSION: NORMAL
SIGNIFICANT IND 70042: NORMAL
SITE SITE: NORMAL
SOURCE SOURCE: NORMAL

## 2022-04-09 ENCOUNTER — HOSPITAL ENCOUNTER (OUTPATIENT)
Facility: MEDICAL CENTER | Age: 72
End: 2022-04-10
Attending: EMERGENCY MEDICINE | Admitting: STUDENT IN AN ORGANIZED HEALTH CARE EDUCATION/TRAINING PROGRAM
Payer: MEDICARE

## 2022-04-09 ENCOUNTER — APPOINTMENT (OUTPATIENT)
Dept: RADIOLOGY | Facility: MEDICAL CENTER | Age: 72
End: 2022-04-09
Attending: EMERGENCY MEDICINE
Payer: MEDICARE

## 2022-04-09 DIAGNOSIS — J96.21 ACUTE ON CHRONIC RESPIRATORY FAILURE WITH HYPOXIA (HCC): ICD-10-CM

## 2022-04-09 DIAGNOSIS — R53.81 DEBILITY: ICD-10-CM

## 2022-04-09 PROBLEM — J44.9 COPD (CHRONIC OBSTRUCTIVE PULMONARY DISEASE) (HCC): Status: ACTIVE | Noted: 2022-03-07

## 2022-04-09 PROBLEM — J96.90 RESPIRATORY FAILURE (HCC): Status: RESOLVED | Noted: 2022-04-09 | Resolved: 2022-04-09

## 2022-04-09 PROBLEM — F10.939 ALCOHOL WITHDRAWAL SYNDROME WITH COMPLICATION (HCC): Status: ACTIVE | Noted: 2022-04-09

## 2022-04-09 PROBLEM — E66.01 CLASS 3 SEVERE OBESITY IN ADULT (HCC): Status: ACTIVE | Noted: 2022-03-07

## 2022-04-09 PROBLEM — J96.90 RESPIRATORY FAILURE (HCC): Status: ACTIVE | Noted: 2022-04-09

## 2022-04-09 LAB
ALBUMIN SERPL BCP-MCNC: 4 G/DL (ref 3.2–4.9)
ALBUMIN/GLOB SERPL: 1.5 G/DL
ALP SERPL-CCNC: 77 U/L (ref 30–99)
ALT SERPL-CCNC: 20 U/L (ref 2–50)
ANION GAP SERPL CALC-SCNC: 13 MMOL/L (ref 7–16)
APPEARANCE UR: CLEAR
AST SERPL-CCNC: 19 U/L (ref 12–45)
BASOPHILS # BLD AUTO: 0.2 % (ref 0–1.8)
BASOPHILS # BLD: 0.01 K/UL (ref 0–0.12)
BILIRUB SERPL-MCNC: 0.4 MG/DL (ref 0.1–1.5)
BILIRUB UR QL STRIP.AUTO: NEGATIVE
BUN SERPL-MCNC: 5 MG/DL (ref 8–22)
CALCIUM SERPL-MCNC: 8.4 MG/DL (ref 8.5–10.5)
CHLORIDE SERPL-SCNC: 87 MMOL/L (ref 96–112)
CO2 SERPL-SCNC: 23 MMOL/L (ref 20–33)
COLOR UR: YELLOW
CREAT SERPL-MCNC: 0.5 MG/DL (ref 0.5–1.4)
EKG IMPRESSION: NORMAL
EOSINOPHIL # BLD AUTO: 0.16 K/UL (ref 0–0.51)
EOSINOPHIL NFR BLD: 2.6 % (ref 0–6.9)
ERYTHROCYTE [DISTWIDTH] IN BLOOD BY AUTOMATED COUNT: 50.5 FL (ref 35.9–50)
GFR SERPLBLD CREATININE-BSD FMLA CKD-EPI: 109 ML/MIN/1.73 M 2
GLOBULIN SER CALC-MCNC: 2.7 G/DL (ref 1.9–3.5)
GLUCOSE SERPL-MCNC: 80 MG/DL (ref 65–99)
GLUCOSE UR STRIP.AUTO-MCNC: NEGATIVE MG/DL
HCT VFR BLD AUTO: 39.6 % (ref 42–52)
HGB BLD-MCNC: 13.6 G/DL (ref 14–18)
IMM GRANULOCYTES # BLD AUTO: 0.06 K/UL (ref 0–0.11)
IMM GRANULOCYTES NFR BLD AUTO: 1 % (ref 0–0.9)
KETONES UR STRIP.AUTO-MCNC: NEGATIVE MG/DL
LEUKOCYTE ESTERASE UR QL STRIP.AUTO: NEGATIVE
LYMPHOCYTES # BLD AUTO: 1.68 K/UL (ref 1–4.8)
LYMPHOCYTES NFR BLD: 26.9 % (ref 22–41)
MCH RBC QN AUTO: 31.6 PG (ref 27–33)
MCHC RBC AUTO-ENTMCNC: 34.3 G/DL (ref 33.7–35.3)
MCV RBC AUTO: 92.1 FL (ref 81.4–97.8)
MICRO URNS: NORMAL
MONOCYTES # BLD AUTO: 0.77 K/UL (ref 0–0.85)
MONOCYTES NFR BLD AUTO: 12.3 % (ref 0–13.4)
NEUTROPHILS # BLD AUTO: 3.57 K/UL (ref 1.82–7.42)
NEUTROPHILS NFR BLD: 57 % (ref 44–72)
NITRITE UR QL STRIP.AUTO: NEGATIVE
NRBC # BLD AUTO: 0 K/UL
NRBC BLD-RTO: 0 /100 WBC
NT-PROBNP SERPL IA-MCNC: 339 PG/ML (ref 0–125)
PH UR STRIP.AUTO: 5 [PH] (ref 5–8)
PLATELET # BLD AUTO: 223 K/UL (ref 164–446)
PMV BLD AUTO: 8.9 FL (ref 9–12.9)
POTASSIUM SERPL-SCNC: 4.2 MMOL/L (ref 3.6–5.5)
PROCALCITONIN SERPL-MCNC: <0.05 NG/ML
PROT SERPL-MCNC: 6.7 G/DL (ref 6–8.2)
PROT UR QL STRIP: NEGATIVE MG/DL
RBC # BLD AUTO: 4.3 M/UL (ref 4.7–6.1)
RBC UR QL AUTO: NEGATIVE
SODIUM SERPL-SCNC: 123 MMOL/L (ref 135–145)
SP GR UR STRIP.AUTO: 1
TROPONIN T SERPL-MCNC: 18 NG/L (ref 6–19)
UROBILINOGEN UR STRIP.AUTO-MCNC: 0.2 MG/DL
WBC # BLD AUTO: 6.3 K/UL (ref 4.8–10.8)

## 2022-04-09 PROCEDURE — 80053 COMPREHEN METABOLIC PANEL: CPT

## 2022-04-09 PROCEDURE — 36415 COLL VENOUS BLD VENIPUNCTURE: CPT

## 2022-04-09 PROCEDURE — 71045 X-RAY EXAM CHEST 1 VIEW: CPT

## 2022-04-09 PROCEDURE — G0378 HOSPITAL OBSERVATION PER HR: HCPCS

## 2022-04-09 PROCEDURE — 85025 COMPLETE CBC W/AUTO DIFF WBC: CPT

## 2022-04-09 PROCEDURE — 94760 N-INVAS EAR/PLS OXIMETRY 1: CPT

## 2022-04-09 PROCEDURE — 99285 EMERGENCY DEPT VISIT HI MDM: CPT

## 2022-04-09 PROCEDURE — 94640 AIRWAY INHALATION TREATMENT: CPT

## 2022-04-09 PROCEDURE — 84484 ASSAY OF TROPONIN QUANT: CPT

## 2022-04-09 PROCEDURE — 93005 ELECTROCARDIOGRAM TRACING: CPT | Performed by: EMERGENCY MEDICINE

## 2022-04-09 PROCEDURE — 81003 URINALYSIS AUTO W/O SCOPE: CPT

## 2022-04-09 PROCEDURE — 99220 PR INITIAL OBSERVATION CARE,LEVL III: CPT | Performed by: STUDENT IN AN ORGANIZED HEALTH CARE EDUCATION/TRAINING PROGRAM

## 2022-04-09 PROCEDURE — 96374 THER/PROPH/DIAG INJ IV PUSH: CPT

## 2022-04-09 PROCEDURE — 83880 ASSAY OF NATRIURETIC PEPTIDE: CPT

## 2022-04-09 PROCEDURE — 84145 PROCALCITONIN (PCT): CPT

## 2022-04-09 PROCEDURE — 700101 HCHG RX REV CODE 250: Performed by: EMERGENCY MEDICINE

## 2022-04-09 PROCEDURE — 700111 HCHG RX REV CODE 636 W/ 250 OVERRIDE (IP): Performed by: STUDENT IN AN ORGANIZED HEALTH CARE EDUCATION/TRAINING PROGRAM

## 2022-04-09 RX ORDER — FOLIC ACID 1 MG/1
1 TABLET ORAL DAILY
Status: DISCONTINUED | OUTPATIENT
Start: 2022-04-10 | End: 2022-04-10 | Stop reason: HOSPADM

## 2022-04-09 RX ORDER — LORAZEPAM 2 MG/ML
1 INJECTION INTRAMUSCULAR
Status: DISCONTINUED | OUTPATIENT
Start: 2022-04-09 | End: 2022-04-10 | Stop reason: HOSPADM

## 2022-04-09 RX ORDER — LORAZEPAM 0.5 MG/1
0.5 TABLET ORAL EVERY 4 HOURS PRN
Status: DISCONTINUED | OUTPATIENT
Start: 2022-04-09 | End: 2022-04-10 | Stop reason: HOSPADM

## 2022-04-09 RX ORDER — OMEPRAZOLE 20 MG/1
40 CAPSULE, DELAYED RELEASE ORAL DAILY
Status: DISCONTINUED | OUTPATIENT
Start: 2022-04-10 | End: 2022-04-10 | Stop reason: HOSPADM

## 2022-04-09 RX ORDER — LORAZEPAM 2 MG/ML
1.5 INJECTION INTRAMUSCULAR
Status: DISCONTINUED | OUTPATIENT
Start: 2022-04-09 | End: 2022-04-10 | Stop reason: HOSPADM

## 2022-04-09 RX ORDER — BISACODYL 10 MG
10 SUPPOSITORY, RECTAL RECTAL
Status: DISCONTINUED | OUTPATIENT
Start: 2022-04-09 | End: 2022-04-10 | Stop reason: HOSPADM

## 2022-04-09 RX ORDER — LORAZEPAM 2 MG/ML
0.5 INJECTION INTRAMUSCULAR EVERY 4 HOURS PRN
Status: DISCONTINUED | OUTPATIENT
Start: 2022-04-09 | End: 2022-04-10 | Stop reason: HOSPADM

## 2022-04-09 RX ORDER — FLUTICASONE PROPIONATE 44 UG/1
2 AEROSOL, METERED RESPIRATORY (INHALATION) DAILY
Status: DISCONTINUED | OUTPATIENT
Start: 2022-04-10 | End: 2022-04-10 | Stop reason: HOSPADM

## 2022-04-09 RX ORDER — ONDANSETRON 4 MG/1
4 TABLET, ORALLY DISINTEGRATING ORAL EVERY 4 HOURS PRN
Status: DISCONTINUED | OUTPATIENT
Start: 2022-04-09 | End: 2022-04-10 | Stop reason: HOSPADM

## 2022-04-09 RX ORDER — POLYETHYLENE GLYCOL 3350 17 G/17G
1 POWDER, FOR SOLUTION ORAL
Status: DISCONTINUED | OUTPATIENT
Start: 2022-04-09 | End: 2022-04-10 | Stop reason: HOSPADM

## 2022-04-09 RX ORDER — IPRATROPIUM BROMIDE AND ALBUTEROL SULFATE 2.5; .5 MG/3ML; MG/3ML
3 SOLUTION RESPIRATORY (INHALATION) ONCE
Status: COMPLETED | OUTPATIENT
Start: 2022-04-09 | End: 2022-04-09

## 2022-04-09 RX ORDER — GAUZE BANDAGE 2" X 2"
100 BANDAGE TOPICAL DAILY
Status: DISCONTINUED | OUTPATIENT
Start: 2022-04-10 | End: 2022-04-10 | Stop reason: HOSPADM

## 2022-04-09 RX ORDER — LORAZEPAM 2 MG/1
4 TABLET ORAL
Status: DISCONTINUED | OUTPATIENT
Start: 2022-04-09 | End: 2022-04-10 | Stop reason: HOSPADM

## 2022-04-09 RX ORDER — SODIUM CHLORIDE, SODIUM LACTATE, POTASSIUM CHLORIDE, AND CALCIUM CHLORIDE .6; .31; .03; .02 G/100ML; G/100ML; G/100ML; G/100ML
500 INJECTION, SOLUTION INTRAVENOUS
Status: DISCONTINUED | OUTPATIENT
Start: 2022-04-09 | End: 2022-04-10 | Stop reason: HOSPADM

## 2022-04-09 RX ORDER — IPRATROPIUM BROMIDE AND ALBUTEROL SULFATE 2.5; .5 MG/3ML; MG/3ML
3 SOLUTION RESPIRATORY (INHALATION)
Status: DISCONTINUED | OUTPATIENT
Start: 2022-04-10 | End: 2022-04-10 | Stop reason: HOSPADM

## 2022-04-09 RX ORDER — AMOXICILLIN 250 MG
2 CAPSULE ORAL 2 TIMES DAILY
Status: DISCONTINUED | OUTPATIENT
Start: 2022-04-09 | End: 2022-04-10 | Stop reason: HOSPADM

## 2022-04-09 RX ORDER — ALBUTEROL SULFATE 90 UG/1
1-2 AEROSOL, METERED RESPIRATORY (INHALATION) EVERY 4 HOURS PRN
Status: DISCONTINUED | OUTPATIENT
Start: 2022-04-09 | End: 2022-04-10 | Stop reason: HOSPADM

## 2022-04-09 RX ORDER — GUAIFENESIN/DEXTROMETHORPHAN 100-10MG/5
10 SYRUP ORAL EVERY 6 HOURS PRN
Status: DISCONTINUED | OUTPATIENT
Start: 2022-04-09 | End: 2022-04-10 | Stop reason: HOSPADM

## 2022-04-09 RX ORDER — AMLODIPINE BESYLATE 5 MG/1
5 TABLET ORAL DAILY
Status: DISCONTINUED | OUTPATIENT
Start: 2022-04-10 | End: 2022-04-10 | Stop reason: HOSPADM

## 2022-04-09 RX ORDER — HEPARIN SODIUM 5000 [USP'U]/ML
5000 INJECTION, SOLUTION INTRAVENOUS; SUBCUTANEOUS EVERY 8 HOURS
Status: DISCONTINUED | OUTPATIENT
Start: 2022-04-10 | End: 2022-04-10 | Stop reason: HOSPADM

## 2022-04-09 RX ORDER — ONDANSETRON 2 MG/ML
4 INJECTION INTRAMUSCULAR; INTRAVENOUS EVERY 4 HOURS PRN
Status: DISCONTINUED | OUTPATIENT
Start: 2022-04-09 | End: 2022-04-10 | Stop reason: HOSPADM

## 2022-04-09 RX ORDER — FUROSEMIDE 10 MG/ML
20 INJECTION INTRAMUSCULAR; INTRAVENOUS
Status: DISCONTINUED | OUTPATIENT
Start: 2022-04-09 | End: 2022-04-10 | Stop reason: HOSPADM

## 2022-04-09 RX ORDER — ACETAMINOPHEN 500 MG
500-1000 TABLET ORAL EVERY 6 HOURS PRN
COMMUNITY
End: 2022-09-12

## 2022-04-09 RX ORDER — CHOLECALCIFEROL (VITAMIN D3) 125 MCG
1000 CAPSULE ORAL DAILY
Status: DISCONTINUED | OUTPATIENT
Start: 2022-04-10 | End: 2022-04-10 | Stop reason: HOSPADM

## 2022-04-09 RX ORDER — LORAZEPAM 2 MG/1
2 TABLET ORAL
Status: DISCONTINUED | OUTPATIENT
Start: 2022-04-09 | End: 2022-04-10 | Stop reason: HOSPADM

## 2022-04-09 RX ORDER — ACETAMINOPHEN 325 MG/1
650 TABLET ORAL EVERY 6 HOURS PRN
Status: DISCONTINUED | OUTPATIENT
Start: 2022-04-09 | End: 2022-04-10 | Stop reason: HOSPADM

## 2022-04-09 RX ORDER — LORAZEPAM 1 MG/1
1 TABLET ORAL EVERY 4 HOURS PRN
Status: DISCONTINUED | OUTPATIENT
Start: 2022-04-09 | End: 2022-04-10 | Stop reason: HOSPADM

## 2022-04-09 RX ORDER — LORAZEPAM 2 MG/ML
2 INJECTION INTRAMUSCULAR
Status: DISCONTINUED | OUTPATIENT
Start: 2022-04-09 | End: 2022-04-10 | Stop reason: HOSPADM

## 2022-04-09 RX ORDER — OMEPRAZOLE 20 MG/1
20 CAPSULE, DELAYED RELEASE ORAL DAILY
Status: DISCONTINUED | OUTPATIENT
Start: 2022-04-10 | End: 2022-04-09

## 2022-04-09 RX ADMIN — FUROSEMIDE 20 MG: 10 INJECTION, SOLUTION INTRAMUSCULAR; INTRAVENOUS at 23:15

## 2022-04-09 RX ADMIN — IPRATROPIUM BROMIDE AND ALBUTEROL SULFATE 3 ML: .5; 2.5 SOLUTION RESPIRATORY (INHALATION) at 19:13

## 2022-04-09 ASSESSMENT — ENCOUNTER SYMPTOMS
FEVER: 0
ABDOMINAL PAIN: 0
FLANK PAIN: 0
CHILLS: 0
FOCAL WEAKNESS: 0
BRUISES/BLEEDS EASILY: 0
HEADACHES: 0
SPEECH CHANGE: 0
VOMITING: 0
WHEEZING: 1
DOUBLE VISION: 0
COUGH: 1
WEAKNESS: 1
MYALGIAS: 0
DEPRESSION: 0
SHORTNESS OF BREATH: 1
NAUSEA: 0
BLURRED VISION: 0
SPUTUM PRODUCTION: 0
DIZZINESS: 0
HEARTBURN: 0

## 2022-04-09 ASSESSMENT — LIFESTYLE VARIABLES: SUBSTANCE_ABUSE: 0

## 2022-04-09 ASSESSMENT — FIBROSIS 4 INDEX: FIB4 SCORE: 1.85

## 2022-04-10 VITALS
HEIGHT: 67 IN | DIASTOLIC BLOOD PRESSURE: 81 MMHG | TEMPERATURE: 97.6 F | WEIGHT: 289 LBS | SYSTOLIC BLOOD PRESSURE: 141 MMHG | OXYGEN SATURATION: 92 % | BODY MASS INDEX: 45.36 KG/M2 | HEART RATE: 112 BPM | RESPIRATION RATE: 16 BRPM

## 2022-04-10 LAB
ALBUMIN SERPL BCP-MCNC: 3.9 G/DL (ref 3.2–4.9)
ALBUMIN/GLOB SERPL: 1.5 G/DL
ALP SERPL-CCNC: 71 U/L (ref 30–99)
ALT SERPL-CCNC: 19 U/L (ref 2–50)
ANION GAP SERPL CALC-SCNC: 14 MMOL/L (ref 7–16)
AST SERPL-CCNC: 17 U/L (ref 12–45)
BASOPHILS # BLD AUTO: 0.1 % (ref 0–1.8)
BASOPHILS # BLD: 0.01 K/UL (ref 0–0.12)
BILIRUB SERPL-MCNC: 0.5 MG/DL (ref 0.1–1.5)
BUN SERPL-MCNC: 5 MG/DL (ref 8–22)
CALCIUM SERPL-MCNC: 8.5 MG/DL (ref 8.5–10.5)
CHLORIDE SERPL-SCNC: 90 MMOL/L (ref 96–112)
CO2 SERPL-SCNC: 25 MMOL/L (ref 20–33)
CREAT SERPL-MCNC: 0.52 MG/DL (ref 0.5–1.4)
EOSINOPHIL # BLD AUTO: 0.2 K/UL (ref 0–0.51)
EOSINOPHIL NFR BLD: 2.7 % (ref 0–6.9)
ERYTHROCYTE [DISTWIDTH] IN BLOOD BY AUTOMATED COUNT: 50.1 FL (ref 35.9–50)
GFR SERPLBLD CREATININE-BSD FMLA CKD-EPI: 107 ML/MIN/1.73 M 2
GLOBULIN SER CALC-MCNC: 2.6 G/DL (ref 1.9–3.5)
GLUCOSE SERPL-MCNC: 92 MG/DL (ref 65–99)
HCT VFR BLD AUTO: 39.7 % (ref 42–52)
HGB BLD-MCNC: 13.7 G/DL (ref 14–18)
IMM GRANULOCYTES # BLD AUTO: 0.09 K/UL (ref 0–0.11)
IMM GRANULOCYTES NFR BLD AUTO: 1.2 % (ref 0–0.9)
LYMPHOCYTES # BLD AUTO: 1.33 K/UL (ref 1–4.8)
LYMPHOCYTES NFR BLD: 17.9 % (ref 22–41)
MAGNESIUM SERPL-MCNC: 1.9 MG/DL (ref 1.5–2.5)
MCH RBC QN AUTO: 31.8 PG (ref 27–33)
MCHC RBC AUTO-ENTMCNC: 34.5 G/DL (ref 33.7–35.3)
MCV RBC AUTO: 92.1 FL (ref 81.4–97.8)
MONOCYTES # BLD AUTO: 0.87 K/UL (ref 0–0.85)
MONOCYTES NFR BLD AUTO: 11.7 % (ref 0–13.4)
NEUTROPHILS # BLD AUTO: 4.91 K/UL (ref 1.82–7.42)
NEUTROPHILS NFR BLD: 66.4 % (ref 44–72)
NRBC # BLD AUTO: 0 K/UL
NRBC BLD-RTO: 0 /100 WBC
PHOSPHATE SERPL-MCNC: 3.4 MG/DL (ref 2.5–4.5)
PLATELET # BLD AUTO: 225 K/UL (ref 164–446)
PMV BLD AUTO: 8.9 FL (ref 9–12.9)
POTASSIUM SERPL-SCNC: 3.7 MMOL/L (ref 3.6–5.5)
PROT SERPL-MCNC: 6.5 G/DL (ref 6–8.2)
RBC # BLD AUTO: 4.31 M/UL (ref 4.7–6.1)
SODIUM SERPL-SCNC: 129 MMOL/L (ref 135–145)
WBC # BLD AUTO: 7.4 K/UL (ref 4.8–10.8)

## 2022-04-10 PROCEDURE — 96376 TX/PRO/DX INJ SAME DRUG ADON: CPT

## 2022-04-10 PROCEDURE — 36415 COLL VENOUS BLD VENIPUNCTURE: CPT

## 2022-04-10 PROCEDURE — G0378 HOSPITAL OBSERVATION PER HR: HCPCS

## 2022-04-10 PROCEDURE — 80053 COMPREHEN METABOLIC PANEL: CPT

## 2022-04-10 PROCEDURE — 83735 ASSAY OF MAGNESIUM: CPT

## 2022-04-10 PROCEDURE — 84100 ASSAY OF PHOSPHORUS: CPT

## 2022-04-10 PROCEDURE — 700102 HCHG RX REV CODE 250 W/ 637 OVERRIDE(OP): Performed by: STUDENT IN AN ORGANIZED HEALTH CARE EDUCATION/TRAINING PROGRAM

## 2022-04-10 PROCEDURE — 96372 THER/PROPH/DIAG INJ SC/IM: CPT

## 2022-04-10 PROCEDURE — 700102 HCHG RX REV CODE 250 W/ 637 OVERRIDE(OP)

## 2022-04-10 PROCEDURE — 99217 PR OBSERVATION CARE DISCHARGE: CPT | Performed by: INTERNAL MEDICINE

## 2022-04-10 PROCEDURE — 700111 HCHG RX REV CODE 636 W/ 250 OVERRIDE (IP): Performed by: STUDENT IN AN ORGANIZED HEALTH CARE EDUCATION/TRAINING PROGRAM

## 2022-04-10 PROCEDURE — A9270 NON-COVERED ITEM OR SERVICE: HCPCS

## 2022-04-10 PROCEDURE — A9270 NON-COVERED ITEM OR SERVICE: HCPCS | Performed by: STUDENT IN AN ORGANIZED HEALTH CARE EDUCATION/TRAINING PROGRAM

## 2022-04-10 PROCEDURE — 85025 COMPLETE CBC W/AUTO DIFF WBC: CPT

## 2022-04-10 RX ORDER — UREA 10 %
1 LOTION (ML) TOPICAL NIGHTLY PRN
Status: DISCONTINUED | OUTPATIENT
Start: 2022-04-10 | End: 2022-04-10 | Stop reason: HOSPADM

## 2022-04-10 RX ADMIN — HEPARIN SODIUM 5000 UNITS: 5000 INJECTION, SOLUTION INTRAVENOUS; SUBCUTANEOUS at 05:23

## 2022-04-10 RX ADMIN — AMLODIPINE BESYLATE 5 MG: 5 TABLET ORAL at 05:23

## 2022-04-10 RX ADMIN — FLUTICASONE PROPIONATE 88 MCG: 44 AEROSOL, METERED RESPIRATORY (INHALATION) at 05:24

## 2022-04-10 RX ADMIN — THERA TABS 1 TABLET: TAB at 05:23

## 2022-04-10 RX ADMIN — UMECLIDINIUM BROMIDE AND VILANTEROL TRIFENATATE 1 PUFF: 62.5; 25 POWDER RESPIRATORY (INHALATION) at 05:24

## 2022-04-10 RX ADMIN — Medication 1 MG: at 00:59

## 2022-04-10 RX ADMIN — FUROSEMIDE 20 MG: 10 INJECTION, SOLUTION INTRAMUSCULAR; INTRAVENOUS at 05:23

## 2022-04-10 RX ADMIN — FOLIC ACID 1 MG: 1 TABLET ORAL at 05:23

## 2022-04-10 RX ADMIN — OMEPRAZOLE 40 MG: 20 CAPSULE, DELAYED RELEASE ORAL at 05:23

## 2022-04-10 RX ADMIN — CYANOCOBALAMIN TAB 500 MCG 1000 MCG: 500 TAB at 05:22

## 2022-04-10 RX ADMIN — THIAMINE HCL TAB 100 MG 100 MG: 100 TAB at 05:22

## 2022-04-10 ASSESSMENT — LIFESTYLE VARIABLES
EVER FELT BAD OR GUILTY ABOUT YOUR DRINKING: NO
DOES PATIENT WANT TO STOP DRINKING: NO
HOW MANY TIMES IN THE PAST YEAR HAVE YOU HAD 5 OR MORE DRINKS IN A DAY: 200
VISUAL DISTURBANCES: NOT PRESENT
TOTAL SCORE: 1
ANXIETY: NO ANXIETY (AT EASE)
HAVE YOU EVER FELT YOU SHOULD CUT DOWN ON YOUR DRINKING: NO
ORIENTATION AND CLOUDING OF SENSORIUM: ORIENTED AND CAN DO SERIAL ADDITIONS
HEADACHE, FULLNESS IN HEAD: NOT PRESENT
AUDITORY DISTURBANCES: NOT PRESENT
EVER HAD A DRINK FIRST THING IN THE MORNING TO STEADY YOUR NERVES TO GET RID OF A HANGOVER: YES
TOTAL SCORE: 1
AGITATION: NORMAL ACTIVITY
TREMOR: NO TREMOR
TOTAL SCORE: 0
NAUSEA AND VOMITING: NO NAUSEA AND NO VOMITING
DO YOU DRINK ALCOHOL: YES
AVERAGE NUMBER OF DAYS PER WEEK YOU HAVE A DRINK CONTAINING ALCOHOL: 7
HAVE PEOPLE ANNOYED YOU BY CRITICIZING YOUR DRINKING: NO
TOTAL SCORE: 1
PAROXYSMAL SWEATS: NO SWEAT VISIBLE
CONSUMPTION TOTAL: POSITIVE
ON A TYPICAL DAY WHEN YOU DRINK ALCOHOL HOW MANY DRINKS DO YOU HAVE: 6

## 2022-04-10 ASSESSMENT — PAIN DESCRIPTION - PAIN TYPE: TYPE: ACUTE PAIN

## 2022-04-10 NOTE — THERAPY
Physical Therapy Contact Note    PT consult received and acknowledged. RN reported patient pending DC home today. Per prior admit chart, patient may benefit from  PT, RN aware. PT evaluation not completed.    Nicole Marie, PT, DPT  [phone]

## 2022-04-10 NOTE — DISCHARGE PLANNING
Received Choice form at 0953  Agency/Facility Name: [facility]   Referral sent per Choice form @ 0957    Received Choice form at 1001  Agency/Facility Name: [facility]  Referral sent per Choice form @ 1001  Resumption of service.      Addendum @ 1009:  Agency/Facility Name: [facility]   Spoke To: Sherry  Outcome: Transferred to On-call (name not given). Asked if family could bring tank that was provided to hospital. This DPA was unsure and liaison said they would reach out to pt to see if family could bring provided tank to hospital, if family cannot bring, [facility] will provide another tank to pt at hospital.     SHON Jackson informed.

## 2022-04-10 NOTE — DISCHARGE SUMMARY
Discharge Summary    CHIEF COMPLAINT ON ADMISSION  Chief Complaint   Patient presents with   • Shortness of Breath       Reason for Admission  SOB     Admission Date  4/9/2022    CODE STATUS  DNAR/DNI    HPI & HOSPITAL COURSE  This is a 71 y.o. male with multiple comorbidities who presented 4/9/2022 with complaint of shortness of breath.  His medical history includes chronic hypoxic respiratory failure dependent on 3 L, EtOH abuse.  He was recently discharged on 4/6/2022.  Patient reported continued to consume 5-6 beers daily, sometimes more.  He admits to consuming alcohol since being discharged.  Patient returned to ER for similar complaints.  Patient was admitted for observation. His oxygen requirements improved and he is now back to his baseline of 3 L. Patient states feeling better this morning and would like to go home.  Will arrange home health and will discharge patient home today      Discharge Date  4/10/22    FOLLOW UP ITEMS POST DISCHARGE  PCP     DISCHARGE DIAGNOSES  Principal Problem:    Acute on chronic respiratory failure with hypoxia (HCC) POA: Unknown  Active Problems:    COPD (chronic obstructive pulmonary disease) (HCC) POA: Unknown    Class 3 severe obesity in adult (HCC) POA: Unknown    Essential hypertension POA: Yes    Hyponatremia POA: Yes    Debility POA: Yes    GERD (gastroesophageal reflux disease) POA: Yes    ACP (advance care planning) POA: Unknown    Alcohol withdrawal syndrome with complication (HCC) POA: Unknown  Resolved Problems:    Respiratory failure (HCC) POA: Yes      FOLLOW UP  No future appointments.  Peterson Amin M.D.  [address] [address]  [phone]    In 1 week  for hospitalization follow up      MEDICATIONS ON DISCHARGE     Medication List      CONTINUE taking these medications      Instructions   acetaminophen 500 MG Tabs  Commonly known as: TYLENOL   Take 500-1,000 mg by mouth every 6 hours as needed for Mild Pain.  Dose: 500-1,000 mg      albuterol 108 (90 Base) MCG/ACT Aers inhalation aerosol   Inhale 1-2 Puffs every four hours as needed for Shortness of Breath.  Dose: 1-2 Puff     amLODIPine 5 MG Tabs  Commonly known as: NORVASC   Take 1 Tablet by mouth every day.  Dose: 5 mg     Centrum Silver Adult 50+ Tabs   Take 1 Tablet by mouth every day.  Dose: 1 Tablet     pantoprazole 40 MG Tbec  Commonly known as: PROTONIX   Take 40 mg by mouth every day.  Dose: 40 mg     sodium chloride 1 GM Tabs  Commonly known as: SALT   Take 1 Tablet by mouth every day.  Dose: 1 g     Trelegy Ellipta 100-62.5-25 MCG/INH Aepb inhalation  Generic drug: Fluticasone-Umeclidin-Vilant   Inhale 2-3 Inhalation every morning.  Dose: 2-3 Inhalation     VITAMIN B COMPLEX PO   Take 1 Tablet by mouth every day.  Dose: 1 Tablet     VITAMIN B12 PO   Take 1 Tablet by mouth every day.  Dose: 1 Tablet     VITAMIN D PO   Take 1 Tablet by mouth every day.  Dose: 1 Tablet     VITAMIN E PO   Take 1 Capsule by mouth every day.  Dose: 1 Capsule            Allergies  Allergies   Allergen Reactions   • Tetanus Toxoid Hives       DIET  Orders Placed This Encounter   Procedures   • Diet Order Diet: Cardiac     Standing Status:   Standing     Number of Occurrences:   1     Order Specific Question:   Diet:     Answer:   Cardiac [6]       ACTIVITY  As tolerated.  Weight bearing as tolerated    CONSULTATIONS  None     PROCEDURES  None     LABORATORY  Lab Results   Component Value Date    SODIUM 129 (L) 04/10/2022    POTASSIUM 3.7 04/10/2022    CHLORIDE 90 (L) 04/10/2022    CO2 25 04/10/2022    GLUCOSE 92 04/10/2022    BUN 5 (L) 04/10/2022    CREATININE 0.52 04/10/2022        Lab Results   Component Value Date    WBC 7.4 04/10/2022    HEMOGLOBIN 13.7 (L) 04/10/2022    HEMATOCRIT 39.7 (L) 04/10/2022    PLATELETCT 225 04/10/2022        Total time of the discharge process exceeds 39 minutes.

## 2022-04-10 NOTE — H&P
Hospital Medicine History & Physical Note    Date of Service  4/9/2022    Primary Care Physician  Peterson Amin M.D.    Consultants  None    Code Status  DNAR/DNI    Chief Complaint  Chief Complaint   Patient presents with   • Shortness of Breath       History of Presenting Illness  Juan Manuel Bass is a 71 y.o. male with multiple comorbidities who presented 4/9/2022 with complaint of shortness of breath.  His medical history includes chronic hypoxic respiratory failure dependent on 3 L, EtOH abuse.  He was recently discharged on 4/6/2022.  Patient reported continued to consume 5-6 beers daily, sometimes more.  He admits to consuming alcohol since being discharged.  Patient returned to ER for similar complaints.  Admission requested for observation.    I discussed the plan of care with patient and bedside RN.    Review of Systems  Review of Systems   Constitutional: Positive for malaise/fatigue. Negative for chills and fever.   HENT: Negative for hearing loss and tinnitus.    Eyes: Negative for blurred vision and double vision.   Respiratory: Positive for cough, shortness of breath and wheezing. Negative for sputum production.    Cardiovascular: Positive for leg swelling. Negative for chest pain.   Gastrointestinal: Negative for abdominal pain, heartburn, nausea and vomiting.   Genitourinary: Negative for dysuria and flank pain.   Musculoskeletal: Negative for joint pain and myalgias.   Skin: Negative for itching and rash.   Neurological: Positive for weakness. Negative for dizziness, speech change, focal weakness and headaches.   Endo/Heme/Allergies: Negative for environmental allergies. Does not bruise/bleed easily.   Psychiatric/Behavioral: Negative for depression and substance abuse.   All other systems reviewed and are negative.      Past Medical History   has a past medical history of Chronic airway obstruction, not elsewhere classified and Hypertension.    Surgical History   has no past surgical history on file.    Denies PSH    Family History  family history includes Heart Disease in his father; No Known Problems in his mother.   Family history reviewed with patient. There is no family history that is pertinent to the chief complaint.     Social History   reports that he quit smoking about 2 years ago. He has a 4.00 pack-year smoking history. He has never used smokeless tobacco. He reports current alcohol use of about 3.6 oz of alcohol per week. He reports previous drug use.    Allergies  Allergies   Allergen Reactions   • Tetanus Toxoid Hives       Medications  Prior to Admission Medications   Prescriptions Last Dose Informant Patient Reported? Taking?   Fluticasone-Umeclidin-Vilant (TRELEGY ELLIPTA) 100-62.5-25 MCG/INH AEROSOL POWDER, BREATH ACTIVATED inhalation  Patient No No   Sig: Inhale 2-3 Inhalation every morning.   Multiple Vitamins-Minerals (CENTRUM SILVER ADULT 50+) Tab  Patient Yes No   Sig: Take 1 Tablet by mouth every day.   albuterol 108 (90 Base) MCG/ACT Aero Soln inhalation aerosol  Patient No No   Sig: Inhale 1-2 Puffs every four hours as needed for Shortness of Breath.   amLODIPine (NORVASC) 5 MG Tab  Patient No No   Sig: Take 1 Tablet by mouth every day.   pantoprazole (PROTONIX) 40 MG Tablet Delayed Response  Patient Yes No   Sig: Take 40 mg by mouth every day.   sodium chloride (SALT) 1 GM Tab  Patient No No   Sig: Take 1 Tablet by mouth every day.      Facility-Administered Medications: None       Physical Exam  Temp:  [36.4 °C (97.6 °F)] 36.4 °C (97.6 °F)  Pulse:  [83-91] 91  Resp:  [16-30] 20  BP: (122-138)/(65-95) 122/65  SpO2:  [94 %-99 %] 96 %  Blood Pressure : 128/78   Temperature: 36.4 °C (97.6 °F)   Pulse: 84   Respiration: (!) 22   Pulse Oximetry: 96 %       Physical Exam  Vitals and nursing note reviewed.   Constitutional:       Appearance: He is obese.   HENT:      Head: Normocephalic and atraumatic.      Nose: Nose normal.      Mouth/Throat:      Mouth: Mucous membranes are moist.       Pharynx: Oropharynx is clear.   Eyes:      General: No scleral icterus.     Extraocular Movements: Extraocular movements intact.   Cardiovascular:      Rate and Rhythm: Normal rate and regular rhythm.      Pulses: Normal pulses.      Heart sounds:     No friction rub.   Pulmonary:      Breath sounds: No stridor. Wheezing present. No rhonchi or rales.      Comments: Decreased bibasilar breath sounds  Abdominal:      General: There is distension.      Palpations: Abdomen is soft.      Tenderness: There is no abdominal tenderness. There is no guarding or rebound.   Musculoskeletal:         General: No swelling or tenderness. Normal range of motion.      Cervical back: Neck supple. No tenderness.   Skin:     General: Skin is warm and dry.      Capillary Refill: Capillary refill takes less than 2 seconds.   Neurological:      General: No focal deficit present.      Mental Status: He is alert and oriented to person, place, and time.      Motor: Weakness present.   Psychiatric:         Mood and Affect: Mood normal.         Laboratory:  Recent Labs     04/09/22  1828   WBC 6.3   RBC 4.30*   HEMOGLOBIN 13.6*   HEMATOCRIT 39.6*   MCV 92.1   MCH 31.6   MCHC 34.3   RDW 50.5*   PLATELETCT 223   MPV 8.9*     Recent Labs     04/09/22  1828   SODIUM 123*   POTASSIUM 4.2   CHLORIDE 87*   CO2 23   GLUCOSE 80   BUN 5*   CREATININE 0.50   CALCIUM 8.4*     Recent Labs     04/09/22  1828   ALTSGPT 20   ASTSGOT 19   ALKPHOSPHAT 77   TBILIRUBIN 0.4   GLUCOSE 80         Recent Labs     04/09/22  1828   NTPROBNP 339*         Recent Labs     04/09/22  1828   TROPONINT 18       Imaging:  DX-CHEST-PORTABLE (1 VIEW)   Final Result      1.  Minimal LEFT lung base atelectasis.   2.  No lobar pneumonia or pneumothorax.   3.  Stable mild cardiomegaly.          X-Ray:  I have personally reviewed the images and compared with prior images.  EKG:  I have personally reviewed the images and compared with prior images.    Assessment/Plan:  I anticipate  this patient is appropriate for observation status at this time.    * Acute on chronic respiratory failure with hypoxia ([facility])  Assessment & Plan  Dependent on 3 L, currently on 5 L --wean off as tolerated  Likely due to volume overload  Gentle diuresis  procal WNL    GERD (gastroesophageal reflux disease)- (present on admission)  Assessment & Plan  PPI    Hyponatremia- (present on admission)  Assessment & Plan  Beer proteinemia    Essential hypertension- (present on admission)  Assessment & Plan  Home med    COPD (chronic obstructive pulmonary disease) ([facility])  Assessment & Plan  Home meds  Not in acute exacerbation    Alcohol withdrawal syndrome with complication ([facility])  Assessment & Plan  MV  CIWA  Alcohol cessation advised    ACP (advance care planning)  Assessment & Plan  D/w pt -- DNR/DNI per pt's wish  ACP: 17mins    Debility- (present on admission)  Assessment & Plan  PT/OT    Class 3 severe obesity in adult ([facility])  Assessment & Plan  Diet and lifestyle modification      VTE prophylaxis: heparin ppx

## 2022-04-10 NOTE — ASSESSMENT & PLAN NOTE
Dependent on 3 L, currently on 5 L --wean off as tolerated  Likely due to volume overload  Gentle diuresis  procal WNL

## 2022-04-10 NOTE — DISCHARGE PLANNING
Medical Social Work    Referral:  HH and needs home oxygen tank to go home with      Intervention: Pt signed 2nd IMM. Pt signed choice for [facility] as he was with them previous. Pt reports he needs home oxygen tank delivered and can not remember the name of the company. Per chart referral pt has [employer] [facility] [employer] and DPA was asked to contact [employer] for home  oxygen tank for the pt to discharge home with.     Plan: Pt will need to follow up with [facility] after discharge due the weekend there will not be a [facility] confirmation of services and wait for home oxygen to be delivered. Pt updated on the name of his home oxygen company and to call [facility] on Monday to confirm acceptance.

## 2022-04-10 NOTE — PROGRESS NOTES
2 Rn skin check    Bruising noted to right butt cheek  Right Ear scab/ wound  Left forearm open wound  Generalized bruises  Heels red but blanching  Elbows dewayne but blanching  Sacrum red but blanching  Red//dry/ flaky lower extremities

## 2022-04-10 NOTE — DISCHARGE PLANNING
Medical Social Work    Referral: HHC, 2nd IMM, home oxygen, and ride home.      Intervention: Pt signed choice form for [facility] as he was service with them before. Choice form faxed to DPA. Pt signed choice form to resume [employer] [facility] [employer] as pt does not have a tank at bedside and does not have any one to bring him a tank. Pt given cab voucher as does not have anyone to give him a ride and reports he does not have funds to pay for a cab at this time. Per RN, attending has cleared pt to discharge before HHC accepted due to weeekend. DPA asked to ask [employer] to bring on oxygen tank.     Plan: Pt update he needs to call [facility] to confirm acceptance and given [place]'s contact information as it is the weekend and they are not in the office to review referral. Pt update [facility] was asked to bring in home oxygen tank to the hospital to the him. Pt signed 2nd IMM letter.

## 2022-04-10 NOTE — ED TRIAGE NOTES
Pt arrives via ems from home for increased SOB wasn't using his oxygen and when EMS arrives states it wasn't helping him. Per EMS room air 90% on 3 L NC at baseline. Pt states increased SOB about 45 minutes ago inhaler wasn't helping. O2 94% 3 L NC states feels bloated

## 2022-04-10 NOTE — RESPIRATORY CARE
COPD EDUCATION by COPD CLINICAL EDUCATOR  4/10/2022 at12:18 PM by Purvi Mcintyre, RRT     Patient being discharged unable to discuss.

## 2022-04-10 NOTE — FACE TO FACE
Face to Face Supporting Documentation - Home Health    The encounter with this patient was in whole or in part the primary reason for home health admission.    Date of encounter:   Patient:                    MRN:                       YOB: 2022  Juan Manuel Bass  [id number]  1950     Home health to see patient for:  Skilled Nursing care for assessment, interventions & education, Physical Therapy evaluation and treatment and Occupational therapy evaluation and treatment    Skilled need for:  Comment: medication managment     Skilled nursing interventions to include:  Comment: Medication managment     Homebound status evidenced by:  Need the aid of supportive devices such as crutches, canes, wheelchairs or walkers. Leaving home requires a considerable and taxing effort. There is a normal inability to leave the home.    Community Physician to provide follow up care: Peterson Amin M.D.     Optional Interventions? No      I certify the face to face encounter for this home health care referral meets the CMS requirements and the encounter/clinical assessment with the patient was, in whole, or in part, for the medical condition(s) listed above, which is the primary reason for home health care. Based on my clinical findings: the service(s) are medically necessary, support the need for home health care, and the homebound criteria are met.  I certify that this patient has had a face to face encounter by myself.  Kurt Calle M.D. - NPI: [id number]

## 2022-04-10 NOTE — ED PROVIDER NOTES
"ED Provider Note    CHIEF COMPLAINT  Chief Complaint   Patient presents with   • Shortness of Breath       HPI  Juan Manuel Bass is a 71 y.o. male who presents with a chief complaint of shortness of breath that started approximately 45 minutes prior to arrival.  He is typically on 3 L supplemental oxygen at baseline but notes that it was not helping him.  He contacted EMS and when they arrived he was not wearing his oxygen.  When they placed him on his home oxygen his O2 sats went up to 90%.  He denies any chest pain.  He denies any hemoptysis.  He complains of bilateral leg swelling as well as abdominal distention.  He denies a history of DVT or PE.  He is not on exogenous hormones.  He does not have a known history of CHF.    REVIEW OF SYSTEMS  See HPI for further details.  Shortness of breath.  All other systems are negative.     PAST MEDICAL HISTORY   has a past medical history of Chronic airway obstruction, not elsewhere classified and Hypertension.    SOCIAL HISTORY  Social History     Tobacco Use   • Smoking status: Former Smoker     Packs/day: 1.00     Years: 4.00     Pack years: 4.00     Quit date: 3/7/2020     Years since quittin.0   • Smokeless tobacco: Never Used   Substance and Sexual Activity   • Alcohol use: Yes     Alcohol/week: 3.6 oz     Types: 6 Cans of beer per week   • Drug use: Not Currently   • Sexual activity: Not on file       SURGICAL HISTORY  patient denies any surgical history    CURRENT MEDICATIONS  Home Medications    **Home medications have not yet been reviewed for this encounter**         ALLERGIES  Allergies   Allergen Reactions   • Tetanus Toxoid Hives       PHYSICAL EXAM  VITAL SIGNS: /95   Pulse 90   Resp (!) 24   Ht 1.702 m (5' 7\")   Wt (!) 131 kg (289 lb)   SpO2 94%   BMI 45.26 kg/m²    Pulse ox interpretation: I interpret this pulse ox as normal.  Constitutional: Alert in no apparent distress.  HENT: No signs of trauma, Bilateral external ears normal, Nose normal.  " Moist mucous membranes.  Eyes: Pupils are equal and reactive, Conjunctiva normal, Non-icteric.   Neck: Normal range of motion, No tenderness, Supple, No stridor.   Lymphatic: No lymphadenopathy noted.   Cardiovascular: Regular rate and rhythm, no murmurs. Pulses symmetrical.  Thorax & Lungs: Nasal cannula in place.  Rhonchorous breath sounds bilaterally, increased work of breathing, No wheezing, No chest tenderness.   Abdomen: Bowel sounds normal, Soft, No tenderness, No masses, No pulsatile masses. No peritoneal signs.  Skin: Warm, Dry, No erythema, No rash.   Back: Normal alignment.  Extremities: No cyanosis.  Musculoskeletal: Good range of motion in all major joints. No tenderness to palpation or major deformities noted.   Neurologic: Alert, No focal deficits noted.   Psychiatric: Pleasant and cooperative.    DIAGNOSTIC STUDIES / PROCEDURES    LABS  Results for orders placed or performed during the hospital encounter of 04/09/22   CBC WITH DIFFERENTIAL   Result Value Ref Range    WBC 6.3 4.8 - 10.8 K/uL    RBC 4.30 (L) 4.70 - 6.10 M/uL    Hemoglobin 13.6 (L) 14.0 - 18.0 g/dL    Hematocrit 39.6 (L) 42.0 - 52.0 %    MCV 92.1 81.4 - 97.8 fL    MCH 31.6 27.0 - 33.0 pg    MCHC 34.3 33.7 - 35.3 g/dL    RDW 50.5 (H) 35.9 - 50.0 fL    Platelet Count 223 164 - 446 K/uL    MPV 8.9 (L) 9.0 - 12.9 fL    Neutrophils-Polys 57.00 44.00 - 72.00 %    Lymphocytes 26.90 22.00 - 41.00 %    Monocytes 12.30 0.00 - 13.40 %    Eosinophils 2.60 0.00 - 6.90 %    Basophils 0.20 0.00 - 1.80 %    Immature Granulocytes 1.00 (H) 0.00 - 0.90 %    Nucleated RBC 0.00 /100 WBC    Neutrophils (Absolute) 3.57 1.82 - 7.42 K/uL    Lymphs (Absolute) 1.68 1.00 - 4.80 K/uL    Monos (Absolute) 0.77 0.00 - 0.85 K/uL    Eos (Absolute) 0.16 0.00 - 0.51 K/uL    Baso (Absolute) 0.01 0.00 - 0.12 K/uL    Immature Granulocytes (abs) 0.06 0.00 - 0.11 K/uL    NRBC (Absolute) 0.00 K/uL   Comp Metabolic Panel   Result Value Ref Range    Sodium 123 (L) 135 - 145  mmol/L    Potassium 4.2 3.6 - 5.5 mmol/L    Chloride 87 (L) 96 - 112 mmol/L    Co2 23 20 - 33 mmol/L    Anion Gap 13.0 7.0 - 16.0    Glucose 80 65 - 99 mg/dL    Bun 5 (L) 8 - 22 mg/dL    Creatinine 0.50 0.50 - 1.40 mg/dL    Calcium 8.4 (L) 8.5 - 10.5 mg/dL    AST(SGOT) 19 12 - 45 U/L    ALT(SGPT) 20 2 - 50 U/L    Alkaline Phosphatase 77 30 - 99 U/L    Total Bilirubin 0.4 0.1 - 1.5 mg/dL    Albumin 4.0 3.2 - 4.9 g/dL    Total Protein 6.7 6.0 - 8.2 g/dL    Globulin 2.7 1.9 - 3.5 g/dL    A-G Ratio 1.5 g/dL   TROPONIN   Result Value Ref Range    Troponin T 18 6 - 19 ng/L   proBrain Natriuretic Peptide, NT   Result Value Ref Range    NT-proBNP 339 (H) 0 - 125 pg/mL   ESTIMATED GFR   Result Value Ref Range    GFR (CKD-EPI) 109 >60 mL/min/1.73 m 2   Urinalysis    Specimen: Urine   Result Value Ref Range    Color Yellow     Character Clear     Specific Gravity 1.005 <1.035    Ph 5.0 5.0 - 8.0    Glucose Negative Negative mg/dL    Ketones Negative Negative mg/dL    Protein Negative Negative mg/dL    Bilirubin Negative Negative    Urobilinogen, Urine 0.2 Negative    Nitrite Negative Negative    Leukocyte Esterase Negative Negative    Occult Blood Negative Negative    Micro Urine Req see below    PROCALCITONIN   Result Value Ref Range    Procalcitonin <0.05 <0.25 ng/mL   EKG   Result Value Ref Range    Report       [facility] Emergency Dept.    Test Date:  2022  Pt Name:    BRIDGETTE VARNER                  Department: ER  MRN:        [id number]                      Room:       RD 07  Gender:     Male                         Technician: [id number]  :        1950                   Requested By:SARAH CAROLINA  Order #:    [id number]                    Reading MD: Sarah Carolina MD    Measurements  Intervals                                Axis  Rate:       86                           P:          -26  PA:         166                          QRS:        34  QRSD:       109                          T:           25  QT:         392  QTc:        469    Interpretive Statements  Sinus rhythm  Atrial premature complexes  Low voltage, precordial leads  RSR' in V1 or V2, right VCD or RVH  Compared to ECG 04/02/2022 21:53:28  Atrial premature complex(es) now present  Low QRS voltage now present  Right ventricular hypertrophy now present  RSR' in V1 or V2 now present  Electronically Signed On  4-9-2022 20:35:55 PDT by Santino Porter MD       RADIOLOGY  DX-CHEST-PORTABLE (1 VIEW)   Final Result      1.  Minimal LEFT lung base atelectasis.   2.  No lobar pneumonia or pneumothorax.   3.  Stable mild cardiomegaly.        COURSE & MEDICAL DECISION MAKING  Pertinent Labs & Imaging studies reviewed. (See chart for details)  Records obtained and reviewed: Patient was just discharged from this facility 3 days ago after he fell due to shortness of breath.  He admitted to drinking excessive amounts of fluid as well as 5-6 beers daily.  He did have hyponatremia.  He was fully vaccinated against COVID-19.  His sodium levels on admission was 114.  With volume restriction his sodium levels came up appropriately.  Pulmonology was consulted and felt that his shortness of breath was not a COPD exacerbation.  He was discharged home on salt tabs.    This is a 71-year-old gentleman who is here with shortness of breath.  He is on 3 L of supplemental oxygen at baseline but when EMS arrived at his house today he was not wearing it stating that it does not help.  He is currently wearing his nasal cannula upon evaluation but is tachypneic and unable to complete a full sentence.  He is using accessory muscles to breathe.  His lungs are very rhonchorous bilaterally with some very mild wheezes.  For this reason we will trial a DuoNeb to see if that is helpful.  I do see that he was evaluated by pulmonology recently and they did not feel that his shortness of breath was due to COPD.    EKG does not suggest acute ischemia.  Initial troponin is at the high end of  normal at 18.  BNP is very slightly elevated at 339.  Patient has no leukocytosis.  He does once again have hyponatremia to 123.  Chest x-ray does not suggest pneumonia or pneumothorax.  Procalcitonin is negative.    Patient will require hospitalization for additional work-up and management.  He may ultimately require placement as he does not appear to be able to care for himself at home.    Discussed with the hospitalist and admitted in guarded condition.    FINAL IMPRESSION  1.  Shortness of breath  2.  Hyponatremia    Electronically signed by: Santino Porter M.D., 4/9/2022 6:35 PM

## 2022-04-10 NOTE — DISCHARGE INSTRUCTIONS
Discharge Instructions    Discharged to home by taxi with self. Discharged via wheelchair, hospital escort: Yes.  Special equipment needed: Walker    Be sure to schedule a follow-up appointment with your primary care doctor or any specialists as instructed.     Discharge Plan:   Diet Plan: Discussed  Activity Level: Discussed  Confirmed Follow up Appointment: Patient to Call and Schedule Appointment  Confirmed Symptoms Management: Discussed  Medication Reconciliation Updated: Yes    I understand that a diet low in cholesterol, fat, and sodium is recommended for good health. Unless I have been given specific instructions below for another diet, I accept this instruction as my diet prescription.   Other diet: regular    Special Instructions: None    · Is patient discharged on Warfarin / Coumadin?   No     Depression / Suicide Risk    As you are discharged from this [facility] facility, it is important to learn how to keep safe from harming yourself.    Recognize the warning signs:  · Abrupt changes in personality, positive or negative- including increase in energy   · Giving away possessions  · Change in eating patterns- significant weight changes-  positive or negative  · Change in sleeping patterns- unable to sleep or sleeping all the time   · Unwillingness or inability to communicate  · Depression  · Unusual sadness, discouragement and loneliness  · Talk of wanting to die  · Neglect of personal appearance   · Rebelliousness- reckless behavior  · Withdrawal from people/activities they love  · Confusion- inability to concentrate     If you or a loved one observes any of these behaviors or has concerns about self-harm, here's what you can do:  · Talk about it- your feelings and reasons for harming yourself  · Remove any means that you might use to hurt yourself (examples: pills, rope, extension cords, firearm)  · Get professional help from the community (Mental Health, Substance Abuse, psychological  counseling)  · Do not be alone:Call your Safe Contact- someone whom you trust who will be there for you.  · Call your local CRISIS HOTLINE 181-2269 or 755-122-3288  · Call your local Children's Mobile Crisis Response Team [place] (814) 731-5191 or www.6fusion  · Call the toll free National Suicide Prevention Hotlines   · National Suicide Prevention Lifeline 796-316-JPMB (2117)  · [address] [employer] 800-SUICIDE (510-2580)        Shortness of Breath, Adult  Shortness of breath means you have trouble breathing. Shortness of breath could be a sign of a medical problem.  Follow these instructions at home:    · Watch for any changes in your symptoms.  · Do not use any products that contain nicotine or tobacco, such as cigarettes, e-cigarettes, and chewing tobacco.  · Do not smoke. Smoking can cause shortness of breath. If you need help to quit smoking, ask your doctor.  · Avoid things that can make it harder to breathe, such as:  ? Mold.  ? Dust.  ? Air pollution.  ? Chemical smells.  ? Things that can cause allergy symptoms (allergens), if you have allergies.  · Keep your living space clean. Use products that help remove mold and dust.  · Rest as needed. Slowly return to your normal activities.  · Take over-the-counter and prescription medicines only as told by your doctor. This includes oxygen therapy and inhaled medicines.  · Keep all follow-up visits as told by your doctor. This is important.  Contact a doctor if:  · Your condition does not get better as soon as expected.  · You have a hard time doing your normal activities, even after you rest.  · You have new symptoms.  Get help right away if:  · Your shortness of breath gets worse.  · You have trouble breathing when you are resting.  · You feel light-headed or you pass out (faint).  · You have a cough that is not helped by medicines.  · You cough up blood.  · You have pain with breathing.  · You have pain in your chest, arms, shoulders, or  belly (abdomen).  · You have a fever.  · You cannot walk up stairs.  · You cannot exercise the way you normally do.  These symptoms may represent a serious problem that is an emergency. Do not wait to see if the symptoms will go away. Get medical help right away. Call your local emergency services (911 in the [place] Do not drive yourself to the hospital.  Summary  · Shortness of breath is when you have trouble breathing enough air. It can be a sign of a medical problem.  · Avoid things that make it hard for you to breathe, such as smoking, pollution, mold, and dust.  · Watch for any changes in your symptoms. Contact your doctor if you do not get better or you get worse.  This information is not intended to replace advice given to you by your health care provider. Make sure you discuss any questions you have with your health care provider.  Document Released: 06/05/2009 Document Revised: 05/20/2019 Document Reviewed: 05/20/2019  Elsevier Patient Education © 2020 [employer]

## 2022-04-10 NOTE — PROGRESS NOTES
Handoff report received from night shift nurse. Pt care assumed. Pt is currently resting in bed. POC discussed with Pt and Pt verbalizes no questions at this time. Pt is AAOx4, on 3L NC, on Tele monitoring, and VSS. Call light and belongings within reach, bed in lowest and locked position, and Pt educated on use of call light. Will continue to monitor.

## 2022-04-11 ENCOUNTER — HOSPITAL ENCOUNTER (OUTPATIENT)
Facility: MEDICAL CENTER | Age: 72
End: 2022-04-12
Attending: EMERGENCY MEDICINE | Admitting: STUDENT IN AN ORGANIZED HEALTH CARE EDUCATION/TRAINING PROGRAM
Payer: MEDICARE

## 2022-04-11 ENCOUNTER — APPOINTMENT (OUTPATIENT)
Dept: RADIOLOGY | Facility: MEDICAL CENTER | Age: 72
End: 2022-04-11
Attending: EMERGENCY MEDICINE
Payer: MEDICARE

## 2022-04-11 DIAGNOSIS — E87.20 LACTIC ACIDOSIS: ICD-10-CM

## 2022-04-11 DIAGNOSIS — J44.1 ACUTE EXACERBATION OF CHRONIC OBSTRUCTIVE PULMONARY DISEASE (COPD) (HCC): ICD-10-CM

## 2022-04-11 DIAGNOSIS — E87.1 HYPONATREMIA: ICD-10-CM

## 2022-04-11 DIAGNOSIS — J44.1 COPD WITH ACUTE EXACERBATION (HCC): ICD-10-CM

## 2022-04-11 LAB
APPEARANCE UR: CLEAR
BASOPHILS # BLD AUTO: 0.2 % (ref 0–1.8)
BASOPHILS # BLD: 0.01 K/UL (ref 0–0.12)
BILIRUB UR QL STRIP.AUTO: NEGATIVE
COLOR UR: YELLOW
EOSINOPHIL # BLD AUTO: 0.07 K/UL (ref 0–0.51)
EOSINOPHIL NFR BLD: 1.3 % (ref 0–6.9)
ERYTHROCYTE [DISTWIDTH] IN BLOOD BY AUTOMATED COUNT: 47.5 FL (ref 35.9–50)
GFR SERPLBLD CREATININE-BSD FMLA CKD-EPI: 109 ML/MIN/1.73 M 2
GLUCOSE UR STRIP.AUTO-MCNC: NEGATIVE MG/DL
HCT VFR BLD AUTO: 36.7 % (ref 42–52)
HGB BLD-MCNC: 12.9 G/DL (ref 14–18)
IMM GRANULOCYTES # BLD AUTO: 0.1 K/UL (ref 0–0.11)
IMM GRANULOCYTES NFR BLD AUTO: 1.8 % (ref 0–0.9)
KETONES UR STRIP.AUTO-MCNC: NEGATIVE MG/DL
LACTATE BLD-SCNC: 2.7 MMOL/L (ref 0.5–2)
LACTATE BLD-SCNC: 2.8 MMOL/L (ref 0.5–2)
LEUKOCYTE ESTERASE UR QL STRIP.AUTO: NEGATIVE
LYMPHOCYTES # BLD AUTO: 1.27 K/UL (ref 1–4.8)
LYMPHOCYTES NFR BLD: 23.2 % (ref 22–41)
MCH RBC QN AUTO: 31.7 PG (ref 27–33)
MCHC RBC AUTO-ENTMCNC: 35.1 G/DL (ref 33.7–35.3)
MCV RBC AUTO: 90.2 FL (ref 81.4–97.8)
MICRO URNS: NORMAL
MONOCYTES # BLD AUTO: 0.82 K/UL (ref 0–0.85)
MONOCYTES NFR BLD AUTO: 15 % (ref 0–13.4)
NEUTROPHILS # BLD AUTO: 3.2 K/UL (ref 1.82–7.42)
NEUTROPHILS NFR BLD: 58.5 % (ref 44–72)
NITRITE UR QL STRIP.AUTO: NEGATIVE
NRBC # BLD AUTO: 0 K/UL
NRBC BLD-RTO: 0 /100 WBC
PH UR STRIP.AUTO: 5.5 [PH] (ref 5–8)
PLATELET # BLD AUTO: 219 K/UL (ref 164–446)
PMV BLD AUTO: 9.2 FL (ref 9–12.9)
PROCALCITONIN SERPL-MCNC: <0.05 NG/ML
PROT UR QL STRIP: NEGATIVE MG/DL
RBC # BLD AUTO: 4.07 M/UL (ref 4.7–6.1)
RBC UR QL AUTO: NEGATIVE
SP GR UR STRIP.AUTO: 1.01
TROPONIN T SERPL-MCNC: 15 NG/L (ref 6–19)
UROBILINOGEN UR STRIP.AUTO-MCNC: 0.2 MG/DL
WBC # BLD AUTO: 5.5 K/UL (ref 4.8–10.8)

## 2022-04-11 PROCEDURE — 84484 ASSAY OF TROPONIN QUANT: CPT

## 2022-04-11 PROCEDURE — 700105 HCHG RX REV CODE 258: Performed by: EMERGENCY MEDICINE

## 2022-04-11 PROCEDURE — 87040 BLOOD CULTURE FOR BACTERIA: CPT | Mod: 91

## 2022-04-11 PROCEDURE — 84100 ASSAY OF PHOSPHORUS: CPT

## 2022-04-11 PROCEDURE — 36415 COLL VENOUS BLD VENIPUNCTURE: CPT

## 2022-04-11 PROCEDURE — 84145 PROCALCITONIN (PCT): CPT

## 2022-04-11 PROCEDURE — 85025 COMPLETE CBC W/AUTO DIFF WBC: CPT

## 2022-04-11 PROCEDURE — 82077 ASSAY SPEC XCP UR&BREATH IA: CPT

## 2022-04-11 PROCEDURE — 87150 DNA/RNA AMPLIFIED PROBE: CPT

## 2022-04-11 PROCEDURE — 700101 HCHG RX REV CODE 250: Performed by: EMERGENCY MEDICINE

## 2022-04-11 PROCEDURE — 81003 URINALYSIS AUTO W/O SCOPE: CPT

## 2022-04-11 PROCEDURE — 83605 ASSAY OF LACTIC ACID: CPT | Mod: 91

## 2022-04-11 PROCEDURE — 80053 COMPREHEN METABOLIC PANEL: CPT

## 2022-04-11 PROCEDURE — 93005 ELECTROCARDIOGRAM TRACING: CPT | Performed by: EMERGENCY MEDICINE

## 2022-04-11 PROCEDURE — 700101 HCHG RX REV CODE 250: Performed by: STUDENT IN AN ORGANIZED HEALTH CARE EDUCATION/TRAINING PROGRAM

## 2022-04-11 PROCEDURE — 87086 URINE CULTURE/COLONY COUNT: CPT

## 2022-04-11 PROCEDURE — 71045 X-RAY EXAM CHEST 1 VIEW: CPT

## 2022-04-11 PROCEDURE — 83735 ASSAY OF MAGNESIUM: CPT

## 2022-04-11 PROCEDURE — 94640 AIRWAY INHALATION TREATMENT: CPT

## 2022-04-11 PROCEDURE — 99285 EMERGENCY DEPT VISIT HI MDM: CPT

## 2022-04-11 PROCEDURE — 700111 HCHG RX REV CODE 636 W/ 250 OVERRIDE (IP): Performed by: STUDENT IN AN ORGANIZED HEALTH CARE EDUCATION/TRAINING PROGRAM

## 2022-04-11 PROCEDURE — 96375 TX/PRO/DX INJ NEW DRUG ADDON: CPT

## 2022-04-11 RX ORDER — SODIUM CHLORIDE, SODIUM LACTATE, POTASSIUM CHLORIDE, CALCIUM CHLORIDE 600; 310; 30; 20 MG/100ML; MG/100ML; MG/100ML; MG/100ML
1000 INJECTION, SOLUTION INTRAVENOUS ONCE
Status: COMPLETED | OUTPATIENT
Start: 2022-04-11 | End: 2022-04-11

## 2022-04-11 RX ORDER — SODIUM CHLORIDE, SODIUM LACTATE, POTASSIUM CHLORIDE, AND CALCIUM CHLORIDE .6; .31; .03; .02 G/100ML; G/100ML; G/100ML; G/100ML
1000 INJECTION, SOLUTION INTRAVENOUS ONCE
Status: COMPLETED | OUTPATIENT
Start: 2022-04-11 | End: 2022-04-11

## 2022-04-11 RX ORDER — IPRATROPIUM BROMIDE AND ALBUTEROL SULFATE 2.5; .5 MG/3ML; MG/3ML
3 SOLUTION RESPIRATORY (INHALATION)
Status: COMPLETED | OUTPATIENT
Start: 2022-04-11 | End: 2022-04-11

## 2022-04-11 RX ORDER — METHYLPREDNISOLONE SODIUM SUCCINATE 40 MG/ML
40 INJECTION, POWDER, LYOPHILIZED, FOR SOLUTION INTRAMUSCULAR; INTRAVENOUS ONCE
Status: COMPLETED | OUTPATIENT
Start: 2022-04-11 | End: 2022-04-11

## 2022-04-11 RX ADMIN — METHYLPREDNISOLONE SODIUM SUCCINATE 40 MG: 40 INJECTION, POWDER, FOR SOLUTION INTRAMUSCULAR; INTRAVENOUS at 22:50

## 2022-04-11 RX ADMIN — SODIUM CHLORIDE, POTASSIUM CHLORIDE, SODIUM LACTATE AND CALCIUM CHLORIDE 1000 ML: 600; 310; 30; 20 INJECTION, SOLUTION INTRAVENOUS at 22:53

## 2022-04-11 RX ADMIN — SODIUM CHLORIDE, POTASSIUM CHLORIDE, SODIUM LACTATE AND CALCIUM CHLORIDE 1000 ML: 600; 310; 30; 20 INJECTION, SOLUTION INTRAVENOUS at 20:46

## 2022-04-11 RX ADMIN — IPRATROPIUM BROMIDE AND ALBUTEROL SULFATE 3 ML: .5; 2.5 SOLUTION RESPIRATORY (INHALATION) at 20:51

## 2022-04-11 RX ADMIN — ALBUTEROL SULFATE 5 MG: 2.5 SOLUTION RESPIRATORY (INHALATION) at 21:00

## 2022-04-11 ASSESSMENT — FIBROSIS 4 INDEX: FIB4 SCORE: 1.23

## 2022-04-12 VITALS
SYSTOLIC BLOOD PRESSURE: 150 MMHG | RESPIRATION RATE: 25 BRPM | OXYGEN SATURATION: 94 % | HEART RATE: 126 BPM | BODY MASS INDEX: 45.36 KG/M2 | WEIGHT: 289 LBS | DIASTOLIC BLOOD PRESSURE: 80 MMHG | TEMPERATURE: 98 F | HEIGHT: 67 IN

## 2022-04-12 PROBLEM — J96.11 CHRONIC RESPIRATORY FAILURE WITH HYPOXIA (HCC): Status: ACTIVE | Noted: 2022-04-12

## 2022-04-12 PROBLEM — J44.1 COPD WITH ACUTE EXACERBATION (HCC): Status: ACTIVE | Noted: 2022-04-12

## 2022-04-12 PROBLEM — E87.20 LACTIC ACIDOSIS: Status: ACTIVE | Noted: 2022-04-12

## 2022-04-12 LAB
ALBUMIN SERPL BCP-MCNC: 3.8 G/DL (ref 3.2–4.9)
ALBUMIN/GLOB SERPL: 1.5 G/DL
ALP SERPL-CCNC: 73 U/L (ref 30–99)
ALT SERPL-CCNC: 16 U/L (ref 2–50)
ANION GAP SERPL CALC-SCNC: 14 MMOL/L (ref 7–16)
AST SERPL-CCNC: 15 U/L (ref 12–45)
BILIRUB SERPL-MCNC: 0.5 MG/DL (ref 0.1–1.5)
BUN SERPL-MCNC: 4 MG/DL (ref 8–22)
CALCIUM SERPL-MCNC: 8.1 MG/DL (ref 8.5–10.5)
CHLORIDE SERPL-SCNC: 82 MMOL/L (ref 96–112)
CO2 SERPL-SCNC: 23 MMOL/L (ref 20–33)
CREAT SERPL-MCNC: 0.5 MG/DL (ref 0.5–1.4)
EKG IMPRESSION: NORMAL
ETHANOL BLD-MCNC: 206.6 MG/DL (ref 0–10)
GLOBULIN SER CALC-MCNC: 2.6 G/DL (ref 1.9–3.5)
GLUCOSE SERPL-MCNC: 103 MG/DL (ref 65–99)
LACTATE BLD-SCNC: 1.6 MMOL/L (ref 0.5–2)
LACTATE BLD-SCNC: 3 MMOL/L (ref 0.5–2)
LACTATE BLD-SCNC: 3 MMOL/L (ref 0.5–2)
MAGNESIUM SERPL-MCNC: 1.8 MG/DL (ref 1.5–2.5)
PHOSPHATE SERPL-MCNC: 3.6 MG/DL (ref 2.5–4.5)
POTASSIUM SERPL-SCNC: 4.1 MMOL/L (ref 3.6–5.5)
PROT SERPL-MCNC: 6.4 G/DL (ref 6–8.2)
SODIUM SERPL-SCNC: 119 MMOL/L (ref 135–145)

## 2022-04-12 PROCEDURE — 83605 ASSAY OF LACTIC ACID: CPT

## 2022-04-12 PROCEDURE — 700105 HCHG RX REV CODE 258: Performed by: STUDENT IN AN ORGANIZED HEALTH CARE EDUCATION/TRAINING PROGRAM

## 2022-04-12 PROCEDURE — 99214 OFFICE O/P EST MOD 30 MIN: CPT | Mod: GC | Performed by: INTERNAL MEDICINE

## 2022-04-12 PROCEDURE — 99236 HOSP IP/OBS SAME DATE HI 85: CPT | Performed by: STUDENT IN AN ORGANIZED HEALTH CARE EDUCATION/TRAINING PROGRAM

## 2022-04-12 PROCEDURE — 94640 AIRWAY INHALATION TREATMENT: CPT

## 2022-04-12 PROCEDURE — 96372 THER/PROPH/DIAG INJ SC/IM: CPT | Mod: XU

## 2022-04-12 PROCEDURE — 96365 THER/PROPH/DIAG IV INF INIT: CPT

## 2022-04-12 PROCEDURE — 700111 HCHG RX REV CODE 636 W/ 250 OVERRIDE (IP): Performed by: STUDENT IN AN ORGANIZED HEALTH CARE EDUCATION/TRAINING PROGRAM

## 2022-04-12 PROCEDURE — 700101 HCHG RX REV CODE 250: Performed by: STUDENT IN AN ORGANIZED HEALTH CARE EDUCATION/TRAINING PROGRAM

## 2022-04-12 PROCEDURE — 700105 HCHG RX REV CODE 258: Performed by: EMERGENCY MEDICINE

## 2022-04-12 PROCEDURE — 700102 HCHG RX REV CODE 250 W/ 637 OVERRIDE(OP): Performed by: STUDENT IN AN ORGANIZED HEALTH CARE EDUCATION/TRAINING PROGRAM

## 2022-04-12 PROCEDURE — G0378 HOSPITAL OBSERVATION PER HR: HCPCS

## 2022-04-12 PROCEDURE — A9270 NON-COVERED ITEM OR SERVICE: HCPCS | Performed by: STUDENT IN AN ORGANIZED HEALTH CARE EDUCATION/TRAINING PROGRAM

## 2022-04-12 RX ORDER — AMOXICILLIN 250 MG
2 CAPSULE ORAL 2 TIMES DAILY
Status: DISCONTINUED | OUTPATIENT
Start: 2022-04-12 | End: 2022-04-12 | Stop reason: HOSPADM

## 2022-04-12 RX ORDER — IPRATROPIUM BROMIDE AND ALBUTEROL SULFATE 2.5; .5 MG/3ML; MG/3ML
3 SOLUTION RESPIRATORY (INHALATION)
Status: DISCONTINUED | OUTPATIENT
Start: 2022-04-12 | End: 2022-04-12 | Stop reason: HOSPADM

## 2022-04-12 RX ORDER — POLYETHYLENE GLYCOL 3350 17 G/17G
1 POWDER, FOR SOLUTION ORAL
Status: DISCONTINUED | OUTPATIENT
Start: 2022-04-12 | End: 2022-04-12 | Stop reason: HOSPADM

## 2022-04-12 RX ORDER — BISACODYL 10 MG
10 SUPPOSITORY, RECTAL RECTAL
Status: DISCONTINUED | OUTPATIENT
Start: 2022-04-12 | End: 2022-04-12 | Stop reason: HOSPADM

## 2022-04-12 RX ORDER — PREDNISONE 20 MG/1
40 TABLET ORAL DAILY
Qty: 8 TABLET | Refills: 0 | Status: SHIPPED | OUTPATIENT
Start: 2022-04-12 | End: 2022-04-16

## 2022-04-12 RX ORDER — AZITHROMYCIN 250 MG/1
500 TABLET, FILM COATED ORAL DAILY
Qty: 4 TABLET | Refills: 0 | Status: SHIPPED | OUTPATIENT
Start: 2022-04-12 | End: 2022-04-14

## 2022-04-12 RX ORDER — ONDANSETRON 2 MG/ML
4 INJECTION INTRAMUSCULAR; INTRAVENOUS EVERY 4 HOURS PRN
Status: DISCONTINUED | OUTPATIENT
Start: 2022-04-12 | End: 2022-04-12 | Stop reason: HOSPADM

## 2022-04-12 RX ORDER — HYDRALAZINE HYDROCHLORIDE 20 MG/ML
10 INJECTION INTRAMUSCULAR; INTRAVENOUS EVERY 4 HOURS PRN
Status: DISCONTINUED | OUTPATIENT
Start: 2022-04-12 | End: 2022-04-12 | Stop reason: HOSPADM

## 2022-04-12 RX ORDER — AZITHROMYCIN 250 MG/1
500 TABLET, FILM COATED ORAL DAILY
Qty: 4 TABLET | Refills: 0 | Status: SHIPPED | OUTPATIENT
Start: 2022-04-12 | End: 2022-04-12

## 2022-04-12 RX ORDER — PREDNISONE 20 MG/1
40 TABLET ORAL DAILY
Qty: 8 TABLET | Refills: 0 | Status: SHIPPED | OUTPATIENT
Start: 2022-04-12 | End: 2022-04-12

## 2022-04-12 RX ORDER — FLUTICASONE PROPIONATE 44 UG/1
2 AEROSOL, METERED RESPIRATORY (INHALATION)
Status: DISCONTINUED | OUTPATIENT
Start: 2022-04-12 | End: 2022-04-12 | Stop reason: HOSPADM

## 2022-04-12 RX ORDER — ONDANSETRON 4 MG/1
4 TABLET, ORALLY DISINTEGRATING ORAL EVERY 4 HOURS PRN
Status: DISCONTINUED | OUTPATIENT
Start: 2022-04-12 | End: 2022-04-12 | Stop reason: HOSPADM

## 2022-04-12 RX ORDER — PREDNISONE 20 MG/1
40 TABLET ORAL DAILY
Status: DISCONTINUED | OUTPATIENT
Start: 2022-04-12 | End: 2022-04-12 | Stop reason: HOSPADM

## 2022-04-12 RX ORDER — AZITHROMYCIN 250 MG/1
500 TABLET, FILM COATED ORAL DAILY
Status: DISCONTINUED | OUTPATIENT
Start: 2022-04-12 | End: 2022-04-12 | Stop reason: HOSPADM

## 2022-04-12 RX ORDER — ACETAMINOPHEN 325 MG/1
650 TABLET ORAL EVERY 6 HOURS PRN
Status: DISCONTINUED | OUTPATIENT
Start: 2022-04-12 | End: 2022-04-12 | Stop reason: HOSPADM

## 2022-04-12 RX ORDER — SODIUM CHLORIDE, SODIUM LACTATE, POTASSIUM CHLORIDE, CALCIUM CHLORIDE 600; 310; 30; 20 MG/100ML; MG/100ML; MG/100ML; MG/100ML
1000 INJECTION, SOLUTION INTRAVENOUS ONCE
Status: COMPLETED | OUTPATIENT
Start: 2022-04-12 | End: 2022-04-12

## 2022-04-12 RX ORDER — OMEPRAZOLE 20 MG/1
20 CAPSULE, DELAYED RELEASE ORAL DAILY
Status: DISCONTINUED | OUTPATIENT
Start: 2022-04-12 | End: 2022-04-12 | Stop reason: HOSPADM

## 2022-04-12 RX ADMIN — OMEPRAZOLE 20 MG: 20 CAPSULE, DELAYED RELEASE ORAL at 05:38

## 2022-04-12 RX ADMIN — ENOXAPARIN SODIUM 40 MG: 40 INJECTION SUBCUTANEOUS at 05:38

## 2022-04-12 RX ADMIN — IPRATROPIUM BROMIDE AND ALBUTEROL SULFATE 3 ML: .5; 2.5 SOLUTION RESPIRATORY (INHALATION) at 10:52

## 2022-04-12 RX ADMIN — THIAMINE HYDROCHLORIDE 500 MG: 100 INJECTION, SOLUTION INTRAMUSCULAR; INTRAVENOUS at 10:28

## 2022-04-12 RX ADMIN — IPRATROPIUM BROMIDE AND ALBUTEROL SULFATE 3 ML: .5; 2.5 SOLUTION RESPIRATORY (INHALATION) at 06:52

## 2022-04-12 RX ADMIN — FLUTICASONE PROPIONATE 88 MCG: 44 AEROSOL, METERED RESPIRATORY (INHALATION) at 10:56

## 2022-04-12 RX ADMIN — SODIUM CHLORIDE, POTASSIUM CHLORIDE, SODIUM LACTATE AND CALCIUM CHLORIDE 1000 ML: 600; 310; 30; 20 INJECTION, SOLUTION INTRAVENOUS at 01:15

## 2022-04-12 RX ADMIN — UMECLIDINIUM BROMIDE AND VILANTEROL TRIFENATATE 1 PUFF: 62.5; 25 POWDER RESPIRATORY (INHALATION) at 10:57

## 2022-04-12 RX ADMIN — IPRATROPIUM BROMIDE AND ALBUTEROL SULFATE 3 ML: .5; 2.5 SOLUTION RESPIRATORY (INHALATION) at 02:22

## 2022-04-12 RX ADMIN — PREDNISONE 40 MG: 20 TABLET ORAL at 05:38

## 2022-04-12 RX ADMIN — AZITHROMYCIN MONOHYDRATE 500 MG: 250 TABLET ORAL at 01:15

## 2022-04-12 RX ADMIN — THIAMINE HYDROCHLORIDE 500 MG: 100 INJECTION, SOLUTION INTRAMUSCULAR; INTRAVENOUS at 05:37

## 2022-04-12 ASSESSMENT — ENCOUNTER SYMPTOMS
HEMOPTYSIS: 0
CHILLS: 0
HALLUCINATIONS: 0
SHORTNESS OF BREATH: 1
WHEEZING: 0
FOCAL WEAKNESS: 0
CLAUDICATION: 0
HEARTBURN: 0
NECK PAIN: 0
EYE PAIN: 0
ORTHOPNEA: 0
NAUSEA: 0
ABDOMINAL PAIN: 0
TINGLING: 0
DEPRESSION: 0
DIZZINESS: 0
MYALGIAS: 0
VOMITING: 0
PHOTOPHOBIA: 0
PALPITATIONS: 0
SPUTUM PRODUCTION: 1
SPEECH CHANGE: 0
DIARRHEA: 0
BLURRED VISION: 0
WEIGHT LOSS: 0
DOUBLE VISION: 0
HEADACHES: 0
NERVOUS/ANXIOUS: 0
COUGH: 1
WHEEZING: 1
FEVER: 0

## 2022-04-12 ASSESSMENT — COPD QUESTIONNAIRES
HAVE YOU SMOKED AT LEAST 100 CIGARETTES IN YOUR ENTIRE LIFE: YES
COPD SCREENING SCORE: 4
DURING THE PAST 4 WEEKS HOW MUCH DID YOU FEEL SHORT OF BREATH: NONE/LITTLE OF THE TIME
DO YOU EVER COUGH UP ANY MUCUS OR PHLEGM?: NO/ONLY WITH OCCASIONAL COLDS OR INFECTIONS

## 2022-04-12 ASSESSMENT — LIFESTYLE VARIABLES
EVER_SMOKED: YES
SUBSTANCE_ABUSE: 1

## 2022-04-12 ASSESSMENT — PAIN DESCRIPTION - PAIN TYPE: TYPE: ACUTE PAIN

## 2022-04-12 NOTE — DISCHARGE SUMMARY
Discharge Summary    CHIEF COMPLAINT ON ADMISSION  Chief Complaint   Patient presents with   • Shortness of Breath     Discharged from here on 4/10 on 3L, pt lives at home alone, started feeling like he could not breath, and bumped himself up to 6L and called EMS       Reason for Admission  SOB     Admission Date  4/11/2022    CODE STATUS  DNAR/DNI    HPI & HOSPITAL COURSE  This is a 71 y.o. male here with dyspnea. Patient with history of COPD, chronic hypoxia on 3L oxygen, hypertension, alcoholism, who presents with dyspnea. Patient with multiple recent hospitalizations for simillar complaints in the last two weeks. Patient treated with breathing treatment and steroids with good effect for presumed COPD exacerbation. Patient initially required up to 6L oxygen on presentation which was weaned down to his baseline 3L oxygen by time of discharge. Patient with elevated lactic acid which normalized with IV fluids. Patient feeling well, he is eager to go home. Taxi home arranged with  assistance. Patient to follow up with his PCP.    Therefore, he is discharged in fair and stable condition to home with close outpatient follow-up.    Discharge Date  4/12/2022    FOLLOW UP ITEMS POST DISCHARGE  Take medications as prescribed.  Follow up with PCP.    DISCHARGE DIAGNOSES  Principal Problem:    COPD with acute exacerbation (HCC) POA: Yes  Active Problems:    Class 3 severe obesity in adult (HCC) POA: Yes    Essential hypertension POA: Yes    Alcoholism (HCC) POA: Yes    GERD (gastroesophageal reflux disease) POA: Yes    Chronic respiratory failure with hypoxia (HCC) POA: Yes    Lactic acidosis POA: Yes  Resolved Problems:    * No resolved hospital problems. *      FOLLOW UP  No future appointments.  Peterson Amin M.D.  [address] [address]  [phone]            MEDICATIONS ON DISCHARGE     Medication List      START taking these medications      Instructions   azithromycin 250 MG  Tabs  Commonly known as: ZITHROMAX   Take 2 Tablets by mouth every day for 2 days.  Dose: 500 mg     predniSONE 20 MG Tabs  Commonly known as: DELTASONE   Take 2 Tablets by mouth every day for 4 days.  Dose: 40 mg        CONTINUE taking these medications      Instructions   acetaminophen 500 MG Tabs  Commonly known as: TYLENOL   Take 500-1,000 mg by mouth every 6 hours as needed for Mild Pain.  Dose: 500-1,000 mg     albuterol 108 (90 Base) MCG/ACT Aers inhalation aerosol   Inhale 1-2 Puffs every four hours as needed for Shortness of Breath.  Dose: 1-2 Puff     amLODIPine 5 MG Tabs  Commonly known as: NORVASC   Take 1 Tablet by mouth every day.  Dose: 5 mg     Centrum Silver Adult 50+ Tabs   Take 1 Tablet by mouth every day.  Dose: 1 Tablet     pantoprazole 40 MG Tbec  Commonly known as: PROTONIX   Take 40 mg by mouth every day.  Dose: 40 mg     sodium chloride 1 GM Tabs  Commonly known as: SALT   Take 1 Tablet by mouth every day.  Dose: 1 g     Trelegy Ellipta 100-62.5-25 MCG/INH Aepb inhalation  Generic drug: Fluticasone-Umeclidin-Vilant   Inhale 2-3 Inhalation every morning.  Dose: 2-3 Inhalation     VITAMIN B COMPLEX PO   Take 1 Tablet by mouth every day.  Dose: 1 Tablet     VITAMIN B12 PO   Take 1 Tablet by mouth every day.  Dose: 1 Tablet     VITAMIN D PO   Take 1 Tablet by mouth every day.  Dose: 1 Tablet     VITAMIN E PO   Take 1 Capsule by mouth every day.  Dose: 1 Capsule            Allergies  Allergies   Allergen Reactions   • Tetanus Toxoid Hives       DIET  Orders Placed This Encounter   Procedures   • Diet Order Diet: Regular     Standing Status:   Standing     Number of Occurrences:   1     Order Specific Question:   Diet:     Answer:   Regular [1]       ACTIVITY  As tolerated.  Weight bearing as tolerated    CONSULTATIONS  none    PROCEDURES  none    LABORATORY  Lab Results   Component Value Date    SODIUM 129 (L) 04/11/2022    POTASSIUM 4.1 04/11/2022    CHLORIDE 89 (L) 04/11/2022    CO2 23  04/11/2022    GLUCOSE 103 (H) 04/11/2022    BUN 4 (L) 04/11/2022    CREATININE 0.50 04/11/2022        Lab Results   Component Value Date    WBC 5.5 04/11/2022    HEMOGLOBIN 12.9 (L) 04/11/2022    HEMATOCRIT 36.7 (L) 04/11/2022    PLATELETCT 219 04/11/2022        Total time of the discharge process exceeds 32 minutes.

## 2022-04-12 NOTE — DOCUMENTATION QUERY
[facility]                                                                       Query Response Note      PATIENT:               BRIDGETTE VARNER  ACCT #:                  [id number]  MRN:                     [id number]  :                      1950  ADMIT DATE:       2022 9:31 PM  DISCH DATE:        2022 10:04 AM  RESPONDING  PROVIDER #:        [id number]           QUERY TEXT:    Acute on chronic respiratory failure secondary to COPD exacerbation is documented in the Medical Record. The History and Physical reports a respiratory rate of 18-32 and SPO2 of 91-94% on O2 at 2-3L via nasal cannula. Exam findings per the H&P and the following Progress Notes report ?pulmonary effort is normal? and ?normal breath sounds?. Pulmonary Consultation notes breathing at baseline with no acute exacerbation of COPD. He was treated with a maximum of 6L via nasal cannula and returned to home baseline O2 of 3L. Azithromycin and prednisone were discontinued. Is acute respiratory failure an accurate diagnosis for this admission?     Note: If an appropriate response is not listed below, please respond with a new note.    The patient's Clinical Indicators include:  Per H&P   -Resp:  [18-32] 32   -SpO2:  [91 %-94 %] 94 %   -Pulmonary effort is normal.   -Breath sounds: Normal breath sounds. No wheezing, rhonchi or rales  -Acute on chronic respiratory failure (HCC)- (present on admission)  -Baseline home oxygen 3 L     Per Pulmonary Consultation   -He states his breathing is at baseline  -he is not wheezing  -he denies worsening of cough or sputum production  -I would not recommend azithromycin or prednisone at this time   -Per my evaluation this does not constitute a COPD exacerbation     Treatment:   Pulmonary Consultation, Supplemental O2, Dounebs, Adro Ellipta & Fluticasone inhaler, d/c azithromycin & prednisone, & outpatient follow up with Pulmonary      Risk factors:   Hx COPD & chronic respiratory failure, alcoholism, debility, & hyponatremia    Thank You,  Priscilla Katz RN BSN  Clinical   Connect via [employer] Messenger  Options provided:   -- No, acute respiratory failure is not a valid diagnosis during this admission; Chronic respiratory failure is present   -- Yes, acute respiratory failure is present active during this admission, please provide additional clinical indicators   -- Other explanation of clinical finding, (Please specify other explanation of clinical findings)   -- Unable to determine      Query created by: Priscilla Katz on 4/6/2022 12:50 PM    RESPONSE TEXT:    Yes, acute respiratory failure is present active during this admission - as is evidenced by the increased oxygen requirements          Electronically signed by:  WILMER WHEATLEY MD 4/11/2022 8:08 PM

## 2022-04-12 NOTE — ED NOTES
Pt medicated per MAR   Skin Substitute Text: The defect edges were debeveled with a #15 scalpel blade.  Given the location of the defect, shape of the defect and the proximity to free margins a skin substitute graft was deemed most appropriate.  The graft material was trimmed to fit the size of the defect. The graft was then placed in the primary defect and oriented appropriately.

## 2022-04-12 NOTE — ED PROVIDER NOTES
ED Provider Note    Scribed for Huseyin Tatum M.D. by Vic Jacobs. 2022, 8:22 PM.    Primary care provider: Peterson Amin M.D.  Means of arrival: EMS  History obtained from: Patient  History limited by: None    CHIEF COMPLAINT  Chief Complaint   Patient presents with   • Shortness of Breath     Discharged from here on 4/10 on 3L, pt lives at home alone, started feeling like he could not breath, and bumped himself up to 6L and called EMS       HPI  Juan Manuel Bass is a 71 y.o. male who presents to the Emergency Department via EMS for evaluation of shortness of breath onset prior to arrival. He endorses associated weakness and cough, but denies any sputum production, nausea, vomiting, chest pain, fever, or chills. Patient states he collapsed at home secondary to his weakness which prompted a call to EMS. Patient states he uses inhalers at home with minimal alleviation to symptoms. Per chart review, patient was admitted several times recently for COPD exacerbation. After discharge, he was sent to a SNF but left AMA 2 days ago. Patient was last admitted and discharged yesterday for similar symptoms. During this admission, pulmonary saw the patient and discharged him stating the patient was not likely to be experiencing COPD exacerbation.     REVIEW OF SYSTEMS  Pertinent positives include shortness of breath, weakness, and cough. Pertinent negatives include sputum production, nausea, vomiting, chest pain, fever, or chills. All other systems negative.  C    PAST MEDICAL HISTORY   has a past medical history of Chronic airway obstruction, not elsewhere classified and Hypertension.    SURGICAL HISTORY  patient denies any surgical history    SOCIAL HISTORY  Social History     Tobacco Use   • Smoking status: Former Smoker     Packs/day: 1.00     Years: 4.00     Pack years: 4.00     Quit date: 3/7/2020     Years since quittin.0   • Smokeless tobacco: Never Used   Substance Use Topics   • Alcohol use: Yes      "Alcohol/week: 21.0 oz     Types: 35 Cans of beer per week     Comment: last drink 4/11/2022   • Drug use: Not Currently      Social History     Substance and Sexual Activity   Drug Use Not Currently       FAMILY HISTORY  Family History   Problem Relation Age of Onset   • No Known Problems Mother    • Heart Disease Father        CURRENT MEDICATIONS  Home Medications    None noted when reviewed         ALLERGIES  Allergies   Allergen Reactions   • Tetanus Toxoid Hives       PHYSICAL EXAM  VITAL SIGNS: /91   Pulse 88   Temp 35.9 °C (96.6 °F) (Temporal)   Resp (!) 29   Ht 1.702 m (5' 7\")   Wt (!) 131 kg (289 lb)   SpO2 98%   BMI 45.26 kg/m²     Constitutional: Well developed, Well nourished, mild distress. Obese.  HENT: Normocephalic, Atraumatic, mask in place.  Eyes: Conjunctiva normal, No discharge.   Neck: Supple, No stridor   Cardiovascular: Normal heart rate, Normal rhythm, No murmurs, equal pulses.   Pulmonary: Mild wheezing bilaterally, No rales, No rhonchi.  Chest: No chest wall tenderness or deformity.   Abdomen:Soft, Mild epigastric tenderness, No masses, no rebound, no guarding.   Back: No CVA tenderness.   Musculoskeletal: No major deformities noted, No tenderness. 1+ pitting edema to bilateral lower extremities   Skin: Warm, Dry, No erythema, No rash.   Neurologic: Alert & oriented x 3, Normal motor function,  No focal deficits noted.   Psychiatric: Affect normal, Judgment normal, Mood normal.     LABS  Results for orders placed or performed during the hospital encounter of 04/11/22   Lactic acid (lactate)   Result Value Ref Range    Lactic Acid 2.7 (H) 0.5 - 2.0 mmol/L   Lactic acid (lactate): Repeat if initial lactic acid result is greater than 2   Result Value Ref Range    Lactic Acid 2.8 (H) 0.5 - 2.0 mmol/L   Lactic acid (lactate): Repeat if initial lactic acid result is greater than 2   Result Value Ref Range    Lactic Acid 3.0 (H) 0.5 - 2.0 mmol/L   CBC WITH DIFFERENTIAL   Result Value " Ref Range    WBC 5.5 4.8 - 10.8 K/uL    RBC 4.07 (L) 4.70 - 6.10 M/uL    Hemoglobin 12.9 (L) 14.0 - 18.0 g/dL    Hematocrit 36.7 (L) 42.0 - 52.0 %    MCV 90.2 81.4 - 97.8 fL    MCH 31.7 27.0 - 33.0 pg    MCHC 35.1 33.7 - 35.3 g/dL    RDW 47.5 35.9 - 50.0 fL    Platelet Count 219 164 - 446 K/uL    MPV 9.2 9.0 - 12.9 fL    Neutrophils-Polys 58.50 44.00 - 72.00 %    Lymphocytes 23.20 22.00 - 41.00 %    Monocytes 15.00 (H) 0.00 - 13.40 %    Eosinophils 1.30 0.00 - 6.90 %    Basophils 0.20 0.00 - 1.80 %    Immature Granulocytes 1.80 (H) 0.00 - 0.90 %    Nucleated RBC 0.00 /100 WBC    Neutrophils (Absolute) 3.20 1.82 - 7.42 K/uL    Lymphs (Absolute) 1.27 1.00 - 4.80 K/uL    Monos (Absolute) 0.82 0.00 - 0.85 K/uL    Eos (Absolute) 0.07 0.00 - 0.51 K/uL    Baso (Absolute) 0.01 0.00 - 0.12 K/uL    Immature Granulocytes (abs) 0.10 0.00 - 0.11 K/uL    NRBC (Absolute) 0.00 K/uL   COMP METABOLIC PANEL   Result Value Ref Range    Sodium 129 (L) 135 - 145 mmol/L    Potassium 4.1 3.6 - 5.5 mmol/L    Chloride 89 (L) 96 - 112 mmol/L    Co2 23 20 - 33 mmol/L    Anion Gap 17.0 (H) 7.0 - 16.0    Glucose 103 (H) 65 - 99 mg/dL    Bun 4 (L) 8 - 22 mg/dL    Creatinine 0.50 0.50 - 1.40 mg/dL    Calcium 8.1 (L) 8.5 - 10.5 mg/dL    AST(SGOT) 15 12 - 45 U/L    ALT(SGPT) 16 2 - 50 U/L    Alkaline Phosphatase 73 30 - 99 U/L    Total Bilirubin 0.5 0.1 - 1.5 mg/dL    Albumin 3.8 3.2 - 4.9 g/dL    Total Protein 6.4 6.0 - 8.2 g/dL    Globulin 2.6 1.9 - 3.5 g/dL    A-G Ratio 1.5 g/dL   URINALYSIS    Specimen: Urine, Clean Catch   Result Value Ref Range    Color Yellow     Character Clear     Specific Gravity 1.007 <1.035    Ph 5.5 5.0 - 8.0    Glucose Negative Negative mg/dL    Ketones Negative Negative mg/dL    Protein Negative Negative mg/dL    Bilirubin Negative Negative    Urobilinogen, Urine 0.2 Negative    Nitrite Negative Negative    Leukocyte Esterase Negative Negative    Occult Blood Negative Negative    Micro Urine Req see below    ESTIMATED  GFR   Result Value Ref Range    GFR (CKD-EPI) 109 >60 mL/min/1.73 m 2   PROCALCITONIN   Result Value Ref Range    Procalcitonin <0.05 <0.25 ng/mL   TROPONIN   Result Value Ref Range    Troponin T 15 6 - 19 ng/L   EKG (NOW)   Result Value Ref Range    Report       [facility] Emergency Dept.    Test Date:  2022  Pt Name:    BRIDGETTE VARNER                  Department: ER  MRN:        [id number]                      Room:       [facility]  Gender:     Male                         Technician: [id number]  :        1950                   Requested By:HARJIT MARQUEZ  Order #:    [id number]                    Reading MD:    Measurements  Intervals                                Axis  Rate:       95                           P:          35  WV:         184                          QRS:        45  QRSD:       94                           T:          45  QT:         356  QTc:        448    Interpretive Statements  SINUS RHYTHM  ATRIAL PREMATURE COMPLEX  EARLY PRECORDIAL R/S TRANSITION  CONSIDER ANTERIOR INFARCT  Compared to ECG 2022 19:43:19  Myocardial infarct finding now present  Right ventricular hypertrophy no longer present        All labs reviewed by me.    RADIOLOGY  DX-CHEST-PORTABLE (1 VIEW)   Final Result      1.  LEFT lung base atelectasis and/or infiltrate, slightly worse than on prior.   2.  Small LEFT pleural effusion is not excluded.        The radiologist's interpretation of all radiological studies have been reviewed by me.    COURSE & MEDICAL DECISION MAKING  Pertinent Labs & Imaging studies reviewed. (See chart for details)    8:22 PM - Patient seen and examined at bedside. Patient will be treated with small volume nebulizer, Proventil, prednisone 40 mg, and Duoneb. The patient will receive IV fluids for dehydration. Ordered Dx-Chest, lactic acid, pro calcitonin, CBC with diff, CMP, UA, urine culture, and blood culture to evaluate his symptoms. The differential diagnoses include  but are not limited to: Pneumonia, COPD exacerbation, sepsis, myocardial infarction.     9:29 PM - Ordered for troponin for further evaluation of symptoms.    There was a good response to IV fluids after patient reevaluation.    HYDRATION: Based on the patient's presentation of Dehydration the patient was given IV fluids. IV Hydration was used because oral hydration was not adequate alone. Upon recheck following hydration, the patient lactic acid continue to trend up    Discussed the case with Dr. Adams hospitalist who agreed to consult on hospitalization        Medical Decision Making: At this point time I think the patient's most likely has a COPD exacerbation causing his increased oxygen demand.  I do not see any obvious source of infection but the patient's lactic acid has been steadily trending up.  We will start 1/3 L of fluids but I think you benefit from observation with continued trending of his lactic acid    DISPOSITION:  Patient will be hospitalized by Dr. Adams in guarded condition.       FINAL IMPRESSION  1. Acute exacerbation of chronic obstructive pulmonary disease (COPD) (HCC)    2. Hyponatremia    3. Lactic acidosis          Vic NEWMAN), am scribing for, and in the presence of, Huseyin Tatum M.D.    Electronically signed by: Vic Garay), 4/11/2022    Huseyin NEWMAN M.D. personally performed the services described in this documentation, as scribed by Vic Jacobs in my presence, and it is both accurate and complete.    The note accurately reflects work and decisions made by me.  Huseyin Tatum M.D.  4/12/2022  12:42 AM

## 2022-04-12 NOTE — ASSESSMENT & PLAN NOTE
Unclear etiology, no clear infectious symptoms, afebrile, no leukocytosis, procalcitonin negative, UA noninfectious, no obvious cellulitis or dental infection.  Vital signs have been stable.  Could be from hypoxia, possibly from alcohol.  Check serum alcohol.  Has received 3 L IV fluid in the ED but also has had about 2 L urine output in the ED as well.  Urine is clear, specific gravity is 1.007.  Trend lactate  Thiamine supplementation

## 2022-04-12 NOTE — H&P
Hospital Medicine History & Physical Note    Date of Service  4/12/2022    Primary Care Physician  Peterson Amin M.D.    Consultants  None    Code Status  DNAR/DNI    Chief Complaint  Chief Complaint   Patient presents with   • Shortness of Breath     Discharged from here on 4/10 on 3L, pt lives at home alone, started feeling like he could not breath, and bumped himself up to 6L and called EMS       History of Presenting Illness  Juan Manuel Bass is a 71 y.o. male chronic respiratory failure secondary to COPD on 3 L home O2 at baseline, hypertension, GERD, obesity, alcoholism, who presented 4/11/2022 with dyspnea requiring increased oxygen.  Mr. Bass has had multiple recent admissions for dyspnea and ground-level falls related to alcohol abuse.  Most recently discharged 4/10/2022 after presenting with dyspnea.  After a short observation stay improved back to his baseline oxygen requirements and was discharged.  Patient notes since discharge he has been in his baseline health up until the day of admission when he developed severe dyspnea.  He notes he has been coughing however not necessarily increased from baseline, nonproductive, he did have significant chest tightness and wheezing during his dyspnea all of which were unrelieved with his rescue inhalers.  Due to ongoing dyspnea he activated EMS.  He denies recent fevers or chills, chest pain, abdominal pain, nausea vomiting diarrhea or dysuria.    In the ED he is afebrile, normal pulse, he is tachypneic, systolic blood pressures range from 126-178, SPO2 has been greater than 88% on the ED initially was on 6 L titrated down to 3 L maintaining oxygen saturations greater than 92% now.  Initial work-up shows no leukocytosis, stable anemia, chronic hyponatremia 129, anion gap 17 normal renal function lactic acid initially 2.7 did rise to 3.0, UA does not indicate infection procalcitonin undetectable, chest x-ray with left lung base atelectasis versus infiltrate small left  pleural effusion not excluded.  EKG shows sinus rhythm PACs and early precordial RS transition.  Oxygen status improved following steroids and multiple breathing treatments however with lactate continuing to rise patient subsequently referred to hospitalist for admission.  He did receive 3 L IV fluid in the ED with proximately 2 L urine output.    I discussed the plan of care with patient, bedside RN and pharmacy.    Review of Systems  Review of Systems   Constitutional: Negative for chills and fever.   HENT: Negative for congestion and nosebleeds.    Eyes: Negative for photophobia and pain.   Respiratory: Positive for cough, shortness of breath and wheezing.    Cardiovascular: Negative for chest pain and palpitations.   Gastrointestinal: Negative for abdominal pain, diarrhea, nausea and vomiting.   Genitourinary: Positive for frequency. Negative for dysuria and urgency.   Musculoskeletal: Negative for myalgias and neck pain.   Neurological: Negative for speech change, focal weakness and headaches.   Psychiatric/Behavioral: Positive for substance abuse. The patient is not nervous/anxious.        Past Medical History   has a past medical history of Chronic airway obstruction, not elsewhere classified and Hypertension.    Surgical History   has no past surgical history on file.     Family History  family history includes Heart Disease in his father; No Known Problems in his mother.       Social History   reports that he quit smoking about 2 years ago. He has a 4.00 pack-year smoking history. He has never used smokeless tobacco. He reports current alcohol use of about 21.0 oz of alcohol per week. He reports previous drug use.    Allergies  Allergies   Allergen Reactions   • Tetanus Toxoid Hives       Medications  Prior to Admission Medications   Prescriptions Last Dose Informant Patient Reported? Taking?   B Complex Vitamins (VITAMIN B COMPLEX PO)  Patient Yes No   Sig: Take 1 Tablet by mouth every day.    Cyanocobalamin (VITAMIN B12 PO)  Patient Yes No   Sig: Take 1 Tablet by mouth every day.   Fluticasone-Umeclidin-Vilant (TRELEGY ELLIPTA) 100-62.5-25 MCG/INH AEROSOL POWDER, BREATH ACTIVATED inhalation  Patient No No   Sig: Inhale 2-3 Inhalation every morning.   Multiple Vitamins-Minerals (CENTRUM SILVER ADULT 50+) Tab  Patient Yes No   Sig: Take 1 Tablet by mouth every day.   VITAMIN D PO  Patient Yes No   Sig: Take 1 Tablet by mouth every day.   VITAMIN E PO  Patient Yes No   Sig: Take 1 Capsule by mouth every day.   acetaminophen (TYLENOL) 500 MG Tab  Patient Yes No   Sig: Take 500-1,000 mg by mouth every 6 hours as needed for Mild Pain.   albuterol 108 (90 Base) MCG/ACT Aero Soln inhalation aerosol  Patient No No   Sig: Inhale 1-2 Puffs every four hours as needed for Shortness of Breath.   amLODIPine (NORVASC) 5 MG Tab  Patient No No   Sig: Take 1 Tablet by mouth every day.   pantoprazole (PROTONIX) 40 MG Tablet Delayed Response  Patient Yes No   Sig: Take 40 mg by mouth every day.   sodium chloride (SALT) 1 GM Tab  Patient No No   Sig: Take 1 Tablet by mouth every day.      Facility-Administered Medications: None       Physical Exam  Temp:  [35.9 °C (96.6 °F)] 35.9 °C (96.6 °F)  Pulse:  [85-99] 97  Resp:  [13-29] 15  BP: (126-178)/() 166/101  SpO2:  [88 %-99 %] 93 %  Blood Pressure : (!) 168/99   Temperature: 35.9 °C (96.6 °F)   Pulse: 97   Respiration: 20   Pulse Oximetry: 95 %       Physical Exam  Vitals and nursing note reviewed.   Constitutional:       General: He is not in acute distress.     Appearance: He is obese. He is not toxic-appearing.      Comments: 71-year-old male appears chronically ill alert and conversant able speak full sentences no acute distress   HENT:      Head: Normocephalic and atraumatic.      Nose: Nose normal. No rhinorrhea.      Mouth/Throat:      Mouth: Mucous membranes are dry.      Pharynx: Oropharynx is clear.   Eyes:      General: No scleral icterus.     Extraocular  Movements: Extraocular movements intact.      Conjunctiva/sclera: Conjunctivae normal.      Pupils: Pupils are equal, round, and reactive to light.   Cardiovascular:      Rate and Rhythm: Normal rate and regular rhythm.      Pulses: Normal pulses.   Pulmonary:      Effort: No respiratory distress.      Breath sounds: Rales present. No wheezing or rhonchi.      Comments: Diminished breath sounds throughout, occasional expiratory wheeze, bibasilar rales, low work of breathing no distress  Abdominal:      General: Bowel sounds are normal.      Palpations: Abdomen is soft.      Tenderness: There is abdominal tenderness (mild LUQ tenderness). There is no guarding or rebound.   Musculoskeletal:         General: No tenderness. Normal range of motion.      Cervical back: Normal range of motion and neck supple. No rigidity or tenderness.   Skin:     General: Skin is warm and dry.      Capillary Refill: Capillary refill takes less than 2 seconds.      Comments: Bilateral lower extremities with plaques/crusting and scaling, noncellulitic   Neurological:      General: No focal deficit present.      Mental Status: He is alert and oriented to person, place, and time. Mental status is at baseline.      Cranial Nerves: No cranial nerve deficit.      Sensory: No sensory deficit.      Motor: No weakness.      Coordination: Coordination normal.   Psychiatric:         Mood and Affect: Mood normal.         Behavior: Behavior normal.         Thought Content: Thought content normal.         Judgment: Judgment normal.         Laboratory:  Recent Labs     04/09/22  1828 04/10/22  0144 04/11/22  1935   WBC 6.3 7.4 5.5   RBC 4.30* 4.31* 4.07*   HEMOGLOBIN 13.6* 13.7* 12.9*   HEMATOCRIT 39.6* 39.7* 36.7*   MCV 92.1 92.1 90.2   MCH 31.6 31.8 31.7   MCHC 34.3 34.5 35.1   RDW 50.5* 50.1* 47.5   PLATELETCT 223 225 219   MPV 8.9* 8.9* 9.2     Recent Labs     04/09/22  1828 04/10/22  0144 04/11/22  1935   SODIUM 123* 129* 129*   POTASSIUM 4.2 3.7  4.1   CHLORIDE 87* 90* 89*   CO2 23 25 23   GLUCOSE 80 92 103*   BUN 5* 5* 4*   CREATININE 0.50 0.52 0.50   CALCIUM 8.4* 8.5 8.1*     Recent Labs     04/09/22  1828 04/10/22  0144 04/11/22  1935   ALTSGPT 20 19 16   ASTSGOT 19 17 15   ALKPHOSPHAT 77 71 73   TBILIRUBIN 0.4 0.5 0.5   GLUCOSE 80 92 103*         Recent Labs     04/09/22 1828   NTPROBNP 339*         Recent Labs     04/09/22 1828 04/11/22  2044   TROPONINT 18 15       Imaging:  DX-CHEST-PORTABLE (1 VIEW)   Final Result      1.  LEFT lung base atelectasis and/or infiltrate, slightly worse than on prior.   2.  Small LEFT pleural effusion is not excluded.          X-Ray:  I have personally reviewed the images and compared with prior images. and My impression is: chest x-ray with left lung base atelectasis versus infiltrate small left pleural effusion not excluded.   EKG:  I have personally reviewed the images and compared with prior images. and My impression is: EKG shows sinus rhythm PACs and early precordial RS transition.    Assessment/Plan:  I anticipate this patient will require at least two midnights for appropriate medical management, necessitating inpatient admission.    * COPD with acute exacerbation (HCC)- (present on admission)  Assessment & Plan  Maintain sat > 88% for v/q matching  On baseline 3 L nasal cannula following multiple breathing treatments and IV steroids in the ED  Steroids: Prednisone for 5 days  Nebs/rx: Scheduled and as needed, continue home triple therapy  Azithromycin  Procalcitonin negative    Lactic acidosis- (present on admission)  Assessment & Plan  Unclear etiology, no clear infectious symptoms, afebrile, no leukocytosis, procalcitonin negative, UA noninfectious, no obvious cellulitis or dental infection.  Vital signs have been stable.  Could be from hypoxia, possibly from alcohol.  Check serum alcohol.  Has received 3 L IV fluid in the ED but also has had about 2 L urine output in the ED as well.  Urine is clear,  specific gravity is 1.007.  Trend lactate  Thiamine supplementation    Chronic respiratory failure with hypoxia (HCC)- (present on admission)  Assessment & Plan  COPD on 3 L home O2 at baseline.  Initially presented on 6 L nasal cannula to maintain oxygen saturations greater than 90% improved back to baseline following multiple breathing treatments and IV steroid administration.    RT consult, continuous pulse ox, breathing treatments scheduled and as needed, incentive spirometry      GERD (gastroesophageal reflux disease)- (present on admission)  Assessment & Plan  Continue home PPI    Alcoholism (HCC)- (present on admission)  Assessment & Plan  Monitor for signs of withdrawal    Essential hypertension- (present on admission)  Assessment & Plan  Home regimen Norvasc 5 mg daily held initially due to rising lactate.  IV antihypertensives as needed with parameters    Class 3 severe obesity in adult (HCC)- (present on admission)  Assessment & Plan  Strong recommendation for follow-up outpatient PCP for lifestyle modification including diet and exercise regiment of at least 30 min of brisk cardiovascular exercise 3-5 times weekly.        VTE prophylaxis: enoxaparin ppx

## 2022-04-12 NOTE — ED NOTES
Report given to ALDEN Clinton. Patient moving to [facility]. Patient care transferred. All belongings and chart sent with patient.

## 2022-04-12 NOTE — ASSESSMENT & PLAN NOTE
Maintain sat > 88% for v/q matching  On baseline 3 L nasal cannula following multiple breathing treatments and IV steroids in the ED  Steroids: Prednisone for 5 days  Nebs/rx: Scheduled and as needed, continue home triple therapy  Azithromycin  Procalcitonin negative

## 2022-04-12 NOTE — CONSULTS
PULMONARY CONSULT NOTE    DATE OF SERVICE: 4/12/2022    REQUESTING MD: Declan Marcelino M.D.    REASON FOR CONSULT: Shortness of breath    CHIEF COMPLAINT:     HISTORY OF PRESENT ILLNESS:    Juan Manuel Bass is a 71 y.o. male with past medical history that includes COPD (No PFTs on file), chronic hypoxia on 3L oxygen, hypertension, alcoholism, and GERD, who presented to the ED 04/11 for shortness of breath. Patient has been admitted and discharged numerous times (5 times since March alone) for similar symptoms and sometimes was in COPD exacerbation and other instances was not. He was recently discharged for the exact same symptoms 04/09 and  sent to a SNF but left AMA 3 days ago. Patient recalls waking up short of breath yesterday evening that had minimal improvement with his outpatient inhalers. Denied changes in his chronic cough (clear-green), fevers/chills, nausea/vomiting, chest pain, bleeding, new mediations, and recent smoking. Still endorses drinking at least 3-4 beers a day.    On the way to the ER via EMS, he received a treatment of nebulized Albuterol and was sating > 90% on 6L NC. He had an lactic acid up to 3L, but improved to 1.6 after 3L IV fluids. CXR revealed a new, small left lower lung atelectasis vs small pleural effusion. Otherwise other labs revealed shows no leukocytosis, stable anemia, chronic hyponatremia 129 (chronic likely beer potomania), anion gap 17 normal renal function    Upon evaluation, patient is now at his baseline oxygen 3L sating 92%. Denies any shortness of breath and expresses great desire to go home.      Past Medical History:   Diagnosis Date   • Chronic airway obstruction, not elsewhere classified    • Hypertension         History reviewed. No pertinent surgical history.     Current Facility-Administered Medications   Medication Dose Route Frequency Provider Last Rate Last Admin   • omeprazole (PRILOSEC) capsule 20 mg  20 mg Oral DAILY Mickey Jones M.D.   20 mg at 04/12/22  0538   • Respiratory Therapy Consult   Nebulization Continuous RT Mickey Jones M.D.       • ipratropium-albuterol (DUONEB) nebulizer solution  3 mL Nebulization Q4HRS (RT) Mickey Jones M.D.   3 mL at 04/12/22 1052   • ipratropium-albuterol (DUONEB) nebulizer solution  3 mL Nebulization Q2HRS PRN (RT) Mickey Jones M.D.       • senna-docusate (PERICOLACE or SENOKOT S) 8.6-50 MG per tablet 2 Tablet  2 Tablet Oral BID Mickey Jones M.D.        And   • polyethylene glycol/lytes (MIRALAX) PACKET 1 Packet  1 Packet Oral QDAY PRN Mickey Jones M.D.        And   • magnesium hydroxide (MILK OF MAGNESIA) suspension 30 mL  30 mL Oral QDAY PRN Mickey Jones M.D.        And   • bisacodyl (DULCOLAX) suppository 10 mg  10 mg Rectal QDAY PRN Mickey Jones M.D.       • enoxaparin (LOVENOX) inj 40 mg  40 mg Subcutaneous DAILY Mickey Jones M.D.   40 mg at 04/12/22 0538   • acetaminophen (Tylenol) tablet 650 mg  650 mg Oral Q6HRS PRN Mickey Jones M.D.       • hydrALAZINE (APRESOLINE) injection 10 mg  10 mg Intravenous Q4HRS PRN Mickey Jones M.D.       • ondansetron (ZOFRAN) syringe/vial injection 4 mg  4 mg Intravenous Q4HRS PRN Mickey Jones M.D.       • ondansetron (ZOFRAN ODT) dispertab 4 mg  4 mg Oral Q4HRS PRN Mickey Jones M.D.       • predniSONE (DELTASONE) tablet 40 mg  40 mg Oral DAILY Mickey Jones M.D.   40 mg at 04/12/22 0538   • azithromycin (ZITHROMAX) tablet 500 mg  500 mg Oral DAILY Mickey Jones M.D.   500 mg at 04/12/22 0115   • umeclidinium-vilanterol (ANORO ELLIPTA) inhaler 1 Puff  1 Puff Inhalation QDAILY (RT) Mickey Jones M.D.   1 Puff at 04/12/22 1057    And   • fluticasone (FLOVENT HFA) 44 MCG/ACT inhaler 88 mcg  2 Puff Inhalation QDAILY (RT) Mickey Jones M.D.   88 mcg at 04/12/22 1056   • thiamine (B-1) 500 mg in dextrose 5% 100 mL IVPB  500 mg Intravenous TID Mickey Jones M.D.   Stopped at 04/12/22 1122     Current Outpatient  Medications   Medication Sig Dispense Refill   • azithromycin (ZITHROMAX) 250 MG Tab Take 2 Tablets by mouth every day for 2 days. 4 Tablet 0   • predniSONE (DELTASONE) 20 MG Tab Take 2 Tablets by mouth every day for 4 days. 8 Tablet 0   • B Complex Vitamins (VITAMIN B COMPLEX PO) Take 1 Tablet by mouth every day.     • VITAMIN D PO Take 1 Tablet by mouth every day.     • VITAMIN E PO Take 1 Capsule by mouth every day.     • Cyanocobalamin (VITAMIN B12 PO) Take 1 Tablet by mouth every day.     • acetaminophen (TYLENOL) 500 MG Tab Take 500-1,000 mg by mouth every 6 hours as needed for Mild Pain.     • sodium chloride (SALT) 1 GM Tab Take 1 Tablet by mouth every day. 30 Tablet 0   • albuterol 108 (90 Base) MCG/ACT Aero Soln inhalation aerosol Inhale 1-2 Puffs every four hours as needed for Shortness of Breath. 1 Each 10   • amLODIPine (NORVASC) 5 MG Tab Take 1 Tablet by mouth every day. 30 Tablet 0   • Fluticasone-Umeclidin-Vilant (TRELEGY ELLIPTA) 100-62.5-25 MCG/INH AEROSOL POWDER, BREATH ACTIVATED inhalation Inhale 2-3 Inhalation every morning. 1 Each 10   • Multiple Vitamins-Minerals (CENTRUM SILVER ADULT 50+) Tab Take 1 Tablet by mouth every day.     • pantoprazole (PROTONIX) 40 MG Tablet Delayed Response Take 40 mg by mouth every day.         Tetanus toxoid    Social History     Tobacco Use   • Smoking status: Former Smoker     Packs/day: 1.00     Years: 4.00     Pack years: 4.00     Quit date: 3/7/2020     Years since quittin.0   • Smokeless tobacco: Never Used   Substance Use Topics   • Alcohol use: Yes     Alcohol/week: 21.0 oz     Types: 35 Cans of beer per week     Comment: last drink 2022   • Drug use: Not Currently       Social History:   Tobacco: Quit 2 years ago, 30+ pack year history  Alcohol: Current drinker, drinks 5+ beers daily  Recreational drugs (illegal and prescription):  Denies    Family History   Problem Relation Age of Onset   • No Known Problems Mother    • Heart Disease Father         Review of Systems:  Review of Systems   Constitutional: Negative for chills, fever and weight loss.   HENT: Negative for ear pain, hearing loss and tinnitus.    Eyes: Negative for blurred vision, double vision, photophobia and pain.   Respiratory: Positive for cough (Chronic but unchanged), sputum production (Chronic clear-white, but unchanged and not frequent) and shortness of breath. Negative for hemoptysis and wheezing.    Cardiovascular: Negative for chest pain, palpitations, orthopnea, claudication and leg swelling.   Gastrointestinal: Negative for abdominal pain, diarrhea, heartburn, nausea and vomiting.   Genitourinary: Negative for dysuria, frequency, hematuria and urgency.   Musculoskeletal: Negative for myalgias and neck pain.   Skin: Negative for itching and rash.   Neurological: Negative for dizziness, tingling and headaches.   Psychiatric/Behavioral: Positive for substance abuse (Alcohol). Negative for depression, hallucinations and suicidal ideas. The patient is not nervous/anxious.        PHYSICAL EXAM:  Vitals:    04/12/22 0900 04/12/22 1000 04/12/22 1054 04/12/22 1100   BP:    (!) 161/90   Pulse: (!) 116 (!) 123 (!) 122 (!) 123   Resp: (!) 31 20 18 (!) 34   Temp:    36.7 °C (98 °F)   TempSrc:       SpO2: 94% 88% 95% 93%   Weight:       Height:           Physical Exam  Constitutional:       General: He is not in acute distress.     Appearance: He is obese. He is not ill-appearing, toxic-appearing or diaphoretic.   HENT:      Head: Normocephalic.      Mouth/Throat:      Mouth: Mucous membranes are dry.      Pharynx: Oropharynx is clear. No oropharyngeal exudate.   Cardiovascular:      Rate and Rhythm: Regular rhythm. Tachycardia present.      Pulses: Normal pulses.      Heart sounds: Normal heart sounds.   Pulmonary:      Effort: No respiratory distress.      Breath sounds: Wheezing (Expiratory) and rhonchi (B/L Lower lobes) present.      Comments: On 3L NC (baseline)  Chest:      Chest wall: No  tenderness.   Abdominal:      General: Abdomen is flat. Bowel sounds are normal. There is no distension.      Palpations: Abdomen is soft. There is no mass.      Tenderness: There is no abdominal tenderness.      Hernia: No hernia is present.   Musculoskeletal:         General: Normal range of motion.      Right lower leg: No edema.      Left lower leg: No edema.      Comments: Dry, flaky skin in B/L LEs up to the knees. Chronic per patient and non-painful and not infectious appearing. Left hallus top toenail came off upon sock removal, but no bleeding or pain noted.    Skin:     General: Skin is warm and dry.      Capillary Refill: Capillary refill takes less than 2 seconds.      Coloration: Skin is not jaundiced or pale.      Findings: No bruising, erythema or rash.   Neurological:      Mental Status: He is alert. Mental status is at baseline.   Psychiatric:         Mood and Affect: Mood normal.         Behavior: Behavior normal.         Thought Content: Thought content normal.         LABORATORY:  Recent Labs     04/09/22  1828 04/10/22  0144 04/11/22  1935   WBC 6.3 7.4 5.5   RBC 4.30* 4.31* 4.07*   HEMOGLOBIN 13.6* 13.7* 12.9*   HEMATOCRIT 39.6* 39.7* 36.7*   MCV 92.1 92.1 90.2   MCH 31.6 31.8 31.7   RDW 50.5* 50.1* 47.5   PLATELETCT 223 225 219   MPV 8.9* 8.9* 9.2   NEUTSPOLYS 57.00 66.40 58.50   LYMPHOCYTES 26.90 17.90* 23.20   MONOCYTES 12.30 11.70 15.00*   EOSINOPHILS 2.60 2.70 1.30   BASOPHILS 0.20 0.10 0.20     Recent Labs     04/09/22  1828 04/10/22  0144 04/11/22 1935   SODIUM 123* 129* 129*   POTASSIUM 4.2 3.7 4.1   CHLORIDE 87* 90* 89*   CO2 23 25 23   GLUCOSE 80 92 103*   BUN 5* 5* 4*         RADIOLOGY:  DX-CHEST-PORTABLE (1 VIEW)   Final Result      1.  LEFT lung base atelectasis and/or infiltrate, slightly worse than on prior.   2.  Small LEFT pleural effusion is not excluded.            ASSESSMENT AND RECOMMENDATIONS  #Acute on Chronic Hypoxic Respiratory failure  #Possible COPD vs obesity  hypoventilation syndrome  #Chronic Alcohol Abuse  #Chronic anemia  #Chronic hyponatremia  #Lactic Acidosis  No PFTs on file, and patient seen again for similar SOB symptoms and not very compliant with medications. Labs are at his baseline in terms of anemia and hyponatremia (like 2/2 beer potomania) plus lactic acid levels improved after 3L IVFs. Not a COPD exacerbation and likely hypoxia is 2/2 to alcohol abuse and medication non-compliance.    -No need to continue COPD exacerbation protocol  -Con't home supplemental oxygen, at his baseline of 3L  -Recommend continuing Thiamine and home ICS, Albuterol, and Trelegy Ellipta  -Recommend and advised patient to stay compliant with medications and to cut down or quit drinking  -Recommend patient follow up with PCP or Pulmonology outpatient as there are previous notes for June 2022 PFTs

## 2022-04-12 NOTE — DISCHARGE INSTRUCTIONS
Discharge Instructions    Discharged to home by taxi with escort. Discharged via wheelchair, hospital escort: Yes.  Special equipment needed: Not Applicable    Be sure to schedule a follow-up appointment with your primary care doctor or any specialists as instructed.     Discharge Plan:        I understand that a diet low in cholesterol, fat, and sodium is recommended for good health. Unless I have been given specific instructions below for another diet, I accept this instruction as my diet prescription.   Other diet: Regular    Special Instructions: Chronic Obstructive Pulmonary Disease (COPD) is a long-term, progressive lung disease that makes it harder to breathe. It includes chronic bronchitis, emphysema, and refractory (non-reversible) asthma. With COPD, the airways in your lungs become inflamed and thicken, and the tissue where oxygen is exchanged is destroyed. The flow of air in and out of your lungs decreases. When that happens, less oxygen gets into your body tissues, and it becomes harder to get rid of the waste gas carbon dioxide. As the disease gets worse, shortness of breath makes it harder to remain active. There is no cure for COPD, but it is preventable and treatable.    COPD Patient Discharge Instructions    • Diet  o Follow a low fat, low cholesterol, low salt diet unless instructed otherwise by your Doctor. Read the labels on the back of food products and track your intake of fat, cholesterol and salt.  • No smoking  o Discontinuing smoking will have the biggest impact on preventing progression of disease.  o To participate in [employer]’s Quit Tobacco Program, call 413-9246 or visit [email or web address]  • Oxygen  o If your doctor has order that you wear oxygen at home, it is important to wear it as ordered.  o Do not smoke, vape, or use e-cigarettes with oxygen on.  o Store in an appropriate location: upright in its delarosa or laid flat down, away from open flames and stoves.   o Do not use  oil-based creams and moisturizers (ie: petroleum products, oil-based lip moisturizers) or aerosol sprays (ie: hair sprays or deodorants) when using your oxygen equipment.  o Be careful with tubing placement to prevent yourself and others from tripping.  • Medications  o Refer to your personalized Action Plan to manage your symptoms.  • Warning signs of an exacerbation  o Breathing fast and shallow, worsening shortness of breath (like you just finished exercising)  o Chest tightness  o Increases in sputum production  o Changes in sputum color  o Lower oxygen levels than baseline  • When to call your doctor  o If the warning signs of an exacerbation do not improve  o Refer to your personalized Action Plan   • Pulmonary Rehab  o Your doctor has ordered you a referral to Pulmonary Rehab. Call [phone] to schedule an appointment  • Attend your follow-up appointment with your PCP and/or Pulmonologist  • Remote Monitoring: At the direction of the remote monitoring on-call provider, you may increase your oxygen by 2 liters above your baseline.     See the educational handout provided by your COPD Navigator for more information. This also explains more about COPD, symptoms of an exacerbation, and some of the tests that you have undergone.    · Is patient discharged on Warfarin / Coumadin?   No     Depression / Suicide Risk    As you are discharged from this [facility] facility, it is important to learn how to keep safe from harming yourself.    Recognize the warning signs:  · Abrupt changes in personality, positive or negative- including increase in energy   · Giving away possessions  · Change in eating patterns- significant weight changes-  positive or negative  · Change in sleeping patterns- unable to sleep or sleeping all the time   · Unwillingness or inability to communicate  · Depression  · Unusual sadness, discouragement and loneliness  · Talk of wanting to die  · Neglect of personal appearance   · Rebelliousness-  reckless behavior  · Withdrawal from people/activities they love  · Confusion- inability to concentrate     If you or a loved one observes any of these behaviors or has concerns about self-harm, here's what you can do:  · Talk about it- your feelings and reasons for harming yourself  · Remove any means that you might use to hurt yourself (examples: pills, rope, extension cords, firearm)  · Get professional help from the community (Mental Health, Substance Abuse, psychological counseling)  · Do not be alone:Call your Safe Contact- someone whom you trust who will be there for you.  · Call your local CRISIS HOTLINE 495-8578 or 603-135-5983  · Call your local Children's Mobile Crisis Response Team [place] (741) 209-1384 or www.Code71  · Call the toll free National Suicide Prevention Hotlines   · National Suicide Prevention Lifeline 092-077-HBHH (3215)  · National Hope Line Network 800-SUICIDE (866-2598)

## 2022-04-12 NOTE — ASSESSMENT & PLAN NOTE
Home regimen Norvasc 5 mg daily held initially due to rising lactate.  IV antihypertensives as needed with parameters

## 2022-04-12 NOTE — ED TRIAGE NOTES
"Chief Complaint   Patient presents with   • Shortness of Breath     Discharged from here on 4/10 on 3L, pt lives at home alone, started feeling like he could not breath, and bumped himself up to 6L and called EMS       BIB EMS to green 25, pt on monitor and in gown, labs drawn. Pt consists of: came in for above complaint, currently O2 sat >90% on 6L NC    Medications given en route:1 neb albuterol, PIV placed to the RAC    /91   Pulse 91   Resp (!) 29   Ht 1.702 m (5' 7\")   Wt (!) 131 kg (289 lb)   SpO2 99%   BMI 45.26 kg/m²   "

## 2022-04-12 NOTE — ASSESSMENT & PLAN NOTE
Strong recommendation for follow-up outpatient PCP for lifestyle modification including diet and exercise regiment of at least 30 min of brisk cardiovascular exercise 3-5 times weekly.

## 2022-04-14 LAB
BACTERIA BLD CULT: ABNORMAL
BACTERIA BLD CULT: ABNORMAL
BACTERIA UR CULT: NORMAL
SIGNIFICANT IND 70042: ABNORMAL
SIGNIFICANT IND 70042: NORMAL
SITE SITE: ABNORMAL
SITE SITE: NORMAL
SOURCE SOURCE: ABNORMAL
SOURCE SOURCE: NORMAL

## 2022-04-16 LAB
BACTERIA BLD CULT: NORMAL
SIGNIFICANT IND 70042: NORMAL
SITE SITE: NORMAL
SOURCE SOURCE: NORMAL

## 2022-09-12 ENCOUNTER — APPOINTMENT (OUTPATIENT)
Dept: RADIOLOGY | Facility: MEDICAL CENTER | Age: 72
DRG: 641 | End: 2022-09-12
Attending: EMERGENCY MEDICINE
Payer: MEDICARE

## 2022-09-12 ENCOUNTER — HOSPITAL ENCOUNTER (INPATIENT)
Facility: MEDICAL CENTER | Age: 72
LOS: 2 days | DRG: 641 | End: 2022-09-14
Attending: EMERGENCY MEDICINE | Admitting: INTERNAL MEDICINE
Payer: MEDICARE

## 2022-09-12 DIAGNOSIS — J96.11 CHRONIC RESPIRATORY FAILURE WITH HYPOXIA (HCC): ICD-10-CM

## 2022-09-12 DIAGNOSIS — M25.50 ARTHRALGIA, UNSPECIFIED JOINT: ICD-10-CM

## 2022-09-12 DIAGNOSIS — E87.1 HYPONATREMIA: ICD-10-CM

## 2022-09-12 DIAGNOSIS — J18.1 RIGHT LOWER LOBE CONSOLIDATION (HCC): ICD-10-CM

## 2022-09-12 DIAGNOSIS — F10.920 ALCOHOLIC INTOXICATION WITHOUT COMPLICATION (HCC): ICD-10-CM

## 2022-09-12 DIAGNOSIS — R41.82 ALTERED MENTAL STATUS, UNSPECIFIED ALTERED MENTAL STATUS TYPE: ICD-10-CM

## 2022-09-12 DIAGNOSIS — J44.1 COPD WITH ACUTE EXACERBATION (HCC): ICD-10-CM

## 2022-09-12 PROBLEM — D64.9 ANEMIA: Status: ACTIVE | Noted: 2022-09-12

## 2022-09-12 LAB
ALBUMIN SERPL BCP-MCNC: 3.6 G/DL (ref 3.2–4.9)
ALBUMIN/GLOB SERPL: 1.3 G/DL
ALP SERPL-CCNC: 84 U/L (ref 30–99)
ALT SERPL-CCNC: 21 U/L (ref 2–50)
ANION GAP SERPL CALC-SCNC: 15 MMOL/L (ref 7–16)
APPEARANCE UR: CLEAR
AST SERPL-CCNC: 53 U/L (ref 12–45)
BASOPHILS # BLD AUTO: 0.2 % (ref 0–1.8)
BASOPHILS # BLD: 0.01 K/UL (ref 0–0.12)
BILIRUB SERPL-MCNC: 0.4 MG/DL (ref 0.1–1.5)
BILIRUB UR QL STRIP.AUTO: NEGATIVE
BUN SERPL-MCNC: 4 MG/DL (ref 8–22)
CALCIUM SERPL-MCNC: 8.3 MG/DL (ref 8.5–10.5)
CHLORIDE SERPL-SCNC: 83 MMOL/L (ref 96–112)
CO2 SERPL-SCNC: 24 MMOL/L (ref 20–33)
COLOR UR: YELLOW
CREAT SERPL-MCNC: 0.47 MG/DL (ref 0.5–1.4)
EOSINOPHIL # BLD AUTO: 0.13 K/UL (ref 0–0.51)
EOSINOPHIL NFR BLD: 2 % (ref 0–6.9)
ERYTHROCYTE [DISTWIDTH] IN BLOOD BY AUTOMATED COUNT: 40.9 FL (ref 35.9–50)
ETHANOL BLD-MCNC: 192.5 MG/DL
FERRITIN SERPL-MCNC: 381 NG/ML (ref 22–322)
GFR SERPLBLD CREATININE-BSD FMLA CKD-EPI: 110 ML/MIN/1.73 M 2
GLOBULIN SER CALC-MCNC: 2.8 G/DL (ref 1.9–3.5)
GLUCOSE SERPL-MCNC: 91 MG/DL (ref 65–99)
GLUCOSE UR STRIP.AUTO-MCNC: NEGATIVE MG/DL
HCT VFR BLD AUTO: 34.9 % (ref 42–52)
HGB BLD-MCNC: 13 G/DL (ref 14–18)
IMM GRANULOCYTES # BLD AUTO: 0.03 K/UL (ref 0–0.11)
IMM GRANULOCYTES NFR BLD AUTO: 0.5 % (ref 0–0.9)
IRON SATN MFR SERPL: 42 % (ref 15–55)
IRON SERPL-MCNC: 107 UG/DL (ref 50–180)
KETONES UR STRIP.AUTO-MCNC: NEGATIVE MG/DL
LACTATE SERPL-SCNC: 2.7 MMOL/L (ref 0.5–2)
LACTATE SERPL-SCNC: 2.8 MMOL/L (ref 0.5–2)
LEUKOCYTE ESTERASE UR QL STRIP.AUTO: NEGATIVE
LYMPHOCYTES # BLD AUTO: 1.26 K/UL (ref 1–4.8)
LYMPHOCYTES NFR BLD: 19.8 % (ref 22–41)
MCH RBC QN AUTO: 34.5 PG (ref 27–33)
MCHC RBC AUTO-ENTMCNC: 37.2 G/DL (ref 33.7–35.3)
MCV RBC AUTO: 92.6 FL (ref 81.4–97.8)
MICRO URNS: NORMAL
MONOCYTES # BLD AUTO: 0.75 K/UL (ref 0–0.85)
MONOCYTES NFR BLD AUTO: 11.8 % (ref 0–13.4)
NEUTROPHILS # BLD AUTO: 4.17 K/UL (ref 1.82–7.42)
NEUTROPHILS NFR BLD: 65.7 % (ref 44–72)
NITRITE UR QL STRIP.AUTO: NEGATIVE
NRBC # BLD AUTO: 0 K/UL
NRBC BLD-RTO: 0 /100 WBC
OSMOLALITY SERPL: 307 MOSM/KG H2O (ref 278–298)
PH UR STRIP.AUTO: 5.5 [PH] (ref 5–8)
PLATELET # BLD AUTO: 250 K/UL (ref 164–446)
PMV BLD AUTO: 9.4 FL (ref 9–12.9)
POTASSIUM SERPL-SCNC: 3.7 MMOL/L (ref 3.6–5.5)
PROCALCITONIN SERPL-MCNC: 0.06 NG/ML
PROT SERPL-MCNC: 6.4 G/DL (ref 6–8.2)
PROT UR QL STRIP: NEGATIVE MG/DL
RBC # BLD AUTO: 3.77 M/UL (ref 4.7–6.1)
RBC UR QL AUTO: NEGATIVE
SODIUM SERPL-SCNC: 122 MMOL/L (ref 135–145)
SP GR UR STRIP.AUTO: 1.01
TIBC SERPL-MCNC: 255 UG/DL (ref 250–450)
UIBC SERPL-MCNC: 148 UG/DL (ref 110–370)
UROBILINOGEN UR STRIP.AUTO-MCNC: 0.2 MG/DL
WBC # BLD AUTO: 6.4 K/UL (ref 4.8–10.8)

## 2022-09-12 PROCEDURE — 700111 HCHG RX REV CODE 636 W/ 250 OVERRIDE (IP): Performed by: EMERGENCY MEDICINE

## 2022-09-12 PROCEDURE — 700102 HCHG RX REV CODE 250 W/ 637 OVERRIDE(OP): Performed by: INTERNAL MEDICINE

## 2022-09-12 PROCEDURE — 83550 IRON BINDING TEST: CPT

## 2022-09-12 PROCEDURE — A9270 NON-COVERED ITEM OR SERVICE: HCPCS | Performed by: INTERNAL MEDICINE

## 2022-09-12 PROCEDURE — 72125 CT NECK SPINE W/O DYE: CPT

## 2022-09-12 PROCEDURE — 83930 ASSAY OF BLOOD OSMOLALITY: CPT

## 2022-09-12 PROCEDURE — 770001 HCHG ROOM/CARE - MED/SURG/GYN PRIV*

## 2022-09-12 PROCEDURE — 84145 PROCALCITONIN (PCT): CPT

## 2022-09-12 PROCEDURE — 700111 HCHG RX REV CODE 636 W/ 250 OVERRIDE (IP): Performed by: INTERNAL MEDICINE

## 2022-09-12 PROCEDURE — 96374 THER/PROPH/DIAG INJ IV PUSH: CPT

## 2022-09-12 PROCEDURE — A9270 NON-COVERED ITEM OR SERVICE: HCPCS

## 2022-09-12 PROCEDURE — 99285 EMERGENCY DEPT VISIT HI MDM: CPT

## 2022-09-12 PROCEDURE — 83605 ASSAY OF LACTIC ACID: CPT

## 2022-09-12 PROCEDURE — 82077 ASSAY SPEC XCP UR&BREATH IA: CPT

## 2022-09-12 PROCEDURE — 82728 ASSAY OF FERRITIN: CPT

## 2022-09-12 PROCEDURE — 700102 HCHG RX REV CODE 250 W/ 637 OVERRIDE(OP)

## 2022-09-12 PROCEDURE — 81003 URINALYSIS AUTO W/O SCOPE: CPT

## 2022-09-12 PROCEDURE — 99223 1ST HOSP IP/OBS HIGH 75: CPT | Mod: AI | Performed by: INTERNAL MEDICINE

## 2022-09-12 PROCEDURE — 87040 BLOOD CULTURE FOR BACTERIA: CPT

## 2022-09-12 PROCEDURE — 80053 COMPREHEN METABOLIC PANEL: CPT

## 2022-09-12 PROCEDURE — 83540 ASSAY OF IRON: CPT

## 2022-09-12 PROCEDURE — 94760 N-INVAS EAR/PLS OXIMETRY 1: CPT

## 2022-09-12 PROCEDURE — 87086 URINE CULTURE/COLONY COUNT: CPT

## 2022-09-12 PROCEDURE — 70450 CT HEAD/BRAIN W/O DYE: CPT

## 2022-09-12 PROCEDURE — 71045 X-RAY EXAM CHEST 1 VIEW: CPT

## 2022-09-12 PROCEDURE — 36415 COLL VENOUS BLD VENIPUNCTURE: CPT

## 2022-09-12 PROCEDURE — 85025 COMPLETE CBC W/AUTO DIFF WBC: CPT

## 2022-09-12 RX ORDER — FLUTICASONE PROPIONATE 44 UG/1
2 AEROSOL, METERED RESPIRATORY (INHALATION)
Status: DISCONTINUED | OUTPATIENT
Start: 2022-09-12 | End: 2022-09-14 | Stop reason: HOSPADM

## 2022-09-12 RX ORDER — ALBUTEROL SULFATE 90 UG/1
1-2 AEROSOL, METERED RESPIRATORY (INHALATION)
Status: DISCONTINUED | OUTPATIENT
Start: 2022-09-12 | End: 2022-09-14 | Stop reason: HOSPADM

## 2022-09-12 RX ORDER — LORAZEPAM 1 MG/1
0.5 TABLET ORAL EVERY 4 HOURS PRN
Status: DISCONTINUED | OUTPATIENT
Start: 2022-09-12 | End: 2022-09-14 | Stop reason: HOSPADM

## 2022-09-12 RX ORDER — ONDANSETRON 4 MG/1
4 TABLET, ORALLY DISINTEGRATING ORAL EVERY 4 HOURS PRN
Status: DISCONTINUED | OUTPATIENT
Start: 2022-09-12 | End: 2022-09-14 | Stop reason: HOSPADM

## 2022-09-12 RX ORDER — DIAZEPAM 5 MG/ML
5 INJECTION, SOLUTION INTRAMUSCULAR; INTRAVENOUS
Status: DISCONTINUED | OUTPATIENT
Start: 2022-09-12 | End: 2022-09-14 | Stop reason: HOSPADM

## 2022-09-12 RX ORDER — CEFTRIAXONE 2 G/1
2 INJECTION, POWDER, FOR SOLUTION INTRAMUSCULAR; INTRAVENOUS ONCE
Status: COMPLETED | OUTPATIENT
Start: 2022-09-12 | End: 2022-09-12

## 2022-09-12 RX ORDER — ACETAMINOPHEN 325 MG/1
650 TABLET ORAL EVERY 6 HOURS PRN
Status: DISCONTINUED | OUTPATIENT
Start: 2022-09-12 | End: 2022-09-14 | Stop reason: HOSPADM

## 2022-09-12 RX ORDER — AMOXICILLIN 250 MG
2 CAPSULE ORAL 2 TIMES DAILY
Status: DISCONTINUED | OUTPATIENT
Start: 2022-09-12 | End: 2022-09-14 | Stop reason: HOSPADM

## 2022-09-12 RX ORDER — LORAZEPAM 2 MG/1
4 TABLET ORAL
Status: DISCONTINUED | OUTPATIENT
Start: 2022-09-12 | End: 2022-09-14 | Stop reason: HOSPADM

## 2022-09-12 RX ORDER — FOLIC ACID 1 MG/1
1 TABLET ORAL DAILY
Status: DISCONTINUED | OUTPATIENT
Start: 2022-09-12 | End: 2022-09-14 | Stop reason: HOSPADM

## 2022-09-12 RX ORDER — POLYETHYLENE GLYCOL 3350 17 G/17G
1 POWDER, FOR SOLUTION ORAL
Status: DISCONTINUED | OUTPATIENT
Start: 2022-09-12 | End: 2022-09-14 | Stop reason: HOSPADM

## 2022-09-12 RX ORDER — LORAZEPAM 2 MG/1
2 TABLET ORAL
Status: DISCONTINUED | OUTPATIENT
Start: 2022-09-12 | End: 2022-09-14 | Stop reason: HOSPADM

## 2022-09-12 RX ORDER — LORAZEPAM 1 MG/1
1 TABLET ORAL EVERY 4 HOURS PRN
Status: DISCONTINUED | OUTPATIENT
Start: 2022-09-12 | End: 2022-09-14 | Stop reason: HOSPADM

## 2022-09-12 RX ORDER — ENOXAPARIN SODIUM 100 MG/ML
40 INJECTION SUBCUTANEOUS DAILY
Status: DISCONTINUED | OUTPATIENT
Start: 2022-09-12 | End: 2022-09-14 | Stop reason: HOSPADM

## 2022-09-12 RX ORDER — ONDANSETRON 2 MG/ML
4 INJECTION INTRAMUSCULAR; INTRAVENOUS EVERY 4 HOURS PRN
Status: DISCONTINUED | OUTPATIENT
Start: 2022-09-12 | End: 2022-09-14 | Stop reason: HOSPADM

## 2022-09-12 RX ORDER — BISACODYL 10 MG
10 SUPPOSITORY, RECTAL RECTAL
Status: DISCONTINUED | OUTPATIENT
Start: 2022-09-12 | End: 2022-09-14 | Stop reason: HOSPADM

## 2022-09-12 RX ORDER — GAUZE BANDAGE 2" X 2"
100 BANDAGE TOPICAL DAILY
Status: DISCONTINUED | OUTPATIENT
Start: 2022-09-12 | End: 2022-09-14 | Stop reason: HOSPADM

## 2022-09-12 RX ORDER — CHOLECALCIFEROL (VITAMIN D3) 125 MCG
5 CAPSULE ORAL NIGHTLY PRN
Status: DISCONTINUED | OUTPATIENT
Start: 2022-09-12 | End: 2022-09-14 | Stop reason: HOSPADM

## 2022-09-12 RX ADMIN — SENNOSIDES AND DOCUSATE SODIUM 2 TABLET: 50; 8.6 TABLET ORAL at 18:33

## 2022-09-12 RX ADMIN — THERA TABS 1 TABLET: TAB at 18:33

## 2022-09-12 RX ADMIN — ENOXAPARIN SODIUM 40 MG: 40 INJECTION SUBCUTANEOUS at 18:33

## 2022-09-12 RX ADMIN — CEFTRIAXONE SODIUM 2 G: 2 INJECTION, POWDER, FOR SOLUTION INTRAMUSCULAR; INTRAVENOUS at 16:12

## 2022-09-12 RX ADMIN — Medication 100 MG: at 18:33

## 2022-09-12 RX ADMIN — Medication 5 MG: at 23:56

## 2022-09-12 RX ADMIN — FOLIC ACID 1 MG: 1 TABLET ORAL at 18:33

## 2022-09-12 ASSESSMENT — LIFESTYLE VARIABLES
AGITATION: NORMAL ACTIVITY
PAROXYSMAL SWEATS: NO SWEAT VISIBLE
AVERAGE NUMBER OF DAYS PER WEEK YOU HAVE A DRINK CONTAINING ALCOHOL: 7
HEADACHE, FULLNESS IN HEAD: NOT PRESENT
DOES PATIENT WANT TO STOP DRINKING: NO
NAUSEA AND VOMITING: NO NAUSEA AND NO VOMITING
TOTAL SCORE: 1
EVER HAD A DRINK FIRST THING IN THE MORNING TO STEADY YOUR NERVES TO GET RID OF A HANGOVER: NO
TOTAL SCORE: 1
CONSUMPTION TOTAL: POSITIVE
EVER FELT BAD OR GUILTY ABOUT YOUR DRINKING: NO
HAVE PEOPLE ANNOYED YOU BY CRITICIZING YOUR DRINKING: NO
AUDITORY DISTURBANCES: NOT PRESENT
ANXIETY: NO ANXIETY (AT EASE)
ALCOHOL_USE: YES
TOTAL SCORE: 0
HOW MANY TIMES IN THE PAST YEAR HAVE YOU HAD 5 OR MORE DRINKS IN A DAY: 0
HAVE YOU EVER FELT YOU SHOULD CUT DOWN ON YOUR DRINKING: YES
SUBSTANCE_ABUSE: 0
ON A TYPICAL DAY WHEN YOU DRINK ALCOHOL HOW MANY DRINKS DO YOU HAVE: 5
TREMOR: NO TREMOR
ORIENTATION AND CLOUDING OF SENSORIUM: ORIENTED AND CAN DO SERIAL ADDITIONS
VISUAL DISTURBANCES: NOT PRESENT
TOTAL SCORE: 1

## 2022-09-12 ASSESSMENT — COGNITIVE AND FUNCTIONAL STATUS - GENERAL
MOVING FROM LYING ON BACK TO SITTING ON SIDE OF FLAT BED: A LITTLE
CLIMB 3 TO 5 STEPS WITH RAILING: A LOT
HELP NEEDED FOR BATHING: A LITTLE
MOBILITY SCORE: 18
STANDING UP FROM CHAIR USING ARMS: A LITTLE
SUGGESTED CMS G CODE MODIFIER MOBILITY: CK
SUGGESTED CMS G CODE MODIFIER DAILY ACTIVITY: CI
DAILY ACTIVITIY SCORE: 23
WALKING IN HOSPITAL ROOM: A LOT

## 2022-09-12 ASSESSMENT — ENCOUNTER SYMPTOMS
FEVER: 0
BACK PAIN: 0
LOSS OF CONSCIOUSNESS: 0
FALLS: 0
NAUSEA: 1
COUGH: 0
BLURRED VISION: 0
VOMITING: 0
SORE THROAT: 0
SEIZURES: 0
HALLUCINATIONS: 0
ABDOMINAL PAIN: 0
SHORTNESS OF BREATH: 0
CHILLS: 0
FALLS: 1
PALPITATIONS: 0
DOUBLE VISION: 0

## 2022-09-12 ASSESSMENT — FIBROSIS 4 INDEX: FIB4 SCORE: 1.23

## 2022-09-12 NOTE — H&P
Hospital Medicine History & Physical Note    Date of Service  9/12/2022    Primary Care Physician  Peterson Amin M.D.    Code Status  DNAR/DNI    Chief Complaint  Chief Complaint   Patient presents with    ALOC    T-5000 GLF     Per report, pt has had six unwitnessed falls in the past day    Head Injury     Pt reports head trauma when her fell. No evidence of head trauma observed    Urinary Pain       History of Presenting Illness  Juan Manuel Bass is a 72 y.o. male who presented 9/12/2022 with confusion.  Medical history is significant for COPD on 3 L at baseline.  Presents after contacting EMS after sustaining a ground-level fall at home.  Patient reports increased frequency of falling recently.  Patient drinks 5-6 beers daily.  Patient denies nausea, vomiting, diarrhea.  Upon EMS arrival to patient's home patient was found with SPO2 of 85% and was not wearing his oxygen.  Upon presentation to the ED.  Initial laboratory evaluation demonstrated hemoglobin of 13, sodium of 122, chloride 83, creatinine 0.47.  Patient received dose of Rocephin in the ED for suspected right lower lobe pneumonia.    I discussed the plan of care with patient and ER provider .    Review of Systems  Review of Systems   Constitutional:  Negative for chills and fever.   HENT:  Negative for congestion and sore throat.    Eyes:  Negative for blurred vision and double vision.   Respiratory:  Negative for cough and shortness of breath.    Cardiovascular:  Negative for chest pain and palpitations.   Gastrointestinal:  Positive for nausea. Negative for abdominal pain and vomiting.   Genitourinary:  Negative for dysuria and frequency.   Musculoskeletal:  Negative for back pain and falls.   Skin:  Negative for rash.   Neurological:  Negative for seizures and loss of consciousness.   Psychiatric/Behavioral:  Negative for hallucinations and substance abuse.      Past Medical History   has a past medical history of Chronic airway obstruction, not  elsewhere classified and Hypertension.    Surgical History   has no past surgical history on file.     Family History  family history includes Heart Disease in his father; No Known Problems in his mother.   Family history reviewed with patient. There is no family history that is pertinent to the chief complaint.     Social History   reports that he quit smoking about 2 years ago. He has a 4.00 pack-year smoking history. He has never used smokeless tobacco. He reports current alcohol use of about 21.0 oz per week. He reports that he does not currently use drugs.    Allergies  Allergies   Allergen Reactions    Tetanus Toxoid Hives       Medications  Prior to Admission Medications   Prescriptions Last Dose Informant Patient Reported? Taking?   Fluticasone-Umeclidin-Vilant (TRELEGY ELLIPTA) 100-62.5-25 MCG/INH AEROSOL POWDER, BREATH ACTIVATED inhalation unknown at unknown Patient's Home Pharmacy No No   Sig: Inhale 2-3 Inhalation every morning.   albuterol 108 (90 Base) MCG/ACT Aero Soln inhalation aerosol unknown at unknown Patient's Home Pharmacy No No   Sig: Inhale 1-2 Puffs every four hours as needed for Shortness of Breath.      Facility-Administered Medications: None       Physical Exam  Temp:  [37.3 °C (99.1 °F)] 37.3 °C (99.1 °F)  Pulse:  [80] 80  Resp:  [21] 21  BP: (123)/(64) 123/64  SpO2:  [94 %] 94 %  Blood Pressure : 123/64   Temperature: 37.3 °C (99.1 °F)   Pulse: 80   Respiration: (!) 21   Pulse Oximetry: 94 %       Physical Exam  Constitutional:       General: He is not in acute distress.     Appearance: He is not toxic-appearing.   HENT:      Head: Normocephalic.      Right Ear: External ear normal.      Left Ear: External ear normal.      Nose: Nose normal. No congestion.      Mouth/Throat:      Mouth: Mucous membranes are moist.      Pharynx: No oropharyngeal exudate.   Eyes:      General: No scleral icterus.     Pupils: Pupils are equal, round, and reactive to light.   Cardiovascular:      Rate and  Rhythm: Normal rate and regular rhythm.      Heart sounds: No murmur heard.  Abdominal:      Palpations: Abdomen is soft.      Tenderness: There is no abdominal tenderness. There is no guarding or rebound.   Musculoskeletal:         General: No swelling or deformity.   Skin:     Coloration: Skin is not jaundiced.      Findings: No bruising.   Neurological:      General: No focal deficit present.      Mental Status: He is alert and oriented to person, place, and time.      Cranial Nerves: No cranial nerve deficit.   Psychiatric:         Mood and Affect: Mood normal.         Behavior: Behavior normal.       Laboratory:  Recent Labs     09/12/22  1314   WBC 6.4   RBC 3.77*   HEMOGLOBIN 13.0*   HEMATOCRIT 34.9*   MCV 92.6   MCH 34.5*   MCHC 37.2*   RDW 40.9   PLATELETCT 250   MPV 9.4     Recent Labs     09/12/22  1314   SODIUM 122*   POTASSIUM 3.7   CHLORIDE 83*   CO2 24   GLUCOSE 91   BUN 4*   CREATININE 0.47*   CALCIUM 8.3*     Recent Labs     09/12/22  1314   ALTSGPT 21   ASTSGOT 53*   ALKPHOSPHAT 84   TBILIRUBIN 0.4   GLUCOSE 91         No results for input(s): NTPROBNP in the last 72 hours.      No results for input(s): TROPONINT in the last 72 hours.    Imaging:  CT-HEAD W/O   Final Result      1.  Cerebral atrophy.      2.  Otherwise, Head CT without contrast within normal limits. No evidence of acute cerebral infarction, hemorrhage or mass lesion.         CT-CSPINE WITHOUT PLUS RECONS   Final Result         1.  CT of the cervical spine without contrast demonstrates no evidence of acute fracture      2.  Previous anterior cervical fusion from C5 through C7.      3.  Mild degenerative anterolisthesis at the C7-T1 level.      4.  Moderate osteoarthritic changes at C4-5 level with moderate anterior osteophytosis.      DX-CHEST-PORTABLE (1 VIEW)   Final Result      1.  Mild cardiomegaly.      2.  Wedge-shaped opacity in right infrahilar region consistent with atelectasis and/or pneumonitis.          X-Ray:  I have  personally reviewed the images and compared with prior images.    Assessment/Plan:  Justification for Admission Status  I anticipate this patient will require at least two midnights for appropriate medical management, necessitating inpatient admission because hyponatremia with concern for pneumonia and underlying alcohol withdrawl    Patient will need a  bed on EMERGENCY service .  The need is secondary to hyponatremia.    * Hyponatremia- (present on admission)  Assessment & Plan  Appears chronic  Na 122 on admission  No offending medications  Likely beer potomania  SIADH work-up  Fluid restriction    Alcoholism (HCC)- (present on admission)  Assessment & Plan  Patient states she drinks 5-6 beers daily  Diagnostic alcohol 192 on admission  CIWA protocol in place    COPD (chronic obstructive pulmonary disease) (HCC)- (present on admission)  Assessment & Plan  Not in acute exacerbation  found with SPO2 of 85% and was not wearing his oxygen  On 3 L baseline  Continue albuterol, Trelegy    Right lower lobe consolidation (HCC)  Assessment & Plan  As demonstrated on CXR  Possibly atelectasis  Received dose of rocephin in ED  Will check procalcitonin  No leukocytosis, afebrile  Monitor off antibiotics for now    Chronic respiratory failure with hypoxia (HCC)- (present on admission)  Assessment & Plan  On 3 L baseline    Anemia  Assessment & Plan  Hb 13  No gross signs of bleeding  Check iron studies    ACP (advance care planning)- (present on admission)  Assessment & Plan  Patient wishes to be DNR/DNI      VTE prophylaxis: SCDs/TEDs and enoxaparin ppx

## 2022-09-12 NOTE — ED NOTES
Pt's linen's soiled with urine.  Soiled linen removed and replaced with clean ones.  Condom catheter placed.

## 2022-09-12 NOTE — ED NOTES
Med rec updated and complete. Allergies reviewed.. pt unable to participate in an interview at this time. Placed call to pts home pharmacy to verify.    Only prescriptions pt has picked in the last 90 days were inhalers.  Amlodipine 10 mg and protonix 40 mg last picked up May 2022      Home pharmacy [facility] [phone]

## 2022-09-12 NOTE — ASSESSMENT & PLAN NOTE
Appears chronic  Na 122 on admission, improving with sodium restrictions   No offending medications  Likely beer potomania  High serum osmo/low urine osmo   Cont. Fluid restriction

## 2022-09-12 NOTE — ED TRIAGE NOTES
Chief Complaint   Patient presents with   • ALOC   • T-5000 GLF     Per report, pt has had six unwitnessed falls in the past day   • Head Injury     Pt reports head trauma when her fell. No evidence of head trauma observed   • Urinary Pain       Pt BIB [facility] from home for above complaint.  Per report, pt lives alone and has been drinking today.  Pt is A&OX2 (oriented to self and city).  Pt placed on monitor.   Chart up for ERP

## 2022-09-12 NOTE — ASSESSMENT & PLAN NOTE
Patient states she drinks 5-6 beers daily, denies hx of withdrawal   Diagnostic alcohol 192 on admission  CIWA protocol in place  Continue MV/thiamin

## 2022-09-12 NOTE — ED PROVIDER NOTES
"ED Provider Note    Scribed for Dayron Garcia M.D. by Toni Torres. 2022, 1:43 PM.    Primary care provider: Peterson Amin M.D.  Means of arrival: EMS  History obtained from: EMS and Patient  History limited by: Altered Level of Consciousness.     CHIEF COMPLAINT  Chief Complaint   Patient presents with    ALOC    T-5000 GLF     Per report, pt has had six unwitnessed falls in the past day    Head Injury     Pt reports head trauma when her fell. No evidence of head trauma observed    Urinary Pain       HPI  Juan Manuel Bass is a 72 y.o. male who presents to the Emergency Department via EMS for evaluation of altered level of of consciousness. Onset prior to arrival. Per EMS the patient informed them that he has fallen 6-7 times over the past few days and that he has hit his head. He also notes associated symptoms of burning with urination. While in the ED the patient admits to drinking 4-5 beers today. Patient is continuously repeating \"Please help\" but does not indicate what he needs help with. He has a history of COPD and is normally on 3L supplemental oxygen. He is allergic to tetanus toxoid.     History limited by: Altered Level of Consciousness.       REVIEW OF SYSTEMS  Review of Systems   HENT:          Positive for reported head injury.   Genitourinary:  Positive for dysuria.   Musculoskeletal:  Positive for falls.   All other systems reviewed and are negative.    ROS limited by: Altered Level of Consciousness.     PAST MEDICAL HISTORY   has a past medical history of Chronic airway obstruction, not elsewhere classified and Hypertension.    SURGICAL HISTORY  patient denies any surgical history    SOCIAL HISTORY  Social History     Tobacco Use    Smoking status: Former     Packs/day: 1.00     Years: 4.00     Pack years: 4.00     Types: Cigarettes     Quit date: 3/7/2020     Years since quittin.5    Smokeless tobacco: Never   Substance Use Topics    Alcohol use: Yes     Alcohol/week: 21.0 oz     Types: " "35 Cans of beer per week     Comment: last drink 4/11/2022    Drug use: Not Currently      Social History     Substance and Sexual Activity   Drug Use Not Currently       FAMILY HISTORY  Family History   Problem Relation Age of Onset    No Known Problems Mother     Heart Disease Father        CURRENT MEDICATIONS  Current medications can be seen on the nurse's note.       ALLERGIES  Allergies   Allergen Reactions    Tetanus Toxoid Hives       PHYSICAL EXAM  VITAL SIGNS: /64   Pulse 80   Temp 37.3 °C (99.1 °F)   Resp (!) 21   Ht 1.778 m (5' 10\")   Wt (!) 136 kg (300 lb)   SpO2 94%   BMI 43.05 kg/m²   Vitals reviewed.  Constitutional: Alert, Awake, Agitated, Repetitive, Non-toxic appearance.   HENT: Normocephalic, Atraumatic, Bilateral external ears normal, Oropharynx moist, No oral exudates, Nose normal.   Eyes: PERRL, EOMI, Conjunctiva normal, No discharge.   Neck: Normal range of motion, No tenderness, Supple, No stridor.   Cardiovascular: Normal heart rate, Normal rhythm, No murmurs, No rubs, No gallops.   Thorax & Lungs: Diminished breath sounds bilaterally, No respiratory distress,   Abdomen: Bowel sounds normal, Soft, No tenderness  Skin: Skin tear on distal left upper extremity dorsum, Warm, Dry, No erythema, No rash.   Back: No tenderness, No CVA tenderness.   Musculoskeletal: Good range of motion in all major joints.  Mild edema.  Neurologic: Alert, Awake, Agitated, Repetitive, Responds to stimuli, Normal motor function, Normal sensory function, No focal deficits noted.   Psychiatric: Unable to assess        LABS  Results for orders placed or performed during the hospital encounter of 09/12/22   Lactic acid (lactate)   Result Value Ref Range    Lactic Acid 2.8 (H) 0.5 - 2.0 mmol/L   Lactic acid (lactate): Repeat if initial lactic acid result is greater than 2   Result Value Ref Range    Lactic Acid 2.7 (H) 0.5 - 2.0 mmol/L   CBC WITH DIFFERENTIAL   Result Value Ref Range    WBC 6.4 4.8 - 10.8 " K/uL    RBC 3.77 (L) 4.70 - 6.10 M/uL    Hemoglobin 13.0 (L) 14.0 - 18.0 g/dL    Hematocrit 34.9 (L) 42.0 - 52.0 %    MCV 92.6 81.4 - 97.8 fL    MCH 34.5 (H) 27.0 - 33.0 pg    MCHC 37.2 (H) 33.7 - 35.3 g/dL    RDW 40.9 35.9 - 50.0 fL    Platelet Count 250 164 - 446 K/uL    MPV 9.4 9.0 - 12.9 fL    Neutrophils-Polys 65.70 44.00 - 72.00 %    Lymphocytes 19.80 (L) 22.00 - 41.00 %    Monocytes 11.80 0.00 - 13.40 %    Eosinophils 2.00 0.00 - 6.90 %    Basophils 0.20 0.00 - 1.80 %    Immature Granulocytes 0.50 0.00 - 0.90 %    Nucleated RBC 0.00 /100 WBC    Neutrophils (Absolute) 4.17 1.82 - 7.42 K/uL    Lymphs (Absolute) 1.26 1.00 - 4.80 K/uL    Monos (Absolute) 0.75 0.00 - 0.85 K/uL    Eos (Absolute) 0.13 0.00 - 0.51 K/uL    Baso (Absolute) 0.01 0.00 - 0.12 K/uL    Immature Granulocytes (abs) 0.03 0.00 - 0.11 K/uL    NRBC (Absolute) 0.00 K/uL   COMP METABOLIC PANEL   Result Value Ref Range    Sodium 122 (L) 135 - 145 mmol/L    Potassium 3.7 3.6 - 5.5 mmol/L    Chloride 83 (L) 96 - 112 mmol/L    Co2 24 20 - 33 mmol/L    Anion Gap 15.0 7.0 - 16.0    Glucose 91 65 - 99 mg/dL    Bun 4 (L) 8 - 22 mg/dL    Creatinine 0.47 (L) 0.50 - 1.40 mg/dL    Calcium 8.3 (L) 8.5 - 10.5 mg/dL    AST(SGOT) 53 (H) 12 - 45 U/L    ALT(SGPT) 21 2 - 50 U/L    Alkaline Phosphatase 84 30 - 99 U/L    Total Bilirubin 0.4 0.1 - 1.5 mg/dL    Albumin 3.6 3.2 - 4.9 g/dL    Total Protein 6.4 6.0 - 8.2 g/dL    Globulin 2.8 1.9 - 3.5 g/dL    A-G Ratio 1.3 g/dL   URINALYSIS    Specimen: Urine   Result Value Ref Range    Color Yellow     Character Clear     Specific Gravity 1.007 <1.035    Ph 5.5 5.0 - 8.0    Glucose Negative Negative mg/dL    Ketones Negative Negative mg/dL    Protein Negative Negative mg/dL    Bilirubin Negative Negative    Urobilinogen, Urine 0.2 Negative    Nitrite Negative Negative    Leukocyte Esterase Negative Negative    Occult Blood Negative Negative    Micro Urine Req see below    ESTIMATED GFR   Result Value Ref Range    GFR  (CKD-EPI) 110 >60 mL/min/1.73 m 2   DIAGNOSTIC ALCOHOL   Result Value Ref Range    Diagnostic Alcohol 192.5 (H) <10.1 mg/dL       All labs reviewed by me.      RADIOLOGY  CT-HEAD W/O   Final Result      1.  Cerebral atrophy.      2.  Otherwise, Head CT without contrast within normal limits. No evidence of acute cerebral infarction, hemorrhage or mass lesion.         CT-CSPINE WITHOUT PLUS RECONS   Final Result         1.  CT of the cervical spine without contrast demonstrates no evidence of acute fracture      2.  Previous anterior cervical fusion from C5 through C7.      3.  Mild degenerative anterolisthesis at the C7-T1 level.      4.  Moderate osteoarthritic changes at C4-5 level with moderate anterior osteophytosis.      DX-CHEST-PORTABLE (1 VIEW)   Final Result      1.  Mild cardiomegaly.      2.  Wedge-shaped opacity in right infrahilar region consistent with atelectasis and/or pneumonitis.        The radiologist's interpretation of all radiological studies have been reviewed by me.    COURSE & MEDICAL DECISION MAKING  Pertinent Labs & Imaging studies reviewed. (See chart for details)    Obtained and reviewed past medical records.    1:43 PM Patient seen and examined at bedside. The patient presents with altered level of consciousness, and the differential diagnosis includes but is not limited to UTI, sepsis, pneumonia, alcohol intoxication, dehydration, electrolyte abnormality.  Ordered for CT-Head without, CT-Cspine without plus recons, DX-Chest, Lactic acid now and every two hours, CBC with differential, CMP, Urinalysis, Urine culture, and Blood culture x2 to evaluate.     3:14 Ordered diagnostic alcohol for further evaluation.     Transfusion based labs and previous work-up for comparison.  The patient has had recurrent hyponatremia in the past this may be because of the symptoms.    Head CT is unremarkable for intracranial abnormalities.  Neck CT does not show fracture.  Because of his age, abnormal  mental status head CT was indicated.  Neck is not clearable by Nexus criteria.  Chest x-ray shows an infiltrate she was treated before sepsis with antibiotics.    Patient require hospitalization.    4:19 PM Paged Hospitalist.       Patient is hospitalized for further work-up and treatment care is the hospitalist at that time.    DISPOSITION:  Patient will be hospitalized by Dr. Aragon  in guarded condition.      FINAL IMPRESSION  1. Altered mental status, unspecified altered mental status type    2. Right lower lobe consolidation (HCC)    3. COPD with acute exacerbation (HCC)    4. Chronic respiratory failure with hypoxia (HCC)    5. Alcoholic intoxication without complication (HCC)    6. Hyponatremia          Toni NEWMAN (Scribe), am scribing for, and in the presence of, Dayron Garcia M.D..    Electronically signed by: Toni Torres (Scribe), 9/12/2022    Dayron NEWMAN M.D. personally performed the services described in this documentation, as scribed by Toni Torres in my presence, and it is both accurate and complete.    The note accurately reflects work and decisions made by me.  Dayron Garcia M.D.  9/12/2022  6:55 PM

## 2022-09-12 NOTE — ED NOTES
"Pt yelling \"Help, help.\" When this RN attempted to help pt, pt unable to explain what he needs help with.  Pt repositioned to promote comfort.  Pt reoriented and updated on POC. No evidence of learning observed.   Will continue to monitor  "
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
## 2022-09-13 LAB
ANION GAP SERPL CALC-SCNC: 12 MMOL/L (ref 7–16)
ANION GAP SERPL CALC-SCNC: 7 MMOL/L (ref 7–16)
ANION GAP SERPL CALC-SCNC: 9 MMOL/L (ref 7–16)
BUN SERPL-MCNC: 4 MG/DL (ref 8–22)
BUN SERPL-MCNC: 5 MG/DL (ref 8–22)
BUN SERPL-MCNC: 7 MG/DL (ref 8–22)
CALCIUM SERPL-MCNC: 8.3 MG/DL (ref 8.5–10.5)
CALCIUM SERPL-MCNC: 8.3 MG/DL (ref 8.5–10.5)
CALCIUM SERPL-MCNC: 8.6 MG/DL (ref 8.5–10.5)
CHLORIDE SERPL-SCNC: 85 MMOL/L (ref 96–112)
CHLORIDE SERPL-SCNC: 88 MMOL/L (ref 96–112)
CHLORIDE SERPL-SCNC: 88 MMOL/L (ref 96–112)
CO2 SERPL-SCNC: 28 MMOL/L (ref 20–33)
CO2 SERPL-SCNC: 28 MMOL/L (ref 20–33)
CO2 SERPL-SCNC: 29 MMOL/L (ref 20–33)
CREAT SERPL-MCNC: 0.45 MG/DL (ref 0.5–1.4)
CREAT SERPL-MCNC: 0.45 MG/DL (ref 0.5–1.4)
CREAT SERPL-MCNC: 0.64 MG/DL (ref 0.5–1.4)
GFR SERPLBLD CREATININE-BSD FMLA CKD-EPI: 100 ML/MIN/1.73 M 2
GFR SERPLBLD CREATININE-BSD FMLA CKD-EPI: 112 ML/MIN/1.73 M 2
GFR SERPLBLD CREATININE-BSD FMLA CKD-EPI: 112 ML/MIN/1.73 M 2
GLUCOSE SERPL-MCNC: 116 MG/DL (ref 65–99)
GLUCOSE SERPL-MCNC: 137 MG/DL (ref 65–99)
GLUCOSE SERPL-MCNC: 90 MG/DL (ref 65–99)
LACTATE SERPL-SCNC: 1.3 MMOL/L (ref 0.5–2)
OSMOLALITY UR: 215 MOSM/KG H2O (ref 300–900)
POTASSIUM SERPL-SCNC: 3.6 MMOL/L (ref 3.6–5.5)
POTASSIUM SERPL-SCNC: 3.7 MMOL/L (ref 3.6–5.5)
POTASSIUM SERPL-SCNC: 4 MMOL/L (ref 3.6–5.5)
SODIUM SERPL-SCNC: 124 MMOL/L (ref 135–145)
SODIUM SERPL-SCNC: 125 MMOL/L (ref 135–145)
SODIUM SERPL-SCNC: 125 MMOL/L (ref 135–145)
SODIUM UR-SCNC: 20 MMOL/L

## 2022-09-13 PROCEDURE — 97602 WOUND(S) CARE NON-SELECTIVE: CPT

## 2022-09-13 PROCEDURE — 99232 SBSQ HOSP IP/OBS MODERATE 35: CPT | Performed by: STUDENT IN AN ORGANIZED HEALTH CARE EDUCATION/TRAINING PROGRAM

## 2022-09-13 PROCEDURE — 83935 ASSAY OF URINE OSMOLALITY: CPT

## 2022-09-13 PROCEDURE — A9270 NON-COVERED ITEM OR SERVICE: HCPCS

## 2022-09-13 PROCEDURE — 770001 HCHG ROOM/CARE - MED/SURG/GYN PRIV*

## 2022-09-13 PROCEDURE — A9270 NON-COVERED ITEM OR SERVICE: HCPCS | Performed by: INTERNAL MEDICINE

## 2022-09-13 PROCEDURE — 80048 BASIC METABOLIC PNL TOTAL CA: CPT | Mod: 91

## 2022-09-13 PROCEDURE — 83605 ASSAY OF LACTIC ACID: CPT

## 2022-09-13 PROCEDURE — 700102 HCHG RX REV CODE 250 W/ 637 OVERRIDE(OP): Performed by: INTERNAL MEDICINE

## 2022-09-13 PROCEDURE — 700102 HCHG RX REV CODE 250 W/ 637 OVERRIDE(OP)

## 2022-09-13 PROCEDURE — 700111 HCHG RX REV CODE 636 W/ 250 OVERRIDE (IP): Performed by: INTERNAL MEDICINE

## 2022-09-13 PROCEDURE — 84300 ASSAY OF URINE SODIUM: CPT

## 2022-09-13 RX ADMIN — FOLIC ACID 1 MG: 1 TABLET ORAL at 05:17

## 2022-09-13 RX ADMIN — LORAZEPAM 1 MG: 1 TABLET ORAL at 01:14

## 2022-09-13 RX ADMIN — ENOXAPARIN SODIUM 40 MG: 40 INJECTION SUBCUTANEOUS at 18:47

## 2022-09-13 RX ADMIN — Medication 100 MG: at 05:17

## 2022-09-13 RX ADMIN — SENNOSIDES AND DOCUSATE SODIUM 2 TABLET: 50; 8.6 TABLET ORAL at 05:17

## 2022-09-13 RX ADMIN — THERA TABS 1 TABLET: TAB at 05:17

## 2022-09-13 RX ADMIN — Medication 5 MG: at 22:00

## 2022-09-13 RX ADMIN — ACETAMINOPHEN 650 MG: 325 TABLET, FILM COATED ORAL at 22:00

## 2022-09-13 ASSESSMENT — ENCOUNTER SYMPTOMS
HEADACHES: 0
NAUSEA: 0
ABDOMINAL PAIN: 0
SHORTNESS OF BREATH: 1
FEVER: 0
WEAKNESS: 1
CHILLS: 0
VOMITING: 0
FALLS: 1
COUGH: 0
DOUBLE VISION: 0
BLURRED VISION: 0
MEMORY LOSS: 0
DIZZINESS: 0
MYALGIAS: 0
NERVOUS/ANXIOUS: 0
BACK PAIN: 1

## 2022-09-13 ASSESSMENT — LIFESTYLE VARIABLES
VISUAL DISTURBANCES: NOT PRESENT
AGITATION: *
AUDITORY DISTURBANCES: NOT PRESENT
PAROXYSMAL SWEATS: NO SWEAT VISIBLE
PAROXYSMAL SWEATS: NO SWEAT VISIBLE
AUDITORY DISTURBANCES: NOT PRESENT
NAUSEA AND VOMITING: NO NAUSEA AND NO VOMITING
TREMOR: TREMOR NOT VISIBLE BUT CAN BE FELT, FINGERTIP TO FINGERTIP
VISUAL DISTURBANCES: NOT PRESENT
AGITATION: NORMAL ACTIVITY
TOTAL SCORE: 0
TREMOR: NO TREMOR
HEADACHE, FULLNESS IN HEAD: NOT PRESENT
ANXIETY: NO ANXIETY (AT EASE)
ORIENTATION AND CLOUDING OF SENSORIUM: ORIENTED AND CAN DO SERIAL ADDITIONS
ORIENTATION AND CLOUDING OF SENSORIUM: ORIENTED AND CAN DO SERIAL ADDITIONS
TOTAL SCORE: 1
AUDITORY DISTURBANCES: NOT PRESENT
NAUSEA AND VOMITING: NO NAUSEA AND NO VOMITING
HEADACHE, FULLNESS IN HEAD: VERY MILD
HEADACHE, FULLNESS IN HEAD: NOT PRESENT
ORIENTATION AND CLOUDING OF SENSORIUM: ORIENTED AND CAN DO SERIAL ADDITIONS
TOTAL SCORE: 8
TREMOR: NO TREMOR
ANXIETY: MILDLY ANXIOUS
ANXIETY: *
PAROXYSMAL SWEATS: NO SWEAT VISIBLE
VISUAL DISTURBANCES: NOT PRESENT
SUBSTANCE_ABUSE: 1
AGITATION: NORMAL ACTIVITY
NAUSEA AND VOMITING: NO NAUSEA AND NO VOMITING

## 2022-09-13 ASSESSMENT — PAIN DESCRIPTION - PAIN TYPE
TYPE: ACUTE PAIN
TYPE: CHRONIC PAIN

## 2022-09-13 NOTE — CARE PLAN
The patient is Stable - Low risk of patient condition declining or worsening    Shift Goals  Clinical Goals: Safety   Patient Goals: sleep    Progress made toward(s) clinical / shift goals:  Patient free from falls.    Patient is not progressing towards the following goals:

## 2022-09-13 NOTE — ASSESSMENT & PLAN NOTE
As demonstrated on CXR  Possibly atelectasis  Received dose of rocephin in ED  procalcitonin negative, No leukocytosis, afebrile  Monitor off antibiotics for now

## 2022-09-13 NOTE — ASSESSMENT & PLAN NOTE
More frequent falls over the last one week   Check B12   Encourage alcohol cessation   Na improving with fluid restriction   Check orthostatic vitals   PT/OT

## 2022-09-13 NOTE — HOSPITAL COURSE
72-year-old male with a past medical history of COPD on chronic 3 L of oxygen, hypertension and alcohol use who presented to the hospital on 9/12/2022 with confusion after a reported ground-level fall.  Patient had noted he has had increased falling recently but denies syncope.  Patient notes he drinks 5-6 beers daily.  On presentation to the ER he is found to have a sodium of 122, chloride 83 and hemoglobin of 13.  Chest x-ray showed possible right lower lobe opacification and he was treated with 1 dose of Rocephin and admitted for further evaluation.

## 2022-09-13 NOTE — PROGRESS NOTES
"4 Eyes Skin Assessment Completed by ALDEN Short and ALDEN Singh.    Head WDL  Ears: Redness, scab on right ear  Nose WDL  Mouth: Missing teeth  Neck Redness and Blanching  Breast/Chest Bruising  Shoulder Blades WDL  Spine Bruising  (R) Arm/Elbow/Hand Redness, Bruising, Abrasion, and Scab, tear  (L) Arm/Elbow/Hand Redness and Bruising, tear, \"mole\"  Abdomen Bruising  Groin WDL  Scrotum/Coccyx/Buttocks: bruising  (R) Leg Redness and Swelling, flaky, dry  (L) Leg Redness and Swelling, flaky, dry  (R) Heel/Foot/Toe Swelling, flaky, dry  (L) Heel/Foot/Toe Swelling, flaky, dry    Pt with generalized bruising and skin tears on bilat arms. Pt has what he calls a mole, which is discolored and draining. Adaptic, gauze, roll gauze dressings applied       Devices In Places Blood Pressure Cuff, Pulse Ox, Condom Cath, and Oxy Mask      Interventions In Place Pillows and Pressure Redistribution Mattress    Possible Skin Injury No    Pictures Uploaded Into Epic Yes  Wound Consult Placed Yes  RN Wound Prevention Protocol Ordered No    "

## 2022-09-13 NOTE — PROGRESS NOTES
[facility] Medicine Daily Progress Note    Date of Service  9/13/2022    Chief Complaint  Juan Manuel Bass is a 72 y.o. male admitted 9/12/2022 with F    Hospital Course  72-year-old male with a past medical history of COPD on chronic 3 L of oxygen, hypertension and alcohol use who presented to the hospital on 9/12/2022 with confusion after a reported ground-level fall.  Patient had noted he has had increased falling recently but denies syncope.  Patient notes he drinks 5-6 beers daily.  On presentation to the ER he is found to have a sodium of 122, chloride 83 and hemoglobin of 13.  Chest x-ray showed possible right lower lobe opacification and he was treated with 1 dose of Rocephin and admitted for further evaluation.    Interval Problem Update  Patient seen and examined at bedside he is awake and alert he denies any significant complaints aside from wanting to get out of bed and stand up.  Denies any focal symptoms.  He is eager for discharge.  -Does not appear to be withdrawing denies any history of alcohol withdrawal  -Hyponatremia appears chronic it is improving with fluid restriction  -We will check orthostatic vitals given his history of recurrent falls, PT OT also ordered  -Patient is at his baseline oxygen    Pending PT OT needs I suspect that he may be able to discharge back home within the next 24 hours.    I have discussed this patient's plan of care and discharge plan at IDT rounds today with Case Management, Nursing, Nursing leadership, and other members of the IDT team.    Consultants/Specialty  NA    Code Status  DNAR/DNI    Disposition  Patient is not medically cleared for discharge.   Anticipate discharge to  tbd .  I have placed the appropriate orders for post-discharge needs.    Review of Systems  Review of Systems   Constitutional:  Positive for malaise/fatigue. Negative for chills and fever.   HENT:  Negative for congestion.    Eyes:  Negative for blurred vision and double vision.   Respiratory:   Positive for shortness of breath (chronic at baseline). Negative for cough.    Cardiovascular:  Negative for chest pain and leg swelling.   Gastrointestinal:  Negative for abdominal pain, nausea and vomiting.   Genitourinary:  Negative for dysuria, frequency and urgency.   Musculoskeletal:  Positive for back pain and falls. Negative for myalgias.   Neurological:  Positive for weakness (generalized). Negative for dizziness and headaches.   Psychiatric/Behavioral:  Positive for substance abuse (ETOH). Negative for memory loss. The patient is not nervous/anxious.       Physical Exam  Temp:  [36.1 °C (97 °F)-36.8 °C (98.3 °F)] 36.2 °C (97.1 °F)  Pulse:  [75-93] 91  Resp:  [14-20] 18  BP: (115-146)/(59-87) 127/87  SpO2:  [90 %-100 %] 97 %    Physical Exam  Vitals and nursing note reviewed.   Constitutional:       General: He is not in acute distress.     Appearance: He is obese. He is not toxic-appearing.      Comments: Chronically ill appearing   HENT:      Head: Normocephalic and atraumatic.      Mouth/Throat:      Mouth: Mucous membranes are dry.      Pharynx: Oropharynx is clear.   Eyes:      Extraocular Movements: Extraocular movements intact.      Conjunctiva/sclera: Conjunctivae normal.   Cardiovascular:      Rate and Rhythm: Normal rate.   Pulmonary:      Effort: Pulmonary effort is normal.      Comments: Diminished breath sounds, on baseline 3L home O2  Abdominal:      General: Bowel sounds are normal. There is distension.      Palpations: Abdomen is soft.      Tenderness: There is no abdominal tenderness. There is no guarding.   Musculoskeletal:      Cervical back: Neck supple.      Right lower leg: No edema.      Left lower leg: No edema.   Skin:     General: Skin is warm.      Findings: Lesion present.      Comments: Scattered bruising, several skin abrasions to Rt forearm and right auricle concerning for underlying skin cancer    Neurological:      General: No focal deficit present.      Mental Status: He is  oriented to person, place, and time.   Psychiatric:         Mood and Affect: Mood normal.         Behavior: Behavior normal.       Fluids    Intake/Output Summary (Last 24 hours) at 9/13/2022 1649  Last data filed at 9/13/2022 1230  Gross per 24 hour   Intake 1576 ml   Output 2750 ml   Net -1174 ml       Laboratory  Recent Labs     09/12/22  1314   WBC 6.4   RBC 3.77*   HEMOGLOBIN 13.0*   HEMATOCRIT 34.9*   MCV 92.6   MCH 34.5*   MCHC 37.2*   RDW 40.9   PLATELETCT 250   MPV 9.4     Recent Labs     09/12/22  1314 09/13/22  0113 09/13/22  0859   SODIUM 122* 125* 125*   POTASSIUM 3.7 3.6 3.7   CHLORIDE 83* 88* 85*   CO2 24 28 28   GLUCOSE 91 90 137*   BUN 4* 4* 5*   CREATININE 0.47* 0.45* 0.45*   CALCIUM 8.3* 8.3* 8.3*                   Imaging  CT-HEAD W/O   Final Result      1.  Cerebral atrophy.      2.  Otherwise, Head CT without contrast within normal limits. No evidence of acute cerebral infarction, hemorrhage or mass lesion.         CT-CSPINE WITHOUT PLUS RECONS   Final Result         1.  CT of the cervical spine without contrast demonstrates no evidence of acute fracture      2.  Previous anterior cervical fusion from C5 through C7.      3.  Mild degenerative anterolisthesis at the C7-T1 level.      4.  Moderate osteoarthritic changes at C4-5 level with moderate anterior osteophytosis.      DX-CHEST-PORTABLE (1 VIEW)   Final Result      1.  Mild cardiomegaly.      2.  Wedge-shaped opacity in right infrahilar region consistent with atelectasis and/or pneumonitis.           Assessment/Plan  * Hyponatremia- (present on admission)  Assessment & Plan  Appears chronic  Na 122 on admission, improving with sodium restrictions   No offending medications  Likely beer potomania  High serum osmo/low urine osmo   Cont. Fluid restriction    Falls- (present on admission)  Assessment & Plan  More frequent falls over the last one week   Check B12   Encourage alcohol cessation   Na improving with fluid restriction   Check  orthostatic vitals   PT/OT    Right lower lobe consolidation (HCC)  Assessment & Plan  As demonstrated on CXR  Possibly atelectasis  Received dose of rocephin in ED  procalcitonin negative, No leukocytosis, afebrile  Monitor off antibiotics for now    Anemia  Assessment & Plan  Hb 13  No gross signs of bleeding  Normal iron studies     Chronic respiratory failure with hypoxia (HCC)- (present on admission)  Assessment & Plan  On 3 L baseline, not in acute exacerbation   RT protocol     Ear lesion- (present on admission)  Assessment & Plan  Rt ear lesion as well as right arm lesions concerning for underlying skin malignancy. Pt declines further evaluation and does not want dermatology referral on discharge, states he has one     ACP (advance care planning)- (present on admission)  Assessment & Plan  Patient wishes to be DNR/DNI    Alcoholism (HCC)- (present on admission)  Assessment & Plan  Patient states she drinks 5-6 beers daily, denies hx of withdrawal   Diagnostic alcohol 192 on admission  CIWA protocol in place  Continue MV/thiamin     COPD (chronic obstructive pulmonary disease) (HCC)- (present on admission)  Assessment & Plan  Not in acute exacerbation  found with SPO2 of 85% and was not wearing his oxygen  On 3 L baseline  Continue albuterol, Trelegy       VTE prophylaxis: enoxaparin ppx    I have performed a physical exam and reviewed and updated ROS and Plan today (9/13/2022). In review of yesterday's note (9/12/2022), there are no changes except as documented above.

## 2022-09-13 NOTE — ED NOTES
Report given to Joshua RUANO  Pt transported to [facility] via [facility] on monitor by RN

## 2022-09-13 NOTE — ASSESSMENT & PLAN NOTE
Rt ear lesion as well as right arm lesions concerning for underlying skin malignancy. Pt declines further evaluation and does not want dermatology referral on discharge, states he has one

## 2022-09-13 NOTE — DISCHARGE PLANNING
Case Management Discharge Planning    Admission Date: 9/12/2022  GMLOS: 2.6  ALOS: 1    6-Clicks ADL Score: 23  6-Clicks Mobility Score: 18      Anticipated Discharge Dispo: Discharge Disposition: Discharged to home/self care (01)    DME Needed: No    Action(s) Taken: DC Assessment Complete (See below)    Escalations Completed: None    Medically Clear: No    Next Steps: Anticipate return home to his RV.     Barriers to Discharge: Medical clearance          Care Transition Team Assessment    Information Source  Orientation Level: Oriented X4  Information Given By: Patient  Who is responsible for making decisions for patient? : Patient  I spoke with the patient at bedside to complete the assessment. Patient lives alone in a RV trailer with 3 steps to enter. Patient uses home O2 and has a walker. Patient has friends and neighbors who help him at home. Patient uses [facility] for prescriptions. PCP is Dr Amin. Patient states that Kennedi Saulmargaret is his MPOA and decision maker.     Readmission Evaluation  Is this a readmission?: No    Elopement Risk  Legal Hold: No  Ambulatory or Self Mobile in Wheelchair: No-Not an Elopement Risk  Elopement Risk: Not at Risk for Elopement    Interdisciplinary Discharge Planning  Lives with - Patient's Self Care Capacity: Alone and Able to Care For Self  Patient or legal guardian wants to designate a caregiver: No  Support Systems: Friends / Neighbors  Housing / Facility: Motor Home  Do You Take your Prescribed Medications Regularly: Yes  Able to Return to Previous ADL's: Yes  Mobility Issues: No  Prior Services: None  Patient Prefers to be Discharged to:: Home  Assistance Needed: No  Durable Medical Equipment: Home Oxygen, Walker  DME Provider / Phone: Preferred is O2 provider    Discharge Preparedness  What is your plan after discharge?: Home with help  What are your discharge supports?: Other (comment) (Friends and neighbors)         Finances  Financial Barriers to Discharge:  Pt calling for pap results  Pap is normal  HPV negative   No  Prescription Coverage: Yes         Values / Beliefs / Concerns  Values / Beliefs Concerns : No    Advance Directive  Advance Directive?: DPOA for Health Care  Durable Power of  Name and Contact : Kennedi Santo [phone]    Domestic Abuse  Have you ever been the victim of abuse or violence?: No  Physical Abuse or Sexual Abuse: No  Verbal Abuse or Emotional Abuse: No  Possible Abuse/Neglect Reported to:: Not Applicable         Discharge Risks or Barriers  Discharge risks or barriers?: Lives alone, no community support  Patient risk factors: Lack of outside supports, Vulnerable adult    Anticipated Discharge Information  Discharge Disposition: Discharged to home/self care (01)

## 2022-09-13 NOTE — DIETARY
NUTRITION SERVICES: BMI - Pt with BMI >40 (=Body mass index is 43.05 kg/m².), Class III obesity. Weight loss counseling not appropriate in acute care setting. RECOMMEND - If appropriate at DC please refer to outpatient nutrition services for weight management.

## 2022-09-14 ENCOUNTER — PHARMACY VISIT (OUTPATIENT)
Dept: PHARMACY | Facility: MEDICAL CENTER | Age: 72
End: 2022-09-14
Payer: COMMERCIAL

## 2022-09-14 VITALS
RESPIRATION RATE: 18 BRPM | TEMPERATURE: 97.4 F | OXYGEN SATURATION: 97 % | DIASTOLIC BLOOD PRESSURE: 73 MMHG | HEIGHT: 70 IN | WEIGHT: 246.69 LBS | HEART RATE: 83 BPM | SYSTOLIC BLOOD PRESSURE: 137 MMHG | BODY MASS INDEX: 35.32 KG/M2

## 2022-09-14 LAB
ANION GAP SERPL CALC-SCNC: 8 MMOL/L (ref 7–16)
BACTERIA UR CULT: NORMAL
BUN SERPL-MCNC: 6 MG/DL (ref 8–22)
CALCIUM SERPL-MCNC: 8.5 MG/DL (ref 8.5–10.5)
CHLORIDE SERPL-SCNC: 91 MMOL/L (ref 96–112)
CO2 SERPL-SCNC: 31 MMOL/L (ref 20–33)
CREAT SERPL-MCNC: 0.57 MG/DL (ref 0.5–1.4)
GFR SERPLBLD CREATININE-BSD FMLA CKD-EPI: 104 ML/MIN/1.73 M 2
GLUCOSE SERPL-MCNC: 112 MG/DL (ref 65–99)
POTASSIUM SERPL-SCNC: 3.8 MMOL/L (ref 3.6–5.5)
SIGNIFICANT IND 70042: NORMAL
SITE SITE: NORMAL
SODIUM SERPL-SCNC: 130 MMOL/L (ref 135–145)
SOURCE SOURCE: NORMAL
VIT B12 SERPL-MCNC: 679 PG/ML (ref 211–911)

## 2022-09-14 PROCEDURE — 700102 HCHG RX REV CODE 250 W/ 637 OVERRIDE(OP): Performed by: INTERNAL MEDICINE

## 2022-09-14 PROCEDURE — 99239 HOSP IP/OBS DSCHRG MGMT >30: CPT | Performed by: STUDENT IN AN ORGANIZED HEALTH CARE EDUCATION/TRAINING PROGRAM

## 2022-09-14 PROCEDURE — 97161 PT EVAL LOW COMPLEX 20 MIN: CPT

## 2022-09-14 PROCEDURE — 80048 BASIC METABOLIC PNL TOTAL CA: CPT

## 2022-09-14 PROCEDURE — RXMED WILLOW AMBULATORY MEDICATION CHARGE: Performed by: STUDENT IN AN ORGANIZED HEALTH CARE EDUCATION/TRAINING PROGRAM

## 2022-09-14 PROCEDURE — 82607 VITAMIN B-12: CPT

## 2022-09-14 PROCEDURE — A9270 NON-COVERED ITEM OR SERVICE: HCPCS | Performed by: INTERNAL MEDICINE

## 2022-09-14 RX ORDER — HYDROCODONE BITARTRATE AND ACETAMINOPHEN 5; 325 MG/1; MG/1
1 TABLET ORAL 2 TIMES DAILY PRN
Qty: 10 TABLET | Refills: 0 | Status: SHIPPED | OUTPATIENT
Start: 2022-09-14 | End: 2022-09-19

## 2022-09-14 RX ADMIN — FOLIC ACID 1 MG: 1 TABLET ORAL at 04:07

## 2022-09-14 RX ADMIN — ACETAMINOPHEN 650 MG: 325 TABLET, FILM COATED ORAL at 14:43

## 2022-09-14 RX ADMIN — ACETAMINOPHEN 650 MG: 325 TABLET, FILM COATED ORAL at 04:11

## 2022-09-14 RX ADMIN — Medication 100 MG: at 04:07

## 2022-09-14 RX ADMIN — THERA TABS 1 TABLET: TAB at 04:07

## 2022-09-14 ASSESSMENT — PAIN DESCRIPTION - PAIN TYPE: TYPE: CHRONIC PAIN

## 2022-09-14 ASSESSMENT — COGNITIVE AND FUNCTIONAL STATUS - GENERAL
MOBILITY SCORE: 24
SUGGESTED CMS G CODE MODIFIER MOBILITY: CH

## 2022-09-14 ASSESSMENT — FIBROSIS 4 INDEX
FIB4 SCORE: 3.33
FIB4 SCORE: 3.33

## 2022-09-14 ASSESSMENT — GAIT ASSESSMENTS
DISTANCE (FEET): 150
ASSISTIVE DEVICE: FRONT WHEEL WALKER
GAIT LEVEL OF ASSIST: SUPERVISED

## 2022-09-14 NOTE — DISCHARGE PLANNING
RN CM notified by Charge RN that patient needs cab voucher for discharge. Cab voucher provided and and given the Charge RN for discharge transportation.

## 2022-09-14 NOTE — DISCHARGE INSTRUCTIONS
Pain medication has been prescribed, do not drive or drink alcohol while taking this medication   Stop drinking alcohol or limit to no more than 1 drink per day   You should not consume more than 1800 ml of fluid daily   Follow up with  your primary care doctor as soon as possible  Use your walker at all times when mobilizing to avoid falls

## 2022-09-14 NOTE — RESPIRATORY CARE
COPD EDUCATION by COPD CLINICAL EDUCATOR  9/14/2022  at  12:45 PM by Mary Meza, RRT     Patient interviewed by COPD education team.  Patient unable to participate in full program.  A short intervention has been conducted.  A comprehensive packet including information about COPD, types of treatments to manage their disease and safe home Oxygen usage was provided and reviewed with patient at the bedside. Discussed proper way to take medication. We discussed PFT testing, refused to schedule appointment.     COPD Assessment  COPD Clinical Specialists ONLY  COPD Education Initiated: Yes--Short Intervention (pleaseant gentleman, he declined COPD program and booklet , refused to schedule PFT ( has ref from previous visit))  DME Company: Preferred  DME Equipment Type: oxygen 3L, concentrator  Physician Follow Up Appointment:  (declined assistance with scheduling)  Physician Name: VIOLETTA STOLL M.D.  Pulmonologist Name: declined Pulmonary clinics list  Referrals Initiated: Yes  Pulmonary Rehab: Declined  Smoking Cessation: N/A  Palliative Care:  (seen on previous admit, not ordered on this admit)  Hospice: N/A  Home Health Care: N/A  [facility] Outreach: N/A  [facility]: N/A  [facility]  Private In-Home Care Agency: N/A  Is this a COPD exacerbation patient?: No  $ Demo/Eval of SVN's, MDI's and Aerosols:  (declined spacer)  (OP) Pulmonary Function Testing:  (pt has orders for PFT, try to assisatin scheduling him, he stated transportion is hard, offered programs that could assisat. Declined all)    PFT Results    No results found for: PFT    Meds to Beds  Would the patient like to opt in for Bedside Medication Delivery at Discharge?: No     MY COPD ACTION PLAN     It is recommended that patients and physicians /healthcare providers complete this action plan together. This plan should be discussed at each physician visit and updated as needed.    The green, yellow and red zones show  "groups of symptoms of COPD. This list of symptoms is not comprehensive, and you may experience other symptoms. In the \"Actions\" column, your healthcare provider has recommended actions for you to take based on your symptoms.    Patient Name: Juan Manuel Bass   YOB: 1950   Last Updated on: 9/14/2022 12:44 PM   Green Zone:  I am doing well today Actions     Usual activitiy and exercise level   Take daily medications     Usual amounts of cough and phlegm/mucus   Use oxygen as prescribed     Sleep well at night   Continue regular exercise/diet plan     Appetite is good   At all times avoid cigarette smoke, inhaled irritants     Daily Medications (these medications are taken every day):   Fluticasone and Umeclidinium and Vilanterol (Trelogy) 1 Puff Once daily     Additional Information:  Please remember to RINSE MOUTH after taking this medicine    Yellow Zone:  I am having a bad day or a COPD flare Actions     More breathless than usual   Continue daily medications     I have less energy for my daily activities   Use quick relief inhaler as ordered     Increased or thicker phlegm/mucus   Use oxygen as prescribed     Using quick relief inhaler/nebulizer more often   Get plenty of rest     Swelling of ankles more than usual   Use pursed lip breathing     More coughing than usual   At all times avoid cigarette smoke, inhaled irritants     I feel like I have a \"chest cold\"     Poor sleep and my symptoms woke me up     My appetite is not good     My medicine is not helping      Call provider immediately if symptoms don’t improve     Continue daily medications, add rescue medications:   Albuterol 2 Puffs         Medications to be used during a flare up, (as Discussed with Provider):           Additional Information:  Take as directed by your Doctor and use the spacer    Red Zone:  I need urgent medical care Actions     Severe shortness of breath even at rest   Call 911 or seek medical care immediately     Not able to " do any activity because of breathing      Fever or shaking chills      Feeling confused or very drowsy       Chest pains      Coughing up blood

## 2022-09-14 NOTE — CARE PLAN
The patient is Stable - Low risk of patient condition declining or worsening    Shift Goals  Clinical Goals: Safety  Patient Goals: Rest, Comfort  Family Goals: ALEKSANDAR    Progress made toward(s) clinical / shift goals:  Patient is being informed of POC by interdisciplinary team. Patient is aware of lifestyle changes and pain regimen.     Patient is not progressing towards the following goals:

## 2022-09-14 NOTE — CARE PLAN
The patient is Stable - Low risk of patient condition declining or worsening    Shift Goals  Clinical Goals: Safety  Patient Goals: Rest      Problem: Knowledge Deficit - Standard  Goal: Patient and family/care givers will demonstrate understanding of plan of care, disease process/condition, diagnostic tests and medications  Outcome: Progressing     Problem: Fall Risk  Goal: Patient will remain free from falls  Outcome: Progressing       Progress made toward(s) clinical / shift goals:  Patient updated on plan of care. Patient demonstrates proper use of the call light. Fall precautions in place. Hourly rounding provided.

## 2022-09-14 NOTE — PROGRESS NOTES
Nurse rec'd BSR from night shift nurse, updated on POC. Patient denies any pain or any s/s of distress/discomfort. Nurse to resume care, call light in reach. RONEL

## 2022-09-14 NOTE — DISCHARGE SUMMARY
Discharge Summary    CHIEF COMPLAINT ON ADMISSION  Chief Complaint   Patient presents with    ALOC    T-5000 GLF     Per report, pt has had six unwitnessed falls in the past day    Head Injury     Pt reports head trauma when her fell. No evidence of head trauma observed    Urinary Pain       Reason for Admission  EMS     Admission Date  9/12/2022    CODE STATUS  DNAR/DNI    HPI & HOSPITAL COURSE  72-year-old male with a past medical history of COPD on chronic 3 L of oxygen, hypertension and alcohol use who presented to the hospital on 9/12/2022 with confusion after a reported ground-level fall.  Patient had noted he has had increased falling recently but denies syncope.  Patient notes he drinks 5-6 beers daily.  On presentation to the ER he is found to have a sodium of 122, chloride 83 and hemoglobin of 13.  Chest x-ray showed possible right lower lobe opacification and he was treated with 1 dose of Rocephin and admitted for further evaluation.  On further review patient does have chronic hyponatremia likely secondary to alcohol use.  This did improve with fluid restriction he was educated on importance of compliance with fluid restriction and cessation/limitation of his alcohol consumption.   In regards to patient's falls orthostatic vitals were negative he was up ambulatory with therapy using front wheel walker without issue.  I suspect that his falling may be secondary to his alcohol use and he was extensively educated on importance of cessation.  Patient does report that some of his alcohol use is secondary to pain control given his diffuse arthritis.  Patient reports that Tylenol and NSAIDs have been ineffective and helping.  We discussed alternatives and patient will be given a low-dose prescription for hydrocodone.  A 3-day supply was given and he was instructed to follow-up with his primary care doctor for ongoing evaluation for treatment of his arthritis.  He was extensively educated on importance of not  "taking this medication with alcohol and to avoid operating heavy machinery or drive vehicles while taking this medication.  He verbalized understanding.  We did discuss that if he continues to have falls that assisted living may be a better alternative and he adamantly declines this stating \" I would rather die than live in a nursing home\".   At the time of discharge patient is hemodynamically stable and comfortable discharge plan.    Therefore, he is discharged in fair and stable condition to home with close outpatient follow-up.    The patient met 2-midnight criteria for an inpatient stay at the time of discharge.    Discharge Date  9/14/2022    FOLLOW UP ITEMS POST DISCHARGE  Sodium levels   Fluid restriction   Alcohol use   Volume status   Arthritis pain   Chronic medical conditions     DISCHARGE DIAGNOSES  Principal Problem:    Hyponatremia POA: Yes  Active Problems:    COPD (chronic obstructive pulmonary disease) (HCC) POA: Yes    Alcoholism (HCC) POA: Yes    ACP (advance care planning) POA: Yes    Ear lesion POA: Yes    Chronic respiratory failure with hypoxia (HCC) POA: Yes    Anemia POA: Unknown    Right lower lobe consolidation (HCC) POA: Unknown    Falls POA: Yes  Resolved Problems:    * No resolved hospital problems. *      FOLLOW UP  No future appointments.  Peterson Amin M.D.  [address] [address]  [phone]    Schedule an appointment as soon as possible for a visit in 1 week(s)  hospital follow up      MEDICATIONS ON DISCHARGE     Medication List        START taking these medications        Instructions   HYDROcodone-acetaminophen 5-325 MG Tabs per tablet  Commonly known as: Norco   Take 1 Tablet by mouth 2 times a day as needed (pain) for up to 5 days.  Dose: 1 Tablet            CONTINUE taking these medications        Instructions   albuterol 108 (90 Base) MCG/ACT Aers inhalation aerosol   Inhale 1-2 Puffs every four hours as needed for Shortness of Breath.  Dose: 1-2 " Puff     Trelegy Ellipta 100-62.5-25 MCG/INH Aepb inhalation  Generic drug: Fluticasone-Umeclidin-Vilant   Inhale 2-3 Inhalation every morning.  Dose: 2-3 Inhalation              Allergies  Allergies   Allergen Reactions    Tetanus Toxoid Hives       DIET  Orders Placed This Encounter   Procedures    Diet Order Diet: Regular; Fluid modifications: (optional): 1500 ml Fluid Restriction     Standing Status:   Standing     Number of Occurrences:   1     Order Specific Question:   Diet:     Answer:   Regular [1]     Order Specific Question:   Fluid modifications: (optional)     Answer:   1500 ml Fluid Restriction [9]       ACTIVITY  As tolerated.      CONSULTATIONS  NA    PROCEDURES  NA    LABORATORY  Lab Results   Component Value Date    SODIUM 130 (L) 09/14/2022    POTASSIUM 3.8 09/14/2022    CHLORIDE 91 (L) 09/14/2022    CO2 31 09/14/2022    GLUCOSE 112 (H) 09/14/2022    BUN 6 (L) 09/14/2022    CREATININE 0.57 09/14/2022        Lab Results   Component Value Date    WBC 6.4 09/12/2022    HEMOGLOBIN 13.0 (L) 09/12/2022    HEMATOCRIT 34.9 (L) 09/12/2022    PLATELETCT 250 09/12/2022        Total time of the discharge process exceeds 36 minutes.

## 2022-09-15 NOTE — PROGRESS NOTES
Pt DC'd. IV removed, discharge instructions provided to patient, pt verbalizes understanding. Pt states all questions have been answered. Copy of discharge paperwork provided to pt, signed copy in chart. Pt states all belongings in possession. Pt escorted off unit by RN without incident. Pt taking cab home with cab voucher.

## 2022-09-17 LAB
BACTERIA BLD CULT: NORMAL
BACTERIA BLD CULT: NORMAL
SIGNIFICANT IND 70042: NORMAL
SIGNIFICANT IND 70042: NORMAL
SITE SITE: NORMAL
SITE SITE: NORMAL
SOURCE SOURCE: NORMAL
SOURCE SOURCE: NORMAL

## 2022-10-05 ENCOUNTER — APPOINTMENT (OUTPATIENT)
Dept: RADIOLOGY | Facility: MEDICAL CENTER | Age: 72
End: 2022-10-05
Attending: EMERGENCY MEDICINE
Payer: MEDICARE

## 2022-10-05 ENCOUNTER — HOSPITAL ENCOUNTER (EMERGENCY)
Facility: MEDICAL CENTER | Age: 72
End: 2022-10-06
Attending: EMERGENCY MEDICINE
Payer: MEDICARE

## 2022-10-05 DIAGNOSIS — S06.0XAA CONCUSSION WITH UNKNOWN LOSS OF CONSCIOUSNESS STATUS, INITIAL ENCOUNTER: ICD-10-CM

## 2022-10-05 LAB
ALBUMIN SERPL BCP-MCNC: 3.2 G/DL (ref 3.2–4.9)
ALBUMIN/GLOB SERPL: 1 G/DL
ALP SERPL-CCNC: 84 U/L (ref 30–99)
ALT SERPL-CCNC: 18 U/L (ref 2–50)
ANION GAP SERPL CALC-SCNC: 14 MMOL/L (ref 7–16)
AST SERPL-CCNC: 39 U/L (ref 12–45)
BASOPHILS # BLD AUTO: 0.2 % (ref 0–1.8)
BASOPHILS # BLD: 0.01 K/UL (ref 0–0.12)
BILIRUB SERPL-MCNC: 0.6 MG/DL (ref 0.1–1.5)
BUN SERPL-MCNC: 4 MG/DL (ref 8–22)
CALCIUM SERPL-MCNC: 8.5 MG/DL (ref 8.5–10.5)
CHLORIDE SERPL-SCNC: 91 MMOL/L (ref 96–112)
CO2 SERPL-SCNC: 20 MMOL/L (ref 20–33)
CREAT SERPL-MCNC: 0.45 MG/DL (ref 0.5–1.4)
EOSINOPHIL # BLD AUTO: 0.1 K/UL (ref 0–0.51)
EOSINOPHIL NFR BLD: 1.6 % (ref 0–6.9)
ERYTHROCYTE [DISTWIDTH] IN BLOOD BY AUTOMATED COUNT: 44.6 FL (ref 35.9–50)
ETHANOL BLD-MCNC: 194.2 MG/DL
GFR SERPLBLD CREATININE-BSD FMLA CKD-EPI: 112 ML/MIN/1.73 M 2
GLOBULIN SER CALC-MCNC: 3.2 G/DL (ref 1.9–3.5)
GLUCOSE SERPL-MCNC: 87 MG/DL (ref 65–99)
HCT VFR BLD AUTO: 35.8 % (ref 42–52)
HGB BLD-MCNC: 12.7 G/DL (ref 14–18)
IMM GRANULOCYTES # BLD AUTO: 0.02 K/UL (ref 0–0.11)
IMM GRANULOCYTES NFR BLD AUTO: 0.3 % (ref 0–0.9)
LYMPHOCYTES # BLD AUTO: 0.72 K/UL (ref 1–4.8)
LYMPHOCYTES NFR BLD: 11.7 % (ref 22–41)
MCH RBC QN AUTO: 34 PG (ref 27–33)
MCHC RBC AUTO-ENTMCNC: 35.5 G/DL (ref 33.7–35.3)
MCV RBC AUTO: 95.7 FL (ref 81.4–97.8)
MONOCYTES # BLD AUTO: 0.57 K/UL (ref 0–0.85)
MONOCYTES NFR BLD AUTO: 9.3 % (ref 0–13.4)
NEUTROPHILS # BLD AUTO: 4.74 K/UL (ref 1.82–7.42)
NEUTROPHILS NFR BLD: 76.9 % (ref 44–72)
NRBC # BLD AUTO: 0 K/UL
NRBC BLD-RTO: 0 /100 WBC
PLATELET # BLD AUTO: 256 K/UL (ref 164–446)
PMV BLD AUTO: 9.1 FL (ref 9–12.9)
POTASSIUM SERPL-SCNC: 4.6 MMOL/L (ref 3.6–5.5)
PROT SERPL-MCNC: 6.4 G/DL (ref 6–8.2)
RBC # BLD AUTO: 3.74 M/UL (ref 4.7–6.1)
SODIUM SERPL-SCNC: 125 MMOL/L (ref 135–145)
WBC # BLD AUTO: 6.2 K/UL (ref 4.8–10.8)

## 2022-10-05 PROCEDURE — A9270 NON-COVERED ITEM OR SERVICE: HCPCS

## 2022-10-05 PROCEDURE — 700102 HCHG RX REV CODE 250 W/ 637 OVERRIDE(OP)

## 2022-10-05 PROCEDURE — 70450 CT HEAD/BRAIN W/O DYE: CPT

## 2022-10-05 PROCEDURE — 99284 EMERGENCY DEPT VISIT MOD MDM: CPT

## 2022-10-05 PROCEDURE — 700105 HCHG RX REV CODE 258: Performed by: EMERGENCY MEDICINE

## 2022-10-05 PROCEDURE — 82077 ASSAY SPEC XCP UR&BREATH IA: CPT

## 2022-10-05 PROCEDURE — 85025 COMPLETE CBC W/AUTO DIFF WBC: CPT

## 2022-10-05 PROCEDURE — 36415 COLL VENOUS BLD VENIPUNCTURE: CPT

## 2022-10-05 PROCEDURE — 72125 CT NECK SPINE W/O DYE: CPT

## 2022-10-05 PROCEDURE — 70486 CT MAXILLOFACIAL W/O DYE: CPT

## 2022-10-05 PROCEDURE — 80053 COMPREHEN METABOLIC PANEL: CPT

## 2022-10-05 RX ORDER — ACETAMINOPHEN 325 MG/1
650 TABLET ORAL ONCE
Status: COMPLETED | OUTPATIENT
Start: 2022-10-05 | End: 2022-10-05

## 2022-10-05 RX ORDER — SODIUM CHLORIDE 9 MG/ML
INJECTION, SOLUTION INTRAVENOUS CONTINUOUS
Status: DISCONTINUED | OUTPATIENT
Start: 2022-10-05 | End: 2022-10-06 | Stop reason: HOSPADM

## 2022-10-05 RX ADMIN — ACETAMINOPHEN 650 MG: 325 TABLET, FILM COATED ORAL at 21:39

## 2022-10-05 RX ADMIN — SODIUM CHLORIDE: 9 INJECTION, SOLUTION INTRAVENOUS at 16:30

## 2022-10-05 ASSESSMENT — LIFESTYLE VARIABLES
HAVE PEOPLE ANNOYED YOU BY CRITICIZING YOUR DRINKING: NO
TOTAL SCORE: 0
DOES PATIENT WANT TO STOP DRINKING: NO
EVER FELT BAD OR GUILTY ABOUT YOUR DRINKING: NO
AVERAGE NUMBER OF DAYS PER WEEK YOU HAVE A DRINK CONTAINING ALCOHOL: 26
DO YOU DRINK ALCOHOL: YES
TOTAL SCORE: 0
HAVE YOU EVER FELT YOU SHOULD CUT DOWN ON YOUR DRINKING: NO
ON A TYPICAL DAY WHEN YOU DRINK ALCOHOL HOW MANY DRINKS DO YOU HAVE: 5
TOTAL SCORE: 0
EVER HAD A DRINK FIRST THING IN THE MORNING TO STEADY YOUR NERVES TO GET RID OF A HANGOVER: NO
CONSUMPTION TOTAL: INCOMPLETE

## 2022-10-05 ASSESSMENT — FIBROSIS 4 INDEX: FIB4 SCORE: 3.33

## 2022-10-05 NOTE — ED TRIAGE NOTES
Chief Complaint   Patient presents with    T-5000 GLF     Pt BIB [facility] from home s/p GLF. Pt states he fell out of his chair, hit his nose/face. Unknown LOC. Pt does not take blood thinners. Hematoma noted to R eye, dried blood in nose.     Failure to Thrive     Per EMS, pt lives in trailer by himself. Was approx 90 degrees in trailer per EMS. Pt covered in urine/stool. Pt calls EMS multiple times per day for help. Per neighbors, pt has unsafe living situation.     PT BIB [facility] for above. Arrives A&Ox4, GCS 15.     Pt has old bruises to arms.     Hx COPD, HTN. On 3L NC at baseline.    Pt TBI alert, to CT scan from charge desk, to blue 14H after CT. Report given to Rosamaria RUANO.

## 2022-10-05 NOTE — ED PROVIDER NOTES
ED Provider Note    CHIEF COMPLAINT  Chief Complaint   Patient presents with    T-5000 GLF     Pt BIB [facility] from home s/p GLF. Pt states he fell out of his chair, hit his nose/face. Unknown LOC. Pt does not take blood thinners. Hematoma noted to R eye, dried blood in nose.     Failure to Thrive     Per EMS, pt lives in trailer by himself. Was approx 90 degrees in trailer per EMS. Pt covered in urine/stool. Pt calls EMS multiple times per day for help. Per neighbors, pt has unsafe living situation.       HPI  Juan Manuel Bass is a 72 y.o. male who presents with facial pain, headache, and neck pain.  The patient lives in a trailer by himself and he fell over a chair.  EMS is well aware of the patient as they get multiple calls and they are concerned the patient cannot care for himself.  He states he is fine living alone.  He states he does have COPD and hypertension.  He takes his medication as prescribed.  He states he does have a headache as well as facial pain from the fall.  He also has some neck pain.  Otherwise he is unaware of any injuries.  He does not have any paresthesias nor functional loss of his extremities.    REVIEW OF SYSTEMS  See HPI for further details. All other systems are negative.     PAST MEDICAL HISTORY  Past Medical History:   Diagnosis Date    Chronic airway obstruction, not elsewhere classified     Hypertension        FAMILY HISTORY  [unfilled]    SOCIAL HISTORY  Social History     Socioeconomic History    Marital status:    Tobacco Use    Smoking status: Former     Packs/day: 1.00     Years: 4.00     Pack years: 4.00     Types: Cigarettes     Quit date: 3/7/2020     Years since quittin.5    Smokeless tobacco: Never   Substance and Sexual Activity    Alcohol use: Yes     Alcohol/week: 21.0 oz     Types: 35 Cans of beer per week     Comment: 5-6 beers/day    Drug use: Not Currently       SURGICAL HISTORY  No past surgical history on file.    CURRENT MEDICATIONS  Home Medications        "Reviewed by Anita Kaiser R.N. (Registered Nurse) on 10/05/22 at 1535  Med List Status: Partial     Medication Last Dose Status   albuterol 108 (90 Base) MCG/ACT Aero Soln inhalation aerosol  Active   Fluticasone-Umeclidin-Vilant (TRELEGY ELLIPTA) 100-62.5-25 MCG/INH AEROSOL POWDER, BREATH ACTIVATED inhalation  Active                    ALLERGIES  Allergies   Allergen Reactions    Tetanus Toxoid Hives       PHYSICAL EXAM  VITAL SIGNS: /72   Pulse 100   Temp 36.7 °C (98.1 °F) (Temporal)   Resp 20   Ht 1.778 m (5' 10\")   Wt 113 kg (250 lb)   SpO2 96%   BMI 35.87 kg/m²       Constitutional: Unkempt  HENT: Facial contusions and abrasions noted, Bilateral external ears normal, Oropharynx moist, No oral exudates, Nose normal.   Eyes: PERRLA, EOMI, Conjunctiva normal, No discharge.   Neck: Midline cervical discomfort without step-offs  Lymphatic: No lymphadenopathy noted.   Cardiovascular: Normal heart rate, Normal rhythm, No murmurs, No rubs, No gallops.   Thorax & Lungs: Slightly diminished throughout, No respiratory distress, No wheezing, No chest tenderness.   Abdomen: Bowel sounds normal, Soft, No tenderness, No masses, No pulsatile masses.   Skin: Multiple subacute contusions and abrasions throughout his extremities   back: No tenderness, No CVA tenderness.   Extremities: Intact distal pulses, No edema, No tenderness, No cyanosis, No clubbing. .   Neurologic: Alert & oriented x 3, Normal motor function, Normal sensory function, No focal deficits noted.   Psychiatric: Affect normal, Judgment normal, Mood normal.     Results for orders placed or performed during the hospital encounter of 10/05/22   CBC WITH DIFFERENTIAL   Result Value Ref Range    WBC 6.2 4.8 - 10.8 K/uL    RBC 3.74 (L) 4.70 - 6.10 M/uL    Hemoglobin 12.7 (L) 14.0 - 18.0 g/dL    Hematocrit 35.8 (L) 42.0 - 52.0 %    MCV 95.7 81.4 - 97.8 fL    MCH 34.0 (H) 27.0 - 33.0 pg    MCHC 35.5 (H) 33.7 - 35.3 g/dL    RDW 44.6 35.9 - 50.0 fL    " Platelet Count 256 164 - 446 K/uL    MPV 9.1 9.0 - 12.9 fL    Neutrophils-Polys 76.90 (H) 44.00 - 72.00 %    Lymphocytes 11.70 (L) 22.00 - 41.00 %    Monocytes 9.30 0.00 - 13.40 %    Eosinophils 1.60 0.00 - 6.90 %    Basophils 0.20 0.00 - 1.80 %    Immature Granulocytes 0.30 0.00 - 0.90 %    Nucleated RBC 0.00 /100 WBC    Neutrophils (Absolute) 4.74 1.82 - 7.42 K/uL    Lymphs (Absolute) 0.72 (L) 1.00 - 4.80 K/uL    Monos (Absolute) 0.57 0.00 - 0.85 K/uL    Eos (Absolute) 0.10 0.00 - 0.51 K/uL    Baso (Absolute) 0.01 0.00 - 0.12 K/uL    Immature Granulocytes (abs) 0.02 0.00 - 0.11 K/uL    NRBC (Absolute) 0.00 K/uL   COMP METABOLIC PANEL   Result Value Ref Range    Sodium 125 (L) 135 - 145 mmol/L    Potassium 4.6 3.6 - 5.5 mmol/L    Chloride 91 (L) 96 - 112 mmol/L    Co2 20 20 - 33 mmol/L    Anion Gap 14.0 7.0 - 16.0    Glucose 87 65 - 99 mg/dL    Bun 4 (L) 8 - 22 mg/dL    Creatinine 0.45 (L) 0.50 - 1.40 mg/dL    Calcium 8.5 8.5 - 10.5 mg/dL    AST(SGOT) 39 12 - 45 U/L    ALT(SGPT) 18 2 - 50 U/L    Alkaline Phosphatase 84 30 - 99 U/L    Total Bilirubin 0.6 0.1 - 1.5 mg/dL    Albumin 3.2 3.2 - 4.9 g/dL    Total Protein 6.4 6.0 - 8.2 g/dL    Globulin 3.2 1.9 - 3.5 g/dL    A-G Ratio 1.0 g/dL   DIAGNOSTIC ALCOHOL   Result Value Ref Range    Diagnostic Alcohol 194.2 (H) <10.1 mg/dL   ESTIMATED GFR   Result Value Ref Range    GFR (CKD-EPI) 112 >60 mL/min/1.73 m 2         RADIOLOGY/PROCEDURES  CT-HEAD W/O   Final Result      No acute intracranial hemorrhage.         CT-MAXILLOFACIAL W/O PLUS RECONS   Final Result      Right periorbital soft tissue swelling and small soft tissue hematoma in the right cheek. No acute facial fracture. No retro-orbital hematoma.         CT-CSPINE WITHOUT PLUS RECONS   Final Result      No acute cervical fracture or traumatic malalignment.               COURSE & MEDICAL DECISION MAKING  Pertinent Labs & Imaging studies reviewed. (See chart for details)  This a 72-year-old male who presents the  emergency department after a fall.  The patient is on aspirin and therefore he was sent emergently for CT scan of the head.  Did not show any evidence of intracranial hemorrhage.  On arrival he also had evidence of facial trauma and CT scan of the maxillofacial bones was performed.  Again there is no fractures just soft tissue injury.  The patient also had neck pain and CT scan of the cervical spine was performed as well and again this was negative for acute fractures.  The patient's been observed in the emergency department for prolonged period of time.  Laboratory analysis shows a stable anemia as well as hyponatremia.  He is intoxicated and did receive IV fluids.  On repeat exam around 1900 the patient is ambulatory into her's with no difficulty.  There was some concern the patient cannot care for himself but he does not want to be placed.  He is adamant he would like to go home.  Therefore we will get the patient a taxi voucher home and I like him to follow-up with his primary care doctor for routine health maintenance.  He will take Tylenol as needed for discomfort.    FINAL IMPRESSION  1.  Mechanical fall  2.  Concussion without loss of consciousness  3.  Facial contusion  4.  Cervical strain  5.  Stable anemia  6.  Alcohol abuse    Disposition  The patient will be discharged in stable condition         Electronically signed by: Emigdio Gibson M.D., 10/5/2022 3:59 PM

## 2022-10-06 VITALS
WEIGHT: 250 LBS | SYSTOLIC BLOOD PRESSURE: 168 MMHG | BODY MASS INDEX: 35.79 KG/M2 | HEART RATE: 100 BPM | RESPIRATION RATE: 15 BRPM | DIASTOLIC BLOOD PRESSURE: 91 MMHG | HEIGHT: 70 IN | OXYGEN SATURATION: 94 % | TEMPERATURE: 97.6 F

## 2022-10-06 NOTE — ED NOTES
"Pt constantly on the call light stating his desire to be admitted, and that \"if I'm not going to be admitted, I will throw myself on the floor\". Offered reassurance to the patient that staff will establish a safe discharge plan. Social work notified again.  "

## 2022-10-06 NOTE — ED NOTES
Assumed care of pt, received report from ALDEN Blank. Safety rounds completed, pt resting comfortably in the room. He states he wants to be admitted, ERP notified.

## 2022-10-06 NOTE — ED NOTES
Pt medicated with tylenol for generalized pain per MAR. Pt pushing call light every 2 minutes for a new request, Pt ambulated in room with a steady gait with walker.

## 2022-10-06 NOTE — DISCHARGE INSTRUCTIONS
Refrain from further alcohol use.  Stay well-hydrated and recheck with your primary care doctor in 48 to 72 hours.  Take Tylenol as needed for discomfort.

## 2022-10-06 NOTE — ED NOTES
Cab called for pt, Security present to escort pt to lobby to get in the cab.  Discharge instructions given and pt has no follow up questions. Vitals and condition stable for discharge

## 2022-10-06 NOTE — DISCHARGE PLANNING
ER SW made aware that this Pt is cleared for discharge.  SW met with Pt bedside and updated him that he had been medically cleared for discharge and that we would be sending him home.  SW offered to call and get the Pt a O2 tank and provide him with a cab voucher. Pt unsure of home O2 company. SW offered resources for ETOH however Pt declined stating that he has been drinking since he was a teenager and did not plan on stopping now.      DELIO reviewed Pt medical chart and found that the Pt is set up with [facility] for O2 with preferred.  DELIO placed call to [facility] and requested that a tank be delivered bedside in the ER.     Cab Voucher completed and provided to BS RN for discharge ([id number]).

## 2022-10-06 NOTE — ED NOTES
Pt given clean clothes for discharge. Pt able to stand independently and put his clothes on. Security called to escort patient to the lobby, security states they will come as soon as they can

## 2022-10-06 NOTE — ED NOTES
Spoke with [facility] about oxygen tank delivery, they state the O2 tank will be delivered within 30 mins to 3 hours.

## 2022-10-06 NOTE — ED NOTES
"Pt pressing his call light multiple times, reporting it is not working, RN showed patient his call light is working and assisted patient with TV. Patient reports he is refusing to Leave. RN used therapeutic communication with patient and provided active listening for patient's concerns. Pt educated all of patient's work up was clear by ERP and all scans and blood work looked good. RN educated patient, that patient is able to do ADLs and walk with walker with a steady gait. Pt needs reinforcement. Pt reports to RN, \"if you kick me out, I will drop to the ground!\" RN educated patient that the team is waiting for his oxygen  "

## 2022-10-06 NOTE — ED NOTES
Pt able to ambulate without assistance and perform ADLs. Pt is in need of oxygen to go home with, social work aware and working on this issue

## 2022-10-06 NOTE — ED NOTES
Patient pressed call light, again RN assist to unhook patient from the monitor, patient able to stand up with walker and use urinal. Patient becoming rude with RN and reports will fall on the floor if he leaves, RN educated patient about patient being cleared for discharge and those behaviors will not be tolerated.

## 2022-10-06 NOTE — ED NOTES
Assisted pt to bedside chair instructed him to call if he needs to get up. Pt expresses his desire to get admitted, and if we won't admit him here, to send him to another hospital. Social work notified of this issue, will work to establish a safe discharge plan.

## 2022-10-27 ENCOUNTER — HOSPITAL ENCOUNTER (OUTPATIENT)
Facility: MEDICAL CENTER | Age: 72
End: 2022-10-28
Attending: EMERGENCY MEDICINE | Admitting: STUDENT IN AN ORGANIZED HEALTH CARE EDUCATION/TRAINING PROGRAM
Payer: MEDICARE

## 2022-10-27 ENCOUNTER — APPOINTMENT (OUTPATIENT)
Dept: RADIOLOGY | Facility: MEDICAL CENTER | Age: 72
End: 2022-10-27
Attending: EMERGENCY MEDICINE
Payer: MEDICARE

## 2022-10-27 DIAGNOSIS — R53.1 WEAKNESS: ICD-10-CM

## 2022-10-27 DIAGNOSIS — M25.561 ACUTE PAIN OF BOTH KNEES: ICD-10-CM

## 2022-10-27 DIAGNOSIS — S09.90XA CLOSED HEAD INJURY, INITIAL ENCOUNTER: ICD-10-CM

## 2022-10-27 DIAGNOSIS — M54.50 ACUTE LOW BACK PAIN WITHOUT SCIATICA, UNSPECIFIED BACK PAIN LATERALITY: ICD-10-CM

## 2022-10-27 DIAGNOSIS — M25.562 ACUTE PAIN OF BOTH KNEES: ICD-10-CM

## 2022-10-27 DIAGNOSIS — E87.1 HYPONATREMIA: ICD-10-CM

## 2022-10-27 DIAGNOSIS — R55 SYNCOPE, UNSPECIFIED SYNCOPE TYPE: ICD-10-CM

## 2022-10-27 DIAGNOSIS — J44.9 CHRONIC OBSTRUCTIVE PULMONARY DISEASE, UNSPECIFIED COPD TYPE (HCC): ICD-10-CM

## 2022-10-27 PROBLEM — F10.929 ALCOHOL INTOXICATION (HCC): Status: ACTIVE | Noted: 2022-10-27

## 2022-10-27 LAB
ALBUMIN SERPL BCP-MCNC: 3.7 G/DL (ref 3.2–4.9)
ALBUMIN/GLOB SERPL: 1.2 G/DL
ALP SERPL-CCNC: 84 U/L (ref 30–99)
ALT SERPL-CCNC: 21 U/L (ref 2–50)
AMPHET UR QL SCN: NEGATIVE
ANION GAP SERPL CALC-SCNC: 14 MMOL/L (ref 7–16)
APPEARANCE UR: CLEAR
APTT PPP: 26.8 SEC (ref 24.7–36)
AST SERPL-CCNC: 42 U/L (ref 12–45)
BARBITURATES UR QL SCN: NEGATIVE
BASOPHILS # BLD AUTO: 0.1 % (ref 0–1.8)
BASOPHILS # BLD: 0.01 K/UL (ref 0–0.12)
BENZODIAZ UR QL SCN: NEGATIVE
BILIRUB SERPL-MCNC: 0.6 MG/DL (ref 0.1–1.5)
BILIRUB UR QL STRIP.AUTO: NEGATIVE
BUN SERPL-MCNC: 3 MG/DL (ref 8–22)
BZE UR QL SCN: NEGATIVE
CALCIUM SERPL-MCNC: 8.6 MG/DL (ref 8.5–10.5)
CANNABINOIDS UR QL SCN: NEGATIVE
CHLORIDE SERPL-SCNC: 91 MMOL/L (ref 96–112)
CHOLEST SERPL-MCNC: 124 MG/DL (ref 100–199)
CO2 SERPL-SCNC: 21 MMOL/L (ref 20–33)
COLOR UR: YELLOW
CREAT SERPL-MCNC: 0.48 MG/DL (ref 0.5–1.4)
EKG IMPRESSION: NORMAL
EOSINOPHIL # BLD AUTO: 0.08 K/UL (ref 0–0.51)
EOSINOPHIL NFR BLD: 0.7 % (ref 0–6.9)
ERYTHROCYTE [DISTWIDTH] IN BLOOD BY AUTOMATED COUNT: 46.5 FL (ref 35.9–50)
EST. AVERAGE GLUCOSE BLD GHB EST-MCNC: 91 MG/DL
ETHANOL BLD-MCNC: 149.8 MG/DL
GFR SERPLBLD CREATININE-BSD FMLA CKD-EPI: 110 ML/MIN/1.73 M 2
GLOBULIN SER CALC-MCNC: 3.1 G/DL (ref 1.9–3.5)
GLUCOSE SERPL-MCNC: 94 MG/DL (ref 65–99)
GLUCOSE UR STRIP.AUTO-MCNC: NEGATIVE MG/DL
HBA1C MFR BLD: 4.8 % (ref 4–5.6)
HCT VFR BLD AUTO: 40.9 % (ref 42–52)
HDLC SERPL-MCNC: 86 MG/DL
HGB BLD-MCNC: 14.3 G/DL (ref 14–18)
IMM GRANULOCYTES # BLD AUTO: 0.07 K/UL (ref 0–0.11)
IMM GRANULOCYTES NFR BLD AUTO: 0.6 % (ref 0–0.9)
INR PPP: 1.06 (ref 0.87–1.13)
KETONES UR STRIP.AUTO-MCNC: ABNORMAL MG/DL
LDLC SERPL CALC-MCNC: 34 MG/DL
LEUKOCYTE ESTERASE UR QL STRIP.AUTO: NEGATIVE
LIPASE SERPL-CCNC: 21 U/L (ref 11–82)
LYMPHOCYTES # BLD AUTO: 0.83 K/UL (ref 1–4.8)
LYMPHOCYTES NFR BLD: 7.5 % (ref 22–41)
MAGNESIUM SERPL-MCNC: 1.8 MG/DL (ref 1.5–2.5)
MCH RBC QN AUTO: 33.4 PG (ref 27–33)
MCHC RBC AUTO-ENTMCNC: 35 G/DL (ref 33.7–35.3)
MCV RBC AUTO: 95.6 FL (ref 81.4–97.8)
METHADONE UR QL SCN: NEGATIVE
MICRO URNS: ABNORMAL
MONOCYTES # BLD AUTO: 0.85 K/UL (ref 0–0.85)
MONOCYTES NFR BLD AUTO: 7.7 % (ref 0–13.4)
NEUTROPHILS # BLD AUTO: 9.21 K/UL (ref 1.82–7.42)
NEUTROPHILS NFR BLD: 83.4 % (ref 44–72)
NITRITE UR QL STRIP.AUTO: NEGATIVE
NRBC # BLD AUTO: 0 K/UL
NRBC BLD-RTO: 0 /100 WBC
OPIATES UR QL SCN: NEGATIVE
OXYCODONE UR QL SCN: NEGATIVE
PCP UR QL SCN: NEGATIVE
PH UR STRIP.AUTO: 5.5 [PH] (ref 5–8)
PLATELET # BLD AUTO: 250 K/UL (ref 164–446)
PMV BLD AUTO: 9.7 FL (ref 9–12.9)
POTASSIUM SERPL-SCNC: 4.2 MMOL/L (ref 3.6–5.5)
PROCALCITONIN SERPL-MCNC: <0.05 NG/ML
PROPOXYPH UR QL SCN: NEGATIVE
PROT SERPL-MCNC: 6.8 G/DL (ref 6–8.2)
PROT UR QL STRIP: NEGATIVE MG/DL
PROTHROMBIN TIME: 13.7 SEC (ref 12–14.6)
RBC # BLD AUTO: 4.28 M/UL (ref 4.7–6.1)
RBC UR QL AUTO: NEGATIVE
SODIUM SERPL-SCNC: 126 MMOL/L (ref 135–145)
SP GR UR STRIP.AUTO: 1
TRIGL SERPL-MCNC: 20 MG/DL (ref 0–149)
UROBILINOGEN UR STRIP.AUTO-MCNC: 0.2 MG/DL
WBC # BLD AUTO: 11.1 K/UL (ref 4.8–10.8)

## 2022-10-27 PROCEDURE — 83735 ASSAY OF MAGNESIUM: CPT

## 2022-10-27 PROCEDURE — 99220 PR INITIAL OBSERVATION CARE,LEVL III: CPT | Performed by: STUDENT IN AN ORGANIZED HEALTH CARE EDUCATION/TRAINING PROGRAM

## 2022-10-27 PROCEDURE — 85025 COMPLETE CBC W/AUTO DIFF WBC: CPT

## 2022-10-27 PROCEDURE — 84145 PROCALCITONIN (PCT): CPT

## 2022-10-27 PROCEDURE — 85730 THROMBOPLASTIN TIME PARTIAL: CPT

## 2022-10-27 PROCEDURE — 80307 DRUG TEST PRSMV CHEM ANLYZR: CPT

## 2022-10-27 PROCEDURE — G0378 HOSPITAL OBSERVATION PER HR: HCPCS

## 2022-10-27 PROCEDURE — 80061 LIPID PANEL: CPT

## 2022-10-27 PROCEDURE — 70450 CT HEAD/BRAIN W/O DYE: CPT

## 2022-10-27 PROCEDURE — 80053 COMPREHEN METABOLIC PANEL: CPT

## 2022-10-27 PROCEDURE — 700105 HCHG RX REV CODE 258: Performed by: STUDENT IN AN ORGANIZED HEALTH CARE EDUCATION/TRAINING PROGRAM

## 2022-10-27 PROCEDURE — 81003 URINALYSIS AUTO W/O SCOPE: CPT | Mod: XU

## 2022-10-27 PROCEDURE — 83036 HEMOGLOBIN GLYCOSYLATED A1C: CPT

## 2022-10-27 PROCEDURE — 82077 ASSAY SPEC XCP UR&BREATH IA: CPT

## 2022-10-27 PROCEDURE — 72125 CT NECK SPINE W/O DYE: CPT

## 2022-10-27 PROCEDURE — 99285 EMERGENCY DEPT VISIT HI MDM: CPT

## 2022-10-27 PROCEDURE — 83690 ASSAY OF LIPASE: CPT

## 2022-10-27 PROCEDURE — 70486 CT MAXILLOFACIAL W/O DYE: CPT

## 2022-10-27 PROCEDURE — 71045 X-RAY EXAM CHEST 1 VIEW: CPT

## 2022-10-27 PROCEDURE — 36415 COLL VENOUS BLD VENIPUNCTURE: CPT

## 2022-10-27 PROCEDURE — 85610 PROTHROMBIN TIME: CPT

## 2022-10-27 PROCEDURE — 93005 ELECTROCARDIOGRAM TRACING: CPT | Performed by: EMERGENCY MEDICINE

## 2022-10-27 RX ORDER — ONDANSETRON 4 MG/1
4 TABLET, ORALLY DISINTEGRATING ORAL EVERY 4 HOURS PRN
Status: DISCONTINUED | OUTPATIENT
Start: 2022-10-27 | End: 2022-10-28 | Stop reason: HOSPADM

## 2022-10-27 RX ORDER — ACETAMINOPHEN 500 MG
500-1500 TABLET ORAL EVERY 6 HOURS PRN
Status: ON HOLD | COMMUNITY
End: 2022-10-28 | Stop reason: SDUPTHER

## 2022-10-27 RX ORDER — OXYCODONE HYDROCHLORIDE 5 MG/1
2.5 TABLET ORAL
Status: DISCONTINUED | OUTPATIENT
Start: 2022-10-27 | End: 2022-10-28 | Stop reason: HOSPADM

## 2022-10-27 RX ORDER — BISACODYL 10 MG
10 SUPPOSITORY, RECTAL RECTAL
Status: DISCONTINUED | OUTPATIENT
Start: 2022-10-27 | End: 2022-10-28 | Stop reason: HOSPADM

## 2022-10-27 RX ORDER — OXYCODONE HYDROCHLORIDE 5 MG/1
5 TABLET ORAL
Status: DISCONTINUED | OUTPATIENT
Start: 2022-10-27 | End: 2022-10-28 | Stop reason: HOSPADM

## 2022-10-27 RX ORDER — LABETALOL HYDROCHLORIDE 5 MG/ML
10 INJECTION, SOLUTION INTRAVENOUS EVERY 4 HOURS PRN
Status: DISCONTINUED | OUTPATIENT
Start: 2022-10-27 | End: 2022-10-28 | Stop reason: HOSPADM

## 2022-10-27 RX ORDER — ACETAMINOPHEN 325 MG/1
650 TABLET ORAL EVERY 6 HOURS PRN
Status: DISCONTINUED | OUTPATIENT
Start: 2022-10-27 | End: 2022-10-28 | Stop reason: HOSPADM

## 2022-10-27 RX ORDER — ALBUTEROL SULFATE 90 UG/1
1-2 AEROSOL, METERED RESPIRATORY (INHALATION) EVERY 4 HOURS PRN
Status: DISCONTINUED | OUTPATIENT
Start: 2022-10-27 | End: 2022-10-28 | Stop reason: HOSPADM

## 2022-10-27 RX ORDER — POLYETHYLENE GLYCOL 3350 17 G/17G
1 POWDER, FOR SOLUTION ORAL
Status: DISCONTINUED | OUTPATIENT
Start: 2022-10-27 | End: 2022-10-28 | Stop reason: HOSPADM

## 2022-10-27 RX ORDER — IPRATROPIUM BROMIDE AND ALBUTEROL SULFATE 2.5; .5 MG/3ML; MG/3ML
3 SOLUTION RESPIRATORY (INHALATION)
Status: DISCONTINUED | OUTPATIENT
Start: 2022-10-27 | End: 2022-10-28 | Stop reason: HOSPADM

## 2022-10-27 RX ORDER — FLUTICASONE PROPIONATE 44 UG/1
2 AEROSOL, METERED RESPIRATORY (INHALATION)
Status: DISCONTINUED | OUTPATIENT
Start: 2022-10-28 | End: 2022-10-28 | Stop reason: HOSPADM

## 2022-10-27 RX ORDER — ONDANSETRON 2 MG/ML
4 INJECTION INTRAMUSCULAR; INTRAVENOUS EVERY 4 HOURS PRN
Status: DISCONTINUED | OUTPATIENT
Start: 2022-10-27 | End: 2022-10-28 | Stop reason: HOSPADM

## 2022-10-27 RX ORDER — HYDROMORPHONE HYDROCHLORIDE 1 MG/ML
0.25 INJECTION, SOLUTION INTRAMUSCULAR; INTRAVENOUS; SUBCUTANEOUS
Status: DISCONTINUED | OUTPATIENT
Start: 2022-10-27 | End: 2022-10-28 | Stop reason: HOSPADM

## 2022-10-27 RX ORDER — AMOXICILLIN 250 MG
2 CAPSULE ORAL 2 TIMES DAILY
Status: DISCONTINUED | OUTPATIENT
Start: 2022-10-28 | End: 2022-10-28 | Stop reason: HOSPADM

## 2022-10-27 RX ORDER — SODIUM CHLORIDE, SODIUM LACTATE, POTASSIUM CHLORIDE, CALCIUM CHLORIDE 600; 310; 30; 20 MG/100ML; MG/100ML; MG/100ML; MG/100ML
INJECTION, SOLUTION INTRAVENOUS CONTINUOUS
Status: ACTIVE | OUTPATIENT
Start: 2022-10-27 | End: 2022-10-28

## 2022-10-27 RX ADMIN — SODIUM CHLORIDE, POTASSIUM CHLORIDE, SODIUM LACTATE AND CALCIUM CHLORIDE: 600; 310; 30; 20 INJECTION, SOLUTION INTRAVENOUS at 22:53

## 2022-10-27 ASSESSMENT — ENCOUNTER SYMPTOMS
BACK PAIN: 1
BACK PAIN: 0
HEARTBURN: 0
NECK PAIN: 0
CONSTIPATION: 0
BLURRED VISION: 0
DEPRESSION: 0
HEADACHES: 1
SHORTNESS OF BREATH: 0
NAUSEA: 0
LOSS OF CONSCIOUSNESS: 1
FEVER: 0
PALPITATIONS: 0
COUGH: 0
BLOOD IN STOOL: 0
DOUBLE VISION: 0
DIZZINESS: 1
WEAKNESS: 0
DIARRHEA: 0
ABDOMINAL PAIN: 1
FALLS: 1
VOMITING: 0
WHEEZING: 0
BRUISES/BLEEDS EASILY: 0
ABDOMINAL PAIN: 0
CHILLS: 0
FOCAL WEAKNESS: 0
SINUS PAIN: 1
SORE THROAT: 0
INSOMNIA: 0

## 2022-10-27 ASSESSMENT — FIBROSIS 4 INDEX: FIB4 SCORE: 2.59

## 2022-10-27 ASSESSMENT — LIFESTYLE VARIABLES
CONSUMPTION TOTAL: INCOMPLETE
EVER HAD A DRINK FIRST THING IN THE MORNING TO STEADY YOUR NERVES TO GET RID OF A HANGOVER: NO
EVER FELT BAD OR GUILTY ABOUT YOUR DRINKING: NO
HAVE PEOPLE ANNOYED YOU BY CRITICIZING YOUR DRINKING: NO
TOTAL SCORE: 0
HAVE YOU EVER FELT YOU SHOULD CUT DOWN ON YOUR DRINKING: NO
DO YOU DRINK ALCOHOL: YES
TOTAL SCORE: 0
TOTAL SCORE: 0

## 2022-10-27 NOTE — ED PROVIDER NOTES
ED Provider Note    Scribed for Dayron Garcia M.D. by Vic Jacobs. 10/27/2022, 3:07 PM.    Primary care provider: Peterson Amin M.D.  Means of arrival: EMS  History obtained from: EMS  History limited by: Altered mentation    CHIEF COMPLAINT  Chief Complaint   Patient presents with    T-5000 FALL    Head Injury     Abrasion with hematoma to left head,     Facial Injury     Left eye hematoma from fall yesterday.        HPI  Juan Manuel Bass is a 72 y.o. male who presents to the Emergency Department for evaluation following a fall. Per EMS, the patient stood up, felt dizzy, then fell back and hit his head. Patient states he does not remember what happened but notes positive loss of consciousness. EMS states they visited the patient yesterday but the patient does not remember this. EMS reports his blood pressure was 106/68 and had GCS 15. Patient reports back pain and abdominal pain. He notes he is on 3L of oxygen at baseline.    Further history limited secondary to altered mentation    REVIEW OF SYSTEMS  Review of Systems   Reason unable to perform ROS: Altered mentation.   Gastrointestinal:  Positive for abdominal pain.   Musculoskeletal:  Positive for back pain.   Neurological:  Positive for dizziness and loss of consciousness.        Positive for head trauma   C    PAST MEDICAL HISTORY   has a past medical history of Chronic airway obstruction, not elsewhere classified and Hypertension.    SURGICAL HISTORY  patient denies any surgical history    SOCIAL HISTORY  Social History     Tobacco Use    Smoking status: Former     Packs/day: 1.00     Years: 4.00     Pack years: 4.00     Types: Cigarettes     Quit date: 3/7/2020     Years since quittin.6    Smokeless tobacco: Never   Substance Use Topics    Alcohol use: Yes     Alcohol/week: 21.0 oz     Types: 35 Cans of beer per week     Comment: 5-6 beers/day    Drug use: Not Currently      Social History     Substance and Sexual Activity   Drug Use Not Currently  "      FAMILY HISTORY  Family History   Problem Relation Age of Onset    No Known Problems Mother     Heart Disease Father        CURRENT MEDICATIONS  Home Medications       Reviewed by Ivett Malhotra R.N. (Registered Nurse) on 10/27/22 at 1500  Med List Status: Complete     Medication Last Dose Status   albuterol 108 (90 Base) MCG/ACT Aero Soln inhalation aerosol 10/27/2022 Active   Fluticasone-Umeclidin-Vilant (TRELEGY ELLIPTA) 100-62.5-25 MCG/INH AEROSOL POWDER, BREATH ACTIVATED inhalation 10/27/2022 Active                    ALLERGIES  Allergies   Allergen Reactions    Tetanus Toxoid Hives       PHYSICAL EXAM  VITAL SIGNS: /74   Pulse 96   Temp 36.7 °C (98 °F) (Temporal)   Resp 16   Ht 1.778 m (5' 10\")   Wt 113 kg (250 lb)   SpO2 94%   BMI 35.87 kg/m²   Vitals reviewed.  Constitutional: Awake alert chronic ill-appearing no acute cardiopulmonary distress.  HENT: Normocephalic, occipital scalp hematoma and abrasion.  Subacute facial contusions.  Ilateral external ears normal, periorbital ecchymosis.  Appears subacute.  Eyes: PERRL, EOMI, Conjunctiva normal, No discharge.   Neck: Normal range of motion, No tenderness, Supple, No stridor.   Cardiovascular: Normal heart rate, Normal rhythm, No murmurs, No rubs, No gallops.   Thorax & Lungs: Normal breath sounds, No respiratory distress, No wheezing,   Abdomen: Bowel sounds normal, Soft, No tenderness  Skin: Warm, Dry, No erythema, No rash.   Back: No tenderness, No CVA tenderness.   Musculoskeletal: Good range of motion in all major joints.  Mild edema.  No focal bony tenderness.  Neurologic: Alert, confused and slow respond but moves all extremities.    LABS  Results for orders placed or performed during the hospital encounter of 10/27/22   CBC WITH DIFFERENTIAL   Result Value Ref Range    WBC 11.1 (H) 4.8 - 10.8 K/uL    RBC 4.28 (L) 4.70 - 6.10 M/uL    Hemoglobin 14.3 14.0 - 18.0 g/dL    Hematocrit 40.9 (L) 42.0 - 52.0 %    MCV 95.6 81.4 - 97.8 fL "    MCH 33.4 (H) 27.0 - 33.0 pg    MCHC 35.0 33.7 - 35.3 g/dL    RDW 46.5 35.9 - 50.0 fL    Platelet Count 250 164 - 446 K/uL    MPV 9.7 9.0 - 12.9 fL    Neutrophils-Polys 83.40 (H) 44.00 - 72.00 %    Lymphocytes 7.50 (L) 22.00 - 41.00 %    Monocytes 7.70 0.00 - 13.40 %    Eosinophils 0.70 0.00 - 6.90 %    Basophils 0.10 0.00 - 1.80 %    Immature Granulocytes 0.60 0.00 - 0.90 %    Nucleated RBC 0.00 /100 WBC    Neutrophils (Absolute) 9.21 (H) 1.82 - 7.42 K/uL    Lymphs (Absolute) 0.83 (L) 1.00 - 4.80 K/uL    Monos (Absolute) 0.85 0.00 - 0.85 K/uL    Eos (Absolute) 0.08 0.00 - 0.51 K/uL    Baso (Absolute) 0.01 0.00 - 0.12 K/uL    Immature Granulocytes (abs) 0.07 0.00 - 0.11 K/uL    NRBC (Absolute) 0.00 K/uL   COMP METABOLIC PANEL   Result Value Ref Range    Sodium 126 (L) 135 - 145 mmol/L    Potassium 4.2 3.6 - 5.5 mmol/L    Chloride 91 (L) 96 - 112 mmol/L    Co2 21 20 - 33 mmol/L    Anion Gap 14.0 7.0 - 16.0    Glucose 94 65 - 99 mg/dL    Bun 3 (L) 8 - 22 mg/dL    Creatinine 0.48 (L) 0.50 - 1.40 mg/dL    Calcium 8.6 8.5 - 10.5 mg/dL    AST(SGOT) 42 12 - 45 U/L    ALT(SGPT) 21 2 - 50 U/L    Alkaline Phosphatase 84 30 - 99 U/L    Total Bilirubin 0.6 0.1 - 1.5 mg/dL    Albumin 3.7 3.2 - 4.9 g/dL    Total Protein 6.8 6.0 - 8.2 g/dL    Globulin 3.1 1.9 - 3.5 g/dL    A-G Ratio 1.2 g/dL   LIPASE   Result Value Ref Range    Lipase 21 11 - 82 U/L   DIAGNOSTIC ALCOHOL   Result Value Ref Range    Diagnostic Alcohol 149.8 (H) <10.1 mg/dL   ESTIMATED GFR   Result Value Ref Range    GFR (CKD-EPI) 110 >60 mL/min/1.73 m 2   URINALYSIS    Specimen: Urine, Clean Catch   Result Value Ref Range    Color Yellow     Character Clear     Specific Gravity 1.005 <1.035    Ph 5.5 5.0 - 8.0    Glucose Negative Negative mg/dL    Ketones Trace (A) Negative mg/dL    Protein Negative Negative mg/dL    Bilirubin Negative Negative    Urobilinogen, Urine 0.2 Negative    Nitrite Negative Negative    Leukocyte Esterase Negative Negative    Occult Blood  Negative Negative    Micro Urine Req see below    APTT   Result Value Ref Range    APTT 26.8 24.7 - 36.0 sec   Prothrombin Time   Result Value Ref Range    PT 13.7 12.0 - 14.6 sec    INR 1.06 0.87 - 1.13   Lipid Profile   Result Value Ref Range    Cholesterol,Tot 124 100 - 199 mg/dL    Triglycerides 20 0 - 149 mg/dL    HDL 86 >=40 mg/dL    LDL 34 <100 mg/dL   HEMOGLOBIN A1C   Result Value Ref Range    Glycohemoglobin 4.8 4.0 - 5.6 %    Est Avg Glucose 91 mg/dL   MAGNESIUM   Result Value Ref Range    Magnesium 1.8 1.5 - 2.5 mg/dL   PROCALCITONIN   Result Value Ref Range    Procalcitonin <0.05 <0.25 ng/mL   EKG (NOW)   Result Value Ref Range    Report       [facility] Emergency Dept.    Test Date:  2022-10-27  Pt Name:    BRIDGETTE VARNER                  Department: ER  MRN:        [id number]                      Room:        11  Gender:     Male                         Technician: [id number]  :        1950                   Requested By:TIMMY CLEMENTS  Order #:    [id number]                    Reading MD: TIMMY CLEMENTS. AMD    Measurements  Intervals                                Axis  Rate:       82                           P:          -67  MT:         166                          QRS:        9  QRSD:       104                          T:          24  QT:         378  QTc:        442    Interpretive Statements  Ectopic atrial rhythm  RSR' in V1 or V2, right VCD or RVH  Inferior infarct, old  Compared to ECG 2022 22:32:05  Ectopic atrial rhythm now present  Right ventricular hypertrophy now present  RSR' in V1 or V2 now present  Sinus rhythm no longer present  Atrial premature complex(es) no longer present  M yocardial infarct finding still present  Electronically Signed On 10- 22:42:40 PDT by TIMMY CLEMENTS. AMD        All labs reviewed by me.    EKG Interpretation  Interpreted by me    Results for orders placed or performed during the hospital encounter of 22   EKG (NOW)    Result Value Ref Range    Report       [facility] Emergency Dept.    Test Date:  2022  Pt Name:    BRIDGETTE VARNER                  Department: ER  MRN:        [id number]                      Room:        25  Gender:     Male                         Technician: [id number]  :        1950                   Requested By:HARJIT MARQUEZ  Order #:    [id number]                    Reading MD: HARJIT MARQUEZ MD    Measurements  Intervals                                Axis  Rate:       95                           P:          35  IA:         184                          QRS:        45  QRSD:       94                           T:          45  QT:         356  QTc:        448    Interpretive Statements  SINUS RHYTHM, rate of 95, normal axis  ATRIAL PREMATURE COMPLEX  EARLY PRECORDIAL R/S TRANSITION  CONSIDER ANTERIOR INFARCT  Compared to ECG 2022 19:43:19  Myocardial infarct finding now present  Right ventricular hypertrophy no longer present  Electronically Signed On 2022 0:30:04 PDT b y HARJIT MARQUEZ MD          RADIOLOGY  DX-CHEST-PORTABLE (1 VIEW)   Final Result      Bibasilar atelectasis.      CT-HEAD W/O   Final Result      1.  No acute intracranial abnormality      2.  Cerebral volume loss and white matter change         CT-CSPINE WITHOUT PLUS RECONS   Final Result      No acute cervical fracture or traumatic malalignment.         CT-MAXILLOFACIAL W/O PLUS RECONS   Final Result      1.  No acute facial fracture.   2.  Left periorbital soft tissue swelling. Globes are symmetric.   3.  2.0 x 3.0 cm right neck rounded soft tissue density possibly a sebaceous cyst, similar to prior CT. Correlate with exam.   4.  Mild left maxillary sinus disease.      EC-ECHOCARDIOGRAM COMPLETE W/O CONT    (Results Pending)     The radiologist's interpretation of all radiological studies have been reviewed by me.    COURSE & MEDICAL DECISION MAKING  Pertinent Labs & Imaging studies  reviewed. (See chart for details)    Obtained and reviewed past medical records. Patient was seen here on the 5th intoxicated and covered in stool and urine.     3:07 PM Patient seen and examined at bedside. The patient presents following a fall with loss of consciousness, and the differential diagnosis includes but is not limited to alcohol intoxication, syncope, dehydration, electrolyte abnormality, cardiac arrhythmia, CVA.. Ordered for CT-Head w/o, CT-Cspine w/o, CT-Maxillofacial w/o, CBC with diff, CMP, Lipase, and Diagnostic alcohol to evaluate.     5:15 PM - Patient was reevaluated at bedside. Additional history was obtained.    7:53 PM - Patient was reevaluated at bedside. Discussed lab and radiology results with the patient and informed them of findings.     Patient is really having a difficult time getting up he is very dizzy symptomatic and weak.  While here in the ED his mental status has improved but he developed signs of alcohol withdrawal.  Patient has hyponatremia but this is chronic.  He has anemia which is chronic and stable.  He does not have evidence of a stroke.  The patient is otherwise afebrile and nontoxic.  Because of his weakness, syncope inability to walk and alcohol withdrawal he will be hospitalized.  Discussed case with the hospitalist.  Transfer at that time.      FINAL IMPRESSION  1. Closed head injury, initial encounter    2. Syncope, unspecified syncope type    3. Hyponatremia    4. Weakness     5.  Alcohol withdrawal     Vic NEWMAN (Tiffanie), am scribing for, and in the presence of, Dayron Garcia M.D..    Electronically signed by: Vic Garay), 10/27/2022    Dayron NEWMAN M.D. personally performed the services described in this documentation, as scribed by Vic Jacobs in my presence, and it is both accurate and complete.    The note accurately reflects work and decisions made by me.  Dayron Garcia M.D.  10/27/2022  10:45 PM

## 2022-10-27 NOTE — ED TRIAGE NOTES
.  Chief Complaint   Patient presents with    T-5000 FALL    Head Injury     Abrasion with hematoma to left head,     Facial Injury     Left eye hematoma from fall yesterday.      [place] from home pt lives alone daily etoh. Pt reports fall with abrasion to right head and abrasion with laceration to left head. Bilateral knee abrasion. Pt also has hematoma to left eye per ems this occurred yesterday after fall which he did not get evaluated for. Pt [place] A&O x 3. Home o2 3l 94%

## 2022-10-28 ENCOUNTER — APPOINTMENT (OUTPATIENT)
Dept: CARDIOLOGY | Facility: MEDICAL CENTER | Age: 72
End: 2022-10-28
Attending: STUDENT IN AN ORGANIZED HEALTH CARE EDUCATION/TRAINING PROGRAM
Payer: MEDICARE

## 2022-10-28 VITALS
HEIGHT: 70 IN | WEIGHT: 232.14 LBS | OXYGEN SATURATION: 96 % | DIASTOLIC BLOOD PRESSURE: 75 MMHG | HEART RATE: 97 BPM | TEMPERATURE: 97.9 F | RESPIRATION RATE: 16 BRPM | BODY MASS INDEX: 33.23 KG/M2 | SYSTOLIC BLOOD PRESSURE: 148 MMHG

## 2022-10-28 PROBLEM — F10.929 ALCOHOL INTOXICATION (HCC): Status: RESOLVED | Noted: 2022-10-27 | Resolved: 2022-10-28

## 2022-10-28 PROBLEM — R55 SYNCOPE AND COLLAPSE: Status: RESOLVED | Noted: 2022-10-27 | Resolved: 2022-10-28

## 2022-10-28 PROBLEM — E87.1 HYPONATREMIA: Status: RESOLVED | Noted: 2022-03-07 | Resolved: 2022-10-28

## 2022-10-28 LAB
ANION GAP SERPL CALC-SCNC: 12 MMOL/L (ref 7–16)
BASOPHILS # BLD AUTO: 0.1 % (ref 0–1.8)
BASOPHILS # BLD: 0.01 K/UL (ref 0–0.12)
BUN SERPL-MCNC: 3 MG/DL (ref 8–22)
CALCIUM SERPL-MCNC: 8.8 MG/DL (ref 8.5–10.5)
CHLORIDE SERPL-SCNC: 96 MMOL/L (ref 96–112)
CO2 SERPL-SCNC: 23 MMOL/L (ref 20–33)
CREAT SERPL-MCNC: 0.45 MG/DL (ref 0.5–1.4)
EOSINOPHIL # BLD AUTO: 0.1 K/UL (ref 0–0.51)
EOSINOPHIL NFR BLD: 1.2 % (ref 0–6.9)
ERYTHROCYTE [DISTWIDTH] IN BLOOD BY AUTOMATED COUNT: 48.8 FL (ref 35.9–50)
GFR SERPLBLD CREATININE-BSD FMLA CKD-EPI: 112 ML/MIN/1.73 M 2
GLUCOSE SERPL-MCNC: 94 MG/DL (ref 65–99)
HCT VFR BLD AUTO: 42.2 % (ref 42–52)
HGB BLD-MCNC: 14.6 G/DL (ref 14–18)
IMM GRANULOCYTES # BLD AUTO: 0.04 K/UL (ref 0–0.11)
IMM GRANULOCYTES NFR BLD AUTO: 0.5 % (ref 0–0.9)
LV EJECT FRACT  99904: 70
LYMPHOCYTES # BLD AUTO: 0.91 K/UL (ref 1–4.8)
LYMPHOCYTES NFR BLD: 11.2 % (ref 22–41)
MCH RBC QN AUTO: 34 PG (ref 27–33)
MCHC RBC AUTO-ENTMCNC: 34.6 G/DL (ref 33.7–35.3)
MCV RBC AUTO: 98.4 FL (ref 81.4–97.8)
MONOCYTES # BLD AUTO: 0.77 K/UL (ref 0–0.85)
MONOCYTES NFR BLD AUTO: 9.5 % (ref 0–13.4)
NEUTROPHILS # BLD AUTO: 6.31 K/UL (ref 1.82–7.42)
NEUTROPHILS NFR BLD: 77.5 % (ref 44–72)
NRBC # BLD AUTO: 0 K/UL
NRBC BLD-RTO: 0 /100 WBC
PLATELET # BLD AUTO: 231 K/UL (ref 164–446)
PMV BLD AUTO: 9.7 FL (ref 9–12.9)
POTASSIUM SERPL-SCNC: 4.6 MMOL/L (ref 3.6–5.5)
RBC # BLD AUTO: 4.29 M/UL (ref 4.7–6.1)
SODIUM SERPL-SCNC: 131 MMOL/L (ref 135–145)
WBC # BLD AUTO: 8.1 K/UL (ref 4.8–10.8)

## 2022-10-28 PROCEDURE — 93306 TTE W/DOPPLER COMPLETE: CPT

## 2022-10-28 PROCEDURE — G0378 HOSPITAL OBSERVATION PER HR: HCPCS

## 2022-10-28 PROCEDURE — 80048 BASIC METABOLIC PNL TOTAL CA: CPT

## 2022-10-28 PROCEDURE — 93306 TTE W/DOPPLER COMPLETE: CPT | Mod: 26 | Performed by: INTERNAL MEDICINE

## 2022-10-28 PROCEDURE — A9270 NON-COVERED ITEM OR SERVICE: HCPCS | Performed by: STUDENT IN AN ORGANIZED HEALTH CARE EDUCATION/TRAINING PROGRAM

## 2022-10-28 PROCEDURE — 85025 COMPLETE CBC W/AUTO DIFF WBC: CPT

## 2022-10-28 PROCEDURE — 99217 PR OBSERVATION CARE DISCHARGE: CPT | Performed by: NURSE PRACTITIONER

## 2022-10-28 PROCEDURE — 36415 COLL VENOUS BLD VENIPUNCTURE: CPT

## 2022-10-28 PROCEDURE — 700102 HCHG RX REV CODE 250 W/ 637 OVERRIDE(OP): Performed by: STUDENT IN AN ORGANIZED HEALTH CARE EDUCATION/TRAINING PROGRAM

## 2022-10-28 PROCEDURE — 51798 US URINE CAPACITY MEASURE: CPT

## 2022-10-28 RX ORDER — SODIUM CHLORIDE 1 G/1
1 TABLET ORAL 3 TIMES DAILY
Qty: 30 TABLET | Refills: 0 | Status: SHIPPED | OUTPATIENT
Start: 2022-10-28 | End: 2022-10-28 | Stop reason: SDUPTHER

## 2022-10-28 RX ORDER — BACLOFEN 20 MG/1
20 TABLET ORAL 3 TIMES DAILY
Qty: 30 TABLET | Refills: 0 | Status: SHIPPED | OUTPATIENT
Start: 2022-10-28 | End: 2022-11-07

## 2022-10-28 RX ORDER — ACETAMINOPHEN 500 MG
500 TABLET ORAL EVERY 6 HOURS PRN
Qty: 30 TABLET | Refills: 0 | Status: SHIPPED | OUTPATIENT
Start: 2022-10-28 | End: 2023-04-01

## 2022-10-28 RX ORDER — SODIUM CHLORIDE 1 G/1
2 TABLET ORAL 3 TIMES DAILY
Qty: 60 TABLET | Refills: 0 | Status: SHIPPED | OUTPATIENT
Start: 2022-10-28 | End: 2022-11-07

## 2022-10-28 RX ORDER — BACLOFEN 10 MG/1
10 TABLET ORAL 3 TIMES DAILY
Qty: 30 TABLET | Refills: 0 | Status: SHIPPED | OUTPATIENT
Start: 2022-10-28 | End: 2022-10-28 | Stop reason: SDUPTHER

## 2022-10-28 RX ADMIN — OXYCODONE 2.5 MG: 5 TABLET ORAL at 12:39

## 2022-10-28 RX ADMIN — UMECLIDINIUM BROMIDE AND VILANTEROL TRIFENATATE 1 PUFF: 62.5; 25 POWDER RESPIRATORY (INHALATION) at 09:49

## 2022-10-28 RX ADMIN — OXYCODONE 5 MG: 5 TABLET ORAL at 03:12

## 2022-10-28 RX ADMIN — FLUTICASONE PROPIONATE 88 MCG: 44 AEROSOL, METERED RESPIRATORY (INHALATION) at 09:47

## 2022-10-28 RX ADMIN — OXYCODONE 2.5 MG: 5 TABLET ORAL at 08:56

## 2022-10-28 ASSESSMENT — FIBROSIS 4 INDEX: FIB4 SCORE: 2.64

## 2022-10-28 ASSESSMENT — PATIENT HEALTH QUESTIONNAIRE - PHQ9
SUM OF ALL RESPONSES TO PHQ9 QUESTIONS 1 AND 2: 0
SUM OF ALL RESPONSES TO PHQ9 QUESTIONS 1 AND 2: 0
2. FEELING DOWN, DEPRESSED, IRRITABLE, OR HOPELESS: NOT AT ALL
1. LITTLE INTEREST OR PLEASURE IN DOING THINGS: NOT AT ALL
1. LITTLE INTEREST OR PLEASURE IN DOING THINGS: NOT AT ALL
2. FEELING DOWN, DEPRESSED, IRRITABLE, OR HOPELESS: NOT AT ALL

## 2022-10-28 ASSESSMENT — LIFESTYLE VARIABLES
TOTAL SCORE: 0
CONSUMPTION TOTAL: POSITIVE
AVERAGE NUMBER OF DAYS PER WEEK YOU HAVE A DRINK CONTAINING ALCOHOL: 7
HAVE YOU EVER FELT YOU SHOULD CUT DOWN ON YOUR DRINKING: NO
HOW MANY TIMES IN THE PAST YEAR HAVE YOU HAD 5 OR MORE DRINKS IN A DAY: 365
TOTAL SCORE: 0
ALCOHOL_USE: YES
HAVE PEOPLE ANNOYED YOU BY CRITICIZING YOUR DRINKING: NO
TOTAL SCORE: 0
ON A TYPICAL DAY WHEN YOU DRINK ALCOHOL HOW MANY DRINKS DO YOU HAVE: 6
EVER FELT BAD OR GUILTY ABOUT YOUR DRINKING: NO
EVER HAD A DRINK FIRST THING IN THE MORNING TO STEADY YOUR NERVES TO GET RID OF A HANGOVER: NO

## 2022-10-28 ASSESSMENT — PAIN SCALES - PAIN ASSESSMENT IN ADVANCED DEMENTIA (PAINAD)
BREATHING: NORMAL
FACIALEXPRESSION: SMILING OR INEXPRESSIVE

## 2022-10-28 NOTE — PROGRESS NOTES
Attempted to call pt's friend, Kennedi, to notify of pt's admission per pt's request. Kennedi did not answer. Notified pt that we can try again later.

## 2022-10-28 NOTE — ASSESSMENT & PLAN NOTE
Admit to telemetry  Seizure precautions  Not in acute withdrawal but monitor for symptoms of alcohol withdrawal

## 2022-10-28 NOTE — ED NOTES
"PT was able to stand on own using a walker to urinate but declined to try and walk for a mobility test due to feeling \"unsafe\" while being in motion.  PT further stated he was feeling \"wobbly\"  but reported no pain or visual changes.  "

## 2022-10-28 NOTE — PROGRESS NOTES
Call to [facility] to get an ETA on 02 delivery. Phone # [phone]. Confirmed ETA by 7pm.

## 2022-10-28 NOTE — DISCHARGE PLANNING
Patient medically cleared but patient has no O2 tank here. Patient receives home O2 from [facility]. Called and spoke with Kathryn with [facility] and the will need new order and referral and need to speak with patient as patient has 4 loaner tanks at home. Spoke with patient at bedside and he has spoke with [facility] and they are to  loaner tanks on Monday 10/31/2022. Patient signed choice form. Choice form faxed to Leidy GAMBOA. Message sent to Leidy to update to update. Selam VICTOR updated and will place order.

## 2022-10-28 NOTE — DISCHARGE PLANNING
Received Choice form at [id number]  Agency/Facility Name: [facility]  Referral sent per Choice form @ [id number]

## 2022-10-28 NOTE — HOSPITAL COURSE
Mr. Juan Manuel Bass is a 72-year-old male with a PMHx of COPD on 3L home O2, hypertension, alcohol use, chronic hyponatremia secondary to alcohol use, who presented on 10/27/2022 due to complaints of headache after a ground-level fall.    Patient has had multiple falls in the past likely secondary to alcohol use versus dehydration.  Patient also has had multiple injuries from falling and this time discussed a notable hematoma over left eye, skin laceration over scalp, and other skin lacerations over her arms.  Patient also complains of bandlike constricting headache.  Patient admits to drinking 6-7 beers daily, but denies any withdrawal symptoms on admission.  Patient is unsure and denies any seizure-like activity when he had a syncopal episode, however, as noted by EMS, patient is due to from his chair and felt dizzy causing him to hit his head.  Otherwise, patient had no other complaints.  Patient denies any chest pain, no cough, no fevers, no chills, no nausea, no vomiting, no diarrhea, no dysuria or hematuria.  Patient admits to losing consciousness and headache as well as noted facial injury.    In ER, vitals noted to be within normal limits.  Chest x-ray noted bibasilar atelectasis.  CT of the head without contrast noted no acute intracranial abnormalities.  CT cervical spine was unremarkable.  CT maxillofacial reveals no fracture, however there is a left periorbital soft tissue swelling with 2 x 3 cm right neck rounded mass tissue density noted which is possibly a sebaceous cyst.  Also noted mild left maxillary sinus disease.  Notable lab findings notes chronic hyponatremia, low renal function indicative of possible poor oral intake, otherwise unremarkable CMP and CBC.  Patient noted to have a predominant leukocytosis of WBC 11.1.  Diagnostic alcohol level on admission is at 149.8.    Patient was monitored overnight.  No signs of withdrawal noted at this time.  Discussed with patient imaging results along with  laboratory results.  Patient was ambulated around the unit and noted to be at baseline of which he uses a walker.  Discussed and counseled extensively in regards to alcohol use.  Also discussed with patient keeping hydrated as this is likely contributory to his dizziness in conjunction with alcohol use.  Patient will prescribe salt tabs for 10 days to help with hyponatremia, more likely this is chronic secondary to alcohol use.  Patient will be prescribed baclofen to help with back soreness from his falls.  Discussed with patient following up with PCP s/p hospitalization and management of care.  All questions and concerns answered prior to being discharged.  Patient discharged home.

## 2022-10-28 NOTE — PROGRESS NOTES
Ambulated in [place] to see if 02 sat dropped. Reported back to NP and CM. 02 being arranged for transport home.

## 2022-10-28 NOTE — PROGRESS NOTES
4 Eyes Skin Assessment Completed by ALDEN Paredes and ALDEN Hassan.    Head Bruising and Swelling, abrasions  Ears Redness, abrasion and dried blood to right ear  Nose WDL  Mouth WDL  Neck WDL  Breast/Chest WDL  Shoulder Blades WDL  Spine WDL  (R) Arm/Elbow/Hand Bruising and Abrasion  (L) Arm/Elbow/Hand Bruising and Abrasion, open wound on forearm  Abdomen Bruising  Groin WDL  Scrotum/Coccyx/Buttocks Redness and Blanching  (R) Leg Abrasion  (L) Leg Abrasion  (R) Heel/Foot/Toe Discoloration - diya, dry flaky skin  (L) Heel/Foot/Toe Discoloration - diya, dry flaky skin          Devices In Places Tele Box, Blood Pressure Cuff, and Pulse Ox      Interventions In Place Pillows    Possible Skin Injury Yes    Pictures Uploaded Into Epic Yes  Wound Consult Placed Yes  RN Wound Prevention Protocol Ordered Yes

## 2022-10-28 NOTE — H&P
Hospital Medicine History & Physical Note    Date of Service  10/27/2022    Primary Care Physician  Peterson Amin M.D.    Consultants  None    Code Status  DNAR/DNI    Chief Complaint  Chief Complaint   Patient presents with    T-5000 FALL    Head Injury     Abrasion with hematoma to left head,     Facial Injury     Left eye hematoma from fall yesterday.        History of Presenting Illness  Juan Manuel Bass is a 72 y.o. male who presented 10/27/2022 with complaints of headache after ground-level fall yesterday.  He complains of left facial injury with notable hematoma and bandlike constricting headache.  He does admit to EtOH use earlier but denies any withdrawal symptoms currently.  He is unsure if he has any seizure-like activity when he passed out however was noted per EMS that when the patient stood up he felt dizzy and fell back and hit his head on seen with EMS.  No seizure-like activity noted at that time.  He otherwise has no complaints at this time and admits to being vaccine against COVID-19 and denies the following ROS: Chest pain, cough with sputum production, fevers, chills, anosmia, ageusia, nausea, vomiting, constipation, diarrhea, melena, hematochezia, dysuria, hematuria.  Does admit to loss of consciousness and headache as well as facial injury.    Vital signs in ER were as follows: 98.0, 96, 16, 122/74, 94% 3 L nasal cannula.    Chest x-ray reveals bibasilar atelectasis, CT head without reveals no acute intracranial abnormalities.  CT cervical spine unremarkable.  CT maxillofacial reveals no fractures however left periorbital soft tissue swelling with 2 x 3 cm right neck rounded mass tissue density also noted and possibly a sebaceous cyst and also noted mild left maxillary sinus disease.    CMP reveals chronic hyponatremia and low BUN/creatinine indicative of poor p.o. intake.  Otherwise unremarkable CMP and CBC reveals neutrophilic predominant leukocytosis of 11.1 otherwise unremarkable.  Diagnostic  alcohol level at 149.8.    Hospitalist consulted for admission.  Patient agreeable to observation admission for syncopal event and pain control for his headache/facial hematoma.  He agrees to DO NOT RESUSCITATE DO NOT INTUBATE CODE STATUS with POLST on file.  He will be optimized in ED and subsequently transferred to medical bernal floor for further optimization medical management.    I discussed the plan of care with patient.    Review of Systems  Review of Systems   Constitutional:  Negative for chills and fever.   HENT:  Positive for congestion and sinus pain. Negative for sore throat.    Eyes:  Negative for blurred vision and double vision.   Respiratory:  Negative for cough, shortness of breath and wheezing.    Cardiovascular:  Negative for chest pain and palpitations.   Gastrointestinal:  Negative for abdominal pain, blood in stool, constipation, diarrhea, heartburn, melena, nausea and vomiting.   Genitourinary:  Negative for dysuria and frequency.   Musculoskeletal:  Positive for falls. Negative for back pain and neck pain.   Skin:  Negative for itching and rash.   Neurological:  Positive for loss of consciousness and headaches. Negative for focal weakness and weakness.   Endo/Heme/Allergies:  Negative for environmental allergies. Does not bruise/bleed easily.   Psychiatric/Behavioral:  Negative for depression. The patient does not have insomnia.      Past Medical History   has a past medical history of Chronic airway obstruction, not elsewhere classified and Hypertension.    Surgical History   has no past surgical history on file.     Family History  family history includes Heart Disease in his father; No Known Problems in his mother.   Family history reviewed with patient. There is no family history that is pertinent to the chief complaint.     Social History   reports that he quit smoking about 2 years ago. His smoking use included cigarettes. He has a 4.00 pack-year smoking history. He has never used  smokeless tobacco. He reports current alcohol use of about 21.0 oz per week. He reports that he does not currently use drugs.    Allergies  Allergies   Allergen Reactions    Tetanus Toxoid Hives       Medications  Prior to Admission Medications   Prescriptions Last Dose Informant Patient Reported? Taking?   Fluticasone-Umeclidin-Vilant (TRELEGY ELLIPTA) 100-62.5-25 MCG/INH AEROSOL POWDER, BREATH ACTIVATED inhalation 10/27/2022 Patient's Home Pharmacy No No   Sig: Inhale 2-3 Inhalation every morning.   albuterol 108 (90 Base) MCG/ACT Aero Soln inhalation aerosol 10/27/2022 Patient's Home Pharmacy No No   Sig: Inhale 1-2 Puffs every four hours as needed for Shortness of Breath.      Facility-Administered Medications: None       Physical Exam  Temp:  [36.7 °C (98 °F)] 36.7 °C (98 °F)  Pulse:  [] 94  Resp:  [16-35] 35  BP: (117-153)/(65-99) 153/99  SpO2:  [93 %-96 %] 94 %  Blood Pressure : (!) 153/99   Temperature: 36.7 °C (98 °F)   Pulse: 94   Respiration: (!) 35   Pulse Oximetry: 94 %       Physical Exam  Vitals reviewed.   Constitutional:       Appearance: Normal appearance. He is obese. He is not toxic-appearing or diaphoretic.   HENT:      Head:      Comments: Facial fullness on left consistent with hematoma     Mouth/Throat:      Mouth: Mucous membranes are dry.      Pharynx: Oropharynx is clear. No oropharyngeal exudate or posterior oropharyngeal erythema.   Eyes:      General: No scleral icterus.     Extraocular Movements: Extraocular movements intact.      Conjunctiva/sclera: Conjunctivae normal.      Pupils: Pupils are equal, round, and reactive to light.   Neck:      Vascular: No carotid bruit.   Cardiovascular:      Rate and Rhythm: Regular rhythm. Tachycardia present.      Pulses: Normal pulses.      Heart sounds: Normal heart sounds. No murmur heard.    No friction rub. No gallop.   Pulmonary:      Effort: Pulmonary effort is normal.      Breath sounds: Normal breath sounds. No wheezing, rhonchi or  rales.   Abdominal:      General: Bowel sounds are normal. There is no distension.      Palpations: Abdomen is soft. There is no mass.      Tenderness: There is no abdominal tenderness. There is no guarding or rebound.      Hernia: No hernia is present.   Musculoskeletal:         General: Normal range of motion.      Cervical back: Normal range of motion and neck supple. No muscular tenderness.      Right lower leg: No edema.      Left lower leg: No edema.   Lymphadenopathy:      Cervical: No cervical adenopathy.   Skin:     General: Skin is warm and dry.      Capillary Refill: Capillary refill takes less than 2 seconds.      Coloration: Skin is not pale.      Findings: No erythema or rash.   Neurological:      General: No focal deficit present.      Mental Status: He is alert and oriented to person, place, and time. Mental status is at baseline.      Cranial Nerves: No cranial nerve deficit.      Sensory: No sensory deficit.      Motor: No weakness.   Psychiatric:         Mood and Affect: Mood normal.         Behavior: Behavior normal.      Comments: Poor insight/poor judgment.       Laboratory:  Recent Labs     10/27/22  1611   WBC 11.1*   RBC 4.28*   HEMOGLOBIN 14.3   HEMATOCRIT 40.9*   MCV 95.6   MCH 33.4*   MCHC 35.0   RDW 46.5   PLATELETCT 250   MPV 9.7     Recent Labs     10/27/22  1611   SODIUM 126*   POTASSIUM 4.2   CHLORIDE 91*   CO2 21   GLUCOSE 94   BUN 3*   CREATININE 0.48*   CALCIUM 8.6     Recent Labs     10/27/22  1611   ALTSGPT 21   ASTSGOT 42   ALKPHOSPHAT 84   TBILIRUBIN 0.6   LIPASE 21   GLUCOSE 94     I reviewed and interpreted the above twelve-lead EKG and do appreciate sinus rhythm without any ischemic changes.  QTc within normal limits and poor R wave progression precordial leads as seen above.        No results for input(s): NTPROBNP in the last 72 hours.      No results for input(s): TROPONINT in the last 72 hours.    Imaging:  DX-CHEST-PORTABLE (1 VIEW)   Final Result      Bibasilar  atelectasis.      CT-HEAD W/O   Final Result      1.  No acute intracranial abnormality      2.  Cerebral volume loss and white matter change         CT-CSPINE WITHOUT PLUS RECONS   Final Result      No acute cervical fracture or traumatic malalignment.         CT-MAXILLOFACIAL W/O PLUS RECONS   Final Result      1.  No acute facial fracture.   2.  Left periorbital soft tissue swelling. Globes are symmetric.   3.  2.0 x 3.0 cm right neck rounded soft tissue density possibly a sebaceous cyst, similar to prior CT. Correlate with exam.   4.  Mild left maxillary sinus disease.      EC-ECHOCARDIOGRAM COMPLETE W/O CONT    (Results Pending)       I have reviewed and interpreted the above CT head and do appreciate left-sided facial soft tissue swelling as seen above.      Assessment/Plan:  Justification for Admission Status  I anticipate this patient is appropriate for observation status at this time because he does not meet inpatient criteria at this time.      * Syncope and collapse- (present on admission)  Assessment & Plan  Fall precautions  Orthostatic vital signs  Echocardiogram ordered and pending  Possibly intravascularly depleted with poor p.o. intake and we will provide with fluid resuscitation at this time  CT head as seen above  Cardiac monitoring ordered      COPD (chronic obstructive pulmonary disease) (HCC)- (present on admission)  Assessment & Plan  Not in acute exacerbation  Continue home medication regimen  DuoNebs as needed  Uses 3 L O2 at home and at baseline  Chest x-ray reveals atelectasis  Incentive spirometer ordered    Alcohol intoxication (HCC)- (present on admission)  Assessment & Plan  Admit to telemetry  Seizure precautions  Not in acute withdrawal but monitor for symptoms of alcohol withdrawal    Hyponatremia- (present on admission)  Assessment & Plan  Chronic and asymptomatic  Fluid resuscitation initiated in ED we will continue onward  BMP in a.m.  Likely beer Poto myranda    Class 3 severe  obesity in adult (HCC)- (present on admission)  Assessment & Plan  Strong recommendation for follow-up outpatient PCP for lifestyle modification including diet and exercise regiment of at least 30 min of brisk cardiovascular exercise 3-5 times weekly.  Lipid panel ordered and pending  Hemoglobin A1c ordered and pending        VTE prophylaxis: pharmacologic prophylaxis contraindicated due to hematoma

## 2022-10-28 NOTE — FACE TO FACE
"Face to Face Note  -  Durable Medical Equipment    MECHE Hobson - NPI: [id number]  I certify that this patient is under my care and that they had a durable medical equipment(DME)face to face encounter by myself that meets the physician DME face-to-face encounter requirements with this patient on:    Date of encounter:   Patient:                    MRN:                       YOB: 2022  Juan Manuel Bass  [id number]  1950     The encounter with the patient was in whole, or in part, for the following medical condition, which is the primary reason for durable medical equipment:  COPD    I certify that, based on my findings, the following durable medical equipment is medically necessary:    Oxygen   HOME O2 Saturation Measurements:(Values must be present for Home Oxygen orders)  Room air sat at rest: 92  Room air sat with amb: 88  With liters of O2: 3, O2 sat at rest with O2: 94  With Liters of O2: 3, O2 sat with amb with O2 : 94  Is the patient mobile?: Yes  If patient feels more short of breath, they can go up to 6 liters per minute and contact healthcare provider.    Supporting Symptoms: The patient requires supplemental oxygen, as the following interventions have been tried with limited or no improvement: \"Ambulation with oximetry and \"Incentive spirometry.    My Clinical findings support the need for the above equipment due to:  Hypoxia  "

## 2022-10-28 NOTE — ASSESSMENT & PLAN NOTE
Chronic and asymptomatic  Fluid resuscitation initiated in ED we will continue onward  BMP in a.m.  Likely reyna whitmore

## 2022-10-28 NOTE — DISCHARGE INSTRUCTIONS
FOLLOW UP ITEMS POST DISCHARGE  Please call [phone] to schedule PCP appointment for patient.    Required specialty appointments include:       Discharge Instructions per Emil Dixon, GERTRUDEPGalRGalN.    -Follow-up with PCP s/p hospitalization  -Alcohol cessation if possible  -Keep yourself hydrated and drink water 1.0-1.5L per day  -Utilize salt tabs for hyponatremia  -Utilize baclofen for back soreness due to multiple episodes of falls  -Resume all home medication    DIET: As tolerated    ACTIVITY: As tolerated    DIAGNOSIS: Ground-level fall    Return to ER if symptoms persist, chest pain, palpitations, shortness of breath, numbness, tingling, weakness, and high fevers.

## 2022-10-28 NOTE — ASSESSMENT & PLAN NOTE
Fall precautions  Orthostatic vital signs  Echocardiogram ordered and pending  Possibly intravascularly depleted with poor p.o. intake and we will provide with fluid resuscitation at this time  CT head as seen above  Cardiac monitoring ordered

## 2022-10-28 NOTE — ASSESSMENT & PLAN NOTE
Not in acute exacerbation  Continue home medication regimen  DuoNebs as needed  Uses 3 L O2 at home and at baseline  Chest x-ray reveals atelectasis  Incentive spirometer ordered

## 2022-10-28 NOTE — DISCHARGE SUMMARY
Discharge Summary    CHIEF COMPLAINT ON ADMISSION  Chief Complaint   Patient presents with    T-5000 FALL    Head Injury     Abrasion with hematoma to left head,     Facial Injury     Left eye hematoma from fall yesterday.        Reason for Admission  tbi     Admission Date  10/27/2022    CODE STATUS  DNAR/DNI    HPI & HOSPITAL COURSE    Mr. Juan Manuel Bass is a 72-year-old male with a PMHx of COPD on 3L home O2, hypertension, alcohol use, chronic hyponatremia secondary to alcohol use, who presented on 10/27/2022 due to complaints of headache after a ground-level fall.    Patient has had multiple falls in the past likely secondary to alcohol use versus dehydration.  Patient also has had multiple injuries from falling and this time discussed a notable hematoma over left eye, skin laceration over scalp, and other skin lacerations over her arms.  Patient also complains of bandlike constricting headache.  Patient admits to drinking 6-7 beers daily, but denies any withdrawal symptoms on admission.  Patient is unsure and denies any seizure-like activity when he had a syncopal episode, however, as noted by EMS, patient is due to from his chair and felt dizzy causing him to hit his head.  Otherwise, patient had no other complaints.  Patient denies any chest pain, no cough, no fevers, no chills, no nausea, no vomiting, no diarrhea, no dysuria or hematuria.  Patient admits to losing consciousness and headache as well as noted facial injury.    In ER, vitals noted to be within normal limits.  Chest x-ray noted bibasilar atelectasis.  CT of the head without contrast noted no acute intracranial abnormalities.  CT cervical spine was unremarkable.  CT maxillofacial reveals no fracture, however there is a left periorbital soft tissue swelling with 2 x 3 cm right neck rounded mass tissue density noted which is possibly a sebaceous cyst.  Also noted mild left maxillary sinus disease.  Notable lab findings notes chronic hyponatremia, low  renal function indicative of possible poor oral intake, otherwise unremarkable CMP and CBC.  Patient noted to have a predominant leukocytosis of WBC 11.1.  Diagnostic alcohol level on admission is at 149.8.    Patient was monitored overnight.  No signs of withdrawal noted at this time.  Discussed with patient imaging results along with laboratory results.  Patient was ambulated around the unit and noted to be at baseline of which he uses a walker.  Discussed and counseled extensively in regards to alcohol use.  Also discussed with patient keeping hydrated as this is likely contributory to his dizziness in conjunction with alcohol use.  Patient will prescribe salt tabs for 10 days to help with hyponatremia, more likely this is chronic secondary to alcohol use.  Patient will be prescribed baclofen to help with back soreness from his falls.  Discussed with patient following up with PCP s/p hospitalization and management of care.  All questions and concerns answered prior to being discharged.  Patient discharged home.    Therefore, he is discharged in good and stable condition to home with close outpatient follow-up.    The patient recovered much more quickly than anticipated on admission.    Discharge Date  10/28/22      FOLLOW UP ITEMS POST DISCHARGE  Please call [phone] to schedule PCP appointment for patient.    Required specialty appointments include:       Discharge Instructions per GERTRUDE HobsonPGalRGalN.    -Follow-up with PCP s/p hospitalization  -Alcohol cessation if possible  -Keep yourself hydrated and drink water 1.0-1.5L per day  -Utilize salt tabs for hyponatremia  -Utilize baclofen for back soreness due to multiple episodes of falls  -Resume all home medication    DIET: As tolerated    ACTIVITY: As tolerated    DIAGNOSIS: Ground-level fall    Return to ER if symptoms persist, chest pain, palpitations, shortness of breath, numbness, tingling, weakness, and high fevers.    DISCHARGE  DIAGNOSES  Principal Problem (Resolved):    Syncope and collapse POA: Yes  Active Problems:    COPD (chronic obstructive pulmonary disease) ([facility]) POA: Yes    Class 3 severe obesity in adult ([facility]) POA: Yes  Resolved Problems:    Hyponatremia POA: Yes    Alcohol intoxication ([facility]) POA: Yes      FOLLOW UP    Peterson Amin M.D.  [address] [address]  [phone]    Schedule an appointment as soon as possible for a visit in 1 week(s)        MEDICATIONS ON DISCHARGE     Medication List        START taking these medications        Instructions   baclofen 20 MG tablet  Commonly known as: LIORESAL   Take 1 Tablet by mouth 3 times a day for 10 days.  Dose: 20 mg     sodium chloride 1 GM Tabs  Commonly known as: SALT   Take 2 Tablets by mouth 3 times a day for 10 days.  Dose: 2 g            CHANGE how you take these medications        Instructions   acetaminophen 500 MG Tabs  What changed: how much to take  Commonly known as: TYLENOL   Take 1 Tablet by mouth every 6 hours as needed for Mild Pain.  Dose: 500 mg            CONTINUE taking these medications        Instructions   albuterol 108 (90 Base) MCG/ACT Aers inhalation aerosol   Inhale 1-2 Puffs every four hours as needed for Shortness of Breath.  Dose: 1-2 Puff     Trelegy Ellipta 100-62.5-25 MCG/ACT Aepb inhalation  Generic drug: fluticasone-umeclidin-vilant   Inhale 2-3 Inhalation every morning.  Dose: 2-3 Inhalation              Allergies  Allergies   Allergen Reactions    Tetanus Toxoid Hives       DIET  Orders Placed This Encounter   Procedures    Diet Order Diet: Regular     Standing Status:   Standing     Number of Occurrences:   1     Order Specific Question:   Diet:     Answer:   Regular [1]       ACTIVITY  As tolerated.  Weight bearing as tolerated    CONSULTATIONS  NONE    PROCEDURES  NONE    IMAGING    EC-ECHOCARDIOGRAM COMPLETE W/O CONT   Final Result      DX-CHEST-PORTABLE (1 VIEW)   Final Result      Bibasilar atelectasis.       CT-HEAD W/O   Final Result      1.  No acute intracranial abnormality      2.  Cerebral volume loss and white matter change         CT-CSPINE WITHOUT PLUS RECONS   Final Result      No acute cervical fracture or traumatic malalignment.         CT-MAXILLOFACIAL W/O PLUS RECONS   Final Result      1.  No acute facial fracture.   2.  Left periorbital soft tissue swelling. Globes are symmetric.   3.  2.0 x 3.0 cm right neck rounded soft tissue density possibly a sebaceous cyst, similar to prior CT. Correlate with exam.   4.  Mild left maxillary sinus disease.            LABORATORY  Lab Results   Component Value Date    SODIUM 131 (L) 10/28/2022    POTASSIUM 4.6 10/28/2022    CHLORIDE 96 10/28/2022    CO2 23 10/28/2022    GLUCOSE 94 10/28/2022    BUN 3 (L) 10/28/2022    CREATININE 0.45 (L) 10/28/2022        Lab Results   Component Value Date    WBC 8.1 10/28/2022    HEMOGLOBIN 14.6 10/28/2022    HEMATOCRIT 42.2 10/28/2022    PLATELETCT 231 10/28/2022        Total time of the discharge process took 36 minutes.    ========================================================================================================================================================================  Please note that this dictation was created using voice recognition software. I have made every reasonable attempt to correct obvious errors, but there may be errors of grammar and possibly content that I did not discover before finalizing the note.    Electronically signed by:  Dr. KATH Dixon, DNP, APRN, FNP-C  Hospitalist Services  [facility]  [phone]  [email or web address]  10/28/22                 1234

## 2022-10-28 NOTE — PROGRESS NOTES
Awake, assessed after report recv'd from night RN. Discussed need to stop ETOH intake to help prevent falls. Patient is not accepting of this. Call placed to friend by patient and message was left.

## 2022-10-28 NOTE — PROGRESS NOTES
Ambulated out of room approx 50 ft. Became dyspnic but able to recover. AM Meds given. Anticipate DC.

## 2022-10-29 NOTE — WOUND TEAM
[facility]  Inpatient Services  Wound and Skin Care Brief Evaluation    Admission Date: 10/27/2022     Last order of IP CONSULT TO WOUND CARE was found on 10/28/2022 from Hospital Encounter on 10/27/2022     HPI, PMH, SH: Reviewed    Chief Complaint   Patient presents with    T-5000 FALL    Head Injury     Abrasion with hematoma to left head,     Facial Injury     Left eye hematoma from fall yesterday.      Diagnosis: Syncope and collapse [R55]    Unit where seen by Wound Team: T214/00     Wound consult placed regarding scalp, BUE and BLE. Chart and images reviewed. This discussed with bedside RN. This RN in to assess patient. Pt pleasant and agreeable. Scalp with small abrasions. Left LUDIN. Left elbow, left hand, and bilateral knees with partial thickness wounds d/t fall. Left forearm with chronic hypergranular lesion. Non-selectively debrided with NS/wound cleanser and gauze. Xeroform (yellow) applied to provide an occlusive dressing that incorporates bacteriostatic protection. Dressing orders placed. No pressure injuries or advanced wound care needs identified. Wound consult completed. No further follow up unless indicated and consulted.                        RSKIN:   CURRENTLY IN PLACE (X), APPLIED THIS VISIT (A), ORDERED (O):  Q shift Nitish:  X  Q shift pressure point assessments:  X    Surface/Positioning   Pressure redistribution mattress         X   Low Airloss          Bariatric foam      Bariatric BERTRAND     Waffle cushion        Waffle Overlay          Reposition q 2 hours      TAPs Turning system     Z [place] Pillow     Offloading/Redistribution   Sacral Mepilex (Silicone dressing)     Heel Mepilex (Silicone dressing)         Heel float boots (Prevalon boot)             Float Heels off Bed with Pillows           Respiratory   Silicone O2 tubing       X  Gray Foam Ear protectors   X  Cannula fixation Device (Tender )          High flow offloading Clip    Elastic head band offloading device       Anchorfast                                                         Trach with Optifoam split foam             Containment/Moisture Prevention   Rectal tube or BMS    Purwick/Condom Cath        [place] Catheter    Barrier wipes           Barrier paste       Antifungal tx      Interdry        Mobilization   Up to chair        Ambulate      PT/OT      Nutrition   Dietician        Diabetes Education      PO X    TF     TPN     NPO   # days     Other

## 2022-10-29 NOTE — CARE PLAN
The patient is Stable - Low risk of patient condition declining or worsening    Shift Goals  Clinical Goals: IV fluids, electrolyte replacement, safety  Patient Goals: rest, comfort    Progress made toward(s) clinical / shift goals:  To DC home after 02 delivery. Is at baseline for resp status. Pain is managed.     Patient is not progressing towards the following goals:

## 2022-10-29 NOTE — PROGRESS NOTES
IV DC'd with cath intact. DC instructions reviewed. DC to home via wc, cab called. Tech transported.

## 2022-10-31 ENCOUNTER — APPOINTMENT (OUTPATIENT)
Dept: RADIOLOGY | Facility: MEDICAL CENTER | Age: 72
End: 2022-10-31
Attending: EMERGENCY MEDICINE
Payer: MEDICARE

## 2022-10-31 ENCOUNTER — HOSPITAL ENCOUNTER (EMERGENCY)
Facility: MEDICAL CENTER | Age: 72
End: 2022-10-31
Attending: EMERGENCY MEDICINE
Payer: MEDICARE

## 2022-10-31 VITALS
WEIGHT: 224 LBS | OXYGEN SATURATION: 96 % | HEART RATE: 99 BPM | RESPIRATION RATE: 20 BRPM | TEMPERATURE: 98.7 F | HEIGHT: 70 IN | BODY MASS INDEX: 32.07 KG/M2 | SYSTOLIC BLOOD PRESSURE: 160 MMHG | DIASTOLIC BLOOD PRESSURE: 92 MMHG

## 2022-10-31 DIAGNOSIS — S09.90XA CLOSED HEAD INJURY, INITIAL ENCOUNTER: ICD-10-CM

## 2022-10-31 DIAGNOSIS — S30.0XXA CONTUSION OF BUTTOCK, INITIAL ENCOUNTER: ICD-10-CM

## 2022-10-31 DIAGNOSIS — F10.920 ACUTE ALCOHOLIC INTOXICATION WITHOUT COMPLICATION (HCC): ICD-10-CM
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
## 2022-10-31 LAB
ANION GAP SERPL CALC-SCNC: 13 MMOL/L (ref 7–16)
BUN SERPL-MCNC: 4 MG/DL (ref 8–22)
CALCIUM SERPL-MCNC: 9 MG/DL (ref 8.5–10.5)
CHLORIDE SERPL-SCNC: 94 MMOL/L (ref 96–112)
CO2 SERPL-SCNC: 25 MMOL/L (ref 20–33)
CREAT SERPL-MCNC: 0.47 MG/DL (ref 0.5–1.4)
EKG IMPRESSION: NORMAL
ERYTHROCYTE [DISTWIDTH] IN BLOOD BY AUTOMATED COUNT: 47.7 FL (ref 35.9–50)
ETHANOL BLD-MCNC: 123.6 MG/DL
GFR SERPLBLD CREATININE-BSD FMLA CKD-EPI: 110 ML/MIN/1.73 M 2
GLUCOSE SERPL-MCNC: 94 MG/DL (ref 65–99)
HCT VFR BLD AUTO: 43.3 % (ref 42–52)
HGB BLD-MCNC: 14.7 G/DL (ref 14–18)
MCH RBC QN AUTO: 33 PG (ref 27–33)
MCHC RBC AUTO-ENTMCNC: 33.9 G/DL (ref 33.7–35.3)
MCV RBC AUTO: 97.1 FL (ref 81.4–97.8)
PLATELET # BLD AUTO: 216 K/UL (ref 164–446)
PMV BLD AUTO: 10 FL (ref 9–12.9)
POTASSIUM SERPL-SCNC: 4 MMOL/L (ref 3.6–5.5)
RBC # BLD AUTO: 4.46 M/UL (ref 4.7–6.1)
SODIUM SERPL-SCNC: 132 MMOL/L (ref 135–145)
WBC # BLD AUTO: 6 K/UL (ref 4.8–10.8)

## 2022-10-31 PROCEDURE — 82077 ASSAY SPEC XCP UR&BREATH IA: CPT

## 2022-10-31 PROCEDURE — 80048 BASIC METABOLIC PNL TOTAL CA: CPT

## 2022-10-31 PROCEDURE — 85027 COMPLETE CBC AUTOMATED: CPT

## 2022-10-31 PROCEDURE — 93005 ELECTROCARDIOGRAM TRACING: CPT | Performed by: EMERGENCY MEDICINE

## 2022-10-31 PROCEDURE — 93005 ELECTROCARDIOGRAM TRACING: CPT

## 2022-10-31 PROCEDURE — 700101 HCHG RX REV CODE 250: Performed by: EMERGENCY MEDICINE

## 2022-10-31 PROCEDURE — 74176 CT ABD & PELVIS W/O CONTRAST: CPT

## 2022-10-31 PROCEDURE — 36415 COLL VENOUS BLD VENIPUNCTURE: CPT

## 2022-10-31 PROCEDURE — 70450 CT HEAD/BRAIN W/O DYE: CPT

## 2022-10-31 PROCEDURE — 94640 AIRWAY INHALATION TREATMENT: CPT

## 2022-10-31 PROCEDURE — 99285 EMERGENCY DEPT VISIT HI MDM: CPT

## 2022-10-31 RX ORDER — IPRATROPIUM BROMIDE AND ALBUTEROL SULFATE 2.5; .5 MG/3ML; MG/3ML
3 SOLUTION RESPIRATORY (INHALATION)
Status: DISCONTINUED | OUTPATIENT
Start: 2022-10-31 | End: 2022-10-31 | Stop reason: HOSPADM

## 2022-10-31 RX ADMIN — IPRATROPIUM BROMIDE AND ALBUTEROL SULFATE 3 ML: .5; 2.5 SOLUTION RESPIRATORY (INHALATION) at 15:42

## 2022-10-31 ASSESSMENT — LIFESTYLE VARIABLES
CONSUMPTION TOTAL: POSITIVE
ON A TYPICAL DAY WHEN YOU DRINK ALCOHOL HOW MANY DRINKS DO YOU HAVE: 6
DO YOU DRINK ALCOHOL: YES
TOTAL SCORE: 0
AVERAGE NUMBER OF DAYS PER WEEK YOU HAVE A DRINK CONTAINING ALCOHOL: 7
TOTAL SCORE: 0
HAVE PEOPLE ANNOYED YOU BY CRITICIZING YOUR DRINKING: NO
EVER HAD A DRINK FIRST THING IN THE MORNING TO STEADY YOUR NERVES TO GET RID OF A HANGOVER: NO
EVER FELT BAD OR GUILTY ABOUT YOUR DRINKING: NO
HAVE YOU EVER FELT YOU SHOULD CUT DOWN ON YOUR DRINKING: NO
TOTAL SCORE: 0
HOW MANY TIMES IN THE PAST YEAR HAVE YOU HAD 5 OR MORE DRINKS IN A DAY: 350

## 2022-10-31 ASSESSMENT — FIBROSIS 4 INDEX: FIB4 SCORE: 2.86

## 2022-10-31 NOTE — ED TRIAGE NOTES
Pt BIB by EMS from home for complaints of GLF. Pt reports he thinks he had a syncopal events but is not sure. Reports pain to buttock and back of his head. Redness to sacrum. Evidence of healing bruise and laceration to head. Reports recent admission for fall. No use of blood thinners.     Pt educated on ED process and asked to wait in lobby. Patient educated on importance of alerting staff to new or worsening symptoms or concerns.

## 2022-10-31 NOTE — ED PROVIDER NOTES
ED Provider Note    CHIEF COMPLAINT  Chief Complaint   Patient presents with    T-5000 GLF    Syncope       HPI  Juan Manuel Bass is a 72 y.o. male who presents for evaluation of a ground-level fall, history of chronic alcohol abuse and multiple falls, recently admitted to hospital few days ago for the same.  He states he lives [employer] light and drinks this all day long.  Currently complaining of pain in his buttocks and abdominal pain.  Does not know if he struck his head.  The patient denies any unilateral weakness numbness or tingling, no chest pain or shortness of breath.  No upper back pain.  He does not recall falling, does not know if he passed out or tripped on something.    REVIEW OF SYSTEMS  Negative for fever, rash, chest pain, dyspnea, vomiting, diarrhea, headache, focal weakness, focal numbness, focal tingling. All other systems are negative.     PAST MEDICAL HISTORY  Past Medical History:   Diagnosis Date    Chronic airway obstruction, not elsewhere classified     Hypertension        FAMILY HISTORY  Family History   Problem Relation Age of Onset    No Known Problems Mother     Heart Disease Father        SOCIAL HISTORY  Social History     Tobacco Use    Smoking status: Former     Packs/day: 1.00     Years: 4.00     Pack years: 4.00     Types: Cigarettes     Quit date: 3/7/2020     Years since quittin.6    Smokeless tobacco: Never   Substance Use Topics    Alcohol use: Yes     Alcohol/week: 21.0 oz     Types: 35 Cans of beer per week     Comment: 5-6 beers/day    Drug use: Not Currently       SURGICAL HISTORY  History reviewed. No pertinent surgical history.    CURRENT MEDICATIONS  I personally reviewed the medication list in the charting documentation.     ALLERGIES  Allergies   Allergen Reactions    Tetanus Toxoid Hives       MEDICAL RECORD  I have reviewed patient's medical record and pertinent results are listed above.      PHYSICAL EXAM  VITAL SIGNS: BP (!) 154/91   Pulse 79   Temp 36.2 °C (97.2 °F)  "(Temporal)   Resp (!) 24   Ht 1.778 m (5' 10\")   Wt 102 kg (224 lb)   SpO2 96%   BMI 32.14 kg/m²    Constitutional: Awake, alert, chronically ill-appearing  HENT: Normocephalic, various areas of ecchymosis across his head in all different stages of healing.  No acute lacerations appreciated.  Eyes: No scleral icterus. Normal conjunctiva pupils are equal reactive.  Neck: Comfortable movement without any obvious restriction in the range of motion.  No tenderness.  Cardiovascular: Upon ascultation I appreciate a regular heart rhythm and a normal rate.   Thorax & Lungs: Normal nonlabored respirations.  Upon application of the stethoscope for auscultation I find there to be no associated chest wall tenderness.  I appreciate no wheezing, rhonchi or rales. There is normal air movement.    Abdomen: The abdomen is not visibly distended.  Upon palpation has moderate generalized tenderness but no rebound, no guarding  Skin: The exposed portions of skin reveal no obvious rash or other abnormalities.  Extremities/Musculoskeletal: No obvious sign of acute trauma. No asymmetric calf tenderness or edema.   Neurologic: Alert & oriented. No focal deficits observed.   Psychiatric: Normal affect appropriate for the clinical situation.    DIAGNOSTIC STUDIES / PROCEDURES    LABS/EKGs     Results for orders placed or performed during the hospital encounter of 10/31/22   DIAGNOSTIC ALCOHOL   Result Value Ref Range    Diagnostic Alcohol 123.6 (H) <10.1 mg/dL   CBC WITHOUT DIFFERENTIAL   Result Value Ref Range    WBC 6.0 4.8 - 10.8 K/uL    RBC 4.46 (L) 4.70 - 6.10 M/uL    Hemoglobin 14.7 14.0 - 18.0 g/dL    Hematocrit 43.3 42.0 - 52.0 %    MCV 97.1 81.4 - 97.8 fL    MCH 33.0 27.0 - 33.0 pg    MCHC 33.9 33.7 - 35.3 g/dL    RDW 47.7 35.9 - 50.0 fL    Platelet Count 216 164 - 446 K/uL    MPV 10.0 9.0 - 12.9 fL   BASIC METABOLIC PANEL   Result Value Ref Range    Sodium 132 (L) 135 - 145 mmol/L    Potassium 4.0 3.6 - 5.5 mmol/L    Chloride " 94 (L) 96 - 112 mmol/L    Co2 25 20 - 33 mmol/L    Glucose 94 65 - 99 mg/dL    Bun 4 (L) 8 - 22 mg/dL    Creatinine 0.47 (L) 0.50 - 1.40 mg/dL    Calcium 9.0 8.5 - 10.5 mg/dL    Anion Gap 13.0 7.0 - 16.0   ESTIMATED GFR   Result Value Ref Range    GFR (CKD-EPI) 110 >60 mL/min/1.73 m 2   EKG   Result Value Ref Range    Report       [facility] Emergency Dept.    Test Date:  2022-10-31  Pt Name:    BRIDGETTE VARNER                  Department: ER  MRN:        [id number]                      Room:       [facility]  Gender:     Male                         Technician: [id number]  :        [id number]                   Requested By:ER TRIAGE PROTOCOL  Order #:    [id number]                    Reading MD: MARKUS HAMILTON MD    Measurements  Intervals                                Axis  Rate:       74                           P:          0  OH:         190                          QRS:        3  QRSD:       101                          T:          16  QT:         376  QTc:        418    Interpretive Statements  12 Lead EKG interpreted by me to show: -- Rate 74 -- Rhythm: Normal sinus  rhythm  -- Axis: Normal -- OH and QRS Intervals: Normal -- T waves: No acute changes  --  ST segments: Evevation throughout -- Ectopy: None. My impression of this EKG:  Does not indicate acute ischemia at this time. ST Elevation amelia e as prior  dating  back through 2022  Electronically Signed On 10- 14:07:07 PDT by MARKUS HAMILTON MD          RADIOLOGY     CT-ABDOMEN-PELVIS W/O   Final Result      1.  No evidence of acute traumatic injury to the abdomen or pelvis.      2.  Colonic diverticulosis without evidence of acute diverticulitis.      3.  No evidence of acute pelvic or spinal fracture.      4.  2.6 cm benign exophytic right renal cyst.      CT-HEAD W/O   Final Result      1.  Mild supratentorial white matter disease most consistent with microvascular ischemic change.   2.  No evidence of acute findings or intra-axial  or extra-axial posttraumatic hemorrhage.               COURSE & MEDICAL DECISION MAKING  I have reviewed any medical record information, laboratory studies and radiographic results as noted above.  Differential diagnoses includes: Fall, head injury, pelvic injury, intra-abdominal injury, dehydration, electrolyte abnormalities, anemia    Encounter Summary: This is a very pleasant 72 y.o. male who unfortunately required evaluation in the emergency department today with history of alcohol abuse and many falls, comes in after another fall.  Does not recall the surrounding circumstances whether it was syncope versus a mechanical fall.  Has evidence of head trauma but not all of its acute.  Has a tender abdomen although he states that is also chronic and his main source of pain involves his pelvis and buttocks.  CT of the abdomen pelvis will be obtained to evaluate for bony trauma, CT of the head will be obtained and ultimately the patient will be reevaluated -------- CTs are negative for traumatic injury, blood work confirms intoxication without other obvious abnormalities.  At this point, the patient is ambulatory.  We had oxygen dropped off form here.  The patient wants to go home and continue drinking his Coors light, he has no interest in sobriety, he was just admitted to the hospital so no need for further hospitalization.  He is being discharged home in stable condition with strict return instructions provided      DISPOSITION: Discharged home in stable condition      FINAL IMPRESSION  1. Acute alcoholic intoxication without complication (HCC)    2. Closed head injury, initial encounter    3. Fall, initial encounter    4. Contusion of buttock, initial encounter           This dictation was created using voice recognition software. The accuracy of the dictation is limited to the abilities of the software. I expect there may be some errors of grammar and possibly content. The nursing notes were reviewed and certain  aspects of this information were incorporated into this note.    Electronically signed by: Amor Colindres M.D., 10/31/2022 2:03 PM

## 2022-11-01 NOTE — ED NOTES
02 tank was delivered. Taxi voucher provided by [employer]. Awaiting assistance from the clothing donations in the hospital to provided pants to pt.

## 2022-11-01 NOTE — ED NOTES
Pt ambulated in hallway with walker with RN.   This RN Spoke to SW and she will have a portable oxygen tank delivered and then we will arrange transportation.     Meal provided to patient.

## 2022-11-01 NOTE — ED NOTES
DC home on 3 lpm 02 via portable tank. He was escorted to lobby with a walker. He  Reports he has a walker at home and will use it to get into house. PT assisted into taxi.

## 2022-11-01 NOTE — DISCHARGE PLANNING
Pt in need of transportation home.   Pt also needs a Portable Oxygen Tank - Notes indicate Pt. Is established with [facility].     [facility] placed call to [employer] [phone] and spoke to Tosha. They will send a tank over. Per Tosha after 5 it is the on call  and deliver could be from 1720 to 2300.    [facility] updated RN.

## 2023-01-01 NOTE — PROGRESS NOTES
Assuming patient care of T- 813. Report received bedside by ER RN in B20. Patient visually assessed and updated on POC.     Transferred to Tele 8, Tele box connected and monitor tech notified.    Bed is locked and in lowest position. Bed alarm checked for proper functioning and is currently on.  All obstacles clear from floor and personal belongings at bedside.    RN call button is w/in reach and education provided on proper use.     Medications, labs and orders reviewed.      40w1d

## 2023-04-01 ENCOUNTER — APPOINTMENT (OUTPATIENT)
Dept: RADIOLOGY | Facility: MEDICAL CENTER | Age: 73
DRG: 640 | End: 2023-04-01
Attending: EMERGENCY MEDICINE
Payer: MEDICARE

## 2023-04-01 ENCOUNTER — HOSPITAL ENCOUNTER (INPATIENT)
Facility: MEDICAL CENTER | Age: 73
LOS: 6 days | DRG: 640 | End: 2023-04-07
Attending: EMERGENCY MEDICINE | Admitting: EMERGENCY MEDICINE
Payer: MEDICARE

## 2023-04-01 DIAGNOSIS — S00.83XA CONTUSION OF FACE, INITIAL ENCOUNTER: ICD-10-CM

## 2023-04-01 DIAGNOSIS — J96.11 CHRONIC RESPIRATORY FAILURE WITH HYPOXIA (HCC): ICD-10-CM

## 2023-04-01 DIAGNOSIS — J41.0 SIMPLE CHRONIC BRONCHITIS (HCC): ICD-10-CM

## 2023-04-01 DIAGNOSIS — S09.90XA CLOSED HEAD INJURY, INITIAL ENCOUNTER: ICD-10-CM

## 2023-04-01 DIAGNOSIS — G93.40 ENCEPHALOPATHY: ICD-10-CM

## 2023-04-01 DIAGNOSIS — R53.1 WEAKNESS: ICD-10-CM

## 2023-04-01 DIAGNOSIS — F10.939 ALCOHOL WITHDRAWAL SYNDROME WITH COMPLICATION (HCC): ICD-10-CM

## 2023-04-01 DIAGNOSIS — E87.1 CHRONIC HYPONATREMIA: ICD-10-CM

## 2023-04-01 DIAGNOSIS — E87.1 HYPONATREMIA: ICD-10-CM

## 2023-04-01 DIAGNOSIS — J44.1 COPD WITH ACUTE EXACERBATION (HCC): ICD-10-CM
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
## 2023-04-01 PROBLEM — R60.0 EDEMA OF LEFT LOWER EXTREMITY: Status: ACTIVE | Noted: 2023-04-01

## 2023-04-01 PROBLEM — S09.93XA FACIAL TRAUMA: Status: ACTIVE | Noted: 2023-04-01

## 2023-04-01 PROBLEM — F10.90 ALCOHOL USE DISORDER: Status: ACTIVE | Noted: 2022-03-07

## 2023-04-01 PROBLEM — S69.92XA: Status: ACTIVE | Noted: 2023-04-01

## 2023-04-01 PROBLEM — E87.29 HIGH ANION GAP METABOLIC ACIDOSIS: Status: ACTIVE | Noted: 2023-04-01

## 2023-04-01 LAB
ALBUMIN SERPL BCP-MCNC: 4 G/DL (ref 3.2–4.9)
ALBUMIN/GLOB SERPL: 1.3 G/DL
ALP SERPL-CCNC: 79 U/L (ref 30–99)
ALT SERPL-CCNC: 29 U/L (ref 2–50)
ANION GAP SERPL CALC-SCNC: 17 MMOL/L (ref 7–16)
APPEARANCE UR: CLEAR
APTT PPP: 32.2 SEC (ref 24.7–36)
AST SERPL-CCNC: 64 U/L (ref 12–45)
BASOPHILS # BLD AUTO: 0.1 % (ref 0–1.8)
BASOPHILS # BLD: 0.01 K/UL (ref 0–0.12)
BILIRUB SERPL-MCNC: 1.6 MG/DL (ref 0.1–1.5)
BILIRUB UR QL STRIP.AUTO: NEGATIVE
BUN SERPL-MCNC: 8 MG/DL (ref 8–22)
CALCIUM ALBUM COR SERPL-MCNC: 8.8 MG/DL (ref 8.5–10.5)
CALCIUM SERPL-MCNC: 8.8 MG/DL (ref 8.5–10.5)
CHLORIDE SERPL-SCNC: 74 MMOL/L (ref 96–112)
CK SERPL-CCNC: 827 U/L (ref 0–154)
CO2 SERPL-SCNC: 21 MMOL/L (ref 20–33)
COLOR UR: YELLOW
CREAT SERPL-MCNC: 0.43 MG/DL (ref 0.5–1.4)
EKG IMPRESSION: NORMAL
EOSINOPHIL # BLD AUTO: 0.02 K/UL (ref 0–0.51)
EOSINOPHIL NFR BLD: 0.2 % (ref 0–6.9)
ERYTHROCYTE [DISTWIDTH] IN BLOOD BY AUTOMATED COUNT: 39 FL (ref 35.9–50)
ETHANOL BLD-MCNC: <10.1 MG/DL
GFR SERPLBLD CREATININE-BSD FMLA CKD-EPI: 113 ML/MIN/1.73 M 2
GLOBULIN SER CALC-MCNC: 3.1 G/DL (ref 1.9–3.5)
GLUCOSE SERPL-MCNC: 88 MG/DL (ref 65–99)
GLUCOSE UR STRIP.AUTO-MCNC: NEGATIVE MG/DL
HCT VFR BLD AUTO: 34.3 % (ref 42–52)
HGB BLD-MCNC: 12.7 G/DL (ref 14–18)
IMM GRANULOCYTES # BLD AUTO: 0.05 K/UL (ref 0–0.11)
IMM GRANULOCYTES NFR BLD AUTO: 0.6 % (ref 0–0.9)
INR PPP: 1.06 (ref 0.87–1.13)
KETONES UR STRIP.AUTO-MCNC: 15 MG/DL
LEUKOCYTE ESTERASE UR QL STRIP.AUTO: NEGATIVE
LYMPHOCYTES # BLD AUTO: 0.68 K/UL (ref 1–4.8)
LYMPHOCYTES NFR BLD: 7.9 % (ref 22–41)
MAGNESIUM SERPL-MCNC: 1.6 MG/DL (ref 1.5–2.5)
MCH RBC QN AUTO: 32.9 PG (ref 27–33)
MCHC RBC AUTO-ENTMCNC: 37 G/DL (ref 33.7–35.3)
MCV RBC AUTO: 88.9 FL (ref 81.4–97.8)
MICRO URNS: ABNORMAL
MONOCYTES # BLD AUTO: 0.91 K/UL (ref 0–0.85)
MONOCYTES NFR BLD AUTO: 10.5 % (ref 0–13.4)
NEUTROPHILS # BLD AUTO: 6.99 K/UL (ref 1.82–7.42)
NEUTROPHILS NFR BLD: 80.7 % (ref 44–72)
NITRITE UR QL STRIP.AUTO: NEGATIVE
NRBC # BLD AUTO: 0 K/UL
NRBC BLD-RTO: 0 /100 WBC
OSMOLALITY UR: 368 MOSM/KG H2O (ref 300–900)
PH UR STRIP.AUTO: 6.5 [PH] (ref 5–8)
PLATELET # BLD AUTO: 218 K/UL (ref 164–446)
PMV BLD AUTO: 9.3 FL (ref 9–12.9)
POTASSIUM SERPL-SCNC: 3.8 MMOL/L (ref 3.6–5.5)
PROT SERPL-MCNC: 7.1 G/DL (ref 6–8.2)
PROT UR QL STRIP: NEGATIVE MG/DL
PROTHROMBIN TIME: 13.7 SEC (ref 12–14.6)
RBC # BLD AUTO: 3.86 M/UL (ref 4.7–6.1)
RBC UR QL AUTO: NEGATIVE
SODIUM SERPL-SCNC: 112 MMOL/L (ref 135–145)
SODIUM SERPL-SCNC: 113 MMOL/L (ref 135–145)
SODIUM SERPL-SCNC: 114 MMOL/L (ref 135–145)
SODIUM UR-SCNC: 31 MMOL/L
SP GR UR STRIP.AUTO: 1.01
TROPONIN T SERPL-MCNC: 19 NG/L (ref 6–19)
TSH SERPL DL<=0.005 MIU/L-ACNC: 2.96 UIU/ML (ref 0.38–5.33)
UROBILINOGEN UR STRIP.AUTO-MCNC: 0.2 MG/DL
WBC # BLD AUTO: 8.7 K/UL (ref 4.8–10.8)

## 2023-04-01 PROCEDURE — 770022 HCHG ROOM/CARE - ICU (200)

## 2023-04-01 PROCEDURE — 93005 ELECTROCARDIOGRAM TRACING: CPT | Performed by: EMERGENCY MEDICINE

## 2023-04-01 PROCEDURE — 82077 ASSAY SPEC XCP UR&BREATH IA: CPT

## 2023-04-01 PROCEDURE — 700111 HCHG RX REV CODE 636 W/ 250 OVERRIDE (IP): Performed by: NURSE PRACTITIONER

## 2023-04-01 PROCEDURE — 84295 ASSAY OF SERUM SODIUM: CPT

## 2023-04-01 PROCEDURE — 72125 CT NECK SPINE W/O DYE: CPT

## 2023-04-01 PROCEDURE — 84300 ASSAY OF URINE SODIUM: CPT

## 2023-04-01 PROCEDURE — 36415 COLL VENOUS BLD VENIPUNCTURE: CPT

## 2023-04-01 PROCEDURE — 84484 ASSAY OF TROPONIN QUANT: CPT

## 2023-04-01 PROCEDURE — 85730 THROMBOPLASTIN TIME PARTIAL: CPT

## 2023-04-01 PROCEDURE — 73110 X-RAY EXAM OF WRIST: CPT | Mod: LT

## 2023-04-01 PROCEDURE — 83935 ASSAY OF URINE OSMOLALITY: CPT

## 2023-04-01 PROCEDURE — 81003 URINALYSIS AUTO W/O SCOPE: CPT

## 2023-04-01 PROCEDURE — HZ2ZZZZ DETOXIFICATION SERVICES FOR SUBSTANCE ABUSE TREATMENT: ICD-10-PCS | Performed by: EMERGENCY MEDICINE

## 2023-04-01 PROCEDURE — 700111 HCHG RX REV CODE 636 W/ 250 OVERRIDE (IP): Performed by: EMERGENCY MEDICINE

## 2023-04-01 PROCEDURE — 82550 ASSAY OF CK (CPK): CPT

## 2023-04-01 PROCEDURE — 83735 ASSAY OF MAGNESIUM: CPT

## 2023-04-01 PROCEDURE — 85025 COMPLETE CBC W/AUTO DIFF WBC: CPT

## 2023-04-01 PROCEDURE — 80053 COMPREHEN METABOLIC PANEL: CPT

## 2023-04-01 PROCEDURE — 71045 X-RAY EXAM CHEST 1 VIEW: CPT

## 2023-04-01 PROCEDURE — 99291 CRITICAL CARE FIRST HOUR: CPT

## 2023-04-01 PROCEDURE — 84443 ASSAY THYROID STIM HORMONE: CPT

## 2023-04-01 PROCEDURE — 700105 HCHG RX REV CODE 258: Performed by: EMERGENCY MEDICINE

## 2023-04-01 PROCEDURE — 70486 CT MAXILLOFACIAL W/O DYE: CPT

## 2023-04-01 PROCEDURE — 73130 X-RAY EXAM OF HAND: CPT | Mod: LT

## 2023-04-01 PROCEDURE — 70450 CT HEAD/BRAIN W/O DYE: CPT

## 2023-04-01 PROCEDURE — 700105 HCHG RX REV CODE 258: Performed by: NURSE PRACTITIONER

## 2023-04-01 PROCEDURE — 99291 CRITICAL CARE FIRST HOUR: CPT | Performed by: EMERGENCY MEDICINE

## 2023-04-01 PROCEDURE — 85610 PROTHROMBIN TIME: CPT

## 2023-04-01 RX ORDER — FOLIC ACID 1 MG/1
1 TABLET ORAL DAILY
Status: COMPLETED | OUTPATIENT
Start: 2023-04-02 | End: 2023-04-05

## 2023-04-01 RX ORDER — LORAZEPAM 1 MG/1
4 TABLET ORAL
Status: DISCONTINUED | OUTPATIENT
Start: 2023-04-01 | End: 2023-04-01

## 2023-04-01 RX ORDER — HYDROCHLOROTHIAZIDE 25 MG/1
25 TABLET ORAL DAILY
Status: DISCONTINUED | OUTPATIENT
Start: 2023-04-02 | End: 2023-04-01

## 2023-04-01 RX ORDER — LORAZEPAM 2 MG/ML
1.5 INJECTION INTRAMUSCULAR
Status: DISCONTINUED | OUTPATIENT
Start: 2023-04-01 | End: 2023-04-01

## 2023-04-01 RX ORDER — LORAZEPAM 2 MG/ML
1 INJECTION INTRAMUSCULAR
Status: DISCONTINUED | OUTPATIENT
Start: 2023-04-01 | End: 2023-04-06

## 2023-04-01 RX ORDER — MAGNESIUM SULFATE HEPTAHYDRATE 40 MG/ML
2 INJECTION, SOLUTION INTRAVENOUS ONCE
Status: COMPLETED | OUTPATIENT
Start: 2023-04-01 | End: 2023-04-01

## 2023-04-01 RX ORDER — LORAZEPAM 2 MG/ML
1 INJECTION INTRAMUSCULAR
Status: DISCONTINUED | OUTPATIENT
Start: 2023-04-01 | End: 2023-04-01

## 2023-04-01 RX ORDER — LORAZEPAM 1 MG/1
0.5 TABLET ORAL EVERY 4 HOURS PRN
Status: DISCONTINUED | OUTPATIENT
Start: 2023-04-01 | End: 2023-04-01

## 2023-04-01 RX ORDER — AMLODIPINE BESYLATE 5 MG/1
5 TABLET ORAL DAILY
Status: DISCONTINUED | OUTPATIENT
Start: 2023-04-02 | End: 2023-04-07 | Stop reason: HOSPADM

## 2023-04-01 RX ORDER — LORAZEPAM 2 MG/ML
3 INJECTION INTRAMUSCULAR
Status: DISCONTINUED | OUTPATIENT
Start: 2023-04-01 | End: 2023-04-06

## 2023-04-01 RX ORDER — AMLODIPINE BESYLATE 5 MG/1
5 TABLET ORAL DAILY
Status: ON HOLD | COMMUNITY
End: 2023-04-24

## 2023-04-01 RX ORDER — 3% SODIUM CHLORIDE 3 G/100ML
100 INJECTION, SOLUTION INTRAVENOUS ONCE
Status: COMPLETED | OUTPATIENT
Start: 2023-04-01 | End: 2023-04-01

## 2023-04-01 RX ORDER — AMOXICILLIN 250 MG
2 CAPSULE ORAL 2 TIMES DAILY
Status: DISCONTINUED | OUTPATIENT
Start: 2023-04-01 | End: 2023-04-07 | Stop reason: HOSPADM

## 2023-04-01 RX ORDER — LORAZEPAM 2 MG/ML
0.5 INJECTION INTRAMUSCULAR EVERY 4 HOURS PRN
Status: DISCONTINUED | OUTPATIENT
Start: 2023-04-01 | End: 2023-04-01

## 2023-04-01 RX ORDER — LORAZEPAM 1 MG/1
1 TABLET ORAL EVERY 4 HOURS PRN
Status: DISCONTINUED | OUTPATIENT
Start: 2023-04-01 | End: 2023-04-01

## 2023-04-01 RX ORDER — LANOLIN ALCOHOL/MO/W.PET/CERES
400 CREAM (GRAM) TOPICAL 2 TIMES DAILY
Status: DISCONTINUED | OUTPATIENT
Start: 2023-04-02 | End: 2023-04-05

## 2023-04-01 RX ORDER — HYDROCHLOROTHIAZIDE 25 MG/1
25 TABLET ORAL DAILY
Status: ON HOLD | COMMUNITY
End: 2023-04-07

## 2023-04-01 RX ORDER — LORAZEPAM 2 MG/ML
2 INJECTION INTRAMUSCULAR
Status: DISCONTINUED | OUTPATIENT
Start: 2023-04-01 | End: 2023-04-06

## 2023-04-01 RX ORDER — BISACODYL 10 MG
10 SUPPOSITORY, RECTAL RECTAL
Status: DISCONTINUED | OUTPATIENT
Start: 2023-04-01 | End: 2023-04-07 | Stop reason: HOSPADM

## 2023-04-01 RX ORDER — PANTOPRAZOLE SODIUM 40 MG/1
40 TABLET, DELAYED RELEASE ORAL DAILY
COMMUNITY

## 2023-04-01 RX ORDER — ENOXAPARIN SODIUM 100 MG/ML
40 INJECTION SUBCUTANEOUS DAILY
Status: DISCONTINUED | OUTPATIENT
Start: 2023-04-01 | End: 2023-04-01

## 2023-04-01 RX ORDER — LORAZEPAM 1 MG/1
2 TABLET ORAL
Status: DISCONTINUED | OUTPATIENT
Start: 2023-04-01 | End: 2023-04-01

## 2023-04-01 RX ORDER — POLYETHYLENE GLYCOL 3350 17 G/17G
1 POWDER, FOR SOLUTION ORAL
Status: DISCONTINUED | OUTPATIENT
Start: 2023-04-01 | End: 2023-04-07 | Stop reason: HOSPADM

## 2023-04-01 RX ORDER — LORAZEPAM 1 MG/1
3 TABLET ORAL
Status: DISCONTINUED | OUTPATIENT
Start: 2023-04-01 | End: 2023-04-01

## 2023-04-01 RX ORDER — ENOXAPARIN SODIUM 100 MG/ML
40 INJECTION SUBCUTANEOUS DAILY
Status: DISCONTINUED | OUTPATIENT
Start: 2023-04-01 | End: 2023-04-05

## 2023-04-01 RX ORDER — OMEPRAZOLE 20 MG/1
20 CAPSULE, DELAYED RELEASE ORAL DAILY
Status: DISCONTINUED | OUTPATIENT
Start: 2023-04-02 | End: 2023-04-07 | Stop reason: HOSPADM

## 2023-04-01 RX ORDER — FLUTICASONE PROPIONATE 44 UG/1
2 AEROSOL, METERED RESPIRATORY (INHALATION)
Status: DISCONTINUED | OUTPATIENT
Start: 2023-04-02 | End: 2023-04-03

## 2023-04-01 RX ORDER — LORAZEPAM 2 MG/ML
4 INJECTION INTRAMUSCULAR
Status: DISCONTINUED | OUTPATIENT
Start: 2023-04-01 | End: 2023-04-06

## 2023-04-01 RX ORDER — LORAZEPAM 2 MG/ML
2 INJECTION INTRAMUSCULAR
Status: DISCONTINUED | OUTPATIENT
Start: 2023-04-01 | End: 2023-04-01

## 2023-04-01 RX ADMIN — THIAMINE HYDROCHLORIDE 200 MG: 100 INJECTION, SOLUTION INTRAMUSCULAR; INTRAVENOUS at 23:09

## 2023-04-01 RX ADMIN — MAGNESIUM SULFATE HEPTAHYDRATE 2 G: 40 INJECTION, SOLUTION INTRAVENOUS at 19:48

## 2023-04-01 RX ADMIN — SODIUM CHLORIDE 100 ML: 3 INJECTION, SOLUTION INTRAVENOUS at 19:38

## 2023-04-01 RX ADMIN — ENOXAPARIN SODIUM 40 MG: 40 INJECTION SUBCUTANEOUS at 19:48

## 2023-04-01 RX ADMIN — THIAMINE HYDROCHLORIDE 100 MG: 100 INJECTION, SOLUTION INTRAMUSCULAR; INTRAVENOUS at 20:04

## 2023-04-01 ASSESSMENT — PAIN DESCRIPTION - PAIN TYPE
TYPE: ACUTE PAIN
TYPE: ACUTE PAIN

## 2023-04-01 ASSESSMENT — FIBROSIS 4 INDEX: FIB4 SCORE: 3.06

## 2023-04-01 NOTE — ED NOTES
Med rec updated and complete. Allergies reviewed.  Pt able to confirmed name and date of birth. Pt unable to clarify when he last took his medications.  Placed call to home pharmacy to clarify current  Prescription medications.        Home pharmacy [facility] [phone]

## 2023-04-01 NOTE — ED NOTES
Assist RN:  Byron from Lab called with critical result of Sodium of 112 at 1616. Critical lab result read back to Byron.   Dr. Colindres notified of critical lab result at 1616.  Critical lab result read back by Dr. Colindres.  Primary RN notified.

## 2023-04-01 NOTE — ED PROVIDER NOTES
ED Provider Note    CHIEF COMPLAINT  Chief Complaint   Patient presents with    T-5000 FALL    Facial Injury     Left eye/face     EXTERNAL RECORDS REVIEWED  Inpatient Notes discharge summary 10/28/2022, head injury and ground-level fall with alcohol intoxication    HPI/ROS  OUTSIDE HISTORIAN(S):  EMS      Juan Manuel Bass is a 72 y.o. male who presents with complaints only of left-sided facial pain after a ground-level fall.  He was take the trash out and tripped.  History of alcohol abuse and many falls, in fact he fell yesterday and refused transport by EMS.  He fell again today and was on the ground for nearly an hour before he was able to contact EMS and be picked up.  EMS reports  he wasinitially was refusing to come to the hospital although eventually they were able to convince him.  He denies any blood thinners.  He denies neck pain or back pain or chest pain or abdominal pain.  No weakness numbness or tingling.  He has no other acute complaints.  EMS reports he was soiled in stool and urine upon their arrival    PAST MEDICAL HISTORY   has a past medical history of Chronic airway obstruction, not elsewhere classified and Hypertension.  Alcohol abuse    SURGICAL HISTORY  patient denies any surgical history    FAMILY HISTORY  Family History   Problem Relation Age of Onset    No Known Problems Mother     Heart Disease Father        SOCIAL HISTORY  Social History     Tobacco Use    Smoking status: Former     Packs/day: 1.00     Years: 4.00     Pack years: 4.00     Types: Cigarettes     Quit date: 3/7/2020     Years since quitting: 3.0    Smokeless tobacco: Never   Substance and Sexual Activity    Alcohol use: Yes     Alcohol/week: 21.0 oz     Types: 35 Cans of beer per week     Comment: 5-6 beers/day    Drug use: Not Currently    Sexual activity: Not on file       CURRENT MEDICATIONS  Home Medications    **Home medications have not yet been reviewed for this encounter**         ALLERGIES  Allergies   Allergen  "Reactions    Tetanus Toxoid Hives       PHYSICAL EXAM  VITAL SIGNS: /56   Pulse 93   Temp 37.2 °C (99 °F)   Resp 15   Ht 1.676 m (5' 6\")   Wt 102 kg (224 lb)   SpO2 98%   BMI 36.15 kg/m²    Constitutional: Chronically ill-appearing awake and alert  HENT: Subacute appearing left periorbital hematoma.  Eyes: Left conjunctival injection/subconjunctival hematoma  Thorax & Lungs: Normal nonlabored respirations. I appreciate no wheezing, rhonchi or rales. There is normal air movement.  Upon cardiac ascultation I appreciate a regular heart rhythm and a normal rate.   Abdomen: The abdomen is not visibly distended. Upon palpation, I find it to be without tenderness.  No mass appreciated.  Skin: The exposed portions of skin reveal no obvious rash or other abnormalities.  Extremities/Musculoskeletal: Scattered bruises and abrasions of all acuities on all of the extremities  Neurologic: Alert & oriented. No focal deficits observed.      DIAGNOSTIC STUDIES / PROCEDURES    LABS  Results for orders placed or performed during the hospital encounter of 04/01/23   CBC WITH DIFFERENTIAL   Result Value Ref Range    WBC 8.7 4.8 - 10.8 K/uL    RBC 3.86 (L) 4.70 - 6.10 M/uL    Hemoglobin 12.7 (L) 14.0 - 18.0 g/dL    Hematocrit 34.3 (L) 42.0 - 52.0 %    MCV 88.9 81.4 - 97.8 fL    MCH 32.9 27.0 - 33.0 pg    MCHC 37.0 (H) 33.7 - 35.3 g/dL    RDW 39.0 35.9 - 50.0 fL    Platelet Count 218 164 - 446 K/uL    MPV 9.3 9.0 - 12.9 fL    Neutrophils-Polys 80.70 (H) 44.00 - 72.00 %    Lymphocytes 7.90 (L) 22.00 - 41.00 %    Monocytes 10.50 0.00 - 13.40 %    Eosinophils 0.20 0.00 - 6.90 %    Basophils 0.10 0.00 - 1.80 %    Immature Granulocytes 0.60 0.00 - 0.90 %    Nucleated RBC 0.00 /100 WBC    Neutrophils (Absolute) 6.99 1.82 - 7.42 K/uL    Lymphs (Absolute) 0.68 (L) 1.00 - 4.80 K/uL    Monos (Absolute) 0.91 (H) 0.00 - 0.85 K/uL    Eos (Absolute) 0.02 0.00 - 0.51 K/uL    Baso (Absolute) 0.01 0.00 - 0.12 K/uL    Immature Granulocytes " (abs) 0.05 0.00 - 0.11 K/uL    NRBC (Absolute) 0.00 K/uL   COMP METABOLIC PANEL   Result Value Ref Range    Sodium 112 (LL) 135 - 145 mmol/L    Potassium 3.8 3.6 - 5.5 mmol/L    Chloride 74 (L) 96 - 112 mmol/L    Co2 21 20 - 33 mmol/L    Anion Gap 17.0 (H) 7.0 - 16.0    Glucose 88 65 - 99 mg/dL    Bun 8 8 - 22 mg/dL    Creatinine 0.43 (L) 0.50 - 1.40 mg/dL    Calcium 8.8 8.5 - 10.5 mg/dL    AST(SGOT) 64 (H) 12 - 45 U/L    ALT(SGPT) 29 2 - 50 U/L    Alkaline Phosphatase 79 30 - 99 U/L    Total Bilirubin 1.6 (H) 0.1 - 1.5 mg/dL    Albumin 4.0 3.2 - 4.9 g/dL    Total Protein 7.1 6.0 - 8.2 g/dL    Globulin 3.1 1.9 - 3.5 g/dL    A-G Ratio 1.3 g/dL   TROPONIN   Result Value Ref Range    Troponin T 19 6 - 19 ng/L   URINALYSIS (UA)    Specimen: Urine   Result Value Ref Range    Color Yellow     Character Clear     Specific Gravity 1.013 <1.035    Ph 6.5 5.0 - 8.0    Glucose Negative Negative mg/dL    Ketones 15 (A) Negative mg/dL    Protein Negative Negative mg/dL    Bilirubin Negative Negative    Urobilinogen, Urine 0.2 Negative    Nitrite Negative Negative    Leukocyte Esterase Negative Negative    Occult Blood Negative Negative    Micro Urine Req see below    DIAGNOSTIC ALCOHOL   Result Value Ref Range    Diagnostic Alcohol <10.1 <10.1 mg/dL   CREATINE KINASE   Result Value Ref Range    CPK Total 827 (H) 0 - 154 U/L   APTT   Result Value Ref Range    APTT 32.2 24.7 - 36.0 sec   PROTHROMBIN TIME (INR)   Result Value Ref Range    PT 13.7 12.0 - 14.6 sec    INR 1.06 0.87 - 1.13   CORRECTED CALCIUM   Result Value Ref Range    Correct Calcium 8.8 8.5 - 10.5 mg/dL   ESTIMATED GFR   Result Value Ref Range    GFR (CKD-EPI) 113 >60 mL/min/1.73 m 2       RADIOLOGY  I have independently interpreted the diagnostic imaging associated with this visit and am waiting the final reading from the radiologist.   My preliminary interpretation is as follows: No ICH  Radiologist interpretation:   DX-WRIST-COMPLETE 3+ LEFT   Final Result       Mild demineralization and degenerative change of the wrist. No acute fracture or malalignment.      DX-HAND 3+ LEFT   Final Result      Demineralization and degenerative changes without acute fracture or malalignment.      CT-CSPINE WITHOUT PLUS RECONS   Final Result      Degenerative and postsurgical changes of the cervical spine without acute fracture or malalignment.      DX-CHEST-PORTABLE (1 VIEW)   Final Result      1.  There is an enlarged cardiac silhouette with possible mild vascular congestion.   2.  There is bibasilar atelectasis.      CT-MAXILLOFACIAL W/O PLUS RECONS   Final Result         1.  No acute displaced facial bone or orbital fracture.   2.  There is left frontal, supraorbital, and facial soft tissue swelling with 2 nodular components probably focal areas of soft tissue hematoma.   3. There is minimal underlying chronic sinus disease again seen.      CT-HEAD W/O   Final Result      1.  Left fore head hematoma..   2.  No acute intracranial abnormality.         US-EXTREMITY VENOUS LOWER UNILAT LEFT    (Results Pending)         COURSE & MEDICAL DECISION MAKING    ED Observation Status? Yes; I am placing the patient in to an observation status due to a diagnostic uncertainty as well as therapeutic intensity. Patient placed in observation status at 2:55 PM, 4/1/2023.     Observation plan is as follows: Work-up, imaging, reevaluation and social work involvement    Upon Reevaluation, the patient's condition has: not improved; and will be escalated to hospitalization.    Patient discharged from ED Observation status at 4:45 PM (Time) 4/1/2023 (Date).     INITIAL ASSESSMENT, COURSE AND PLAN  Care Narrative: This is a 72-year-old alcoholic who has frequent falls, presents after he had another fall with evidence of widespread trauma but all sorts of different acuities, unable to get up on his own, no focal neurologic complaints or findings on exam and no anticoagulants.  Code TBI and I evaluate him  emergently at the charge desk.  CT head and face will be obtained.  Blood work will be obtained and he will be reevaluated  Differential includes: Intoxication, ICH, facial fracture, rhabdomyolysis, dehydration, electrolyte abnormalities, anemia      Blood work reveals critical hyponatremia with a sodium of 112.  His alcohol level is negative and undetectable.  CPK is a little over 800.  At this point, I did again reevaluate the patient, he is awake and alert and impressively neurologically intact considering the degree of hyponatremia.  We will initiate fluid restriction here in the emergency department, no indication for hypertonic saline at this time as the patient is neurologically doing quite well.  I discussed the case with the ICU physician Dr. Worthington the patient is admitted to the hospital in critical condition      CRITICAL CARE  The very real possibilty of a deterioration of this patient's condition required the highest level of my preparedness for sudden, emergent intervention.  I provided critical care services, which included medication orders, frequent reevaluations of the patient's condition and response to treatment, ordering and reviewing test results, and discussing the case with various consultants.  The critical care time associated with the care of the patient was 39 minutes. Review chart for interventions. This time is exclusive of any other billable procedures.       FINAL DIAGNOSIS  1. Hyponatremia    2. Weakness    3. Ground-level fall    4. Closed head injury, initial encounter    5. Contusion of face, initial encounter           Electronically signed by: Amor Colindres M.D., 4/1/2023 3:00 PM

## 2023-04-01 NOTE — ED TRIAGE NOTES
Code tbi  Pt bib ems from home ,glf yesterday while taking his garbage out. Pt said that he had syncopal episode, ems helped him yesterday and since then unable to get up . He has been laying on the floor since yesterday  Large left side of face hematoma and multiple abrasions/skin tears.  Pt a/ox3, disoriented to time.   Pt denies taking blood thinner.  Taken pt to ct then blue 18

## 2023-04-02 ENCOUNTER — APPOINTMENT (OUTPATIENT)
Dept: RADIOLOGY | Facility: MEDICAL CENTER | Age: 73
DRG: 640 | End: 2023-04-02
Attending: EMERGENCY MEDICINE
Payer: MEDICARE

## 2023-04-02 PROBLEM — E87.29 HIGH ANION GAP METABOLIC ACIDOSIS: Status: RESOLVED | Noted: 2023-04-01 | Resolved: 2023-04-02

## 2023-04-02 LAB
ALBUMIN SERPL BCP-MCNC: 3.6 G/DL (ref 3.2–4.9)
ALBUMIN/GLOB SERPL: 1.4 G/DL
ALP SERPL-CCNC: 70 U/L (ref 30–99)
ALT SERPL-CCNC: 27 U/L (ref 2–50)
ANION GAP SERPL CALC-SCNC: 11 MMOL/L (ref 7–16)
ANION GAP SERPL CALC-SCNC: 12 MMOL/L (ref 7–16)
ANION GAP SERPL CALC-SCNC: 12 MMOL/L (ref 7–16)
ANION GAP SERPL CALC-SCNC: 13 MMOL/L (ref 7–16)
ANION GAP SERPL CALC-SCNC: 13 MMOL/L (ref 7–16)
ANION GAP SERPL CALC-SCNC: 15 MMOL/L (ref 7–16)
AST SERPL-CCNC: 61 U/L (ref 12–45)
BILIRUB SERPL-MCNC: 1.7 MG/DL (ref 0.1–1.5)
BUN SERPL-MCNC: 6 MG/DL (ref 8–22)
BUN SERPL-MCNC: 6 MG/DL (ref 8–22)
BUN SERPL-MCNC: 7 MG/DL (ref 8–22)
CALCIUM ALBUM COR SERPL-MCNC: 8.4 MG/DL (ref 8.5–10.5)
CALCIUM SERPL-MCNC: 8.1 MG/DL (ref 8.5–10.5)
CALCIUM SERPL-MCNC: 8.2 MG/DL (ref 8.5–10.5)
CALCIUM SERPL-MCNC: 8.3 MG/DL (ref 8.5–10.5)
CHLORIDE SERPL-SCNC: 81 MMOL/L (ref 96–112)
CHLORIDE SERPL-SCNC: 82 MMOL/L (ref 96–112)
CHLORIDE SERPL-SCNC: 84 MMOL/L (ref 96–112)
CHLORIDE SERPL-SCNC: 86 MMOL/L (ref 96–112)
CHOLEST SERPL-MCNC: 102 MG/DL (ref 100–199)
CO2 SERPL-SCNC: 25 MMOL/L (ref 20–33)
CO2 SERPL-SCNC: 25 MMOL/L (ref 20–33)
CO2 SERPL-SCNC: 26 MMOL/L (ref 20–33)
CREAT SERPL-MCNC: 0.33 MG/DL (ref 0.5–1.4)
CREAT SERPL-MCNC: 0.33 MG/DL (ref 0.5–1.4)
CREAT SERPL-MCNC: 0.37 MG/DL (ref 0.5–1.4)
CREAT SERPL-MCNC: 0.38 MG/DL (ref 0.5–1.4)
CREAT SERPL-MCNC: 0.39 MG/DL (ref 0.5–1.4)
CREAT SERPL-MCNC: 0.4 MG/DL (ref 0.5–1.4)
ERYTHROCYTE [DISTWIDTH] IN BLOOD BY AUTOMATED COUNT: 40.1 FL (ref 35.9–50)
GFR SERPLBLD CREATININE-BSD FMLA CKD-EPI: 115 ML/MIN/1.73 M 2
GFR SERPLBLD CREATININE-BSD FMLA CKD-EPI: 116 ML/MIN/1.73 M 2
GFR SERPLBLD CREATININE-BSD FMLA CKD-EPI: 117 ML/MIN/1.73 M 2
GFR SERPLBLD CREATININE-BSD FMLA CKD-EPI: 118 ML/MIN/1.73 M 2
GFR SERPLBLD CREATININE-BSD FMLA CKD-EPI: 122 ML/MIN/1.73 M 2
GFR SERPLBLD CREATININE-BSD FMLA CKD-EPI: 122 ML/MIN/1.73 M 2
GLOBULIN SER CALC-MCNC: 2.5 G/DL (ref 1.9–3.5)
GLUCOSE SERPL-MCNC: 104 MG/DL (ref 65–99)
GLUCOSE SERPL-MCNC: 104 MG/DL (ref 65–99)
GLUCOSE SERPL-MCNC: 107 MG/DL (ref 65–99)
GLUCOSE SERPL-MCNC: 113 MG/DL (ref 65–99)
GLUCOSE SERPL-MCNC: 127 MG/DL (ref 65–99)
GLUCOSE SERPL-MCNC: 99 MG/DL (ref 65–99)
HCT VFR BLD AUTO: 32.6 % (ref 42–52)
HDLC SERPL-MCNC: 76 MG/DL
HGB BLD-MCNC: 12.1 G/DL (ref 14–18)
LDLC SERPL CALC-MCNC: 18 MG/DL
MAGNESIUM SERPL-MCNC: 1.8 MG/DL (ref 1.5–2.5)
MAGNESIUM SERPL-MCNC: 1.9 MG/DL (ref 1.5–2.5)
MCH RBC QN AUTO: 33 PG (ref 27–33)
MCHC RBC AUTO-ENTMCNC: 37.1 G/DL (ref 33.7–35.3)
MCV RBC AUTO: 88.8 FL (ref 81.4–97.8)
OSMOLALITY SERPL: 264 MOSM/KG H2O (ref 278–298)
PHOSPHATE SERPL-MCNC: 2.7 MG/DL (ref 2.5–4.5)
PHOSPHATE SERPL-MCNC: 3 MG/DL (ref 2.5–4.5)
PLATELET # BLD AUTO: 197 K/UL (ref 164–446)
PMV BLD AUTO: 10 FL (ref 9–12.9)
POTASSIUM SERPL-SCNC: 3.4 MMOL/L (ref 3.6–5.5)
POTASSIUM SERPL-SCNC: 3.5 MMOL/L (ref 3.6–5.5)
POTASSIUM SERPL-SCNC: 3.6 MMOL/L (ref 3.6–5.5)
POTASSIUM SERPL-SCNC: 3.7 MMOL/L (ref 3.6–5.5)
POTASSIUM SERPL-SCNC: 3.8 MMOL/L (ref 3.6–5.5)
POTASSIUM SERPL-SCNC: 3.9 MMOL/L (ref 3.6–5.5)
PROT SERPL-MCNC: 6.1 G/DL (ref 6–8.2)
RBC # BLD AUTO: 3.67 M/UL (ref 4.7–6.1)
SODIUM SERPL-SCNC: 118 MMOL/L (ref 135–145)
SODIUM SERPL-SCNC: 119 MMOL/L (ref 135–145)
SODIUM SERPL-SCNC: 120 MMOL/L (ref 135–145)
SODIUM SERPL-SCNC: 121 MMOL/L (ref 135–145)
SODIUM SERPL-SCNC: 122 MMOL/L (ref 135–145)
SODIUM SERPL-SCNC: 122 MMOL/L (ref 135–145)
SODIUM SERPL-SCNC: 124 MMOL/L (ref 135–145)
TRIGL SERPL-MCNC: 42 MG/DL (ref 0–149)
WBC # BLD AUTO: 5.9 K/UL (ref 4.8–10.8)

## 2023-04-02 PROCEDURE — A9270 NON-COVERED ITEM OR SERVICE: HCPCS | Performed by: EMERGENCY MEDICINE

## 2023-04-02 PROCEDURE — 700102 HCHG RX REV CODE 250 W/ 637 OVERRIDE(OP): Performed by: EMERGENCY MEDICINE

## 2023-04-02 PROCEDURE — 700105 HCHG RX REV CODE 258: Performed by: NURSE PRACTITIONER

## 2023-04-02 PROCEDURE — 80053 COMPREHEN METABOLIC PANEL: CPT

## 2023-04-02 PROCEDURE — 80061 LIPID PANEL: CPT

## 2023-04-02 PROCEDURE — A9270 NON-COVERED ITEM OR SERVICE: HCPCS | Performed by: NURSE PRACTITIONER

## 2023-04-02 PROCEDURE — 700102 HCHG RX REV CODE 250 W/ 637 OVERRIDE(OP): Performed by: NURSE PRACTITIONER

## 2023-04-02 PROCEDURE — 84295 ASSAY OF SERUM SODIUM: CPT

## 2023-04-02 PROCEDURE — 92610 EVALUATE SWALLOWING FUNCTION: CPT

## 2023-04-02 PROCEDURE — 93971 EXTREMITY STUDY: CPT | Mod: 26,LT | Performed by: INTERNAL MEDICINE

## 2023-04-02 PROCEDURE — 83735 ASSAY OF MAGNESIUM: CPT

## 2023-04-02 PROCEDURE — 85027 COMPLETE CBC AUTOMATED: CPT

## 2023-04-02 PROCEDURE — 700111 HCHG RX REV CODE 636 W/ 250 OVERRIDE (IP): Performed by: EMERGENCY MEDICINE

## 2023-04-02 PROCEDURE — 700111 HCHG RX REV CODE 636 W/ 250 OVERRIDE (IP): Performed by: NURSE PRACTITIONER

## 2023-04-02 PROCEDURE — 99291 CRITICAL CARE FIRST HOUR: CPT | Performed by: NURSE PRACTITIONER

## 2023-04-02 PROCEDURE — 770022 HCHG ROOM/CARE - ICU (200)

## 2023-04-02 PROCEDURE — 93971 EXTREMITY STUDY: CPT | Mod: LT

## 2023-04-02 PROCEDURE — 84100 ASSAY OF PHOSPHORUS: CPT | Mod: 91

## 2023-04-02 PROCEDURE — 80048 BASIC METABOLIC PNL TOTAL CA: CPT

## 2023-04-02 PROCEDURE — 700111 HCHG RX REV CODE 636 W/ 250 OVERRIDE (IP): Mod: JG | Performed by: NURSE PRACTITIONER

## 2023-04-02 PROCEDURE — 83930 ASSAY OF BLOOD OSMOLALITY: CPT

## 2023-04-02 RX ORDER — ACETAMINOPHEN 325 MG/1
650 TABLET ORAL EVERY 4 HOURS PRN
Status: DISCONTINUED | OUTPATIENT
Start: 2023-04-02 | End: 2023-04-07 | Stop reason: HOSPADM

## 2023-04-02 RX ORDER — DESMOPRESSIN ACETATE 4 UG/ML
2 INJECTION, SOLUTION INTRAVENOUS; SUBCUTANEOUS ONCE
Status: COMPLETED | OUTPATIENT
Start: 2023-04-02 | End: 2023-04-02

## 2023-04-02 RX ORDER — CHLORDIAZEPOXIDE HYDROCHLORIDE 5 MG/1
10 CAPSULE, GELATIN COATED ORAL EVERY 8 HOURS
Status: DISCONTINUED | OUTPATIENT
Start: 2023-04-02 | End: 2023-04-03

## 2023-04-02 RX ORDER — DEXTROSE MONOHYDRATE 50 MG/ML
1000 INJECTION, SOLUTION INTRAVENOUS ONCE
Status: COMPLETED | OUTPATIENT
Start: 2023-04-02 | End: 2023-04-02

## 2023-04-02 RX ORDER — DEXTROSE MONOHYDRATE 50 MG/ML
500 INJECTION, SOLUTION INTRAVENOUS ONCE
Status: DISCONTINUED | OUTPATIENT
Start: 2023-04-02 | End: 2023-04-03

## 2023-04-02 RX ORDER — SODIUM CHLORIDE, SODIUM LACTATE, POTASSIUM CHLORIDE, AND CALCIUM CHLORIDE .6; .31; .03; .02 G/100ML; G/100ML; G/100ML; G/100ML
1000 INJECTION, SOLUTION INTRAVENOUS ONCE
Status: COMPLETED | OUTPATIENT
Start: 2023-04-02 | End: 2023-04-02

## 2023-04-02 RX ORDER — OXYCODONE HYDROCHLORIDE AND ACETAMINOPHEN 5; 325 MG/1; MG/1
1-2 TABLET ORAL EVERY 4 HOURS PRN
Status: DISCONTINUED | OUTPATIENT
Start: 2023-04-02 | End: 2023-04-07 | Stop reason: HOSPADM

## 2023-04-02 RX ADMIN — ENOXAPARIN SODIUM 40 MG: 40 INJECTION SUBCUTANEOUS at 18:19

## 2023-04-02 RX ADMIN — DESMOPRESSIN ACETATE 2 MCG: 4 SOLUTION INTRAVENOUS at 12:33

## 2023-04-02 RX ADMIN — MAGNESIUM OXIDE TAB 400 MG (241.3 MG ELEMENTAL MG) 400 MG: 400 (241.3 MG) TAB at 05:18

## 2023-04-02 RX ADMIN — FOLIC ACID 1 MG: 1 TABLET ORAL at 05:19

## 2023-04-02 RX ADMIN — DEXTROSE MONOHYDRATE: 50 INJECTION, SOLUTION INTRAVENOUS at 15:11

## 2023-04-02 RX ADMIN — ACETAMINOPHEN 650 MG: 325 TABLET, FILM COATED ORAL at 19:47

## 2023-04-02 RX ADMIN — AMLODIPINE BESYLATE 5 MG: 10 TABLET ORAL at 05:19

## 2023-04-02 RX ADMIN — SODIUM CHLORIDE, POTASSIUM CHLORIDE, SODIUM LACTATE AND CALCIUM CHLORIDE 1000 ML: 600; 310; 30; 20 INJECTION, SOLUTION INTRAVENOUS at 01:19

## 2023-04-02 RX ADMIN — MAGNESIUM OXIDE TAB 400 MG (241.3 MG ELEMENTAL MG) 400 MG: 400 (241.3 MG) TAB at 18:18

## 2023-04-02 RX ADMIN — OMEPRAZOLE 20 MG: 20 CAPSULE, DELAYED RELEASE ORAL at 05:19

## 2023-04-02 RX ADMIN — CHLORDIAZEPOXIDE HYDROCHLORIDE 10 MG: 5 CAPSULE ORAL at 22:20

## 2023-04-02 RX ADMIN — DEXTROSE MONOHYDRATE 1000 ML: 50 INJECTION, SOLUTION INTRAVENOUS at 11:19

## 2023-04-02 RX ADMIN — SENNOSIDES AND DOCUSATE SODIUM 2 TABLET: 50; 8.6 TABLET ORAL at 18:18

## 2023-04-02 RX ADMIN — OXYCODONE AND ACETAMINOPHEN 2 TABLET: 5; 325 TABLET ORAL at 15:08

## 2023-04-02 RX ADMIN — THIAMINE HYDROCHLORIDE 200 MG: 100 INJECTION, SOLUTION INTRAMUSCULAR; INTRAVENOUS at 15:15

## 2023-04-02 RX ADMIN — THERA TABS 1 TABLET: TAB at 05:18

## 2023-04-02 RX ADMIN — THIAMINE HYDROCHLORIDE 200 MG: 100 INJECTION, SOLUTION INTRAMUSCULAR; INTRAVENOUS at 09:00

## 2023-04-02 RX ADMIN — POTASSIUM BICARBONATE 50 MEQ: 978 TABLET, EFFERVESCENT ORAL at 18:17

## 2023-04-02 RX ADMIN — THIAMINE HYDROCHLORIDE 200 MG: 100 INJECTION, SOLUTION INTRAMUSCULAR; INTRAVENOUS at 21:01

## 2023-04-02 ASSESSMENT — LIFESTYLE VARIABLES
PAROXYSMAL SWEATS: NO SWEAT VISIBLE
PAROXYSMAL SWEATS: NO SWEAT VISIBLE
NAUSEA AND VOMITING: NO NAUSEA AND NO VOMITING
AUDITORY DISTURBANCES: NOT PRESENT
PAROXYSMAL SWEATS: NO SWEAT VISIBLE
AGITATION: NORMAL ACTIVITY
AGITATION: *
NAUSEA AND VOMITING: NO NAUSEA AND NO VOMITING
ANXIETY: *
AGITATION: *
TREMOR: NO TREMOR
VISUAL DISTURBANCES: NOT PRESENT
AUDITORY DISTURBANCES: NOT PRESENT
ORIENTATION AND CLOUDING OF SENSORIUM: ORIENTED AND CAN DO SERIAL ADDITIONS
VISUAL DISTURBANCES: NOT PRESENT
HEADACHE, FULLNESS IN HEAD: MILD
TREMOR: NO TREMOR
TREMOR: NO TREMOR
ORIENTATION AND CLOUDING OF SENSORIUM: ORIENTED AND CAN DO SERIAL ADDITIONS
NAUSEA AND VOMITING: NO NAUSEA AND NO VOMITING
ANXIETY: *
HEADACHE, FULLNESS IN HEAD: NOT PRESENT
ANXIETY: NO ANXIETY (AT EASE)
TOTAL SCORE: 5
ORIENTATION AND CLOUDING OF SENSORIUM: ORIENTED AND CAN DO SERIAL ADDITIONS
ORIENTATION AND CLOUDING OF SENSORIUM: ORIENTED AND CAN DO SERIAL ADDITIONS
PAROXYSMAL SWEATS: NO SWEAT VISIBLE
ANXIETY: *
NAUSEA AND VOMITING: NO NAUSEA AND NO VOMITING
TOTAL SCORE: 9
AUDITORY DISTURBANCES: NOT PRESENT
VISUAL DISTURBANCES: NOT PRESENT
AUDITORY DISTURBANCES: NOT PRESENT
HEADACHE, FULLNESS IN HEAD: MILD
HEADACHE, FULLNESS IN HEAD: NOT PRESENT
AGITATION: NORMAL ACTIVITY
TOTAL SCORE: 9
TREMOR: NO TREMOR
VISUAL DISTURBANCES: NOT PRESENT
TOTAL SCORE: 0

## 2023-04-02 ASSESSMENT — ENCOUNTER SYMPTOMS
HEADACHES: 0
MYALGIAS: 1
BLURRED VISION: 0
SEIZURES: 0
WEAKNESS: 1
FEVER: 0
CHILLS: 0
VOMITING: 0
SHORTNESS OF BREATH: 0
HEARTBURN: 0
NAUSEA: 0
ABDOMINAL PAIN: 0
DIZZINESS: 0
COUGH: 0

## 2023-04-02 ASSESSMENT — PAIN DESCRIPTION - PAIN TYPE
TYPE: ACUTE PAIN

## 2023-04-02 ASSESSMENT — FIBROSIS 4 INDEX: FIB4 SCORE: 4.29

## 2023-04-02 NOTE — ED NOTES
Called [facility] second time. Informed by RN that they would be coming down to get bedside report and transport to room

## 2023-04-02 NOTE — CARE PLAN
The patient is Watcher - Medium risk of patient condition declining or worsening    Shift Goals  Clinical Goals: q4hr sodium  Patient Goals: Rest  Family Goals: ALEKSANDAR    Progress made toward(s) clinical / shift goals:    Problem: Knowledge Deficit - Standard  Goal: Patient and family/care givers will demonstrate understanding of plan of care, disease process/condition, diagnostic tests and medications  Outcome: Progressing  Patient verbalized that his low sodium is the cause of his admission. Patient verbalizes that his blood is being checked regularly to assess his sodium level.      Problem: Seizure Precautions  Goal: Implementation of seizure precautions  Outcome: Progressing  The patient did not display any seizure like activity throughout the shift.

## 2023-04-02 NOTE — ASSESSMENT & PLAN NOTE
No PFTS on file  Maintain sat > 88% for v/q matching  On baseline 3 L nasal cannula  Nebs/rx: Scheduled and as needed, continue home triple therapy;Duonebs prn; Continue albuterol, Trelegy

## 2023-04-02 NOTE — ASSESSMENT & PLAN NOTE
Multiple bruises and abrasions throughout dorsum of left hand with bruising into the digits no focal deformity, diffuse tenderness    X-ray hand and wrist pending

## 2023-04-02 NOTE — ASSESSMENT & PLAN NOTE
Noncontrast head CT negative  CT max face with soft tissue injury to left periorbital region no fracture  Elevate HOB to minimize edema.  Pain management- tylenol - opioids if tylenol ineffective

## 2023-04-02 NOTE — HOSPITAL COURSE
Juan Manuel Bass is a 72 yom  with a PMHX COPD) 3 L home O2) hypertension, chronic alcohol use, chronic hyponatremia secondary to his alcohol use, ground-level falls (multiple admissions in the last year for GLF) who presented to [facility] ED 4/1/2023 post fall from a chair (however memory is vague) with multiple bruises and abrasions to his left face and left extremities.  EMS reports that he had been on the ground for approximately 1 hour per reports of bystanders on scene.  He denies any significant pain to his extremities and or face.  He denies nausea vomiting however he does state that he had been feeling lightheaded and dizzy for the last few days.  He admits to drinking at least 6 cans of [employer] beer daily and says that he has never variance to withdrawal symptoms or seizures when he has quit drinking or brain previous hospital admissions. Radiology reports were negative for any fractures  however he was found to be hyponatremic with a sodium of 113.  He was administered a bolus of 3% saline and transferred to ICU for critical care management

## 2023-04-02 NOTE — ASSESSMENT & PLAN NOTE
Continue home amlodipine and dc  Hydrochlorothiazide as HCTZ can worsen/cause hyponatremia   Consider 5 mg daily  If needed for BP management

## 2023-04-02 NOTE — CONSULTS
Critical Care Consultation    Date of consult: 4/1/2023    Referring Physician  Christopher Worthington M.D.    Reason for Consultation  Severe hyponatremia    History of Presenting Illness  72 y.o. male who presented 4/1/2023 with a fall with multiple bruises and abrasions to his left face and periorbital region and his left arm and hand.  The patient tells me that this is attributable to a fall, from a chair.  He says that he does not really remember it but he thinks that it happened yesterday.  He said that he fell onto the floor hit his left face.  He does not report any seizure activity or vomiting or any prodrome, he does not really remember the events he denies any chest pain or preceding syncopal symptoms that he recalls but again his memory is pretty vague on the situation.    He asked his neighbor to call EMS who came and found him with multiple bruises, he told them that he was on the ground for about an hour, initially not wanting to come to the hospital but then conceded.  The patient was brought into the ED, and found to be critically hyponatremic with a sodium of 112.    CT head/max face/C-spine no fracture  Chest x-ray with cardiomegaly concerning for cardiomyopathy, no focal infiltrates  Serum sodium 112 apparent baseline of around 130, potassium 3.8, serum chloride of 74, bicarb 21, baseline around 30, anion gap 17 creatinine 0.43 BUN 8 AST 64 T. bili 1.6, CPK 27, diagnostic alcohol negative    Patient tells me that he is not in a lot of pain, he does not have any nausea vomiting, he tells me that he falls a lot and does endorse feeling lightheaded and dizzy the last couple of days.  He drinks daily tells me he drinks about 6 beers a day he is never had a withdrawal seizure he does not remember any seizure activity he does not use any other drugs he says he does not drink any hard alcohol.    Code Status  DNAR/DNI    Review of Systems  Review of Systems   All other systems reviewed and are  negative.    Past Medical History   has a past medical history of Chronic airway obstruction, not elsewhere classified and Hypertension.    Surgical History   has no past surgical history on file.    Family History  family history includes Heart Disease in his father; No Known Problems in his mother.    Social History   reports that he quit smoking about 3 years ago. His smoking use included cigarettes. He has a 4.00 pack-year smoking history. He has never used smokeless tobacco. He reports current alcohol use of about 21.0 oz per week. He reports that he does not currently use drugs.    Medications  Home Medications       Reviewed by Castillo Burch (Pharmacy Tech) on 04/01/23 at 1648  Med List Status: Complete     Medication Last Dose Status   albuterol 108 (90 Base) MCG/ACT Aero Soln inhalation aerosol unknown Active   amLODIPine (NORVASC) 5 MG Tab unknown Active   Fluticasone-Umeclidin-Vilant (TRELEGY ELLIPTA) 100-62.5-25 MCG/INH AEROSOL POWDER, BREATH ACTIVATED inhalation unknown Active   hydroCHLOROthiazide (HYDRODIURIL) 25 MG Tab unknown Active   pantoprazole (PROTONIX) 40 MG Tablet Delayed Response unknown Active                  Current Facility-Administered Medications   Medication Dose Route Frequency Provider Last Rate Last Admin    enoxaparin (Lovenox) inj 40 mg  40 mg Subcutaneous DAILY AT 1800 Christopher Worthington M.D.   40 mg at 04/01/23 1948    amLODIPine (NORVASC) tablet 5 mg  5 mg Oral DAILY Christopher Worthington M.D.        omeprazole (PRILOSEC) capsule 20 mg  20 mg Oral DAILY Christopher Worthington M.D.        senna-docusate (PERICOLACE or SENOKOT S) 8.6-50 MG per tablet 2 Tablet  2 Tablet Oral BID Christopher Worthington M.D.        And    polyethylene glycol/lytes (MIRALAX) PACKET 1 Packet  1 Packet Oral QDAY PRN Christopher Worthington M.D.        And    magnesium hydroxide (MILK OF MAGNESIA) suspension 30 mL  30 mL Oral QDAY PRN Christopher Worthington M.D.        And    bisacodyl (DULCOLAX) suppository 10 mg  10  mg Rectal QDAY PRN Christopher Worthington M.D.        multivitamin tablet 1 Tablet  1 Tablet Oral DAILY Christopher Worthington M.D.        And    folic acid (FOLVITE) tablet 1 mg  1 mg Oral DAILY Christopher Worthington M.D.        magnesium oxide tablet 400 mg  400 mg Oral BID Christopher Worthington M.D.        fluticasone (FLOVENT HFA) 44 MCG/ACT inhaler 88 mcg  2 Puff Inhalation QDAILY (RT) Christopher Worthington M.D.        umeclidinium-vilanterol (Anoro Ellipta) inhaler 1 Puff  1 Puff Inhalation QDAILY (RT) Christopher Worthington M.D.        LORazepam (ATIVAN) injection 4 mg  4 mg Intravenous Q15 MIN PRN oMira Kimer, A.P.R.N.        Or    LORazepam (ATIVAN) injection 3 mg  3 mg Intravenous Q15 MIN PRN Moira Griffin, A.P.R.N.        Or    LORazepam (ATIVAN) injection 2 mg  2 mg Intravenous Q15 MIN PRN Moira Griffin, A.P.R.N.        Or    LORazepam (ATIVAN) injection 1 mg  1 mg Intravenous Q15 MIN PRN Moira Griffin, A.P.R.N.        thiamine (B-1) 200 mg in dextrose 5% 100 mL IVPB  200 mg Intravenous TID Moira Griffin, A.P.R.N.   Stopped at 04/01/23 6907       Allergies  Allergies   Allergen Reactions    Tetanus Toxoid Hives       Vital Signs last 24 hours  Temp:  [36.3 °C (97.3 °F)-37.2 °C (99 °F)] 37 °C (98.6 °F)  Pulse:  [79-99] 79  Resp:  [14-20] 20  BP: (104-141)/(53-74) 117/59  SpO2:  [94 %-99 %] 99 %    Physical Exam  Physical Exam  Constitutional:       General: He is not in acute distress.     Appearance: He is ill-appearing.   HENT:      Head:      Comments: Left periorbital ecchymosis significant swelling with left chemosis, extraocular movements are intact however vision is intact, there is no crepitus,    He has diffuse tenderness in his posterior C-spine none in the midline he can range his neck okay.     Right Ear: Tympanic membrane normal.      Left Ear: Tympanic membrane normal.      Nose: Nose normal.      Mouth/Throat:      Mouth: Mucous membranes are dry.   Eyes:      Pupils: Pupils are equal, round, and  reactive to light.      Comments: Left eye with subconjunctival hemorrhage, pupils intact, vision is intact   Cardiovascular:      Rate and Rhythm: Regular rhythm. Tachycardia present.      Pulses: Normal pulses.   Pulmonary:      Effort: Pulmonary effort is normal.      Breath sounds: Normal breath sounds.      Comments: Patient does have some wet upper airway sounds with rattly cough, lungs are scant wheezes throughout only on forced expiration some prolonged expiration  Abdominal:      General: Abdomen is flat.      Palpations: Abdomen is soft.      Comments: Obese and distended without tenderness to palpation, soft   Musculoskeletal:      Right lower leg: Edema present.      Left lower leg: Edema present.      Comments: Left lower extremity asymmetric edema, with some mild venous stasis changes, no focal tenderness    Left hand is wrapped in a Kerlix and did this he has some abrasions over the top of his dorsum of his left hand which are dressed, he has multiple abrasions overlying the dorsum of the left hand he has diffuse tenderness throughout the hand, no focal tenderness at the carpal bones, no tenderness at the radial styloid he has some tenderness diffusely in his proximal digits which are also ecchymotic    He has diffuse evidence of ecchymosis of varying degrees of healing throughout his body   Skin:     General: Skin is warm.      Capillary Refill: Capillary refill takes less than 2 seconds.   Neurological:      General: No focal deficit present.      Mental Status: He is oriented to person, place, and time. Mental status is at baseline.       Fluids    Intake/Output Summary (Last 24 hours) at 4/2/2023 0030  Last data filed at 4/2/2023 0023  Gross per 24 hour   Intake 330.43 ml   Output --   Net 330.43 ml       Laboratory  Recent Results (from the past 48 hour(s))   CBC WITH DIFFERENTIAL    Collection Time: 04/01/23  3:16 PM   Result Value Ref Range    WBC 8.7 4.8 - 10.8 K/uL    RBC 3.86 (L) 4.70 - 6.10  M/uL    Hemoglobin 12.7 (L) 14.0 - 18.0 g/dL    Hematocrit 34.3 (L) 42.0 - 52.0 %    MCV 88.9 81.4 - 97.8 fL    MCH 32.9 27.0 - 33.0 pg    MCHC 37.0 (H) 33.7 - 35.3 g/dL    RDW 39.0 35.9 - 50.0 fL    Platelet Count 218 164 - 446 K/uL    MPV 9.3 9.0 - 12.9 fL    Neutrophils-Polys 80.70 (H) 44.00 - 72.00 %    Lymphocytes 7.90 (L) 22.00 - 41.00 %    Monocytes 10.50 0.00 - 13.40 %    Eosinophils 0.20 0.00 - 6.90 %    Basophils 0.10 0.00 - 1.80 %    Immature Granulocytes 0.60 0.00 - 0.90 %    Nucleated RBC 0.00 /100 WBC    Neutrophils (Absolute) 6.99 1.82 - 7.42 K/uL    Lymphs (Absolute) 0.68 (L) 1.00 - 4.80 K/uL    Monos (Absolute) 0.91 (H) 0.00 - 0.85 K/uL    Eos (Absolute) 0.02 0.00 - 0.51 K/uL    Baso (Absolute) 0.01 0.00 - 0.12 K/uL    Immature Granulocytes (abs) 0.05 0.00 - 0.11 K/uL    NRBC (Absolute) 0.00 K/uL   COMP METABOLIC PANEL    Collection Time: 04/01/23  3:16 PM   Result Value Ref Range    Sodium 112 (LL) 135 - 145 mmol/L    Potassium 3.8 3.6 - 5.5 mmol/L    Chloride 74 (L) 96 - 112 mmol/L    Co2 21 20 - 33 mmol/L    Anion Gap 17.0 (H) 7.0 - 16.0    Glucose 88 65 - 99 mg/dL    Bun 8 8 - 22 mg/dL    Creatinine 0.43 (L) 0.50 - 1.40 mg/dL    Calcium 8.8 8.5 - 10.5 mg/dL    AST(SGOT) 64 (H) 12 - 45 U/L    ALT(SGPT) 29 2 - 50 U/L    Alkaline Phosphatase 79 30 - 99 U/L    Total Bilirubin 1.6 (H) 0.1 - 1.5 mg/dL    Albumin 4.0 3.2 - 4.9 g/dL    Total Protein 7.1 6.0 - 8.2 g/dL    Globulin 3.1 1.9 - 3.5 g/dL    A-G Ratio 1.3 g/dL   TROPONIN    Collection Time: 04/01/23  3:16 PM   Result Value Ref Range    Troponin T 19 6 - 19 ng/L   DIAGNOSTIC ALCOHOL    Collection Time: 04/01/23  3:16 PM   Result Value Ref Range    Diagnostic Alcohol <10.1 <10.1 mg/dL   CREATINE KINASE    Collection Time: 04/01/23  3:16 PM   Result Value Ref Range    CPK Total 827 (H) 0 - 154 U/L   APTT    Collection Time: 04/01/23  3:16 PM   Result Value Ref Range    APTT 32.2 24.7 - 36.0 sec   PROTHROMBIN TIME (INR)    Collection Time:  23  3:16 PM   Result Value Ref Range    PT 13.7 12.0 - 14.6 sec    INR 1.06 0.87 - 1.13   CORRECTED CALCIUM    Collection Time: 23  3:16 PM   Result Value Ref Range    Correct Calcium 8.8 8.5 - 10.5 mg/dL   ESTIMATED GFR    Collection Time: 23  3:16 PM   Result Value Ref Range    GFR (CKD-EPI) 113 >60 mL/min/1.73 m 2   TSH WITH REFLEX TO FT4    Collection Time: 23  3:16 PM   Result Value Ref Range    TSH 2.960 0.380 - 5.330 uIU/mL   Magnesium    Collection Time: 23  3:16 PM   Result Value Ref Range    Magnesium 1.6 1.5 - 2.5 mg/dL   EKG    Collection Time: 23  3:44 PM   Result Value Ref Range    Report       [facility] Emergency Dept.    Test Date:  2023  Pt Name:    BRIDGETTE VARNER                  Department: ER  MRN:        [id number]                      Room:       [facility]  Gender:     Male                         Technician: [id number]  :        1950                   Requested By:MARKUS HAMILTON  Order #:    [id number]                    Reading MD: MARKUS HAMILTON MD    Measurements  Intervals                                Axis  Rate:       89                           P:          -71  SC:         192                          QRS:        5  QRSD:       103                          T:          27  QT:         361  QTc:        440    Interpretive Statements  12 Lead EKG interpreted by me to show: -- Rate 89 -- Rhythm: Normal sinus  rhythm  -- Axis: Normal -- SC and QRS Intervals: Normal -- T waves: No acute changes  --  ST segments: No acute changes -- Ectopy: PAC. My impression of this EKG: Does  not indicate acute ischemia at this time. No sig change 10/31/2 2  Electronically Signed On 2023 16:23:32 PDT by MARKUS HAMILTON MD     URINALYSIS (UA)    Collection Time: 23  5:16 PM    Specimen: Urine   Result Value Ref Range    Color Yellow     Character Clear     Specific Gravity 1.013 <1.035    Ph 6.5 5.0 - 8.0    Glucose Negative Negative  mg/dL    Ketones 15 (A) Negative mg/dL    Protein Negative Negative mg/dL    Bilirubin Negative Negative    Urobilinogen, Urine 0.2 Negative    Nitrite Negative Negative    Leukocyte Esterase Negative Negative    Occult Blood Negative Negative    Micro Urine Req see below    OSMOLALITY URINE    Collection Time: 04/01/23  5:16 PM   Result Value Ref Range    Osmolality Urine 368 300 - 900 mOsm/kg H2O   URINE SODIUM RANDOM    Collection Time: 04/01/23  5:16 PM   Result Value Ref Range    Sodium, Urine -per volume 31 mmol/L   SODIUM SERUM (NA)    Collection Time: 04/01/23  7:30 PM   Result Value Ref Range    Sodium 113 (LL) 135 - 145 mmol/L   SODIUM SERUM (NA)    Collection Time: 04/01/23  9:50 PM   Result Value Ref Range    Sodium 114 (LL) 135 - 145 mmol/L       Imaging  DX-WRIST-COMPLETE 3+ LEFT   Final Result      Mild demineralization and degenerative change of the wrist. No acute fracture or malalignment.      DX-HAND 3+ LEFT   Final Result      Demineralization and degenerative changes without acute fracture or malalignment.      CT-CSPINE WITHOUT PLUS RECONS   Final Result      Degenerative and postsurgical changes of the cervical spine without acute fracture or malalignment.      DX-CHEST-PORTABLE (1 VIEW)   Final Result      1.  There is an enlarged cardiac silhouette with possible mild vascular congestion.   2.  There is bibasilar atelectasis.      CT-MAXILLOFACIAL W/O PLUS RECONS   Final Result         1.  No acute displaced facial bone or orbital fracture.   2.  There is left frontal, supraorbital, and facial soft tissue swelling with 2 nodular components probably focal areas of soft tissue hematoma.   3. There is minimal underlying chronic sinus disease again seen.      CT-HEAD W/O   Final Result      1.  Left fore head hematoma..   2.  No acute intracranial abnormality.         US-EXTREMITY VENOUS LOWER UNILAT LEFT    (Results Pending)       Assessment/Plan  * Hyponatremia- (present on  admission)  Assessment & Plan  Severe, acute euvolemic hyponatremia  Likely due to poor intake, dietary, chronic EtOH use ADH independent  Multiple admissions admissions for chronic hyponatremia, with low urine osmolality    Admission sodium 112, neurologically intact, but dizzy lightheadedness and falls likely attributable to hyponatremia  Baseline sodium around 130  Given propensity for seizure and dizziness given 100 cc of 3% over 2 hours initially    TSH pending  Urine osm 368, urine sodium: pending  Fluid restriction  Every 4 hours BMP  Strict I/O  Urine labs: pending  Treatment: free H2O restriction / hypertonic saline      Hand injuries, left, initial encounter  Assessment & Plan  Multiple bruises and abrasions throughout dorsum of left hand with bruising into the digits no focal deformity, diffuse tenderness    X-ray hand and wrist pending    Edema of left lower extremity  Assessment & Plan  Patient with asymmetric left lower extremity edema, tells me this is chronic    DVT ultrasound ordered    High anion gap metabolic acidosis  Assessment & Plan  Presenting with bicarbonate of 21, baseline around 30, anion gap of 18, likely some contribution of ketosis    Check venous blood gas  Thiamine  Fluid resuscitation as tolerated  Optimize electrolytes follow acid-base status    Facial trauma  Assessment & Plan  Noncontrast head CT negative  CT max face with soft tissue injury to left periorbital region no fracture        Falls- (present on admission)  Assessment & Plan  Patient with multiple admissions for falls, including a very similar presentation to today    Uncertain etiology, would think that chronic hyponatremia probably contributory    Acute on chronic respiratory failure with hypoxia (HCC)- (present on admission)  Assessment & Plan  Chronic hypoxemic respiratory failure 3 L home O2    Chest x-ray shows cardiomegaly, has been admitted with elevated BNP's  TTE 10/22 with hyperdynamic LV normal RV function,  no evidence of PH  No valvular lesions    Alcohol use disorder  Assessment & Plan  Patient tells me he drinks about 6 beers a day, he is never had a seizure to his knowledge, never been admitted for alcohol related complications to his knowledge but per medical record has been admitted with withdrawal    No signs of alcohol withdraw at this time  Monitor for withdrawal in the ICU  [facility]    Primary hypertension  Assessment & Plan  Continue home amlodipine and hydrochlorothiazide    COPD (chronic obstructive pulmonary disease) (HCC)- (present on admission)  Assessment & Plan  No PFTS on file  Not in acute exacerbation, mildly wheezy in the ED  CXR: cardiomegaly, basilar atelectasis no infiltrate    On 3 L baseline  Duonebs prn  Continue albuterol, Trelegy        Discussed patient condition and risk of morbidity and/or mortality with Family, RN, RT, Therapies, Pharmacy, and Patient.    The patient remains critically ill.  Critical care time = 82 minutes in directly providing and coordinating critical care and extensive data review.  No time overlap and excludes procedures.

## 2023-04-02 NOTE — ASSESSMENT & PLAN NOTE
Patient with asymmetric left lower extremity edema, tells me this is chronic    DVT ultrasound - negative for DVT

## 2023-04-02 NOTE — PROGRESS NOTES
Critical Care Progress Note    Date of admission  4/1/2023    Chief Complaint  72 y.o. male admitted 4/1/2023 with post fall with left facial, head trauma.  Trauma to left extremities.  Severe hyponatremia    Hospital Course  Juan Manuel Bass is a 72 yom  with a PMHX COPD) 3 L home O2) hypertension, chronic alcohol use, chronic hyponatremia secondary to his alcohol use, ground-level falls (multiple admissions in the last year for GLF) who presented to [facility] 4/1/2023 post fall from a chair (however memory is vague) with multiple bruises and abrasions to his left face and left extremities.  EMS reports that he had been on the ground for approximately 1 hour per reports of bystanders on scene.  He denies any significant pain to his extremities and or face.  He denies nausea vomiting however he does state that he had been feeling lightheaded and dizzy for the last few days.  He admits to drinking at least 6 cans of [employer] beer daily and says that he has never variance to withdrawal symptoms or seizures when he has quit drinking or brain previous hospital admissions. Radiology reports were negative for any fractures  however he was found to be hyponatremic with a sodium of 113.  He was administered a bolus of 3% saline and transferred to ICU for critical care management    Interval Problem Update  Reviewed last 24 hour events:    -Afebrile  -Initially treated with 3% saline bolus followed by fluid restriction  -Continue n.p.o.-fluid restriction  -Gamboa Placed for strict DIANA monitoring  -Patient remains awake alert and oriented  -Replace magnesium and phosphorus  -Every 4 hour sodium checks  -Administered D5W - 1 L  -DDAVp given X 1 dose   4/1/2023 serum sodium [id number]; 2150   4/2/2023 serum sodium [id number]; 0600 ; 07 30 ; 1000-; 1400       Review of Systems  Review of Systems   Constitutional:  Positive for malaise/fatigue. Negative for chills and fever.   HENT:  Positive  "for hearing loss.    Eyes:  Negative for blurred vision.   Respiratory:  Negative for cough and shortness of breath.    Cardiovascular:  Negative for chest pain.   Gastrointestinal:  Negative for abdominal pain, heartburn, nausea and vomiting.   Musculoskeletal:  Positive for myalgias.   Skin:  Negative for rash.   Neurological:  Positive for weakness. Negative for dizziness, seizures and headaches.        \"Yep, I fall a lot.  My legs don't work as well. They are not that strong\"        Vital Signs for last 24 hours   Temp:  [36.3 °C (97.3 °F)-37.2 °C (99 °F)] 37 °C (98.6 °F)  Pulse:  [66-99] 77  Resp:  [14-20] 18  BP: (104-155)/(53-74) 155/72  SpO2:  [94 %-100 %] 96 %    Hemodynamic parameters for last 24 hours       Respiratory Information for the last 24 hours       Physical Exam   Physical Exam  Vitals and nursing note reviewed.   Constitutional:       Appearance: He is obese.   HENT:      Head:      Comments: Multiple abrasions and bruising noted to face.  Large hematoma to left eybrow  Eyes:      Comments: Bl Periorbital ecchymosis   Neck:      Comments: Trach midline  Cardiovascular:      Rate and Rhythm: Normal rate and regular rhythm.      Pulses:           Radial pulses are 2+ on the right side and 2+ on the left side.        Dorsalis pedis pulses are 2+ on the right side and 2+ on the left side.      Heart sounds: Normal heart sounds.   Pulmonary:      Breath sounds: Normal breath sounds and air entry.      Comments: Without any respiratory distress  Abdominal:      Comments: Soft round nondistended   Genitourinary:     Comments: Gamboa to down drain with clear yellow urine  Skin:     Comments: Multiple contusions,.  Patient's and bruising noted to extremities mainly on left upper and left lower extremity.  Upper extremity covered with gauze   Neurological:      Mental Status: He is alert.      GCS: GCS eye subscore is 4. GCS verbal subscore is 5. GCS motor subscore is 6.      Comments: MAEE antigravity "   RAMILA 3-4 mm     Psychiatric:         Attention and Perception: Attention and perception normal.         Mood and Affect: Mood and affect normal.         Speech: Speech normal.         Behavior: Behavior normal. Behavior is cooperative.         Thought Content: Thought content normal.       Medications  Current Facility-Administered Medications   Medication Dose Route Frequency Provider Last Rate Last Admin    enoxaparin (Lovenox) inj 40 mg  40 mg Subcutaneous DAILY AT 1800 Christopher Worthington M.D.   40 mg at 04/01/23 1948    amLODIPine (NORVASC) tablet 5 mg  5 mg Oral DAILY Christopher Worthington M.D.   5 mg at 04/02/23 0519    omeprazole (PRILOSEC) capsule 20 mg  20 mg Oral DAILY Christopher Worthington M.D.   20 mg at 04/02/23 0519    senna-docusate (PERICOLACE or SENOKOT S) 8.6-50 MG per tablet 2 Tablet  2 Tablet Oral BID Christopher Worthington M.D.        And    polyethylene glycol/lytes (MIRALAX) PACKET 1 Packet  1 Packet Oral QDAY PRN Christopher Worthington M.D.        And    magnesium hydroxide (MILK OF MAGNESIA) suspension 30 mL  30 mL Oral QDAY PRN Christopher Worthington M.D.        And    bisacodyl (DULCOLAX) suppository 10 mg  10 mg Rectal QDAY PRN Christopher Worthington M.D.        multivitamin tablet 1 Tablet  1 Tablet Oral DAILY Christopher Worthington M.D.   1 Tablet at 04/02/23 0518    And    folic acid (FOLVITE) tablet 1 mg  1 mg Oral DAILY Christopher Worthington M.D.   1 mg at 04/02/23 0519    magnesium oxide tablet 400 mg  400 mg Oral BID Christopher Worthington M.D.   400 mg at 04/02/23 0518    fluticasone (FLOVENT HFA) 44 MCG/ACT inhaler 88 mcg  2 Puff Inhalation QDAILSARA (RT) Christopher Worthington M.D.        umeclidinium-vilanterol (Anoro Ellipta) inhaler 1 Puff  1 Puff Inhalation QDAILSARA (RT) Christopher Worthington M.D.        LORazepam (ATIVAN) injection 4 mg  4 mg Intravenous Q15 MIN PRN Moira F Griffin, A.P.R.N.        Or    LORazepam (ATIVAN) injection 3 mg  3 mg Intravenous Q15 MIN PRN TARIQ SamuelR.N.        Or    LORazepam  (ATIVAN) injection 2 mg  2 mg Intravenous Q15 MIN PRN Moira Kimer, A.P.R.N.        Or    LORazepam (ATIVAN) injection 1 mg  1 mg Intravenous Q15 MIN PRN Moira Griffin, A.P.R.N.        thiamine (B-1) 200 mg in dextrose 5% 100 mL IVPB  200 mg Intravenous TID Moira Griffin, A.P.R.N.   Stopped at 04/01/23 2339       Fluids    Intake/Output Summary (Last 24 hours) at 4/2/2023 0623  Last data filed at 4/2/2023 0600  Gross per 24 hour   Intake 1330.75 ml   Output 2325 ml   Net -994.25 ml       Laboratory      Recent Labs     04/01/23  1516   CPKTOTAL 827*     Recent Labs     04/01/23 1516 04/01/23  1930 04/01/23  2150 04/02/23  0154   SODIUM 112* 113* 114* 118*   POTASSIUM 3.8  --   --  3.8   CHLORIDE 74*  --   --  81*   CO2 21  --   --  26   BUN 8  --   --  7*   CREATININE 0.43*  --   --  0.33*   MAGNESIUM 1.6  --   --   --    CALCIUM 8.8  --   --  8.1*     Recent Labs     04/01/23 1516 04/02/23  0154   ALTSGPT 29 27   ASTSGOT 64* 61*   ALKPHOSPHAT 79 70   TBILIRUBIN 1.6* 1.7*   GLUCOSE 88 104*     Recent Labs     04/01/23 1516 04/02/23  0154   WBC 8.7 5.9   NEUTSPOLYS 80.70*  --    LYMPHOCYTES 7.90*  --    MONOCYTES 10.50  --    EOSINOPHILS 0.20  --    BASOPHILS 0.10  --    ASTSGOT 64* 61*   ALTSGPT 29 27   ALKPHOSPHAT 79 70   TBILIRUBIN 1.6* 1.7*     Recent Labs     04/01/23 1516 04/02/23  0154   RBC 3.86* 3.67*   HEMOGLOBIN 12.7* 12.1*   HEMATOCRIT 34.3* 32.6*   PLATELETCT 218 197   PROTHROMBTM 13.7  --    APTT 32.2  --    INR 1.06  --        Imaging  X-Ray:  I have personally reviewed the images and compared with prior images.  EKG:  I have personally reviewed the images and compared with prior images.  CT:    Reviewed  Ultrasound:  Reviewed    Assessment/Plan  * Hyponatremia- (present on admission)  Assessment & Plan  Severe, acute  hyponatremia  Likely due to poor intake, dietary, chronic EtOH use ADH independent  Multiple admissions admissions for chronic hyponatremia, with low urine osmolality  Admission  sodium 112, neurologically intact, but dizzy lightheadedness and falls likely attributable to hyponatremia  Baseline sodium around 130  -Given propensity for seizure and dizziness given 100 cc of 3% over 2 hours initially as treatment   -Serum NA rising too quickly for he was administered 1 L D5W infusion.    -DDAVP 2 mcg x 1 dose initially ordered and thought to have been cancelled unfortunately it was not cancelled and given- The following serum Na(1400) resulted with a Na of 120 down from 124- If NA trending below 24 hr goal consider gently hydration with normal saline; 3% saline bolus to mitigate seizure activity   -Continue Fluid restriction  -Every 4 hours BMP-   -Strict I/O          Hand injuries, left, initial encounter  Assessment & Plan  Multiple bruises and abrasions throughout dorsum of left hand with bruising into the digits no focal deformity, diffuse tenderness    X-ray hand and wrist pending    Edema of left lower extremity  Assessment & Plan  Patient with asymmetric left lower extremity edema, tells me this is chronic    DVT ultrasound - negative for DVT    Facial trauma  Assessment & Plan  Noncontrast head CT negative  CT max face with soft tissue injury to left periorbital region no fracture  Elevate HOB to minimize edema.  Pain management- tylenol - opioids if tylenol ineffective         Falls- (present on admission)  Assessment & Plan  Patient with multiple admissions for falls, including a very similar presentation to today    Uncertain etiology, would think that chronic hyponatremia probably contributory    Acute on chronic respiratory failure with hypoxia (HCC)- (present on admission)  Assessment & Plan  Chronic hypoxemic respiratory failure 3 L home O2    Chest x-ray shows cardiomegaly, has been admitted with elevated BNP's  TTE 10/22 with hyperdynamic LV normal RV function, no evidence of PH  No valvular lesions    Alcohol use disorder  Assessment & Plan  Patient tells me he drinks about 6  beers a day, he is never had a seizure to his knowledge, never been admitted for alcohol related complications to his knowledge but per medical record has been admitted with withdrawal  No signs of alcohol withdraw at this time- continue to monitor for withdrawl  -Thiamine, MV, folic acid  -Thiamine 500mg IV TID x 2 days , then 500mg IV daily 5 days, then 100mg PO daily if suspect Wernicke's or ALOC  -Check Mg. Check Urine drug screen and serum ethanol level(see AG and if elevated, measure osmolar gap)  -Goal Mg >2, K>4, Phos >2. Monitor for refeeding syndrome  -Replace electrolytes as needed  -aspiration/seizure fall precautions  -UDS- negative  -Social work consult for alcohol cessation counseling/  - If ss of withdrawal then consider Precedex infusion;  librium, gabapentin,clonidine and or  gabapentin      Primary hypertension  Assessment & Plan  Continue home amlodipine and dc  Hydrochlorothiazide as HCTZ can worsen/cause hyponatremia     COPD (chronic obstructive pulmonary disease) (HCC)- (present on admission)  Assessment & Plan  No PFTS on file  Not in acute exacerbation, mildly wheezy in the ED  CXR: cardiomegaly, basilar atelectasis no infiltrate    On 3 L baseline  Duonebs prn  Continue albuterol, Trelegy         VTE:  Lovenox  Ulcer: PPI  Lines: Gamboa Catheter  Ongoing indication addressed    I have performed a physical exam and reviewed and updated ROS and Plan today (4/2/2023). In review of yesterday's note (4/1/2023), there are no changes except as documented above.     Discussed patient condition and risk of morbidity and/or mortality with RN, RT, Therapies, Pharmacy, Dietary, , Charge nurse / hot rounds, and Patient  The patient remains critically ill.  Critical care time = 65 minutes in directly providing and coordinating critical care and extensive data review.  No time overlap and excludes procedures.  Please note that this dictation was created using voice recognition software. The  accuracy of the dictation is limited to the abilities of the software. I have made every reasonable attempt to correct obvious errors, but I expect that there are errors of grammar and possibly content that I did not discover before finalizing the note.

## 2023-04-02 NOTE — ASSESSMENT & PLAN NOTE
Patient tells me he drinks about 6 beers a day,denies every having a seizure and  Never treated for alcohol related complications   -Thiamine, MV, folic acid  -Thiamine 500mg IV TID x 2 days , then 500mg IV daily 5 days, then 100mg PO daily if suspect Wernicke's or ALOC  -Check Mg. Check Urine drug screen and serum ethanol level(see AG and if elevated, measure osmolar gap)  -Goal Mg >2, K>4, Phos >2. Monitor for refeeding syndrome  -Replace electrolytes as needed  -aspiration/seizure fall precautions  -UDS- negative  -Social work consult for alcohol cessation counseling/  - Pt now having some ss of withdrawal therefor started Librium taper; consider Precedex infusion, gabapentin,clonidine and or gabapentin

## 2023-04-02 NOTE — ED NOTES
Called JOSE ANTONIO RN to check on status of transport. RN states that she will be down shortly. Charge nurse notified about delay.

## 2023-04-02 NOTE — ASSESSMENT & PLAN NOTE
Chronic hypoxemic respiratory failure 3 L home O2    Chest x-ray shows cardiomegaly, has been admitted with elevated BNP's  TTE 10/22 with hyperdynamic LV normal RV function, no evidence of PH  No valvular lesions

## 2023-04-02 NOTE — PROGRESS NOTES
4 Eyes Skin Assessment Completed by ALDEN Rob and ALDEN Lin.    Head Scab, Scratch, Swelling, and Redness  Ears Redness and Discoloration  Nose Redness and Scab  Mouth WDL  Neck WDL  Breast/Chest WDL  Shoulder Blades Redness  Spine Redness and Bruising  (R) Arm/Elbow/Hand Redness, Blanching, Bruising, Abrasion, Scab, Swelling, and Discoloration  (L) Arm/Elbow/Hand Blanching, Bruising, Abrasion, and Scab  Abdomen WDL  Groin WDL  Scrotum/Coccyx/Buttocks Redness and Blanching  (R) Leg Redness, Bruising, and Abrasion  (L) Leg Redness, Scab, Abrasion, and Shiny  (R) Heel/Foot/Toe Redness, Blanching, Discoloration, and Scab  (L) Heel/Foot/Toe Blanching and Discoloration          Devices In Places ECG, Tele Box, Blood Pressure Cuff, Pulse Ox, Gamboa, SCD's, and Nasal Cannula      Interventions In Place Gray Ear Foams, Heel Mepilex, Sacral Mepilex, Pillows, Q2 Turns, and Low Air Loss Mattress    Possible Skin Injury Yes    Pictures Uploaded Into Epic No, needs to be completed  Wound Consult Placed N/A  RN Wound Prevention Protocol Ordered Yes

## 2023-04-02 NOTE — ASSESSMENT & PLAN NOTE
Presenting with bicarbonate of 21, baseline around 30, anion gap of 18, likely some contribution of ketosis  Check venous blood gas  Thiamine  Fluid resuscitation as tolerated  Optimize electrolytes follow acid-base status

## 2023-04-02 NOTE — ASSESSMENT & PLAN NOTE
Hyponatremia with NA of 112 - no seizure activity + weakness lightheadedness   Likely due to Beer potomania with chronic alcohol use, poor intake and thiazide diuretic    Multiple admissions admissions for chronic hyponatremia/beer potomania baseline NA appears to be high 120's to  130  -HCTZ  DC'd and should not be restarted based on history   -4/2 During administered he was given 3% saline bolus however fu serum NA rising too quickly therefore he was administered 1 L D5W infusion.     as well as DDAVP 2 mcg x 1 dose  -4/3- am serum  with urine osm of 368  continue  normal saline at 50 cc to slowly increase serum NA  -Every 6 hours NA checks  -Strict I/O  -Fluid restricion

## 2023-04-02 NOTE — ASSESSMENT & PLAN NOTE
Patient with multiple admissions for falls, including a very similar presentation to today    Uncertain etiology, would think that chronic hyponatremia probably contributory

## 2023-04-02 NOTE — THERAPY
Speech Language Pathology   Clinical Swallow Evaluation     Patient Name: Juan Manuel Bass  AGE:  72 y.o., SEX:  male  Medical Record #: [id number]  Date of Service: 4/2/2023      History of Present Illness  Patient is a 72 y.o. male who presented 4/1/2023 with a fall with multiple bruises and abrasions to his left face and periorbital region and his left arm and hand. CT head/max face/C-spine no fracture. Chest x-ray with cardiomegaly concerning for cardiomyopathy, no focal infiltrates    PMHx Chronic airway obstruction, HTN, alcholo abuse  No previous hx of SLP services at [facility]    CMHx Hyponatremia, hand injuries, edema of L-lower extremity, facial trauma, falls, acute on chronic resp failure w/hypoxia, alcohol disorder, COPD    CXR 4/1/23  1.  There is an enlarged cardiac silhouette with possible mild vascular congestion.  2.  There is bibasilar atelectasis.    CT-Head 4/1/23  1.  Left fore head hematoma..  2.  No acute intracranial abnormality.      General Information:  Vitals  O2 (LPM): 3  O2 Delivery Device: Nasal Cannula  Level of Consciousness: Alert, Awake  Orientation: Oriented x 4  Follows Directives: Yes      Prior Living Situation & Level of Function:  Prior Services: None  Housing / Facility: Motor Home  Lives with - Patient's Self Care Capacity: Alone and Able to Care For Self  Comments: Assisted by neighbor and friend PRN  Communication: WFL  Swallowing: WFL       Oral Mechanism Evaluation:  Dentition: Some missing dentition   Facial Symmetry:  (LUQ edema)  Facial Sensation: Equal     Labial Observations: WFL   Lingual Observations: Midline       Laryngeal Function:  Secretion Management: Adequate  Cough: Perceptually WNL       Subjective  Pt is being monitored for hyponatremia, currently strictily NPO. Patient cleared by RN for evaluation. Diet initiation of RG7/TN0 once medically cleared.      Assessment  Current Method of Nutrition: NPO until cleared by speech pathology  Positioning: Multani's (82-90  "degrees)  Bolus Administration: SLP, Patient  O2 (LPM): 3 O2 Delivery Device: Nasal Cannula  Factor(s) Affecting Performance: None          Swallowing Trials:  Swallowing Trials  Ice: WFL  Thin Liquid (TN0): WFL  Liquidised (LQ3): WFL  Soft & Bite Sized (SB6): WFL  Regular (RG7): WFL      Comments: Patient w/ appropriate self-feeding. Appropriate oral bolus stripping and containment. Total AP transit and effective bolus formation. Functional and timely mastication of solid consistencies. No overt s/sx of aspiration noted w/ single and sequential sips of thin liquids. No increase in WOB, no cough/clear, no change in vocal quality.         Clinical Impressions  Patient presents without s/sx concerning for oropharyngeal dysphagia this date. Pt is being monitored for hyponatremia, currently strictily NPO. Recommend initiation of regular solids and thin liquids diet with PO medication administration when medically cleared for oral alimentation.      Recommendations  Diet Consistency: Regular solids and thin liquids  Instrumentation: None indicated at this time  Medication: Whole with liquid, Whole with puree, As tolerated  Supervision: Assist with meal tray set up  Positioning: Fully upright and midline during oral intake, Meals sitting upright in a chair, as tolerated  Risk Management : Small bites/sips, Slow rate of intake  Oral Care: BID         SLP Treatment Plan  Treatment Plan: Dysphagia Treatment, Patient/Family/Caregiver Training  SLP Frequency: 3x Per Week  Estimated Duration: Until Therapy Goals Met      Anticipated Discharge Needs  Discharge Recommendations: Anticipate that the patient will have no further speech therapy needs after discharge from the hospital   Therapy Recommendations Upon DC: Dysphagia Training, Patient / Family / Caregiver Education        Patient / Family Goals  Patient / Family Goal #1: \"Can I have eggs and grayson?\"  Short Term Goals  Short Term Goal # 1: Patient will consume a diet of " regular solids and thin liquids with no overt s/sx of aspiration with adherence to swallow precautions      Sylvia Caldwell, SLP

## 2023-04-02 NOTE — ED NOTES
ICU RN at bedside. Bedside report given. All questions and concerns addressed at this time. Room checked for belongings. All belongings to go to R112 with pt. Pt AOx4, GCS 15, VSS. Pt to R112 now

## 2023-04-03 LAB
ANION GAP SERPL CALC-SCNC: 12 MMOL/L (ref 7–16)
ANION GAP SERPL CALC-SCNC: 8 MMOL/L (ref 7–16)
ANION GAP SERPL CALC-SCNC: 9 MMOL/L (ref 7–16)
BUN SERPL-MCNC: 6 MG/DL (ref 8–22)
BUN SERPL-MCNC: 6 MG/DL (ref 8–22)
BUN SERPL-MCNC: 7 MG/DL (ref 8–22)
CALCIUM SERPL-MCNC: 8 MG/DL (ref 8.5–10.5)
CALCIUM SERPL-MCNC: 8.2 MG/DL (ref 8.5–10.5)
CALCIUM SERPL-MCNC: 8.3 MG/DL (ref 8.5–10.5)
CHLORIDE SERPL-SCNC: 82 MMOL/L (ref 96–112)
CHLORIDE SERPL-SCNC: 83 MMOL/L (ref 96–112)
CHLORIDE SERPL-SCNC: 86 MMOL/L (ref 96–112)
CO2 SERPL-SCNC: 25 MMOL/L (ref 20–33)
CO2 SERPL-SCNC: 26 MMOL/L (ref 20–33)
CO2 SERPL-SCNC: 28 MMOL/L (ref 20–33)
CREAT SERPL-MCNC: 0.4 MG/DL (ref 0.5–1.4)
CREAT SERPL-MCNC: 0.43 MG/DL (ref 0.5–1.4)
CREAT SERPL-MCNC: 0.44 MG/DL (ref 0.5–1.4)
GFR SERPLBLD CREATININE-BSD FMLA CKD-EPI: 112 ML/MIN/1.73 M 2
GFR SERPLBLD CREATININE-BSD FMLA CKD-EPI: 113 ML/MIN/1.73 M 2
GFR SERPLBLD CREATININE-BSD FMLA CKD-EPI: 115 ML/MIN/1.73 M 2
GLUCOSE SERPL-MCNC: 101 MG/DL (ref 65–99)
GLUCOSE SERPL-MCNC: 142 MG/DL (ref 65–99)
GLUCOSE SERPL-MCNC: 99 MG/DL (ref 65–99)
MAGNESIUM SERPL-MCNC: 2.2 MG/DL (ref 1.5–2.5)
OSMOLALITY UR: 323 MOSM/KG H2O (ref 300–900)
PHOSPHATE SERPL-MCNC: 3 MG/DL (ref 2.5–4.5)
POTASSIUM SERPL-SCNC: 3.5 MMOL/L (ref 3.6–5.5)
POTASSIUM SERPL-SCNC: 3.8 MMOL/L (ref 3.6–5.5)
POTASSIUM SERPL-SCNC: 4.2 MMOL/L (ref 3.6–5.5)
SODIUM SERPL-SCNC: 119 MMOL/L (ref 135–145)
SODIUM SERPL-SCNC: 120 MMOL/L (ref 135–145)
SODIUM SERPL-SCNC: 121 MMOL/L (ref 135–145)

## 2023-04-03 PROCEDURE — 83735 ASSAY OF MAGNESIUM: CPT | Mod: 91

## 2023-04-03 PROCEDURE — 700111 HCHG RX REV CODE 636 W/ 250 OVERRIDE (IP): Performed by: NURSE PRACTITIONER

## 2023-04-03 PROCEDURE — 700111 HCHG RX REV CODE 636 W/ 250 OVERRIDE (IP): Performed by: EMERGENCY MEDICINE

## 2023-04-03 PROCEDURE — 84295 ASSAY OF SERUM SODIUM: CPT | Mod: 91

## 2023-04-03 PROCEDURE — 700105 HCHG RX REV CODE 258: Performed by: NURSE PRACTITIONER

## 2023-04-03 PROCEDURE — 80048 BASIC METABOLIC PNL TOTAL CA: CPT | Mod: 91

## 2023-04-03 PROCEDURE — 700102 HCHG RX REV CODE 250 W/ 637 OVERRIDE(OP): Performed by: NURSE PRACTITIONER

## 2023-04-03 PROCEDURE — 83935 ASSAY OF URINE OSMOLALITY: CPT

## 2023-04-03 PROCEDURE — 84100 ASSAY OF PHOSPHORUS: CPT | Mod: 91

## 2023-04-03 PROCEDURE — 99291 CRITICAL CARE FIRST HOUR: CPT | Performed by: NURSE PRACTITIONER

## 2023-04-03 PROCEDURE — A9270 NON-COVERED ITEM OR SERVICE: HCPCS | Performed by: NURSE PRACTITIONER

## 2023-04-03 PROCEDURE — 770022 HCHG ROOM/CARE - ICU (200)

## 2023-04-03 PROCEDURE — 700102 HCHG RX REV CODE 250 W/ 637 OVERRIDE(OP): Performed by: EMERGENCY MEDICINE

## 2023-04-03 PROCEDURE — A9270 NON-COVERED ITEM OR SERVICE: HCPCS | Performed by: EMERGENCY MEDICINE

## 2023-04-03 RX ORDER — CHLORDIAZEPOXIDE HYDROCHLORIDE 25 MG/1
25 CAPSULE, GELATIN COATED ORAL EVERY 8 HOURS
Status: COMPLETED | OUTPATIENT
Start: 2023-04-04 | End: 2023-04-04

## 2023-04-03 RX ORDER — POTASSIUM CHLORIDE 20 MEQ/1
60 TABLET, EXTENDED RELEASE ORAL ONCE
Status: COMPLETED | OUTPATIENT
Start: 2023-04-03 | End: 2023-04-03

## 2023-04-03 RX ORDER — FLUTICASONE PROPIONATE 44 UG/1
2 AEROSOL, METERED RESPIRATORY (INHALATION) DAILY
Status: DISCONTINUED | OUTPATIENT
Start: 2023-04-04 | End: 2023-04-07 | Stop reason: HOSPADM

## 2023-04-03 RX ORDER — CHLORDIAZEPOXIDE HYDROCHLORIDE 25 MG/1
25 CAPSULE, GELATIN COATED ORAL EVERY 12 HOURS
Status: COMPLETED | OUTPATIENT
Start: 2023-04-05 | End: 2023-04-05

## 2023-04-03 RX ORDER — MAGNESIUM SULFATE HEPTAHYDRATE 40 MG/ML
2 INJECTION, SOLUTION INTRAVENOUS ONCE
Status: COMPLETED | OUTPATIENT
Start: 2023-04-03 | End: 2023-04-03

## 2023-04-03 RX ORDER — CHLORDIAZEPOXIDE HYDROCHLORIDE 25 MG/1
50 CAPSULE, GELATIN COATED ORAL EVERY 8 HOURS
Status: COMPLETED | OUTPATIENT
Start: 2023-04-03 | End: 2023-04-03

## 2023-04-03 RX ORDER — CHLORDIAZEPOXIDE HYDROCHLORIDE 25 MG/1
25 CAPSULE, GELATIN COATED ORAL
Status: COMPLETED | OUTPATIENT
Start: 2023-04-06 | End: 2023-04-06

## 2023-04-03 RX ORDER — GAUZE BANDAGE 2" X 2"
200 BANDAGE TOPICAL 3 TIMES DAILY
Status: COMPLETED | OUTPATIENT
Start: 2023-04-03 | End: 2023-04-04

## 2023-04-03 RX ORDER — SODIUM CHLORIDE 9 MG/ML
INJECTION, SOLUTION INTRAVENOUS CONTINUOUS
Status: DISCONTINUED | OUTPATIENT
Start: 2023-04-03 | End: 2023-04-05

## 2023-04-03 RX ADMIN — OMEPRAZOLE 20 MG: 20 CAPSULE, DELAYED RELEASE ORAL at 05:17

## 2023-04-03 RX ADMIN — POTASSIUM CHLORIDE 60 MEQ: 1500 TABLET, EXTENDED RELEASE ORAL at 10:29

## 2023-04-03 RX ADMIN — THERA TABS 1 TABLET: TAB at 05:16

## 2023-04-03 RX ADMIN — THIAMINE HCL TAB 100 MG 200 MG: 100 TAB at 17:58

## 2023-04-03 RX ADMIN — SENNOSIDES AND DOCUSATE SODIUM 2 TABLET: 50; 8.6 TABLET ORAL at 05:17

## 2023-04-03 RX ADMIN — SODIUM CHLORIDE: 9 INJECTION, SOLUTION INTRAVENOUS at 23:06

## 2023-04-03 RX ADMIN — MAGNESIUM SULFATE HEPTAHYDRATE 2 G: 40 INJECTION, SOLUTION INTRAVENOUS at 00:30

## 2023-04-03 RX ADMIN — SODIUM CHLORIDE: 9 INJECTION, SOLUTION INTRAVENOUS at 14:40

## 2023-04-03 RX ADMIN — THIAMINE HCL TAB 100 MG 200 MG: 100 TAB at 12:03

## 2023-04-03 RX ADMIN — THIAMINE HYDROCHLORIDE 200 MG: 100 INJECTION, SOLUTION INTRAMUSCULAR; INTRAVENOUS at 09:43

## 2023-04-03 RX ADMIN — FOLIC ACID 1 MG: 1 TABLET ORAL at 05:17

## 2023-04-03 RX ADMIN — OXYCODONE AND ACETAMINOPHEN 2 TABLET: 5; 325 TABLET ORAL at 23:03

## 2023-04-03 RX ADMIN — MAGNESIUM OXIDE TAB 400 MG (241.3 MG ELEMENTAL MG) 400 MG: 400 (241.3 MG) TAB at 17:58

## 2023-04-03 RX ADMIN — CHLORDIAZEPOXIDE HYDROCHLORIDE 10 MG: 5 CAPSULE ORAL at 05:16

## 2023-04-03 RX ADMIN — OXYCODONE AND ACETAMINOPHEN 1 TABLET: 5; 325 TABLET ORAL at 07:40

## 2023-04-03 RX ADMIN — CHLORDIAZEPOXIDE HYDROCHLORIDE 50 MG: 25 CAPSULE ORAL at 22:19

## 2023-04-03 RX ADMIN — CHLORDIAZEPOXIDE HYDROCHLORIDE 40 MG: 25 CAPSULE ORAL at 08:38

## 2023-04-03 RX ADMIN — ENOXAPARIN SODIUM 40 MG: 40 INJECTION SUBCUTANEOUS at 17:58

## 2023-04-03 RX ADMIN — CHLORDIAZEPOXIDE HYDROCHLORIDE 50 MG: 25 CAPSULE ORAL at 12:44

## 2023-04-03 RX ADMIN — OXYCODONE AND ACETAMINOPHEN 2 TABLET: 5; 325 TABLET ORAL at 00:37

## 2023-04-03 RX ADMIN — MAGNESIUM OXIDE TAB 400 MG (241.3 MG ELEMENTAL MG) 400 MG: 400 (241.3 MG) TAB at 05:17

## 2023-04-03 RX ADMIN — OXYCODONE AND ACETAMINOPHEN 2 TABLET: 5; 325 TABLET ORAL at 19:20

## 2023-04-03 RX ADMIN — OXYCODONE AND ACETAMINOPHEN 1 TABLET: 5; 325 TABLET ORAL at 15:16

## 2023-04-03 RX ADMIN — AMLODIPINE BESYLATE 5 MG: 10 TABLET ORAL at 05:17

## 2023-04-03 ASSESSMENT — ENCOUNTER SYMPTOMS
HEARTBURN: 0
BLURRED VISION: 0
VOMITING: 0
WEAKNESS: 0
CHILLS: 0
FEVER: 0
MYALGIAS: 1
SHORTNESS OF BREATH: 0
COUGH: 0
ABDOMINAL PAIN: 0
SEIZURES: 0
HEADACHES: 0
DIZZINESS: 0
NAUSEA: 0

## 2023-04-03 ASSESSMENT — LIFESTYLE VARIABLES
NAUSEA AND VOMITING: NO NAUSEA AND NO VOMITING
AUDITORY DISTURBANCES: NOT PRESENT
DOES PATIENT WANT TO STOP DRINKING: NO
TOTAL SCORE: 2
PAROXYSMAL SWEATS: NO SWEAT VISIBLE
VISUAL DISTURBANCES: NOT PRESENT
HAVE PEOPLE ANNOYED YOU BY CRITICIZING YOUR DRINKING: YES
ORIENTATION AND CLOUDING OF SENSORIUM: ORIENTED AND CAN DO SERIAL ADDITIONS
ON A TYPICAL DAY WHEN YOU DRINK ALCOHOL HOW MANY DRINKS DO YOU HAVE: 6
TOTAL SCORE: 2
EVER HAD A DRINK FIRST THING IN THE MORNING TO STEADY YOUR NERVES TO GET RID OF A HANGOVER: YES
TREMOR: NO TREMOR
TOTAL SCORE: 2
HOW MANY TIMES IN THE PAST YEAR HAVE YOU HAD 5 OR MORE DRINKS IN A DAY: 20
CONSUMPTION TOTAL: POSITIVE
AVERAGE NUMBER OF DAYS PER WEEK YOU HAVE A DRINK CONTAINING ALCOHOL: 7
EVER FELT BAD OR GUILTY ABOUT YOUR DRINKING: NO
TOTAL SCORE: 2
ALCOHOL_USE: YES
AGITATION: NORMAL ACTIVITY
ANXIETY: MILDLY ANXIOUS
HAVE YOU EVER FELT YOU SHOULD CUT DOWN ON YOUR DRINKING: NO
HEADACHE, FULLNESS IN HEAD: VERY MILD

## 2023-04-03 ASSESSMENT — PAIN DESCRIPTION - PAIN TYPE
TYPE: ACUTE PAIN

## 2023-04-03 ASSESSMENT — COGNITIVE AND FUNCTIONAL STATUS - GENERAL
EATING MEALS: A LITTLE
MOBILITY SCORE: 14
HELP NEEDED FOR BATHING: A LITTLE
MOVING FROM LYING ON BACK TO SITTING ON SIDE OF FLAT BED: A LITTLE
DRESSING REGULAR UPPER BODY CLOTHING: A LITTLE
STANDING UP FROM CHAIR USING ARMS: A LOT
DRESSING REGULAR LOWER BODY CLOTHING: A LOT
MOVING TO AND FROM BED TO CHAIR: A LOT
DAILY ACTIVITIY SCORE: 17
WALKING IN HOSPITAL ROOM: A LOT
CLIMB 3 TO 5 STEPS WITH RAILING: A LOT
SUGGESTED CMS G CODE MODIFIER MOBILITY: CL
TURNING FROM BACK TO SIDE WHILE IN FLAT BAD: A LITTLE
SUGGESTED CMS G CODE MODIFIER DAILY ACTIVITY: CK
PERSONAL GROOMING: A LITTLE
TOILETING: A LITTLE

## 2023-04-03 NOTE — PROGRESS NOTES
Critical Care Progress Note    Date of admission  4/1/2023    Chief Complaint  72 y.o. male admitted 4/1/2023 with post fall with left facial, head trauma.  Trauma to left extremities.  Severe hyponatremia    Hospital Course  Juan Manuel Bass is a 72 yom  with a PMHX COPD) 3 L home O2) hypertension, chronic alcohol use, chronic hyponatremia secondary to his alcohol use, ground-level falls (multiple admissions in the last year for GLF) who presented to [facility] ED 4/1/2023 post fall from a chair (however memory is vague) with multiple bruises and abrasions to his left face and left extremities.  EMS reports that he had been on the ground for approximately 1 hour per reports of bystanders on scene.  He denies any significant pain to his extremities and or face.  He denies nausea vomiting however he does state that he had been feeling lightheaded and dizzy for the last few days.  He admits to drinking at least 6 cans of [employer] beer daily and says that he has never variance to withdrawal symptoms or seizures when he has quit drinking or brain previous hospital admissions. Radiology reports were negative for any fractures  however he was found to be hyponatremic with a sodium of 113.  He was administered a bolus of 3% saline and transferred to ICU for critical care management    Interval Problem Update  Reviewed last 24 hour events:    -T max- 99  -3 L NC   -starting NS @ 50cc hr  -LBM PTA  -electrolytes- mag replaced  -He did not sleep last night and slightly confused at times. -Librium started last night for possible alcohol withdrawal. -Librium taper was started this am 4/3  - Bee for strict I &O monitoring  -Replace magnesium and potassium  today  -I/O-since admission -2000 past 24-hour -100  -urine osmo 323  -Every 4 hour sodium checks    4/1/2023 serum sodium [id number]; 2150     4/2/2023 serum sodium [id number]; 0600 ; 0730 ; 1000-; 1400 ; 1800 ; 2200     4/3/23  "serum sodium levels- 0200- ; 0600 ; 1000     Review of Systems  Review of Systems   Constitutional:  Positive for malaise/fatigue. Negative for chills and fever.   HENT:  Positive for hearing loss.    Eyes:  Negative for blurred vision.   Respiratory:  Negative for cough and shortness of breath.    Cardiovascular:  Negative for chest pain.   Gastrointestinal:  Negative for abdominal pain, heartburn, nausea and vomiting.   Musculoskeletal:  Positive for myalgias.        \"My left  arm, left leg, face and head hurt.  Really sore\"   Skin:  Negative for rash.   Neurological:  Negative for dizziness, seizures, weakness and headaches.        \"Yep, I fall a lot.  My legs don't work as well. They are not that strong\"        Vital Signs for last 24 hours   Temp:  [36.5 °C (97.7 °F)-37.2 °C (99 °F)] 36.5 °C (97.7 °F)  Pulse:  [63-85] 72  Resp:  [11-34] 20  BP: (111-140)/(56-77) 111/57  SpO2:  [94 %-99 %] 97 %    Hemodynamic parameters for last 24 hours       Respiratory Information for the last 24 hours       Physical Exam   Physical Exam  Vitals and nursing note reviewed.   Constitutional:       Appearance: He is obese.   HENT:      Head:        Comments: Multiple abrasions and bruising noted to face.  Large hematoma to left eybrow  Eyes:      Extraocular Movements:      Left eye: Left eye abnormal extraocular motion: left.      Comments: Left orbit ecchymosis    Neck:      Comments: Trach midline  Cardiovascular:      Rate and Rhythm: Normal rate and regular rhythm.      Pulses:           Radial pulses are 2+ on the right side and 2+ on the left side.        Dorsalis pedis pulses are 2+ on the right side and 2+ on the left side.      Heart sounds: Normal heart sounds.   Pulmonary:      Breath sounds: Normal breath sounds and air entry.      Comments: Without any respiratory distress  Chest:   Breasts:     Breasts are symmetrical.   Abdominal:      Comments: Soft round nondistended   Genitourinary:     " Comments: [place]oa to down drain with dark yellow urine  Musculoskeletal:      Comments: Bruising, abrasions and edema to left upper and lower exrtrem   Skin:     Comments: Multiple contusions,.  Patient's and bruising noted to extremities mainly on left upper and left lower extremity.  Upper extremity covered with gauze   Neurological:      Mental Status: He is alert.      GCS: GCS eye subscore is 4. GCS verbal subscore is 5. GCS motor subscore is 6.      Comments: MAEE antigravity - generlized weakness  RAMILA 3-4 mm     Psychiatric:         Attention and Perception: Attention normal.         Mood and Affect: Mood normal.         Behavior: Behavior normal. Behavior is cooperative.       Medications  Current Facility-Administered Medications   Medication Dose Route Frequency Provider Last Rate Last Admin    chlordiazePOXIDE (LIBRIUM) capsule 40 mg  40 mg Oral Once ROXANA Lee.P.R.N.        chlordiazePOXIDE (Librium) capsule 50 mg  50 mg Oral Q8HRS ROXANA Lee.P.R.N.        [START ON 4/4/2023] chlordiazePOXIDE (Librium) capsule 25 mg  25 mg Oral Q8HRS ROXANA Lee.P.R.N.        Followed by    [START ON 4/5/2023] chlordiazePOXIDE (Librium) capsule 25 mg  25 mg Oral Q12HRS Jacquelyn Burt A.P.R.N.        Followed by    [START ON 4/6/2023] chlordiazePOXIDE (Librium) capsule 25 mg  25 mg Oral QHS ROXANA Lee.P.R.N.        acetaminophen (Tylenol) tablet 650 mg  650 mg Oral Q4HRS PRN ROXANA Lee.P.R.N.   650 mg at 04/02/23 1947    oxyCODONE-acetaminophen (PERCOCET) 5-325 MG per tablet 1-2 Tablet  1-2 Tablet Oral Q4HRS PRN ROXANA Lee.P.R.N.   2 Tablet at 04/03/23 0037    enoxaparin (Lovenox) inj 40 mg  40 mg Subcutaneous DAILY AT 1800 Christopher Worthington M.D.   40 mg at 04/02/23 1819    amLODIPine (NORVASC) tablet 5 mg  5 mg Oral DAILY Christopher Worthington M.D.   5 mg at 04/03/23 0517    omeprazole (PRILOSEC) capsule 20 mg  20 mg Oral DAILY Christopher Worthington M.D.   20 mg at 04/03/23 0517    senna-docusate (PERICOLACE or  SENOKOT S) 8.6-50 MG per tablet 2 Tablet  2 Tablet Oral BID Christopher Worthington M.D.   2 Tablet at 04/03/23 0517    And    polyethylene glycol/lytes (MIRALAX) PACKET 1 Packet  1 Packet Oral QDAY PRN Christopher Worthington M.D.        And    magnesium hydroxide (MILK OF MAGNESIA) suspension 30 mL  30 mL Oral QDAY PRN Christopher Worthington M.D.        And    bisacodyl (DULCOLAX) suppository 10 mg  10 mg Rectal QDAY PRN Christopher Worthington M.D.        multivitamin tablet 1 Tablet  1 Tablet Oral DAILY Christopher Worthington M.D.   1 Tablet at 04/03/23 0516    And    folic acid (FOLVITE) tablet 1 mg  1 mg Oral DAILY Christopher Worthington M.D.   1 mg at 04/03/23 0517    magnesium oxide tablet 400 mg  400 mg Oral BID Christopher Worthington M.D.   400 mg at 04/03/23 0517    fluticasone (FLOVENT HFA) 44 MCG/ACT inhaler 88 mcg  2 Puff Inhalation QDAILSARA (RT) Christopher Worthington M.D.        umeclidinium-vilanterol (Anoro Ellipta) inhaler 1 Puff  1 Puff Inhalation QDLUIS (RT) Christopher Worthington M.D.        LORazepam (ATIVAN) injection 4 mg  4 mg Intravenous Q15 MIN PRN Moira Griffin, A.P.R.N.        Or    LORazepam (ATIVAN) injection 3 mg  3 mg Intravenous Q15 MIN PRN Moira Griffin, A.P.R.N.        Or    LORazepam (ATIVAN) injection 2 mg  2 mg Intravenous Q15 MIN PRN Moira Griffin, A.P.R.N.        Or    LORazepam (ATIVAN) injection 1 mg  1 mg Intravenous Q15 MIN PRN Moira Griffin, A.P.R.N.        thiamine (B-1) 200 mg in dextrose 5% 100 mL IVPB  200 mg Intravenous TID Moira Griffin, A.P.R.N. 200 mL/hr at 04/02/23 2101 200 mg at 04/02/23 2101       Fluids    Intake/Output Summary (Last 24 hours) at 4/3/2023 0702  Last data filed at 4/3/2023 0600  Gross per 24 hour   Intake 600.03 ml   Output 1655 ml   Net -1054.97 ml         Laboratory      Recent Labs     04/01/23  1516   CPKTOTAL 827*       Recent Labs     04/01/23  1516 04/01/23  1930 04/02/23  0730 04/02/23  1000 04/02/23  1820 04/02/23  2215 04/03/23  0214   SODIUM 112*   < > 122*   < >  122* 121* 119*   POTASSIUM 3.8   < > 3.9   < > 3.4* 3.7 3.5*   CHLORIDE 74*   < > 84*   < > 82* 82* 82*   CO2 21   < > 26   < > 25 26 25   BUN 8   < > 6*   < > 7* 7* 7*   CREATININE 0.43*   < > 0.33*   < > 0.38* 0.40* 0.40*   MAGNESIUM 1.6  --  1.9  --   --  1.8  --    PHOSPHORUS  --   --  3.0  --   --  2.7  --    CALCIUM 8.8   < > 8.2*   < > 8.2* 8.3* 8.3*    < > = values in this interval not displayed.       Recent Labs     04/01/23  1516 04/02/23  0154 04/02/23  0730 04/02/23  1820 04/02/23  2215 04/03/23  0214   ALTSGPT 29 27  --   --   --   --    ASTSGOT 64* 61*  --   --   --   --    ALKPHOSPHAT 79 70  --   --   --   --    TBILIRUBIN 1.6* 1.7*  --   --   --   --    GLUCOSE 88 104*   < > 104* 99 99    < > = values in this interval not displayed.       Recent Labs     04/01/23 1516 04/02/23 0154   WBC 8.7 5.9   NEUTSPOLYS 80.70*  --    LYMPHOCYTES 7.90*  --    MONOCYTES 10.50  --    EOSINOPHILS 0.20  --    BASOPHILS 0.10  --    ASTSGOT 64* 61*   ALTSGPT 29 27   ALKPHOSPHAT 79 70   TBILIRUBIN 1.6* 1.7*       Recent Labs     04/01/23  1516 04/02/23  0154   RBC 3.86* 3.67*   HEMOGLOBIN 12.7* 12.1*   HEMATOCRIT 34.3* 32.6*   PLATELETCT 218 197   PROTHROMBTM 13.7  --    APTT 32.2  --    INR 1.06  --          Imaging  X-Ray:  I have personally reviewed the images and compared with prior images.  EKG:  I have personally reviewed the images and compared with prior images.  CT:    Reviewed  Ultrasound:  Reviewed    Assessment/Plan  * Chronic hyponatremia- (present on admission)  Assessment & Plan  Hyponatremia with NA of 112 - no seizure=re activity + weakness lightheadedness   Likely due to Beer potomania with chronic alcohol use, poor intake and thiazide diuretic    Multiple admissions admissions for chronic hyponatremia/beer potomania baseline NA appears to be high 120's to  130  -HCTZ  DC'd and should not be restarted based on history   -4/2 During administered he was given 3% saline bolus however fu serum NA rising  too quickly therefore he was administered 1 L D5W infusion.     as well as DDAVP 2 mcg x 1 dose  -4/3- am serum  with urine osm of 368  -Will start normal saline at 50 cc to slowly increase serum NA  -Every 4 hours BMP-   -Strict I/O  -          Hand injuries, left, initial encounter  Assessment & Plan  Multiple bruises and abrasions throughout dorsum of left hand with bruising into the digits no focal deformity, diffuse tenderness    X-ray hand and wrist pending    Edema of left lower extremity  Assessment & Plan  Patient with asymmetric left lower extremity edema, tells me this is chronic    DVT ultrasound - negative for DVT    Facial trauma  Assessment & Plan  Noncontrast head CT negative  CT max face with soft tissue injury to left periorbital region no fracture  Elevate HOB to minimize edema.  Pain management- tylenol - opioids if tylenol ineffective         Falls- (present on admission)  Assessment & Plan  Patient with multiple admissions for falls, including a very similar presentation to today    Uncertain etiology, would think that chronic hyponatremia probably contributory    Gastroesophageal reflux disease without esophagitis  Assessment & Plan  continue home PPI     Acute on chronic respiratory failure with hypoxia (HCC)- (present on admission)  Assessment & Plan  Chronic hypoxemic respiratory failure 3 L home O2    Chest x-ray shows cardiomegaly, has been admitted with elevated BNP's  TTE 10/22 with hyperdynamic LV normal RV function, no evidence of PH  No valvular lesions    Alcohol use disorder  Assessment & Plan  Patient tells me he drinks about 6 beers a day,denies every having a seizure and  Never treated for alcohol related complications   -Thiamine, MV, folic acid  -Thiamine 500mg IV TID x 2 days , then 500mg IV daily 5 days, then 100mg PO daily if suspect Wernicke's or ALOC  -Check Mg. Check Urine drug screen and serum ethanol level(see AG and if elevated, measure osmolar gap)  -Goal Mg  >2, K>4, Phos >2. Monitor for refeeding syndrome  -Replace electrolytes as needed  -aspiration/seizure fall precautions  -UDS- negative  -Social work consult for alcohol cessation counseling/  - Pt now having some ss of withdrawal therefor started Librium taper; consider Precedex infusion, gabapentin,clonidine and or gabapentin      Primary hypertension  Assessment & Plan  Continue home amlodipine and dc  Hydrochlorothiazide as HCTZ can worsen/cause hyponatremia   Consider 5 mg daily  If needed for BP management     Class 3 severe obesity due to excess calories in adult ([facility])  Assessment & Plan  Strong recommendation for follow-up outpatient PCP for lifestyle modification including diet and exercise regiment of at least 30 min of brisk cardiovascular exercise 3-5 times weekly    COPD (chronic obstructive pulmonary disease) ([facility])- (present on admission)  Assessment & Plan  No PFTS on file  Maintain sat > 88% for v/q matching  On baseline 3 L nasal cannula  Nebs/rx: Scheduled and as needed, continue home triple therapy;Duonebs prn; Continue albuterol, Trelegy         VTE:  Lovenox  Ulcer: PPI  Lines: Gamboa Catheter  Ongoing indication addressed    I have performed a physical exam and reviewed and updated ROS and Plan today (4/3/2023). In review of yesterday's note (4/2/2023), there are no changes except as documented above.     Discussed patient condition and risk of morbidity and/or mortality with RN, RT, Therapies, Pharmacy, Dietary, , Charge nurse / hot rounds, and Patient  The patient remains critically ill.  Critical care time = 50 minutes in directly providing and coordinating critical care and extensive data review.  No time overlap and excludes procedures.  Please note that this dictation was created using voice recognition software. The accuracy of the dictation is limited to the abilities of the software. I have made every reasonable attempt to correct obvious errors, but I expect that there are  errors of grammar and possibly content that I did not discover before finalizing the note.

## 2023-04-03 NOTE — ASSESSMENT & PLAN NOTE
Strong recommendation for follow-up outpatient PCP for lifestyle modification including diet and exercise regiment of at least 30 min of brisk cardiovascular exercise 3-5 times weekly

## 2023-04-03 NOTE — CARE PLAN
The patient is Watcher - Medium risk of patient condition declining or worsening    Shift Goals  Clinical Goals: q4hr sodium  Patient Goals: Rest  Family Goals: ALEKSANDAR    Progress made toward(s) clinical / shift goals:    Problem: Knowledge Deficit - Standard  Goal: Patient and family/care givers will demonstrate understanding of plan of care, disease process/condition, diagnostic tests and medications  Outcome: Progressing     Problem: Optimal Care for Alcohol Withdrawal  Goal: Optimal Care for the alcohol withdrawal patient  Outcome: Progressing     Problem: Psychosocial  Goal: Patient's level of anxiety will decrease  Outcome: Progressing     Problem: Pain - Standard  Goal: Alleviation of pain or a reduction in pain to the patient’s comfort goal  Outcome: Progressing

## 2023-04-03 NOTE — DISCHARGE PLANNING
Care Transition Team Assessment    Mr. Bass lives in [place] in a RV alone, prior to admission he had been receiving  services to assist him w/ bathing with [employer].  Mr. Bass has 4 adult sons who live in [place] [place].  He is estranged from his children, his emergency contact is a friend named Kennedi [phone].  Mr. Bass states that he has an Advanced Directive, [facility] reached out to Kennedi to have her bring this document in to be scanned, [facility] left Kennedi a message.  Mr. Bass does not have a PCP, he denied tobacco/vaping/abuse, but states he does drink 6 coors beers a day, when asked about IPR he stated no, he is not interested and wants to go home once medically stable.  Payor is medicare, Pt. Also has home O2 that he obtains from [employer].     Information Source  Orientation Level: Oriented X4  Information Given By: Patient  Informant's Name: Juan Manuel Bass  Who is responsible for making decisions for patient? : Patient    Readmission Evaluation  Is this a readmission?: No    Elopement Risk  Legal Hold: No  Ambulatory or Self Mobile in Wheelchair: No-Not an Elopement Risk  Elopement Risk: Not at Risk for Elopement    Interdisciplinary Discharge Planning  Lives with - Patient's Self Care Capacity: Alone and Able to Care For Self  Patient or legal guardian wants to designate a caregiver: No  Support Systems: Friends / Neighbors  Housing / Facility: Motor Home  Prior Services: Skilled Home Health Services  Patient Prefers to be Discharged to:: Home, stats he will not go to rehab  Durable Medical Equipment: Not Applicable    Discharge Preparedness  What is your plan after discharge?: Home with help  What are your discharge supports?: Other (comment) (Friend Kennedi)  Difficulity with ADLs: Bathing, Walking      Vision / Hearing Impairment  Vision Impairment : Yes  Left Eye Vision: Other (Comments) (self reports normal vision)  Hearing Impairment : No    Advance Directive  Advance Directive?: Living  Will    Domestic Abuse  Have you ever been the victim of abuse or violence?: No  Physical Abuse or Sexual Abuse: No  Verbal Abuse or Emotional Abuse: No  Possible Abuse/Neglect Reported to:: Not Applicable    Psychological Assessment  History of Substance Abuse: Alcohol (6 coors beers a day)

## 2023-04-03 NOTE — THERAPY
Speech Language Therapy Contact Note    Patient Name: Juan Manuel Bass  Age:  72 y.o., Sex:  male  Medical Record #: [id number]  Today's Date: 4/3/2023       04/03/23 1132   Treatment Variance   Reason For Missed Therapy Medical - Other (Please Comment)   Interdisciplinary Plan of Care Collaboration   IDT Collaboration with  Nursing   Collaboration Comments Per RN, pt currently NPO d/t sodium levels. SLP to follow when pt is appropriate to participate in therapy.

## 2023-04-03 NOTE — CARE PLAN
The patient is Watcher - Medium risk of patient condition declining or worsening    Shift Goals  Clinical Goals: stable neuro, Na checks  Patient Goals: pain control  Family Goals: ALEKSANDAR    Progress made toward(s) clinical / shift goals:      Problem: Knowledge Deficit - Standard  Goal: Patient and family/care givers will demonstrate understanding of plan of care, disease process/condition, diagnostic tests and medications  Outcome: Progressing     Problem: Optimal Care for Alcohol Withdrawal  Goal: Optimal Care for the alcohol withdrawal patient  Outcome: Progressing     Problem: Pain - Standard  Goal: Alleviation of pain or a reduction in pain to the patient’s comfort goal  Outcome: Progressing     Patient on Q4 hour neuro checks, remains stable. Pain medications given PRN per verbal pain scale (see MAR). Patient verbalizes understanding of plan of care and education provided. Patient does not wish to make lifestyle changes at this time. Patient medicated and monitored for alcohol withdrawal.       Patient is not progressing towards the following goals:      Problem: Lifestyle Changes  Goal: Patient's ability to identify lifestyle changes and available resources to help reduce recurrence of condition will improve  Outcome: Not Progressing

## 2023-04-04 PROBLEM — E87.8 ELECTROLYTE ABNORMALITY: Status: ACTIVE | Noted: 2023-04-04

## 2023-04-04 PROBLEM — F10.931 ALCOHOL WITHDRAWAL DELIRIUM (HCC): Status: ACTIVE | Noted: 2023-04-04

## 2023-04-04 PROBLEM — E87.1 HYPONATREMIA: Status: RESOLVED | Noted: 2022-03-07 | Resolved: 2023-04-04

## 2023-04-04 PROBLEM — E87.1 CHRONIC HYPONATREMIA: Status: RESOLVED | Noted: 2023-04-01 | Resolved: 2023-04-04

## 2023-04-04 LAB
ANION GAP SERPL CALC-SCNC: 9 MMOL/L (ref 7–16)
ANION GAP SERPL CALC-SCNC: 9 MMOL/L (ref 7–16)
BUN SERPL-MCNC: 5 MG/DL (ref 8–22)
BUN SERPL-MCNC: 6 MG/DL (ref 8–22)
CALCIUM SERPL-MCNC: 8.1 MG/DL (ref 8.5–10.5)
CALCIUM SERPL-MCNC: 8.1 MG/DL (ref 8.5–10.5)
CHLORIDE SERPL-SCNC: 88 MMOL/L (ref 96–112)
CHLORIDE SERPL-SCNC: 89 MMOL/L (ref 96–112)
CO2 SERPL-SCNC: 26 MMOL/L (ref 20–33)
CO2 SERPL-SCNC: 27 MMOL/L (ref 20–33)
CREAT SERPL-MCNC: 0.39 MG/DL (ref 0.5–1.4)
CREAT SERPL-MCNC: 0.48 MG/DL (ref 0.5–1.4)
GFR SERPLBLD CREATININE-BSD FMLA CKD-EPI: 109 ML/MIN/1.73 M 2
GFR SERPLBLD CREATININE-BSD FMLA CKD-EPI: 116 ML/MIN/1.73 M 2
GLUCOSE SERPL-MCNC: 105 MG/DL (ref 65–99)
GLUCOSE SERPL-MCNC: 110 MG/DL (ref 65–99)
MAGNESIUM SERPL-MCNC: 1.9 MG/DL (ref 1.5–2.5)
PHOSPHATE SERPL-MCNC: 3.4 MG/DL (ref 2.5–4.5)
POTASSIUM SERPL-SCNC: 4.1 MMOL/L (ref 3.6–5.5)
POTASSIUM SERPL-SCNC: 4.2 MMOL/L (ref 3.6–5.5)
SODIUM SERPL-SCNC: 123 MMOL/L (ref 135–145)
SODIUM SERPL-SCNC: 125 MMOL/L (ref 135–145)
SODIUM SERPL-SCNC: 126 MMOL/L (ref 135–145)

## 2023-04-04 PROCEDURE — A9270 NON-COVERED ITEM OR SERVICE: HCPCS | Performed by: EMERGENCY MEDICINE

## 2023-04-04 PROCEDURE — 99233 SBSQ HOSP IP/OBS HIGH 50: CPT | Performed by: NURSE PRACTITIONER

## 2023-04-04 PROCEDURE — 700102 HCHG RX REV CODE 250 W/ 637 OVERRIDE(OP): Performed by: EMERGENCY MEDICINE

## 2023-04-04 PROCEDURE — 92526 ORAL FUNCTION THERAPY: CPT

## 2023-04-04 PROCEDURE — 80048 BASIC METABOLIC PNL TOTAL CA: CPT

## 2023-04-04 PROCEDURE — A9270 NON-COVERED ITEM OR SERVICE: HCPCS | Performed by: NURSE PRACTITIONER

## 2023-04-04 PROCEDURE — 84295 ASSAY OF SERUM SODIUM: CPT | Mod: 91

## 2023-04-04 PROCEDURE — 700102 HCHG RX REV CODE 250 W/ 637 OVERRIDE(OP): Performed by: INTERNAL MEDICINE

## 2023-04-04 PROCEDURE — 99223 1ST HOSP IP/OBS HIGH 75: CPT | Performed by: HOSPITALIST

## 2023-04-04 PROCEDURE — 700102 HCHG RX REV CODE 250 W/ 637 OVERRIDE(OP): Performed by: NURSE PRACTITIONER

## 2023-04-04 PROCEDURE — A9270 NON-COVERED ITEM OR SERVICE: HCPCS | Performed by: INTERNAL MEDICINE

## 2023-04-04 PROCEDURE — 700111 HCHG RX REV CODE 636 W/ 250 OVERRIDE (IP): Performed by: NURSE PRACTITIONER

## 2023-04-04 PROCEDURE — 700105 HCHG RX REV CODE 258: Performed by: HOSPITALIST

## 2023-04-04 PROCEDURE — 770000 HCHG ROOM/CARE - INTERMEDIATE ICU *

## 2023-04-04 PROCEDURE — 700111 HCHG RX REV CODE 636 W/ 250 OVERRIDE (IP): Performed by: EMERGENCY MEDICINE

## 2023-04-04 RX ADMIN — MAGNESIUM OXIDE TAB 400 MG (241.3 MG ELEMENTAL MG) 400 MG: 400 (241.3 MG) TAB at 17:24

## 2023-04-04 RX ADMIN — SENNOSIDES AND DOCUSATE SODIUM 2 TABLET: 50; 8.6 TABLET ORAL at 17:24

## 2023-04-04 RX ADMIN — THERA TABS 1 TABLET: TAB at 05:57

## 2023-04-04 RX ADMIN — CHLORDIAZEPOXIDE HYDROCHLORIDE 25 MG: 25 CAPSULE ORAL at 14:00

## 2023-04-04 RX ADMIN — MAGNESIUM OXIDE TAB 400 MG (241.3 MG ELEMENTAL MG) 400 MG: 400 (241.3 MG) TAB at 05:56

## 2023-04-04 RX ADMIN — OMEPRAZOLE 20 MG: 20 CAPSULE, DELAYED RELEASE ORAL at 05:56

## 2023-04-04 RX ADMIN — LORAZEPAM 2 MG: 2 INJECTION INTRAMUSCULAR; INTRAVENOUS at 21:41

## 2023-04-04 RX ADMIN — OXYCODONE AND ACETAMINOPHEN 1 TABLET: 5; 325 TABLET ORAL at 18:49

## 2023-04-04 RX ADMIN — MAGNESIUM HYDROXIDE 30 ML: 400 SUSPENSION ORAL at 05:58

## 2023-04-04 RX ADMIN — SENNOSIDES AND DOCUSATE SODIUM 2 TABLET: 50; 8.6 TABLET ORAL at 05:56

## 2023-04-04 RX ADMIN — THIAMINE HCL TAB 100 MG 200 MG: 100 TAB at 11:33

## 2023-04-04 RX ADMIN — ACETAMINOPHEN 650 MG: 325 TABLET, FILM COATED ORAL at 17:34

## 2023-04-04 RX ADMIN — FOLIC ACID 1 MG: 1 TABLET ORAL at 05:56

## 2023-04-04 RX ADMIN — OXYCODONE AND ACETAMINOPHEN 1 TABLET: 5; 325 TABLET ORAL at 05:55

## 2023-04-04 RX ADMIN — ENOXAPARIN SODIUM 40 MG: 40 INJECTION SUBCUTANEOUS at 17:24

## 2023-04-04 RX ADMIN — THIAMINE HCL TAB 100 MG 200 MG: 100 TAB at 05:56

## 2023-04-04 RX ADMIN — THIAMINE HCL TAB 100 MG 200 MG: 100 TAB at 17:24

## 2023-04-04 RX ADMIN — FLUTICASONE PROPIONATE 88 MCG: 44 AEROSOL, METERED RESPIRATORY (INHALATION) at 05:55

## 2023-04-04 RX ADMIN — CHLORDIAZEPOXIDE HYDROCHLORIDE 25 MG: 25 CAPSULE ORAL at 05:57

## 2023-04-04 RX ADMIN — UMECLIDINIUM BROMIDE AND VILANTEROL TRIFENATATE 1 PUFF: 62.5; 25 POWDER RESPIRATORY (INHALATION) at 05:55

## 2023-04-04 RX ADMIN — SODIUM CHLORIDE: 9 INJECTION, SOLUTION INTRAVENOUS at 20:29

## 2023-04-04 RX ADMIN — CHLORDIAZEPOXIDE HYDROCHLORIDE 25 MG: 25 CAPSULE ORAL at 21:09

## 2023-04-04 ASSESSMENT — PAIN DESCRIPTION - PAIN TYPE
TYPE: ACUTE PAIN

## 2023-04-04 ASSESSMENT — LIFESTYLE VARIABLES
TOTAL SCORE: 0
ANXIETY: NO ANXIETY (AT EASE)
ORIENTATION AND CLOUDING OF SENSORIUM: ORIENTED AND CAN DO SERIAL ADDITIONS
HEADACHE, FULLNESS IN HEAD: NOT PRESENT
ORIENTATION AND CLOUDING OF SENSORIUM: ORIENTED AND CAN DO SERIAL ADDITIONS
TREMOR: NO TREMOR
AUDITORY DISTURBANCES: NOT PRESENT
TREMOR: NO TREMOR
AGITATION: NORMAL ACTIVITY
AUDITORY DISTURBANCES: NOT PRESENT
AGITATION: NORMAL ACTIVITY
ANXIETY: NO ANXIETY (AT EASE)
ANXIETY: NO ANXIETY (AT EASE)
TOTAL SCORE: 0
TOTAL SCORE: 0
AGITATION: NORMAL ACTIVITY
TOTAL SCORE: 0
TREMOR: NO TREMOR
VISUAL DISTURBANCES: NOT PRESENT
NAUSEA AND VOMITING: NO NAUSEA AND NO VOMITING
ORIENTATION AND CLOUDING OF SENSORIUM: ORIENTED AND CAN DO SERIAL ADDITIONS
ORIENTATION AND CLOUDING OF SENSORIUM: ORIENTED AND CAN DO SERIAL ADDITIONS
NAUSEA AND VOMITING: NO NAUSEA AND NO VOMITING
VISUAL DISTURBANCES: NOT PRESENT
PAROXYSMAL SWEATS: NO SWEAT VISIBLE
PAROXYSMAL SWEATS: NO SWEAT VISIBLE
AUDITORY DISTURBANCES: NOT PRESENT
PAROXYSMAL SWEATS: NO SWEAT VISIBLE
AUDITORY DISTURBANCES: NOT PRESENT
TREMOR: NO TREMOR
AGITATION: NORMAL ACTIVITY
VISUAL DISTURBANCES: NOT PRESENT
HEADACHE, FULLNESS IN HEAD: NOT PRESENT
HEADACHE, FULLNESS IN HEAD: NOT PRESENT
NAUSEA AND VOMITING: NO NAUSEA AND NO VOMITING
VISUAL DISTURBANCES: NOT PRESENT
HEADACHE, FULLNESS IN HEAD: NOT PRESENT
ANXIETY: NO ANXIETY (AT EASE)
NAUSEA AND VOMITING: NO NAUSEA AND NO VOMITING
PAROXYSMAL SWEATS: NO SWEAT VISIBLE

## 2023-04-04 ASSESSMENT — ENCOUNTER SYMPTOMS
PALPITATIONS: 0
NAUSEA: 0
HEARTBURN: 0
FEVER: 0
EYE PAIN: 1
WEAKNESS: 0
HEADACHES: 0
ABDOMINAL PAIN: 0
BLURRED VISION: 0
SEIZURES: 0
CHILLS: 0
COUGH: 0
SHORTNESS OF BREATH: 0
DIZZINESS: 0
VOMITING: 0
MYALGIAS: 1

## 2023-04-04 ASSESSMENT — FIBROSIS 4 INDEX
FIB4 SCORE: 4.29
FIB4 SCORE: 4.29

## 2023-04-04 NOTE — CONSULTS
Hospital Medicine Consultation    Date of Service  4/4/2023    Referring Physician  Juan R Israel M.D.    Consulting Physician  Jose Merritt M.D.    Reason for Consultation  hyponatremia    History of Presenting Illness  72 y.o. male who presented 4/1/2023 with weakness and fall.  Mr. Bass has a past medical history of alcohol dependence, COPD, hypertension (on HCTZ and norvasc) that has been admitted to this hospital in the past due to hyponatremia in the setting of beer Poto myranda.  On 4/1/2023 he sustained a fall and was brought to the emergency room where routine labs revealed a severely low sodium of 112..  He was admitted to the intensive care unit and placed on fluid restriction and was given 3% saline.  He had serial sodium checks.  Due to alcohol withdrawal, he was initiated on Librium and was given potassium and magnesium replacement.  His sodium levels went up quickly therefore he was given D5W as well as DDAVP.     4/4/2023: Patient seen and evaluated in the ICU.  He is on a normal saline drip with fluid restriction.  His last 3 sodiums have been 126, 126, 125. He is forgetful per his RN. He is on IV NS at 25 mL/hour.     Review of Systems  Review of Systems   Unable to perform ROS: Mental acuity     Past Medical History   has a past medical history of Chronic airway obstruction, not elsewhere classified, High anion gap metabolic acidosis (4/1/2023), and Hypertension.    Surgical History   has no past surgical history on file.    Family History  family history includes Heart Disease in his father; No Known Problems in his mother.    Social History   reports that he quit smoking about 3 years ago. His smoking use included cigarettes. He has a 4.00 pack-year smoking history. He has never used smokeless tobacco. He reports current alcohol use of about 21.0 oz per week. He reports that he does not currently use drugs.    Medications  Prior to Admission Medications   Prescriptions Last Dose Informant  Patient Reported? Taking?   Fluticasone-Umeclidin-Vilant (TRELEGY ELLIPTA) 100-62.5-25 MCG/INH AEROSOL POWDER, BREATH ACTIVATED inhalation unknown at unknown Patient, Patient's Home Pharmacy No No   Sig: Inhale 2-3 Inhalation every morning.   albuterol 108 (90 Base) MCG/ACT Aero Soln inhalation aerosol unknown at unknown Patient, Patient's Home Pharmacy No No   Sig: Inhale 1-2 Puffs every four hours as needed for Shortness of Breath.   amLODIPine (NORVASC) 5 MG Tab unknown at unknown Patient, Patient's Home Pharmacy Yes Yes   Sig: Take 5 mg by mouth every day.   hydroCHLOROthiazide (HYDRODIURIL) 25 MG Tab unknown at unknown Patient, Patient's Home Pharmacy Yes Yes   Sig: Take 25 mg by mouth every day.   pantoprazole (PROTONIX) 40 MG Tablet Delayed Response unknown at unknown Patient, Patient's Home Pharmacy Yes Yes   Sig: Take 40 mg by mouth every day.      Facility-Administered Medications: None       Allergies  Allergies   Allergen Reactions    Tetanus Toxoid Hives       Physical Exam  Temp:  [36.4 °C (97.6 °F)-36.7 °C (98.1 °F)] 36.7 °C (98 °F)  Pulse:  [61-72] 69  Resp:  [12-23] 14  BP: ()/(53-73) 107/56  SpO2:  [86 %-100 %] 99 %    Physical Exam  Vitals and nursing note reviewed.   Constitutional:       Appearance: He is ill-appearing.   HENT:      Head:      Comments: Large left eyelid hematoma and left eye bruising with scab.    Cardiovascular:      Rate and Rhythm: Normal rate and regular rhythm.   Pulmonary:      Effort: Pulmonary effort is normal.      Breath sounds: Normal breath sounds.      Comments: 3 liters nasal cannula oxygen  Skin:     Comments: Bruises left flank and back   Neurological:      Mental Status: He is alert.      Comments: Speech is clear  He moves his extremities equally   Psychiatric:      Comments: Mentation is slow  He is compliant with exam       Fluids  Date 04/04/23 0700 - 04/05/23 0659   Shift 1132-1512 2394-7441 2754-1984 24 Hour Total   INTAKE   Shift Total       OUTPUT    Urine 210   210   Shift Total 210   210   Weight (kg) 107.2 107.2 107.2 107.2       Laboratory  Recent Labs     04/01/23  1516 04/02/23  0154   WBC 8.7 5.9   RBC 3.86* 3.67*   HEMOGLOBIN 12.7* 12.1*   HEMATOCRIT 34.3* 32.6*   MCV 88.9 88.8   MCH 32.9 33.0   MCHC 37.0* 37.1*   RDW 39.0 40.1   PLATELETCT 218 197   MPV 9.3 10.0     Recent Labs     04/03/23  1758 04/03/23  2220 04/04/23  0250 04/04/23  0540 04/04/23  0915   SODIUM 120* 123* 125* 126* 126*   POTASSIUM 4.2 4.2 4.1  --   --    CHLORIDE 86* 88* 89*  --   --    CO2 26 26 27  --   --    GLUCOSE 101* 105* 110*  --   --    BUN 6* 6* 5*  --   --    CREATININE 0.43* 0.39* 0.48*  --   --    CALCIUM 8.2* 8.1* 8.1*  --   --      Recent Labs     04/01/23  1516   APTT 32.2   INR 1.06          Recent Labs     04/02/23  0154   TRIGLYCERIDE 42   HDL 76   LDL 18        Imaging  US-EXTREMITY VENOUS LOWER UNILAT LEFT   Final Result      DX-WRIST-COMPLETE 3+ LEFT   Final Result      Mild demineralization and degenerative change of the wrist. No acute fracture or malalignment.      DX-HAND 3+ LEFT   Final Result      Demineralization and degenerative changes without acute fracture or malalignment.      CT-CSPINE WITHOUT PLUS RECONS   Final Result      Degenerative and postsurgical changes of the cervical spine without acute fracture or malalignment.      DX-CHEST-PORTABLE (1 VIEW)   Final Result      1.  There is an enlarged cardiac silhouette with possible mild vascular congestion.   2.  There is bibasilar atelectasis.      CT-MAXILLOFACIAL W/O PLUS RECONS   Final Result         1.  No acute displaced facial bone or orbital fracture.   2.  There is left frontal, supraorbital, and facial soft tissue swelling with 2 nodular components probably focal areas of soft tissue hematoma.   3. There is minimal underlying chronic sinus disease again seen.      CT-HEAD W/O   Final Result      1.  Left fore head hematoma..   2.  No acute intracranial abnormality.              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  Status post IV 3% saline with overcorrection and status post IV D5W as well as DDAVP  Fluid restriction  Stop outpatient HCTZ  IV NS  Serial sodium checks q6 hours in the IMCU    Alcohol withdrawal delirium (HCC)- (present on admission)  Assessment & Plan  Scheduled librium  MVI and thiamine     COPD (chronic obstructive pulmonary disease) (HCC)- (present on admission)  Assessment & Plan  Without exacerbation    Primary hypertension- (present on admission)  Assessment & Plan  Continue Norvasc 5 mg daily and hold outpatient HCTZ due to hyponatremia    Facial trauma- (present on admission)  Assessment & Plan  CT of the facial bones does not reveal fractures    Acute on chronic respiratory failure with hypoxia ([facility])- (present on admission)  Assessment & Plan  Baseline 3 liters of oxygen    Alcohol use disorder- (present on admission)  Assessment & Plan  Ongoing alcohol abuse now with detox  Cessation encouragement once he is more lucid    Class 3 severe obesity due to excess calories in adult (HCC)- (present on admission)  Assessment & Plan  BMI 38

## 2023-04-04 NOTE — ASSESSMENT & PLAN NOTE
Severe hyponatremia with Na 112   Status post IV 3% saline with overcorrection and status post IV D5W as well as DDAVP  Status post fluid restriction  Stop outpatient HCTZ  IV NS at 100 mL/hour stopped  4/6:  Patient states he does not eat any salt in his food.  Started salt tablets since Na remains 128.  Dc CIWA, no heavy alcohol abuse, drinks up to 2 beers daily.  He states he was told to cut back this admission and plans on following that advice.

## 2023-04-04 NOTE — PROGRESS NOTES
APRN notified that serum sodium level at 1758 120, at 2220 was 123 and current sodium level is back at 125. Orders to decrease NS gtt to 25 mL/hr. Next check at 0600.

## 2023-04-04 NOTE — PROGRESS NOTES
Critical Care Progress Note    Date of admission  4/1/2023    Chief Complaint  72 y.o. male admitted 4/1/2023 with post fall with left facial, head trauma.  Trauma to left extremities.  Severe hyponatremia    Hospital Course  Juan Manuel Bass is a 72 yom  with a PMHX COPD) 3 L home O2) hypertension, chronic alcohol use, chronic hyponatremia secondary to his alcohol use, ground-level falls (multiple admissions in the last year for GLF) who presented to [facility] ED 4/1/2023 post fall from a chair (however memory is vague) with multiple bruises and abrasions to his left face and left extremities.  EMS reports that he had been on the ground for approximately 1 hour per reports of bystanders on scene.  He denies any significant pain to his extremities and or face.  He denies nausea vomiting however he does state that he had been feeling lightheaded and dizzy for the last few days.  He admits to drinking at least 6 cans of [employer] beer daily and says that he has never variance to withdrawal symptoms or seizures when he has quit drinking or brain previous hospital admissions. Radiology reports were negative for any fractures  however he was found to be hyponatremic with a sodium of 113.  He was administered a bolus of 3% saline and transferred to ICU for critical care management    Interval Problem Update  Reviewed last 24 hour events:    -T max- 98.1   -3 L NC   -NS infusion- gentle rehydration  -fluid restriction  -LBM PTA  -librium taper  -I/O-since admission -3000 past 24-hour -1000- inaccurate Is and Os as pt had a few incontinent (urine) episodes,  -librium taper for ss of alcohol withdrawals   -Electrolytes replaced- Magnesium    Pt no longer requires critical care management and will be transferred to the floor. Discussed with my attending, Dr. Israel and the Hospitalist, Dr. riggins who will assume care. Please contact Critical Care service for any questions and or concerns      Review of Systems  Review of Systems  "  Constitutional:  Positive for malaise/fatigue. Negative for chills and fever.   HENT:  Positive for hearing loss.    Eyes:  Positive for pain. Negative for blurred vision.        \"My left eye and head hurt\"   Respiratory:  Negative for cough and shortness of breath.    Cardiovascular:  Negative for chest pain and palpitations.   Gastrointestinal:  Negative for abdominal pain, heartburn, nausea and vomiting.   Genitourinary:  Negative for dysuria.   Musculoskeletal:  Positive for myalgias.        \"My left  arm, left leg, face and head hurt.  Really sore\"   Skin:  Negative for rash.   Neurological:  Negative for dizziness, seizures, weakness and headaches.        \"Yep, I fall a lot.  My legs don't work as well. They are not that strong\"        Vital Signs for last 24 hours   Temp:  [36.4 °C (97.6 °F)-36.7 °C (98.1 °F)] 36.6 °C (97.9 °F)  Pulse:  [61-72] 68  Resp:  [10-28] 19  BP: ()/(53-73) 105/58  SpO2:  [86 %-100 %] 98 %    Hemodynamic parameters for last 24 hours       Respiratory Information for the last 24 hours       Physical Exam   Physical Exam  Vitals and nursing note reviewed.   Constitutional:       Appearance: He is obese.   HENT:      Head:        Comments: Large hematoma to left eyebrow- multiple abrasions to face, left arm and left leg. Multiple abrasions and bruising noted to face.       Mouth/Throat:      Mouth: Mucous membranes are moist.   Eyes:      Extraocular Movements:      Left eye: Left eye abnormal extraocular motion: left.      Comments: Left orbit ecchymosis    Neck:      Comments: Trach midline  Cardiovascular:      Rate and Rhythm: Normal rate and regular rhythm.      Pulses:           Radial pulses are 2+ on the right side and 2+ on the left side.        Dorsalis pedis pulses are 2+ on the right side and 2+ on the left side.      Heart sounds: Normal heart sounds.   Pulmonary:      Breath sounds: Normal air entry. Examination of the right-lower field reveals decreased breath " sounds. Examination of the left-lower field reveals decreased breath sounds. Decreased breath sounds present.      Comments: Without any respiratory distress  Abdominal:      Comments: Soft round nondistended   Genitourinary:     Comments: [place]oa to down drain with dark yellow urine  Musculoskeletal:      Comments: Bruising, abrasions and edema to left upper and lower exrtrem   Skin:     General: Skin is warm and dry.      Comments: Multiple contusions,.  Patient's and bruising noted to extremities mainly on left upper and left lower extremity.  Upper extremity covered with gauze   Neurological:      Mental Status: He is alert.      GCS: GCS eye subscore is 4. GCS verbal subscore is 5. GCS motor subscore is 6.      Comments: Sleeping however wakes to stimuli  MAEE with gen weakness  RAMILA 3-4 mm  AAO to person place and event- confused to time and confused at times with conversation      Psychiatric:         Speech: Speech normal.         Behavior: Behavior normal. Behavior is cooperative.      Comments: sleepy       Medications  Current Facility-Administered Medications   Medication Dose Route Frequency Provider Last Rate Last Admin    chlordiazePOXIDE (Librium) capsule 25 mg  25 mg Oral Q8HRS ROXANA Lee.P.R.N.   25 mg at 04/04/23 0557    Followed by    [START ON 4/5/2023] chlordiazePOXIDE (Librium) capsule 25 mg  25 mg Oral Q12HRS ROXANA Lee.P.R.N.        Followed by    [START ON 4/6/2023] chlordiazePOXIDE (Librium) capsule 25 mg  25 mg Oral QHS GERTRUDE LeeP.R.N.        fluticasone (FLOVENT HFA) 44 MCG/ACT inhaler 88 mcg  2 Puff Inhalation DAILY Mo Sears M.D.   88 mcg at 04/04/23 0555    umeclidinium-vilanterol (Anoro Ellipta) inhaler 1 Puff  1 Puff Inhalation DAILY Mo Sears M.D.   1 Puff at 04/04/23 0555    MD Alert...ICU Electrolyte Replacement per Pharmacy   Other PHARMACY TO DOSE GERTRUDE LeeP.R.N.        thiamine (Vitamin B-1) tablet 200 mg  200 mg Oral TID GERTRUDE LeeP.RGalN.    200 mg at 04/04/23 0556    NS infusion   Intravenous Continuous Raquelmoo KNOWLES Latona 25 mL/hr at 04/04/23 0419 Rate Change at 04/04/23 0419    acetaminophen (Tylenol) tablet 650 mg  650 mg Oral Q4HRS PRN GERTRUDE LeeP.R.N.   650 mg at 04/02/23 1947    oxyCODONE-acetaminophen (PERCOCET) 5-325 MG per tablet 1-2 Tablet  1-2 Tablet Oral Q4HRS PRN GERTRUDE LeeP.RGalN.   1 Tablet at 04/04/23 0555    enoxaparin (Lovenox) inj 40 mg  40 mg Subcutaneous DAILY AT 1800 Christopher Worthington M.D.   40 mg at 04/03/23 1758    amLODIPine (NORVASC) tablet 5 mg  5 mg Oral DAILY Christopher Worthington M.D.   5 mg at 04/03/23 0517    omeprazole (PRILOSEC) capsule 20 mg  20 mg Oral DAILY Christopher Worthington M.D.   20 mg at 04/04/23 0556    senna-docusate (PERICOLACE or SENOKOT S) 8.6-50 MG per tablet 2 Tablet  2 Tablet Oral BID Christopher Worthington M.D.   2 Tablet at 04/04/23 0556    And    polyethylene glycol/lytes (MIRALAX) PACKET 1 Packet  1 Packet Oral QDAY PRN Christopher Worthington M.D.        And    magnesium hydroxide (MILK OF MAGNESIA) suspension 30 mL  30 mL Oral QDAY PRN Christopher Worthington M.D.   30 mL at 04/04/23 0558    And    bisacodyl (DULCOLAX) suppository 10 mg  10 mg Rectal QDAY PRN Christopher Worthington M.D.        multivitamin tablet 1 Tablet  1 Tablet Oral DAILY Christopher Worthington M.D.   1 Tablet at 04/04/23 0557    And    folic acid (FOLVITE) tablet 1 mg  1 mg Oral DAILY Christopher Worthington M.D.   1 mg at 04/04/23 0556    magnesium oxide tablet 400 mg  400 mg Oral BID Christopher Worthington M.D.   400 mg at 04/04/23 0556    LORazepam (ATIVAN) injection 4 mg  4 mg Intravenous Q15 MIN PRN Moira Kimer, A.P.R.N.        Or    LORazepam (ATIVAN) injection 3 mg  3 mg Intravenous Q15 MIN PRN Moira Kimer, A.P.R.N.        Or    LORazepam (ATIVAN) injection 2 mg  2 mg Intravenous Q15 MIN PRN Moira Griffin, A.P.R.N.        Or    LORazepam (ATIVAN) injection 1 mg  1 mg Intravenous Q15 MIN PRN Moira Griffin, A.P.R.N.            Fluids    Intake/Output Summary (Last 24 hours) at 4/4/2023 0644  Last data filed at 4/4/2023 0600  Gross per 24 hour   Intake 952.54 ml   Output 2050 ml   Net -1097.46 ml         Laboratory      Recent Labs     04/01/23  1516   CPKTOTAL 827*       Recent Labs     04/02/23  2215 04/03/23  0214 04/03/23  1010 04/03/23  1416 04/03/23  1758 04/03/23  2220 04/04/23  0250   SODIUM 121*   < > 120*   < > 120* 123* 125*   POTASSIUM 3.7   < > 3.8  --  4.2 4.2 4.1   CHLORIDE 82*   < > 83*  --  86* 88* 89*   CO2 26   < > 28  --  26 26 27   BUN 7*   < > 6*  --  6* 6* 5*   CREATININE 0.40*   < > 0.44*  --  0.43* 0.39* 0.48*   MAGNESIUM 1.8  --  2.2  --   --  1.9  --    PHOSPHORUS 2.7  --  3.0  --   --  3.4  --    CALCIUM 8.3*   < > 8.0*  --  8.2* 8.1* 8.1*    < > = values in this interval not displayed.       Recent Labs     04/01/23  1516 04/02/23  0154 04/02/23  0730 04/03/23 1758 04/03/23 2220 04/04/23  0250   ALTSGPT 29 27  --   --   --   --    ASTSGOT 64* 61*  --   --   --   --    ALKPHOSPHAT 79 70  --   --   --   --    TBILIRUBIN 1.6* 1.7*  --   --   --   --    GLUCOSE 88 104*   < > 101* 105* 110*    < > = values in this interval not displayed.       Recent Labs     04/01/23  1516 04/02/23  0154   WBC 8.7 5.9   NEUTSPOLYS 80.70*  --    LYMPHOCYTES 7.90*  --    MONOCYTES 10.50  --    EOSINOPHILS 0.20  --    BASOPHILS 0.10  --    ASTSGOT 64* 61*   ALTSGPT 29 27   ALKPHOSPHAT 79 70   TBILIRUBIN 1.6* 1.7*       Recent Labs     04/01/23  1516 04/02/23  0154   RBC 3.86* 3.67*   HEMOGLOBIN 12.7* 12.1*   HEMATOCRIT 34.3* 32.6*   PLATELETCT 218 197   PROTHROMBTM 13.7  --    APTT 32.2  --    INR 1.06  --          Imaging  X-Ray:  I have personally reviewed the images and compared with prior images.  EKG:  I have personally reviewed the images and compared with prior images.  CT:    Reviewed  Ultrasound:  Reviewed    Assessment/Plan  Electrolyte abnormality  Assessment & Plan  Replace magnesium due to hypomagnesemia   Follow  bmp q day  Replace as needed    Hand injuries, left, initial encounter  Assessment & Plan  Multiple bruises and abrasions throughout dorsum of left hand with bruising into the digits no focal deformity, diffuse tenderness    X-ray hand and wrist pending    Edema of left lower extremity  Assessment & Plan  Patient with asymmetric left lower extremity edema, tells me this is chronic    DVT ultrasound - negative for DVT    Facial trauma- (present on admission)  Assessment & Plan  Noncontrast head CT negative  CT max face with soft tissue injury to left periorbital region no fracture  Elevate HOB to minimize edema.  Pain management- tylenol - opioids if tylenol ineffective         Chronic hyponatremia  Assessment & Plan  Hyponatremia with NA of 112 - no seizure activity + weakness lightheadedness   Likely due to Beer potomania with chronic alcohol use, poor intake and thiazide diuretic    Multiple admissions admissions for chronic hyponatremia/beer potomania baseline NA appears to be high 120's to  130  -HCTZ  DC'd and should not be restarted based on history   -4/2 During administered he was given 3% saline bolus however fu serum NA rising too quickly therefore he was administered 1 L D5W infusion.     as well as DDAVP 2 mcg x 1 dose  -4/3- am serum  with urine osm of 368  continue  normal saline at 50 cc to slowly increase serum NA  -Every 6 hours NA checks  -Strict I/O  -Fluid restricion            Falls- (present on admission)  Assessment & Plan  Patient with multiple admissions for falls, including a very similar presentation to today    Uncertain etiology, would think that chronic hyponatremia probably contributory    Gastroesophageal reflux disease without esophagitis  Assessment & Plan  continue home PPI     Acute on chronic respiratory failure with hypoxia (HCC)- (present on admission)  Assessment & Plan  Chronic hypoxemic respiratory failure 3 L home O2    Chest x-ray shows cardiomegaly, has been  admitted with elevated BNP's  TTE 10/22 with hyperdynamic LV normal RV function, no evidence of PH  No valvular lesions    Alcohol use disorder- (present on admission)  Assessment & Plan  Patient tells me he drinks about 6 beers a day,denies every having a seizure and  Never treated for alcohol related complications   -Thiamine, MV, folic acid  -Thiamine 500mg IV TID x 2 days , then 500mg IV daily 5 days, then 100mg PO daily if suspect Wernicke's or ALOC  -Check Mg. Check Urine drug screen and serum ethanol level(see AG and if elevated, measure osmolar gap)  -Goal Mg >2, K>4, Phos >2. Monitor for refeeding syndrome  -Replace electrolytes as needed  -aspiration/seizure fall precautions  -UDS- negative  -Social work consult for alcohol cessation counseling/  - Pt now having some ss of withdrawal therefor started Librium taper; consider Precedex infusion, gabapentin,clonidine and or gabapentin      Primary hypertension- (present on admission)  Assessment & Plan  Continue home amlodipine and dc  Hydrochlorothiazide as HCTZ can worsen/cause hyponatremia   Consider 5 mg daily  If needed for BP management     Class 3 severe obesity due to excess calories in adult (HCC)- (present on admission)  Assessment & Plan  Strong recommendation for follow-up outpatient PCP for lifestyle modification including diet and exercise regiment of at least 30 min of brisk cardiovascular exercise 3-5 times weekly    COPD (chronic obstructive pulmonary disease) (HCC)- (present on admission)  Assessment & Plan  No PFTS on file  Maintain sat > 88% for v/q matching  On baseline 3 L nasal cannula  Nebs/rx: Scheduled and as needed, continue home triple therapy;Duonebs prn; Continue albuterol, Trelegy         VTE:  Lovenox  Ulcer: PPI  Lines: Gamboa Catheter  Ongoing indication addressed    I have performed a physical exam and reviewed and updated ROS and Plan today (4/4/2023). In review of yesterday's note (4/3/2023), there are no changes except as  documented above.     Discussed patient condition and risk of morbidity and/or mortality with RN, RT, Therapies, Pharmacy, Dietary, , Charge nurse / hot rounds, and Patient  The patient remains critically ill.  Critical care time = 40 minutes in directly providing and coordinating critical care and extensive data review.  No time overlap and excludes procedures.  Please note that this dictation was created using voice recognition software. The accuracy of the dictation is limited to the abilities of the software. I have made every reasonable attempt to correct obvious errors, but I expect that there are errors of grammar and possibly content that I did not discover before finalizing the note.

## 2023-04-04 NOTE — THERAPY
"Speech Language Pathology   Daily Treatment     Patient Name: Juan Manuel Bass  AGE:  72 y.o., SEX:  male  Medical Record #: [id number]  Date of Service: 4/4/2023      Precautions:  Precautions: Fall Risk, Swallow Precautions      Assessment  Pt seen on this date for dysphagia therapy. Pt eating regular/thin liquid lunch upon entering room and coughing appreciated. Pt endorsed increased difficulty swallowing today vs yesterday and felt like food was stuck in his throat. Consistent coughing noted after the swallow with both thin liquids and solid textures and coughing continued as this clinician left room which is concerning for penetration/aspiration. Yankour was provided to pt however did not improve coughing. At this time, pt is presenting with s/sx concerning for aspiration so recommend NPO with FEES to objectively assess swallow function. Pt consented to procedure. MD update on session and recommendations for NPO with FEES; order obtained. SLP to follow for FEES on 4/5.      Clinical Impressions  Pt is presenting with s/sx concerning for aspiration      Recommendations  Recommend NPO pending FEES to objectively assess swallow function        Dysphagia Treatment  Diet Consistency: NPO pending FEES  Instrumentation: FEES  Medication: Non Oral  Oral Care: BID                     SLP Treatment Plan  Treatment Plan: Dysphagia Treatment  SLP Frequency: 3x Per Week  Estimated Duration: Until Therapy Goals Met      Anticipated Discharge Needs  Discharge Recommendations: Recommend post-acute placement for additional speech therapy services prior to discharge home  Therapy Recommendations Upon DC: Dysphagia Training, Community Re-Integration, Patient / Family / Caregiver Education      Patient / Family Goals  Patient / Family Goal #1: \"Can I have eggs and grayson?\"  Goal #1 Outcome: Goal not met  Short Term Goals  Short Term Goal # 1: Patient will consume a diet of regular solids and thin liquids with no overt s/sx of aspiration " with adherence to swallow precautions  Goal Outcome # 1: Goal not met      Gypsy Casillas, SLP

## 2023-04-04 NOTE — CARE PLAN
The patient is Watcher - Medium risk of patient condition declining or worsening    Shift Goals  Clinical Goals: Hemodynamically stable, Q4hr neuro checks-stable neuros, Monitor Na level  Patient Goals: Rest and pain control.  Family Goals: ALEKSANDAR    Progress made toward(s) clinical / shift goals:      Problem: Fall Risk  Goal: Patient will remain free from falls  Outcome: Met     Problem: Psychosocial  Goal: Patient's level of anxiety will decrease  Outcome: Progressing     Problem: Risk for Aspiration  Goal: Patient's risk for aspiration will be absent or decrease  Outcome: Progressing     Problem: Pain - Standard  Goal: Alleviation of pain or a reduction in pain to the patient’s comfort goal  Outcome: Progressing     Problem: Skin Integrity  Goal: Skin integrity is maintained or improved  Outcome: Progressing

## 2023-04-05 PROBLEM — R13.10 DYSPHAGIA: Status: ACTIVE | Noted: 2023-04-05

## 2023-04-05 LAB
ANION GAP SERPL CALC-SCNC: 8 MMOL/L (ref 7–16)
BUN SERPL-MCNC: 5 MG/DL (ref 8–22)
CALCIUM SERPL-MCNC: 6.9 MG/DL (ref 8.5–10.5)
CHLORIDE SERPL-SCNC: 102 MMOL/L (ref 96–112)
CO2 SERPL-SCNC: 21 MMOL/L (ref 20–33)
CREAT SERPL-MCNC: 0.38 MG/DL (ref 0.5–1.4)
GFR SERPLBLD CREATININE-BSD FMLA CKD-EPI: 117 ML/MIN/1.73 M 2
GLUCOSE SERPL-MCNC: 103 MG/DL (ref 65–99)
MAGNESIUM SERPL-MCNC: 1.6 MG/DL (ref 1.5–2.5)
PHOSPHATE SERPL-MCNC: 3.1 MG/DL (ref 2.5–4.5)
POTASSIUM SERPL-SCNC: 4.2 MMOL/L (ref 3.6–5.5)
SODIUM SERPL-SCNC: 131 MMOL/L (ref 135–145)

## 2023-04-05 PROCEDURE — 99233 SBSQ HOSP IP/OBS HIGH 50: CPT | Performed by: HOSPITALIST

## 2023-04-05 PROCEDURE — 97166 OT EVAL MOD COMPLEX 45 MIN: CPT

## 2023-04-05 PROCEDURE — 700102 HCHG RX REV CODE 250 W/ 637 OVERRIDE(OP): Performed by: INTERNAL MEDICINE

## 2023-04-05 PROCEDURE — A9270 NON-COVERED ITEM OR SERVICE: HCPCS | Performed by: INTERNAL MEDICINE

## 2023-04-05 PROCEDURE — 700111 HCHG RX REV CODE 636 W/ 250 OVERRIDE (IP): Performed by: HOSPITALIST

## 2023-04-05 PROCEDURE — 770006 HCHG ROOM/CARE - MED/SURG/GYN SEMI*

## 2023-04-05 PROCEDURE — 700102 HCHG RX REV CODE 250 W/ 637 OVERRIDE(OP): Performed by: EMERGENCY MEDICINE

## 2023-04-05 PROCEDURE — 80048 BASIC METABOLIC PNL TOTAL CA: CPT

## 2023-04-05 PROCEDURE — A9270 NON-COVERED ITEM OR SERVICE: HCPCS | Performed by: HOSPITALIST

## 2023-04-05 PROCEDURE — 700102 HCHG RX REV CODE 250 W/ 637 OVERRIDE(OP): Performed by: NURSE PRACTITIONER

## 2023-04-05 PROCEDURE — 700102 HCHG RX REV CODE 250 W/ 637 OVERRIDE(OP): Performed by: HOSPITALIST

## 2023-04-05 PROCEDURE — 84100 ASSAY OF PHOSPHORUS: CPT

## 2023-04-05 PROCEDURE — 92612 ENDOSCOPY SWALLOW (FEES) VID: CPT

## 2023-04-05 PROCEDURE — A9270 NON-COVERED ITEM OR SERVICE: HCPCS | Performed by: NURSE PRACTITIONER

## 2023-04-05 PROCEDURE — 92526 ORAL FUNCTION THERAPY: CPT

## 2023-04-05 PROCEDURE — A9270 NON-COVERED ITEM OR SERVICE: HCPCS | Performed by: EMERGENCY MEDICINE

## 2023-04-05 PROCEDURE — 83735 ASSAY OF MAGNESIUM: CPT

## 2023-04-05 PROCEDURE — 97162 PT EVAL MOD COMPLEX 30 MIN: CPT

## 2023-04-05 RX ORDER — CALCIUM GLUCONATE 20 MG/ML
2 INJECTION, SOLUTION INTRAVENOUS ONCE
Status: COMPLETED | OUTPATIENT
Start: 2023-04-05 | End: 2023-04-05

## 2023-04-05 RX ORDER — MAGNESIUM SULFATE HEPTAHYDRATE 40 MG/ML
2 INJECTION, SOLUTION INTRAVENOUS ONCE
Status: COMPLETED | OUTPATIENT
Start: 2023-04-05 | End: 2023-04-05

## 2023-04-05 RX ADMIN — AMLODIPINE BESYLATE 5 MG: 10 TABLET ORAL at 05:18

## 2023-04-05 RX ADMIN — RIVAROXABAN 10 MG: 10 TABLET, FILM COATED ORAL at 17:08

## 2023-04-05 RX ADMIN — SENNOSIDES AND DOCUSATE SODIUM 2 TABLET: 50; 8.6 TABLET ORAL at 17:08

## 2023-04-05 RX ADMIN — OMEPRAZOLE 20 MG: 20 CAPSULE, DELAYED RELEASE ORAL at 05:18

## 2023-04-05 RX ADMIN — FOLIC ACID 1 MG: 1 TABLET ORAL at 05:18

## 2023-04-05 RX ADMIN — CALCIUM GLUCONATE 2 G: 20 INJECTION, SOLUTION INTRAVENOUS at 07:55

## 2023-04-05 RX ADMIN — CHLORDIAZEPOXIDE HYDROCHLORIDE 25 MG: 25 CAPSULE ORAL at 05:18

## 2023-04-05 RX ADMIN — SENNOSIDES AND DOCUSATE SODIUM 2 TABLET: 50; 8.6 TABLET ORAL at 12:01

## 2023-04-05 RX ADMIN — SENNOSIDES AND DOCUSATE SODIUM 2 TABLET: 50; 8.6 TABLET ORAL at 05:18

## 2023-04-05 RX ADMIN — MAGNESIUM OXIDE TAB 400 MG (241.3 MG ELEMENTAL MG) 400 MG: 400 (241.3 MG) TAB at 05:18

## 2023-04-05 RX ADMIN — THERA TABS 1 TABLET: TAB at 05:18

## 2023-04-05 RX ADMIN — FLUTICASONE PROPIONATE 88 MCG: 44 AEROSOL, METERED RESPIRATORY (INHALATION) at 08:08

## 2023-04-05 RX ADMIN — MAGNESIUM SULFATE HEPTAHYDRATE 2 G: 40 INJECTION, SOLUTION INTRAVENOUS at 10:28

## 2023-04-05 RX ADMIN — CHLORDIAZEPOXIDE HYDROCHLORIDE 25 MG: 25 CAPSULE ORAL at 17:08

## 2023-04-05 RX ADMIN — UMECLIDINIUM BROMIDE AND VILANTEROL TRIFENATATE 1 PUFF: 62.5; 25 POWDER RESPIRATORY (INHALATION) at 08:01

## 2023-04-05 ASSESSMENT — PATIENT HEALTH QUESTIONNAIRE - PHQ9
1. LITTLE INTEREST OR PLEASURE IN DOING THINGS: NOT AT ALL
2. FEELING DOWN, DEPRESSED, IRRITABLE, OR HOPELESS: NOT AT ALL
SUM OF ALL RESPONSES TO PHQ9 QUESTIONS 1 AND 2: 0

## 2023-04-05 ASSESSMENT — ACTIVITIES OF DAILY LIVING (ADL): TOILETING: INDEPENDENT

## 2023-04-05 ASSESSMENT — COGNITIVE AND FUNCTIONAL STATUS - GENERAL
TURNING FROM BACK TO SIDE WHILE IN FLAT BAD: UNABLE
MOVING TO AND FROM BED TO CHAIR: UNABLE
PERSONAL GROOMING: A LITTLE
EATING MEALS: A LITTLE
CLIMB 3 TO 5 STEPS WITH RAILING: TOTAL
MOVING FROM LYING ON BACK TO SITTING ON SIDE OF FLAT BED: UNABLE
DRESSING REGULAR UPPER BODY CLOTHING: A LOT
HELP NEEDED FOR BATHING: A LOT
DRESSING REGULAR LOWER BODY CLOTHING: A LOT
SUGGESTED CMS G CODE MODIFIER MOBILITY: CN
MOBILITY SCORE: 6
DAILY ACTIVITIY SCORE: 14
STANDING UP FROM CHAIR USING ARMS: TOTAL
TOILETING: A LOT
SUGGESTED CMS G CODE MODIFIER DAILY ACTIVITY: CK
WALKING IN HOSPITAL ROOM: TOTAL

## 2023-04-05 ASSESSMENT — PAIN DESCRIPTION - PAIN TYPE
TYPE: ACUTE PAIN

## 2023-04-05 NOTE — DISCHARGE PLANNING
Case Management Discharge Planning    Admission Date: 4/1/2023  GMLOS: 3.4  ALOS: 4    6-Clicks ADL Score: 17  6-Clicks Mobility Score: 14    Anticipated Discharge Dispo: Discharge Disposition: Discharged to home/self care (01)      Action(s) Taken: Choice obtained    Escalations Completed: None    Medically Clear: No    Next Steps: Patient is currently on service with [facility]. Patient is refusing SNF and IRF. RNCM will submit choice for resumption of service with .     Barriers to Discharge: Medical clearance    Is the patient up for discharge tomorrow: No

## 2023-04-05 NOTE — THERAPY
"Speech Language Pathology   Daily Treatment     Patient Name: Juan Manuel Bass  AGE:  72 y.o., SEX:  male  Medical Record #: [id number]  Date of Service: 4/5/2023      Precautions:  Precautions: Fall Risk, Swallow Precautions       Subjective  RN queried about pt who is back on a diet but has FEES orders. Discussed pt with RN prior to seeing pt and she reported no significant difficulty with breakfast. Pt agreeable and cooperative with SLP tx tasks. Did recall discussion with SLP yesterday including recommendations for FEES. \"I hope we don't have to do that because a camera in my nose does not sound great.\"      Assessment  Pt seen for dysphagia management. Trials of TN0, LQ3, and RG7 provided. Pt w/ appropriate self-feeding but did request for SLP to feed several times. Appropriate oral bolus stripping and containment. Total AP transit and effective bolus formation. Fair mastication of solids and did have congested cough during trials of EC7 which is concerning for airway invasion or delayed response to sensation. One to two swallows appreciated per bolus. With single sips of TN0 liquids, pt had slight increase in WOB and had some wet/congested vocal quality. Pt was cued to drink sequential sips (3oz water test) and did not, stated \"I can't drink a lot fast like that\" which may be concerning for swallow/respiratory coordination slade given COPD dx. Discussed dysphagia management options and recommendations with pt who is agreeable to complete FEES and to keep current diet recommendations until FEES is completed. Discussed risk management strategies with pt including thorough and frequent oral care. Pt also reports he has not been mobile since hospitalization and has had recent falls.       Clinical Impressions  Pt appears somewhat improved from previous SLP visit; however, given clinical indicators of possible dysphagia and risk factors including limited mobility and COPD, pt would still benefit from FEES to further evaluate " "swallow and inform decision making.       Recommendations  RG/TN per MD  Instrumentation: FEES  Medication: As tolerated, Non Oral  Oral Care: Pre- and post-meals      SLP Treatment Plan  Treatment Plan: Dysphagia Treatment, Patient/Family/Caregiver Training  SLP Frequency: 3x Per Week  Estimated Duration: Until Therapy Goals Met      Anticipated Discharge Needs  Discharge Recommendations:  (Pending results of FEES)         Patient / Family Goals  Patient / Family Goal #1: \"Can I have eggs and grayson?\"  Goal #1 Outcome: Progressing slower than expected  Short Term Goals  Short Term Goal # 1: Patient will consume a diet of regular solids and thin liquids with no overt s/sx of aspiration with adherence to swallow precautions  Goal Outcome # 1: Progressing slower than expected      Emmanuelle Jennings, SLP  "

## 2023-04-05 NOTE — CARE PLAN
The patient is Watcher - Medium risk of patient condition declining or worsening    Shift Goals  Clinical Goals: monitor na and control pain  Patient Goals: rest pain  Family Goals: marin    Progress made toward(s) clinical / shift goals:    Problem: Neuro Status  Goal: Neuro status will remain stable or improve  Outcome: Progressing     Problem: Self Care  Goal: Patient will have the ability to perform ADLs independently or with assistance (bathe, groom, dress, toilet and feed)  Outcome: Progressing     Problem: Risk for Aspiration  Goal: Patient's risk for aspiration will be absent or decrease  Outcome: Progressing     Problem: Urinary Elimination  Goal: Establish and maintain regular urinary output  Outcome: Progressing     Problem: Bowel Elimination  Goal: Establish and maintain regular bowel function  Outcome: Progressing     Problem: Respiratory  Goal: Patient will achieve/maintain optimum respiratory ventilation and gas exchange  Outcome: Progressing     Problem: Mobility  Goal: Patient's capacity to carry out activities will improve  Outcome: Progressing       Patient is not progressing towards the following goals: pt confused and repeatedly ringing bell for unknown reasons.

## 2023-04-05 NOTE — PROGRESS NOTES
4 Eyes Skin Assessment Completed by Diana Geiger, ALDEN and ALDEN Morris.    Head Scab, Bruising, Swelling, and Redness  Ears Redness and Blanching, dried blood in ears  Nose WDL  Mouth WDL  Neck Redness and Bruising  Breast/Chest WDL  Shoulder Blades Redness and Blanching  Spine Redness and Blanching  (R) Arm/Elbow/Hand Bruising  (L) Arm/Elbow/Hand Bruising, Abrasion, Scab, and Swelling  Abdomen WDL  Groin Redness, moist   Scrotum/Coccyx/Buttocks Redness and Blanching  (R) Leg Redness, dry, flaky  (L) Leg Redness, dry, flaky   (R) Heel/Foot/Toe Redness, dry, scab to second toe + missing nail  (L) Heel/Foot/Toe Edema, dry, flaky, hot          Devices In Places ECG, Blood Pressure Cuff, Pulse Ox, [place], SCD's, and Nasal Cannula      Interventions In Place Gray Ear Foams    Possible Skin Injury No    Pictures Uploaded Into Epic Yes  Wound Consult Placed Yes  RN Wound Prevention Protocol Ordered Yes

## 2023-04-05 NOTE — THERAPY
Physical Therapy   Initial Evaluation     Patient Name: Juan Manuel Bass  Age:  72 y.o., Sex:  male  Medical Record #: [id number]  Today's Date: 4/5/2023     Precautions  Precautions: Fall Risk;Swallow Precautions    Assessment  Patient is 72 y.o. male admitted after multiple GLF with hyponatremia, acute on chronic respiratory failure with hypoxia, and multiple skin abrasions including facial hematoma.  PMH includes ETOH abuse, HTN, edema, and COPD.  Today patient presented with impaired strength, balance, activity tolerance, coordination, and safety awareness.  He required mod-max A for mobility including STS x 2 with FWW.  Attempted side step x 1 along EOB however quickly descended back to EOB.  Patient is below baseline functional mobility, recommend post acute placement.  Will follow for skilled PT.     Plan    Physical Therapy Initial Treatment Plan   Treatment Plan : Bed Mobility, Equipment, Gait Training, Neuro Re-Education / Balance, Self Care / Home Evaluation, Stair Training, Therapeutic Activities, Therapeutic Exercise  Treatment Frequency: 3 Times per Week  Duration: Until Therapy Goals Met    DC Equipment Recommendations: Unable to determine at this time  Discharge Recommendations: Recommend post-acute placement for additional physical therapy services prior to discharge home     Objective     04/05/23 1542   Precautions   Precautions Fall Risk;Swallow Precautions   Pain 0 - 10 Group   Therapist Pain Assessment Post Activity Pain Same as Prior to Activity;Nurse Notified;0   Prior Living Situation   Prior Services Skilled Home Health Services   Housing / Facility Motor Home   Steps Into Home 3   Rail (yes)   Equipment Owned Front-Wheel Walker   Lives with - Patient's Self Care Capacity Alone and Able to Care For Self   Comments Pt reported neighbor assists with laundry and grocery shopping   Prior Level of Functional Mobility   Bed Mobility Independent   Transfer Status Independent   Ambulation Independent    Assistive Devices Used Front-Wheel Walker   Stairs Independent   History of Falls   History of Falls Yes   Date of Last Fall 04/01/23   Cognition    Cognition / Consciousness X   Level of Consciousness Alert   Safety Awareness Impaired   Comments Cooperative, lethargic.  Poor safety awareness   Active ROM Lower Body    Active ROM Lower Body  WDL   Strength Lower Body   Lower Body Strength  WDL   Comments Generalized weakness   Sensation Lower Body   Lower Extremity Sensation   WDL   Comments Denied N/T   Vision   Vision Comments L eye hematoma and swelling   Balance Assessment   Sitting Balance (Static) Fair -   Sitting Balance (Dynamic) Fair -   Standing Balance (Static) Poor +   Standing Balance (Dynamic) Poor -   Weight Shift Sitting Poor   Weight Shift Standing Poor   Bed Mobility    Supine to Sit Moderate Assist   Sit to Supine Moderate Assist   Scooting Moderate Assist   Gait Analysis   Comments side step x 1, uncontrolled eccentric control to EOB   Functional Mobility   Sit to Stand Maximal Assist   Bed, Chair, Wheelchair Transfer Moderate Assist (side step)   Mobility STS x 2   ICU Target Mobility Level   ICU Mobility - Targeted Level Level 3A   Activity Tolerance   Sitting Edge of Bed 8 min   Standing 2 min total   Short Term Goals    Short Term Goal # 1 Pt will perform supine <> sit without bed features with SPV in 6 visits to progress bed mobility   Short Term Goal # 2 Pt will perform STS/functional transfers with FWW and SPV in 6 visits to progress OOB mobility   Short Term Goal # 3 Pt will ambulate 150 ft with FWW and SPV  in 6 visits to progress functional mobility   Short Term Goal # 4 Pt will negotiate 3 steps with rail & SPV in 6 visits to access home

## 2023-04-05 NOTE — THERAPY
Speech Language Pathology   Flexible Endoscopic Evaluation of Swallowing (FEES)        Patient Name: Juan Manuel Bass  AGE:  72 y.o., SEX:  male  Medical Record #: [id number]  Date of Service: 4/5/2023      Pertinent Information  Current Method of Nutrition: Other (see comments) (on reg/thins per MD pending FEES)  Patient Behaviors: Drowsy, Fatigue, Agitated   Dentition: Some missing dentition   Feeding Tube: None   Tracheostomy: No         Factor(s) Affecting Performance: Impaired mental status, Impaired command following, Impaired endurance, Other (see comments) (drowsy)     Discussed the risks, benefits, and alternatives of the FEES procedure. Patient/family acknowledged and agreed to proceed.    Assessment  Flexible Endoscopic Evaluation of Swallowing (FEES) completed at bedside today. The endoscope was passed transnasally via Right nare to evaluate the anatomy and physiology of swallowing. Pt tolerated the procedure with no apparent distress.    Anatomic Findings: Protrusion from posterior pharyngeal wall at the level of the epiglottis which can be consistent with cervical osteophyte. Larynx characterized by complete ventricular obliteration, erythema, mild vocal cord edema, diffuse laryngeal edema, moderate posterior commissure hypertrophy, which can be indicative of laryngopharyngeal reflux (LPR).    Vocal Fold Motion: Bilateral movement  Secretion Management: Adequate  PO Trials: Ice Chips, Thin Liquid, Mildly Thick Liquid, Liquidised, Pudding, Soft & Bite Sized      Consistency PAS Score Timing Residue Comments   Thin Liquid 5 During swallow, Pre Swallow  After swallow  Vallecular Residue: Mild (5%-25%)  Pyriform Sinus Residue: Mild (5%-25%) Tsp x1 PAS 3 during   Cup x1 PAS 3 during, PAS 5 after  PAS 3 before, PAS 5 during Cup with effortful swallow x1   Mildly Thick 1 N/A Vallecular Residue: Trace (1%-5%)  Pyriform Sinus Residue: Trace (1%-5%) Tsp x2  Cup x2   Liquidised 1 N/A Vallecular Residue: Mild  (5%-25%)  Pyriform Sinus Residue: None (0%) (Mild posterior pharyngeal wall and lateral channel residue)    Pudding 1 N/A Vallecular Residue: Mild (5%-25%)  Pyriform Sinus Residue: Mild (5%-25%) (Mild posterior pharyngeal wall and laterl channel residue)    Soft & Bite Sized 1 N/A Vallecular Residue: None (0%)  Pyriform Sinus Residue: None (0%)      Penetration-Aspiration Scale (PAS)  1     No contrast enters airway  2     Contrast enters the airway, remains above the vocal folds, and is ejected from the airway (not seen in the airway at the end of the swallow).  3     Contrast enters the airway, remains above the vocal folds, and is not ejected from the airway (is seen in the airway after the swallow).  4     Contrast enters the airway, contacts the vocal folds, and is ejected from the airway.  5     Contrast enters the airway, contacts the vocal folds, and is not ejected from the airway  6     Contrast enters the airway, crosses the plane of the vocal folds, and is ejected from the airway.  7     Contrast enters the airway, crosses the plane of the vocal folds, and is not ejected from the airway despite effort.  8     Contrast enters the airway, crosses the plane of the vocal folds, is not ejected from the airway and there is no response to aspiration.      Oral phase:  Pt required cues to accept bolus due to waxing/waning alertness. Adequate oral containment noted. Swallow initiation at the level of the vallecula with solids and the piriforms with liquids. Prolonged but functional mastication and piecemeal swallowing appreciated with soft and bite size.     Pharyngeal phase:  - Reduced pharyngeal constriction evidenced by ring of residue along posterior pharyngeal wall and through the vallecula with liquidized and puree.  - Delayed laryngeal vestibule closure resulted in penetration of thins before the swallow, and incomplete/weak laryngeal vestibule closure resulted in penetration of thins to the level of the  vocal folds during the swallow    Compensatory Strategies:  - Pt inconsistently executed a cleansing swallow with  episodes of penetration  - A cued cough and re-swallow was minimally effective in clearing penetrated material  - Effortful swallow was ineffective in reducing pharyngeal residue or improved laryngeal vestibule closure    Severity Rating:  Severity Rating: MALOU  MALOU: Mild-Moderate    Clinical Impressions  The pt presents with a mild-moderate oropharyngeal dysphagia. Airway protection and pharyngeal efficiency are impaired. Suspect dysphagia is acute on chronic given prolonged NPO status, generalized weakness, reduced mobility while admitted, with hx of COPD. Suspect micro-penetration of thin liquids is baseline given hx of COPD and age >65. Pt likely is at reduced risk for aspiration pneumonia at baseline given small amount of penetration, regular ambulation and oral care. Pt is at increased risk for aspiration pneumonia in his acute state given reduced mobility and alertness, as well as ability to follow directions/safe swallow strategies (I.e. cough and swallow again with thins).     Recommend soft and bite size/mildly thick liquid diet with sips of thin water between meals while admitted. Okay to discharge home on thins, where pt will likely return to regular ambulation/mobility and self-led oral care.     Recommendations  Diet Consistency: Soft and Bite Size/Mildly Thick Liquids, okay for sips of thin water between meals after oral care  Medication: Whole with puree  Supervision: Direct supervision during meals, Assist with meal tray set up  Positioning: Fully upright and midline during oral intake  Strategies: Small bites/sips, No straws, Slow rate of intake, Multiple swallows (2-3) per bite/sips, Reduce environmental distractions  Oral Care: Q4h  Additional Instrumentation: None     SLP Treatment Plan  Treatment Plan: Dysphagia Treatment  SLP Frequency: 3x Per Week  Estimated Duration: Until  "Therapy Goals Met      Anticipated Discharge Needs  Discharge Recommendations: Recommend home health for continued speech therapy services   Therapy Recommendations Upon DC: Dysphagia Training, Patient / Family / Caregiver Education     Patient / Family Goals  Patient / Family Goal #1: \"Can I have eggs and grayson?\"  Goal #1 Outcome: Goal not met  Short Term Goal # 1: Patient will consume a diet of regular solids and thin liquids with no overt s/sx of aspiration with adherence to swallow precautions  Goal Outcome # 1: Goal not met  Short Term Goal # 2: Pt will consume a diet of SB/MT with no s/sx of aspiration and min cues.  Short Term Goal # 3: Pt will consume PO trials of thins with cough+re-swallow strategy 1:1 with SLP with no s/sx of aspiration.      Bao Malik, SLP  "

## 2023-04-05 NOTE — DISCHARGE INSTRUCTIONS
"{Type of Stroke:27926}    Stroke Risk Factors    You are at increased risk of having another stroke event.  See your Patient Stroke Guide to help reduce your stroke risk. These are your specific risk factors:  {Stroke Risk Factors:10502}     Get help right away if you have any signs of a stroke.  \"BE FAST\" is an easy way to remember the main warning signs of a stroke:  B - Balance. Dizziness, sudden trouble walking, or loss of balance.  E - Eyes. Trouble seeing or a change in how you see.  F - Face. Sudden weakness or loss of feeling in the face. The face or eyelid may droop on one side.  A - Arms. Weakness or loss of feeling in an arm. This happens all of a sudden and most often on one side of the body.  S - Speech. Sudden trouble speaking, slurred speech, or trouble understanding what people say.  T - Time. Time to call emergency services. Write down what time symptoms started.    "

## 2023-04-05 NOTE — PROGRESS NOTES
[facility] Medicine Daily Progress Note    Date of Service  4/5/2023    Chief Complaint  Juan Manuel Bass is a 72 y.o. male admitted 4/1/2023 with weakness and fall    Hospital Course  Mr. Bass has a past medical history of alcohol dependence, COPD, hypertension (on HCTZ and norvasc) that has been admitted to this hospital in the past due to hyponatremia in the setting of beer Poto myranda.  On 4/1/2023 he sustained a fall and was brought to the emergency room where routine labs revealed a severely low sodium of 112..  He was admitted to the intensive care unit and placed on fluid restriction and was given 3% saline.  He had serial sodium checks.  Due to alcohol withdrawal, he was initiated on Librium and was given potassium and magnesium replacement.  His sodium levels went up quickly therefore he was given D5W as well as DDAVP.     Interval Problem Update  4/4/2023: Patient seen and evaluated in the ICU.  He is on a normal saline drip with fluid restriction.  His last 3 sodiums have been 126, 126, 125. He is forgetful per his RN. He is on IV NS at 25 mL/hour.   4/5: Mr. Bass was evaluated and examined in the IMCU. He has had serial sodium levels now with Na up to 131.Yesterday he failed the swallow eval by speech path thus was made NPO and placed on IV NS at 100 mL per hour. His Calcium is low this morning at 6.9. Last albumin was 3.6. IV calcium gluconate ordered. He is quite weak though is adamant that he will not go to SNF.     I have discussed this patient's plan of care and discharge plan at IDT rounds today with Case Management, Nursing, Nursing leadership, and other members of the IDT team.    Consultants/Specialty  Critical care    Code Status  DNAR/DNI    Disposition  Patient is not medically cleared for discharge.   Anticipate discharge to be determined. Likely SNF  I have placed the appropriate orders for post-discharge needs.    Review of Systems  Review of Systems   Unable to perform ROS: Mental acuity       Physical Exam  Temp:  [36.7 °C (98 °F)-36.8 °C (98.2 °F)] 36.7 °C (98.1 °F)  Pulse:  [68-96] 89  Resp:  [12-26] 24  BP: ()/(54-74) 134/74  SpO2:  [93 %-99 %] 96 %    Physical Exam  Vitals and nursing note reviewed.   Constitutional:       Appearance: He is ill-appearing.   HENT:      Ears:      Comments: Right ear scab     Mouth/Throat:      Mouth: Mucous membranes are dry.   Eyes:      Comments: Periorbital bruising with large hematoma left eyelid with scab   Cardiovascular:      Rate and Rhythm: Normal rate and regular rhythm.   Pulmonary:      Comments: 3 liters nasal cannula oxygen  Normal work of breathing  Abdominal:      General: There is no distension.      Tenderness: There is no abdominal tenderness.   Neurological:      General: No focal deficit present.      Mental Status: He is alert and oriented to person, place, and time.   Psychiatric:         Behavior: Behavior normal.       Fluids    Intake/Output Summary (Last 24 hours) at 4/5/2023 0702  Last data filed at 4/5/2023 0500  Gross per 24 hour   Intake 758.26 ml   Output 1100 ml   Net -341.74 ml       Laboratory      Recent Labs     04/03/23  2220 04/04/23  0250 04/04/23  0540 04/04/23  0915 04/04/23  1846 04/05/23  0510   SODIUM 123* 125*   < > 126* 126* 131*   POTASSIUM 4.2 4.1  --   --   --  4.2   CHLORIDE 88* 89*  --   --   --  102   CO2 26 27  --   --   --  21   GLUCOSE 105* 110*  --   --   --  103*   BUN 6* 5*  --   --   --  5*   CREATININE 0.39* 0.48*  --   --   --  0.38*   CALCIUM 8.1* 8.1*  --   --   --  6.9*    < > = values in this interval not displayed.                   Imaging  US-EXTREMITY VENOUS LOWER UNILAT LEFT   Final Result      DX-WRIST-COMPLETE 3+ LEFT   Final Result      Mild demineralization and degenerative change of the wrist. No acute fracture or malalignment.      DX-HAND 3+ LEFT   Final Result      Demineralization and degenerative changes without acute fracture or malalignment.      CT-CSPINE WITHOUT PLUS RECONS    Final Result      Degenerative and postsurgical changes of the cervical spine without acute fracture or malalignment.      DX-CHEST-PORTABLE (1 VIEW)   Final Result      1.  There is an enlarged cardiac silhouette with possible mild vascular congestion.   2.  There is bibasilar atelectasis.      CT-MAXILLOFACIAL W/O PLUS RECONS   Final Result         1.  No acute displaced facial bone or orbital fracture.   2.  There is left frontal, supraorbital, and facial soft tissue swelling with 2 nodular components probably focal areas of soft tissue hematoma.   3. There is minimal underlying chronic sinus disease again seen.      CT-HEAD W/O   Final Result      1.  Left fore head hematoma..   2.  No acute intracranial abnormality.              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    Alcohol withdrawal delirium (HCC)- (present on admission)  Assessment & Plan  Scheduled librium  MVI and thiamine     COPD (chronic obstructive pulmonary disease) (HCC)- (present on admission)  Assessment & Plan  Without exacerbation    Primary hypertension- (present on admission)  Assessment & Plan  Continue Norvasc 5 mg daily and hold outpatient HCTZ due to hyponatremia    Dysphagia- (present on admission)  Assessment & Plan  On 4/4 he failed the swallow eval by speech pathology  FEES ordered for 4/5    Facial trauma- (present on admission)  Assessment & Plan  CT of the facial bones does not reveal fractures    Acute on chronic respiratory failure with hypoxia (HCC)- (present on admission)  Assessment & Plan  Baseline 3 liters of oxygen    Alcohol use disorder- (present on admission)  Assessment & Plan  Ongoing alcohol  abuse now with detox  Cessation encouraged    Class 3 severe obesity due to excess calories in adult (HCC)- (present on admission)  Assessment & Plan  BMI 38         VTE prophylaxis: Xarelto    I have performed a physical exam and reviewed and updated ROS and Plan today (4/5/2023). In review of yesterday's note (4/4/2023), there are no changes except as documented above.

## 2023-04-05 NOTE — THERAPY
"Occupational Therapy   Initial Evaluation     Patient Name: Juan Manuel Bass  Age:  72 y.o., Sex:  male  Medical Record #: [id number]  Today's Date: 4/5/2023     Precautions  Precautions: Fall Risk, Swallow Precautions    Assessment    Patient is 72 y.o. male admitted following GLF at home, pmhx frequent falls, etoh dependence, COPD, HTN, and hyponatremia. Pt presents with facial trauma from GLF. Pt requires assist for all ADLs at this time d/t weakness, poor safety awareness and impaired balance. Pt will require post-acute placement to regain PLOF.    Plan    Occupational Therapy Initial Treatment Plan   Treatment Interventions: Self Care / Activities of Daily Living, Adaptive Equipment, Therapeutic Exercises, Therapeutic Activity  Treatment Frequency: 3 Times per Week  Duration: Until Therapy Goals Met    DC Equipment Recommendations: Unable to determine at this time  Discharge Recommendations: Recommend post-acute placement for additional occupational therapy services prior to discharge home     Subjective    \"Can you come back tomorrow?\"       Objective       04/05/23 1548   Prior Living Situation   Prior Services Skilled Home Health Services   Housing / Facility Motor Home   Steps Into Home 3   Bathroom Set up Bathtub / Shower Combination   Equipment Owned Grab Bar(s) In Tub / Shower;Grab Bar(s) By Toilet;Tub / Shower Seat   Lives with - Patient's Self Care Capacity Alone and Able to Care For Self   Comments neighbors assist per pt report   Prior Level of ADL Function   Self Feeding Independent   Grooming / Hygiene Independent   Bathing Independent   Dressing Independent   Toileting Independent   Prior Level of IADL Function   Comments reports assist from neighbors as needed for groceries, laundry etc   History of Falls   History of Falls Yes   Date of Last Fall   (reason for admit, frequent falls)   Precautions   Precautions Fall Risk;Swallow Precautions   Pain 0 - 10 Group   Therapist Pain Assessment Nurse " Notified;Post Activity Pain Same as Prior to Activity;0   Cognition    Level of Consciousness Alert   Safety Awareness Impaired   Comments impuslive, waxes and wanes between alert and lethargy   Balance Assessment   Sitting Balance (Static) Fair -   Sitting Balance (Dynamic) Fair -   Standing Balance (Static) Poor +   Standing Balance (Dynamic) Poor -   Weight Shift Sitting Poor   Weight Shift Standing Poor   Bed Mobility    Supine to Sit Moderate Assist   Sit to Supine Moderate Assist   Scooting Moderate Assist   ADL Assessment   Eating Supervision   Grooming   (declined)   Upper Body Dressing Moderate Assist   Lower Body Dressing Maximal Assist   Toileting Maximal Assist   How much help from another person does the patient currently need...   Putting on and taking off regular lower body clothing? 2   Bathing (including washing, rinsing, and drying)? 2   Toileting, which includes using a toilet, bedpan, or urinal? 2   Putting on and taking off regular upper body clothing? 2   Taking care of personal grooming such as brushing teeth? 3   Eating meals? 3   6 Clicks Daily Activity Score 14   Functional Mobility   Sit to Stand Maximal Assist   Transfer Method   (sit>stand only)   Activity Tolerance   Comments unable to manage ADLs at his baseline at this time   Patient / Family Goals   Patient / Family Goal #1 none stated   Short Term Goals   Short Term Goal # 1 pt will complete functional transfer with Judy   Short Term Goal # 2 pt will complete seated grooming ADLs with set-up   Short Term Goal # 3 pt will complete toileting ADL with Judy   Education Group   Education Provided Activities of Daily Living;Role of Occupational Therapist   Role of Occupational Therapist Patient Response Patient;Acceptance;Explanation;Verbal Demonstration   ADL Patient Response Patient;Acceptance;Explanation;Action Demonstration;Verbal Demonstration   Occupational Therapy Initial Treatment Plan    Treatment Interventions Self Care /  Activities of Daily Living;Adaptive Equipment;Therapeutic Exercises;Therapeutic Activity   Treatment Frequency 3 Times per Week   Duration Until Therapy Goals Met   Problem List   Problem List Decreased Active Daily Living Skills;Decreased Homemaking Skills;Decreased Functional Mobility;Decreased Activity Tolerance;Safety Awareness Deficits / Cognition;Impaired Cognitive Function;Impaired Postural Control / Balance;Decreased Upper Extremity Strength Left;Decreased Upper Extremity Strength Right   Anticipated Discharge Equipment and Recommendations   DC Equipment Recommendations Unable to determine at this time   Discharge Recommendations Recommend post-acute placement for additional occupational therapy services prior to discharge home

## 2023-04-06 LAB
ANION GAP SERPL CALC-SCNC: 9 MMOL/L (ref 7–16)
BUN SERPL-MCNC: 7 MG/DL (ref 8–22)
CALCIUM SERPL-MCNC: 8.5 MG/DL (ref 8.5–10.5)
CHLORIDE SERPL-SCNC: 94 MMOL/L (ref 96–112)
CO2 SERPL-SCNC: 25 MMOL/L (ref 20–33)
CREAT SERPL-MCNC: 0.49 MG/DL (ref 0.5–1.4)
GFR SERPLBLD CREATININE-BSD FMLA CKD-EPI: 109 ML/MIN/1.73 M 2
GLUCOSE SERPL-MCNC: 123 MG/DL (ref 65–99)
MAGNESIUM SERPL-MCNC: 2.1 MG/DL (ref 1.5–2.5)
POTASSIUM SERPL-SCNC: 4.2 MMOL/L (ref 3.6–5.5)
SODIUM SERPL-SCNC: 128 MMOL/L (ref 135–145)

## 2023-04-06 PROCEDURE — 36415 COLL VENOUS BLD VENIPUNCTURE: CPT

## 2023-04-06 PROCEDURE — 94664 DEMO&/EVAL PT USE INHALER: CPT

## 2023-04-06 PROCEDURE — 700102 HCHG RX REV CODE 250 W/ 637 OVERRIDE(OP): Performed by: HOSPITALIST

## 2023-04-06 PROCEDURE — 700102 HCHG RX REV CODE 250 W/ 637 OVERRIDE(OP): Performed by: NURSE PRACTITIONER

## 2023-04-06 PROCEDURE — A9270 NON-COVERED ITEM OR SERVICE: HCPCS | Performed by: NURSE PRACTITIONER

## 2023-04-06 PROCEDURE — A9270 NON-COVERED ITEM OR SERVICE: HCPCS | Performed by: HOSPITALIST

## 2023-04-06 PROCEDURE — 80048 BASIC METABOLIC PNL TOTAL CA: CPT

## 2023-04-06 PROCEDURE — 700102 HCHG RX REV CODE 250 W/ 637 OVERRIDE(OP): Performed by: EMERGENCY MEDICINE

## 2023-04-06 PROCEDURE — A9270 NON-COVERED ITEM OR SERVICE: HCPCS | Performed by: EMERGENCY MEDICINE

## 2023-04-06 PROCEDURE — 99232 SBSQ HOSP IP/OBS MODERATE 35: CPT | Performed by: HOSPITALIST

## 2023-04-06 PROCEDURE — 770006 HCHG ROOM/CARE - MED/SURG/GYN SEMI*

## 2023-04-06 PROCEDURE — 83735 ASSAY OF MAGNESIUM: CPT

## 2023-04-06 RX ORDER — SODIUM CHLORIDE 1 G/1
1 TABLET ORAL
Status: DISCONTINUED | OUTPATIENT
Start: 2023-04-06 | End: 2023-04-07 | Stop reason: HOSPADM

## 2023-04-06 RX ADMIN — UMECLIDINIUM BROMIDE AND VILANTEROL TRIFENATATE 1 PUFF: 62.5; 25 POWDER RESPIRATORY (INHALATION) at 05:33

## 2023-04-06 RX ADMIN — SENNOSIDES AND DOCUSATE SODIUM 2 TABLET: 50; 8.6 TABLET ORAL at 17:54

## 2023-04-06 RX ADMIN — SODIUM CHLORIDE 1 G: 1 TABLET ORAL at 17:55

## 2023-04-06 RX ADMIN — AMLODIPINE BESYLATE 5 MG: 10 TABLET ORAL at 05:33

## 2023-04-06 RX ADMIN — OMEPRAZOLE 20 MG: 20 CAPSULE, DELAYED RELEASE ORAL at 05:33

## 2023-04-06 RX ADMIN — OXYCODONE AND ACETAMINOPHEN 2 TABLET: 5; 325 TABLET ORAL at 17:55

## 2023-04-06 RX ADMIN — FLUTICASONE PROPIONATE 88 MCG: 44 AEROSOL, METERED RESPIRATORY (INHALATION) at 05:33

## 2023-04-06 RX ADMIN — CHLORDIAZEPOXIDE HYDROCHLORIDE 25 MG: 25 CAPSULE ORAL at 05:33

## 2023-04-06 RX ADMIN — RIVAROXABAN 10 MG: 10 TABLET, FILM COATED ORAL at 17:55

## 2023-04-06 ASSESSMENT — ENCOUNTER SYMPTOMS
SPEECH CHANGE: 0
EYE DISCHARGE: 0
SORE THROAT: 0
CONSTIPATION: 0
DEPRESSION: 0
MYALGIAS: 0
BRUISES/BLEEDS EASILY: 1
NAUSEA: 0
SENSORY CHANGE: 0
FALLS: 1
EYE PAIN: 0
COUGH: 0
CLAUDICATION: 0
LOSS OF CONSCIOUSNESS: 0
VOMITING: 0
FOCAL WEAKNESS: 0
DIZZINESS: 0
BACK PAIN: 0
WHEEZING: 0
SPUTUM PRODUCTION: 0
FEVER: 0
DIAPHORESIS: 0
CHILLS: 0
SHORTNESS OF BREATH: 0
HEMOPTYSIS: 0
DIARRHEA: 0
PALPITATIONS: 0
HEADACHES: 0
ABDOMINAL PAIN: 0
SEIZURES: 0
WEAKNESS: 1
NECK PAIN: 0

## 2023-04-06 ASSESSMENT — PAIN DESCRIPTION - PAIN TYPE
TYPE: ACUTE PAIN

## 2023-04-06 ASSESSMENT — LIFESTYLE VARIABLES: SUBSTANCE_ABUSE: 0

## 2023-04-06 NOTE — CARE PLAN
The patient is Stable - Low risk of patient condition declining or worsening    Shift Goals  Clinical Goals: Monitor labs, Comfort  Patient Goals: Rest  Family Goals: ALEKSANDAR    Progress made toward(s) clinical / shift goals:      Problem: Neuro Status  Goal: Neuro status will remain stable or improve  Outcome: Progressing     Problem: Self Care  Goal: Patient will have the ability to perform ADLs independently or with assistance (bathe, groom, dress, toilet and feed)  Outcome: Progressing       Patient is not progressing towards the following goals:

## 2023-04-06 NOTE — CARE PLAN
The patient is Stable - Low risk of patient condition declining or worsening    Shift Goals  Clinical Goals: monitor labs  Patient Goals: rest  Family Goals: ALEKSANDAR    Progress made toward(s) clinical / shift goals:    Problem: Neuro Status  Goal: Neuro status will remain stable or improve  Outcome: Progressing     Problem: Urinary Elimination  Goal: Establish and maintain regular urinary output  Outcome: Progressing     Problem: Respiratory  Goal: Patient will achieve/maintain optimum respiratory ventilation and gas exchange  Outcome: Progressing     Problem: Mobility  Goal: Patient's capacity to carry out activities will improve  Outcome: Progressing       Patient is not progressing towards the following goals:

## 2023-04-06 NOTE — RESPIRATORY CARE
"COPD EDUCATION by COPD CLINICAL EDUCATOR  4/6/2023 at 12:15 PM by Purvi Mcintyre, RRT     Patient interviewed by COPD education team. Patient refused COPD program at this time. An Action Plan was completed in the EMR to reflect current Respiratory Medication use.    We did review his current respiratory medication delivery                COPD Assessment  COPD Clinical Specialists ONLY  COPD Education Initiated: Yes--Short Intervention (met with pt declined discussion wants to go home Quick review of respiratory medicines AP updaated)  DME Company: Preferred  DME Equipment Type: O2-3lpm  Physician Name: Peterson Amin M.D.  Referrals Initiated: Yes  Smoking Cessation: No  Home Health Care: Yes ([facility])  Is this a COPD exacerbation patient?: No  $ Demo/Eval of SVN's, MDI's and Aerosols: Yes  (OP) Pulmonary Function Testing:  (refuses pft)    PFT Results  No results found for: PFT    Meds to Beds  Would the patient like to opt in for Bedside Medication Delivery at Discharge?: Yes, interested     MY COPD ACTION PLAN     It is recommended that patients and physicians /healthcare providers complete this action plan together. This plan should be discussed at each physician visit and updated as needed.    The green, yellow and red zones show groups of symptoms of COPD. This list of symptoms is not comprehensive, and you may experience other symptoms. In the \"Actions\" column, your healthcare provider has recommended actions for you to take based on your symptoms.    Patient Name: Juan Manuel Bass   YOB: 1950   Last Updated on: 9/14/2022 12:44 PM   Green Zone:  I am doing well today Actions     Usual activitiy and exercise level   Take daily medications     Usual amounts of cough and phlegm/mucus   Use oxygen as prescribed     Sleep well at night   Continue regular exercise/diet plan     Appetite is good   At all times avoid cigarette smoke, inhaled irritants     Daily Medications (these " "medications are taken every day):   Fluticasone and Umeclidinium and Vilanterol (Trelogy) 1 Puff Once daily     Additional Information:  Please remember to RINSE MOUTH after taking this medicine    Yellow Zone:  I am having a bad day or a COPD flare Actions     More breathless than usual   Continue daily medications     I have less energy for my daily activities   Use quick relief inhaler as ordered     Increased or thicker phlegm/mucus   Use oxygen as prescribed     Using quick relief inhaler/nebulizer more often   Get plenty of rest     Swelling of ankles more than usual   Use pursed lip breathing     More coughing than usual   At all times avoid cigarette smoke, inhaled irritants     I feel like I have a \"chest cold\"     Poor sleep and my symptoms woke me up     My appetite is not good     My medicine is not helping      Call provider immediately if symptoms don’t improve     Continue daily medications, add rescue medications:   Albuterol 2 Puffs         Medications to be used during a flare up, (as Discussed with Provider):           Additional Information:  Take as directed by your Doctor and use the spacer    Red Zone:  I need urgent medical care Actions     Severe shortness of breath even at rest   Call 911 or seek medical care immediately     Not able to do any activity because of breathing      Fever or shaking chills      Feeling confused or very drowsy       Chest pains      Coughing up blood                  "

## 2023-04-06 NOTE — FACE TO FACE
Face to Face Supporting Documentation - Home Health    The encounter with this patient was in whole or in part the primary reason for home health admission.    Date of encounter:   Patient:                    MRN:                       YOB: 2023  Juan Manuel Bass  [id number]  1950     Home health to see patient for:  Skilled Nursing care for assessment, interventions & education, Wound Care, Registered dietitian consult, Home health aide, Physical Therapy evaluation and treatment, and Occupational therapy evaluation and treatment    Skilled need for:  Recent Deterioration of Health Status hyponatremia severe causing falls.    Skilled nursing interventions to include:  Wound Care and Comment: home safety evaluation    Homebound status evidenced by:  Need the aid of supportive devices such as crutches, canes, wheelchairs or walkers. Leaving home requires a considerable and taxing effort. There is a normal inability to leave the home.    Community Physician to provide follow up care: Peterson Amin M.D.     Optional Interventions? No      I certify the face to face encounter for this home health care referral meets the CMS requirements and the encounter/clinical assessment with the patient was, in whole, or in part, for the medical condition(s) listed above, which is the primary reason for home health care. Based on my clinical findings: the service(s) are medically necessary, support the need for home health care, and the homebound criteria are met.  I certify that this patient has had a face to face encounter by myself.  Sheree Lemon M.D. - NPI: [id number]

## 2023-04-06 NOTE — DISCHARGE PLANNING
Home Health Face to Face is completed. Pt had [facility] prior to admission.Will contact when ready.

## 2023-04-06 NOTE — PROGRESS NOTES
Assumed care of patient 0700. Received Report from [facility] nurse. Patient A&O2-3 very forgetful, on 3 liters 02 via nasal canula, Reporting a pain level of 0. Call light within reach, belongings within reach, Fall precautions in place, and bed alarm is on and bed in lowest position. Patient does not have any other needs at this time.

## 2023-04-06 NOTE — PROGRESS NOTES
Assumed care of pt  at 2100. Report received from Dominick RUANO. Pt is A&O x 4 but forgetful. Patient stated that their pain is 0/10. Pt's pain comfort goal is 2/10. Pt educated on how to use the call light, pt verbalized understanding. Bed in lowest locked position, call light within reach, hourly rounding in place. Labs reviewed, orders reviewed, communication board updated. Pt declines any further needs at this time.

## 2023-04-06 NOTE — CARE PLAN
The patient is Stable - Low risk of patient condition declining or worsening    Shift Goals  Clinical Goals: monitor na and control pain  Patient Goals: rest pain  Family Goals: marin    Progress made toward(s) clinical / shift goals:  Na stable- Qday checks. Neuro assessments stable A&Ox4 moving all extremities         Problem: Neuro Status  Goal: Neuro status will remain stable or improve  Outcome: Progressing     Problem: Self Care  Goal: Patient will have the ability to perform ADLs independently or with assistance (bathe, groom, dress, toilet and feed)  Outcome: Progressing     Problem: Risk for Aspiration  Goal: Patient's risk for aspiration will be absent or decrease  Outcome: Progressing     Problem: Urinary Elimination  Goal: Establish and maintain regular urinary output  Outcome: Progressing     Problem: Bowel Elimination  Goal: Establish and maintain regular bowel function  Outcome: Progressing     Problem: Respiratory  Goal: Patient will achieve/maintain optimum respiratory ventilation and gas exchange  Outcome: Progressing

## 2023-04-06 NOTE — PROGRESS NOTES
4 Eyes Skin Assessment Completed by ALDEN Telles and ALDEN Wu.    Head Scab, swollen eye  Ears Redness and Blanching  Nose WDL  Mouth WDL  Neck WDL  Breast/Chest WDL  Shoulder Blades WDL  Spine WDL  (R) Arm/Elbow/Hand Redness, Bruising, and Scab  (L) Arm/Elbow/Hand Redness, Bruising, and Scab  Abdomen WDL  Groin WDL  Scrotum/Coccyx/Buttocks WDL  (R) Leg Scab, Swelling, and Shiny  (L) Leg Scab, Swelling, Shiny, and Edema  (R) Heel/Foot/Toe Redness to the second and third toe  (L) Heel/Foot/Toe WDL          Devices In Places Pulse Ox and Nasal Cannula      Interventions In Place Gray Ear Foams, TAP System, and Q2 Turns    Possible Skin Injury No    Pictures Uploaded Into Epic Yes  Wound Consult Placed N/A  RN Wound Prevention Protocol Ordered No

## 2023-04-06 NOTE — CARE PLAN
The patient is Stable - Low risk of patient condition declining or worsening    Shift Goals  Clinical Goals: monitor na and comfort  Patient Goals: sleep pain control  Family Goals: marin    Progress made toward(s) clinical / shift goals:    Problem: Neuro Status  Goal: Neuro status will remain stable or improve  Outcome: Progressing     Problem: Risk for Aspiration  Goal: Patient's risk for aspiration will be absent or decrease  Outcome: Progressing       Patient is not progressing towards the following goals:walking

## 2023-04-07 ENCOUNTER — PATIENT OUTREACH (OUTPATIENT)
Dept: SCHEDULING | Facility: IMAGING CENTER | Age: 73
End: 2023-04-07
Payer: MEDICARE

## 2023-04-07 ENCOUNTER — PHARMACY VISIT (OUTPATIENT)
Dept: PHARMACY | Facility: MEDICAL CENTER | Age: 73
End: 2023-04-07
Payer: COMMERCIAL

## 2023-04-07 ENCOUNTER — APPOINTMENT (OUTPATIENT)
Dept: RADIOLOGY | Facility: MEDICAL CENTER | Age: 73
DRG: 202 | End: 2023-04-07
Attending: EMERGENCY MEDICINE
Payer: MEDICARE

## 2023-04-07 ENCOUNTER — HOSPITAL ENCOUNTER (INPATIENT)
Facility: MEDICAL CENTER | Age: 73
LOS: 4 days | DRG: 202 | End: 2023-04-12
Attending: EMERGENCY MEDICINE | Admitting: HOSPITALIST
Payer: MEDICARE

## 2023-04-07 VITALS
OXYGEN SATURATION: 97 % | WEIGHT: 247.36 LBS | SYSTOLIC BLOOD PRESSURE: 170 MMHG | BODY MASS INDEX: 39.75 KG/M2 | DIASTOLIC BLOOD PRESSURE: 78 MMHG | HEART RATE: 72 BPM | HEIGHT: 66 IN | TEMPERATURE: 98 F | RESPIRATION RATE: 16 BRPM
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
## 2023-04-07 DIAGNOSIS — J44.1 COPD WITH ACUTE EXACERBATION (HCC): ICD-10-CM

## 2023-04-07 DIAGNOSIS — F10.10 ALCOHOL ABUSE: ICD-10-CM

## 2023-04-07 DIAGNOSIS — E87.1 HYPONATREMIA: ICD-10-CM

## 2023-04-07 DIAGNOSIS — R53.1 WEAKNESS: ICD-10-CM
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
## 2023-04-07 DIAGNOSIS — J45.51 SEVERE PERSISTENT ASTHMA WITH ACUTE EXACERBATION: ICD-10-CM
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
## 2023-04-07 LAB
ALBUMIN SERPL BCP-MCNC: 3.2 G/DL (ref 3.2–4.9)
ALBUMIN/GLOB SERPL: 1 G/DL
ALP SERPL-CCNC: 68 U/L (ref 30–99)
ALT SERPL-CCNC: 16 U/L (ref 2–50)
ANION GAP SERPL CALC-SCNC: 9 MMOL/L (ref 7–16)
AST SERPL-CCNC: 15 U/L (ref 12–45)
BASOPHILS # BLD AUTO: 0.1 % (ref 0–1.8)
BASOPHILS # BLD: 0.01 K/UL (ref 0–0.12)
BILIRUB SERPL-MCNC: 0.5 MG/DL (ref 0.1–1.5)
BUN SERPL-MCNC: 7 MG/DL (ref 8–22)
CALCIUM ALBUM COR SERPL-MCNC: 9.1 MG/DL (ref 8.5–10.5)
CALCIUM SERPL-MCNC: 8.5 MG/DL (ref 8.5–10.5)
CHLORIDE SERPL-SCNC: 95 MMOL/L (ref 96–112)
CO2 SERPL-SCNC: 27 MMOL/L (ref 20–33)
CREAT SERPL-MCNC: 0.47 MG/DL (ref 0.5–1.4)
D DIMER PPP IA.FEU-MCNC: 1.49 UG/ML (FEU) (ref 0–0.5)
EOSINOPHIL # BLD AUTO: 0.19 K/UL (ref 0–0.51)
EOSINOPHIL NFR BLD: 1.6 % (ref 0–6.9)
ERYTHROCYTE [DISTWIDTH] IN BLOOD BY AUTOMATED COUNT: 44.6 FL (ref 35.9–50)
GFR SERPLBLD CREATININE-BSD FMLA CKD-EPI: 110 ML/MIN/1.73 M 2
GLOBULIN SER CALC-MCNC: 3.1 G/DL (ref 1.9–3.5)
GLUCOSE SERPL-MCNC: 114 MG/DL (ref 65–99)
HCT VFR BLD AUTO: 38.9 % (ref 42–52)
HGB BLD-MCNC: 13.5 G/DL (ref 14–18)
IMM GRANULOCYTES # BLD AUTO: 0.05 K/UL (ref 0–0.11)
IMM GRANULOCYTES NFR BLD AUTO: 0.4 % (ref 0–0.9)
LYMPHOCYTES # BLD AUTO: 0.73 K/UL (ref 1–4.8)
LYMPHOCYTES NFR BLD: 6.2 % (ref 22–41)
MCH RBC QN AUTO: 32.7 PG (ref 27–33)
MCHC RBC AUTO-ENTMCNC: 34.7 G/DL (ref 33.7–35.3)
MCV RBC AUTO: 94.2 FL (ref 81.4–97.8)
MONOCYTES # BLD AUTO: 1.1 K/UL (ref 0–0.85)
MONOCYTES NFR BLD AUTO: 9.3 % (ref 0–13.4)
NEUTROPHILS # BLD AUTO: 9.73 K/UL (ref 1.82–7.42)
NEUTROPHILS NFR BLD: 82.4 % (ref 44–72)
NRBC # BLD AUTO: 0 K/UL
NRBC BLD-RTO: 0 /100 WBC
PLATELET # BLD AUTO: 264 K/UL (ref 164–446)
PMV BLD AUTO: 9.3 FL (ref 9–12.9)
POTASSIUM SERPL-SCNC: 4.7 MMOL/L (ref 3.6–5.5)
PROT SERPL-MCNC: 6.3 G/DL (ref 6–8.2)
RBC # BLD AUTO: 4.13 M/UL (ref 4.7–6.1)
SODIUM SERPL-SCNC: 131 MMOL/L (ref 135–145)
WBC # BLD AUTO: 11.8 K/UL (ref 4.8–10.8)

## 2023-04-07 PROCEDURE — 700105 HCHG RX REV CODE 258: Performed by: EMERGENCY MEDICINE

## 2023-04-07 PROCEDURE — 82550 ASSAY OF CK (CPK): CPT

## 2023-04-07 PROCEDURE — 99285 EMERGENCY DEPT VISIT HI MDM: CPT

## 2023-04-07 PROCEDURE — 93005 ELECTROCARDIOGRAM TRACING: CPT | Performed by: EMERGENCY MEDICINE

## 2023-04-07 PROCEDURE — 700102 HCHG RX REV CODE 250 W/ 637 OVERRIDE(OP): Performed by: EMERGENCY MEDICINE

## 2023-04-07 PROCEDURE — A9270 NON-COVERED ITEM OR SERVICE: HCPCS | Performed by: HOSPITALIST

## 2023-04-07 PROCEDURE — 700102 HCHG RX REV CODE 250 W/ 637 OVERRIDE(OP): Performed by: HOSPITALIST

## 2023-04-07 PROCEDURE — 84484 ASSAY OF TROPONIN QUANT: CPT

## 2023-04-07 PROCEDURE — 83930 ASSAY OF BLOOD OSMOLALITY: CPT

## 2023-04-07 PROCEDURE — 85379 FIBRIN DEGRADATION QUANT: CPT

## 2023-04-07 PROCEDURE — 80053 COMPREHEN METABOLIC PANEL: CPT

## 2023-04-07 PROCEDURE — 73080 X-RAY EXAM OF ELBOW: CPT | Mod: RT

## 2023-04-07 PROCEDURE — 84145 PROCALCITONIN (PCT): CPT

## 2023-04-07 PROCEDURE — 36415 COLL VENOUS BLD VENIPUNCTURE: CPT

## 2023-04-07 PROCEDURE — 80053 COMPREHEN METABOLIC PANEL: CPT | Mod: 91

## 2023-04-07 PROCEDURE — 94760 N-INVAS EAR/PLS OXIMETRY 1: CPT

## 2023-04-07 PROCEDURE — 85025 COMPLETE CBC W/AUTO DIFF WBC: CPT

## 2023-04-07 PROCEDURE — RXMED WILLOW AMBULATORY MEDICATION CHARGE: Performed by: HOSPITALIST

## 2023-04-07 PROCEDURE — 71045 X-RAY EXAM CHEST 1 VIEW: CPT

## 2023-04-07 PROCEDURE — 83735 ASSAY OF MAGNESIUM: CPT

## 2023-04-07 PROCEDURE — A9270 NON-COVERED ITEM OR SERVICE: HCPCS | Performed by: EMERGENCY MEDICINE

## 2023-04-07 PROCEDURE — 99239 HOSP IP/OBS DSCHRG MGMT >30: CPT | Performed by: HOSPITALIST

## 2023-04-07 PROCEDURE — 304217 HCHG IRRIGATION SYSTEM

## 2023-04-07 RX ORDER — SODIUM CHLORIDE, SODIUM LACTATE, POTASSIUM CHLORIDE, CALCIUM CHLORIDE 600; 310; 30; 20 MG/100ML; MG/100ML; MG/100ML; MG/100ML
1000 INJECTION, SOLUTION INTRAVENOUS ONCE
Status: COMPLETED | OUTPATIENT
Start: 2023-04-07 | End: 2023-04-08

## 2023-04-07 RX ORDER — SODIUM CHLORIDE 1 G/1
1 TABLET ORAL
Qty: 90 TABLET | Refills: 0 | Status: ON HOLD | OUTPATIENT
Start: 2023-04-07 | End: 2023-05-01

## 2023-04-07 RX ADMIN — OMEPRAZOLE 20 MG: 20 CAPSULE, DELAYED RELEASE ORAL at 04:33

## 2023-04-07 RX ADMIN — SODIUM CHLORIDE 1 G: 1 TABLET ORAL at 07:30

## 2023-04-07 RX ADMIN — FLUTICASONE PROPIONATE 88 MCG: 44 AEROSOL, METERED RESPIRATORY (INHALATION) at 06:00

## 2023-04-07 RX ADMIN — UMECLIDINIUM BROMIDE AND VILANTEROL TRIFENATATE 1 PUFF: 62.5; 25 POWDER RESPIRATORY (INHALATION) at 04:34

## 2023-04-07 RX ADMIN — SODIUM CHLORIDE 1 G: 1 TABLET ORAL at 11:30

## 2023-04-07 RX ADMIN — SENNOSIDES AND DOCUSATE SODIUM 2 TABLET: 50; 8.6 TABLET ORAL at 04:33

## 2023-04-07 RX ADMIN — AMLODIPINE BESYLATE 5 MG: 10 TABLET ORAL at 04:33

## 2023-04-07 RX ADMIN — SODIUM CHLORIDE, POTASSIUM CHLORIDE, SODIUM LACTATE AND CALCIUM CHLORIDE 1000 ML: 600; 310; 30; 20 INJECTION, SOLUTION INTRAVENOUS at 23:06

## 2023-04-07 ASSESSMENT — FIBROSIS 4 INDEX: FIB4 SCORE: 1.37

## 2023-04-07 NOTE — PROGRESS NOTES
02 tank delivered for pts ride home.CNA got pt dressed and into wheelchair and took him down to wait for the cab to go home. He left with discharge instructions that he voiced understanding of and a RX  bottle of salt tablets.

## 2023-04-07 NOTE — DISCHARGE PLANNING
Referral sent per choice to [facility] (resume)    1100- Per epic link, [facility] accepted

## 2023-04-07 NOTE — DISCHARGE SUMMARY
Discharge Summary    CHIEF COMPLAINT ON ADMISSION  Chief Complaint   Patient presents with    T-5000 FALL    Facial Injury     Left eye/face       Reason for Admission  Acute metabolic encephalopathy, GLF    Admission Date  4/1/2023    CODE STATUS  DNAR/DNI    HPI & HOSPITAL COURSE  Mr. Bass has a past medical history of alcohol dependence, COPD, hypertension (on HCTZ and norvasc) that has been admitted to this hospital in the past due to hyponatremia in the setting of beer Poto myranda.  On 4/1/2023 he sustained a fall and was brought to the emergency room where routine labs revealed a severely low sodium of 112..  He was admitted to the intensive care unit and placed on fluid restriction and was given 3% saline.  He had serial sodium checks.  Due to alcohol withdrawal, he was initiated on Librium and was given potassium and magnesium replacement.  His sodium levels went up quickly therefore he was given D5W as well as DDAVP.      Interval Problem Update  4/4/2023: Patient seen and evaluated in the ICU.  He is on a normal saline drip with fluid restriction.  His last 3 sodiums have been 126, 126, 125. He is forgetful per his RN. He is on IV NS at 25 mL/hour.   4/5: Mr. Bass was evaluated and examined in the IMCU. He has had serial sodium levels now with Na up to 131.Yesterday he failed the swallow eval by speech path thus was made NPO and placed on IV NS at 100 mL per hour. His Calcium is low this morning at 6.9. Last albumin was 3.6. IV calcium gluconate ordered. He is quite weak though is adamant that he will not go to SNF.   4/6: Transferred out of ICU day 1.  Patient did ambulate to the restroom and back witnessed by bedside RN with little to no assistance.  Patient denies heavy alcohol use.  He says he drinks 1-2 beers per day only.  Lives in an RV.  Previously had home health and owns a front wheel walker.  Patient also states he does not use any salt at home.  I did instruct him that he needs to start using  salt because that is probably the reason for his falls confusion was his severe hyponatremia of 112.  Patient states he prefers to start salt tablets.  DC'd CIWA protocol.    By the time of discharge, patient was eating well, suspect he will be cleared by SLP to regular diet with thin liquids now that his Na induced encephalopathy has resolved.  Resume HH PT/OT/RN.  Owns FWW, lives in  in [place].  He is A&O x4.     Therefore, he is discharged in good and stable condition to home with organized home healthcare and close outpatient follow-up.    The patient met 2-midnight criteria for an inpatient stay at the time of discharge.    Discharge Date  4/7/2023    FOLLOW UP ITEMS POST DISCHARGE  Follow up with PCP in 1 week, recheck Na level.  Was 112 on admission, currently at 131 on salt tablets and stopping HCTZ, increase dietary salt intake.    DISCHARGE DIAGNOSES  Principal Problem:    Hyponatremia POA: Yes  Active Problems:    COPD (chronic obstructive pulmonary disease) ([facility]) POA: Yes    Class 3 severe obesity due to excess calories in adult ([facility]) POA: Yes    Primary hypertension POA: Yes    Alcohol use disorder POA: Yes    Acute on chronic respiratory failure with hypoxia ([facility]) POA: Yes    Gastroesophageal reflux disease without esophagitis POA: Yes    Falls POA: Yes    Chronic hyponatremia POA: Yes    Facial trauma POA: Yes    Edema of left lower extremity POA: Yes    Hand injuries, left, initial encounter POA: Yes    Alcohol withdrawal delirium ([facility]) POA: Yes    Electrolyte abnormality POA: Yes    Dysphagia POA: Yes  Resolved Problems:    High anion gap metabolic acidosis POA: Unknown      FOLLOW UP  No future appointments.  Peterson Amin M.D.  [address] [address]  [phone]    Follow up in 1 week(s)  recheck Na level, currently at 131, admission level 112.  dc'd HCTZ, started salt tablets.  patient states does not eat any salt in diet.  told to increase salt  intake.      MEDICATIONS ON DISCHARGE     Medication List        START taking these medications        Instructions   sodium chloride 1 GM Tabs  Commonly known as: SALT   Take 1 Tablet by mouth 3 times a day with meals.  Dose: 1 g            CONTINUE taking these medications        Instructions   albuterol 108 (90 Base) MCG/ACT Aers inhalation aerosol   Inhale 1-2 Puffs every four hours as needed for Shortness of Breath.  Dose: 1-2 Puff     amLODIPine 5 MG Tabs  Commonly known as: NORVASC   Take 5 mg by mouth every day.  Dose: 5 mg     pantoprazole 40 MG Tbec  Commonly known as: PROTONIX   Take 40 mg by mouth every day.  Dose: 40 mg     Trelegy Ellipta 100-62.5-25 MCG/ACT Aepb inhalation  Generic drug: fluticasone-umeclidin-vilant   Inhale 2-3 Inhalation every morning.  Dose: 2-3 Inhalation.            STOP taking these medications      hydroCHLOROthiazide 25 MG Tabs  Commonly known as: HYDRODIURIL              Allergies  Allergies   Allergen Reactions    Tetanus Toxoid Hives       DIET  Orders Placed This Encounter   Procedures    Diet Order Diet: Level 6 - Soft and Bite Sized (float meds in puree, ok for sips thins H20 between meals); Liquid level: Level 2 - Mildly Thick; Tray Modifications (optional): No Straws     Standing Status:   Standing     Number of Occurrences:   1     Order Specific Question:   Diet:     Answer:   Level 6 - Soft and Bite Sized [23]     Comments:   float meds in puree, ok for sips thins H20 between meals     Order Specific Question:   Liquid level     Answer:   Level 2 - Mildly Thick     Order Specific Question:   Tray Modifications (optional)     Answer:   No Straws       ACTIVITY  As tolerated.  Weight bearing as tolerated    CONSULTATIONS  intensivist    PROCEDURES  4/1/2023 3:04 PM     HISTORY/REASON FOR EXAM:  Ground level fall + anticoagulants or bleeding disorder; Ground level fall + anticoagulants or bleeding disorder.  Fall     TECHNIQUE/EXAM DESCRIPTION AND NUMBER OF VIEWS:  CT  of the head without contrast.     The study was performed on a helical multidetector CT scanner. Contiguous 2.5 mm axial sections were obtained from the skull base through the vertex.     Up to date radiation dose reduction adjustments have been utilized to meet ALARA standards for radiation dose reduction.     COMPARISON:  10/31/2022     FINDINGS:  Mild generalized volume loss.  Patchy hypodensities in the cerebral white matter most likely represent mild to moderate chronic microvascular ischemic changes.     No acute intracranial hemorrhage, major vascular territory infarct, mass effect, midline shift or hydrocephalus.     Small amount of fluid in the sphenoid sinuses could represent acute sinusitis in the appropriate clinical setting.  No depressed calvarial fracture.  Moderate to large left forehead hematoma     IMPRESSION:     1.  Left fore head hematoma..  2.  No acute intracranial abnormality.              Exam Ended: 04/01/23  3:16 PM Last Resulted: 04/01/23  3:24 PM              4/1/2023 5:44 PM     HISTORY/REASON FOR EXAM: Neck pain after fall        TECHNIQUE/EXAM DESCRIPTION:  CT cervical spine without contrast, with reconstructions.     The study was performed on a helical multidetector CT scanner. Thin-section helical scanning was performed from the skull base through T1. Sagittal and coronal multiplanar reconstructions were generated from the axial images.     Low dose optimization technique was utilized for this CT exam including automated exposure control and adjustment of the mA and/or kV according to patient size.     COMPARISON:  10/27/2022.     FINDINGS:  There are postsurgical changes of the cervical spine with anterior fusion hardware spanning C5-C7. There is no evidence of hardware failure or complication. There is mild grade 1 degenerative anterolisthesis of C3 on 4 and C7 on T1.     There is no acute fracture or malalignment identified. The craniovertebral junction appears intact.     The  prevertebral and paraspinous soft tissues are unremarkable.     There are scattered age-related degenerative changes of the spine with intervertebral disc space narrowing, endplate spurring and facet arthropathy.     The superior mediastinum and lung apices in the field of view are unremarkable.     IMPRESSION:     Degenerative and postsurgical changes of the cervical spine without acute fracture or malalignment.           Exam Ended: 04/01/23  6:02 PM Last Resulted: 04/01/23  6:06 PM           4/1/2023 3:04 PM     HISTORY/REASON FOR EXAM:  Fall with facial trauma and pain     TECHNIQUE/EXAM DESCRIPTION AND NUMBER OF VIEWS:  CT scan maxillofacial without contrast, with reconstructions.     Thin-section helical imaging was obtained of the face from the orbital roofs through the mandible. Coronal multiplanar volume reformat images were generated from the axial data.     Low dose optimization technique was utilized for this CT exam including automated exposure control and adjustment of the mA and/or kV according to patient size.     COMPARISON: 10/27/2022     FINDINGS:  Evaluation of the facial bones is facilitated by the coronal reconstructions.     There is mucosal thickening in the ethmoid air cells and sphenoid sinuses and anterior left maxillary antrum.     There is no evidence of acute facial bone fracture or orbital fracture.     There is a left frontal/supraorbital scalp hematoma. There is swelling and hematoma extending into the left lateral face to the level of the parotid gland. Nodular densities in the subcutaneous tissues on the left side probably focal areas of hematoma   with 1 measuring 11 mm at the level of the axilla and another measuring 2 cm in the left supraorbital frontal region.     The visualized mastoid air cells are clear.     Visualized brain demonstrates atrophy.        IMPRESSION:        1.  No acute displaced facial bone or orbital fracture.  2.  There is left frontal, supraorbital, and  facial soft tissue swelling with 2 nodular components probably focal areas of soft tissue hematoma.  3. There is minimal underlying chronic sinus disease again seen.           Exam Ended: 04/01/23  3:17 PM Last Resulted: 04/01/23  3:31 PM           LABORATORY  Lab Results   Component Value Date    SODIUM 131 (L) 04/07/2023    POTASSIUM 4.7 04/07/2023    CHLORIDE 95 (L) 04/07/2023    CO2 27 04/07/2023    GLUCOSE 114 (H) 04/07/2023    BUN 7 (L) 04/07/2023    CREATININE 0.47 (L) 04/07/2023        Lab Results   Component Value Date    WBC 5.9 04/02/2023    HEMOGLOBIN 12.1 (L) 04/02/2023    HEMATOCRIT 32.6 (L) 04/02/2023    PLATELETCT 197 04/02/2023        Total time of the discharge process exceeds 40 minutes.

## 2023-04-07 NOTE — CARE PLAN
The patient is Stable - Low risk of patient condition declining or worsening    Shift Goals  Clinical Goals: monitor labs  Patient Goals: rest  Family Goals: ALEKSANDAR    Progress made toward(s) clinical / shift goals:      Problem: Self Care  Goal: Patient will have the ability to perform ADLs independently or with assistance (bathe, groom, dress, toilet and feed)  Outcome: Progressing     Problem: Risk for Aspiration  Goal: Patient's risk for aspiration will be absent or decrease  Outcome: Progressing     Problem: Urinary Elimination  Goal: Establish and maintain regular urinary output  Outcome: Progressing     Problem: Bowel Elimination  Goal: Establish and maintain regular bowel function  Outcome: Progressing     Problem: Respiratory  Goal: Patient will achieve/maintain optimum respiratory ventilation and gas exchange  Outcome: Progressing     Problem: Mobility  Goal: Patient's capacity to carry out activities will improve  Outcome: Progressing

## 2023-04-07 NOTE — DISCHARGE PLANNING
Case Management Discharge Planning    Admission Date: 4/1/2023  GMLOS: 3.4  ALOS: 6    6-Clicks ADL Score: 14  6-Clicks Mobility Score: 6  PT and/or OT Eval ordered: Yes  Post-acute Referrals Ordered: Yes  Post-acute Choice Obtained: Yes  Has referral(s) been sent to post-acute provider:  Yes      Anticipated Discharge Dispo: Discharge Disposition: Discharged to home/self care (01)    DME Needed: No    Action(s) Taken: OTHER    Patient discussed in IDT rounds. Per Dr. Lemon, patient is medically cleared to discharge and has the capacity to make medical decisions.     Resumption order of care with [facility] accepted.     Bedside RN expressed concerns about patient's ability to adequately care for himself.     Online APS intake entered alleging self-neglect #[id number].     Cab voucher #[id number] provided to bedside RN.     No other CM needs identified at this time     Escalations Completed: None    Medically Clear: Yes    Next Steps: Care coordination to continue to follow and assist with discharge planning as needed.     Barriers to Discharge: None    Is the patient up for discharge tomorrow: No, today via cab voucher.

## 2023-04-08 ENCOUNTER — APPOINTMENT (OUTPATIENT)
Dept: RADIOLOGY | Facility: MEDICAL CENTER | Age: 73
DRG: 202 | End: 2023-04-08
Attending: EMERGENCY MEDICINE
Payer: MEDICARE

## 2023-04-08 PROBLEM — J69.0 ASPIRATION PNEUMONITIS (HCC): Status: ACTIVE | Noted: 2023-04-08

## 2023-04-08 PROBLEM — D72.829 LEUKOCYTOSIS: Status: ACTIVE | Noted: 2023-04-08

## 2023-04-08 PROBLEM — J45.909 ASTHMA: Status: ACTIVE | Noted: 2022-04-12
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
## 2023-04-08 LAB
ALBUMIN SERPL BCP-MCNC: 4 G/DL (ref 3.2–4.9)
ALBUMIN/GLOB SERPL: 1.1 G/DL
ALP SERPL-CCNC: 86 U/L (ref 30–99)
ALT SERPL-CCNC: 19 U/L (ref 2–50)
ANION GAP SERPL CALC-SCNC: 11 MMOL/L (ref 7–16)
AST SERPL-CCNC: 17 U/L (ref 12–45)
BILIRUB SERPL-MCNC: 0.8 MG/DL (ref 0.1–1.5)
BUN SERPL-MCNC: 7 MG/DL (ref 8–22)
CALCIUM ALBUM COR SERPL-MCNC: 9.2 MG/DL (ref 8.5–10.5)
CALCIUM SERPL-MCNC: 9.2 MG/DL (ref 8.5–10.5)
CHLORIDE SERPL-SCNC: 89 MMOL/L (ref 96–112)
CK SERPL-CCNC: 89 U/L (ref 0–154)
CO2 SERPL-SCNC: 25 MMOL/L (ref 20–33)
CREAT SERPL-MCNC: 0.51 MG/DL (ref 0.5–1.4)
EKG IMPRESSION: NORMAL
GFR SERPLBLD CREATININE-BSD FMLA CKD-EPI: 107 ML/MIN/1.73 M 2
GLOBULIN SER CALC-MCNC: 3.8 G/DL (ref 1.9–3.5)
GLUCOSE SERPL-MCNC: 119 MG/DL (ref 65–99)
MAGNESIUM SERPL-MCNC: 1.9 MG/DL (ref 1.5–2.5)
POTASSIUM SERPL-SCNC: 4.4 MMOL/L (ref 3.6–5.5)
PROCALCITONIN SERPL-MCNC: <0.05 NG/ML
PROT SERPL-MCNC: 7.8 G/DL (ref 6–8.2)
SODIUM SERPL-SCNC: 125 MMOL/L (ref 135–145)
SODIUM SERPL-SCNC: 128 MMOL/L (ref 135–145)
SODIUM UR-SCNC: 82 MMOL/L
TROPONIN T SERPL-MCNC: 19 NG/L (ref 6–19)

## 2023-04-08 PROCEDURE — 94760 N-INVAS EAR/PLS OXIMETRY 1: CPT

## 2023-04-08 PROCEDURE — A9270 NON-COVERED ITEM OR SERVICE: HCPCS | Performed by: INTERNAL MEDICINE

## 2023-04-08 PROCEDURE — 70450 CT HEAD/BRAIN W/O DYE: CPT

## 2023-04-08 PROCEDURE — A9270 NON-COVERED ITEM OR SERVICE: HCPCS | Performed by: HOSPITALIST

## 2023-04-08 PROCEDURE — 94640 AIRWAY INHALATION TREATMENT: CPT

## 2023-04-08 PROCEDURE — 94010 BREATHING CAPACITY TEST: CPT

## 2023-04-08 PROCEDURE — 83935 ASSAY OF URINE OSMOLALITY: CPT

## 2023-04-08 PROCEDURE — 770001 HCHG ROOM/CARE - MED/SURG/GYN PRIV*

## 2023-04-08 PROCEDURE — 700105 HCHG RX REV CODE 258: Performed by: HOSPITALIST

## 2023-04-08 PROCEDURE — 92610 EVALUATE SWALLOWING FUNCTION: CPT

## 2023-04-08 PROCEDURE — 36415 COLL VENOUS BLD VENIPUNCTURE: CPT

## 2023-04-08 PROCEDURE — 700102 HCHG RX REV CODE 250 W/ 637 OVERRIDE(OP): Performed by: HOSPITALIST

## 2023-04-08 PROCEDURE — 700117 HCHG RX CONTRAST REV CODE 255: Performed by: EMERGENCY MEDICINE

## 2023-04-08 PROCEDURE — 99223 1ST HOSP IP/OBS HIGH 75: CPT | Mod: AI | Performed by: HOSPITALIST

## 2023-04-08 PROCEDURE — 84295 ASSAY OF SERUM SODIUM: CPT

## 2023-04-08 PROCEDURE — 700102 HCHG RX REV CODE 250 W/ 637 OVERRIDE(OP): Performed by: INTERNAL MEDICINE

## 2023-04-08 PROCEDURE — 99221 1ST HOSP IP/OBS SF/LOW 40: CPT | Performed by: INTERNAL MEDICINE

## 2023-04-08 PROCEDURE — 71275 CT ANGIOGRAPHY CHEST: CPT

## 2023-04-08 PROCEDURE — 84300 ASSAY OF URINE SODIUM: CPT

## 2023-04-08 PROCEDURE — 700101 HCHG RX REV CODE 250: Performed by: HOSPITALIST

## 2023-04-08 PROCEDURE — 94664 DEMO&/EVAL PT USE INHALER: CPT

## 2023-04-08 PROCEDURE — 700111 HCHG RX REV CODE 636 W/ 250 OVERRIDE (IP): Performed by: HOSPITALIST

## 2023-04-08 RX ORDER — LORAZEPAM 2 MG/1
2 TABLET ORAL
Status: DISCONTINUED | OUTPATIENT
Start: 2023-04-08 | End: 2023-04-12 | Stop reason: HOSPADM

## 2023-04-08 RX ORDER — SODIUM CHLORIDE 9 MG/ML
INJECTION, SOLUTION INTRAVENOUS CONTINUOUS
Status: DISCONTINUED | OUTPATIENT
Start: 2023-04-08 | End: 2023-04-08

## 2023-04-08 RX ORDER — PREDNISONE 20 MG/1
40 TABLET ORAL DAILY
Status: COMPLETED | OUTPATIENT
Start: 2023-04-08 | End: 2023-04-12

## 2023-04-08 RX ORDER — FLUTICASONE PROPIONATE 44 UG/1
2 AEROSOL, METERED RESPIRATORY (INHALATION)
Status: DISCONTINUED | OUTPATIENT
Start: 2023-04-08 | End: 2023-04-12 | Stop reason: HOSPADM

## 2023-04-08 RX ORDER — IPRATROPIUM BROMIDE AND ALBUTEROL SULFATE 2.5; .5 MG/3ML; MG/3ML
3 SOLUTION RESPIRATORY (INHALATION)
Status: DISCONTINUED | OUTPATIENT
Start: 2023-04-08 | End: 2023-04-12 | Stop reason: HOSPADM

## 2023-04-08 RX ORDER — LORAZEPAM 2 MG/ML
1 INJECTION INTRAMUSCULAR
Status: DISCONTINUED | OUTPATIENT
Start: 2023-04-08 | End: 2023-04-12 | Stop reason: HOSPADM

## 2023-04-08 RX ORDER — LABETALOL HYDROCHLORIDE 5 MG/ML
10 INJECTION, SOLUTION INTRAVENOUS EVERY 4 HOURS PRN
Status: DISCONTINUED | OUTPATIENT
Start: 2023-04-08 | End: 2023-04-12 | Stop reason: HOSPADM

## 2023-04-08 RX ORDER — LORAZEPAM 1 MG/1
1 TABLET ORAL EVERY 4 HOURS PRN
Status: DISCONTINUED | OUTPATIENT
Start: 2023-04-08 | End: 2023-04-12 | Stop reason: HOSPADM

## 2023-04-08 RX ORDER — LORAZEPAM 2 MG/ML
2 INJECTION INTRAMUSCULAR
Status: DISCONTINUED | OUTPATIENT
Start: 2023-04-08 | End: 2023-04-12 | Stop reason: HOSPADM

## 2023-04-08 RX ORDER — LORAZEPAM 1 MG/1
0.5 TABLET ORAL EVERY 4 HOURS PRN
Status: DISCONTINUED | OUTPATIENT
Start: 2023-04-08 | End: 2023-04-12 | Stop reason: HOSPADM

## 2023-04-08 RX ORDER — ONDANSETRON 2 MG/ML
4 INJECTION INTRAMUSCULAR; INTRAVENOUS EVERY 4 HOURS PRN
Status: DISCONTINUED | OUTPATIENT
Start: 2023-04-08 | End: 2023-04-12 | Stop reason: HOSPADM

## 2023-04-08 RX ORDER — ONDANSETRON 4 MG/1
4 TABLET, ORALLY DISINTEGRATING ORAL EVERY 4 HOURS PRN
Status: DISCONTINUED | OUTPATIENT
Start: 2023-04-08 | End: 2023-04-12 | Stop reason: HOSPADM

## 2023-04-08 RX ORDER — GAUZE BANDAGE 2" X 2"
100 BANDAGE TOPICAL DAILY
Status: DISPENSED | OUTPATIENT
Start: 2023-04-08 | End: 2023-04-12

## 2023-04-08 RX ORDER — SODIUM CHLORIDE 1 G/1
1 TABLET ORAL
Status: DISCONTINUED | OUTPATIENT
Start: 2023-04-08 | End: 2023-04-12 | Stop reason: HOSPADM

## 2023-04-08 RX ORDER — METHYLPREDNISOLONE SODIUM SUCCINATE 40 MG/ML
40 INJECTION, POWDER, LYOPHILIZED, FOR SOLUTION INTRAMUSCULAR; INTRAVENOUS ONCE
Status: COMPLETED | OUTPATIENT
Start: 2023-04-08 | End: 2023-04-08

## 2023-04-08 RX ORDER — LORAZEPAM 2 MG/1
4 TABLET ORAL
Status: DISCONTINUED | OUTPATIENT
Start: 2023-04-08 | End: 2023-04-12 | Stop reason: HOSPADM

## 2023-04-08 RX ORDER — ALBUTEROL SULFATE 90 UG/1
1-2 AEROSOL, METERED RESPIRATORY (INHALATION) EVERY 4 HOURS PRN
Status: DISCONTINUED | OUTPATIENT
Start: 2023-04-08 | End: 2023-04-12 | Stop reason: HOSPADM

## 2023-04-08 RX ORDER — AMLODIPINE BESYLATE 5 MG/1
5 TABLET ORAL DAILY
Status: DISCONTINUED | OUTPATIENT
Start: 2023-04-08 | End: 2023-04-12 | Stop reason: HOSPADM

## 2023-04-08 RX ORDER — LORAZEPAM 2 MG/ML
1.5 INJECTION INTRAMUSCULAR
Status: DISCONTINUED | OUTPATIENT
Start: 2023-04-08 | End: 2023-04-12 | Stop reason: HOSPADM

## 2023-04-08 RX ORDER — IPRATROPIUM BROMIDE AND ALBUTEROL SULFATE 2.5; .5 MG/3ML; MG/3ML
3 SOLUTION RESPIRATORY (INHALATION)
Status: DISCONTINUED | OUTPATIENT
Start: 2023-04-08 | End: 2023-04-09

## 2023-04-08 RX ORDER — PANTOPRAZOLE SODIUM 40 MG/1
40 TABLET, DELAYED RELEASE ORAL DAILY
Status: DISCONTINUED | OUTPATIENT
Start: 2023-04-08 | End: 2023-04-08

## 2023-04-08 RX ORDER — OMEPRAZOLE 20 MG/1
20 CAPSULE, DELAYED RELEASE ORAL DAILY
Status: DISCONTINUED | OUTPATIENT
Start: 2023-04-08 | End: 2023-04-12 | Stop reason: HOSPADM

## 2023-04-08 RX ORDER — FOLIC ACID 1 MG/1
1 TABLET ORAL DAILY
Status: DISPENSED | OUTPATIENT
Start: 2023-04-08 | End: 2023-04-12

## 2023-04-08 RX ORDER — LORAZEPAM 2 MG/ML
0.5 INJECTION INTRAMUSCULAR EVERY 4 HOURS PRN
Status: DISCONTINUED | OUTPATIENT
Start: 2023-04-08 | End: 2023-04-12 | Stop reason: HOSPADM

## 2023-04-08 RX ADMIN — IPRATROPIUM BROMIDE AND ALBUTEROL SULFATE 3 ML: 2.5; .5 SOLUTION RESPIRATORY (INHALATION) at 20:04

## 2023-04-08 RX ADMIN — FLUTICASONE PROPIONATE 88 MCG: 44 AEROSOL, METERED RESPIRATORY (INHALATION) at 08:23

## 2023-04-08 RX ADMIN — IPRATROPIUM BROMIDE AND ALBUTEROL SULFATE 3 ML: 2.5; .5 SOLUTION RESPIRATORY (INHALATION) at 23:45

## 2023-04-08 RX ADMIN — PREDNISONE 40 MG: 20 TABLET ORAL at 11:34

## 2023-04-08 RX ADMIN — OMEPRAZOLE 20 MG: 20 CAPSULE, DELAYED RELEASE ORAL at 08:23

## 2023-04-08 RX ADMIN — IPRATROPIUM BROMIDE AND ALBUTEROL SULFATE 3 ML: 2.5; .5 SOLUTION RESPIRATORY (INHALATION) at 12:08

## 2023-04-08 RX ADMIN — UMECLIDINIUM BROMIDE AND VILANTEROL TRIFENATATE 1 PUFF: 62.5; 25 POWDER RESPIRATORY (INHALATION) at 08:23

## 2023-04-08 RX ADMIN — IOHEXOL 60 ML: 350 INJECTION, SOLUTION INTRAVENOUS at 02:15

## 2023-04-08 RX ADMIN — SODIUM CHLORIDE 1 G: 1 TABLET ORAL at 18:01

## 2023-04-08 RX ADMIN — SODIUM CHLORIDE: 9 INJECTION, SOLUTION INTRAVENOUS at 06:14

## 2023-04-08 RX ADMIN — IPRATROPIUM BROMIDE AND ALBUTEROL SULFATE 3 ML: 2.5; .5 SOLUTION RESPIRATORY (INHALATION) at 16:07

## 2023-04-08 RX ADMIN — METHYLPREDNISOLONE SODIUM SUCCINATE 40 MG: 40 INJECTION, POWDER, FOR SOLUTION INTRAMUSCULAR; INTRAVENOUS at 06:28

## 2023-04-08 RX ADMIN — SODIUM CHLORIDE 1 G: 1 TABLET ORAL at 11:34

## 2023-04-08 ASSESSMENT — FIBROSIS 4 INDEX: FIB4 SCORE: 1.06

## 2023-04-08 ASSESSMENT — LIFESTYLE VARIABLES
TOTAL SCORE: 2
ORIENTATION AND CLOUDING OF SENSORIUM: ORIENTED AND CAN DO SERIAL ADDITIONS
CONSUMPTION TOTAL: POSITIVE
HEADACHE, FULLNESS IN HEAD: NOT PRESENT
ANXIETY: NO ANXIETY (AT EASE)
HEADACHE, FULLNESS IN HEAD: NOT PRESENT
VISUAL DISTURBANCES: NOT PRESENT
HEADACHE, FULLNESS IN HEAD: NOT PRESENT
DO YOU DRINK ALCOHOL: YES
ANXIETY: MILDLY ANXIOUS
ORIENTATION AND CLOUDING OF SENSORIUM: ORIENTED AND CAN DO SERIAL ADDITIONS
ORIENTATION AND CLOUDING OF SENSORIUM: ORIENTED AND CAN DO SERIAL ADDITIONS
HOW MANY TIMES IN THE PAST YEAR HAVE YOU HAD 5 OR MORE DRINKS IN A DAY: 5
CONSUMPTION TOTAL: POSITIVE
AVERAGE NUMBER OF DAYS PER WEEK YOU HAVE A DRINK CONTAINING ALCOHOL: 7
TOTAL SCORE: 0
AUDITORY DISTURBANCES: NOT PRESENT
AUDITORY DISTURBANCES: NOT PRESENT
TOTAL SCORE: 0
NAUSEA AND VOMITING: NO NAUSEA AND NO VOMITING
VISUAL DISTURBANCES: NOT PRESENT
ALCOHOL_USE: YES
EVER HAD A DRINK FIRST THING IN THE MORNING TO STEADY YOUR NERVES TO GET RID OF A HANGOVER: NO
EVER FELT BAD OR GUILTY ABOUT YOUR DRINKING: NO
HEADACHE, FULLNESS IN HEAD: NOT PRESENT
TOTAL SCORE: 0
AGITATION: SOMEWHAT MORE THAN NORMAL ACTIVITY
TOTAL SCORE: 0
ON A TYPICAL DAY WHEN YOU DRINK ALCOHOL HOW MANY DRINKS DO YOU HAVE: 4
TREMOR: TREMOR NOT VISIBLE BUT CAN BE FELT, FINGERTIP TO FINGERTIP
PAROXYSMAL SWEATS: NO SWEAT VISIBLE
ORIENTATION AND CLOUDING OF SENSORIUM: ORIENTED AND CAN DO SERIAL ADDITIONS
TOTAL SCORE: 3
ANXIETY: MILDLY ANXIOUS
HAVE PEOPLE ANNOYED YOU BY CRITICIZING YOUR DRINKING: NO
HAVE YOU EVER FELT YOU SHOULD CUT DOWN ON YOUR DRINKING: NO
HOW MANY TIMES IN THE PAST YEAR HAVE YOU HAD 5 OR MORE DRINKS IN A DAY: 5
DOES PATIENT WANT TO STOP DRINKING: NO
EVER FELT BAD OR GUILTY ABOUT YOUR DRINKING: NO
AVERAGE NUMBER OF DAYS PER WEEK YOU HAVE A DRINK CONTAINING ALCOHOL: 7
NAUSEA AND VOMITING: NO NAUSEA AND NO VOMITING
DOES PATIENT WANT TO STOP DRINKING: NO
PAROXYSMAL SWEATS: NO SWEAT VISIBLE
VISUAL DISTURBANCES: NOT PRESENT
HAVE YOU EVER FELT YOU SHOULD CUT DOWN ON YOUR DRINKING: NO
NAUSEA AND VOMITING: NO NAUSEA AND NO VOMITING
ANXIETY: MILDLY ANXIOUS
EVER HAD A DRINK FIRST THING IN THE MORNING TO STEADY YOUR NERVES TO GET RID OF A HANGOVER: NO
PAROXYSMAL SWEATS: NO SWEAT VISIBLE
NAUSEA AND VOMITING: NO NAUSEA AND NO VOMITING
TREMOR: NO TREMOR
HAVE PEOPLE ANNOYED YOU BY CRITICIZING YOUR DRINKING: NO
ON A TYPICAL DAY WHEN YOU DRINK ALCOHOL HOW MANY DRINKS DO YOU HAVE: 4
TOTAL SCORE: 0
AGITATION: SOMEWHAT MORE THAN NORMAL ACTIVITY
TOTAL SCORE: 3
TREMOR: NO TREMOR
AUDITORY DISTURBANCES: NOT PRESENT
SUBSTANCE_ABUSE: 0
AGITATION: SOMEWHAT MORE THAN NORMAL ACTIVITY
PAROXYSMAL SWEATS: NO SWEAT VISIBLE
TREMOR: TREMOR NOT VISIBLE BUT CAN BE FELT, FINGERTIP TO FINGERTIP
AUDITORY DISTURBANCES: NOT PRESENT
TOTAL SCORE: 0
TOTAL SCORE: 1
AGITATION: SOMEWHAT MORE THAN NORMAL ACTIVITY
VISUAL DISTURBANCES: NOT PRESENT

## 2023-04-08 ASSESSMENT — PAIN DESCRIPTION - PAIN TYPE
TYPE: ACUTE PAIN
TYPE: ACUTE PAIN

## 2023-04-08 ASSESSMENT — ENCOUNTER SYMPTOMS
BLOOD IN STOOL: 0
SINUS PAIN: 0
EYE PAIN: 0
PHOTOPHOBIA: 0
ORTHOPNEA: 0
SHORTNESS OF BREATH: 1
DIARRHEA: 0
WHEEZING: 0
DIAPHORESIS: 0
WEAKNESS: 0
POLYDIPSIA: 0
NECK PAIN: 0
FEVER: 0
BLURRED VISION: 0
FLANK PAIN: 0
BACK PAIN: 0
TREMORS: 0
NAUSEA: 0
HEADACHES: 0
HALLUCINATIONS: 0
ABDOMINAL PAIN: 0
SPUTUM PRODUCTION: 1
DIZZINESS: 0
CHILLS: 0
HEARTBURN: 0
DOUBLE VISION: 0
SPUTUM PRODUCTION: 0
WHEEZING: 1
TINGLING: 0
SORE THROAT: 0
FALLS: 1
BRUISES/BLEEDS EASILY: 0
STRIDOR: 0
CONSTIPATION: 0
DEPRESSION: 0
PALPITATIONS: 0
HEMOPTYSIS: 0
WEAKNESS: 1
VOMITING: 0
CLAUDICATION: 0
MYALGIAS: 0
PND: 0
COUGH: 1

## 2023-04-08 ASSESSMENT — COGNITIVE AND FUNCTIONAL STATUS - GENERAL
MOBILITY SCORE: 17
DRESSING REGULAR UPPER BODY CLOTHING: A LITTLE
TOILETING: A LITTLE
CLIMB 3 TO 5 STEPS WITH RAILING: A LOT
SUGGESTED CMS G CODE MODIFIER DAILY ACTIVITY: CK
MOVING FROM LYING ON BACK TO SITTING ON SIDE OF FLAT BED: A LITTLE
WALKING IN HOSPITAL ROOM: A LITTLE
PERSONAL GROOMING: A LITTLE
STANDING UP FROM CHAIR USING ARMS: A LITTLE
SUGGESTED CMS G CODE MODIFIER MOBILITY: CK
MOVING TO AND FROM BED TO CHAIR: A LITTLE
HELP NEEDED FOR BATHING: A LOT
DRESSING REGULAR LOWER BODY CLOTHING: A LOT
DAILY ACTIVITIY SCORE: 17
TURNING FROM BACK TO SIDE WHILE IN FLAT BAD: A LITTLE

## 2023-04-08 ASSESSMENT — COPD QUESTIONNAIRES
DO YOU EVER COUGH UP ANY MUCUS OR PHLEGM?: YES, A FEW DAYS A WEEK OR MONTH
COPD SCREENING SCORE: 6
HAVE YOU SMOKED AT LEAST 100 CIGARETTES IN YOUR ENTIRE LIFE: YES
DURING THE PAST 4 WEEKS HOW MUCH DID YOU FEEL SHORT OF BREATH: SOME OF THE TIME

## 2023-04-08 ASSESSMENT — PATIENT HEALTH QUESTIONNAIRE - PHQ9
SUM OF ALL RESPONSES TO PHQ9 QUESTIONS 1 AND 2: 0
2. FEELING DOWN, DEPRESSED, IRRITABLE, OR HOPELESS: NOT AT ALL
1. LITTLE INTEREST OR PLEASURE IN DOING THINGS: NOT AT ALL
2. FEELING DOWN, DEPRESSED, IRRITABLE, OR HOPELESS: NOT AT ALL
SUM OF ALL RESPONSES TO PHQ9 QUESTIONS 1 AND 2: 0
1. LITTLE INTEREST OR PLEASURE IN DOING THINGS: NOT AT ALL

## 2023-04-08 NOTE — ED PROVIDER NOTES
ED Provider Note    CHIEF COMPLAINT  Chief Complaint   Patient presents with    Fall     PT HAD A FALL THIS EVENING AND STATES HE TRIPPED OVER SOMETHING ON THE FLOOR. PATIENT WAS JUST D/C FOR LOW SOIDUM LEVELS. PT WITH +ETOH. -THINNERS - HEAD STRIKE.     PT WITH AN OLD HEALING HEMATOMA TO LEFT EYE WITH DIFFERENT HEALING STAGES.      EXTERNAL RECORDS REVIEWED  Inpatient Notes discharge summary from earlier today . The patient was admitted for injury sustained from ground-level fall thought to be because of hyponatremia in the setting of beer potomania.  At the time of admission on 4/1 he had a sodium level of 112, he went to the ICU and had serial sodium checks, was treated for alcohol withdrawal on Librium given potassium supplementation.  He had leveling of his sodium levels though remained quite weak.  The recommended for SNF placement though he referred refused.    HPI/ROS  LIMITATION TO HISTORY   Select: : None  OUTSIDE HISTORIAN(S):  none    Juan Manuel Bass is a 72 y.o. male who presents to the emergency room for evaluation of injury sustained from a ground-level fall.  The patient states that since he went home from the hospital earlier today he was resting in his bed when he got up and upon standing he says he fell.  He believes that he stood on something that was likely his shoe or slipper causing him to lose his balance.  He fell to the right, landing on his forearm and striking the right side of his head against the wall.  He is unsure loss of consciousness says he feels a little foggy.  Patient does endorse continued to have 2-3 beers since discharge from the hospital earlier today.  Chart reviewed as discussed above the patient had injury sustained from a fall and was hyponatremic at that time.    PAST MEDICAL HISTORY   has a past medical history of Chronic airway obstruction, not elsewhere classified, High anion gap metabolic acidosis (4/1/2023), and Hypertension.    SURGICAL HISTORY  patient denies any  "surgical history    FAMILY HISTORY  Family History   Problem Relation Age of Onset    No Known Problems Mother     Heart Disease Father        SOCIAL HISTORY  Social History     Tobacco Use    Smoking status: Former     Packs/day: 1.00     Years: 4.00     Pack years: 4.00     Types: Cigarettes     Quit date: 3/7/2020     Years since quitting: 3.0    Smokeless tobacco: Never   Vaping Use    Vaping Use: Never used   Substance and Sexual Activity    Alcohol use: Yes     Alcohol/week: 21.0 oz     Types: 35 Cans of beer per week     Comment: 5-6 beers/day    Drug use: Not Currently    Sexual activity: Not on file     CURRENT MEDICATIONS  Home Medications    **Home medications have not yet been reviewed for this encounter**       ALLERGIES  Allergies   Allergen Reactions    Tetanus Toxoid Hives     PHYSICAL EXAM  VITAL SIGNS: /70   Pulse 80   Temp 36.2 °C (97.2 °F) (Temporal)   Resp 16   Ht 1.702 m (5' 7\")   Wt 112 kg (247 lb)   SpO2 98%   BMI 38.69 kg/m²    Genl: elderly M, sitting in gurney uncomfortably, speaking clearly, appears in mild distress   Head: NC, small areas of left-sided anterior facial, periorbital and left-sided facial and neck developing ecchymoses.  There is a new right-sided area of tenderness without hematoma on the parietal scalp.  ENT: Mucous membranes dry, posterior pharynx clear, uvula midline, nares patent bilaterally   Eyes: Normal sclera, pupils equal round reactive to light  Neck: Supple, FROM, no LAD appreciated   Pulmonary: Lungs are clear to auscultation bilaterally  Chest: No TTP  CV:  RRR, no murmur appreciated, pulses 2+ in both upper and lower extremities,  Abdomen: soft, NT/ND; no rebound/guarding, no masses palpated, no HSM   : no CVA or suprapubic tenderness   Musculoskeletal: Pain free ROM of the neck. Moving upper and lower extremities and spontaneous in coordinated fashion  Right Upper Extremity  - Skin: 2 areas of superficial skin tears noted on the proximal and " mid forearm.  No open lacerations, tenderness is appreciated globally over the body of the elbow joint.  No obvious effusion is noted.    - Motor: Full ROM at shoulder, limited due to pain at elbow, intact at wrist; 5/5 wrist flexion/extension, thumb IP joint flexion/extension (AIN/PIN), abduction/adduction (ulnar) remain intact  - Sensation intact to median/ulnar/radial nerves  - 2+ radial pulse, < 2 sec cap refill x 5 digits    DIAGNOSTIC STUDIES / PROCEDURES  EKG  I have independently interpreted this EKG  Results for orders placed or performed during the hospital encounter of 23   EKG   Result Value Ref Range    Report       [facility] Emergency Dept.    Test Date:  2023  Pt Name:    BRIDGETTE VARNER                  Department: ER  MRN:        [id number]                      Room:        23  Gender:     Male                         Technician:  :        [id number]                   Requested By:BEATRICE MORA  Order #:    [id number]                    Reading MD: Addi Madrigal MD    Measurements  Intervals                                Axis  Rate:       82                           P:          -63  IN:         178                          QRS:        21  QRSD:       103                          T:          30  QT:         359  QTc:        420    Interpretive Statements  Sinus rhythm at a rate of 82, PAC is noted, in general there is somewhat low  voltage throughout, no acute ST segment elevations or depressions, normal  intervals and axis, unchanged from prior EKG dated 2023  Electronically Signed On 2023 3:02:02 PDT by Addi Madrigal MD       LABS  Labs Reviewed   CBC WITH DIFFERENTIAL - Abnormal; Notable for the following components:       Result Value    WBC 11.8 (*)     RBC 4.13 (*)     Hemoglobin 13.5 (*)     Hematocrit 38.9 (*)     Neutrophils-Polys 82.40 (*)     Lymphocytes 6.20 (*)     Neutrophils (Absolute) 9.73 (*)     Lymphs (Absolute) 0.73 (*)     Monos (Absolute) 1.10  (*)     All other components within normal limits    Narrative:     Biotin intake of greater than 5 mg per day may interfere with  troponin levels, causing false low values.   COMP METABOLIC PANEL - Abnormal; Notable for the following components:    Sodium 125 (*)     Chloride 89 (*)     Glucose 119 (*)     Bun 7 (*)     Globulin 3.8 (*)     All other components within normal limits    Narrative:     Biotin intake of greater than 5 mg per day may interfere with  troponin levels, causing false low values.   D-DIMER - Abnormal; Notable for the following components:    D-Dimer 1.49 (*)     All other components within normal limits   TROPONIN    Narrative:     Biotin intake of greater than 5 mg per day may interfere with  troponin levels, causing false low values.   CORRECTED CALCIUM    Narrative:     Biotin intake of greater than 5 mg per day may interfere with  troponin levels, causing false low values.   ESTIMATED GFR    Narrative:     Biotin intake of greater than 5 mg per day may interfere with  troponin levels, causing false low values.     RADIOLOGY  I have independently interpreted the diagnostic imaging associated with this visit and am waiting the final reading from the radiologist.   My preliminary interpretation is as follows: No focal infiltrates, no intracranial abnormality, no evidence of PE.  Radiologist interpretation:   CT-HEAD W/O   Final Result         1. No acute intracranial abnormality. No evidence of acute intracranial hemorrhage or mass lesion.         2. Left forehead scalp hematoma, similar to prior               CT-CTA CHEST PULMONARY ARTERY W/ RECONS   Final Result         No significant interval change.      1. No CT evidence of pulmonary embolism.      2. Patchy bibasilar atelectasis.      DX-ELBOW-COMPLETE 3+ RIGHT   Final Result         No acute osseous abnormality.      DX-CHEST-PORTABLE (1 VIEW)   Final Result         Hazy left basilar opacity, similar to prior, likely atelectasis         COURSE & MEDICAL DECISION MAKING    ED Observation Status? No; Patient does not meet criteria for ED Observation.     INITIAL ASSESSMENT, COURSE AND PLAN  Care Narrative: Patient is seen and evaluated for symptoms as described above.  The patient has a recent history of hyponatremia, ICU stay, chronic alcohol use and is now presents with ground-level fall shortly after being discharged.  On arrival he has some element of appearance of acute intoxication, there are multiple areas of old trauma noted on his head and neck with a new area of trauma on the right side of the scalp.  Additionally the patient was tachycardic and somewhat tachypneic and is now having some hypoxia.  I am concerned about his recent immobility/debilitation and increased risk of possible PE.  Additionally his traumatic injuries from today require imaging of his head and his right elbow.  Wounds are superficial in nature and easily cleaned and repaired.  Tetanus was updated when the patient was last in the emergency department approximately week ago.    CT scan shows no new acute intracranial abnormality, no skull fracture    Chest x-ray shows no signs of acute heart failure, no focal infiltrates    Elbow film shows no acute fracture.  Wounds are easily dressed and do not require any suturing.    Lab work shows slight worsening with hyponatremia back to 125.  Likely due to his continued use of alcohol.  Alcohol level was canceled by lab in error.  At this time the patient has slight anemia, slight leukocytosis without febrile illness and has soft hypoxia between 88 and 90.  When trauma scans and imaging came back negative the patient was ambulated with his home walker which he was a 1 person assist and currently unstable and unsafe to go home.  There is apparent that the patient had previous PT/OT assessment and it was recommended that he have placement in a SNF.  He did not want this at this time however he is currently unstable and unable  to safely get himself home.  I have asked the hospitalist to reevaluate the patient with his current worsening hyponatremia, weakness and inability to walk safely.    HYDRATION: Based on the patient's presentation of Dehydration and Other npo status the patient was given IV fluids. IV Hydration was used because oral hydration was not adequate alone. Upon recheck following hydration, the patient was improving.    FINAL DIAGNOSIS  1. Hyponatremia    2. Weakness    3. Alcohol abuse    4. Fall in home, initial encounter      Electronically signed by: Rohan Fontana M.D., 4/7/2023 10:35 PM

## 2023-04-08 NOTE — ASSESSMENT & PLAN NOTE
Do not think pt has COPD or at least no evidence for copd  He may have underlying asthma vs RAD  He is etoh dependant and based on the scars on his lungs likely aspirates during his binges  He also is morbidly obese and likely has untreated MICHOACANO and obesity hypoventilation syndrom and likely needs a sleep study  He does need outpt follow up which pt does not normally keep  Based on his bedside PFTs, CT scan and smoking hx unlikely COPD

## 2023-04-08 NOTE — ASSESSMENT & PLAN NOTE
Seen by pulmonology and does not have COPD  Likely has asthma versus RAD  Complete steroids, nebs   Needs outpatient sleep study

## 2023-04-08 NOTE — PROGRESS NOTES
4 Eyes Skin Assessment Completed by ALDEN Munson and ALDEN Burgos.    Head Scab, Bruising, and Swelling  Ears Discoloration scabbing  Nose Scab  Mouth WDL  Neck Discoloration and Bruising  Breast/Chest Redness, Bruising, and Discoloration  Shoulder Blades Redness  Spine Redness  (R) Arm/Elbow/Hand Abrasion and Scab  (L) Arm/Elbow/Hand Bruising, Abrasion, Scab, and Swelling  Abdomen Redness  Groin Redness  Scrotum/Coccyx/Buttocks Redness and Blanching  (R) Leg WDL  (L) Leg WDL  (R) Heel/Foot/Toe Redness  (L) Heel/Foot/Toe Redness          Devices In Places Nasal Cannula      Interventions In Place Gray Ear Foams    Possible Skin Injury Yes    Pictures Uploaded Into Epic Yes  Wound Consult Placed Yes  RN Wound Prevention Protocol Ordered Yes

## 2023-04-08 NOTE — THERAPY
"Speech Language Pathology   Clinical Swallow Evaluation     Patient Name: Juan Manuel Bass  AGE:  72 y.o., SEX:  male  Medical Record #: [id number]  Date of Service: 4/8/2023      History of Present Illness  \" 72 y.o. male who presented 4/7/2023 with past medical history of alcohol abuse, COPD, hypertension, chronic hypoxic respiratory failure on 3 L of oxygen who comes into the hospital after a ground-level fall.  The patient was resting in bed when he suddenly fell off the bed.  Patient did hit the right side of his head but did not lose consciousness.  Patient was recently discharged in the hospital yesterday after an ICU stay for hyponatremia and facial trauma..  It was believed the hyponatremia was due to beer proteinemia.  He was started on hypertonic saline.  At the time his sodium level was increased too quickly and he was started on DDAVP.  Patient went back home and started to drink alcohol again.\"    PMH: \"Chronic airway obstruction, not elsewhere classified, High anion gap metabolic acidosis (4/1/2023), and Hypertension.\"    CXR: IMPRESSION:   \"Hazy left basilar opacity, similar to prior, likely atelectasis\"    CT Head: \"IMPRESSION:   1. No acute intracranial abnormality. No evidence of acute intracranial hemorrhage or mass lesion.  2. Left forehead scalp hematoma, similar to prior\"    FEES exam 4/5/23-\"pt presents with a mild-moderate oropharyngeal dysphagia. Airway protection and pharyngeal efficiency are impaired. Suspect dysphagia is acute on chronic given prolonged NPO status, generalized weakness, reduced mobility while admitted, with hx of COPD. Suspect micro-penetration of thin liquids is baseline given hx of COPD and age >65. Pt likely is at reduced risk for aspiration pneumonia at baseline given small amount of penetration, regular ambulation and oral care. Pt is at increased risk for aspiration pneumonia in his acute state given reduced mobility and alertness, as well as ability to follow " "directions/safe swallow strategies (I.e. cough and swallow again with thins).\"       General Information:  Vitals  O2 (LPM): 4  O2 Delivery Device: Nasal Cannula  Level of Consciousness: Alert, Awake     Orientation: Oriented x 4  Follows Directives: Yes      Prior Living Situation & Level of Function:  Prior Services: Skilled Home Health Services (per previous admission notes)  Housing / Facility: Motor Home (per previous admission notes)  Steps Into Home: 3 (per previous admission notes)  Bathroom Set up: Bathtub / Shower Combination (per previous admission notes)  Equipment Owned: Front-Wheel Walker (per previous admission notes)  Lives with - Patient's Self Care Capacity: Alone and Able to Care For Self (per previous admission notes)  Comments: Patient reports that a neighbor helps him with laundry and grocery shopping, however he also notes that his neighbor just moved.  Unsure if this is the same neighbor as patient was not clear in answer.     Communication: WFL  Swallowing: WFL       Oral Mechanism Evaluation:  Dentition: Some missing dentition   Facial Symmetry: Equal  Facial Sensation: Equal     Labial Observations: WFL   Lingual Observations: Midline               Laryngeal Function:  Secretion Management: Adequate  Voice Quality: WFL        Cough: Perceptually WNL (for cued cough, no reflexive cough noted throughout this eval.)         Subjective  Patient is alert and agreeable to evaluation.  Rn cleared with no restrictions.      Assessment  Current Method of Nutrition: NPO until cleared by speech pathology  Positioning: Multani's (60-90 degrees)  Bolus Administration: Patient  O2 (LPM): 4 O2 Delivery Device: Nasal Cannula  Factor(s) Affecting Performance: None      Swallowing Trials:  Swallowing Trials  Ice: WFL  Thin Liquid (TN0): WFL  Mildly Thick Liquid (MT2): WFL (trialed 2/2 previous FEES 4/5 to compare with thin liquids.)  Liquidised (LQ3): WFL  Pureed (PU4): WFL  Soft & Bite Sized (SB6): " "WFL  Easy to Chew (EC7): Not tested  Regular (RG7): Not tested (Patient refused, prefers soft bite sized he states)      Comments: Patient was recently seen by SLP on 4/5 for a FEES examination. Patient was discharged the following day, but returned less than 24 hours later after a fall.  Patient presents similarly to prior admission.  He demonstrates no overt s/s of aspiration with mildly thick liquids.  Intermittent throat clear with thin liquids, likely indicating the penetration of the airway noted during the FEES.  Patient tolerated up to soft bite size solids without overt s/s of aspiration and patient refused further advanced solids during this examination.        Clinical Impressions  Given patient's hx of mild-moderate oropharyngeal dysphagia with airway protection and pharyngeal efficiency impairment, I would recommend advancing to the soft and bite size with mildly thick liquids previously recommended on FEES examination.  We will continue to follow patient for skilled dysphagia tx to address use of strategies with thin liquids.      Recommendations  Diet Consistency: Soft and Bite Size/Mildly thick liquids  Instrumentation: Instrumental swallow study pending clinical progress  Medication: Whole with puree  Supervision: Direct supervision during meals, Assist with meal tray set up  Positioning: Fully upright and midline during oral intake  Risk Management : Small bites/sips  Oral Care: Q4h         SLP Treatment Plan  Treatment Plan: Dysphagia Treatment, Patient/Family/Caregiver Training  SLP Frequency: 3x Per Week  Estimated Duration: Until Therapy Goals Met      Anticipated Discharge Needs  Discharge Recommendations: Recommend home health for continued speech therapy services   Therapy Recommendations Upon DC: Dysphagia Training, Patient / Family / Caregiver Education        Patient / Family Goals  Patient / Family Goal #1: \"I want something to eat\".  Short Term Goals  Short Term Goal # 1: Patient will " consume a soft bite sized diet with mildly thick liquids without overt s/s of aspiration and min cues.  Short Term Goal # 2: Patient will consume po trials of thin with cough + reswallow strategy 1:1 with SLP  without overt s/s of aspiration.      Diana Velez, SLP

## 2023-04-08 NOTE — PROGRESS NOTES
[facility] Medicine Daily Progress Note    Date of Service  4/8/2023    Chief Complaint  Juan Manuel Bass is a 72 y.o. male admitted 4/7/2023 with fall    Hospital Course  73 yo man with COPD, HTN, chronically on 3L O2 who fell from bed without LOC. Patient had just been discharged from [facility] the day before for hyponatremia needing hypertonic saline and fall that time as well. CTH had no new findings, prior scalp hematoma. CTA chest was neg for PE. He was admitted for acute COPD flare, PTOT eval    Interval Problem Update  Admitted after midnight, early this morning.  4L O2.  He says the shortness of breath is better.  No wheezing on exam  Na 125, increased to 128 on recheck.  I restarted his salt tablets.  Recheck urine sodium and osm  He says he missed stepped and that is why he fell.  PT OT eval is pending      I have discussed this patient's plan of care and discharge plan at IDT rounds today with Case Management, Nursing, Nursing leadership, and other members of the IDT team.    Consultants/Specialty  pulmonary    Code Status  Full Code    Disposition  Patient is not medically cleared for discharge.   Anticipate discharge to   vs SNF .  I have placed the appropriate orders for post-discharge needs.    Review of Systems  Review of Systems   Constitutional:  Negative for malaise/fatigue.   Respiratory:  Positive for cough, sputum production and shortness of breath (improved). Negative for wheezing.    Cardiovascular:  Negative for chest pain.   Gastrointestinal:  Negative for abdominal pain.   Neurological:  Negative for dizziness.      Physical Exam  Temp:  [36.2 °C (97.1 °F)-37.3 °C (99.2 °F)] 37.3 °C (99.2 °F)  Pulse:  [] 77  Resp:  [14-22] 20  BP: (130-146)/(63-76) 137/68  SpO2:  [86 %-100 %] 98 %    Physical Exam  Vitals and nursing note reviewed.   Constitutional:       General: He is not in acute distress.     Appearance: He is not toxic-appearing.   HENT:      Head:      Comments: Bruising, hematoma and  swelling to left side of face and forehead     Mouth/Throat:      Mouth: Mucous membranes are moist.   Eyes:      General:         Right eye: No discharge.         Left eye: No discharge.   Cardiovascular:      Rate and Rhythm: Normal rate and regular rhythm.   Pulmonary:      Effort: No respiratory distress.      Breath sounds: No wheezing or rales.   Abdominal:      Palpations: Abdomen is soft.      Tenderness: There is no abdominal tenderness. There is no guarding or rebound.   Musculoskeletal:         General: No swelling.      Cervical back: Neck supple.   Skin:     General: Skin is warm and dry.      Comments: Bruising abrasions to his arms   Neurological:      Mental Status: He is alert.      Comments: Oriented to self, place, month and year, was a few days off from the day       Fluids    Intake/Output Summary (Last 24 hours) at 4/8/2023 1347  Last data filed at 4/8/2023 1040  Gross per 24 hour   Intake 1602.6 ml   Output 325 ml   Net 1277.6 ml       Laboratory  Recent Labs     04/07/23 2236   WBC 11.8*   RBC 4.13*   HEMOGLOBIN 13.5*   HEMATOCRIT 38.9*   MCV 94.2   MCH 32.7   MCHC 34.7   RDW 44.6   PLATELETCT 264   MPV 9.3     Recent Labs     04/06/23  0415 04/07/23  0121 04/07/23  2236 04/08/23  0855   SODIUM 128* 131* 125* 128*   POTASSIUM 4.2 4.7 4.4  --    CHLORIDE 94* 95* 89*  --    CO2 25 27 25  --    GLUCOSE 123* 114* 119*  --    BUN 7* 7* 7*  --    CREATININE 0.49* 0.47* 0.51  --    CALCIUM 8.5 8.5 9.2  --                    Imaging  CT-HEAD W/O   Final Result         1. No acute intracranial abnormality. No evidence of acute intracranial hemorrhage or mass lesion.         2. Left forehead scalp hematoma, similar to prior               CT-CTA CHEST PULMONARY ARTERY W/ RECONS   Final Result         No significant interval change.      1. No CT evidence of pulmonary embolism.      2. Patchy bibasilar atelectasis.      DX-ELBOW-COMPLETE 3+ RIGHT   Final Result         No acute osseous abnormality.       DX-CHEST-PORTABLE (1 VIEW)   Final Result         Hazy left basilar opacity, similar to prior, likely atelectasis           Assessment/Plan  * Ground-level fall- (present on admission)  Assessment & Plan  Recurring fall  PT OT to eval    Aspiration pneumonitis (HCC)  Assessment & Plan  CT scan shows evidence of repeated aspiration episodes   Pulmonology consulted, likely related to alcohol use  Monitor for signs of developing pneumonia, no need for antibiotic at this time    Leukocytosis  Assessment & Plan  Likely reactive, minimal elevation  procalcitonin was low  Recheck CBC tomorrow    Dysphagia- (present on admission)  Assessment & Plan  Recently had fees  Speech therapy to centimeters and placed on a modified diet    Asthma- (present on admission)  Assessment & Plan  Seen by pulmonology and does not have COPD  Likely has asthma versus RAD  Complete steroids, nebs as his symptoms improve  Needs outpatient sleep study    Acute on chronic respiratory failure with hypoxia (HCC)- (present on admission)  Assessment & Plan  Patient is currently on 4 L of oxygen at baseline is 3 L  Continue treatment with steroids and nebs and wean to 3 L  Neurology recommends sleep study    Alcohol use disorder- (present on admission)  Assessment & Plan  Was barely hold 1 day from the hospital  Monitor for signs of alcohol withdrawal, none currently      Hyponatremia- (present on admission)  Assessment & Plan  Recently hospitalized for this and placed on salt tablets  Na 125, increased to 128 on recheck.  I restarted his salt tablets.  Recheck urine sodium and osm      Primary hypertension- (present on admission)  Assessment & Plan  BP stable, continue Norvasc         VTE prophylaxis: SCDs/TEDs    I have performed a physical exam and reviewed and updated ROS and Plan today (4/8/2023). In review of yesterday's note (4/7/2023), there are no changes except as documented above.

## 2023-04-08 NOTE — ED TRIAGE NOTES
"Chief Complaint   Patient presents with    Fall     PT HAD A FALL THIS EVENING AND STATES HE TRIPPED OVER SOMETHING ON THE FLOOR. PATIENT WAS JUST D/C FOR LOW SOIDUM LEVELS. PT WITH +ETOH. -THINNERS - HEAD STRIKE.     PT WITH AN OLD HEALING HEMATOMA TO LEFT EYE WITH DIFFERENT HEALING STAGES.            PT BROUGHT IN BY EMS FOR MGLF. SEE ABOVE.         Ht 1.702 m (5' 7\")   Wt 112 kg (247 lb)   BMI 38.69 kg/m²       "

## 2023-04-08 NOTE — ASSESSMENT & PLAN NOTE
CT scan shows evidence of repeated aspiration episodes   Pulmonology consulted, likely related to alcohol use  Monitor for signs of developing pneumonia, no need for antibiotic at this time  WBC has decreased, afebrile  Speech therapy has evaluated him

## 2023-04-08 NOTE — ED NOTES
Transport at bedside to take pt upstairs. Attempted to call report. RN busy and will call back. Pts bellongins with pt at the time of transport. Pt remains on O2. Pt AOx4, GCS15, VSS

## 2023-04-08 NOTE — ASSESSMENT & PLAN NOTE
Was barely home 1 day from the hospital  Monitor for signs of alcohol withdrawal, none currently

## 2023-04-08 NOTE — ED NOTES
Break RN note: Pt reports he urinated on himself. Pt cleaned, linens changed. Pt comfort measures provided. Call light in reach.

## 2023-04-08 NOTE — H&P
Hospital Medicine History & Physical Note    Date of Service  4/8/2023    Primary Care Physician  Peterson Amin M.D.    Consultants      Specialist Names:     Code Status  Full Code    Chief Complaint  Chief Complaint   Patient presents with    Fall     PT HAD A FALL THIS EVENING AND STATES HE TRIPPED OVER SOMETHING ON THE FLOOR. PATIENT WAS JUST D/C FOR LOW SOIDUM LEVELS. PT WITH +ETOH. -THINNERS - HEAD STRIKE.     PT WITH AN OLD HEALING HEMATOMA TO LEFT EYE WITH DIFFERENT HEALING STAGES.        History of Presenting Illness  Juan Manuel Bass is a 72 y.o. male who presented 4/7/2023 with past medical history of alcohol abuse, COPD, hypertension, chronic hypoxic respiratory failure on 3 L of oxygen who comes into the hospital after a ground-level fall.  The patient was resting in bed when he suddenly fell off the bed.  Patient did hit the right side of his head but did not lose consciousness.  Patient was recently discharged in the hospital yesterday after an ICU stay for hyponatremia and facial trauma..  It was believed the hyponatremia was due to beer proteinemia.  He was started on hypertonic saline.  At the time his sodium level was increased too quickly and he was started on DDAVP.  Patient went back home and started to drink alcohol again.    Chest x-ray interpreted by me found left-sided pleural effusion  EKG interpreted by me found normal sinus rhythm and frequent PVCs  Elbow x-ray did not show any abnormalities.  CT scan of the head found no acute intracranial abnormality.  Left forehead scalp hematoma similar  CTA of the chest did not show any evidence of pulmonary embolism.  He had patchy bibasilar atelectasis    I discussed the plan of care with patient.    Review of Systems  Review of Systems   Constitutional:  Positive for malaise/fatigue. Negative for chills, diaphoresis and fever.   HENT:  Negative for congestion, ear discharge, ear pain, hearing loss, nosebleeds, sinus pain, sore throat and tinnitus.     Eyes:  Negative for blurred vision, double vision, photophobia and pain.   Respiratory:  Positive for cough and shortness of breath. Negative for hemoptysis, sputum production, wheezing and stridor.    Cardiovascular:  Negative for chest pain, palpitations, orthopnea, claudication, leg swelling and PND.   Gastrointestinal:  Negative for abdominal pain, blood in stool, constipation, diarrhea, heartburn, melena, nausea and vomiting.   Genitourinary:  Negative for dysuria, flank pain, frequency, hematuria and urgency.   Musculoskeletal:  Positive for falls. Negative for back pain, joint pain, myalgias and neck pain.   Skin:  Negative for itching and rash.   Neurological:  Negative for dizziness, tingling, tremors, weakness and headaches.   Endo/Heme/Allergies:  Negative for environmental allergies and polydipsia. Does not bruise/bleed easily.   Psychiatric/Behavioral:  Negative for depression, hallucinations, substance abuse and suicidal ideas.      Past Medical History   has a past medical history of Chronic airway obstruction, not elsewhere classified, High anion gap metabolic acidosis (4/1/2023), and Hypertension.    Surgical History  Surgical history reviewed and nonpertinent    Family History  family history includes Heart Disease in his father; No Known Problems in his mother.   Family history reviewed with patient. There is no family history that is pertinent to the chief complaint.     Social History   reports that he quit smoking about 3 years ago. His smoking use included cigarettes. He has a 4.00 pack-year smoking history. He has never used smokeless tobacco. He reports current alcohol use of about 21.0 oz per week. He reports that he does not currently use drugs.    Allergies  Allergies   Allergen Reactions    Tetanus Toxoid Hives       Medications  Prior to Admission Medications   Prescriptions Last Dose Informant Patient Reported? Taking?   Fluticasone-Umeclidin-Vilant (TRELEGY ELLIPTA) 100-62.5-25  MCG/INH AEROSOL POWDER, BREATH ACTIVATED inhalation  Patient, Patient's Home Pharmacy No No   Sig: Inhale 2-3 Inhalation every morning.   albuterol 108 (90 Base) MCG/ACT Aero Soln inhalation aerosol  Patient, Patient's Home Pharmacy No No   Sig: Inhale 1-2 Puffs every four hours as needed for Shortness of Breath.   amLODIPine (NORVASC) 5 MG Tab  Patient, Patient's Home Pharmacy Yes No   Sig: Take 5 mg by mouth every day.   pantoprazole (PROTONIX) 40 MG Tablet Delayed Response  Patient, Patient's Home Pharmacy Yes No   Sig: Take 40 mg by mouth every day.   sodium chloride (SALT) 1 GM Tab   No No   Sig: Take 1 Tablet by mouth 3 times a day with meals.      Facility-Administered Medications: None       Physical Exam  Temp:  [36.2 °C (97.2 °F)-36.7 °C (98 °F)] 36.2 °C (97.2 °F)  Pulse:  [] 80  Resp:  [16-22] 16  BP: (130-170)/(70-78) 130/70  SpO2:  [86 %-100 %] 98 %  Blood Pressure : 130/70   Temperature: 36.2 °C (97.2 °F)   Pulse: 80   Respiration: 16   Pulse Oximetry: 98 %       Physical Exam  Vitals and nursing note reviewed.   Constitutional:       General: He is not in acute distress.     Appearance: Normal appearance. He is not ill-appearing, toxic-appearing or diaphoretic.   HENT:      Head: Normocephalic and atraumatic.      Comments: Left eyebrow laceration and hematoma     Nose: No congestion or rhinorrhea.      Mouth/Throat:      Pharynx: No posterior oropharyngeal erythema.   Eyes:      General: No scleral icterus.        Right eye: No discharge.   Cardiovascular:      Rate and Rhythm: Normal rate and regular rhythm.      Pulses: Normal pulses.      Heart sounds: Normal heart sounds. No murmur heard.    No friction rub. No gallop.   Pulmonary:      Effort: Pulmonary effort is normal. No respiratory distress.      Breath sounds: No stridor. Wheezing and rales present. No rhonchi.   Abdominal:      General: There is distension.      Tenderness: There is no abdominal tenderness.   Musculoskeletal:          General: No swelling, tenderness, deformity or signs of injury.      Cervical back: Normal range of motion.      Right lower leg: No edema.      Left lower leg: No edema.   Skin:     Capillary Refill: Capillary refill takes 2 to 3 seconds.      Coloration: Skin is not jaundiced or pale.      Findings: No bruising, erythema, lesion or rash.   Neurological:      General: No focal deficit present.      Mental Status: He is alert and oriented to person, place, and time.       Laboratory:  Recent Labs     04/07/23  2236   WBC 11.8*   RBC 4.13*   HEMOGLOBIN 13.5*   HEMATOCRIT 38.9*   MCV 94.2   MCH 32.7   MCHC 34.7   RDW 44.6   PLATELETCT 264   MPV 9.3     Recent Labs     04/06/23 0415 04/07/23  0121 04/07/23  2236   SODIUM 128* 131* 125*   POTASSIUM 4.2 4.7 4.4   CHLORIDE 94* 95* 89*   CO2 25 27 25   GLUCOSE 123* 114* 119*   BUN 7* 7* 7*   CREATININE 0.49* 0.47* 0.51   CALCIUM 8.5 8.5 9.2     Recent Labs     04/06/23 0415 04/07/23  0121 04/07/23  2236   ALTSGPT  --  16 19   ASTSGOT  --  15 17   ALKPHOSPHAT  --  68 86   TBILIRUBIN  --  0.5 0.8   GLUCOSE 123* 114* 119*         No results for input(s): NTPROBNP in the last 72 hours.      Recent Labs     04/07/23  2236   TROPONINT 19       Imaging:  CT-HEAD W/O   Final Result         1. No acute intracranial abnormality. No evidence of acute intracranial hemorrhage or mass lesion.         2. Left forehead scalp hematoma, similar to prior               CT-CTA CHEST PULMONARY ARTERY W/ RECONS   Final Result         No significant interval change.      1. No CT evidence of pulmonary embolism.      2. Patchy bibasilar atelectasis.      DX-ELBOW-COMPLETE 3+ RIGHT   Final Result         No acute osseous abnormality.      DX-CHEST-PORTABLE (1 VIEW)   Final Result         Hazy left basilar opacity, similar to prior, likely atelectasis          X-Ray:  I have personally reviewed the images and compared with prior images.  EKG:  I have personally reviewed the images and compared  with prior images.    Assessment/Plan:  Justification for Admission Status  I anticipate this patient will require at least two midnights for appropriate medical management, necessitating inpatient admission because ground-level fall    Patient will need a Med/Surg bed on MEDICAL service .  The need is secondary to ground-level fall.    * Ground-level fall- (present on admission)  Assessment & Plan  PT OT eval  Possibly needs SNF placement    COPD with acute exacerbation (HCC)- (present on admission)  Assessment & Plan  Found to be wheezing on examination  DuoNeb inhalers every 4 hours  IV Solu-Medrol one-time dose given in ER and will start oral steroids tomorrow  Continue home inhalers    Acute on chronic respiratory failure with hypoxia (HCC)- (present on admission)  Assessment & Plan  Patient is currently on 5 L of oxygen at baseline is 3 L    Aspiration pneumonitis (HCC)  Assessment & Plan  CT scan shows evidence of repeated aspiration episodes     Leukocytosis  Assessment & Plan  Likely reactive  Rule out pneumonia    Dysphagia- (present on admission)  Assessment & Plan  Patient was evaluated by SLP who cleared him for clear liquid diet  I will keep n.p.o. for now    Alcohol use disorder- (present on admission)  Assessment & Plan  Patient will be admitted to the telemetry unit with close cardiac monitoring on CIWA protocol  Started on rally bag, multivitamin, thiamine and folate  Replete electrolytes  Patient has been counseled on alcohol cessation and will be provided with information for outpatient detox facilities      Hyponatremia- (present on admission)  Assessment & Plan  Hypovolemic hyponatremia  IV fluid hydration with normal saline  Daily correction by more than 6 to 8 mEq/L should be avoided to minimize the risk of central demyelinating lesions with neurologic dysfunction       Primary hypertension- (present on admission)  Assessment & Plan  Continue Norvasc  IV  medications have been  ordered        VTE prophylaxis: SCDs/TEDs

## 2023-04-08 NOTE — CARE PLAN
The patient is Stable - Low risk of patient condition declining or worsening    Shift Goals  Clinical Goals: CIWA, safety  Patient Goals: rest  Family Goals: ALEKSANDAR    Progress made toward(s) clinical / shift goals:  More alert. Clearing throat with strong cough.    Patient is not progressing towards the following goals: Patient is unrealistic about safety at home. Patient's caregiver does not want to continue assisting him with the amount of help he needs. She is worried about him in the environment of his trailer.       Problem: Lifestyle Changes  Goal: Patient's ability to identify lifestyle changes and available resources to help reduce recurrence of condition will improve  Outcome: Not Progressing

## 2023-04-08 NOTE — ED NOTES
Irrigated patients right arm with 400mls of NS. Placed non-adhering adaptic dressings on patients wounds and wrapped the arm. Patient left in a position of comfort.

## 2023-04-08 NOTE — ED NOTES
Pt unstable on feet during ambulation. Walker used to ambulate pt. O2 stayed above 90% while walking

## 2023-04-08 NOTE — ED NOTES
Placed an 18Ga. I.V. in patients left AC. Obtained labs and left patient in a position of comfort. Provided patient with 3 warmed blankets.

## 2023-04-08 NOTE — RESPIRATORY CARE
COPD EDUCATION by COPD CLINICAL EDUCATOR  4/8/2023  at  3:22 PM by Cezar Holliday RRT     Patient interviewed by COPD education team.  Patient declines COPD program at this time.  Bedside PFT done, per IP Pulmonary note - looks more restrictive.  Reviewed RT medications and flutter, patient refuses to use spacer.  Action plan updated.    COPD Screen  COPD Risk Screening  Do you have a history of COPD?: Yes  Do you have a Pulmonologist?: No  COPD Population Screener  During the past 4 weeks, how much did you feel short of breath?: Some of the time  Do you ever cough up any mucus or phlegm?: Yes, a few days a week or month  In the past 12 months, you do less than you used to because of your breathing problems: Disagree/unsure  Have you smoked at least 100 cigarettes in your entire life?: Yes  How old are you?: 60+  COPD Screening Score: 6  COPD Coordinator Recommended: Yes    COPD Assessment  COPD Clinical Specialists ONLY  COPD Education Initiated: Yes--Short Intervention (Patient declined program at this time. Declines materials as well. Reports he has been a nonsmoker for several years.  Bedside PFT screening attempted but unable to capture acceptable results as per ATS guidelines.)  DME Company: Preferred  DME Equipment Type: 2-3 lpm oxygen  Physician Name: Peterson Amin M.D.  Pulmonologist Name: [facility]  Referrals Initiated: Yes  Pulmonary Rehab: Yes (per admit team)  Smoking Cessation: No (reports quitting 3 yrs ago)  Hospice: N/A  Home Health Care:  (D)  [facility]: N/A  [facility]: N/A  [facility]  Private In-Home Care Agency: N/A  Is this a COPD exacerbation patient?: Yes (per COPD Exacerbation order set use; IP Pulmonary notified.)  $ Demo/Eval of SVN's, MDI's and Aerosols: Yes (Reviewed RT medications, patient refuses spacer use)  $ Bedside PFT Screen: Yes (FEV1: 1.54 l  VC: 2.01 l    PFT Results    4/8/23 PFT FEV1 57%; FEV1/FVC 76%.    Meds to Beds    "     MY COPD ACTION PLAN     It is recommended that patients and physicians /healthcare providers complete this action plan together. This plan should be discussed at each physician visit and updated as needed.    The green, yellow and red zones show groups of symptoms of COPD. This list of symptoms is not comprehensive, and you may experience other symptoms. In the \"Actions\" column, your healthcare provider has recommended actions for you to take based on your symptoms.    Patient Name: Juan Manuel Bass   YOB: 1950   Last Updated on: 4/8/2023  3:22 PM   Green Zone:  I am doing well today Actions   •  Usual activitiy and exercise level •  Take daily medications   •  Usual amounts of cough and phlegm/mucus •  Use oxygen as prescribed   •  Sleep well at night •  Continue regular exercise/diet plan   •  Appetite is good •  At all times avoid cigarette smoke, inhaled irritants     Daily Medications (these medications are taken every day):   Fluticasone and Umeclidinium and Vilanterol (Trelogy) 1 Puff Once daily     Additional Information:  Please remember to RINSE MOUTH after taking this medicine    Yellow Zone:  I am having a bad day or a COPD flare Actions   •  More breathless than usual •  Continue daily medications   •  I have less energy for my daily activities •  Use quick relief inhaler as ordered   •  Increased or thicker phlegm/mucus •  Use oxygen as prescribed   •  Using quick relief inhaler/nebulizer more often •  Get plenty of rest   •  Swelling of ankles more than usual •  Use pursed lip breathing   •  More coughing than usual •  At all times avoid cigarette smoke, inhaled irritants   •  I feel like I have a \"chest cold\"   •  Poor sleep and my symptoms woke me up   •  My appetite is not good   •  My medicine is not helping    •  Call provider immediately if symptoms don’t improve     Continue daily medications, add rescue medications:   Albuterol 2 Puffs         Medications to be used during a " flare up, (as Discussed with Provider):           Additional Information:  Take as directed by your Doctor and use the spacer    Red Zone:  I need urgent medical care Actions   •  Severe shortness of breath even at rest •  Call 911 or seek medical care immediately   •  Not able to do any activity because of breathing    •  Fever or shaking chills    •  Feeling confused or very drowsy     •  Chest pains    •  Coughing up blood

## 2023-04-08 NOTE — ASSESSMENT & PLAN NOTE
Recently hospitalized for this and placed on salt tablets  continue salt tablets  Na 132, improving and stable

## 2023-04-08 NOTE — CONSULTS
Pulmonary Consultation    Date of consult: 4/8/2023    Referring Physician  Stanley Baron M.D.    Reason for Consultation  COPD     History of Presenting Illness  72 y.o. male who presented 4/7/2023 admitted with near syncopal event  He was recently admitted s/p fall with facial injuries s/p GLF with severe low sodium thought to be due to beer potomanea  He had + etoh and has etoh dependence  Chronically on 3 l of oxygen at home   He was wheezing on his exam and thought to be in exacerbation for copd and started on steroids  Smoking hx is limited with less than 20 pk year  quit 3 years ago     CT scan with scarring in the mukund and LLL small effusion  No emphysema       PFTS done at bedside today shows  FEV1 1.54 l (57%)  FVC 2.01 l (58%)  Ratio 76%  This is more consistent with restriction          Code Status  Full Code    Review of Systems  Review of Systems   Constitutional:  Positive for malaise/fatigue.   HENT:          Face discomfort from the trauma   Respiratory:  Positive for cough, sputum production, shortness of breath and wheezing.    Cardiovascular:  Positive for leg swelling.   Neurological:  Positive for weakness.     Past Medical History   has a past medical history of Chronic airway obstruction, not elsewhere classified, High anion gap metabolic acidosis (4/1/2023), and Hypertension.    Surgical History   has no past surgical history on file.    Family History  family history includes Heart Disease in his father; No Known Problems in his mother.    Social History   reports that he quit smoking about 3 years ago. His smoking use included cigarettes. He has a 4.00 pack-year smoking history. He has never used smokeless tobacco. He reports current alcohol use of about 21.0 oz per week. He reports that he does not currently use drugs.    Medications  Home Medications       Reviewed by Castillo Mccain (Pharmacy Tech) on 04/08/23 at 0802  Med List Status: Complete     Medication Last Dose Status    albuterol 108 (90 Base) MCG/ACT Aero Soln inhalation aerosol UNK Active   amLODIPine (NORVASC) 5 MG Tab 4/7/2023 Active   Fluticasone-Umeclidin-Vilant (TRELEGY ELLIPTA) 100-62.5-25 MCG/INH AEROSOL POWDER, BREATH ACTIVATED inhalation UNK Active   pantoprazole (PROTONIX) 40 MG Tablet Delayed Response UNK Active   sodium chloride (SALT) 1 GM Tab 4/7/2023 Active                  Current Facility-Administered Medications   Medication Dose Route Frequency Provider Last Rate Last Admin    labetalol (NORMODYNE/TRANDATE) injection 10 mg  10 mg Intravenous Q4HRS PRN Fritz Irving M.D.        ondansetron (ZOFRAN) syringe/vial injection 4 mg  4 mg Intravenous Q4HRS PRN Fritz Irving M.D.        ondansetron (ZOFRAN ODT) dispertab 4 mg  4 mg Oral Q4HRS PRN Fritz Irving M.D.        LORazepam (ATIVAN) tablet 0.5 mg  0.5 mg Oral Q4HRS PRN Fritz Irving M.D.        LORazepam (ATIVAN) tablet 1 mg  1 mg Oral Q4HRS PRN Fritz Irving M.D.        Or    LORazepam (ATIVAN) injection 0.5 mg  0.5 mg Intravenous Q4HRS PRN Fritz Irving M.D.        LORazepam (ATIVAN) tablet 2 mg  2 mg Oral Q2HRS PRN Fritz Irving M.D.        Or    LORazepam (ATIVAN) injection 1 mg  1 mg Intravenous Q2HRS PRN Fritz Irving M.D.        LORazepam (ATIVAN) tablet 3 mg  3 mg Oral Q HOUR PRN Fritz Irving M.D.        Or    LORazepam (ATIVAN) injection 1.5 mg  1.5 mg Intravenous Q HOUR PRN Fritz Irving M.D.        LORazepam (ATIVAN) tablet 4 mg  4 mg Oral Q15 MIN PRN Firtz Irving M.D.        Or    LORazepam (ATIVAN) injection 2 mg  2 mg Intravenous Q15 MIN PRN Fritz Irving M.D.        thiamine (Vitamin B-1) tablet 100 mg  100 mg Oral DAILY Fritz Irving M.D.        And    multivitamin tablet 1 Tablet  1 Tablet Oral DAILY Fritz Irving M.D.        And    folic acid (FOLVITE) tablet 1 mg  1 mg Oral DAILY Fritz Irving M.D.        albuterol inhaler 1-2 Puff  1-2 Puff Inhalation Q4HRS PRN Fritz Irving M.D.        amLODIPine (NORVASC) tablet 5 mg  5 mg Oral DAILY  Fritz Irving M.D.        Respiratory Therapy Consult   Nebulization Continuous RT Fritz Irving M.D.        ipratropium-albuterol (DUONEB) nebulizer solution  3 mL Nebulization Q4HRS (RT) Fritz Irving M.D.   3 mL at 04/08/23 1208    ipratropium-albuterol (DUONEB) nebulizer solution  3 mL Nebulization Q2HRS PRN (RT) Fritz Irving M.D.        predniSONE (DELTASONE) tablet 40 mg  40 mg Oral DAILY Fritz Irving M.D.   40 mg at 04/08/23 1134    omeprazole (PRILOSEC) capsule 20 mg  20 mg Oral DAILY Fritz Irving M.D.   20 mg at 04/08/23 0823    umeclidinium-vilanterol (Anoro Ellipta) inhaler 1 Puff  1 Puff Inhalation QDAILY (RT) Fritz Irving M.D.   1 Puff at 04/08/23 0823    fluticasone (FLOVENT HFA) 44 MCG/ACT inhaler 88 mcg  2 Puff Inhalation QDAILY (RT) Fritz Irving M.D.   88 mcg at 04/08/23 0823    sodium chloride (SALT) tablet 1 g  1 g Oral TID WITH MEALS Stanley Baron M.D.   1 g at 04/08/23 1134       Allergies  Allergies   Allergen Reactions    Tetanus Toxoid Hives       Vital Signs last 24 hours  Temp:  [36.2 °C (97.1 °F)-37.3 °C (99.2 °F)] 37.3 °C (99.2 °F)  Pulse:  [] 77  Resp:  [14-22] 20  BP: (130-146)/(63-76) 137/68  SpO2:  [86 %-100 %] 98 %    Physical Exam  Physical Exam  Constitutional:       Appearance: He is obese.   HENT:      Head:      Comments: Hematoma above left eye  Multiple areas of eccymosis ovre left side face and right ear has scabs  Pulmonary:      Breath sounds: Rhonchi present.   Neurological:      Mental Status: He is alert.       Fluids    Intake/Output Summary (Last 24 hours) at 4/8/2023 1336  Last data filed at 4/8/2023 1040  Gross per 24 hour   Intake 1602.6 ml   Output 325 ml   Net 1277.6 ml       Laboratory  Recent Results (from the past 48 hour(s))   Comp Metabolic Panel    Collection Time: 04/07/23  1:21 AM   Result Value Ref Range    Sodium 131 (L) 135 - 145 mmol/L    Potassium 4.7 3.6 - 5.5 mmol/L    Chloride 95 (L) 96 - 112 mmol/L    Co2 27 20 - 33 mmol/L    Anion Gap  9.0 7.0 - 16.0    Glucose 114 (H) 65 - 99 mg/dL    Bun 7 (L) 8 - 22 mg/dL    Creatinine 0.47 (L) 0.50 - 1.40 mg/dL    Calcium 8.5 8.5 - 10.5 mg/dL    AST(SGOT) 15 12 - 45 U/L    ALT(SGPT) 16 2 - 50 U/L    Alkaline Phosphatase 68 30 - 99 U/L    Total Bilirubin 0.5 0.1 - 1.5 mg/dL    Albumin 3.2 3.2 - 4.9 g/dL    Total Protein 6.3 6.0 - 8.2 g/dL    Globulin 3.1 1.9 - 3.5 g/dL    A-G Ratio 1.0 g/dL   CORRECTED CALCIUM    Collection Time: 04/07/23  1:21 AM   Result Value Ref Range    Correct Calcium 9.1 8.5 - 10.5 mg/dL   ESTIMATED GFR    Collection Time: 04/07/23  1:21 AM   Result Value Ref Range    GFR (CKD-EPI) 110 >60 mL/min/1.73 m 2   D-DIMER    Collection Time: 04/07/23 10:26 PM   Result Value Ref Range    D-Dimer 1.49 (H) 0.00 - 0.50 ug/mL (FEU)   CBC WITH DIFFERENTIAL    Collection Time: 04/07/23 10:36 PM   Result Value Ref Range    WBC 11.8 (H) 4.8 - 10.8 K/uL    RBC 4.13 (L) 4.70 - 6.10 M/uL    Hemoglobin 13.5 (L) 14.0 - 18.0 g/dL    Hematocrit 38.9 (L) 42.0 - 52.0 %    MCV 94.2 81.4 - 97.8 fL    MCH 32.7 27.0 - 33.0 pg    MCHC 34.7 33.7 - 35.3 g/dL    RDW 44.6 35.9 - 50.0 fL    Platelet Count 264 164 - 446 K/uL    MPV 9.3 9.0 - 12.9 fL    Neutrophils-Polys 82.40 (H) 44.00 - 72.00 %    Lymphocytes 6.20 (L) 22.00 - 41.00 %    Monocytes 9.30 0.00 - 13.40 %    Eosinophils 1.60 0.00 - 6.90 %    Basophils 0.10 0.00 - 1.80 %    Immature Granulocytes 0.40 0.00 - 0.90 %    Nucleated RBC 0.00 /100 WBC    Neutrophils (Absolute) 9.73 (H) 1.82 - 7.42 K/uL    Lymphs (Absolute) 0.73 (L) 1.00 - 4.80 K/uL    Monos (Absolute) 1.10 (H) 0.00 - 0.85 K/uL    Eos (Absolute) 0.19 0.00 - 0.51 K/uL    Baso (Absolute) 0.01 0.00 - 0.12 K/uL    Immature Granulocytes (abs) 0.05 0.00 - 0.11 K/uL    NRBC (Absolute) 0.00 K/uL   COMP METABOLIC PANEL    Collection Time: 04/07/23 10:36 PM   Result Value Ref Range    Sodium 125 (L) 135 - 145 mmol/L    Potassium 4.4 3.6 - 5.5 mmol/L    Chloride 89 (L) 96 - 112 mmol/L    Co2 25 20 - 33 mmol/L     Anion Gap 11.0 7.0 - 16.0    Glucose 119 (H) 65 - 99 mg/dL    Bun 7 (L) 8 - 22 mg/dL    Creatinine 0.51 0.50 - 1.40 mg/dL    Calcium 9.2 8.5 - 10.5 mg/dL    AST(SGOT) 17 12 - 45 U/L    ALT(SGPT) 19 2 - 50 U/L    Alkaline Phosphatase 86 30 - 99 U/L    Total Bilirubin 0.8 0.1 - 1.5 mg/dL    Albumin 4.0 3.2 - 4.9 g/dL    Total Protein 7.8 6.0 - 8.2 g/dL    Globulin 3.8 (H) 1.9 - 3.5 g/dL    A-G Ratio 1.1 g/dL   TROPONIN    Collection Time: 23 10:36 PM   Result Value Ref Range    Troponin T 19 6 - 19 ng/L   CORRECTED CALCIUM    Collection Time: 23 10:36 PM   Result Value Ref Range    Correct Calcium 9.2 8.5 - 10.5 mg/dL   ESTIMATED GFR    Collection Time: 23 10:36 PM   Result Value Ref Range    GFR (CKD-EPI) 107 >60 mL/min/1.73 m 2   PROCALCITONIN    Collection Time: 23 10:36 PM   Result Value Ref Range    Procalcitonin <0.05 <0.25 ng/mL   MAGNESIUM    Collection Time: 23 10:36 PM   Result Value Ref Range    Magnesium 1.9 1.5 - 2.5 mg/dL   CREATINE KINASE    Collection Time: 23 10:36 PM   Result Value Ref Range    CPK Total 89 0 - 154 U/L   EKG    Collection Time: 23 11:11 PM   Result Value Ref Range    Report       [facility] Emergency Dept.    Test Date:  2023  Pt Name:    BRIDGETTE VARNER                  Department: ER  MRN:        [id number]                      Room:       [facility]  Gender:     Male                         Technician:  :        1950                   Requested By:BEATRICE MORA  Order #:    [id number]                    Reading MD: Addi Madrigal MD    Measurements  Intervals                                Axis  Rate:       82                           P:          -63  WY:         178                          QRS:        21  QRSD:       103                          T:          30  QT:         359  QTc:        420    Interpretive Statements  Sinus rhythm at a rate of 82, PAC is noted, in general there is somewhat low  voltage throughout,  no acute ST segment elevations or depressions, normal  intervals and axis, unchanged from prior EKG dated 4/1/2023  Electronically Signed On 4-8-2023 3:02:02 PDT by Addi Madrigal MD     SODIUM SERUM (NA)    Collection Time: 04/08/23  8:55 AM   Result Value Ref Range    Sodium 128 (L) 135 - 145 mmol/L       Imaging  As above    Assessment/Plan  COPD with acute exacerbation (HCC)- (present on admission)  Assessment & Plan  Do not think pt has COPD or at least no evidence for copd  He may have underlying asthma vs RAD  He is etoh dependant and based on the scars on his lungs likely aspirates during his binges  He also is morbidly obese and likely has untreated MICHOACANO and obesity hypoventilation syndrom and likely needs a sleep study  He does need outpt follow up which pt does not normally keep  Based on his bedside PFTs, CT scan and smoking hx unlikely COPD

## 2023-04-09 LAB
ALBUMIN SERPL BCP-MCNC: 3.6 G/DL (ref 3.2–4.9)
ALBUMIN/GLOB SERPL: 0.9 G/DL
ALP SERPL-CCNC: 77 U/L (ref 30–99)
ALT SERPL-CCNC: 32 U/L (ref 2–50)
ANION GAP SERPL CALC-SCNC: 13 MMOL/L (ref 7–16)
AST SERPL-CCNC: 24 U/L (ref 12–45)
BASOPHILS # BLD AUTO: 0.1 % (ref 0–1.8)
BASOPHILS # BLD: 0.01 K/UL (ref 0–0.12)
BILIRUB SERPL-MCNC: 0.4 MG/DL (ref 0.1–1.5)
BUN SERPL-MCNC: 8 MG/DL (ref 8–22)
CALCIUM ALBUM COR SERPL-MCNC: 9.6 MG/DL (ref 8.5–10.5)
CALCIUM SERPL-MCNC: 9.3 MG/DL (ref 8.5–10.5)
CHLORIDE SERPL-SCNC: 94 MMOL/L (ref 96–112)
CO2 SERPL-SCNC: 24 MMOL/L (ref 20–33)
CREAT SERPL-MCNC: 0.49 MG/DL (ref 0.5–1.4)
EOSINOPHIL # BLD AUTO: 0 K/UL (ref 0–0.51)
EOSINOPHIL NFR BLD: 0 % (ref 0–6.9)
ERYTHROCYTE [DISTWIDTH] IN BLOOD BY AUTOMATED COUNT: 45.3 FL (ref 35.9–50)
GFR SERPLBLD CREATININE-BSD FMLA CKD-EPI: 109 ML/MIN/1.73 M 2
GLOBULIN SER CALC-MCNC: 3.8 G/DL (ref 1.9–3.5)
GLUCOSE SERPL-MCNC: 208 MG/DL (ref 65–99)
HCT VFR BLD AUTO: 36.2 % (ref 42–52)
HGB BLD-MCNC: 12.3 G/DL (ref 14–18)
IMM GRANULOCYTES # BLD AUTO: 0.13 K/UL (ref 0–0.11)
IMM GRANULOCYTES NFR BLD AUTO: 1.2 % (ref 0–0.9)
LYMPHOCYTES # BLD AUTO: 0.3 K/UL (ref 1–4.8)
LYMPHOCYTES NFR BLD: 2.7 % (ref 22–41)
MAGNESIUM SERPL-MCNC: 2 MG/DL (ref 1.5–2.5)
MCH RBC QN AUTO: 32.5 PG (ref 27–33)
MCHC RBC AUTO-ENTMCNC: 34 G/DL (ref 33.7–35.3)
MCV RBC AUTO: 95.8 FL (ref 81.4–97.8)
MONOCYTES # BLD AUTO: 0.37 K/UL (ref 0–0.85)
MONOCYTES NFR BLD AUTO: 3.4 % (ref 0–13.4)
NEUTROPHILS # BLD AUTO: 10.12 K/UL (ref 1.82–7.42)
NEUTROPHILS NFR BLD: 92.6 % (ref 44–72)
NRBC # BLD AUTO: 0 K/UL
NRBC BLD-RTO: 0 /100 WBC
PHOSPHATE SERPL-MCNC: 3 MG/DL (ref 2.5–4.5)
PLATELET # BLD AUTO: 303 K/UL (ref 164–446)
PMV BLD AUTO: 9 FL (ref 9–12.9)
POTASSIUM SERPL-SCNC: 4.7 MMOL/L (ref 3.6–5.5)
PROT SERPL-MCNC: 7.4 G/DL (ref 6–8.2)
RBC # BLD AUTO: 3.78 M/UL (ref 4.7–6.1)
SODIUM SERPL-SCNC: 131 MMOL/L (ref 135–145)
WBC # BLD AUTO: 10.9 K/UL (ref 4.8–10.8)

## 2023-04-09 PROCEDURE — 80053 COMPREHEN METABOLIC PANEL: CPT

## 2023-04-09 PROCEDURE — 700101 HCHG RX REV CODE 250: Performed by: HOSPITALIST

## 2023-04-09 PROCEDURE — 85025 COMPLETE CBC W/AUTO DIFF WBC: CPT

## 2023-04-09 PROCEDURE — 94669 MECHANICAL CHEST WALL OSCILL: CPT

## 2023-04-09 PROCEDURE — 94760 N-INVAS EAR/PLS OXIMETRY 1: CPT

## 2023-04-09 PROCEDURE — 700102 HCHG RX REV CODE 250 W/ 637 OVERRIDE(OP): Performed by: INTERNAL MEDICINE

## 2023-04-09 PROCEDURE — 700111 HCHG RX REV CODE 636 W/ 250 OVERRIDE (IP): Performed by: HOSPITALIST

## 2023-04-09 PROCEDURE — A9270 NON-COVERED ITEM OR SERVICE: HCPCS | Performed by: INTERNAL MEDICINE

## 2023-04-09 PROCEDURE — 770006 HCHG ROOM/CARE - MED/SURG/GYN SEMI*

## 2023-04-09 PROCEDURE — A9270 NON-COVERED ITEM OR SERVICE: HCPCS | Performed by: HOSPITALIST

## 2023-04-09 PROCEDURE — 700102 HCHG RX REV CODE 250 W/ 637 OVERRIDE(OP): Performed by: HOSPITALIST

## 2023-04-09 PROCEDURE — 84100 ASSAY OF PHOSPHORUS: CPT

## 2023-04-09 PROCEDURE — 99232 SBSQ HOSP IP/OBS MODERATE 35: CPT | Performed by: INTERNAL MEDICINE

## 2023-04-09 PROCEDURE — 83735 ASSAY OF MAGNESIUM: CPT

## 2023-04-09 PROCEDURE — 94640 AIRWAY INHALATION TREATMENT: CPT

## 2023-04-09 PROCEDURE — 36415 COLL VENOUS BLD VENIPUNCTURE: CPT

## 2023-04-09 RX ORDER — ACETAMINOPHEN 325 MG/1
650 TABLET ORAL EVERY 4 HOURS PRN
Status: DISCONTINUED | OUTPATIENT
Start: 2023-04-09 | End: 2023-04-12 | Stop reason: HOSPADM

## 2023-04-09 RX ORDER — IPRATROPIUM BROMIDE AND ALBUTEROL SULFATE 2.5; .5 MG/3ML; MG/3ML
3 SOLUTION RESPIRATORY (INHALATION)
Status: DISCONTINUED | OUTPATIENT
Start: 2023-04-10 | End: 2023-04-12 | Stop reason: HOSPADM

## 2023-04-09 RX ORDER — BACITRACIN ZINC AND POLYMYXIN B SULFATE 500; 1000 [USP'U]/G; [USP'U]/G
OINTMENT TOPICAL 2 TIMES DAILY
Status: DISCONTINUED | OUTPATIENT
Start: 2023-04-09 | End: 2023-04-12 | Stop reason: HOSPADM

## 2023-04-09 RX ADMIN — LORAZEPAM 0.5 MG: 1 TABLET ORAL at 08:44

## 2023-04-09 RX ADMIN — IPRATROPIUM BROMIDE AND ALBUTEROL SULFATE 3 ML: 2.5; .5 SOLUTION RESPIRATORY (INHALATION) at 07:18

## 2023-04-09 RX ADMIN — SODIUM CHLORIDE 1 G: 1 TABLET ORAL at 08:44

## 2023-04-09 RX ADMIN — IPRATROPIUM BROMIDE AND ALBUTEROL SULFATE 3 ML: 2.5; .5 SOLUTION RESPIRATORY (INHALATION) at 16:06

## 2023-04-09 RX ADMIN — IPRATROPIUM BROMIDE AND ALBUTEROL SULFATE 3 ML: 2.5; .5 SOLUTION RESPIRATORY (INHALATION) at 10:52

## 2023-04-09 RX ADMIN — SODIUM CHLORIDE 1 G: 1 TABLET ORAL at 16:58

## 2023-04-09 RX ADMIN — OMEPRAZOLE 20 MG: 20 CAPSULE, DELAYED RELEASE ORAL at 05:48

## 2023-04-09 RX ADMIN — THERA TABS 1 TABLET: TAB at 05:48

## 2023-04-09 RX ADMIN — LORAZEPAM 0.5 MG: 1 TABLET ORAL at 16:58

## 2023-04-09 RX ADMIN — PREDNISONE 40 MG: 20 TABLET ORAL at 05:47

## 2023-04-09 RX ADMIN — Medication 100 MG: at 05:49

## 2023-04-09 RX ADMIN — LORAZEPAM 1 MG: 1 TABLET ORAL at 12:45

## 2023-04-09 RX ADMIN — FLUTICASONE PROPIONATE 88 MCG: 44 AEROSOL, METERED RESPIRATORY (INHALATION) at 05:44

## 2023-04-09 RX ADMIN — FOLIC ACID 1 MG: 1 TABLET ORAL at 05:47

## 2023-04-09 RX ADMIN — AMLODIPINE BESYLATE 5 MG: 5 TABLET ORAL at 05:48

## 2023-04-09 RX ADMIN — IPRATROPIUM BROMIDE AND ALBUTEROL SULFATE 3 ML: 2.5; .5 SOLUTION RESPIRATORY (INHALATION) at 19:09

## 2023-04-09 RX ADMIN — UMECLIDINIUM BROMIDE AND VILANTEROL TRIFENATATE 1 PUFF: 62.5; 25 POWDER RESPIRATORY (INHALATION) at 05:46

## 2023-04-09 RX ADMIN — ACETAMINOPHEN 650 MG: 325 TABLET, FILM COATED ORAL at 11:39

## 2023-04-09 RX ADMIN — SODIUM CHLORIDE 1 G: 1 TABLET ORAL at 11:39

## 2023-04-09 ASSESSMENT — LIFESTYLE VARIABLES
AGITATION: SOMEWHAT MORE THAN NORMAL ACTIVITY
PAROXYSMAL SWEATS: BARELY PERCEPTIBLE SWEATING. PALMS MOIST
NAUSEA AND VOMITING: NO NAUSEA AND NO VOMITING
ANXIETY: MILDLY ANXIOUS
PAROXYSMAL SWEATS: NO SWEAT VISIBLE
TREMOR: TREMOR NOT VISIBLE BUT CAN BE FELT, FINGERTIP TO FINGERTIP
ANXIETY: MILDLY ANXIOUS
AVERAGE NUMBER OF DAYS PER WEEK YOU HAVE A DRINK CONTAINING ALCOHOL: 7
HOW MANY TIMES IN THE PAST YEAR HAVE YOU HAD 5 OR MORE DRINKS IN A DAY: 5
AGITATION: SOMEWHAT MORE THAN NORMAL ACTIVITY
VISUAL DISTURBANCES: NOT PRESENT
ON A TYPICAL DAY WHEN YOU DRINK ALCOHOL HOW MANY DRINKS DO YOU HAVE: 4
TOTAL SCORE: 0
NAUSEA AND VOMITING: NO NAUSEA AND NO VOMITING
AVERAGE NUMBER OF DAYS PER WEEK YOU HAVE A DRINK CONTAINING ALCOHOL: 7
TREMOR: TREMOR NOT VISIBLE BUT CAN BE FELT, FINGERTIP TO FINGERTIP
HEADACHE, FULLNESS IN HEAD: NOT PRESENT
TOTAL SCORE: 0
CONSUMPTION TOTAL: POSITIVE
HAVE PEOPLE ANNOYED YOU BY CRITICIZING YOUR DRINKING: NO
NAUSEA AND VOMITING: NO NAUSEA AND NO VOMITING
EVER HAD A DRINK FIRST THING IN THE MORNING TO STEADY YOUR NERVES TO GET RID OF A HANGOVER: NO
HEADACHE, FULLNESS IN HEAD: NOT PRESENT
TOTAL SCORE: 8
DOES PATIENT WANT TO STOP DRINKING: NO
NAUSEA AND VOMITING: NO NAUSEA AND NO VOMITING
VISUAL DISTURBANCES: NOT PRESENT
ORIENTATION AND CLOUDING OF SENSORIUM: CANNOT DO SERIAL ADDITIONS OR IS UNCERTAIN ABOUT DATE
ORIENTATION AND CLOUDING OF SENSORIUM: ORIENTED AND CAN DO SERIAL ADDITIONS
AGITATION: NORMAL ACTIVITY
PAROXYSMAL SWEATS: *
TREMOR: TREMOR NOT VISIBLE BUT CAN BE FELT, FINGERTIP TO FINGERTIP
AUDITORY DISTURBANCES: NOT PRESENT
HEADACHE, FULLNESS IN HEAD: VERY MILD
HAVE YOU EVER FELT YOU SHOULD CUT DOWN ON YOUR DRINKING: NO
VISUAL DISTURBANCES: NOT PRESENT
TOTAL SCORE: 1
HAVE PEOPLE ANNOYED YOU BY CRITICIZING YOUR DRINKING: NO
NAUSEA AND VOMITING: NO NAUSEA AND NO VOMITING
ORIENTATION AND CLOUDING OF SENSORIUM: ORIENTED AND CAN DO SERIAL ADDITIONS
DO YOU DRINK ALCOHOL: YES
HEADACHE, FULLNESS IN HEAD: NOT PRESENT
ORIENTATION AND CLOUDING OF SENSORIUM: ORIENTED AND CAN DO SERIAL ADDITIONS
TOTAL SCORE: 5
VISUAL DISTURBANCES: NOT PRESENT
TOTAL SCORE: 0
ANXIETY: NO ANXIETY (AT EASE)
EVER FELT BAD OR GUILTY ABOUT YOUR DRINKING: NO
PAROXYSMAL SWEATS: NO SWEAT VISIBLE
EVER HAD A DRINK FIRST THING IN THE MORNING TO STEADY YOUR NERVES TO GET RID OF A HANGOVER: NO
HOW MANY TIMES IN THE PAST YEAR HAVE YOU HAD 5 OR MORE DRINKS IN A DAY: 5
TREMOR: TREMOR NOT VISIBLE BUT CAN BE FELT, FINGERTIP TO FINGERTIP
TOTAL SCORE: 3
ORIENTATION AND CLOUDING OF SENSORIUM: CANNOT DO SERIAL ADDITIONS OR IS UNCERTAIN ABOUT DATE
ANXIETY: MILDLY ANXIOUS
TOTAL SCORE: 3
VISUAL DISTURBANCES: NOT PRESENT
DOES PATIENT WANT TO STOP DRINKING: NO
ON A TYPICAL DAY WHEN YOU DRINK ALCOHOL HOW MANY DRINKS DO YOU HAVE: 4
TOTAL SCORE: 0
AUDITORY DISTURBANCES: NOT PRESENT
DOES PATIENT WANT TO STOP DRINKING: NO
AGITATION: *
ORIENTATION AND CLOUDING OF SENSORIUM: CANNOT DO SERIAL ADDITIONS OR IS UNCERTAIN ABOUT DATE
AGITATION: SOMEWHAT MORE THAN NORMAL ACTIVITY
HEADACHE, FULLNESS IN HEAD: VERY MILD
VISUAL DISTURBANCES: NOT PRESENT
ANXIETY: MILDLY ANXIOUS
AUDITORY DISTURBANCES: NOT PRESENT
EVER FELT BAD OR GUILTY ABOUT YOUR DRINKING: NO
AUDITORY DISTURBANCES: NOT PRESENT
ANXIETY: NO ANXIETY (AT EASE)
TOTAL SCORE: 0
TOTAL SCORE: 0
PAROXYSMAL SWEATS: NO SWEAT VISIBLE
TREMOR: TREMOR NOT VISIBLE BUT CAN BE FELT, FINGERTIP TO FINGERTIP
HAVE YOU EVER FELT YOU SHOULD CUT DOWN ON YOUR DRINKING: NO
TREMOR: TREMOR NOT VISIBLE BUT CAN BE FELT, FINGERTIP TO FINGERTIP
PAROXYSMAL SWEATS: BARELY PERCEPTIBLE SWEATING. PALMS MOIST
DO YOU DRINK ALCOHOL: YES
NAUSEA AND VOMITING: NO NAUSEA AND NO VOMITING
TOTAL SCORE: 5
AGITATION: SOMEWHAT MORE THAN NORMAL ACTIVITY
CONSUMPTION TOTAL: POSITIVE
AUDITORY DISTURBANCES: NOT PRESENT
AUDITORY DISTURBANCES: NOT PRESENT
HEADACHE, FULLNESS IN HEAD: NOT PRESENT

## 2023-04-09 ASSESSMENT — ENCOUNTER SYMPTOMS
WHEEZING: 0
SHORTNESS OF BREATH: 1
COUGH: 1
WEAKNESS: 1
ABDOMINAL PAIN: 0
MYALGIAS: 1
NAUSEA: 0
MEMORY LOSS: 1
DIZZINESS: 1

## 2023-04-09 ASSESSMENT — PAIN DESCRIPTION - PAIN TYPE: TYPE: ACUTE PAIN

## 2023-04-09 NOTE — PROGRESS NOTES
[facility] Medicine Daily Progress Note    Date of Service  4/9/2023    Chief Complaint  Juan Maneul Bass is a 72 y.o. male admitted 4/7/2023 with fall    Hospital Course  73 yo man with asthma vs RAD, HTN, chronically on 3L O2 who fell from bed without LOC. Patient had just been discharged from [facility] the day before for hyponatremia needing hypertonic saline and fall that time as well. CTH had no new findings, prior scalp hematoma. CTA chest was neg for PE. He was admitted for acute COPD flare, PTOT eval    Interval Problem Update  Weaned to 1L O2.  PTOT evals pending  He is a bit confused but he is able to tell me he is at the hospital for a fall.  He walked to the nursing station and he felt very short of breath and dizzy, improved after he sat down.  Sodium 131 this morning, improved  I called [facility] with no answer, will try again today    I have discussed this patient's plan of care and discharge plan at IDT rounds today with Case Management, Nursing, Nursing leadership, and other members of the IDT team.    Consultants/Specialty  pulmonary    Code Status  Full Code    Disposition  Patient is not medically cleared for discharge.   Anticipate discharge to   vs SNF .  I have placed the appropriate orders for post-discharge needs.    Review of Systems  Review of Systems   Constitutional:  Positive for malaise/fatigue.   Respiratory:  Positive for cough and shortness of breath. Negative for wheezing.    Cardiovascular:  Negative for chest pain.   Gastrointestinal:  Negative for abdominal pain and nausea.   Musculoskeletal:  Positive for myalgias.   Neurological:  Positive for dizziness and weakness.   Psychiatric/Behavioral:  Positive for memory loss.       Physical Exam  Temp:  [36.3 °C (97.4 °F)-36.9 °C (98.4 °F)] 36.6 °C (97.9 °F)  Pulse:  [76-98] 85  Resp:  [18-20] 18  BP: (136-139)/(60-86) 139/60  SpO2:  [94 %-100 %] 95 %    Physical Exam  Vitals and nursing note reviewed.   Constitutional:       General: He is not  in acute distress.     Appearance: He is not toxic-appearing.   HENT:      Head:      Comments: Bruising, hematoma and swelling to left side of face and forehead     Mouth/Throat:      Mouth: Mucous membranes are moist.   Eyes:      General:         Right eye: No discharge.         Left eye: No discharge.      Comments: Erythematous left conjunctiva   Cardiovascular:      Rate and Rhythm: Normal rate and regular rhythm.   Pulmonary:      Effort: No respiratory distress.      Breath sounds: No wheezing or rales.   Abdominal:      Palpations: Abdomen is soft.      Tenderness: There is no abdominal tenderness. There is no guarding or rebound.   Musculoskeletal:      Cervical back: Neck supple.      Right lower leg: Edema present.      Left lower leg: Edema present.   Skin:     General: Skin is warm and dry.      Comments: Bruising abrasions to his arms   Neurological:      Mental Status: He is alert.      Comments: Oriented to self, month, year, hospital.  Disoriented to day       Fluids    Intake/Output Summary (Last 24 hours) at 4/9/2023 1204  Last data filed at 4/9/2023 0900  Gross per 24 hour   Intake 1507.39 ml   Output 2600 ml   Net -1092.61 ml       Laboratory  Recent Labs     04/07/23 2236 04/09/23  0930   WBC 11.8* 10.9*   RBC 4.13* 3.78*   HEMOGLOBIN 13.5* 12.3*   HEMATOCRIT 38.9* 36.2*   MCV 94.2 95.8   MCH 32.7 32.5   MCHC 34.7 34.0   RDW 44.6 45.3   PLATELETCT 264 303   MPV 9.3 9.0     Recent Labs     04/07/23  0121 04/07/23 2236 04/08/23  0855 04/09/23  0930   SODIUM 131* 125* 128* 131*   POTASSIUM 4.7 4.4  --  4.7   CHLORIDE 95* 89*  --  94*   CO2 27 25  --  24   GLUCOSE 114* 119*  --  208*   BUN 7* 7*  --  8   CREATININE 0.47* 0.51  --  0.49*   CALCIUM 8.5 9.2  --  9.3                   Imaging  CT-HEAD W/O   Final Result         1. No acute intracranial abnormality. No evidence of acute intracranial hemorrhage or mass lesion.         2. Left forehead scalp hematoma, similar to prior                CT-CTA CHEST PULMONARY ARTERY W/ RECONS   Final Result         No significant interval change.      1. No CT evidence of pulmonary embolism.      2. Patchy bibasilar atelectasis.      DX-ELBOW-COMPLETE 3+ RIGHT   Final Result         No acute osseous abnormality.      DX-CHEST-PORTABLE (1 VIEW)   Final Result         Hazy left basilar opacity, similar to prior, likely atelectasis           Assessment/Plan  * Ground-level fall- (present on admission)  Assessment & Plan  Recurring fall  PT OT to eval    Aspiration pneumonitis (HCC)  Assessment & Plan  CT scan shows evidence of repeated aspiration episodes   Pulmonology consulted, likely related to alcohol use  Monitor for signs of developing pneumonia, no need for antibiotic at this time  WBC has decreased, afebrile  Speech therapy has evaluated him    Leukocytosis  Assessment & Plan  Likely reactive, minimal elevation  procalcitonin was low  WBC has decreased    Dysphagia- (present on admission)  Assessment & Plan  Recently had fees  Speech therapy placed on a modified diet    Asthma- (present on admission)  Assessment & Plan  Seen by pulmonology and does not have COPD  Likely has asthma versus RAD  Complete steroids, nebs   Needs outpatient sleep study    Acute on chronic respiratory failure with hypoxia (HCC)- (present on admission)  Assessment & Plan  On 3 L at home  Continue treatment with steroids and nebs   Has been weaned to 1 L  Pulmonology recommends sleep study    Alcohol use disorder- (present on admission)  Assessment & Plan  Was barely home 1 day from the hospital  Monitor for signs of alcohol withdrawal, none currently      Hyponatremia- (present on admission)  Assessment & Plan  Recently hospitalized for this and placed on salt tablets  Sodium improved to 131, continue salt tablets      Primary hypertension- (present on admission)  Assessment & Plan  BP stable, continue Norvasc         VTE prophylaxis: SCDs/TEDs    I have performed a physical exam  and reviewed and updated ROS and Plan today (4/9/2023). In review of yesterday's note (4/8/2023), there are no changes except as documented above.

## 2023-04-09 NOTE — CARE PLAN
Problem: Bronchoconstriction  Goal: Improve in air movement and diminished wheezing  Description: Target End Date:  2 to 3 days    1.  Implement inhaled treatments  2.  Evaluate and manage medication effects  Outcome: Not Met   DUO NEB QID  Problem: Bronchopulmonary Hygiene  Goal: Increase mobilization of retained secretions  Description: Target End Date:  2 to 3 days    1.  Perform bronchopulmonary therapy as indicated by assessment  2.  Perform airway suctioning  3.  Perform actions to maintain patient airway  Outcome: Not Met   FLUTTER QID

## 2023-04-09 NOTE — CARE PLAN
The patient is Stable - Low risk of patient condition declining or worsening    Shift Goals  Clinical Goals: CIWA, safety  Patient Goals: rest  Family Goals: ALEKSANDAR  Progress made toward(s) clinical / shift goals:  on going    Patient is not progressing towards the following goals:      Problem: Knowledge Deficit - Standard  Goal: Patient and family/care givers will demonstrate understanding of plan of care, disease process/condition, diagnostic tests and medications  Outcome: Not Progressing     Problem: Nutrition - Advanced  Goal: Patient will display progressive weight gain toward goal have adequate food and fluid intake  Outcome: Not Progressing

## 2023-04-09 NOTE — PROGRESS NOTES
"Received care of pt from NOC RN this am. Pt is A+O x 4 this am, but attempting to use call light as a phone and insisting it is a phone. Says: \"I was just talking on it.\" Gave pt a phone, assisted him in calling his friend Kennedi. Nurse apprentice also speaks with Kennedi, updating her concerning plan of care. Pt also frequently attempting to get OOB. Bed alarms on for pt safety. Pt brought out to table at [facility] station for safety, to be watched. Ativan PO given for elevated CIWA score.       "

## 2023-04-10 LAB
ANION GAP SERPL CALC-SCNC: 10 MMOL/L (ref 7–16)
BUN SERPL-MCNC: 9 MG/DL (ref 8–22)
CALCIUM SERPL-MCNC: 9.4 MG/DL (ref 8.5–10.5)
CHLORIDE SERPL-SCNC: 94 MMOL/L (ref 96–112)
CO2 SERPL-SCNC: 26 MMOL/L (ref 20–33)
CREAT SERPL-MCNC: 0.57 MG/DL (ref 0.5–1.4)
FEV1 % PREDICTED: 57
FEV1/FVC % PREDICTED: 99
FEV1/FVC: 76.45 %
FEV1: 1.54 L
FVC % PREDICTED: 58
FVC (L): 2.01
GFR SERPLBLD CREATININE-BSD FMLA CKD-EPI: 104 ML/MIN/1.73 M 2
GLUCOSE SERPL-MCNC: 139 MG/DL (ref 65–99)
POTASSIUM SERPL-SCNC: 4.9 MMOL/L (ref 3.6–5.5)
SODIUM SERPL-SCNC: 130 MMOL/L (ref 135–145)

## 2023-04-10 PROCEDURE — 99232 SBSQ HOSP IP/OBS MODERATE 35: CPT | Performed by: INTERNAL MEDICINE

## 2023-04-10 PROCEDURE — 94760 N-INVAS EAR/PLS OXIMETRY 1: CPT

## 2023-04-10 PROCEDURE — 97162 PT EVAL MOD COMPLEX 30 MIN: CPT

## 2023-04-10 PROCEDURE — 700102 HCHG RX REV CODE 250 W/ 637 OVERRIDE(OP): Performed by: INTERNAL MEDICINE

## 2023-04-10 PROCEDURE — 700111 HCHG RX REV CODE 636 W/ 250 OVERRIDE (IP): Performed by: HOSPITALIST

## 2023-04-10 PROCEDURE — A9270 NON-COVERED ITEM OR SERVICE: HCPCS | Performed by: HOSPITALIST

## 2023-04-10 PROCEDURE — 770006 HCHG ROOM/CARE - MED/SURG/GYN SEMI*

## 2023-04-10 PROCEDURE — 36415 COLL VENOUS BLD VENIPUNCTURE: CPT

## 2023-04-10 PROCEDURE — 97166 OT EVAL MOD COMPLEX 45 MIN: CPT

## 2023-04-10 PROCEDURE — A9270 NON-COVERED ITEM OR SERVICE: HCPCS | Performed by: INTERNAL MEDICINE

## 2023-04-10 PROCEDURE — 94669 MECHANICAL CHEST WALL OSCILL: CPT

## 2023-04-10 PROCEDURE — 80048 BASIC METABOLIC PNL TOTAL CA: CPT

## 2023-04-10 PROCEDURE — 700101 HCHG RX REV CODE 250: Performed by: INTERNAL MEDICINE

## 2023-04-10 PROCEDURE — 94640 AIRWAY INHALATION TREATMENT: CPT

## 2023-04-10 PROCEDURE — 700102 HCHG RX REV CODE 250 W/ 637 OVERRIDE(OP): Performed by: HOSPITALIST

## 2023-04-10 RX ADMIN — PREDNISONE 40 MG: 20 TABLET ORAL at 04:25

## 2023-04-10 RX ADMIN — IPRATROPIUM BROMIDE AND ALBUTEROL SULFATE 3 ML: 2.5; .5 SOLUTION RESPIRATORY (INHALATION) at 07:38

## 2023-04-10 RX ADMIN — IPRATROPIUM BROMIDE AND ALBUTEROL SULFATE 3 ML: 2.5; .5 SOLUTION RESPIRATORY (INHALATION) at 15:32

## 2023-04-10 RX ADMIN — SODIUM CHLORIDE 1 G: 1 TABLET ORAL at 11:15

## 2023-04-10 RX ADMIN — FLUTICASONE PROPIONATE 88 MCG: 44 AEROSOL, METERED RESPIRATORY (INHALATION) at 04:22

## 2023-04-10 RX ADMIN — UMECLIDINIUM BROMIDE AND VILANTEROL TRIFENATATE 1 PUFF: 62.5; 25 POWDER RESPIRATORY (INHALATION) at 04:24

## 2023-04-10 RX ADMIN — OMEPRAZOLE 20 MG: 20 CAPSULE, DELAYED RELEASE ORAL at 04:25

## 2023-04-10 RX ADMIN — Medication 1 EACH: at 16:45

## 2023-04-10 RX ADMIN — THERA TABS 1 TABLET: TAB at 04:26

## 2023-04-10 RX ADMIN — SODIUM CHLORIDE 1 G: 1 TABLET ORAL at 16:45

## 2023-04-10 RX ADMIN — FOLIC ACID 1 MG: 1 TABLET ORAL at 04:26

## 2023-04-10 RX ADMIN — AMLODIPINE BESYLATE 5 MG: 5 TABLET ORAL at 04:25

## 2023-04-10 RX ADMIN — IPRATROPIUM BROMIDE AND ALBUTEROL SULFATE 3 ML: 2.5; .5 SOLUTION RESPIRATORY (INHALATION) at 18:14

## 2023-04-10 RX ADMIN — Medication 1 EACH: at 04:27

## 2023-04-10 RX ADMIN — IPRATROPIUM BROMIDE AND ALBUTEROL SULFATE 3 ML: 2.5; .5 SOLUTION RESPIRATORY (INHALATION) at 10:50

## 2023-04-10 RX ADMIN — SODIUM CHLORIDE 1 G: 1 TABLET ORAL at 07:00

## 2023-04-10 RX ADMIN — Medication 100 MG: at 04:26

## 2023-04-10 ASSESSMENT — COGNITIVE AND FUNCTIONAL STATUS - GENERAL
HELP NEEDED FOR BATHING: A LITTLE
SUGGESTED CMS G CODE MODIFIER DAILY ACTIVITY: CK
DRESSING REGULAR UPPER BODY CLOTHING: A LITTLE
WALKING IN HOSPITAL ROOM: A LITTLE
PERSONAL GROOMING: A LITTLE
STANDING UP FROM CHAIR USING ARMS: A LITTLE
SUGGESTED CMS G CODE MODIFIER MOBILITY: CJ
EATING MEALS: A LITTLE
CLIMB 3 TO 5 STEPS WITH RAILING: A LITTLE
MOBILITY SCORE: 20
MOVING FROM LYING ON BACK TO SITTING ON SIDE OF FLAT BED: A LITTLE
DAILY ACTIVITIY SCORE: 18
TOILETING: A LITTLE
DRESSING REGULAR LOWER BODY CLOTHING: A LITTLE

## 2023-04-10 ASSESSMENT — LIFESTYLE VARIABLES
TOTAL SCORE: 1
TOTAL SCORE: 0
ORIENTATION AND CLOUDING OF SENSORIUM: ORIENTED AND CAN DO SERIAL ADDITIONS
TOTAL SCORE: 0
AVERAGE NUMBER OF DAYS PER WEEK YOU HAVE A DRINK CONTAINING ALCOHOL: 7
EVER HAD A DRINK FIRST THING IN THE MORNING TO STEADY YOUR NERVES TO GET RID OF A HANGOVER: NO
HAVE PEOPLE ANNOYED YOU BY CRITICIZING YOUR DRINKING: NO
HEADACHE, FULLNESS IN HEAD: NOT PRESENT
ON A TYPICAL DAY WHEN YOU DRINK ALCOHOL HOW MANY DRINKS DO YOU HAVE: 4
ORIENTATION AND CLOUDING OF SENSORIUM: ORIENTED AND CAN DO SERIAL ADDITIONS
ANXIETY: NO ANXIETY (AT EASE)
AGITATION: NORMAL ACTIVITY
VISUAL DISTURBANCES: NOT PRESENT
HOW MANY TIMES IN THE PAST YEAR HAVE YOU HAD 5 OR MORE DRINKS IN A DAY: 5
HEADACHE, FULLNESS IN HEAD: NOT PRESENT
VISUAL DISTURBANCES: NOT PRESENT
DOES PATIENT WANT TO STOP DRINKING: NO
HAVE YOU EVER FELT YOU SHOULD CUT DOWN ON YOUR DRINKING: NO
AUDITORY DISTURBANCES: NOT PRESENT
ANXIETY: NO ANXIETY (AT EASE)
TOTAL SCORE: 0
TOTAL SCORE: 1
NAUSEA AND VOMITING: NO NAUSEA AND NO VOMITING
EVER FELT BAD OR GUILTY ABOUT YOUR DRINKING: NO
TREMOR: TREMOR NOT VISIBLE BUT CAN BE FELT, FINGERTIP TO FINGERTIP
CONSUMPTION TOTAL: POSITIVE
PAROXYSMAL SWEATS: NO SWEAT VISIBLE
AGITATION: NORMAL ACTIVITY
PAROXYSMAL SWEATS: NO SWEAT VISIBLE
TREMOR: TREMOR NOT VISIBLE BUT CAN BE FELT, FINGERTIP TO FINGERTIP
NAUSEA AND VOMITING: NO NAUSEA AND NO VOMITING
AUDITORY DISTURBANCES: NOT PRESENT
DO YOU DRINK ALCOHOL: YES

## 2023-04-10 ASSESSMENT — ENCOUNTER SYMPTOMS
MYALGIAS: 1
COUGH: 1
DIZZINESS: 1
WEAKNESS: 1
NAUSEA: 0
SHORTNESS OF BREATH: 1
MEMORY LOSS: 1
ABDOMINAL PAIN: 0
WHEEZING: 0

## 2023-04-10 ASSESSMENT — GAIT ASSESSMENTS
GAIT LEVEL OF ASSIST: STANDBY ASSIST
DEVIATION: BRADYKINETIC;DECREASED TOE OFF;DECREASED HEEL STRIKE
ASSISTIVE DEVICE: FRONT WHEEL WALKER
DISTANCE (FEET): 80

## 2023-04-10 ASSESSMENT — PAIN DESCRIPTION - PAIN TYPE: TYPE: ACUTE PAIN

## 2023-04-10 ASSESSMENT — PAIN SCALES - PAIN ASSESSMENT IN ADVANCED DEMENTIA (PAINAD): BREATHING: NORMAL

## 2023-04-10 ASSESSMENT — ACTIVITIES OF DAILY LIVING (ADL): TOILETING: INDEPENDENT

## 2023-04-10 NOTE — PROGRESS NOTES
Rec'd report from NOC RN.  Pt ao x 4 but can be impulsive and confused when woken up.  Pt otherwise pleasant and cooperative, VSS.  Pt was ambulated by PT in the am.  Pt meds passed per MAR, no injuries this shift.  Pt call light in reach, bed at lowest position, condom cath replaced.  Pt has no needs at this time.

## 2023-04-10 NOTE — FACE TO FACE
Face to Face Supporting Documentation - Home Health    The encounter with this patient was in whole or in part the primary reason for home health admission.    Date of encounter:   Patient:                    MRN:                       YOB: 2023  Juan Manuel Bass  [id number]  1950     Home health to see patient for:  Skilled Nursing care for assessment, interventions & education, Physical Therapy evaluation and treatment, Occupational therapy evaluation and treatment, and Speech Language Pathology evaluation and treatment    Skilled need for:  Exacerbation of Chronic Disease State asthma, hyponatremia    Skilled nursing interventions to include:  Comment: monitor vital signs, PTOT    Homebound status evidenced by:  Needs the assistance of another person in order to leave the home. Leaving home requires a considerable and taxing effort. There is a normal inability to leave the home.    Community Physician to provide follow up care: Peterson Amin M.D.     Optional Interventions? No      I certify the face to face encounter for this home health care referral meets the CMS requirements and the encounter/clinical assessment with the patient was, in whole, or in part, for the medical condition(s) listed above, which is the primary reason for home health care. Based on my clinical findings: the service(s) are medically necessary, support the need for home health care, and the homebound criteria are met.  I certify that this patient has had a face to face encounter by myself.  Stanley Baron M.D. - NPI: [id number]

## 2023-04-10 NOTE — CARE PLAN
The patient is Stable - Low risk of patient condition declining or worsening    Shift Goals  Clinical Goals: CIWA  Patient Goals: SAFE DISCHARGE  Family Goals: ALEKSANDAR    Progress made toward(s) clinical / shift goals:  on going  Patient is  progressing towards the following goals:  Problem: Pain - Standard  Goal: Alleviation of pain or a reduction in pain to the patient’s comfort goal  Outcome: Progressing     Problem: Skin Integrity  Goal: Skin integrity is maintained or improved  Outcome: Progressing     Problem: Risk for Aspiration  Goal: Patient's risk for aspiration will be absent or decrease  Outcome: Progressing     Problem: Knowledge Deficit - COPD  Goal: Patient/significant other demonstrates understanding of disease process, utilization of the Action Plan, medications and discharge instruction  Outcome: Progressing

## 2023-04-10 NOTE — THERAPY
"Occupational Therapy   Initial Evaluation     Patient Name: Juan Manuel Bass  Age:  72 y.o., Sex:  male  Medical Record #: [id number]  Today's Date: 4/10/2023     Precautions  Precautions: (P) Fall Risk, Swallow Precautions    Assessment    Patient is 72 y.o. male with ground level fall with head strike and COPD exacerbation. PMHx of alcohol abuse, COPD, hypertension, dysphagia, chronic hypoxic respiratory failure on 3 L of oxygen who comes into the hospital after a ground-level fall. Pt normally independent with functional mobility and ADLs living in a motor home alone, has had multiple falls. Patient would prefer to discharge home with home health, will continue to see for skilled therapy while admitted.       Plan    Occupational Therapy Initial Treatment Plan   Treatment Interventions: (P) Self Care / Activities of Daily Living, Adaptive Equipment, Neuro Re-Education / Balance, Therapeutic Exercises, Therapeutic Activity  Treatment Frequency: (P) 3 Times per Week  Duration: (P) Until Therapy Goals Met    DC Equipment Recommendations: (P) None  Discharge Recommendations: (P) Recommend home health for continued occupational therapy services     Subjective    \"I would love to get up\"     Objective       04/10/23 0904   Prior Living Situation   Prior Services Home-Independent   Housing / Facility Motor Home   Steps Into Home 3   Steps In Home 1   Rail Right Rail (Steps into Home)   Bathroom Set up Bathtub / Shower Combination   Equipment Owned Front-Wheel Walker;Tub / Shower Seat;Grab Bar(s) In Tub / Shower;Grab Bar(s) By Toilet   Lives with - Patient's Self Care Capacity Alone and Unable to Care For Self   Prior Level of ADL Function   Self Feeding Independent   Grooming / Hygiene Independent   Bathing Requires Assist   Dressing Independent   Toileting Independent   Comments bath aide 1x/wk   Prior Level of IADL Function   Medication Management Independent   Laundry Independent   Kitchen Mobility Independent   Finances " Independent   Home Management Independent   Shopping Independent   Prior Level Of Mobility Independent With Device in Community;Independent With Device in Home   Driving / Transportation Relatives / Others Provide Transportation   History of Falls   History of Falls Yes   Date of Last Fall   (reason for multiple admissions)   Precautions   Precautions Fall Risk;Swallow Precautions   Pain 0 - 10 Group   Therapist Pain Assessment Post Activity Pain Same as Prior to Activity;Nurse Notified   Cognition    Cognition / Consciousness X   Level of Consciousness Alert   Safety Awareness Impaired   Strength Upper Body   Upper Body Strength  WDL   Upper Body Muscle Tone   Upper Body Muscle Tone  WDL   Coordination Upper Body   Coordination WDL   Balance Assessment   Sitting Balance (Static) Fair   Sitting Balance (Dynamic) Fair   Standing Balance (Static) Fair -   Standing Balance (Dynamic) Fair -   Weight Shift Sitting Fair   Weight Shift Standing Fair   Comments w/ FWW   Bed Mobility    Supine to Sit Supervised   ADL Assessment   Grooming Supervision;Seated   Upper Body Dressing Supervision   Lower Body Dressing Minimal Assist   How much help from another person does the patient currently need...   Putting on and taking off regular lower body clothing? 3   Bathing (including washing, rinsing, and drying)? 3   Toileting, which includes using a toilet, bedpan, or urinal? 3   Putting on and taking off regular upper body clothing? 3   Taking care of personal grooming such as brushing teeth? 3   Eating meals? 3   6 Clicks Daily Activity Score 18   Functional Mobility   Sit to Stand Standby Assist   Bed, Chair, Wheelchair Transfer Contact Guard Assist   Transfer Method Stand Step   Mobility bed mobility, hallway mobility, back to EOB   Comments w/ FWW   Activity Tolerance   Sitting Edge of Bed left seated at EOB   Standing 10 min   Short Term Goals   Short Term Goal # 1 Pt will complete functional transfers with supervision    Short Term Goal # 2 Pt will complete LB dressing with supervision   Short Term Goal # 3 Pt will complete toileting with supervision   Education Group   Education Provided Role of Occupational Therapist   Role of Occupational Therapist Patient Response Patient;Acceptance;Explanation;Verbal Demonstration   Occupational Therapy Initial Treatment Plan    Treatment Interventions Self Care / Activities of Daily Living;Adaptive Equipment;Neuro Re-Education / Balance;Therapeutic Exercises;Therapeutic Activity   Treatment Frequency 3 Times per Week   Duration Until Therapy Goals Met   Problem List   Problem List Decreased Active Daily Living Skills;Decreased Homemaking Skills;Decreased Functional Mobility;Decreased Activity Tolerance;Safety Awareness Deficits / Cognition;Impaired Postural Control / Balance   Anticipated Discharge Equipment and Recommendations   DC Equipment Recommendations None   Discharge Recommendations Recommend home health for continued occupational therapy services   Interdisciplinary Plan of Care Collaboration   IDT Collaboration with  Nursing;Physical Therapist   Patient Position at End of Therapy Seated;Edge of Bed;Bed Alarm On;Call Light within Reach;Tray Table within Reach;Phone within Reach   Collaboration Comments RN updated

## 2023-04-10 NOTE — RESPIRATORY CARE
PULMONARY FUNCTION TEST by COPD CLINICAL EDUCATOR  4/10/2023 at 9:52 AM by Melissa Galicia RRT     PFT performed by COPD educator on 4/8/2023. Bedside PFT will reside in results tab on EMR and sent to inpatient pulmonary to interpret.     PFT Results    Lab Results   Component Value Date    FEV1 (L) 1.54 04/10/2023    FEV1/FVC % 76.45 04/10/2023    FVC % Predicted 58 04/10/2023    FEV1 % Predicted 57 04/10/2023    FVC 2.01 04/10/2023    FEV1/FVC % Predicted 99 04/10/2023

## 2023-04-10 NOTE — THERAPY
"Physical Therapy   Initial Evaluation     Patient Name: Juan Manuel Bass  Age:  72 y.o., Sex:  male  Medical Record #: [id number]  Today's Date: 4/10/2023     Precautions  Precautions: Fall Risk    Assessment    Patient is 72 y.o. male with ground level fall with head strike and COPD exacerbation. PMHx of alcohol abuse, COPD, hypertension, dysphagia, chronic hypoxic respiratory failure on 3 L of oxygen who comes into the hospital after a ground-level fall.    Pt is agreeable and enthusiastic regarding participation in therapy session and tolerates session well; initial pain reported in B LE which resolves with ambulation. Pt requires SBA/CGA for ambulation and standing activities with FWW, does demonstrate good safety awareness during navigation of obstacles in room and hallway. Pt's functional mobility is primarily limited by deficits in balance and endurance. Pt will benefit from continued PT services in acute setting, recommend HHPT upon DC. No DME needs at this time.       Plan    Physical Therapy Initial Treatment Plan   Treatment Plan : (P) Gait Training, Neuro Re-Education / Balance, Stair Training, Therapeutic Activities, Therapeutic Exercise  Treatment Frequency: (P) 3 Times per Week  Duration: (P) Until Therapy Goals Met    DC Equipment Recommendations: (P) None  Discharge Recommendations: (P) Recommend home health for continued physical therapy services       Subjective    \"Oh good! Let's go.\"     Objective       04/10/23 0901   Charge Group   PT Evaluation PT Evaluation Mod   Total Time Spent   PT Total Time Yes   PT Evaluation Time Spent (Mins) 21   PT Total Time Spent (Calculated) 21    Services   Is patient using  services for this encounter? No   Initial Contact Note    Initial Contact Note Order Received and Verified, Physical Therapy Evaluation in Progress with Full Report to Follow.   Precautions   Precautions Fall Risk   Vitals   O2 (LPM) 3   O2 Delivery Device Nasal Cannula   Pain " 0 - 10 Group   Location Foot   Location Orientation Right;Left   Therapist Pain Assessment During Activity  (upon initial stand, improves with ambulation)   Prior Living Situation   Prior Services Home-Independent   Housing / Facility Motor Home   Steps Into Home 3   Steps In Home 1  (to bed, pt reports falls x2 have occurred while accessing bed)   Rail Right Rail (Steps into Home)   Bathroom Set up Bathtub / Shower Combination   Equipment Owned Front-Wheel Walker   Lives with - Patient's Self Care Capacity Alone and Able to Care For Self   Comments pt reports he was home for 1 day prior to readmission, reports he has someone assist with showers 1x/week   Prior Level of Functional Mobility   Bed Mobility Independent   Transfer Status Required Assist   Ambulation Independent  (for shower transfers/showering)   Ambulation Distance household and [facility] community   Assistive Devices Used Front-Wheel Walker   Stairs Independent  (with recent falls)   History of Falls   History of Falls Yes   Date of Last Fall 04/08/23   Cognition    Cognition / Consciousness X   Level of Consciousness Alert   Safety Awareness Impaired   Comments cooperative, enthusiastic regarding mobility, emotionally labile at times   Strength Upper Body   Upper Body Strength  WDL   Comments for mobility   Strength Lower Body   Lower Body Strength  WDL   Comments adequate for mobility/ambulation   Coordination Upper Body   Coordination WDL   Coordination Lower Body    Coordination Lower Body  WDL   Balance Assessment   Sitting Balance (Static) Fair   Sitting Balance (Dynamic) Fair   Standing Balance (Static) Fair -   Standing Balance (Dynamic) Fair -   Weight Shift Sitting Fair   Weight Shift Standing Fair   Bed Mobility    Supine to Sit Supervised   Gait Analysis   Gait Level Of Assist Standby Assist   Assistive Device Front Wheel Walker   Distance (Feet) 80   # of Times Distance was Traveled 1   Deviation Bradykinetic;Decreased Toe Off;Decreased Heel  Strike   Functional Mobility   Sit to Stand Standby Assist   Bed, Chair, Wheelchair Transfer Standby Assist   Mobility supine to sit, ambulation in hallway with FWW, up to EOB   How much difficulty does the patient currently have...   Turning over in bed (including adjusting bedclothes, sheets and blankets)? 4   Sitting down on and standing up from a chair with arms (e.g., wheelchair, bedside commode, etc.) 3   Moving from lying on back to sitting on the side of the bed? 4   How much help from another person does the patient currently need...   Moving to and from a bed to a chair (including a wheelchair)? 3   Need to walk in a hospital room? 3   Climbing 3-5 steps with a railing? 3   6 clicks Mobility Score 20   Activity Tolerance   Sitting in Chair NT, declines   Sitting Edge of Bed 10 min total   Standing 8 min   Short Term Goals    Short Term Goal # 1 Pt will demonstrate sit to stand from chair, EOB and toilet with good safety awareness and Supervision in 6 visits   Short Term Goal # 2 pt will ambulate with  feet with supervision in 6 visits   Short Term Goal # 3 pt will ascend/descend 3 steps with use of railing, SBA and good safety awareness in 6 visits   Education Group   Education Provided Role of Physical Therapist;Gait Training;Transfer Status   Role of Physical Therapist Patient Response Patient;Acceptance;Explanation;Verbal Demonstration   Gait Training Patient Response Patient;Acceptance;Verbal Demonstration;Action Demonstration   Transfer Status Patient Response Patient;Acceptance;Explanation;Verbal Demonstration;Action Demonstration   Physical Therapy Initial Treatment Plan    Treatment Plan  Gait Training;Neuro Re-Education / Balance;Stair Training;Therapeutic Activities;Therapeutic Exercise   Treatment Frequency 3 Times per Week   Duration Until Therapy Goals Met   Problem List    Problems Impaired Transfers;Impaired Ambulation;Impaired Balance;Decreased Activity Tolerance   Anticipated  Discharge Equipment and Recommendations   DC Equipment Recommendations None   Discharge Recommendations Recommend home health for continued physical therapy services   Interdisciplinary Plan of Care Collaboration   IDT Collaboration with  Nursing;Occupational Therapist   Patient Position at End of Therapy Seated;Edge of Bed;Bed Alarm On;Tray Table within Reach;Phone within Reach;Call Light within Reach   Collaboration Comments RN updated   Session Information   Date / Session Number  4/10  -1  (1/3; 4/16)     Anita Keys DPT

## 2023-04-10 NOTE — WOUND TEAM
Wound team consulted for traumatic injuries to patient's face and arms.  Scabs are present and new orders placed for topical bacitracin.  No advance wound care needed.  Wound team is signing off, please re-consult if needed.

## 2023-04-10 NOTE — CARE PLAN
"The patient is Watcher - Medium risk of patient condition declining or worsening - Discussed pt's care with Hospitalist.       Shift Goals  Clinical Goals: CIWA, safety  Patient Goals: \"I need to use the phone.\" (see progress notes)  Family Goals: ALEKSANDAR    Progress made toward(s) clinical / shift goals:  see this am progress note, receives ativan po, BED ALARMS ON FOR PATIENT'S SAFETY TODAY.      Patient is not progressing towards the following goals:      Problem: Self Care  Goal: Patient will have the ability to perform ADLs independently or with assistance (bathe, groom, dress, toilet and feed)  Outcome: Not Progressing  Note: Pt does not demonstrate ability to do this     "

## 2023-04-10 NOTE — CARE PLAN
The patient is Stable - Low risk of patient condition declining or worsening    Shift Goals  Clinical Goals: CIWA, safety  Patient Goals: dc  Family Goals: ALEKSANDAR    Progress made toward(s) clinical / shift goals:  pt meds passed per MAR, no injuries this shift      Problem: Knowledge Deficit - Standard  Goal: Patient and family/care givers will demonstrate understanding of plan of care, disease process/condition, diagnostic tests and medications  Outcome: Progressing     Problem: Knowledge Deficit - COPD  Goal: Patient/significant other demonstrates understanding of disease process, utilization of the Action Plan, medications and discharge instruction  Outcome: Progressing     Problem: Risk for Infection - COPD  Goal: Patient will remain free from signs and symptoms of infection  Outcome: Progressing     Problem: Nutrition - Advanced  Goal: Patient will display progressive weight gain toward goal have adequate food and fluid intake  Outcome: Progressing     Problem: Ineffective Airway Clearance  Goal: Patient will maintain patent airway with clear/clearing breath sounds  Outcome: Progressing     Problem: Impaired Gas Exchange  Goal: Patient will demonstrate improved ventilation and adequate oxygenation and participate in treatment regimen within the level of ability/situation.  Outcome: Progressing     Problem: Risk for Aspiration  Goal: Patient's risk for aspiration will be absent or decrease  Outcome: Progressing     Problem: Self Care  Goal: Patient will have the ability to perform ADLs independently or with assistance (bathe, groom, dress, toilet and feed)  Outcome: Progressing     Problem: Optimal Care for Alcohol Withdrawal  Goal: Optimal Care for the alcohol withdrawal patient  Outcome: Progressing     Problem: Seizure Precautions  Goal: Implementation of seizure precautions  Outcome: Progressing     Problem: Lifestyle Changes  Goal: Patient's ability to identify lifestyle changes and available resources to  help reduce recurrence of condition will improve  Outcome: Progressing     Problem: Psychosocial  Goal: Patient's level of anxiety will decrease  Outcome: Progressing  Goal: Spiritual and cultural needs incorporated into hospitalization  Outcome: Progressing     Problem: Pain - Standard  Goal: Alleviation of pain or a reduction in pain to the patient’s comfort goal  Outcome: Progressing     Problem: Fall Risk  Goal: Patient will remain free from falls  Outcome: Progressing     Problem: Skin Integrity  Goal: Skin integrity is maintained or improved  Outcome: Progressing       Patient is not progressing towards the following goals:

## 2023-04-10 NOTE — PROGRESS NOTES
Hospital Medicine Daily Progress Note    Date of Service  4/10/2023    Chief Complaint  Juan Manuel Bass is a 72 y.o. male admitted 4/7/2023 with fall    Hospital Course  73 yo man with asthma vs RAD, HTN, chronically on 3L O2 who fell from bed without LOC. Patient had just been discharged from [facility] the day before for hyponatremia needing hypertonic saline and fall that time as well. CTH had no new findings, prior scalp hematoma. CTA chest was neg for PE. He was admitted for acute COPD flare, PTOT eval.     Interval Problem Update  On 2L O2.  He still feels very short of breath and dizzy when trying to ambulate.  Continue on DuoNebs and prednisone.  Sodium is 130, continue to trend, continue salt tablets  Discussed his risk with recurring falls and he wants to go home.  PT OT did recommend home health.    I have discussed this patient's plan of care and discharge plan at IDT rounds today with Case Management, Nursing, Nursing leadership, and other members of the IDT team.    Consultants/Specialty  pulmonary    Code Status  Full Code    Disposition  Patient is not medically cleared for discharge.   Anticipate discharge to to home with organized home healthcare and close outpatient follow-up.  I have placed the appropriate orders for post-discharge needs.    Review of Systems  Review of Systems   Constitutional:  Positive for malaise/fatigue.   Respiratory:  Positive for cough and shortness of breath. Negative for wheezing.    Cardiovascular:  Negative for chest pain.   Gastrointestinal:  Negative for abdominal pain and nausea.   Musculoskeletal:  Positive for myalgias.   Neurological:  Positive for dizziness and weakness.   Psychiatric/Behavioral:  Positive for memory loss.       Physical Exam  Temp:  [36.4 °C (97.6 °F)-36.6 °C (97.9 °F)] 36.5 °C (97.7 °F)  Pulse:  [] 84  Resp:  [18-20] 18  BP: (127-144)/(64-83) 132/64  SpO2:  [95 %-100 %] 100 %    Physical Exam  Vitals and nursing note reviewed.   Constitutional:        Appearance: He is ill-appearing.   HENT:      Head:      Comments: Bruising, hematoma and swelling to left side of face and forehead     Mouth/Throat:      Mouth: Mucous membranes are moist.   Eyes:      General:         Right eye: No discharge.         Left eye: No discharge.      Comments: Erythematous left conjunctiva   Cardiovascular:      Rate and Rhythm: Normal rate and regular rhythm.   Pulmonary:      Breath sounds: No wheezing or rales.      Comments: Tachypneic, poor air movement throughout  Abdominal:      Palpations: Abdomen is soft.      Tenderness: There is no abdominal tenderness. There is no guarding or rebound.   Musculoskeletal:      Cervical back: Neck supple.      Right lower leg: Edema present.      Left lower leg: Edema present.   Skin:     General: Skin is warm and dry.      Comments: Bruising abrasions to his arms   Neurological:      Mental Status: He is alert.      Comments: Oriented to self, year, hospital.  Disoriented to month       Fluids    Intake/Output Summary (Last 24 hours) at 4/10/2023 1552  Last data filed at 4/10/2023 0901  Gross per 24 hour   Intake 240 ml   Output 1000 ml   Net -760 ml       Laboratory  Recent Labs     04/07/23 2236 04/09/23  0930   WBC 11.8* 10.9*   RBC 4.13* 3.78*   HEMOGLOBIN 13.5* 12.3*   HEMATOCRIT 38.9* 36.2*   MCV 94.2 95.8   MCH 32.7 32.5   MCHC 34.7 34.0   RDW 44.6 45.3   PLATELETCT 264 303   MPV 9.3 9.0     Recent Labs     04/07/23 2236 04/08/23  0855 04/09/23  0930 04/10/23  0809   SODIUM 125* 128* 131* 130*   POTASSIUM 4.4  --  4.7 4.9   CHLORIDE 89*  --  94* 94*   CO2 25  --  24 26   GLUCOSE 119*  --  208* 139*   BUN 7*  --  8 9   CREATININE 0.51  --  0.49* 0.57   CALCIUM 9.2  --  9.3 9.4                   Imaging  CT-HEAD W/O   Final Result         1. No acute intracranial abnormality. No evidence of acute intracranial hemorrhage or mass lesion.         2. Left forehead scalp hematoma, similar to prior               CT-CTA CHEST PULMONARY  ARTERY W/ RECONS   Final Result         No significant interval change.      1. No CT evidence of pulmonary embolism.      2. Patchy bibasilar atelectasis.      DX-ELBOW-COMPLETE 3+ RIGHT   Final Result         No acute osseous abnormality.      DX-CHEST-PORTABLE (1 VIEW)   Final Result         Hazy left basilar opacity, similar to prior, likely atelectasis           Assessment/Plan  * Ground-level fall- (present on admission)  Assessment & Plan  Recurring fall  PT OT to eval    Aspiration pneumonitis (HCC)  Assessment & Plan  CT scan shows evidence of repeated aspiration episodes   Pulmonology consulted, likely related to alcohol use  Monitor for signs of developing pneumonia, no need for antibiotic at this time  WBC has decreased, afebrile  Speech therapy has evaluated him    Leukocytosis  Assessment & Plan  Likely reactive, minimal elevation  procalcitonin was low  WBC has decreased    Dysphagia- (present on admission)  Assessment & Plan  Recently had fees  Speech therapy placed on a modified diet    Asthma- (present on admission)  Assessment & Plan  Seen by pulmonology and does not have COPD  Likely has asthma versus RAD  Complete steroids, nebs   Needs outpatient sleep study    Acute on chronic respiratory failure with hypoxia (HCC)- (present on admission)  Assessment & Plan  On 3 L at home  Continues to be very symptomatic with ambulation and at high risk for falls when he returns home.  Continue treatment with steroids and nebs in the hospital and monitor for clinical improvement  Pulmonology recommends sleep study    Alcohol use disorder- (present on admission)  Assessment & Plan  Was barely home 1 day from the hospital  Monitor for signs of alcohol withdrawal, none currently      Hyponatremia- (present on admission)  Assessment & Plan  Recently hospitalized for this and placed on salt tablets  Sodium stable 130, continue salt tablets      Primary hypertension- (present on admission)  Assessment & Plan  BP  stable, continue Norvasc         VTE prophylaxis: SCDs/TEDs    I have performed a physical exam and reviewed and updated ROS and Plan today (4/10/2023). In review of yesterday's note (4/9/2023), there are no changes except as documented above.

## 2023-04-11 ENCOUNTER — APPOINTMENT (OUTPATIENT)
Dept: CARDIOLOGY | Facility: MEDICAL CENTER | Age: 73
DRG: 202 | End: 2023-04-11
Attending: STUDENT IN AN ORGANIZED HEALTH CARE EDUCATION/TRAINING PROGRAM
Payer: MEDICARE

## 2023-04-11 LAB
ANION GAP SERPL CALC-SCNC: 11 MMOL/L (ref 7–16)
BUN SERPL-MCNC: 8 MG/DL (ref 8–22)
CALCIUM SERPL-MCNC: 8.8 MG/DL (ref 8.5–10.5)
CHLORIDE SERPL-SCNC: 96 MMOL/L (ref 96–112)
CO2 SERPL-SCNC: 25 MMOL/L (ref 20–33)
CREAT SERPL-MCNC: 0.56 MG/DL (ref 0.5–1.4)
GFR SERPLBLD CREATININE-BSD FMLA CKD-EPI: 104 ML/MIN/1.73 M 2
GLUCOSE SERPL-MCNC: 96 MG/DL (ref 65–99)
POTASSIUM SERPL-SCNC: 4.2 MMOL/L (ref 3.6–5.5)
SODIUM SERPL-SCNC: 132 MMOL/L (ref 135–145)
TEST NAME 95000: ABNORMAL
TEST NAME 95000: NORMAL

## 2023-04-11 PROCEDURE — 700102 HCHG RX REV CODE 250 W/ 637 OVERRIDE(OP): Performed by: HOSPITALIST

## 2023-04-11 PROCEDURE — 93306 TTE W/DOPPLER COMPLETE: CPT

## 2023-04-11 PROCEDURE — 36415 COLL VENOUS BLD VENIPUNCTURE: CPT

## 2023-04-11 PROCEDURE — 94760 N-INVAS EAR/PLS OXIMETRY 1: CPT

## 2023-04-11 PROCEDURE — A9270 NON-COVERED ITEM OR SERVICE: HCPCS | Performed by: INTERNAL MEDICINE

## 2023-04-11 PROCEDURE — 770006 HCHG ROOM/CARE - MED/SURG/GYN SEMI*

## 2023-04-11 PROCEDURE — 94640 AIRWAY INHALATION TREATMENT: CPT

## 2023-04-11 PROCEDURE — 94669 MECHANICAL CHEST WALL OSCILL: CPT

## 2023-04-11 PROCEDURE — 700101 HCHG RX REV CODE 250: Performed by: INTERNAL MEDICINE

## 2023-04-11 PROCEDURE — A9270 NON-COVERED ITEM OR SERVICE: HCPCS | Performed by: HOSPITALIST

## 2023-04-11 PROCEDURE — 80048 BASIC METABOLIC PNL TOTAL CA: CPT

## 2023-04-11 PROCEDURE — 99232 SBSQ HOSP IP/OBS MODERATE 35: CPT | Performed by: STUDENT IN AN ORGANIZED HEALTH CARE EDUCATION/TRAINING PROGRAM

## 2023-04-11 PROCEDURE — 700111 HCHG RX REV CODE 636 W/ 250 OVERRIDE (IP): Performed by: HOSPITALIST

## 2023-04-11 PROCEDURE — 700102 HCHG RX REV CODE 250 W/ 637 OVERRIDE(OP): Performed by: INTERNAL MEDICINE

## 2023-04-11 RX ADMIN — UMECLIDINIUM BROMIDE AND VILANTEROL TRIFENATATE 1 PUFF: 62.5; 25 POWDER RESPIRATORY (INHALATION) at 05:35

## 2023-04-11 RX ADMIN — PREDNISONE 40 MG: 20 TABLET ORAL at 05:39

## 2023-04-11 RX ADMIN — IPRATROPIUM BROMIDE AND ALBUTEROL SULFATE 3 ML: 2.5; .5 SOLUTION RESPIRATORY (INHALATION) at 19:28

## 2023-04-11 RX ADMIN — Medication 1 EACH: at 05:38

## 2023-04-11 RX ADMIN — IPRATROPIUM BROMIDE AND ALBUTEROL SULFATE 3 ML: 2.5; .5 SOLUTION RESPIRATORY (INHALATION) at 15:16

## 2023-04-11 RX ADMIN — SODIUM CHLORIDE 1 G: 1 TABLET ORAL at 10:38

## 2023-04-11 RX ADMIN — Medication 1 EACH: at 17:45

## 2023-04-11 RX ADMIN — FOLIC ACID 1 MG: 1 TABLET ORAL at 05:40

## 2023-04-11 RX ADMIN — ACETAMINOPHEN 650 MG: 325 TABLET, FILM COATED ORAL at 19:38

## 2023-04-11 RX ADMIN — THERA TABS 1 TABLET: TAB at 05:39

## 2023-04-11 RX ADMIN — AMLODIPINE BESYLATE 5 MG: 5 TABLET ORAL at 05:39

## 2023-04-11 RX ADMIN — Medication 1 EACH: at 05:37

## 2023-04-11 RX ADMIN — OMEPRAZOLE 20 MG: 20 CAPSULE, DELAYED RELEASE ORAL at 05:39

## 2023-04-11 RX ADMIN — SODIUM CHLORIDE 1 G: 1 TABLET ORAL at 17:45

## 2023-04-11 RX ADMIN — IPRATROPIUM BROMIDE AND ALBUTEROL SULFATE 3 ML: 2.5; .5 SOLUTION RESPIRATORY (INHALATION) at 06:41

## 2023-04-11 RX ADMIN — FLUTICASONE PROPIONATE 88 MCG: 44 AEROSOL, METERED RESPIRATORY (INHALATION) at 05:36

## 2023-04-11 RX ADMIN — Medication 100 MG: at 05:43

## 2023-04-11 RX ADMIN — SODIUM CHLORIDE 1 G: 1 TABLET ORAL at 07:27

## 2023-04-11 ASSESSMENT — LIFESTYLE VARIABLES
HEADACHE, FULLNESS IN HEAD: NOT PRESENT
TREMOR: TREMOR NOT VISIBLE BUT CAN BE FELT, FINGERTIP TO FINGERTIP
ANXIETY: NO ANXIETY (AT EASE)
VISUAL DISTURBANCES: NOT PRESENT
NAUSEA AND VOMITING: NO NAUSEA AND NO VOMITING
ORIENTATION AND CLOUDING OF SENSORIUM: ORIENTED AND CAN DO SERIAL ADDITIONS
AUDITORY DISTURBANCES: NOT PRESENT
PAROXYSMAL SWEATS: NO SWEAT VISIBLE
AGITATION: NORMAL ACTIVITY
TOTAL SCORE: 1

## 2023-04-11 ASSESSMENT — ENCOUNTER SYMPTOMS
MEMORY LOSS: 1
ABDOMINAL PAIN: 0
SHORTNESS OF BREATH: 1
DIZZINESS: 1
WHEEZING: 0
WEAKNESS: 1
MYALGIAS: 1
COUGH: 1
NAUSEA: 0

## 2023-04-11 ASSESSMENT — PAIN DESCRIPTION - PAIN TYPE
TYPE: ACUTE PAIN
TYPE: ACUTE PAIN

## 2023-04-11 NOTE — PROGRESS NOTES
Hospital Medicine Daily Progress Note    Date of Service  4/11/2023    Chief Complaint  Juan Manuel Bass is a 72 y.o. male admitted 4/7/2023 with fall    Hospital Course  73 yo man with asthma vs RAD, HTN, chronically on 3L O2 who fell from bed without LOC. Patient had just been discharged from [facility] the day before for hyponatremia needing hypertonic saline and fall that time as well. CTH had no new findings, prior scalp hematoma. CTA chest was neg for PE. He was admitted for acute COPD flare, PTOT eval.     Interval Problem Update  No acute events overnight.  Patient on baseline 2-3L oxygen.  Patient reports dizziness with standing, had patient stand at bedside and he reports minor dizziness.  Given hx of falls and presyncope, echo ordered.  Discussed alcohol cessation recommendations as this may have been contributing to falls at home.  Na 132, improving and stable. Continue salt tablets.  Home health ordered for patient.  Anticipate discharge home tomorrow if echocardiogram is normal.      I have discussed this patient's plan of care and discharge plan at IDT rounds today with Case Management, Nursing, Nursing leadership, and other members of the IDT team.    Consultants/Specialty  pulmonary    Code Status  Full Code    Disposition  Patient is not medically cleared for discharge.   Anticipate discharge to to home with organized home healthcare and close outpatient follow-up.  I have placed the appropriate orders for post-discharge needs.    Review of Systems  Review of Systems   Constitutional:  Positive for malaise/fatigue.   Respiratory:  Positive for cough and shortness of breath. Negative for wheezing.    Cardiovascular:  Negative for chest pain.   Gastrointestinal:  Negative for abdominal pain and nausea.   Musculoskeletal:  Positive for myalgias.   Neurological:  Positive for dizziness and weakness.   Psychiatric/Behavioral:  Positive for memory loss.       Physical Exam  Temp:  [36.2 °C (97.2 °F)-36.5 °C (97.7  °F)] 36.2 °C (97.2 °F)  Pulse:  [74-88] 88  Resp:  [18-20] 18  BP: (117-132)/(61-64) 124/64  SpO2:  [94 %-100 %] 94 %    Physical Exam  Vitals and nursing note reviewed.   Constitutional:       Appearance: He is ill-appearing.   HENT:      Head:      Comments: Bruising, hematoma and swelling to left side of face and forehead     Mouth/Throat:      Mouth: Mucous membranes are moist.   Eyes:      General:         Right eye: No discharge.         Left eye: No discharge.      Comments: Erythematous left conjunctiva   Cardiovascular:      Rate and Rhythm: Normal rate and regular rhythm.   Pulmonary:      Breath sounds: No wheezing or rales.      Comments: Tachypneic, poor air movement throughout  Abdominal:      Palpations: Abdomen is soft.      Tenderness: There is no abdominal tenderness. There is no guarding or rebound.   Musculoskeletal:      Cervical back: Neck supple.      Right lower leg: Edema present.      Left lower leg: Edema present.   Skin:     General: Skin is warm and dry.      Comments: Bruising abrasions to his arms   Neurological:      Mental Status: He is alert.      Comments: Oriented to self, year, hospital.  Disoriented to month       Fluids    Intake/Output Summary (Last 24 hours) at 4/11/2023 1331  Last data filed at 4/11/2023 0400  Gross per 24 hour   Intake 240 ml   Output 1900 ml   Net -1660 ml       Laboratory  Recent Labs     04/09/23  0930   WBC 10.9*   RBC 3.78*   HEMOGLOBIN 12.3*   HEMATOCRIT 36.2*   MCV 95.8   MCH 32.5   MCHC 34.0   RDW 45.3   PLATELETCT 303   MPV 9.0     Recent Labs     04/09/23  0930 04/10/23  0809 04/11/23  0014   SODIUM 131* 130* 132*   POTASSIUM 4.7 4.9 4.2   CHLORIDE 94* 94* 96   CO2 24 26 25   GLUCOSE 208* 139* 96   BUN 8 9 8   CREATININE 0.49* 0.57 0.56   CALCIUM 9.3 9.4 8.8                   Imaging  CT-HEAD W/O   Final Result         1. No acute intracranial abnormality. No evidence of acute intracranial hemorrhage or mass lesion.         2. Left forehead scalp  hematoma, similar to prior               CT-CTA CHEST PULMONARY ARTERY W/ RECONS   Final Result         No significant interval change.      1. No CT evidence of pulmonary embolism.      2. Patchy bibasilar atelectasis.      DX-ELBOW-COMPLETE 3+ RIGHT   Final Result         No acute osseous abnormality.      DX-CHEST-PORTABLE (1 VIEW)   Final Result         Hazy left basilar opacity, similar to prior, likely atelectasis      EC-ECHOCARDIOGRAM COMPLETE W/O CONT    (Results Pending)        Assessment/Plan  * Ground-level fall- (present on admission)  Assessment & Plan  Recurring fall  PT OT recommend home health    Aspiration pneumonitis (HCC)  Assessment & Plan  CT scan shows evidence of repeated aspiration episodes   Pulmonology consulted, likely related to alcohol use  Monitor for signs of developing pneumonia, no need for antibiotic at this time  WBC has decreased, afebrile  Speech therapy has evaluated him    Leukocytosis  Assessment & Plan  Likely reactive, minimal elevation  procalcitonin was low  WBC has decreased    Dysphagia- (present on admission)  Assessment & Plan  Recently had fees  Speech therapy placed on a modified diet    Asthma- (present on admission)  Assessment & Plan  Seen by pulmonology and does not have COPD  Likely has asthma versus RAD  Complete steroids, nebs   Needs outpatient sleep study    Acute on chronic respiratory failure with hypoxia (HCC)- (present on admission)  Assessment & Plan  On 3 L at home  Continues to be very symptomatic with ambulation and at high risk for falls when he returns home.  Continue treatment with steroids and nebs in the hospital and monitor for clinical improvement  Pulmonology recommends sleep study    Alcohol use disorder- (present on admission)  Assessment & Plan  Was barely home 1 day from the hospital  Monitor for signs of alcohol withdrawal, none currently      Hyponatremia- (present on admission)  Assessment & Plan  Recently hospitalized for this and  placed on salt tablets  continue salt tablets  Na 132, improving and stable    Primary hypertension- (present on admission)  Assessment & Plan  BP stable, continue Norvasc         VTE prophylaxis: SCDs/TEDs    I have performed a physical exam and reviewed and updated ROS and Plan today (4/11/2023). In review of yesterday's note (4/10/2023), there are no changes except as documented above.

## 2023-04-11 NOTE — PROGRESS NOTES
Rec'd report from NOC RN. Pt ao x 4, pleasant and cooperative, denies pain.  Pt POA visited earlier this shift to speak about advance care directive options and worked with CM to update documents regarding pt.  Pt meds passed per MAR, sustained no injuries.  Ambulated pt 100 ft out in [place] on RA - tolerated well. Pt condom catheter kept getting removed but was replaced.  Pt call light in reach, bed at lowest position, no needs at this time.

## 2023-04-11 NOTE — CARE PLAN
The patient is Stable - Low risk of patient condition declining or worsening    Shift Goals  Clinical Goals: CIWA, safety  Patient Goals: safe dc  Family Goals: ALEKSANDAR    Progress made toward(s) clinical / shift goals:  pt meds passed per MAR, no injuries this shift      Problem: Knowledge Deficit - Standard  Goal: Patient and family/care givers will demonstrate understanding of plan of care, disease process/condition, diagnostic tests and medications  Outcome: Progressing     Problem: Knowledge Deficit - COPD  Goal: Patient/significant other demonstrates understanding of disease process, utilization of the Action Plan, medications and discharge instruction  Outcome: Progressing     Problem: Risk for Infection - COPD  Goal: Patient will remain free from signs and symptoms of infection  Outcome: Progressing     Problem: Nutrition - Advanced  Goal: Patient will display progressive weight gain toward goal have adequate food and fluid intake  Outcome: Progressing     Problem: Ineffective Airway Clearance  Goal: Patient will maintain patent airway with clear/clearing breath sounds  Outcome: Progressing     Problem: Impaired Gas Exchange  Goal: Patient will demonstrate improved ventilation and adequate oxygenation and participate in treatment regimen within the level of ability/situation.  Outcome: Progressing     Problem: Risk for Aspiration  Goal: Patient's risk for aspiration will be absent or decrease  Outcome: Progressing     Problem: Self Care  Goal: Patient will have the ability to perform ADLs independently or with assistance (bathe, groom, dress, toilet and feed)  Outcome: Progressing     Problem: Optimal Care for Alcohol Withdrawal  Goal: Optimal Care for the alcohol withdrawal patient  Outcome: Progressing     Problem: Seizure Precautions  Goal: Implementation of seizure precautions  Outcome: Progressing     Problem: Lifestyle Changes  Goal: Patient's ability to identify lifestyle changes and available resources  to help reduce recurrence of condition will improve  Outcome: Progressing     Problem: Psychosocial  Goal: Patient's level of anxiety will decrease  Outcome: Progressing  Goal: Spiritual and cultural needs incorporated into hospitalization  Outcome: Progressing     Problem: Pain - Standard  Goal: Alleviation of pain or a reduction in pain to the patient’s comfort goal  Outcome: Progressing     Problem: Fall Risk  Goal: Patient will remain free from falls  Outcome: Progressing     Problem: Skin Integrity  Goal: Skin integrity is maintained or improved  Outcome: Progressing       Patient is not progressing towards the following goals:

## 2023-04-12 VITALS
OXYGEN SATURATION: 96 % | RESPIRATION RATE: 18 BRPM | DIASTOLIC BLOOD PRESSURE: 76 MMHG | WEIGHT: 237.66 LBS | BODY MASS INDEX: 37.3 KG/M2 | SYSTOLIC BLOOD PRESSURE: 122 MMHG | TEMPERATURE: 97.2 F | HEART RATE: 82 BPM | HEIGHT: 67 IN

## 2023-04-12 LAB
LV EJECT FRACT  99904: 65
LV EJECT FRACT MOD 2C 99903: 48.22
LV EJECT FRACT MOD 4C 99902: 40.81
LV EJECT FRACT MOD BP 99901: 29.07

## 2023-04-12 PROCEDURE — A9270 NON-COVERED ITEM OR SERVICE: HCPCS | Performed by: HOSPITALIST

## 2023-04-12 PROCEDURE — 700102 HCHG RX REV CODE 250 W/ 637 OVERRIDE(OP): Performed by: INTERNAL MEDICINE

## 2023-04-12 PROCEDURE — 99239 HOSP IP/OBS DSCHRG MGMT >30: CPT | Performed by: STUDENT IN AN ORGANIZED HEALTH CARE EDUCATION/TRAINING PROGRAM

## 2023-04-12 PROCEDURE — 97530 THERAPEUTIC ACTIVITIES: CPT | Mod: CQ

## 2023-04-12 PROCEDURE — 700101 HCHG RX REV CODE 250: Performed by: INTERNAL MEDICINE

## 2023-04-12 PROCEDURE — 700111 HCHG RX REV CODE 636 W/ 250 OVERRIDE (IP): Performed by: HOSPITALIST

## 2023-04-12 PROCEDURE — 94669 MECHANICAL CHEST WALL OSCILL: CPT

## 2023-04-12 PROCEDURE — 93306 TTE W/DOPPLER COMPLETE: CPT | Mod: 26 | Performed by: INTERNAL MEDICINE

## 2023-04-12 PROCEDURE — 97116 GAIT TRAINING THERAPY: CPT | Mod: CQ

## 2023-04-12 PROCEDURE — A9270 NON-COVERED ITEM OR SERVICE: HCPCS | Performed by: INTERNAL MEDICINE

## 2023-04-12 PROCEDURE — 700102 HCHG RX REV CODE 250 W/ 637 OVERRIDE(OP): Performed by: HOSPITALIST

## 2023-04-12 PROCEDURE — 97535 SELF CARE MNGMENT TRAINING: CPT

## 2023-04-12 PROCEDURE — 94640 AIRWAY INHALATION TREATMENT: CPT

## 2023-04-12 RX ADMIN — OMEPRAZOLE 20 MG: 20 CAPSULE, DELAYED RELEASE ORAL at 04:51

## 2023-04-12 RX ADMIN — SODIUM CHLORIDE 1 G: 1 TABLET ORAL at 08:39

## 2023-04-12 RX ADMIN — Medication: at 04:52

## 2023-04-12 RX ADMIN — IPRATROPIUM BROMIDE AND ALBUTEROL SULFATE 3 ML: 2.5; .5 SOLUTION RESPIRATORY (INHALATION) at 07:55

## 2023-04-12 RX ADMIN — UMECLIDINIUM BROMIDE AND VILANTEROL TRIFENATATE 1 PUFF: 62.5; 25 POWDER RESPIRATORY (INHALATION) at 04:51

## 2023-04-12 RX ADMIN — ACETAMINOPHEN 650 MG: 325 TABLET, FILM COATED ORAL at 08:39

## 2023-04-12 RX ADMIN — IPRATROPIUM BROMIDE AND ALBUTEROL SULFATE 3 ML: 2.5; .5 SOLUTION RESPIRATORY (INHALATION) at 14:43

## 2023-04-12 RX ADMIN — Medication 1 EACH: at 16:48

## 2023-04-12 RX ADMIN — AMLODIPINE BESYLATE 5 MG: 5 TABLET ORAL at 04:51

## 2023-04-12 RX ADMIN — SODIUM CHLORIDE 1 G: 1 TABLET ORAL at 16:47

## 2023-04-12 RX ADMIN — SODIUM CHLORIDE 1 G: 1 TABLET ORAL at 13:04

## 2023-04-12 RX ADMIN — FLUTICASONE PROPIONATE 88 MCG: 44 AEROSOL, METERED RESPIRATORY (INHALATION) at 04:51

## 2023-04-12 RX ADMIN — PREDNISONE 40 MG: 20 TABLET ORAL at 08:39

## 2023-04-12 ASSESSMENT — PAIN DESCRIPTION - PAIN TYPE
TYPE: ACUTE PAIN
TYPE: ACUTE PAIN

## 2023-04-12 ASSESSMENT — COGNITIVE AND FUNCTIONAL STATUS - GENERAL
DAILY ACTIVITIY SCORE: 18
PERSONAL GROOMING: A LITTLE
WALKING IN HOSPITAL ROOM: A LITTLE
EATING MEALS: A LITTLE
DRESSING REGULAR LOWER BODY CLOTHING: A LITTLE
SUGGESTED CMS G CODE MODIFIER MOBILITY: CJ
CLIMB 3 TO 5 STEPS WITH RAILING: A LITTLE
DRESSING REGULAR UPPER BODY CLOTHING: A LITTLE
SUGGESTED CMS G CODE MODIFIER DAILY ACTIVITY: CK
TOILETING: A LITTLE
MOVING FROM LYING ON BACK TO SITTING ON SIDE OF FLAT BED: A LITTLE
HELP NEEDED FOR BATHING: A LITTLE
MOBILITY SCORE: 20
STANDING UP FROM CHAIR USING ARMS: A LITTLE

## 2023-04-12 ASSESSMENT — GAIT ASSESSMENTS
ASSISTIVE DEVICE: FRONT WHEEL WALKER
DISTANCE (FEET): 130
GAIT LEVEL OF ASSIST: SUPERVISED
DEVIATION: BRADYKINETIC;DECREASED HEEL STRIKE;DECREASED TOE OFF

## 2023-04-12 NOTE — DISCHARGE PLANNING
Received Choice form at [phone]  Agency/Facility Name: [facility]  Referral sent per Choice form @ [phone]

## 2023-04-12 NOTE — DISCHARGE PLANNING
Case Management Discharge Planning    Admission Date: 4/7/2023  GMLOS: 3.4  ALOS: 4    6-Clicks ADL Score: 18  6-Clicks Mobility Score: 20      Anticipated Discharge Dispo: Discharge Disposition: D/T to home under HHA care in anticipation of covered skilled care (06) (Resume services with [facility])  Discharge Address: [address] [place] [address] [address] [address]  Discharge Contact Phone Number: [phone]    DME Needed: No    Action(s) Taken: Updated Provider/Nurse on Discharge Plan, Choice obtained, Referral(s) sent, and OTHER: HH choice sent to [facility] to resume services on discharge. RN CM spoke with caregiver, Kennedi, who states the patient has family in [facility]; however, he has no contact with any of them. Patient stating he does not want SNF placement and is agreeable to returning home with [facility] again.    Escalations Completed: Provider and Bedside RN    Medically Clear: Yes    Next Steps: Continue to monitor for changes and update the discharge plan accordingly. Follow-up with the DPA regarding HH acceptance/resumption of services.    Barriers to Discharge: None    Is the patient up for discharge tomorrow: No, patient is set to discharge home today with  services.

## 2023-04-12 NOTE — THERAPY
"Occupational Therapy  Daily Treatment     Patient Name: Juan Manuel Bass  Age:  72 y.o., Sex:  male  Medical Record #: [id number]  Today's Date: 4/12/2023     Precautions  Precautions: (P) Fall Risk, Swallow Precautions    Assessment    Pt seen for follow up OT tx session, pt yelling out needing to use the restroom pt assisted with bed mobility and bathroom mobility. Pt requiring less overall assistance with functional tasks, likely near his functional baseline, pt still requesting to discharge home versus placement, will recommend home health.    Plan    Treatment Plan Status: (P) Continue Current Treatment Plan  Type of Treatment: Self Care / Activities of Daily Living, Adaptive Equipment, Neuro Re-Education / Balance, Therapeutic Exercises, Therapeutic Activity  Treatment Frequency: 3 Times per Week  Treatment Duration: Until Therapy Goals Met    DC Equipment Recommendations: (P) None  Discharge Recommendations: (P) Recommend home health for continued occupational therapy services    Subjective    \"I gotta go, when you gotta go you gotta go\"     Objective       04/12/23 0940   Precautions   Precautions Fall Risk;Swallow Precautions   Pain 0 - 10 Group   Therapist Pain Assessment Post Activity Pain Same as Prior to Activity;Nurse Notified   Cognition    Cognition / Consciousness X   Level of Consciousness Alert   Safety Awareness Impaired   Balance   Sitting Balance (Static) Fair   Sitting Balance (Dynamic) Fair   Standing Balance (Static) Fair -   Standing Balance (Dynamic) Fair -   Weight Shift Sitting Fair   Weight Shift Standing Fair   Skilled Intervention Facilitation;Verbal Cuing   Comments w/ FWW   Bed Mobility    Supine to Sit Supervised   Rolling Supervised   Skilled Intervention Verbal Cuing;Facilitation   Activities of Daily Living   Grooming Supervision;Seated   Upper Body Dressing Supervision   Lower Body Dressing Minimal Assist   Skilled Intervention Verbal Cuing;Facilitation   How much help from another " person does the patient currently need...   Putting on and taking off regular lower body clothing? 3   Bathing (including washing, rinsing, and drying)? 3   Toileting, which includes using a toilet, bedpan, or urinal? 3   Putting on and taking off regular upper body clothing? 3   Taking care of personal grooming such as brushing teeth? 3   Eating meals? 3   6 Clicks Daily Activity Score 18   Functional Mobility   Sit to Stand Standby Assist   Bed, Chair, Wheelchair Transfer Contact Guard Assist   Toilet Transfers Contact Guard Assist   Transfer Method Stand Step   Mobility bed mobility, bathroom mobility   Skilled Intervention Verbal Cuing;Facilitation   Comments w/ FWW   Activity Tolerance   Sitting in Chair left seated on toilet   Standing 5 min   Short Term Goals   Short Term Goal # 1 Pt will complete functional transfers with supervision   Goal Outcome # 1 Progressing as expected   Short Term Goal # 2 Pt will complete LB dressing with supervision   Goal Outcome # 2 Progressing as expected   Short Term Goal # 3 Pt will complete toileting with supervision   Goal Outcome # 3 Progressing as expected   Education Group   Education Provided Role of Occupational Therapist   Role of Occupational Therapist Patient Response Patient;Acceptance;Explanation;Verbal Demonstration   Occupational Therapy Treatment Plan    O.T. Treatment Plan Continue Current Treatment Plan   Anticipated Discharge Equipment and Recommendations   DC Equipment Recommendations None   Discharge Recommendations Recommend home health for continued occupational therapy services   Interdisciplinary Plan of Care Collaboration   IDT Collaboration with  Nursing   Patient Position at End of Therapy Seated;Call Light within Reach   Collaboration Comments RN updated

## 2023-04-12 NOTE — DISCHARGE PLANNING
DC Transport Scheduled    Received request at: 4/12/2023 AT 1415    Transport Company Scheduled:  [employer]    Scheduled Date: 4/12/2023  Scheduled Time: 1730    Destination: Home at [address] [facility] [address] [facility]     Notified care team of scheduled transport via Voalte.     If there are any changes needed to the DC transportation scheduled, please contact [facility] at ext. [phone] between the hours of 4411-0711 Mon-Fri. If outside those hours, contact the ED Case Manager at ext. [phone].

## 2023-04-12 NOTE — CARE PLAN
Problem: Bronchoconstriction  Goal: Improve in air movement and diminished wheezing  Description: Target End Date:  2 to 3 days    1.  Implement inhaled treatments  2.  Evaluate and manage medication effects  Outcome: Progressing     Problem: Bronchopulmonary Hygiene  Goal: Increase mobilization of retained secretions  Description: Target End Date:  2 to 3 days    1.  Perform bronchopulmonary therapy as indicated by assessment  2.  Perform airway suctioning  3.  Perform actions to maintain patient airway  Outcome: Progressing     Duo & Flutter QID

## 2023-04-12 NOTE — DISCHARGE SUMMARY
Discharge Summary    CHIEF COMPLAINT ON ADMISSION  Chief Complaint   Patient presents with    Fall     PT HAD A FALL THIS EVENING AND STATES HE TRIPPED OVER SOMETHING ON THE FLOOR. PATIENT WAS JUST D/C FOR LOW SOIDUM LEVELS. PT WITH +ETOH. -THINNERS - HEAD STRIKE.     PT WITH AN OLD HEALING HEMATOMA TO LEFT EYE WITH DIFFERENT HEALING STAGES.        Reason for Admission  Ground-level fall     Admission Date  4/7/2023    CODE STATUS  Full Code    HPI & HOSPITAL COURSE  73 yo man with asthma vs RAD, HTN, chronically on 3L O2 who fell from bed without LOC. Patient had just been discharged from [facility] the day before for hyponatremia needing hypertonic saline and fall that time as well. CTH had no new findings, prior scalp hematoma. CTA chest was neg for PE. He was admitted for suspected acute COPD flare. He was treated with duonebs and steroids. Pulmonology do not suspect patient has COPD but rather asthma or reactive airway disease. They recommend outpatient sleep study. He is on his baseline oxygen at time of discharge and in no respiratory distress. Patient reported presyncope with standing, orthostatic vital signs normal. Echocardiogram with normal findings. PT/OT recommend home health services which have been arranged for patient. Patient's sodium levels in low near normal range during this hospitalization. Cardiac monitor ordered for patient in the outpatient setting, cardiology referral made as well. Patient is to follow up with his PCP, pulmonology, and cardiology.    Therefore, he is discharged in fair and stable condition to home with organized home healthcare and close outpatient follow-up.    The patient met 2-midnight criteria for an inpatient stay at the time of discharge.    Discharge Date  4/12/2023    FOLLOW UP ITEMS POST DISCHARGE  Take medications as prescribed.  Follow up with PCP, pulmonology, cardiology.    DISCHARGE DIAGNOSES  Principal Problem:    Ground-level fall POA: Yes  Active Problems:     Primary hypertension POA: Yes    Hyponatremia POA: Yes    Alcohol use disorder POA: Yes    Acute on chronic respiratory failure with hypoxia (HCC) POA: Yes    Asthma POA: Yes    Dysphagia POA: Yes    Leukocytosis POA: Unknown    Aspiration pneumonitis (HCC) POA: Unknown  Resolved Problems:    * No resolved hospital problems. *      FOLLOW UP  No future appointments.  Peterson Amin M.D.  [address] [address]  [phone]    Follow up in 1 week(s)        MEDICATIONS ON DISCHARGE     Medication List        CONTINUE taking these medications        Instructions   albuterol 108 (90 Base) MCG/ACT Aers inhalation aerosol   Inhale 1-2 Puffs every four hours as needed for Shortness of Breath.  Dose: 1-2 Puff     amLODIPine 5 MG Tabs  Commonly known as: NORVASC  Next Dose Due: 4/13   Take 5 mg by mouth every day.  Dose: 5 mg     pantoprazole 40 MG Tbec  Commonly known as: PROTONIX  Next Dose Due: 4/13   Take 40 mg by mouth every day.  Dose: 40 mg     sodium chloride 1 GM Tabs  Commonly known as: SALT  Next Dose Due: 4/13   Take 1 Tablet by mouth 3 times a day with meals.  Dose: 1 g     Trelegy Ellipta 100-62.5-25 MCG/ACT Aepb inhalation  Generic drug: fluticasone-umeclidin-vilant   Inhale 2-3 Inhalation every morning.  Dose: 2-3 Inhalation.              Allergies  Allergies   Allergen Reactions    Tetanus Toxoid Hives       DIET  Orders Placed This Encounter   Procedures    Diet Order Diet: Level 6 - Soft and Bite Sized (Needs assist with meal tray set-up.); Liquid level: Level 2 - Mildly Thick; Tray Modifications (optional): No Straws, SLP - Deliver to Nursing Station     Standing Status:   Standing     Number of Occurrences:   1     Order Specific Question:   Diet:     Answer:   Level 6 - Soft and Bite Sized [23]     Comments:   Needs assist with meal tray set-up.     Order Specific Question:   Liquid level     Answer:   Level 2 - Mildly Thick     Order Specific Question:   Tray Modifications  (optional)     Answer:   No Straws     Order Specific Question:   Tray Modifications (optional)     Answer:   SLP - Deliver to Nursing Station       ACTIVITY  As tolerated.  Weight bearing as tolerated    CONSULTATIONS  pulmonology    PROCEDURES  none    LABORATORY  Lab Results   Component Value Date    SODIUM 132 (L) 04/11/2023    POTASSIUM 4.2 04/11/2023    CHLORIDE 96 04/11/2023    CO2 25 04/11/2023    GLUCOSE 96 04/11/2023    BUN 8 04/11/2023    CREATININE 0.56 04/11/2023        Lab Results   Component Value Date    WBC 10.9 (H) 04/09/2023    HEMOGLOBIN 12.3 (L) 04/09/2023    HEMATOCRIT 36.2 (L) 04/09/2023    PLATELETCT 303 04/09/2023        Total time of the discharge process exceeds 35 minutes.

## 2023-04-12 NOTE — CARE PLAN
The patient is Stable - Low risk of patient condition declining or worsening    Shift Goals  Clinical Goals: safety; pain control; rest  Patient Goals: go home  Family Goals: n/a    Progress made toward(s) clinical / shift goals:    Problem: Knowledge Deficit - Standard  Goal: Patient and family/care givers will demonstrate understanding of plan of care, disease process/condition, diagnostic tests and medications  Outcome: Progressing     Problem: Impaired Gas Exchange  Goal: Patient will demonstrate improved ventilation and adequate oxygenation and participate in treatment regimen within the level of ability/situation.  Outcome: Progressing     Problem: Risk for Aspiration  Goal: Patient's risk for aspiration will be absent or decrease  Outcome: Progressing       Patient is not progressing towards the following goals:

## 2023-04-12 NOTE — THERAPY
Physical Therapy   Discharge     Patient Name: Juan Manuel Bass  Age:  72 y.o., Sex:  male  Medical Record #: [id number]  Today's Date: 4/12/2023     Precautions  Precautions: Fall Risk;Swallow Precautions    Assessment    Pt greeted and seen for PT treatment. Pt performed transfers and ambulated w/ FWW 130ft w/ SPV, pt used FWW at baseline. Pt ascend/descend 4 stairs using single R handrail, demo'd no LOB, good sequencing. Pt reports no concern for Dcing home, pt has friend who does grocery shopping for him and reports that he doesn't leave his RV unless there's somebody near him while performing the stairs. Pt to be Dc'd 2/2 goals met. Pt has own FWW.       Plan    Reason for Discharge From Therapy:      DC Equipment Recommendations: None (has FWW)  Discharge Recommendations: Recommend home health for continued physical therapy services (if available)         04/12/23 1118   Cognition    Comments cooperative, agreeable   Balance   Sitting Balance (Static) Fair   Sitting Balance (Dynamic) Fair   Standing Balance (Static) Fair   Standing Balance (Dynamic) Fair   Weight Shift Sitting Fair   Weight Shift Standing Fair   Skilled Intervention Verbal Cuing   Comments w/ FWW   Bed Mobility    Scooting Supervised   Comments pt sitting EOB pre/post session   Gait Analysis   Gait Level Of Assist Supervised   Assistive Device Front Wheel Walker   Distance (Feet) 130   # of Times Distance was Traveled 1   Deviation Bradykinetic;Decreased Heel Strike;Decreased Toe Off   # of Stairs Climbed 4   Level of Assist with Stairs Standby Assist   Skilled Intervention Verbal Cuing;Sequencing;Tactile Cuing   Comments used R single hand rail   Functional Mobility   Sit to Stand Supervised   Bed, Chair, Wheelchair Transfer Supervised   Transfer Method Stand Step   Mobility ambulation, stairs   Skilled Intervention Verbal Cuing;Tactile Cuing;Sequencing   Comments w/ FWW   Short Term Goals    Short Term Goal # 1 Pt will demonstrate sit to stand from  chair, EOB and toilet with good safety awareness and Supervision in 6 visits   Goal Outcome # 1 Goal met   Short Term Goal # 2 pt will ambulate with  feet with supervision in 6 visits   Goal Outcome # 2 Progressing as expected   Short Term Goal # 3 pt will ascend/descend 3 steps with use of railing, SBA and good safety awareness in 6 visits   Goal Outcome # 3 Goal met   Supervising Physical Therapist (PTA Treatments Only)   Supervising Physical Therapist Nubia Bolaños

## 2023-04-12 NOTE — PROGRESS NOTES
All discharge education given. Any questions answered. PIV removed. All belongings gathered.   Transportation coming at 1730.

## 2023-04-20 ENCOUNTER — APPOINTMENT (OUTPATIENT)
Dept: RADIOLOGY | Facility: MEDICAL CENTER | Age: 73
DRG: 871 | End: 2023-04-20
Attending: EMERGENCY MEDICINE
Payer: MEDICARE

## 2023-04-20 ENCOUNTER — HOSPITAL ENCOUNTER (INPATIENT)
Facility: MEDICAL CENTER | Age: 73
LOS: 4 days | DRG: 871 | End: 2023-04-24
Attending: EMERGENCY MEDICINE | Admitting: INTERNAL MEDICINE
Payer: MEDICARE

## 2023-04-20 DIAGNOSIS — J18.9 PNEUMONIA OF LEFT LOWER LOBE DUE TO INFECTIOUS ORGANISM: ICD-10-CM

## 2023-04-20 DIAGNOSIS — J96.11 CHRONIC RESPIRATORY FAILURE WITH HYPOXIA (HCC): ICD-10-CM

## 2023-04-20 DIAGNOSIS — J44.1 ACUTE EXACERBATION OF CHRONIC OBSTRUCTIVE PULMONARY DISEASE (COPD) (HCC): ICD-10-CM

## 2023-04-20 DIAGNOSIS — S09.90XA CLOSED HEAD INJURY, INITIAL ENCOUNTER: ICD-10-CM

## 2023-04-20 DIAGNOSIS — R55 NEAR SYNCOPE: ICD-10-CM

## 2023-04-20 DIAGNOSIS — K21.9 GASTROESOPHAGEAL REFLUX DISEASE, UNSPECIFIED WHETHER ESOPHAGITIS PRESENT: ICD-10-CM

## 2023-04-20 DIAGNOSIS — R41.82 ALTERED MENTAL STATUS, UNSPECIFIED ALTERED MENTAL STATUS TYPE: ICD-10-CM

## 2023-04-20 DIAGNOSIS — R78.81 BACTEREMIA: ICD-10-CM

## 2023-04-20 DIAGNOSIS — J44.9 CHRONIC OBSTRUCTIVE PULMONARY DISEASE, UNSPECIFIED COPD TYPE (HCC): ICD-10-CM
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
## 2023-04-20 DIAGNOSIS — E87.1 HYPONATREMIA: ICD-10-CM

## 2023-04-20 DIAGNOSIS — F10.29 ALCOHOL DEPENDENCE WITH UNSPECIFIED ALCOHOL-INDUCED DISORDER (HCC): ICD-10-CM

## 2023-04-20 DIAGNOSIS — Z66 DNR (DO NOT RESUSCITATE): ICD-10-CM

## 2023-04-20 DIAGNOSIS — F10.931 ALCOHOL WITHDRAWAL DELIRIUM (HCC): ICD-10-CM

## 2023-04-20 DIAGNOSIS — L03.116 CELLULITIS OF LEFT LOWER EXTREMITY: ICD-10-CM

## 2023-04-20 DIAGNOSIS — I10 PRIMARY HYPERTENSION: ICD-10-CM

## 2023-04-20 DIAGNOSIS — R53.81 DEBILITY: ICD-10-CM

## 2023-04-20 DIAGNOSIS — J96.21 ACUTE ON CHRONIC RESPIRATORY FAILURE WITH HYPOXIA (HCC): ICD-10-CM

## 2023-04-20 DIAGNOSIS — K21.9 GASTROESOPHAGEAL REFLUX DISEASE WITHOUT ESOPHAGITIS: ICD-10-CM

## 2023-04-20 PROBLEM — L03.90 CELLULITIS: Status: ACTIVE | Noted: 2023-04-20

## 2023-04-20 LAB
ALBUMIN SERPL BCP-MCNC: 4.2 G/DL (ref 3.2–4.9)
ALBUMIN/GLOB SERPL: 1.4 G/DL
ALP SERPL-CCNC: 102 U/L (ref 30–99)
ALT SERPL-CCNC: 34 U/L (ref 2–50)
ANION GAP SERPL CALC-SCNC: 15 MMOL/L (ref 7–16)
APPEARANCE UR: CLEAR
APTT PPP: 35.1 SEC (ref 24.7–36)
AST SERPL-CCNC: 53 U/L (ref 12–45)
BACTERIA #/AREA URNS HPF: NEGATIVE /HPF
BASOPHILS # BLD AUTO: 0.1 % (ref 0–1.8)
BASOPHILS # BLD: 0.01 K/UL (ref 0–0.12)
BILIRUB SERPL-MCNC: 1.3 MG/DL (ref 0.1–1.5)
BILIRUB UR QL STRIP.AUTO: NEGATIVE
BUN SERPL-MCNC: 6 MG/DL (ref 8–22)
CALCIUM ALBUM COR SERPL-MCNC: 8.8 MG/DL (ref 8.5–10.5)
CALCIUM SERPL-MCNC: 9 MG/DL (ref 8.5–10.5)
CHLORIDE SERPL-SCNC: 75 MMOL/L (ref 96–112)
CO2 SERPL-SCNC: 23 MMOL/L (ref 20–33)
COLOR UR: YELLOW
CORTIS SERPL-MCNC: 14.7 UG/DL (ref 0–23)
CREAT SERPL-MCNC: 0.45 MG/DL (ref 0.5–1.4)
EKG IMPRESSION: NORMAL
EOSINOPHIL # BLD AUTO: 0.07 K/UL (ref 0–0.51)
EOSINOPHIL NFR BLD: 0.5 % (ref 0–6.9)
EPI CELLS #/AREA URNS HPF: NEGATIVE /HPF
ERYTHROCYTE [DISTWIDTH] IN BLOOD BY AUTOMATED COUNT: 42.1 FL (ref 35.9–50)
ETHANOL BLD-MCNC: <10.1 MG/DL
FLUAV RNA SPEC QL NAA+PROBE: NEGATIVE
FLUBV RNA SPEC QL NAA+PROBE: NEGATIVE
GFR SERPLBLD CREATININE-BSD FMLA CKD-EPI: 111 ML/MIN/1.73 M 2
GLOBULIN SER CALC-MCNC: 3.1 G/DL (ref 1.9–3.5)
GLUCOSE SERPL-MCNC: 87 MG/DL (ref 65–99)
GLUCOSE UR STRIP.AUTO-MCNC: NEGATIVE MG/DL
HCT VFR BLD AUTO: 37.1 % (ref 42–52)
HGB BLD-MCNC: 13.2 G/DL (ref 14–18)
IMM GRANULOCYTES # BLD AUTO: 0.11 K/UL (ref 0–0.11)
IMM GRANULOCYTES NFR BLD AUTO: 0.8 % (ref 0–0.9)
INR PPP: 1.08 (ref 0.87–1.13)
KETONES UR STRIP.AUTO-MCNC: ABNORMAL MG/DL
LACTATE SERPL-SCNC: 1 MMOL/L (ref 0.5–2)
LACTATE SERPL-SCNC: 2.5 MMOL/L (ref 0.5–2)
LEUKOCYTE ESTERASE UR QL STRIP.AUTO: ABNORMAL
LIPASE SERPL-CCNC: 18 U/L (ref 11–82)
LYMPHOCYTES # BLD AUTO: 0.79 K/UL (ref 1–4.8)
LYMPHOCYTES NFR BLD: 6 % (ref 22–41)
MAGNESIUM SERPL-MCNC: 1.6 MG/DL (ref 1.5–2.5)
MCH RBC QN AUTO: 32.4 PG (ref 27–33)
MCHC RBC AUTO-ENTMCNC: 35.6 G/DL (ref 33.7–35.3)
MCV RBC AUTO: 90.9 FL (ref 81.4–97.8)
MICRO URNS: ABNORMAL
MONOCYTES # BLD AUTO: 0.94 K/UL (ref 0–0.85)
MONOCYTES NFR BLD AUTO: 7.1 % (ref 0–13.4)
NEUTROPHILS # BLD AUTO: 11.3 K/UL (ref 1.82–7.42)
NEUTROPHILS NFR BLD: 85.5 % (ref 44–72)
NITRITE UR QL STRIP.AUTO: NEGATIVE
NRBC # BLD AUTO: 0 K/UL
NRBC BLD-RTO: 0 /100 WBC
NT-PROBNP SERPL IA-MCNC: 281 PG/ML (ref 0–125)
PH UR STRIP.AUTO: 6 [PH] (ref 5–8)
PLATELET # BLD AUTO: 387 K/UL (ref 164–446)
PMV BLD AUTO: 9 FL (ref 9–12.9)
POTASSIUM SERPL-SCNC: 4 MMOL/L (ref 3.6–5.5)
PROT SERPL-MCNC: 7.3 G/DL (ref 6–8.2)
PROT UR QL STRIP: NEGATIVE MG/DL
PROTHROMBIN TIME: 13.9 SEC (ref 12–14.6)
RBC # BLD AUTO: 4.08 M/UL (ref 4.7–6.1)
RBC # URNS HPF: ABNORMAL /HPF
RBC UR QL AUTO: NEGATIVE
RSV RNA SPEC QL NAA+PROBE: NEGATIVE
SARS-COV-2 RNA RESP QL NAA+PROBE: NOTDETECTED
SODIUM SERPL-SCNC: 113 MMOL/L (ref 135–145)
SODIUM SERPL-SCNC: 113 MMOL/L (ref 135–145)
SODIUM SERPL-SCNC: 120 MMOL/L (ref 135–145)
SODIUM SERPL-SCNC: 121 MMOL/L (ref 135–145)
SODIUM SERPL-SCNC: 122 MMOL/L (ref 135–145)
SODIUM SERPL-SCNC: 122 MMOL/L (ref 135–145)
SP GR UR STRIP.AUTO: 1.01
SPECIMEN SOURCE: NORMAL
T4 FREE SERPL-MCNC: 1.69 NG/DL (ref 0.93–1.7)
TROPONIN T SERPL-MCNC: 18 NG/L (ref 6–19)
TROPONIN T SERPL-MCNC: 21 NG/L (ref 6–19)
TSH SERPL DL<=0.005 MIU/L-ACNC: 2.44 UIU/ML (ref 0.38–5.33)
UROBILINOGEN UR STRIP.AUTO-MCNC: 0.2 MG/DL
WBC # BLD AUTO: 13.2 K/UL (ref 4.8–10.8)
WBC #/AREA URNS HPF: ABNORMAL /HPF

## 2023-04-20 PROCEDURE — 70450 CT HEAD/BRAIN W/O DYE: CPT

## 2023-04-20 PROCEDURE — 700105 HCHG RX REV CODE 258: Performed by: NURSE PRACTITIONER

## 2023-04-20 PROCEDURE — 700102 HCHG RX REV CODE 250 W/ 637 OVERRIDE(OP): Performed by: NURSE PRACTITIONER

## 2023-04-20 PROCEDURE — 84484 ASSAY OF TROPONIN QUANT: CPT | Mod: 91

## 2023-04-20 PROCEDURE — 71045 X-RAY EXAM CHEST 1 VIEW: CPT

## 2023-04-20 PROCEDURE — A9270 NON-COVERED ITEM OR SERVICE: HCPCS | Performed by: HOSPITALIST

## 2023-04-20 PROCEDURE — 96374 THER/PROPH/DIAG INJ IV PUSH: CPT

## 2023-04-20 PROCEDURE — 0241U HCHG SARS-COV-2 COVID-19 NFCT DS RESP RNA 4 TRGT MIC: CPT

## 2023-04-20 PROCEDURE — 85730 THROMBOPLASTIN TIME PARTIAL: CPT

## 2023-04-20 PROCEDURE — A9270 NON-COVERED ITEM OR SERVICE: HCPCS | Performed by: EMERGENCY MEDICINE

## 2023-04-20 PROCEDURE — HZ2ZZZZ DETOXIFICATION SERVICES FOR SUBSTANCE ABUSE TREATMENT: ICD-10-PCS | Performed by: INTERNAL MEDICINE

## 2023-04-20 PROCEDURE — 700111 HCHG RX REV CODE 636 W/ 250 OVERRIDE (IP): Performed by: EMERGENCY MEDICINE

## 2023-04-20 PROCEDURE — 84295 ASSAY OF SERUM SODIUM: CPT

## 2023-04-20 PROCEDURE — 87150 DNA/RNA AMPLIFIED PROBE: CPT

## 2023-04-20 PROCEDURE — 99291 CRITICAL CARE FIRST HOUR: CPT | Performed by: INTERNAL MEDICINE

## 2023-04-20 PROCEDURE — 81001 URINALYSIS AUTO W/SCOPE: CPT

## 2023-04-20 PROCEDURE — 93005 ELECTROCARDIOGRAM TRACING: CPT | Performed by: EMERGENCY MEDICINE

## 2023-04-20 PROCEDURE — 96375 TX/PRO/DX INJ NEW DRUG ADDON: CPT

## 2023-04-20 PROCEDURE — 83735 ASSAY OF MAGNESIUM: CPT

## 2023-04-20 PROCEDURE — 83690 ASSAY OF LIPASE: CPT

## 2023-04-20 PROCEDURE — 84439 ASSAY OF FREE THYROXINE: CPT

## 2023-04-20 PROCEDURE — 83880 ASSAY OF NATRIURETIC PEPTIDE: CPT

## 2023-04-20 PROCEDURE — 700105 HCHG RX REV CODE 258: Performed by: INTERNAL MEDICINE

## 2023-04-20 PROCEDURE — 700111 HCHG RX REV CODE 636 W/ 250 OVERRIDE (IP): Performed by: INTERNAL MEDICINE

## 2023-04-20 PROCEDURE — 99223 1ST HOSP IP/OBS HIGH 75: CPT | Performed by: HOSPITALIST

## 2023-04-20 PROCEDURE — 99291 CRITICAL CARE FIRST HOUR: CPT

## 2023-04-20 PROCEDURE — 82533 TOTAL CORTISOL: CPT

## 2023-04-20 PROCEDURE — 770000 HCHG ROOM/CARE - INTERMEDIATE ICU *

## 2023-04-20 PROCEDURE — 80053 COMPREHEN METABOLIC PANEL: CPT

## 2023-04-20 PROCEDURE — 700105 HCHG RX REV CODE 258: Performed by: EMERGENCY MEDICINE

## 2023-04-20 PROCEDURE — 94760 N-INVAS EAR/PLS OXIMETRY 1: CPT

## 2023-04-20 PROCEDURE — C9803 HOPD COVID-19 SPEC COLLECT: HCPCS | Performed by: EMERGENCY MEDICINE

## 2023-04-20 PROCEDURE — 87040 BLOOD CULTURE FOR BACTERIA: CPT

## 2023-04-20 PROCEDURE — 85025 COMPLETE CBC W/AUTO DIFF WBC: CPT

## 2023-04-20 PROCEDURE — 85610 PROTHROMBIN TIME: CPT

## 2023-04-20 PROCEDURE — 84443 ASSAY THYROID STIM HORMONE: CPT

## 2023-04-20 PROCEDURE — 700101 HCHG RX REV CODE 250: Performed by: INTERNAL MEDICINE

## 2023-04-20 PROCEDURE — 700102 HCHG RX REV CODE 250 W/ 637 OVERRIDE(OP): Performed by: EMERGENCY MEDICINE

## 2023-04-20 PROCEDURE — 36415 COLL VENOUS BLD VENIPUNCTURE: CPT

## 2023-04-20 PROCEDURE — A9270 NON-COVERED ITEM OR SERVICE: HCPCS | Performed by: NURSE PRACTITIONER

## 2023-04-20 PROCEDURE — 700102 HCHG RX REV CODE 250 W/ 637 OVERRIDE(OP): Performed by: HOSPITALIST

## 2023-04-20 PROCEDURE — 87077 CULTURE AEROBIC IDENTIFY: CPT

## 2023-04-20 PROCEDURE — 83605 ASSAY OF LACTIC ACID: CPT | Mod: 91

## 2023-04-20 PROCEDURE — 82077 ASSAY SPEC XCP UR&BREATH IA: CPT

## 2023-04-20 RX ORDER — MAGNESIUM SULFATE HEPTAHYDRATE 40 MG/ML
4 INJECTION, SOLUTION INTRAVENOUS ONCE
Status: COMPLETED | OUTPATIENT
Start: 2023-04-20 | End: 2023-04-20

## 2023-04-20 RX ORDER — LORAZEPAM 2 MG/ML
1-2 INJECTION INTRAMUSCULAR
Status: DISCONTINUED | OUTPATIENT
Start: 2023-04-20 | End: 2023-04-24 | Stop reason: HOSPADM

## 2023-04-20 RX ORDER — AZITHROMYCIN 250 MG/1
500 TABLET, FILM COATED ORAL DAILY
Status: COMPLETED | OUTPATIENT
Start: 2023-04-21 | End: 2023-04-22

## 2023-04-20 RX ORDER — BISACODYL 10 MG
10 SUPPOSITORY, RECTAL RECTAL
Status: DISCONTINUED | OUTPATIENT
Start: 2023-04-20 | End: 2023-04-24 | Stop reason: HOSPADM

## 2023-04-20 RX ORDER — FOLIC ACID 1 MG/1
1 TABLET ORAL DAILY
Status: DISCONTINUED | OUTPATIENT
Start: 2023-04-20 | End: 2023-04-20

## 2023-04-20 RX ORDER — LORAZEPAM 2 MG/1
2 TABLET ORAL
Status: DISCONTINUED | OUTPATIENT
Start: 2023-04-20 | End: 2023-04-24 | Stop reason: HOSPADM

## 2023-04-20 RX ORDER — FLUTICASONE PROPIONATE 44 UG/1
2 AEROSOL, METERED RESPIRATORY (INHALATION) EVERY 12 HOURS
Status: DISCONTINUED | OUTPATIENT
Start: 2023-04-21 | End: 2023-04-24 | Stop reason: HOSPADM

## 2023-04-20 RX ORDER — METHYLPREDNISOLONE SODIUM SUCCINATE 125 MG/2ML
125 INJECTION, POWDER, LYOPHILIZED, FOR SOLUTION INTRAMUSCULAR; INTRAVENOUS ONCE
Status: COMPLETED | OUTPATIENT
Start: 2023-04-20 | End: 2023-04-20

## 2023-04-20 RX ORDER — CEFTRIAXONE 2 G/1
2000 INJECTION, POWDER, FOR SOLUTION INTRAMUSCULAR; INTRAVENOUS ONCE
Status: COMPLETED | OUTPATIENT
Start: 2023-04-20 | End: 2023-04-20

## 2023-04-20 RX ORDER — FOLIC ACID 1 MG/1
1 TABLET ORAL DAILY
Status: DISCONTINUED | OUTPATIENT
Start: 2023-04-20 | End: 2023-04-24 | Stop reason: HOSPADM

## 2023-04-20 RX ORDER — LORAZEPAM 2 MG/1
4 TABLET ORAL
Status: DISCONTINUED | OUTPATIENT
Start: 2023-04-20 | End: 2023-04-24 | Stop reason: HOSPADM

## 2023-04-20 RX ORDER — PANTOPRAZOLE SODIUM 40 MG/1
40 TABLET, DELAYED RELEASE ORAL DAILY
Status: DISCONTINUED | OUTPATIENT
Start: 2023-04-20 | End: 2023-04-20

## 2023-04-20 RX ORDER — POLYETHYLENE GLYCOL 3350 17 G/17G
1 POWDER, FOR SOLUTION ORAL
Status: DISCONTINUED | OUTPATIENT
Start: 2023-04-20 | End: 2023-04-24 | Stop reason: HOSPADM

## 2023-04-20 RX ORDER — AMOXICILLIN 250 MG
2 CAPSULE ORAL 2 TIMES DAILY
Status: DISCONTINUED | OUTPATIENT
Start: 2023-04-20 | End: 2023-04-24 | Stop reason: HOSPADM

## 2023-04-20 RX ORDER — SODIUM CHLORIDE 9 MG/ML
INJECTION, SOLUTION INTRAVENOUS CONTINUOUS
Status: DISCONTINUED | OUTPATIENT
Start: 2023-04-20 | End: 2023-04-20

## 2023-04-20 RX ORDER — GAUZE BANDAGE 2" X 2"
100 BANDAGE TOPICAL DAILY
Status: DISCONTINUED | OUTPATIENT
Start: 2023-04-20 | End: 2023-04-20

## 2023-04-20 RX ORDER — AZITHROMYCIN 500 MG/5ML
500 INJECTION, POWDER, LYOPHILIZED, FOR SOLUTION INTRAVENOUS EVERY 24 HOURS
Status: DISCONTINUED | OUTPATIENT
Start: 2023-04-20 | End: 2023-04-20

## 2023-04-20 RX ORDER — OMEPRAZOLE 20 MG/1
20 CAPSULE, DELAYED RELEASE ORAL DAILY
Status: DISCONTINUED | OUTPATIENT
Start: 2023-04-20 | End: 2023-04-24 | Stop reason: HOSPADM

## 2023-04-20 RX ORDER — FLUTICASONE PROPIONATE 44 UG/1
2 AEROSOL, METERED RESPIRATORY (INHALATION)
Status: DISCONTINUED | OUTPATIENT
Start: 2023-04-20 | End: 2023-04-20

## 2023-04-20 RX ORDER — ALBUTEROL SULFATE 90 UG/1
1-2 AEROSOL, METERED RESPIRATORY (INHALATION) EVERY 4 HOURS PRN
Status: DISCONTINUED | OUTPATIENT
Start: 2023-04-20 | End: 2023-04-24 | Stop reason: HOSPADM

## 2023-04-20 RX ORDER — AZITHROMYCIN 250 MG/1
500 TABLET, FILM COATED ORAL ONCE
Status: COMPLETED | OUTPATIENT
Start: 2023-04-20 | End: 2023-04-20

## 2023-04-20 RX ORDER — LORAZEPAM 2 MG/ML
1.5 INJECTION INTRAMUSCULAR
Status: DISCONTINUED | OUTPATIENT
Start: 2023-04-20 | End: 2023-04-24 | Stop reason: HOSPADM

## 2023-04-20 RX ORDER — LORAZEPAM 2 MG/ML
1 INJECTION INTRAMUSCULAR
Status: DISCONTINUED | OUTPATIENT
Start: 2023-04-20 | End: 2023-04-24 | Stop reason: HOSPADM

## 2023-04-20 RX ORDER — HYDRALAZINE HYDROCHLORIDE 20 MG/ML
10-20 INJECTION INTRAMUSCULAR; INTRAVENOUS EVERY 4 HOURS PRN
Status: DISCONTINUED | OUTPATIENT
Start: 2023-04-20 | End: 2023-04-24 | Stop reason: HOSPADM

## 2023-04-20 RX ORDER — ACETAMINOPHEN 325 MG/1
650 TABLET ORAL EVERY 4 HOURS PRN
Status: DISCONTINUED | OUTPATIENT
Start: 2023-04-20 | End: 2023-04-24 | Stop reason: HOSPADM

## 2023-04-20 RX ORDER — LORAZEPAM 1 MG/1
1 TABLET ORAL EVERY 4 HOURS PRN
Status: DISCONTINUED | OUTPATIENT
Start: 2023-04-20 | End: 2023-04-24 | Stop reason: HOSPADM

## 2023-04-20 RX ORDER — DEXTROSE MONOHYDRATE 50 MG/ML
500 INJECTION, SOLUTION INTRAVENOUS ONCE
Status: COMPLETED | OUTPATIENT
Start: 2023-04-21 | End: 2023-04-21

## 2023-04-20 RX ORDER — LORAZEPAM 2 MG/ML
2 INJECTION INTRAMUSCULAR
Status: DISCONTINUED | OUTPATIENT
Start: 2023-04-20 | End: 2023-04-24 | Stop reason: HOSPADM

## 2023-04-20 RX ORDER — GAUZE BANDAGE 2" X 2"
100 BANDAGE TOPICAL DAILY
Status: DISCONTINUED | OUTPATIENT
Start: 2023-04-23 | End: 2023-04-24 | Stop reason: HOSPADM

## 2023-04-20 RX ORDER — IPRATROPIUM BROMIDE AND ALBUTEROL SULFATE 2.5; .5 MG/3ML; MG/3ML
3 SOLUTION RESPIRATORY (INHALATION) ONCE
Status: ACTIVE | OUTPATIENT
Start: 2023-04-20 | End: 2023-04-21

## 2023-04-20 RX ORDER — LORAZEPAM 2 MG/ML
0.5 INJECTION INTRAMUSCULAR EVERY 4 HOURS PRN
Status: DISCONTINUED | OUTPATIENT
Start: 2023-04-20 | End: 2023-04-24 | Stop reason: HOSPADM

## 2023-04-20 RX ORDER — DEXMEDETOMIDINE HYDROCHLORIDE 4 UG/ML
.1-1 INJECTION, SOLUTION INTRAVENOUS CONTINUOUS
Status: DISCONTINUED | OUTPATIENT
Start: 2023-04-20 | End: 2023-04-20

## 2023-04-20 RX ORDER — AMLODIPINE BESYLATE 5 MG/1
5 TABLET ORAL DAILY
Status: DISCONTINUED | OUTPATIENT
Start: 2023-04-20 | End: 2023-04-22

## 2023-04-20 RX ORDER — ONDANSETRON 2 MG/ML
4 INJECTION INTRAMUSCULAR; INTRAVENOUS EVERY 4 HOURS PRN
Status: DISCONTINUED | OUTPATIENT
Start: 2023-04-20 | End: 2023-04-24 | Stop reason: HOSPADM

## 2023-04-20 RX ORDER — ONDANSETRON 4 MG/1
4 TABLET, ORALLY DISINTEGRATING ORAL EVERY 4 HOURS PRN
Status: DISCONTINUED | OUTPATIENT
Start: 2023-04-20 | End: 2023-04-24 | Stop reason: HOSPADM

## 2023-04-20 RX ORDER — LORAZEPAM 0.5 MG/1
0.5 TABLET ORAL EVERY 4 HOURS PRN
Status: DISCONTINUED | OUTPATIENT
Start: 2023-04-20 | End: 2023-04-24 | Stop reason: HOSPADM

## 2023-04-20 RX ORDER — ENOXAPARIN SODIUM 100 MG/ML
40 INJECTION SUBCUTANEOUS DAILY
Status: DISCONTINUED | OUTPATIENT
Start: 2023-04-20 | End: 2023-04-20

## 2023-04-20 RX ADMIN — OMEPRAZOLE 20 MG: 20 CAPSULE, DELAYED RELEASE ORAL at 14:39

## 2023-04-20 RX ADMIN — CEFTRIAXONE SODIUM 2000 MG: 2 INJECTION, POWDER, FOR SOLUTION INTRAMUSCULAR; INTRAVENOUS at 04:25

## 2023-04-20 RX ADMIN — MAGNESIUM SULFATE HEPTAHYDRATE 4 G: 40 INJECTION, SOLUTION INTRAVENOUS at 07:13

## 2023-04-20 RX ADMIN — ENOXAPARIN SODIUM 40 MG: 40 INJECTION SUBCUTANEOUS at 17:01

## 2023-04-20 RX ADMIN — METHYLPREDNISOLONE SODIUM SUCCINATE 125 MG: 125 INJECTION, POWDER, FOR SOLUTION INTRAMUSCULAR; INTRAVENOUS at 05:19

## 2023-04-20 RX ADMIN — HYDRALAZINE HYDROCHLORIDE 20 MG: 20 INJECTION INTRAMUSCULAR; INTRAVENOUS at 09:06

## 2023-04-20 RX ADMIN — SODIUM CHLORIDE: 9 INJECTION, SOLUTION INTRAVENOUS at 08:24

## 2023-04-20 RX ADMIN — ACETAMINOPHEN 650 MG: 325 TABLET, FILM COATED ORAL at 17:01

## 2023-04-20 RX ADMIN — THIAMINE HYDROCHLORIDE 500 MG: 100 INJECTION, SOLUTION INTRAMUSCULAR; INTRAVENOUS at 07:11

## 2023-04-20 RX ADMIN — AZITHROMYCIN MONOHYDRATE 500 MG: 250 TABLET ORAL at 04:25

## 2023-04-20 RX ADMIN — RIVAROXABAN 10 MG: 10 TABLET, FILM COATED ORAL at 18:02

## 2023-04-20 RX ADMIN — AZITHROMYCIN MONOHYDRATE 500 MG: 500 INJECTION, POWDER, LYOPHILIZED, FOR SOLUTION INTRAVENOUS at 07:52

## 2023-04-20 RX ADMIN — SODIUM CHLORIDE: 9 INJECTION, SOLUTION INTRAVENOUS at 03:34

## 2023-04-20 ASSESSMENT — LIFESTYLE VARIABLES
TREMOR: TREMOR NOT VISIBLE BUT CAN BE FELT, FINGERTIP TO FINGERTIP
ORIENTATION AND CLOUDING OF SENSORIUM: ORIENTED AND CAN DO SERIAL ADDITIONS
ANXIETY: NO ANXIETY (AT EASE)
AGITATION: NORMAL ACTIVITY
PAROXYSMAL SWEATS: NO SWEAT VISIBLE
TOTAL SCORE: 1
VISUAL DISTURBANCES: NOT PRESENT
NAUSEA AND VOMITING: NO NAUSEA AND NO VOMITING
HEADACHE, FULLNESS IN HEAD: NOT PRESENT
AUDITORY DISTURBANCES: NOT PRESENT

## 2023-04-20 ASSESSMENT — COPD QUESTIONNAIRES
DO YOU EVER COUGH UP ANY MUCUS OR PHLEGM?: YES, EVERY DAY
COPD SCREENING SCORE: 7
DURING THE PAST 4 WEEKS HOW MUCH DID YOU FEEL SHORT OF BREATH: SOME OF THE TIME
HAVE YOU SMOKED AT LEAST 100 CIGARETTES IN YOUR ENTIRE LIFE: YES

## 2023-04-20 ASSESSMENT — PAIN DESCRIPTION - PAIN TYPE
TYPE: ACUTE PAIN;CHRONIC PAIN
TYPE: ACUTE PAIN

## 2023-04-20 ASSESSMENT — ENCOUNTER SYMPTOMS
FEVER: 0
SHORTNESS OF BREATH: 0
CHILLS: 0

## 2023-04-20 ASSESSMENT — FIBROSIS 4 INDEX
FIB4 SCORE: 1.01
FIB4 SCORE: 1.69

## 2023-04-20 NOTE — H&P
CRITICAL CARE MEDICINE ATTENDING HISTORY AND PHYSICAL    Date of admission  4/20/2023    Chief Complaint  72 y.o. male admitted 4/20/2023 with hyponatremia, sepsis, lower extremity cellulitis and pneumonia.    History of Present Illness      The history is obtained mostly from healthcare providers and the medical record as this gentleman cannot provide me with much useful history.  This is a 72-year-old gentleman with a history of chronic hypoxemic respiratory failure on 3 L of domiciliary oxygen, probable asthma, primary hypertension, alcohol dependence, obesity and hyponatremia due to beer potomania.  He presents to the ED in the wee hours of the morning due to dizziness.  EMS found him soiled in stool and urine.  He complains of shortness of breath and a cough.  This is his [facility] admission to [facility] this month.      Review of Systems  Review of Systems   Unable to perform ROS: Acuity of condition     Vital Signs for the last 24 hours  Temp:  [36.6 °C (97.9 °F)] 36.6 °C (97.9 °F)  Pulse:  [92-99] 92  Resp:  [15-28] 28  BP: (137-156)/(69-92) 141/69  SpO2:  [98 %-100 %] 100 %    Hemodynamic parameters for the last 24 hours       Vent Settings for the last 24 hours       Physical Exam  Physical Exam  Constitutional:       Appearance: He is obese.      Comments: Disheveled, unkempt and dirty   HENT:      Mouth/Throat:      Mouth: Mucous membranes are dry.   Eyes:      Pupils: Pupils are equal, round, and reactive to light.   Cardiovascular:      Comments: Sinus rhythm  Pulmonary:      Breath sounds: Rales (Few scattered crackles) present. No wheezing.   Abdominal:      General: There is no distension.      Tenderness: There is no abdominal tenderness.   Musculoskeletal:      Cervical back: Normal range of motion.      Right lower leg: Edema present.      Left lower leg: Edema present.      Comments: Multiple wounds on his legs with increased erythema and increased warmth in both lower legs with edema.   Skin:      Capillary Refill: Capillary refill takes less than 2 seconds.   Neurological:      Comments: He wakes up easily.  He gives a very quick 1-2 word answers and falls back asleep.       Medications  Current Facility-Administered Medications   Medication Dose Route Frequency Provider Last Rate Last Admin    ipratropium-albuterol (DUONEB) nebulizer solution  3 mL Nebulization Once Aura Lehman D.O.        senna-docusate (PERICOLACE or SENOKOT S) 8.6-50 MG per tablet 2 Tablet  2 Tablet Oral BID Raghavendra Baum M.D.        And    polyethylene glycol/lytes (MIRALAX) PACKET 1 Packet  1 Packet Oral QDAY PRN Raghavendra Baum M.D.        And    magnesium hydroxide (MILK OF MAGNESIA) suspension 30 mL  30 mL Oral QDAY PRN Raghavendra Baum M.D.        And    bisacodyl (DULCOLAX) suppository 10 mg  10 mg Rectal QDAY PRN Raghavendra Baum M.D.        Respiratory Therapy Consult   Nebulization Continuous RT Raghavendra Baum M.D.        enoxaparin (Lovenox) inj 40 mg  40 mg Subcutaneous DAILY AT 1800 Raghavendra Baum M.D.        ondansetron (ZOFRAN) syringe/vial injection 4 mg  4 mg Intravenous Q4HRS PRN Raghavendra Baum M.D.        ondansetron (ZOFRAN ODT) dispertab 4 mg  4 mg Oral Q4HRS PRN Raghavendra Baum M.D.        hydrALAZINE (APRESOLINE) injection 10-20 mg  10-20 mg Intravenous Q4HRS PRN Raghavendra Baum M.D.        [START ON 4/21/2023] ampicillin/sulbactam (UNASYN) 3 g in  mL IVPB  3 g Intravenous Q6HRS Raghavendra Baum M.D.        thiamine (B-1) 500 mg in dextrose 5% 100 mL IVPB  500 mg Intravenous DAILY Raghavendra Baum M.D.        Followed by    [START ON 4/23/2023] thiamine (Vitamin B-1) tablet 100 mg  100 mg Oral DAILY Raghavendra Baum M.D.        folic acid (FOLVITE) tablet 1 mg  1 mg Oral DAILY Raghavendra Baum M.D.        multivitamin tablet 1 Tablet  1 Tablet Oral DAILY Raghavendra Baum M.D.        MD Alert...ICU Electrolyte  Replacement per Pharmacy   Other PHARMACY TO DOSE Raghavendra Baum M.D.        magnesium sulfate IVPB premix 4 g  4 g Intravenous Once Raghavendra Baum M.D.        dexmedetomidine (PRECEDEX) 400 mcg/100mL NS premix infusion  0.1-1 mcg/kg/hr (Ideal) Intravenous Continuous Raghavendra Baum M.D.        LORazepam (ATIVAN) injection 1-2 mg  1-2 mg Intravenous Q2HRS PRN Raghavendra Baum M.D.        albuterol inhaler 1-2 Puff  1-2 Puff Inhalation Q4HRS PRN Raghavendra Baum M.D.        amLODIPine (NORVASC) tablet 5 mg  5 mg Oral DAILY Raghavendra Baum M.D.        fluticasone-umeclidin-vilant (Trelegy) 100-62.5-25 MCG/ACT inhalation 2-3 Inhalation.  2-3 Inhalation. Inhalation QAM Raghavendra Baum M.D.        pantoprazole (PROTONIX) EC tablet 40 mg  40 mg Oral DAILY Raghavendra Baum M.D.        azithromycin (ZITHROMAX) 500 mg in D5W 250 mL IVPB premix  500 mg Intravenous Q24HRS Raghavendra Baum M.D.         Current Outpatient Medications   Medication Sig Dispense Refill    sodium chloride (SALT) 1 GM Tab Take 1 Tablet by mouth 3 times a day with meals. 90 Tablet 0    amLODIPine (NORVASC) 5 MG Tab Take 5 mg by mouth every day.      pantoprazole (PROTONIX) 40 MG Tablet Delayed Response Take 40 mg by mouth every day.      albuterol 108 (90 Base) MCG/ACT Aero Soln inhalation aerosol Inhale 1-2 Puffs every four hours as needed for Shortness of Breath. 1 Each 10    Fluticasone-Umeclidin-Vilant (TRELEGY ELLIPTA) 100-62.5-25 MCG/INH AEROSOL POWDER, BREATH ACTIVATED inhalation Inhale 2-3 Inhalation every morning. 1 Each 10       Fluids  No intake or output data in the 24 hours ending 04/20/23 0612    Laboratory      Recent Labs     04/20/23  0230   SODIUM 113*   POTASSIUM 4.0   CHLORIDE 75*   CO2 23   BUN 6*   CREATININE 0.45*   MAGNESIUM 1.6   CALCIUM 9.0     Recent Labs     04/20/23  0230   ALTSGPT 34   ASTSGOT 53*   ALKPHOSPHAT 102*   TBILIRUBIN 1.3   LIPASE 18   GLUCOSE 87      Recent Labs     04/20/23  0230   WBC 13.2*   NEUTSPOLYS 85.50*   LYMPHOCYTES 6.00*   MONOCYTES 7.10   EOSINOPHILS 0.50   BASOPHILS 0.10   ASTSGOT 53*   ALTSGPT 34   ALKPHOSPHAT 102*   TBILIRUBIN 1.3     Recent Labs     04/20/23  0230   RBC 4.08*   HEMOGLOBIN 13.2*   HEMATOCRIT 37.1*   PLATELETCT 387   PROTHROMBTM 13.9   APTT 35.1   INR 1.08       Imaging  X-Ray:  I have personally reviewed the images and compared with prior images. and My impression is: Increased opacification in the left lower lobe    Assessment/Plan      Severe, critical hyponatremia   Admitting sodium 113 at 0230 hrs. today   He appears to be volume overloaded   I suspect due to recurrent beer potomania   Monitor serum sodium every 4 hours   Goal is to correct serum sodium by 8 to 10 mmol/L every 24 hours   Monitor urine output hourly    Sepsis   Present on admission   Source appears to be lower extremity cellulitis as well as pneumonia   Culture blood and sputum   Begin empiric Unasyn and azithromycin   Sepsis fluid bolus is contraindicated due to clinical evidence of hypervolemia   Trend lactic acid    Lower extremity cellulitis   Culture blood   Begin empiric Unasyn    Pneumonia   Culture blood and sputum   Begin empiric Unasyn and azithromycin    Acute on chronic hypoxemic respiratory failure   On 3 L of domiciliary oxygen    Alcohol dependence with continued alcohol abuse   High-dose IV thiamine and supplemental vitamins   Observe for evidence of alcohol withdrawal    Obesity    DNR/DNI   This gentleman has a POLST completed on 3/8/2022 which clearly documents his wishes to be DNR/DNI - I will honor his previously expressed wishes      VTE:  Lovenox  Ulcer: Not Indicated  Lines: [place] Catheter  Ongoing indication addressed    I have performed a physical exam and reviewed and updated ROS and Plan today (4/20/2023). In review of yesterday's note (4/19/2023), there are no changes except as documented above.     I have assessed and  reassessed his respiratory status, blood pressure, hemodynamics, cardiovascular status, serial determinations of serum sodium and his neurologic status.  He is at increased risk for worsening respiratory, cardiovascular and CNS system dysfunction.    Discussed patient condition and risk of morbidity and/or mortality with RN and ER physician    The patient remains critically ill.  Critical care time = 40 minutes in directly providing and coordinating critical care and extensive data review.  No time overlap and excludes procedures.    A Critical Care Medicine progress note may have been authored by a resident physician or advanced practitioner of nursing under my direct supervision on this date of service.  As the supervising and attending physician, I have either attested to or cosigned that document.  IN THE EVENT THAT DISCREPANCIES EXIST BETWEEN THIS DOCUMENT AND ANY DOCUMENT THAT I HAVE ATTESTED TO OR COSIGNED ON THIS DATE OF SERVICE, THEN THIS DOCUMENT REMAINS THE FINAL AUTHORITY AS TO MY ASSESSMENT AND PLAN REGARDING THE CARE OF THIS PATIENT.    Raghavendra Baum MD  Pulmonary and Critical Care Medicine

## 2023-04-20 NOTE — CARE PLAN
The patient is Watcher - Medium risk of patient condition declining or worsening    Shift Goals  Clinical Goals: serial sodium labs, respiratory monitoring  Patient Goals: ALEKSANDAR  Family Goals: ALEKSANDAR    Progress made toward(s) clinical / shift goals:    Problem: Optimal Care for Alcohol Withdrawal  Goal: Optimal Care for the alcohol withdrawal patient  Outcome: Progressing     Problem: Seizure Precautions  Goal: Implementation of seizure precautions  Outcome: Progressing     Problem: Lifestyle Changes  Goal: Patient's ability to identify lifestyle changes and available resources to help reduce recurrence of condition will improve  Outcome: Progressing     Problem: Risk for Aspiration  Goal: Patient's risk for aspiration will be absent or decrease  Outcome: Progressing     Problem: Fluid Volume  Goal: Fluid volume balance will be maintained  Outcome: Progressing     Problem: Respiratory  Goal: Patient will achieve/maintain optimum respiratory ventilation and gas exchange  Outcome: Progressing     Problem: Fall Risk  Goal: Patient will remain free from falls  Outcome: Progressing       Patient is not progressing towards the following goals:  Patient skin is excoriated and he has some open wounds.  Consult placed to wound care.

## 2023-04-20 NOTE — ED NOTES
Notified Dr Lehman the critical value of Serum Sodium level: 113 mmol/L, as called by the laboratory

## 2023-04-20 NOTE — ED PROVIDER NOTES
ED Provider Note    CHIEF COMPLAINT  Chief Complaint   Patient presents with    Dizziness     Pt states having dizziness for the past 2 hrs, hx of HTN. Pt denies chest pain.        EXTERNAL RECORDS REVIEWED  Inpatient Notes discharge summary 4/12/2023 after a fall.  Found to have hyponatremia.  Old healing hematoma to the left eye.  Asthma versus reactive airway disease, hypertension, chronically on 3 L of home oxygen fell from bed without loss of consciousness.  Evaluated in the emergency department the day before for hyponatremia needing hypertonic saline.  CT of the head without new findings.  CTA of the chest was negative for PE.  He was admitted for suspected acute COPD flare.  Outpatient sleep study recommended.  Patient reported presyncope with standing, orthostatic static vital signs normal.  Echocardiogram normal.  PT OT recommended home health services which were arranged for patient.  Echocardiogram/11/23 with poor endocardial definition probable left ventricular systolic function.  EF 55 to 60%.    HPI/ROS  LIMITATION TO HISTORY   Select: Altered mental status / Confusion  OUTSIDE HISTORIAN(S):  None    Juan Manuel Bass is a 72 y.o. male who presents to the emergency department by ambulance from home for dizziness.  Patient states that he felt dizzy for a few hours, called EMS himself tonight.  Head trauma but patient states this fall was 1 week ago.  Denies new fall.  Denies syncope.  Denies chest pain.  He does have shortness of breath, chronic cough and oxygen dependence.  Productive sputum.  Denies fever.  Lower extremity edema, weeping which patient cannot elaborate on.  Denies fever or other recent illness.    Patient is a very poor historian, somnolent, unknown baseline.    PAST MEDICAL HISTORY   has a past medical history of Chronic airway obstruction, not elsewhere classified, High anion gap metabolic acidosis (4/1/2023), and Hypertension.    SURGICAL HISTORY  patient denies any surgical  "history    FAMILY HISTORY  Family History   Problem Relation Age of Onset    No Known Problems Mother     Heart Disease Father        SOCIAL HISTORY  Social History     Tobacco Use    Smoking status: Former     Packs/day: 1.00     Years: 4.00     Pack years: 4.00     Types: Cigarettes     Quit date: 3/7/2020     Years since quitting: 3.1    Smokeless tobacco: Never   Vaping Use    Vaping Use: Never used   Substance and Sexual Activity    Alcohol use: Yes     Alcohol/week: 21.0 oz     Types: 35 Cans of beer per week     Comment: 5-6 beers/day    Drug use: Not Currently    Sexual activity: Not on file       CURRENT MEDICATIONS  Home Medications       Reviewed by Meaghan Culp R.N. (Registered Nurse) on 04/20/23 at 0224  Med List Status: Partial     Medication Last Dose Status   albuterol 108 (90 Base) MCG/ACT Aero Soln inhalation aerosol  Active   amLODIPine (NORVASC) 5 MG Tab  Active   Fluticasone-Umeclidin-Vilant (TRELEGY ELLIPTA) 100-62.5-25 MCG/INH AEROSOL POWDER, BREATH ACTIVATED inhalation  Active   pantoprazole (PROTONIX) 40 MG Tablet Delayed Response  Active   sodium chloride (SALT) 1 GM Tab  Active                    ALLERGIES  Allergies   Allergen Reactions    Tetanus Toxoid Hives       PHYSICAL EXAM  VITAL SIGNS: BP (!) 156/78   Pulse 98   Temp 36.6 °C (97.9 °F) (Temporal)   Resp 15   Ht 1.702 m (5' 7\")   Wt 108 kg (237 lb)   SpO2 99%   BMI 37.12 kg/m²    Pulse ox interpretation: I interpret this pulse ox as low  Constitutional: Somnolent, disheveled  HENT: Normocephalic, left facial ecchymosis, left brow hematoma. Bilateral external ears normal, Nose normal.  Dry mucous membranes.    Eyes: Pupils are equal and reactive, Conjunctiva normal.  No subconjunctival hemorrhage or hyphema  Neck: Normal range of motion, Supple  Lymphatic: No lymphadenopathy noted.   Cardiovascular: Regular rate and rhythm, no murmurs. Distal pulses intact.  3+ pitting peripheral edema with mild erythema, skin breakdown and " weeping   Thorax & Lungs: diminished, coarse bilaterally.  Mild tachypnea without retractions.  Subacute chest wall ecchymosis  Abdomen: Obese, softly distended.  Large reducible ventral hernia.  No grimace or withdrawal to palpation  Skin: Warm, Dry, No erythema, No rash.   Musculoskeletal: No major deformities noted.   Neurologic: Somnolent but answers questions appropriately.  Psychiatric: Difficult to assess      DIAGNOSTIC STUDIES / PROCEDURES    LABS  Results for orders placed or performed during the hospital encounter of 04/20/23   CBC with Differential   Result Value Ref Range    WBC 13.2 (H) 4.8 - 10.8 K/uL    RBC 4.08 (L) 4.70 - 6.10 M/uL    Hemoglobin 13.2 (L) 14.0 - 18.0 g/dL    Hematocrit 37.1 (L) 42.0 - 52.0 %    MCV 90.9 81.4 - 97.8 fL    MCH 32.4 27.0 - 33.0 pg    MCHC 35.6 (H) 33.7 - 35.3 g/dL    RDW 42.1 35.9 - 50.0 fL    Platelet Count 387 164 - 446 K/uL    MPV 9.0 9.0 - 12.9 fL    Neutrophils-Polys 85.50 (H) 44.00 - 72.00 %    Lymphocytes 6.00 (L) 22.00 - 41.00 %    Monocytes 7.10 0.00 - 13.40 %    Eosinophils 0.50 0.00 - 6.90 %    Basophils 0.10 0.00 - 1.80 %    Immature Granulocytes 0.80 0.00 - 0.90 %    Nucleated RBC 0.00 /100 WBC    Neutrophils (Absolute) 11.30 (H) 1.82 - 7.42 K/uL    Lymphs (Absolute) 0.79 (L) 1.00 - 4.80 K/uL    Monos (Absolute) 0.94 (H) 0.00 - 0.85 K/uL    Eos (Absolute) 0.07 0.00 - 0.51 K/uL    Baso (Absolute) 0.01 0.00 - 0.12 K/uL    Immature Granulocytes (abs) 0.11 0.00 - 0.11 K/uL    NRBC (Absolute) 0.00 K/uL   Comp Metabolic Panel   Result Value Ref Range    Sodium 113 (LL) 135 - 145 mmol/L    Potassium 4.0 3.6 - 5.5 mmol/L    Chloride 75 (L) 96 - 112 mmol/L    Co2 23 20 - 33 mmol/L    Anion Gap 15.0 7.0 - 16.0    Glucose 87 65 - 99 mg/dL    Bun 6 (L) 8 - 22 mg/dL    Creatinine 0.45 (L) 0.50 - 1.40 mg/dL    Calcium 9.0 8.5 - 10.5 mg/dL    AST(SGOT) 53 (H) 12 - 45 U/L    ALT(SGPT) 34 2 - 50 U/L    Alkaline Phosphatase 102 (H) 30 - 99 U/L    Total Bilirubin 1.3 0.1 -  1.5 mg/dL    Albumin 4.2 3.2 - 4.9 g/dL    Total Protein 7.3 6.0 - 8.2 g/dL    Globulin 3.1 1.9 - 3.5 g/dL    A-G Ratio 1.4 g/dL   Lactic Acid   Result Value Ref Range    Lactic Acid 2.5 (H) 0.5 - 2.0 mmol/L   Urinalysis    Specimen: Urine   Result Value Ref Range    Color Yellow     Character Clear     Specific Gravity 1.006 <1.035    Ph 6.0 5.0 - 8.0    Glucose Negative Negative mg/dL    Ketones Trace (A) Negative mg/dL    Protein Negative Negative mg/dL    Bilirubin Negative Negative    Urobilinogen, Urine 0.2 Negative    Nitrite Negative Negative    Leukocyte Esterase Small (A) Negative    Occult Blood Negative Negative    Micro Urine Req Microscopic    CoV-2, Flu A/B, And RSV by PCR ([employer])    Specimen: Respirate   Result Value Ref Range    SARS-CoV-2 Source NP Swab    APTT   Result Value Ref Range    APTT 35.1 24.7 - 36.0 sec   Prothrombin Time   Result Value Ref Range    PT 13.9 12.0 - 14.6 sec    INR 1.08 0.87 - 1.13   CORRECTED CALCIUM   Result Value Ref Range    Correct Calcium 8.8 8.5 - 10.5 mg/dL   ESTIMATED GFR   Result Value Ref Range    GFR (CKD-EPI) 111 >60 mL/min/1.73 m 2   TROPONIN   Result Value Ref Range    Troponin T 21 (H) 6 - 19 ng/L   LIPASE   Result Value Ref Range    Lipase 18 11 - 82 U/L   MAGNESIUM   Result Value Ref Range    Magnesium 1.6 1.5 - 2.5 mg/dL   proBrain Natriuretic Peptide, NT   Result Value Ref Range    NT-proBNP 281 (H) 0 - 125 pg/mL   URINE MICROSCOPIC (W/UA)   Result Value Ref Range    WBC 2-5 (A) /hpf    RBC 0-2 (A) /hpf    Bacteria Negative None /hpf    Epithelial Cells Negative /hpf   EKG   Result Value Ref Range    Report       [facility] Emergency Dept.    Test Date:  2023  Pt Name:    BRIDGETTE VARNER                  Department: ER  MRN:        [id number]                      Room:       RD 02  Gender:     Male                         Technician: [id number]  :        [id number]                   Requested By:ER TRIAGE PROTOCOL  Order #:     [id number]                    Reading MD: ERICK RIOS DO    Measurements  Intervals                                Axis  Rate:       98                           P:          -41  TN:         194                          QRS:        52  QRSD:       95                           T:          44  QT:         339  QTc:        433    Interpretive Statements  Sinus rhythm  Atrial premature complex  Artifact in lead(s) I,II,III,aVR,aVL,aVF,V1,V2,V3 and baseline wander in  lead(s)  I,aVR,aVL  Compared to ECG 04/07/2023 23:11:15  No significant changes  Electronically Signed On 4- 3:26:22 PDT by ERICK RIOS DO       RADIOLOGY  I have independently interpreted the diagnostic imaging associated with this visit and am waiting the final reading from the radiologist.   My preliminary interpretation is as follows:   Chest x-ray: Left lower lobe consolidation.    Radiologist interpretation:   CT-HEAD W/O   Final Result         1.  No acute intracranial abnormality is identified, there are nonspecific white matter changes, commonly associated with small vessel ischemic disease.  Associated mild cerebral atrophy is noted.   2.  Atherosclerosis.         DX-CHEST-PORTABLE (1 VIEW)   Final Result         1.  Left lower lobe infiltrate   2.  Atherosclerosis          COURSE & MEDICAL DECISION MAKING    ED Observation Status? Yes; I am placing the patient in to an observation status due to a diagnostic uncertainty as well as therapeutic intensity. Patient placed in observation status at 3:20 AM, 4/20/2023.     Observation plan is as follows: Labs, imaging before final disposition can be made    Upon Reevaluation, the patient's condition has: not improved; and will be escalated to hospitalization.    Patient discharged from ED Observation status at 5:24 AM  (Time) 4/20/23 (Date).     INITIAL ASSESSMENT, COURSE AND PLAN  Care Narrative:   Seen evaluated bedside.  Ill-appearing, disheveled, clinically dehydrated.  Somnolent  but answers questions appropriately amongst slurred speech.  History of alcohol dependence, states he drink today as well.  Complaining of dizziness, denies new fall or trauma.  Denies syncope.  Denies chest pain.  Lungs are coarse, diminished bilaterally with increased respiratory rate, no retractions.  Chronic oxygen dependence.  Softly distended abdomen with reducible ventral hernia.  Lower extremity weeping edema and skin breakdown bilaterally.  Generally weak but moves 4 extremity spontaneously.  Add labs per sepsis protocol, unknown if recurrent falls, acute on chronic trauma repeat head CT.  Chest x-ray.  Hold IV fluid at this time for coarse lung sounds, hypoxia and lower extremity edema.    Chest x-ray suggest left lower lobe infiltrate.  Broad-spectrum antibiotics.    Critical results, sodium 113.  Add maintenance fluid, avoid bolus.  Despite somnolence, he is arousable and answers questions appropriately.  No seizure-like activity.  No indication for hypertonic saline at this time.    Leukocytosis with WBC 13.2, leftward shift without bandemia.  Lactate slightly elevated at 2.5.  Hyponatremia with sodium 113, chloride 75, normal CO2 and renal function.  Normal LFTs.  Troponin indeterminate 21, BNP only elevated at 281, this can be trended, although recent fairly normal echocardiogram.  Urinalysis with small leukocyte esterase, 2-5 WBCs without nitrate, bacteria.  Antibiotics chosen above will cover urinary tract infection as well.    CT head delayed due to patient intervention, cleaning CT delay.  Negative for acute trauma.  He will be hospitalized for further evaluation and treatment.      ADDITIONAL PROBLEM LIST  Chronic alcohol dependence  Chronic respiratory failure    DISPOSITION AND DISCUSSIONS  I have discussed management of the patient with the following physicians and CHASE's:    8891 -Dr. Mckeon is aware of the patient agreeable to consultation.    The total critical care time on this patient  is 40 minutes, immediate and continuous hemodynamic monitoring and multiple bedside evaluations, resuscitating patient and assessing response to treatment, deciphering test results, speaking with admitting physician, and arranging for ICU hospital admission. This 40 minutes is exclusive of separately billable procedures.      FINAL DIAGNOSIS  1. Hyponatremia    2. Pneumonia of left lower lobe due to infectious organism    3. Near syncope    4. Fall, initial encounter    5. Closed head injury, initial encounter    6. Alcohol dependence with unspecified alcohol-induced disorder (HCC)    7. Altered mental status, unspecified altered mental status type    8. Acute exacerbation of chronic obstructive pulmonary disease (COPD) (HCC)           Electronically signed by: Aura Lehman D.O., 4/20/2023 5:04 AM

## 2023-04-20 NOTE — PROGRESS NOTES
Lab called with critical result of Na 113. KAYLEIGH Griffin notified of critical lab result at 0726.

## 2023-04-20 NOTE — DISCHARGE PLANNING
Care Transition Team Assessment    Mr. Bass is a single male who lives alone in [place] [place] in a mobile home, his friend Kennedi is his POA and support system.  Prior to admission Mr. Bass was encourage to go to SNF, he declined and went home when he fell.  [facility] and also notified [facility] - Clarissa is the  with [phone], she would like to be notified once d/c planning and orders begin.   Mr Bass was signed up for [facility], Juan Manuel stated they came to see him twice. Payor is Medicare.    Information Source  Orientation Level: Oriented X4  Information Given By: Patient  Informant's Name: Juan Manuel Bass  Who is responsible for making decisions for patient? : Patient    Readmission Evaluation  Is this a readmission?: Yes - unplanned readmission  Why do you think you were readmitted?: I fell    Elopement Risk  Legal Hold: No    Interdisciplinary Discharge Planning  Does Admitting Nurse Feel This Could be a Complex Discharge?: No  Lives with - Patient's Self Care Capacity: Alone and Unable to Care For Self  Support Systems: Friends / Neighbors (Kennedi- Friend)  Housing / Facility: Mobile Home  Able to Return to Previous ADL's: Future Time w/Therapy  Mobility Issues: Yes  Prior Services: Skilled Home Health Services  Patient Prefers to be Discharged to:: SNF    Discharge Preparedness  What is your plan after discharge?: Skilled nursing facility  What are your discharge supports?: Other (comment) (Friend Kennedi)  Prior Functional Level: Ambulatory, Needs Assist with Activities of Daily Living, Needs Assist with Medication Management    Functional Assesment  Prior Functional Level: Ambulatory, Needs Assist with Activities of Daily Living, Needs Assist with Medication Management    Finances  Financial Barriers to Discharge: No    Vision / Hearing Impairment  Right Eye Vision: Wears Glasses, Impaired  Left Eye Vision: Wears Glasses, Impaired  Hearing Impairment: Both Ears    Advance  Directive  Advance Directive?: Living Will    Domestic Abuse  Have you ever been the victim of abuse or violence?: No  Physical Abuse or Sexual Abuse: No    Psychological Assessment  History of Substance Abuse: Alcohol    Discharge Risks or Barriers  Discharge risks or barriers?: No    Anticipated Discharge Information  Discharge Disposition: D/T to SNF with Medicare cert in anticipation of skilled care (03)

## 2023-04-20 NOTE — ED NOTES
Labs sent, US PIV placed. CXR done. ERP at bedside. Fluids begun. Straight cath done and urine sent. Phlebotomy at bedside for second BCX collection, unable to obtain due to difficult stick.

## 2023-04-20 NOTE — ED TRIAGE NOTES
"Chief Complaint   Patient presents with    Dizziness     Pt states having dizziness for the past 2 hrs, hx of HTN. Pt denies chest pain.        BIB [facility] to R2, pt on monitor and in gown, labs drawn and sent. Pt consists of: above complaint, pt A&Ox4, on 4L  O2 NC, uses 3L O2 NC baseline for hx of COPD. Pt called ems, per ems pt was found in soaked soiled linen in bed, pt was incontinent of bowel and bladder. Pt stating having SOB. BLE swollen, pt states that he has been feeling weak. Old bump to L eyebrow noted, denies any recent falls, states last fall was a \"couple weeks ago\"     Medications given en route:n/a     BP (!) 145/92   Pulse 99   Temp 36.6 °C (97.9 °F) (Temporal)   Resp 20   Ht 1.702 m (5' 7\")   Wt 108 kg (237 lb)   BMI 37.12 kg/m²     "

## 2023-04-21 ENCOUNTER — APPOINTMENT (OUTPATIENT)
Dept: RADIOLOGY | Facility: MEDICAL CENTER | Age: 73
DRG: 871 | End: 2023-04-21
Attending: HOSPITALIST
Payer: MEDICARE

## 2023-04-21 LAB
ALBUMIN SERPL BCP-MCNC: 3.1 G/DL (ref 3.2–4.9)
ALBUMIN/GLOB SERPL: 1.1 G/DL
ALP SERPL-CCNC: 77 U/L (ref 30–99)
ALT SERPL-CCNC: 24 U/L (ref 2–50)
ANION GAP SERPL CALC-SCNC: 11 MMOL/L (ref 7–16)
AST SERPL-CCNC: 32 U/L (ref 12–45)
BASOPHILS # BLD AUTO: 0.1 % (ref 0–1.8)
BASOPHILS # BLD: 0.01 K/UL (ref 0–0.12)
BILIRUB SERPL-MCNC: 0.6 MG/DL (ref 0.1–1.5)
BUN SERPL-MCNC: 7 MG/DL (ref 8–22)
CALCIUM ALBUM COR SERPL-MCNC: 8.9 MG/DL (ref 8.5–10.5)
CALCIUM SERPL-MCNC: 8.2 MG/DL (ref 8.5–10.5)
CHLORIDE SERPL-SCNC: 88 MMOL/L (ref 96–112)
CO2 SERPL-SCNC: 24 MMOL/L (ref 20–33)
CREAT SERPL-MCNC: 0.5 MG/DL (ref 0.5–1.4)
EOSINOPHIL # BLD AUTO: 0 K/UL (ref 0–0.51)
EOSINOPHIL NFR BLD: 0 % (ref 0–6.9)
ERYTHROCYTE [DISTWIDTH] IN BLOOD BY AUTOMATED COUNT: 43.2 FL (ref 35.9–50)
GFR SERPLBLD CREATININE-BSD FMLA CKD-EPI: 108 ML/MIN/1.73 M 2
GLOBULIN SER CALC-MCNC: 2.9 G/DL (ref 1.9–3.5)
GLUCOSE SERPL-MCNC: 152 MG/DL (ref 65–99)
HCT VFR BLD AUTO: 30.8 % (ref 42–52)
HGB BLD-MCNC: 11.2 G/DL (ref 14–18)
IMM GRANULOCYTES # BLD AUTO: 0.06 K/UL (ref 0–0.11)
IMM GRANULOCYTES NFR BLD AUTO: 0.6 % (ref 0–0.9)
LYMPHOCYTES # BLD AUTO: 0.67 K/UL (ref 1–4.8)
LYMPHOCYTES NFR BLD: 6.2 % (ref 22–41)
MAGNESIUM SERPL-MCNC: 2.1 MG/DL (ref 1.5–2.5)
MCH RBC QN AUTO: 32.7 PG (ref 27–33)
MCHC RBC AUTO-ENTMCNC: 36.4 G/DL (ref 33.7–35.3)
MCV RBC AUTO: 90.1 FL (ref 81.4–97.8)
MONOCYTES # BLD AUTO: 0.93 K/UL (ref 0–0.85)
MONOCYTES NFR BLD AUTO: 8.6 % (ref 0–13.4)
NEUTROPHILS # BLD AUTO: 9.17 K/UL (ref 1.82–7.42)
NEUTROPHILS NFR BLD: 84.5 % (ref 44–72)
NRBC # BLD AUTO: 0 K/UL
NRBC BLD-RTO: 0 /100 WBC
PHOSPHATE SERPL-MCNC: 2.7 MG/DL (ref 2.5–4.5)
PLATELET # BLD AUTO: 307 K/UL (ref 164–446)
PMV BLD AUTO: 9.3 FL (ref 9–12.9)
POTASSIUM SERPL-SCNC: 3.8 MMOL/L (ref 3.6–5.5)
PROT SERPL-MCNC: 6 G/DL (ref 6–8.2)
RBC # BLD AUTO: 3.42 M/UL (ref 4.7–6.1)
SODIUM SERPL-SCNC: 123 MMOL/L (ref 135–145)
SODIUM SERPL-SCNC: 124 MMOL/L (ref 135–145)
SODIUM SERPL-SCNC: 125 MMOL/L (ref 135–145)
SODIUM SERPL-SCNC: 125 MMOL/L (ref 135–145)
SODIUM SERPL-SCNC: 126 MMOL/L (ref 135–145)
WBC # BLD AUTO: 10.8 K/UL (ref 4.8–10.8)

## 2023-04-21 PROCEDURE — 700101 HCHG RX REV CODE 250: Performed by: STUDENT IN AN ORGANIZED HEALTH CARE EDUCATION/TRAINING PROGRAM

## 2023-04-21 PROCEDURE — 97602 WOUND(S) CARE NON-SELECTIVE: CPT

## 2023-04-21 PROCEDURE — 700102 HCHG RX REV CODE 250 W/ 637 OVERRIDE(OP): Performed by: INTERNAL MEDICINE

## 2023-04-21 PROCEDURE — 700105 HCHG RX REV CODE 258: Performed by: STUDENT IN AN ORGANIZED HEALTH CARE EDUCATION/TRAINING PROGRAM

## 2023-04-21 PROCEDURE — A9270 NON-COVERED ITEM OR SERVICE: HCPCS | Performed by: INTERNAL MEDICINE

## 2023-04-21 PROCEDURE — A9270 NON-COVERED ITEM OR SERVICE: HCPCS | Performed by: HOSPITALIST

## 2023-04-21 PROCEDURE — 700102 HCHG RX REV CODE 250 W/ 637 OVERRIDE(OP): Performed by: NURSE PRACTITIONER

## 2023-04-21 PROCEDURE — 700102 HCHG RX REV CODE 250 W/ 637 OVERRIDE(OP): Performed by: HOSPITALIST

## 2023-04-21 PROCEDURE — 74230 X-RAY XM SWLNG FUNCJ C+: CPT

## 2023-04-21 PROCEDURE — 94664 DEMO&/EVAL PT USE INHALER: CPT

## 2023-04-21 PROCEDURE — 84100 ASSAY OF PHOSPHORUS: CPT

## 2023-04-21 PROCEDURE — 80053 COMPREHEN METABOLIC PANEL: CPT

## 2023-04-21 PROCEDURE — A9270 NON-COVERED ITEM OR SERVICE: HCPCS | Performed by: NURSE PRACTITIONER

## 2023-04-21 PROCEDURE — 92610 EVALUATE SWALLOWING FUNCTION: CPT

## 2023-04-21 PROCEDURE — 83735 ASSAY OF MAGNESIUM: CPT

## 2023-04-21 PROCEDURE — 92611 MOTION FLUOROSCOPY/SWALLOW: CPT

## 2023-04-21 PROCEDURE — 36415 COLL VENOUS BLD VENIPUNCTURE: CPT

## 2023-04-21 PROCEDURE — 700111 HCHG RX REV CODE 636 W/ 250 OVERRIDE (IP): Performed by: STUDENT IN AN ORGANIZED HEALTH CARE EDUCATION/TRAINING PROGRAM

## 2023-04-21 PROCEDURE — 770020 HCHG ROOM/CARE - TELE (206)

## 2023-04-21 PROCEDURE — 85025 COMPLETE CBC W/AUTO DIFF WBC: CPT

## 2023-04-21 PROCEDURE — 700111 HCHG RX REV CODE 636 W/ 250 OVERRIDE (IP): Performed by: INTERNAL MEDICINE

## 2023-04-21 PROCEDURE — 84295 ASSAY OF SERUM SODIUM: CPT | Mod: 91

## 2023-04-21 PROCEDURE — 99232 SBSQ HOSP IP/OBS MODERATE 35: CPT | Performed by: HOSPITALIST

## 2023-04-21 PROCEDURE — 700105 HCHG RX REV CODE 258: Performed by: INTERNAL MEDICINE

## 2023-04-21 RX ORDER — PETROLATUM 42 G/100G
OINTMENT TOPICAL 2 TIMES DAILY
Status: DISCONTINUED | OUTPATIENT
Start: 2023-04-21 | End: 2023-04-24 | Stop reason: HOSPADM

## 2023-04-21 RX ORDER — POTASSIUM CHLORIDE 20 MEQ/1
40 TABLET, EXTENDED RELEASE ORAL ONCE
Status: COMPLETED | OUTPATIENT
Start: 2023-04-21 | End: 2023-04-21

## 2023-04-21 RX ORDER — CHOLECALCIFEROL (VITAMIN D3) 125 MCG
5 CAPSULE ORAL NIGHTLY
Status: DISCONTINUED | OUTPATIENT
Start: 2023-04-21 | End: 2023-04-24 | Stop reason: HOSPADM

## 2023-04-21 RX ADMIN — UMECLIDINIUM BROMIDE AND VILANTEROL TRIFENATATE 1 PUFF: 62.5; 25 POWDER RESPIRATORY (INHALATION) at 08:06

## 2023-04-21 RX ADMIN — AMLODIPINE BESYLATE 5 MG: 10 TABLET ORAL at 06:07

## 2023-04-21 RX ADMIN — Medication 5 MG: at 20:40

## 2023-04-21 RX ADMIN — AZITHROMYCIN MONOHYDRATE 500 MG: 250 TABLET ORAL at 06:07

## 2023-04-21 RX ADMIN — FLUTICASONE PROPIONATE 88 MCG: 44 AEROSOL, METERED RESPIRATORY (INHALATION) at 06:07

## 2023-04-21 RX ADMIN — Medication 5 MG: at 02:15

## 2023-04-21 RX ADMIN — POTASSIUM CHLORIDE 40 MEQ: 1500 TABLET, EXTENDED RELEASE ORAL at 09:01

## 2023-04-21 RX ADMIN — DEXTROSE MONOHYDRATE 500 ML: 50 INJECTION, SOLUTION INTRAVENOUS at 00:16

## 2023-04-21 RX ADMIN — Medication: at 18:06

## 2023-04-21 RX ADMIN — RIVAROXABAN 10 MG: 10 TABLET, FILM COATED ORAL at 18:06

## 2023-04-21 RX ADMIN — THERA TABS 1 TABLET: TAB at 06:07

## 2023-04-21 RX ADMIN — FOLIC ACID 1 MG: 1 TABLET ORAL at 06:26

## 2023-04-21 RX ADMIN — THIAMINE HYDROCHLORIDE 500 MG: 100 INJECTION, SOLUTION INTRAMUSCULAR; INTRAVENOUS at 05:58

## 2023-04-21 RX ADMIN — OMEPRAZOLE 20 MG: 20 CAPSULE, DELAYED RELEASE ORAL at 06:07

## 2023-04-21 RX ADMIN — CEFTRIAXONE SODIUM 2000 MG: 10 INJECTION, POWDER, FOR SOLUTION INTRAVENOUS at 06:00

## 2023-04-21 ASSESSMENT — LIFESTYLE VARIABLES
AGITATION: NORMAL ACTIVITY
TOTAL SCORE: 0
VISUAL DISTURBANCES: NOT PRESENT
ORIENTATION AND CLOUDING OF SENSORIUM: ORIENTED AND CAN DO SERIAL ADDITIONS
TREMOR: TREMOR NOT VISIBLE BUT CAN BE FELT, FINGERTIP TO FINGERTIP
AUDITORY DISTURBANCES: NOT PRESENT
ANXIETY: NO ANXIETY (AT EASE)
ORIENTATION AND CLOUDING OF SENSORIUM: ORIENTED AND CAN DO SERIAL ADDITIONS
ANXIETY: NO ANXIETY (AT EASE)
AUDITORY DISTURBANCES: NOT PRESENT
TREMOR: TREMOR NOT VISIBLE BUT CAN BE FELT, FINGERTIP TO FINGERTIP
AGITATION: NORMAL ACTIVITY
TOTAL SCORE: 1
VISUAL DISTURBANCES: NOT PRESENT
NAUSEA AND VOMITING: NO NAUSEA AND NO VOMITING
NAUSEA AND VOMITING: NO NAUSEA AND NO VOMITING
SUBSTANCE_ABUSE: 0
AUDITORY DISTURBANCES: NOT PRESENT
TOTAL SCORE: 1
VISUAL DISTURBANCES: NOT PRESENT
HEADACHE, FULLNESS IN HEAD: NOT PRESENT
ANXIETY: NO ANXIETY (AT EASE)
AGITATION: NORMAL ACTIVITY
HEADACHE, FULLNESS IN HEAD: NOT PRESENT
HEADACHE, FULLNESS IN HEAD: NOT PRESENT
PAROXYSMAL SWEATS: NO SWEAT VISIBLE
NAUSEA AND VOMITING: NO NAUSEA AND NO VOMITING
ORIENTATION AND CLOUDING OF SENSORIUM: ORIENTED AND CAN DO SERIAL ADDITIONS
TREMOR: NO TREMOR
PAROXYSMAL SWEATS: NO SWEAT VISIBLE
PAROXYSMAL SWEATS: NO SWEAT VISIBLE

## 2023-04-21 ASSESSMENT — COGNITIVE AND FUNCTIONAL STATUS - GENERAL
SUGGESTED CMS G CODE MODIFIER DAILY ACTIVITY: CK
MOVING TO AND FROM BED TO CHAIR: A LITTLE
DRESSING REGULAR LOWER BODY CLOTHING: A LOT
MOBILITY SCORE: 15
CLIMB 3 TO 5 STEPS WITH RAILING: A LOT
HELP NEEDED FOR BATHING: A LOT
SUGGESTED CMS G CODE MODIFIER MOBILITY: CK
STANDING UP FROM CHAIR USING ARMS: A LOT
PERSONAL GROOMING: A LITTLE
MOVING FROM LYING ON BACK TO SITTING ON SIDE OF FLAT BED: A LOT
DAILY ACTIVITIY SCORE: 16
TOILETING: A LITTLE
TURNING FROM BACK TO SIDE WHILE IN FLAT BAD: A LITTLE
WALKING IN HOSPITAL ROOM: A LITTLE
DRESSING REGULAR UPPER BODY CLOTHING: A LITTLE
EATING MEALS: A LITTLE

## 2023-04-21 ASSESSMENT — ENCOUNTER SYMPTOMS
DIARRHEA: 0
SENSORY CHANGE: 0
NAUSEA: 0
SHORTNESS OF BREATH: 0
DIZZINESS: 0
SPEECH CHANGE: 0
EYE DISCHARGE: 0
BACK PAIN: 0
COUGH: 0
ABDOMINAL PAIN: 0
FEVER: 0
PALPITATIONS: 0
NERVOUS/ANXIOUS: 0

## 2023-04-21 ASSESSMENT — FIBROSIS 4 INDEX: FIB4 SCORE: 1.53

## 2023-04-21 ASSESSMENT — PAIN DESCRIPTION - PAIN TYPE: TYPE: ACUTE PAIN

## 2023-04-21 NOTE — PROGRESS NOTES
1052: Report given to En RUANO.   Patient transferred to room 723 with all his belongings. Stable at the time of transfer. Wants RN to notify friend Marietta. Friend called but no response.

## 2023-04-21 NOTE — ASSESSMENT & PLAN NOTE
This has been a recurrent process for him in the setting of beer Poto myranda for which she was admitted 3 weeks ago.  Sodium on admission was severely low at 113. Slowly improving now up to 129. Since admission  Starting also back his salt tabs   Monitor sodium every 6 hrs

## 2023-04-21 NOTE — PROGRESS NOTES
Patient arrived to unit at 1735. Report received from Lilian RUANO. AOx4. On baseline 3L NC. Wound pictures taken, wound consult in place.

## 2023-04-21 NOTE — THERAPY
Speech Language Pathology   Videofluoroscopic Swallow Study Evaluation     Patient Name: Juan Manuel Bass  AGE:  72 y.o., SEX:  male  Medical Record #: [id number]  Date of Service: 4/21/2023    Pertinent Information  Current Method of Nutrition: Oral diet (soft bite size/thins)     Dentition: Fair  Secretion Management: Adequate  Feeding Tube: None  Tracheostomy: No       Factor(s) Affecting Performance: None  Discussed the risks, benefits, and alternatives of the VFSS procedure. Patient/family acknowledged and agreed to proceed.    Assessment  Videofluoroscopic Swallow Study was conducted in the Lateral projection(s) to evaluate oropharyngeal swallow function. A radiology tech was present to assist with the procedure. Attempted to complete AP view, however, unable to complete due to body habitus.     Positioning: Seated upright in fluoroscopy chair  Anatomic View: Hardware visualized at the level of C5 pt endorsing hx of prior ACDF  Bolus Administration: SLP, Patient  PO Barium Contrast Trials: Varibar thin, Varibar nectar (mildly thick), Varibar pudding, Liquidised mixed with Varibar powder, Soft & Bite sized coated with Varibar powder, Mixed consistency with Varibar powder, Regular solid coated with Varibar pudding      Consistency PAS Score Timing Residue Comments   Thin Liquid 3 During swallow Vallecular Residue: None (0%)  Pyriform Sinus Residue: None (0%) PAS 1 by tsp x2  PAS 3 during by cup x2  PAS 1 by straw   Mildly Thick 3 During Swallow Vallecular Residue: None (0%)    Cup   Liquidised 1 N/A Vallecular Residue: None (0%)  Pyriform Sinus Residue: None (0%)    Pudding 1 N/A Vallecular Residue: None (0%)  Pyriform Sinus Residue: None (0%)    Soft & Bite Sized 1 N/A Vallecular Residue: None (0%)  Pyriform Sinus Residue: None (0%)    Regular Solid 1 N/A Vallecular Residue: None (0%)  Pyriform Sinus Residue: None (0%)    Mixed 1 N/A Vallecular Residue: None (0%)  Pyriform Sinus Residue: None (0%)       Penetration-Aspiration Scale (PAS)  1     No contrast enters airway  2     Contrast enters the airway, remains above the vocal folds, and is ejected from the airway (not seen in the airway at the end of the swallow).  3     Contrast enters the airway, remains above the vocal folds, and is not ejected from the airway (is seen in the airway after the swallow).  4     Contrast enters the airway, contacts the vocal folds, and is ejected from the airway.  5     Contrast enters the airway, contacts the vocal folds, and is not ejected from the airway  6     Contrast enters the airway, crosses the plane of the vocal folds, and is ejected from the airway.  7     Contrast enters the airway, crosses the plane of the vocal folds, and is not ejected from the airway despite effort.  8     Contrast enters the airway, crosses the plane of the vocal folds, is not ejected from the airway and there is no response to aspiration.       Oral phase:  - Adequate oral acceptance and containment noted  - Pt demonstrated prolonged but functional mastication of regular    Pharyngeal phase:  - Delayed laryngeal vestibule closure resulted in high penetration during the swallow with thins by cup and MTL by cup.   - No aspiration visualized during study  - No significant pharyngeal residue visualized during study.       Compensatory Strategies:  - Cues to cough and re-swallow were effective in ejecting penetrated material from laryngeal vestibule.     Severity Rating:   Severity Rating: MALOU  MALOU: Functional      Clinical Impressions  The pt presents with a functional oropharyngeal swallow. Airway protection mildly impaired, characterized by high penetration of thins by cup occurred during the swallow, easily cleared with cued cough and cleansing swallow. Pharyngeal efficiency intact. Dysphagia is likely chronic given hx of COPD and alcoholism. Suspect pt is at or near baseline swallow function. Recommend diligent oral care, ambulation per PT  "and OT recs and adherence to safe swallow strategies to reduce risk of aspiration pneumonia.      Recommendations  Diet Consistency: Easy to Chew/Thins  Medication: Whole with puree, Whole with liquid, As tolerated  Supervision: Assist with meal tray set up, Distant supervision - check on patient 2-3 times per meal  Positioning: Remain upright for 45 minutes after oral intake, Fully upright and midline during oral intake  Strategies: Small bites/sips, Slow rate of intake, Multiple swallows (2  ) per bite/sips (cough and re-swallow with thins)  Oral Care: Q4h  Additional Instrumentation: None       SLP Treatment Plan  Treatment Plan: Dysphagia Treatment  SLP Frequency: 3x Per Week  Estimated Duration: Until Therapy Goals Met      Anticipated Discharge Needs  Discharge Recommendations: Anticipate that the patient will have no further speech therapy needs after discharge from the hospital   Therapy Recommendations Upon DC: Not Indicated        Patient / Family Goals  Patient / Family Goal #1: \"Can I have that applesauce\"  Goal #1 Outcome: Goal met  Short Term Goal # 1: Pt will participate in MBSS to objectively assess oropharygneal swallow function and determine least restrictive diet texture with min cues.  Goal Outcome # 1: Goal met, new goal added  Short Term Goal # 1 B : Pt will consume a diet of EC/TN with no s/sx of aspiration and min cues.      Bao Malik, SLP   "

## 2023-04-21 NOTE — ASSESSMENT & PLAN NOTE
Wean oxygen as able.  Monitor SpO2.  CXR reveals a LLL infiltrate query aspiration pneumonia  Speech therapy evaluating patient 4/21  Probable component of MICHOACANO

## 2023-04-21 NOTE — ASSESSMENT & PLAN NOTE
LLL infiltrate seen on cxr  Likely aspiration given his altered sensorium  On unasyn    Lawson Fuentes)

## 2023-04-21 NOTE — CONSULTS
Hospital Medicine Consultation    Date of Service  4/20/2023    Referring Physician  Juan R Israel M.D.    Consulting Physician  Jose Merritt M.D.    Reason for Consultation  hyponatremia    History of Presenting Illness  72 y.o. male who presented 4/20/2023 with dizziness.  Mr. Bass has a past medical history of hypertension, home oxygen dependency at 3 L, that was most recently admitted to our hospital from 4/1/2023 through 4/7/2023 with severe hyponatremia with initial sodium of 112 secondary to beer Poto myranda.  He was then readmitted again on 4/7/2023 through 4/12/2023 after a ground-level fall with head trauma.  He lives alone and called paramedics because of dizziness.  He was brought to the emergency room where his sodium found to be severely low at 113.  He was admitted to the intensive care unit and placed on normal saline and his sodium has come up to 120.  Found to have severe left lower extremity cellulitis initiated on IV Unasyn.  Chest x-ray revealed a left lower lobe opacity suspicious for aspiration pneumonia and this will also be treated with IV Unasyn.  He will be transferred to the [facility] in guarded condition.      He was treated with NS at 100 mL/hour and now is on fluid restriction.   We discussed that he is not safe for discharging home.   Review of Systems  Review of Systems   Constitutional:  Negative for chills and fever.   Respiratory:  Negative for shortness of breath.    Cardiovascular:  Negative for chest pain.   All other systems reviewed and are negative.    Past Medical History   has a past medical history of Chronic airway obstruction, not elsewhere classified, High anion gap metabolic acidosis (4/1/2023), and Hypertension.    Surgical History  Umbilical hernia  Cervical surgery    Family History  family history includes Heart Disease in his father; No Known Problems in his mother.    Social History   reports that he quit smoking about 3 years ago. His smoking use included  cigarettes. He has a 4.00 pack-year smoking history. He has never used smokeless tobacco. He reports current alcohol use of about 21.0 oz per week. He reports that he does not currently use drugs.he lives alone    Medications  Prior to Admission Medications   Prescriptions Last Dose Informant Patient Reported? Taking?   Fluticasone-Umeclidin-Vilant (TRELEGY ELLIPTA) 100-62.5-25 MCG/INH AEROSOL POWDER, BREATH ACTIVATED inhalation  Patient's Home Pharmacy No No   Sig: Inhale 2-3 Inhalation every morning.   albuterol 108 (90 Base) MCG/ACT Aero Soln inhalation aerosol  Patient's Home Pharmacy No No   Sig: Inhale 1-2 Puffs every four hours as needed for Shortness of Breath.   amLODIPine (NORVASC) 5 MG Tab  Patient's Home Pharmacy, Historical Yes No   Sig: Take 5 mg by mouth every day.   pantoprazole (PROTONIX) 40 MG Tablet Delayed Response  Patient's Home Pharmacy Yes No   Sig: Take 40 mg by mouth every day.   sodium chloride (SALT) 1 GM Tab  Historical No No   Sig: Take 1 Tablet by mouth 3 times a day with meals.      Facility-Administered Medications: None       Allergies  Allergies   Allergen Reactions    Tetanus Toxoid Hives       Physical Exam  Temp:  [36.6 °C (97.9 °F)] 36.6 °C (97.9 °F)  Pulse:  [] 115  Resp:  [6-42] 6  BP: (134-177)/(60-94) 150/70  SpO2:  [95 %-100 %] 95 %    Physical Exam  Vitals and nursing note reviewed.   Constitutional:       General: He is not in acute distress.  HENT:      Mouth/Throat:      Mouth: Mucous membranes are dry.   Cardiovascular:      Rate and Rhythm: Regular rhythm. Tachycardia present.   Abdominal:      General: There is no distension.      Tenderness: There is no abdominal tenderness.   Musculoskeletal:      Comments: Large bruise left chest  Right elbow multiple scabs  Left foot and shin red and warm to touch also swollen  Oncychomycosis   Scabs shins  Left posterior thigh lesion   Neurological:      General: No focal deficit present.      Mental Status: He is alert  and oriented to person, place, and time.       Fluids  Date 04/20/23 0700 - 04/21/23 0659   Shift 6666-3440 7552-1933 4052-1606 24 Hour Total   INTAKE   Shift Total       OUTPUT   Urine 3400   3400   Shift Total 3400   3400   Weight (kg) 110.2 110.2 110.2 110.2       Laboratory  Recent Labs     04/20/23  0230   WBC 13.2*   RBC 4.08*   HEMOGLOBIN 13.2*   HEMATOCRIT 37.1*   MCV 90.9   MCH 32.4   MCHC 35.6*   RDW 42.1   PLATELETCT 387   MPV 9.0     Recent Labs     04/20/23  0230 04/20/23  0539 04/20/23  1115 04/20/23  1442   SODIUM 113* 113* 120* 121*   POTASSIUM 4.0  --   --   --    CHLORIDE 75*  --   --   --    CO2 23  --   --   --    GLUCOSE 87  --   --   --    BUN 6*  --   --   --    CREATININE 0.45*  --   --   --    CALCIUM 9.0  --   --   --      Recent Labs     04/20/23  0230   APTT 35.1   INR 1.08                 Imaging  CT-HEAD W/O   Final Result         1.  No acute intracranial abnormality is identified, there are nonspecific white matter changes, commonly associated with small vessel ischemic disease.  Associated mild cerebral atrophy is noted.   2.  Atherosclerosis.         DX-CHEST-PORTABLE (1 VIEW)   Final Result         1.  Left lower lobe infiltrate   2.  Atherosclerosis          Assessment/Plan  * Hyponatremia- (present on admission)  Assessment & Plan  This has been a recurrent process for him in the setting of beer Poto myranda for which she was admitted 3 weeks ago.  Sodium on admission was severely low at 113.  He was placed on normal saline and sodium came up to 120 therefore he is on fluid restriction  Continue to check sodium levels every 4 hours and adjust accordingly with the goal to not go above 9 mmol/L in a 24-hour period  Monitor closely in the IMCU with every 4 hours neurochecks as well      Cellulitis- (present on admission)  Assessment & Plan  Left leg  One blood culture is positive  IV Unasyn and adjust according to blood cultures    Sepsis (HCC)- (present on admission)  Assessment &  Plan  This is Sepsis Present on admission  SIRS criteria identified on my evaluation include: Leukocytosis, with WBC greater than 12,000  Source is left leg cellulitis  Sepsis protocol initiated  Fluid resuscitation ordered per protocol  Crystalloid Fluid Administration: truncated dosing due to severe hyponatremia  IV antibiotics as appropriate for source of sepsis IV Unasyn  Reassessment: I have reassessed the patient's hemodynamic status          Alcohol dependence (HCC)- (present on admission)  Assessment & Plan  Excessive beer intake   CIWA protocol ordered for possible withdrawal  Magnesium, potassium, and phosphorus level in the morning and replace accordingly.  Multivitamins ordered  Cessation discussed    Primary hypertension- (present on admission)  Assessment & Plan  Continue outpatient Norvasc with holding parameters.     DNR (do not resuscitate)- (present on admission)  Assessment & Plan  Per Mr. Bass's wishes    Pneumonia- (present on admission)  Assessment & Plan  LLL infiltrate seen on cxr  Likely aspiration given his altered sensorium  IV Unasyn    Acute on chronic respiratory failure with hypoxia (HCC)- (present on admission)  Assessment & Plan  Requiring supplemental oxygen  CXR reveals a LLL infiltrate query aspiration pneumonia  Supplemental oxygen    Obesity- (present on admission)  Assessment & Plan  BMI 38

## 2023-04-21 NOTE — PROGRESS NOTES
4 Eyes Skin Assessment Completed by Yfn VERNON RN and En CAIN RN.    Head Bruising  Ears Redness, scabs  Nose WDL  Mouth WDL  Neck WDL  Breast/Chest Bruising  Shoulder Blades WDL  Spine WDL  (R) Arm/Elbow/Hand Bruising  (L) Arm/Elbow/Hand Bruising  Abdomen WDL  Groin Excoriation  Scrotum/Coccyx/Buttocks Redness, Excoriation, and Discoloration  (R) Leg Blanching, Swelling, Shiny, and Edema  (L) Leg Redness, Blanching, Swelling, Shiny, and Edema  (R) Heel/Foot/Toe Redness, Blanching, Discoloration, Swelling, and Edema  (L) Heel/Foot/Toe Redness, Blanching, Discoloration, Swelling, and Edema          Devices In Places Tele Box, Blood Pressure Cuff, Pulse Ox, and Nasal Cannula      Interventions In Place Gray Ear Foams, Heel Mepilex, Sacral Mepilex, Pillows, Q2 Turns, and Heels Loaded W/Pillows    Possible Skin Injury Yes    Pictures Uploaded Into Epic Yes  Wound Consult Placed Yes  RN Wound Prevention Protocol Ordered Yes

## 2023-04-21 NOTE — ASSESSMENT & PLAN NOTE
Left leg cellulitis   also his blood cultures grew aerococcus viridans most likely contaminant   Will repeat Blood cultures

## 2023-04-21 NOTE — PROGRESS NOTES
Report received from Gary Lewis. Assumed pt care. Pt resting in bed. Fall precautions in place, call light and belongings within reach, bed in lowest position. No signs of distress.

## 2023-04-21 NOTE — PROGRESS NOTES
Hospital Medicine Daily Progress Note    Date of Service  4/21/2023    Chief Complaint  Hyponatremia due to beer intake    Hospital Course  72 y.o. male who presented 4/20/2023 with dizziness.  Mr. Bass has a past medical history of hypertension, home oxygen dependency at 3 L, that was most recently admitted to our hospital from 4/1/2023 through 4/7/2023 with severe hyponatremia with initial sodium of 112 secondary to beer Poto myranda.  He was then readmitted again on 4/7/2023 through 4/12/2023 after a ground-level fall with head trauma.  He lives alone and called paramedics because of dizziness.  He was brought to the emergency room where his sodium found to be severely low at 113.  He was admitted to the intensive care unit and placed on normal saline and his sodium has come up to 120.  Found to have severe left lower extremity cellulitis initiated on IV Unasyn.  Chest x-ray revealed a left lower lobe opacity suspicious for aspiration pneumonia and this will also be treated with IV Unasyn.  He will be transferred to the [facility] in guarded condition.    Interval Problem Update  4/21: blood cultures positive for possible Strep (we are treating a cellulitis). Na:123. States only drinking 3 12 oz beers daily. Left leg cellulitis and bilateral leg swelling.  Alcohol cessation advised.      I have discussed this patient's plan of care and discharge plan at IDT rounds today with Case Management, Nursing, Nursing leadership, and other members of the IDT team.      Code Status  DNAR/DNI    Disposition  Patient is not medically cleared for discharge.   Anticipate discharge to to home with close outpatient follow-up.  I have placed the appropriate orders for post-discharge needs.    Review of Systems  Review of Systems   Constitutional:  Positive for malaise/fatigue. Negative for fever.   Eyes:  Negative for discharge.   Respiratory:  Negative for cough and shortness of breath.    Cardiovascular:  Positive for leg swelling.  Negative for chest pain and palpitations.   Gastrointestinal:  Negative for abdominal pain, diarrhea and nausea.   Genitourinary:  Negative for dysuria and hematuria.   Musculoskeletal:  Negative for back pain and joint pain.   Neurological:  Negative for dizziness, sensory change and speech change.   Psychiatric/Behavioral:  Negative for substance abuse. The patient is not nervous/anxious.       Physical Exam  Temp:  [36.6 °C (97.8 °F)-36.9 °C (98.4 °F)] 36.9 °C (98.4 °F)  Pulse:  [73-97] 84  Resp:  [14-20] 18  BP: (104-144)/(51-94) 110/51  SpO2:  [96 %-98 %] 97 %    Physical Exam  Vitals reviewed.   Constitutional:       Appearance: He is obese. He is ill-appearing. He is not diaphoretic.   HENT:      Head: Normocephalic and atraumatic.      Nose: Nose normal.      Mouth/Throat:      Mouth: Mucous membranes are moist.      Pharynx: No oropharyngeal exudate.   Eyes:      General: No scleral icterus.        Right eye: No discharge.         Left eye: No discharge.      Extraocular Movements: Extraocular movements intact.      Conjunctiva/sclera: Conjunctivae normal.   Cardiovascular:      Rate and Rhythm: Normal rate and regular rhythm.      Pulses:           Radial pulses are 2+ on the right side and 2+ on the left side.        Dorsalis pedis pulses are 2+ on the right side and 2+ on the left side.      Heart sounds: No murmur heard.  Pulmonary:      Effort: Pulmonary effort is normal. No respiratory distress.      Breath sounds: Normal breath sounds. No wheezing or rales.   Abdominal:      General: Bowel sounds are normal. There is no distension.      Palpations: Abdomen is soft.   Musculoskeletal:         General: No swelling or tenderness.      Cervical back: Neck supple. No muscular tenderness.      Right lower leg: Edema present.      Left lower leg: Edema present.   Lymphadenopathy:      Cervical: No cervical adenopathy.   Skin:     Coloration: Skin is not jaundiced or pale.      Findings: Erythema present.       Comments: Thickened unkept toenails.   Neurological:      General: No focal deficit present.      Mental Status: He is alert and oriented to person, place, and time. Mental status is at baseline.      Cranial Nerves: No cranial nerve deficit.   Psychiatric:         Mood and Affect: Mood normal.         Behavior: Behavior normal.       Fluids    Intake/Output Summary (Last 24 hours) at 4/21/2023 2007  Last data filed at 4/21/2023 1600  Gross per 24 hour   Intake 1034 ml   Output 800 ml   Net 234 ml       Laboratory  Recent Labs     04/20/23 0230 04/21/23  0216   WBC 13.2* 10.8   RBC 4.08* 3.42*   HEMOGLOBIN 13.2* 11.2*   HEMATOCRIT 37.1* 30.8*   MCV 90.9 90.1   MCH 32.4 32.7   MCHC 35.6* 36.4*   RDW 42.1 43.2   PLATELETCT 387 307   MPV 9.0 9.3     Recent Labs     04/20/23  0230 04/20/23  0539 04/21/23  0216 04/21/23  0624 04/21/23  1038 04/21/23  1437   SODIUM 113*   < > 123* 124* 125* 125*   POTASSIUM 4.0  --  3.8  --   --   --    CHLORIDE 75*  --  88*  --   --   --    CO2 23  --  24  --   --   --    GLUCOSE 87  --  152*  --   --   --    BUN 6*  --  7*  --   --   --    CREATININE 0.45*  --  0.50  --   --   --    CALCIUM 9.0  --  8.2*  --   --   --     < > = values in this interval not displayed.     Recent Labs     04/20/23 0230   APTT 35.1   INR 1.08               Imaging  DX-ESOPHAGUS - [id number]   Final Result      1.  No evidence of tracheal aspiration.      CT-HEAD W/O   Final Result         1.  No acute intracranial abnormality is identified, there are nonspecific white matter changes, commonly associated with small vessel ischemic disease.  Associated mild cerebral atrophy is noted.   2.  Atherosclerosis.         DX-CHEST-PORTABLE (1 VIEW)   Final Result         1.  Left lower lobe infiltrate   2.  Atherosclerosis           Assessment/Plan  * Hyponatremia- (present on admission)  Assessment & Plan  This has been a recurrent process for him in the setting of beer Poto myranda for which she was admitted 3  weeks ago.  Sodium on admission was severely low at 113.  He was placed on normal saline and sodium came up to 120 therefore he is on fluid restriction  Continue to check sodium levels every 4 hours and adjust accordingly with the goal to not go above 9 mmol/L in a 24-hour period  Monitor closely in the IMCU with every 4 hours neurochecks as well    Cellulitis- (present on admission)  Assessment & Plan  Left leg  One blood culture is positive  IV Unasyn and adjust according to blood cultures    DNR (do not resuscitate)- (present on admission)  Assessment & Plan  Per Mr. Bass's wishes    Pneumonia- (present on admission)  Assessment & Plan  LLL infiltrate seen on cxr  Likely aspiration given his altered sensorium  IV Unasyn  4/21 wbc:10.8    Sepsis (HCC)- (present on admission)  Assessment & Plan  Left leg cellulitis  Antibiotics  Wound care    Acute on chronic respiratory failure with hypoxia (HCC)- (present on admission)  Assessment & Plan  Wean oxygen as able.  Monitor SpO2.  CXR reveals a LLL infiltrate query aspiration pneumonia  Speech therapy evaluating patient 4/21  Probable component of MICHOACANO    Alcohol dependence (HCC)- (present on admission)  Assessment & Plan  Excessive beer intake (but states only drinking three 12 oz beers/day)  CIWA protocol ordered for possible withdrawal  Magnesium, potassium, and phosphorus level in the morning and replace accordingly.  Multivitamins ordered  Cessation discussed    Primary hypertension- (present on admission)  Assessment & Plan  Continue outpatient Norvasc with holding parameters.     Obesity- (present on admission)  Assessment & Plan  BMI 38         VTE prophylaxis: Xarelto 10 mg daily as prophylaxis    I have performed a physical exam and reviewed and updated ROS and Plan today (4/21/2023). In review of yesterday's note (4/20/2023), there are no changes except as documented above.

## 2023-04-21 NOTE — PROGRESS NOTES
4 Eyes Skin Assessment Completed by Joshua MOORE RN and Trent MOORE RN.    Head Bruising  Ears Redness, scabs  Left eye Bruising  Nose WDL  Mouth WDL  Neck WDL  Breast/Chest Bruising  Shoulder Blades WDL   Spine WDL  (R) Shoulder bruising  (R) Arm/Elbow/Hand Bruising  (L) Arm/Elbow/Hand Bruising  Abdomen WDL  Groin WDL  Scrotum/Coccyx/Buttocks Redness, Excoriation, and Discoloration  (R) Leg Redness, Blanching, Swelling, and Shiny  (L) Leg Redness, Blanching, and Shiny, Swelling, Edema  (R) Heel/Foot/Toe Redness, Blanching, Discoloration, Swelling, and Edema  (L) Heel/Foot/Toe Redness, Blanching, Swelling, and Edema          Devices In Places ECG, Blood Pressure Cuff, Pulse Ox, SCD's, and Nasal Cannula      Interventions In Place NC W/Ear Foams, Heel Mepilex, Sacral Mepilex, Pillows, Elbow Mepilex, Q2 Turns, Low Air Loss Mattress, and Heels Loaded W/Pillows    Possible Skin Injury Yes    Pictures Uploaded Into Epic Yes  Wound Consult Placed Yes  RN Wound Prevention Protocol Ordered Yes

## 2023-04-21 NOTE — CARE PLAN
The patient is Watcher - Medium risk of patient condition declining or worsening    Shift Goals  Clinical Goals: Na+ monitoring, neuro checks  Patient Goals: Comfort  Family Goals: ALEKSANDAR    Progress made toward(s) clinical / shift goals:    Problem: Risk for Aspiration  Goal: Patient's risk for aspiration will be absent or decrease  Outcome: Progressing, pt able to drink without coughing.      Problem: Respiratory  Goal: Patient will achieve/maintain optimum respiratory ventilation and gas exchange  Outcome: Progressing, pt down to 2L NC.        Patient is not progressing towards the following goals:

## 2023-04-21 NOTE — RESPIRATORY CARE
COPD EDUCATION by COPD CLINICAL EDUCATOR  4/21/2023  at  1:43 PM by Cezar Holliday RRT     Patient interviewed by COPD education team.  Patient declines to participate in full program.  A short intervention has been conducted.  A comprehensive packet including information about COPD, types of treatments to manage their disease and safe home Oxygen usage was provided and reviewed with patient at the bedside.  Readmitted for GLF.  Reviewed RT medications, patient still declining spacer use. Action plan updated.    COPD Screen  COPD Risk Screening  Do you have a history of COPD?: No  COPD Population Screener  During the past 4 weeks, how much did you feel short of breath?: Some of the time  Do you ever cough up any mucus or phlegm?: Yes, every day  In the past 12 months, you do less than you used to because of your breathing problems: Disagree/unsure  Have you smoked at least 100 cigarettes in your entire life?: Yes  How old are you?: 60+  COPD Screening Score: 7  COPD Coordinator Not Recommended: Yes (quit 6-7 years ago)    COPD Assessment  COPD Clinical Specialists ONLY  COPD Education Initiated: Yes--Short Intervention (Patient readmitted with GLF)  DME Company: none  DME Equipment Type: none  Physician Name: Peterson Amin M.D.-declines appt assistance  Pulmonologist Name: none per patient-declines appt assistance  Is this a COPD exacerbation patient?: No  $ Demo/Eval of SVN's, MDI's and Aerosols: Yes (Reviewed RT medications, patient still declines spacer use)  (OP) Pulmonary Function Testing: Yes (4/8/23 FEV1 57%; FEV1/FCV 76.)    PFT Results    Lab Results   Component Value Date    FEV1 (L) 1.54 04/10/2023    FEV1/FVC % 76.45 04/10/2023    FVC % Predicted 58 04/10/2023    FEV1 % Predicted 57 04/10/2023    FVC 2.01 04/10/2023    FEV1/FVC % Predicted 99 04/10/2023       Meds to Beds        MY COPD ACTION PLAN     It is recommended that patients and physicians /healthcare providers complete this action plan  "together. This plan should be discussed at each physician visit and updated as needed.    The green, yellow and red zones show groups of symptoms of COPD. This list of symptoms is not comprehensive, and you may experience other symptoms. In the \"Actions\" column, your healthcare provider has recommended actions for you to take based on your symptoms.    Patient Name: Juan Manuel Bass   YOB: 1950   Last Updated on: 4/21/2023  1:43 PM   Green Zone:  I am doing well today Actions   •  Usual activitiy and exercise level •  Take daily medications   •  Usual amounts of cough and phlegm/mucus •  Use oxygen as prescribed   •  Sleep well at night •  Continue regular exercise/diet plan   •  Appetite is good •  At all times avoid cigarette smoke, inhaled irritants     Daily Medications (these medications are taken every day):   Fluticasone and Umeclidinium and Vilanterol (Trelogy) 1 Puff Once daily     Additional Information:  Please remember to RINSE MOUTH after taking this medicine    Yellow Zone:  I am having a bad day or a COPD flare Actions   •  More breathless than usual •  Continue daily medications   •  I have less energy for my daily activities •  Use quick relief inhaler as ordered   •  Increased or thicker phlegm/mucus •  Use oxygen as prescribed   •  Using quick relief inhaler/nebulizer more often •  Get plenty of rest   •  Swelling of ankles more than usual •  Use pursed lip breathing   •  More coughing than usual •  At all times avoid cigarette smoke, inhaled irritants   •  I feel like I have a \"chest cold\"   •  Poor sleep and my symptoms woke me up   •  My appetite is not good   •  My medicine is not helping    •  Call provider immediately if symptoms don’t improve     Continue daily medications, add rescue medications:   Albuterol 2 Puffs         Medications to be used during a flare up, (as Discussed with Provider):           Additional Information:  Take as directed by your Doctor and use the " spacer    Red Zone:  I need urgent medical care Actions   •  Severe shortness of breath even at rest •  Call 911 or seek medical care immediately   •  Not able to do any activity because of breathing    •  Fever or shaking chills    •  Feeling confused or very drowsy     •  Chest pains    •  Coughing up blood

## 2023-04-21 NOTE — DOCUMENTATION QUERY
[facility]                                                                       Query Response Note      PATIENT:               BRIDGETTE VARNER  ACCT #:                  [id number]  MRN:                     [id number]  :                      1950  ADMIT DATE:       2023 2:13 AM  DISCH DATE:          RESPONDING  PROVIDER #:        [id number]           QUERY TEXT:    Please clarify in documentation the relationship, if any, between acute respiratory failure and sepsis.      The patient's Clinical Indicators include:  H&P: Sepsis POA, source appears to be LE cellulitis as well as pneumonia.  Acute on chronic hypoxemic respiratory failure   SpO2 99% on 4L, RR 22  Risk Factors: pneumonia, sepsis  Treatment: supplemental O2, Unasyn, Zithromax, Rocephin    Thank you,  Stiven Bolaños RN, BSN  Clinical   Connect via [employer]  Options provided:   -- Acute respiratory failure is due to or associated with sepsis (severe sepsis ruled in)   -- Unrelated to each other   -- Other explanation, please specify   -- Unable to determine      Query created by: Stiven Bolaños on 2023 10:36 AM    RESPONSE TEXT:    Acute respiratory failure is due to or associated with sepsis (severe sepsis ruled in)          Electronically signed by:  CK VINES MD 2023 10:00 PM

## 2023-04-21 NOTE — ASSESSMENT & PLAN NOTE
Left leg  One blood culture is positive for aerococcus viridans   On IV unasyn   Will repeat blood culture

## 2023-04-21 NOTE — PROGRESS NOTES
Dr Ji at bedside, plan of care reviewed with pt and care team. RN notified MD of sodium level of 124, states he is aware. Per MD, RN to discontinue dawson cath before transfer.

## 2023-04-21 NOTE — PROGRESS NOTES
Micro notified this RN of pt having gram + clusters in blood and not gram + chains as they had originally thought. Notified MD Talia.

## 2023-04-21 NOTE — THERAPY
"Speech Language Pathology   Clinical Swallow Evaluation     Patient Name: Juan Manuel Bass  AGE:  72 y.o., SEX:  male  Medical Record #: [id number]  Date of Service: 4/21/2023      History of Present Illness  73 YO male admitted 4/20 2/2 hyponatremia, sepsis, and LE cellulitis and pneumonia.     PMHx: chronic hypoxemic respiratory failure, probable asthma, primary HTN, alcohol dependence, obesity and hyponatremia.     CMHx: hyponatremia, cellulitis, sepsis, alcohol depenence, primary HTN, pneumonia, acute on chronic respiratory failure with hypoxia, obesity.     Head CT 4/20 \"1.  No acute intracranial abnormality is identified, there are nonspecific white matter changes, commonly associated with small vessel ischemic disease.  Associated mild cerebral atrophy is noted.  2.  Atherosclerosis.\"    CXR 4/20 \"1.  Left lower lobe infiltrate 2.  Atherosclerosis\"      General Information:  Vitals  O2 (LPM): 2  O2 Delivery Device: Nasal Cannula  Level of Consciousness: Awake, Alert     Orientation: Self, General place  Follows Directives: Yes      Prior Living Situation & Level of Function:  Prior Services: Skilled Home Health Services  Housing / Facility: Mobile Home  Lives with - Patient's Self Care Capacity: Alone and Unable to Care For Self     Communication: WFL  Swallowing: Last seen by SLP 4/8/23 with recs for SB/MT. FEES done 4/5/23 with the following impressions \"The pt presents with a mild-moderate oropharyngeal dysphagia. Airway protection and pharyngeal efficiency are impaired. Suspect dysphagia is acute on chronic given prolonged NPO status, generalized weakness, reduced mobility while admitted, with hx of COPD. Suspect micro-penetration of thin liquids is baseline given hx of COPD and age >65. Pt likely is at reduced risk for aspiration pneumonia at baseline given small amount of penetration, regular ambulation and oral care. Pt is at increased risk for aspiration pneumonia in his acute state given reduced mobility and " "alertness, as well as ability to follow directions/safe swallow strategies (I.e. cough and swallow again with thins). \"     Oral Mechanism Evaluation:  Dentition: Fair   Facial Symmetry: Central left facial droop  Facial Sensation: Equal     Labial Observations: Left sided weakness   Lingual Observations: Midline  Motor Speech: WFL    Laryngeal Function:  Secretion Management: Adequate  Voice Quality: Other (see comments) (gravely)  Cough: Perceptually WNL    Subjective  Pt alert, followed directions, and demonstrated limited recall of previous admission in relation to swallowing strategies and recommendations.      Assessment  Current Method of Nutrition: Oral diet (reg/thins)  Positioning: Multani's (60-90 degrees)  Bolus Administration: SLP, Patient  O2 (LPM): 2 O2 Delivery Device: Nasal Cannula  Factor(s) Affecting Performance: Impaired mental status, Impaired command following  Tracheostomy : No     Swallowing Trials:  Swallowing Trials  Ice: Impaired  Thin Liquid (TN0): Impaired  Liquidised (LQ3): WFL  Pureed (PU4): WFL  Easy to Chew (EC7): WFL  Regular (RG7): Impaired      Comments: Adequate bolus acceptance and containment noted. Timely swallow initiation, presumed complete hyolaryngeal elevation to palpation, and intermittent wet vocal quality with throat clearing appreciated. Pt demonstrated functional mastication with no oral residue appreciated. Pt reporting hx of GERD and often finds himself burping and coughing after meals, which is concerning for retrograde flow and/or esophageal dysphagia. Per FEES 4/5, presentation of laryngeal structures were indicative of laryngopharyngeal reflux (LPR).     from [facility] present for part of session, reporting pt primarily sedentary in his home and spends majority of his day in his recliner.     Clinical Impressions  Pt presenting with s/sx consistent with pharyngoesophageal, characterized by wet vocal quality, throat clearing and burping " "with coughing after PO. Recommend MBSS to further assess oropharyngeal swallow function and perform esophageal sweep to determine least restrictive diet texture and any necessary strategies to improve pharyngeal efficiency and/or airway protection.     Recommendations  Diet Consistency: soft and bite size/thins pending MBSS  Instrumentation: VFSS (MBSS) (tentatively scheduled for 1:30 PM this date)  Medication: Whole with puree  Supervision: Direct supervision during meals, Assist with meal tray set up  Positioning: Fully upright and midline during oral intake, Remain upright for 45 minutes after oral intake  Risk Management : Small bites/sips, No straws, Slow rate of intake, Alternate bites and sips  Oral Care: Q4h     SLP Treatment Plan  Treatment Plan: Dysphagia Treatment  SLP Frequency: 3x Per Week  Estimated Duration: Until Therapy Goals Met      Anticipated Discharge Needs  Discharge Recommendations: Recommend post-acute placement for additional speech therapy services prior to discharge home   Therapy Recommendations Upon DC: Dysphagia Training, Patient / Family / Caregiver Education, Community Re-Integration        Patient / Family Goals  Patient / Family Goal #1: \"Can I have that applesauce\"  Short Term Goals  Short Term Goal # 1: Pt will participate in MBSS to objectively assess oropharygneal swallow function and determine least restrictive diet texture with min cues.      Bao Malik, SLP   "

## 2023-04-21 NOTE — WOUND TEAM
[facility]  Inpatient Services  Initial Wound and Skin Care Evaluation    Admission Date: 4/20/2023     Last order of IP CONSULT TO WOUND CARE was found on 4/20/2023 from Hospital Encounter on 4/20/2023     HPI, PMH, SH: Reviewed    History reviewed. No pertinent surgical history.  Social History     Tobacco Use    Smoking status: Former     Packs/day: 1.00     Years: 4.00     Pack years: 4.00     Types: Cigarettes     Quit date: 3/7/2020     Years since quitting: 3.1    Smokeless tobacco: Never   Substance Use Topics    Alcohol use: Yes     Alcohol/week: 21.0 oz     Types: 35 Cans of beer per week     Comment: 5-6 beers/day     Chief Complaint   Patient presents with    Dizziness     Pt states having dizziness for the past 2 hrs, hx of HTN. Pt denies chest pain.      Diagnosis: Hyponatremia [E87.1]    Unit where seen by Wound Team: [id number]     WOUND CONSULT/FOLLOW UP RELATED TO:  Head to toe performed     WOUND HISTORY:  Pt is a 72yr old male admitted with hyponatremia, sepsis and pneumonia as well as BLE cellulitis. Pt has history of 3L O2 at baseline, HTN, ETOH and obesity. Pt was brought in by EMS after he was found down soiled in urine and stool. Wound team was consulted regarding multiple areas of concern.    WOUND ASSESSMENT/LDA     Wound 04/20/23 Other (comment) Buttocks Posterior Bilateral excoriation and redness to buttocks and back of legs (Active)   Wound Image    04/21/23 1100   Site Assessment Excoriated;Red;Pink    Periwound Assessment Red    Margins Attached edges    Closure Open to air    Drainage Amount None    Treatments Cleansed;Offloading    Wound Cleansing Foam Cleanser/Washcloth    Periwound Protectant Barrier Paste    Dressing Options Open to Air;Mepitel One    Dressing Changed New    Dressing Status Open to Air    Dressing Change/Treatment Frequency Every Shift, and As Needed    NEXT Dressing Change/Treatment Date 04/21/23    NEXT Weekly Photo (Inpatient Only) 04/28/23     Non-staged Wound Description Partial thickness    WOUND NURSE ONLY - Time Spent with Patient (mins) 45                    Vascular:    EDY:   No results found.    Lab Values:    Lab Results   Component Value Date/Time    WBC 10.8 04/21/2023 02:16 AM    RBC 3.42 (L) 04/21/2023 02:16 AM    HEMOGLOBIN 11.2 (L) 04/21/2023 02:16 AM    HEMATOCRIT 30.8 (L) 04/21/2023 02:16 AM    CREACTPROT 2.47 (H) 04/02/2022 10:22 PM    HBA1C 4.8 10/27/2022 04:11 PM      Culture Results show:  No results found for this or any previous visit (from the past 720 hour(s)).    Pain Level/Medicated:  Denies pain       INTERVENTIONS BY WOUND TEAM:  Chart and images reviewed. Discussed with bedside RN. All areas of concern (based on picture review, LDA review and discussion with bedside RN) have been thoroughly assessed. Documentation of areas based on significant findings. This RN in to assess patient. Performed standard wound care which includes appropriate positioning, dressing removal and non-selective debridement. Pictures and measurements obtained weekly if/when required.  Preparation for Dressing removal: NA LUDIN  Non-selectively Debrided with:  moist warm washcloth and no rinse foam soap  Sharp debridement: NA  Tabitha wound: Cleansed with moist warm washlcoth and no rinse foam soap, Prepped with NA  Primary Dressing: Aquaphor ordered for bilateral legs, mepitel one applied to posterior thigh  Secondary (Outer) Dressing: NA LUDIN    Pt has scattered bruising to the face, right ear, bilateral arms. Heels are intact.     Advanced Wound Care Discharge Planning  Number of Clinicians necessary to complete wound care: 1  Is patient requiring IV pain medications for dressing changes: no  Length of time for dressing change 30 min. (This does not include chart review, pre-medication time, set up, clean up or time spent charting.)    Interdisciplinary consultation: Patient, Bedside RN, Oneida LOREDO (Wound RN)    EVALUATION / RATIONALE FOR  TREATMENT:  Most Recent Date:  04/21/23: Pt with wound to posterior leg. Mepitel one applied. Sacrococcygeal area with what appears to be venous pooling in combination with bruising and dry flaking skin. Barrier paste to be applied to the sacrum. Aquaphor ordered to moisten dry skin to legs. Mepitel one over nearly healed left posterior thigh wound as a nonadherent contact layer.     Goals: Steady decrease in wound area and depth weekly.    WOUND TEAM PLAN OF CARE ([X] for frequency of wound follow up,):   Nursing to follow dressing orders written for wound care. Contact wound team if area fails to progress, deteriorates or with any questions/concerns if something comes up before next scheduled follow up (See below as to whether wound is following and frequency of wound follow up)  Dressing changes by wound team:                   Follow up 3 times weekly:                NPWT change 3 times weekly:     Follow up 1-2 times weekly:      Follow up Bi-Monthly:           Follow up Monthly (High Risk):                        Follow up as needed:   X  Other (explain):     NURSING PLAN OF CARE ORDERS (X):  Dressing changes: See Dressing Care orders: X  Skin care: See Skin Care orders:   RN Prevention Protocol: X  Rectal tube care: See Rectal Tube Care orders:   Other orders:    RSKIN:   CURRENTLY IN PLACE (X), APPLIED THIS VISIT (A), ORDERED (O):   Q shift Nitish:  X  Q shift pressure point assessments:  X    Surface/Positioning   Standard Mattress/Trauma Bed          Low Airloss          ICU Low Airloss X  Bariatric BERTRAND     Waffle cushion        Waffle Overlay          Reposition q 2 hours    X  TAPs Turning system     Z [place] Pillow     Offloading/Redistribution   Sacral Offloading Dressing (Silicone dressing)     Heel Offloading Dressing (Silicone dressing)       Ordered  Heel float boots (Prevalon boot)             Float Heels off Bed with Pillows       Applied    Respiratory   Silicone O2 tubing       X  Gray Foam Ear  protectors   X  Cannula fixation Device (Tender )          High flow offloading Clip    Elastic head band offloading device      Anchorfast                                                         Trach with Optifoam split foam             Containment/Moisture Prevention     Rectal tube or BMS    Purwick/Condom Cath      Ordered  [place] Catheter    Barrier wipes           Barrier paste     Applied  Antifungal tx      Interdry        Mobilization       Up to chair        Ambulate      PT/OT      Nutrition       Dietician        Diabetes Education      PO   X  TF     TPN     NPO   # days     Other        Anticipated discharge plans: TBD  LTACH:        SNF/Rehab:                  Home Health Care:           Outpatient Wound Center:            Self/Family Care:        Other:                  Vac Discharge Needs:   Vac Discharge plan is purely a recommendation from wound team and not a requirement for discharge unless otherwise stated by physician.  Not Applicable Pt not on a wound vac:     X  Regular Vac while inpatient, alternative dressing at DC:        Regular Vac in use and continued at DC:            Reg. Vac w/ Skin Sub/Biologic in use. Will need to be changed 2x wkly:      Veraflo Vac while inpatient, ok to transition to Regular Vac on Discharge (Bedside RN to Clamp small instillation tubing at time of DC):           Veraflo Vac while inpatient, would benefit from remaining on Veraflo Vac upon discharge:

## 2023-04-21 NOTE — ASSESSMENT & PLAN NOTE
Excessive beer intake (but states only drinking three 12 oz beers/day)  CIWA protocol ordered for possible withdrawal  Magnesium, potassium, and phosphorus level in the morning and replace accordingly.  Multivitamins ordered  Cessation discussed

## 2023-04-21 NOTE — PROGRESS NOTES
Paged regarding sodium of 122    Notable Na 113 4/20/2023 @ 05:39    Bolus 500cc of D5W    Unasyn (Na 102 meq per infusion) changed to ceftriaxone to minimize over correction of hyponatremia    Continue to trend Na

## 2023-04-22 PROBLEM — R78.81 BACTEREMIA: Status: ACTIVE | Noted: 2023-04-22

## 2023-04-22 LAB
BACTERIA BLD CULT: ABNORMAL
SIGNIFICANT IND 70042: ABNORMAL
SIGNIFICANT IND 70042: ABNORMAL
SITE SITE: ABNORMAL
SITE SITE: ABNORMAL
SODIUM SERPL-SCNC: 127 MMOL/L (ref 135–145)
SODIUM SERPL-SCNC: 127 MMOL/L (ref 135–145)
SODIUM SERPL-SCNC: 128 MMOL/L (ref 135–145)
SODIUM SERPL-SCNC: 129 MMOL/L (ref 135–145)
SOURCE SOURCE: ABNORMAL
SOURCE SOURCE: ABNORMAL

## 2023-04-22 PROCEDURE — A9270 NON-COVERED ITEM OR SERVICE: HCPCS | Performed by: NURSE PRACTITIONER

## 2023-04-22 PROCEDURE — 700102 HCHG RX REV CODE 250 W/ 637 OVERRIDE(OP): Performed by: HOSPITALIST

## 2023-04-22 PROCEDURE — A9270 NON-COVERED ITEM OR SERVICE: HCPCS | Performed by: INTERNAL MEDICINE

## 2023-04-22 PROCEDURE — 97535 SELF CARE MNGMENT TRAINING: CPT

## 2023-04-22 PROCEDURE — 700102 HCHG RX REV CODE 250 W/ 637 OVERRIDE(OP): Performed by: INTERNAL MEDICINE

## 2023-04-22 PROCEDURE — 770020 HCHG ROOM/CARE - TELE (206)

## 2023-04-22 PROCEDURE — 700105 HCHG RX REV CODE 258: Performed by: INTERNAL MEDICINE

## 2023-04-22 PROCEDURE — A9270 NON-COVERED ITEM OR SERVICE: HCPCS | Performed by: HOSPITALIST

## 2023-04-22 PROCEDURE — 700102 HCHG RX REV CODE 250 W/ 637 OVERRIDE(OP): Performed by: NURSE PRACTITIONER

## 2023-04-22 PROCEDURE — 99232 SBSQ HOSP IP/OBS MODERATE 35: CPT | Performed by: INTERNAL MEDICINE

## 2023-04-22 PROCEDURE — 97162 PT EVAL MOD COMPLEX 30 MIN: CPT

## 2023-04-22 PROCEDURE — 700111 HCHG RX REV CODE 636 W/ 250 OVERRIDE (IP): Performed by: HOSPITALIST

## 2023-04-22 PROCEDURE — 700111 HCHG RX REV CODE 636 W/ 250 OVERRIDE (IP): Performed by: INTERNAL MEDICINE

## 2023-04-22 PROCEDURE — 84295 ASSAY OF SERUM SODIUM: CPT

## 2023-04-22 PROCEDURE — 36415 COLL VENOUS BLD VENIPUNCTURE: CPT

## 2023-04-22 PROCEDURE — 87040 BLOOD CULTURE FOR BACTERIA: CPT | Mod: 91

## 2023-04-22 RX ORDER — BUTALBITAL, ACETAMINOPHEN AND CAFFEINE 50; 325; 40 MG/1; MG/1; MG/1
1 TABLET ORAL ONCE
Status: COMPLETED | OUTPATIENT
Start: 2023-04-22 | End: 2023-04-22

## 2023-04-22 RX ORDER — SODIUM CHLORIDE 1 G/1
1 TABLET ORAL
Status: DISCONTINUED | OUTPATIENT
Start: 2023-04-22 | End: 2023-04-24 | Stop reason: HOSPADM

## 2023-04-22 RX ORDER — LOSARTAN POTASSIUM 50 MG/1
50 TABLET ORAL
Status: DISCONTINUED | OUTPATIENT
Start: 2023-04-23 | End: 2023-04-24

## 2023-04-22 RX ADMIN — BUTALBITAL, ACETAMINOPHEN AND CAFFEINE 1 TABLET: 325; 50; 40 TABLET ORAL at 20:44

## 2023-04-22 RX ADMIN — DOCUSATE SODIUM 50 MG AND SENNOSIDES 8.6 MG 2 TABLET: 8.6; 5 TABLET, FILM COATED ORAL at 17:09

## 2023-04-22 RX ADMIN — Medication: at 17:10

## 2023-04-22 RX ADMIN — FLUTICASONE PROPIONATE 88 MCG: 44 AEROSOL, METERED RESPIRATORY (INHALATION) at 05:00

## 2023-04-22 RX ADMIN — RIVAROXABAN 10 MG: 10 TABLET, FILM COATED ORAL at 17:10

## 2023-04-22 RX ADMIN — CEFTRIAXONE SODIUM 2000 MG: 10 INJECTION, POWDER, FOR SOLUTION INTRAVENOUS at 04:59

## 2023-04-22 RX ADMIN — SODIUM CHLORIDE 1 G: 1 TABLET ORAL at 12:31

## 2023-04-22 RX ADMIN — DOCUSATE SODIUM 50 MG AND SENNOSIDES 8.6 MG 2 TABLET: 8.6; 5 TABLET, FILM COATED ORAL at 04:57

## 2023-04-22 RX ADMIN — UMECLIDINIUM BROMIDE AND VILANTEROL TRIFENATATE 1 PUFF: 62.5; 25 POWDER RESPIRATORY (INHALATION) at 07:49

## 2023-04-22 RX ADMIN — OMEPRAZOLE 20 MG: 20 CAPSULE, DELAYED RELEASE ORAL at 05:00

## 2023-04-22 RX ADMIN — THIAMINE HYDROCHLORIDE 500 MG: 100 INJECTION, SOLUTION INTRAMUSCULAR; INTRAVENOUS at 05:07

## 2023-04-22 RX ADMIN — Medication 5 MG: at 20:44

## 2023-04-22 RX ADMIN — FOLIC ACID 1 MG: 1 TABLET ORAL at 04:59

## 2023-04-22 RX ADMIN — Medication: at 05:00

## 2023-04-22 RX ADMIN — SODIUM CHLORIDE 1 G: 1 TABLET ORAL at 17:10

## 2023-04-22 RX ADMIN — ACETAMINOPHEN 650 MG: 325 TABLET, FILM COATED ORAL at 19:40

## 2023-04-22 RX ADMIN — AMLODIPINE BESYLATE 5 MG: 10 TABLET ORAL at 04:59

## 2023-04-22 RX ADMIN — THERA TABS 1 TABLET: TAB at 05:00

## 2023-04-22 RX ADMIN — AZITHROMYCIN MONOHYDRATE 500 MG: 250 TABLET ORAL at 05:00

## 2023-04-22 RX ADMIN — FLUTICASONE PROPIONATE 88 MCG: 44 AEROSOL, METERED RESPIRATORY (INHALATION) at 17:10

## 2023-04-22 ASSESSMENT — COGNITIVE AND FUNCTIONAL STATUS - GENERAL
SUGGESTED CMS G CODE MODIFIER MOBILITY: CK
MOBILITY SCORE: 18
MOVING TO AND FROM BED TO CHAIR: A LITTLE
WALKING IN HOSPITAL ROOM: A LITTLE
CLIMB 3 TO 5 STEPS WITH RAILING: A LITTLE
MOVING FROM LYING ON BACK TO SITTING ON SIDE OF FLAT BED: A LITTLE
TURNING FROM BACK TO SIDE WHILE IN FLAT BAD: A LITTLE
STANDING UP FROM CHAIR USING ARMS: A LITTLE

## 2023-04-22 ASSESSMENT — ENCOUNTER SYMPTOMS
ABDOMINAL PAIN: 0
BACK PAIN: 0
SHORTNESS OF BREATH: 0
FEVER: 0
NERVOUS/ANXIOUS: 0
SENSORY CHANGE: 0
DIZZINESS: 0
NAUSEA: 0
PALPITATIONS: 0
EYE DISCHARGE: 0
DIARRHEA: 0
SPEECH CHANGE: 0
COUGH: 0

## 2023-04-22 ASSESSMENT — LIFESTYLE VARIABLES: SUBSTANCE_ABUSE: 0

## 2023-04-22 ASSESSMENT — FIBROSIS 4 INDEX: FIB4 SCORE: 1.53

## 2023-04-22 ASSESSMENT — GAIT ASSESSMENTS
GAIT LEVEL OF ASSIST: SUPERVISED
DEVIATION: BRADYKINETIC
ASSISTIVE DEVICE: FRONT WHEEL WALKER
DISTANCE (FEET): 150

## 2023-04-22 ASSESSMENT — PAIN DESCRIPTION - PAIN TYPE: TYPE: ACUTE PAIN

## 2023-04-22 NOTE — CARE PLAN
The patient is Stable - Low risk of patient condition declining or worsening    Shift Goals  Clinical Goals: patient will remain stable  Patient Goals: comfort and sleep  Family Goals: marin    Progress made toward(s) clinical / shift goals:    Problem: Knowledge Deficit - Standard  Goal: Patient and family/care givers will demonstrate understanding of plan of care, disease process/condition, diagnostic tests and medications  Outcome: Progressing  Note: Patient demonstrates a fair understanding in plan of care     Problem: Optimal Care for Alcohol Withdrawal  Goal: Optimal Care for the alcohol withdrawal patient  Outcome: Progressing  Note: No s/s of withdrawal     Problem: Hemodynamics  Goal: Patient's hemodynamics, fluid balance and neurologic status will be stable or improve  Outcome: Progressing     Problem: Skin Integrity  Goal: Skin integrity is maintained or improved  Outcome: Progressing  Note: Maintained current skin integrity      Problem: Fall Risk  Goal: Patient will remain free from falls  Outcome: Progressing  Note: Using call light appropriately for needs        Patient is not progressing towards the following goals:

## 2023-04-22 NOTE — PROGRESS NOTES
Hospital Medicine Daily Progress Note    Date of Service  4/22/2023    Chief Complaint  Hyponatremia due to beer intake    Hospital Course  72 y.o. male who presented 4/20/2023 with dizziness.  Mr. Bass has a past medical history of hypertension, home oxygen dependency at 3 L, that was most recently admitted to our hospital from 4/1/2023 through 4/7/2023 with severe hyponatremia with initial sodium of 112 secondary to beer Poto myranda.  He was then readmitted again on 4/7/2023 through 4/12/2023 after a ground-level fall with head trauma.  He lives alone and called paramedics because of dizziness.  He was brought to the emergency room where his sodium found to be severely low at 113.  He was admitted to the intensive care unit and placed on normal saline and his sodium has come up to 120.  Found to have severe left lower extremity cellulitis initiated on IV Unasyn.  Chest x-ray revealed a left lower lobe opacity suspicious for aspiration pneumonia and this will also be treated with IV Unasyn.  He will be transferred to the [facility] in guarded condition.    Interval Problem Update  Patient seen and examined, blood cultures one growing aerococcus viridans and the other coag negative staph, patient on Unasyn for left lower extremity cellulitis .  Will repeat blood cultures  Na:127. Restarting back his salt tablets  close monitoring with q6hr sodium level checks     I have discussed this patient's plan of care and discharge plan at IDT rounds today with Case Management, Nursing, Nursing leadership, and other members of the IDT team.    Consults:  None     Code Status  DNAR/DNI    Disposition  Patient is not medically cleared for discharge.   Anticipate discharge to to home with close outpatient follow-up.  I have placed the appropriate orders for post-discharge needs.    Review of Systems  Review of Systems   Constitutional:  Positive for malaise/fatigue. Negative for fever.   Eyes:  Negative for discharge.   Respiratory:   Negative for cough and shortness of breath.    Cardiovascular:  Positive for leg swelling. Negative for chest pain and palpitations.   Gastrointestinal:  Negative for abdominal pain, diarrhea and nausea.   Genitourinary:  Negative for dysuria and hematuria.   Musculoskeletal:  Negative for back pain and joint pain.   Neurological:  Negative for dizziness, sensory change and speech change.   Psychiatric/Behavioral:  Negative for substance abuse. The patient is not nervous/anxious.       Physical Exam  Temp:  [36.6 °C (97.9 °F)-36.9 °C (98.4 °F)] 36.6 °C (97.9 °F)  Pulse:  [] 108  Resp:  [18] 18  BP: (110-138)/(51-91) 138/84  SpO2:  [95 %-97 %] 96 %    Physical Exam  Vitals reviewed.   Constitutional:       Appearance: He is obese. He is ill-appearing. He is not diaphoretic.   HENT:      Head: Normocephalic and atraumatic.      Nose: Nose normal.      Mouth/Throat:      Mouth: Mucous membranes are moist.      Pharynx: No oropharyngeal exudate.   Eyes:      General: No scleral icterus.        Right eye: No discharge.         Left eye: No discharge.      Extraocular Movements: Extraocular movements intact.      Conjunctiva/sclera: Conjunctivae normal.   Cardiovascular:      Rate and Rhythm: Normal rate and regular rhythm.      Pulses:           Radial pulses are 2+ on the right side and 2+ on the left side.        Dorsalis pedis pulses are 2+ on the right side and 2+ on the left side.      Heart sounds: No murmur heard.  Pulmonary:      Effort: Pulmonary effort is normal. No respiratory distress.      Breath sounds: Normal breath sounds. No wheezing or rales.   Abdominal:      General: Bowel sounds are normal. There is no distension.      Palpations: Abdomen is soft.   Musculoskeletal:         General: No swelling or tenderness.      Cervical back: Neck supple. No muscular tenderness.      Right lower leg: Edema present.      Left lower leg: Edema present.   Lymphadenopathy:      Cervical: No cervical adenopathy.    Skin:     Coloration: Skin is not jaundiced or pale.      Findings: Erythema present.      Comments: Thickened unkept toenails.   Neurological:      General: No focal deficit present.      Mental Status: He is alert and oriented to person, place, and time. Mental status is at baseline.      Cranial Nerves: No cranial nerve deficit.   Psychiatric:         Mood and Affect: Mood normal.         Behavior: Behavior normal.       Fluids    Intake/Output Summary (Last 24 hours) at 4/22/2023 1442  Last data filed at 4/22/2023 1028  Gross per 24 hour   Intake 1320 ml   Output 1850 ml   Net -530 ml       Laboratory  Recent Labs     04/20/23  0230 04/21/23  0216   WBC 13.2* 10.8   RBC 4.08* 3.42*   HEMOGLOBIN 13.2* 11.2*   HEMATOCRIT 37.1* 30.8*   MCV 90.9 90.1   MCH 32.4 32.7   MCHC 35.6* 36.4*   RDW 42.1 43.2   PLATELETCT 387 307   MPV 9.0 9.3     Recent Labs     04/20/23  0230 04/20/23  0539 04/21/23  0216 04/21/23  0624 04/22/23  0227 04/22/23  0644 04/22/23  1013   SODIUM 113*   < > 123*   < > 127* 127* 129*   POTASSIUM 4.0  --  3.8  --   --   --   --    CHLORIDE 75*  --  88*  --   --   --   --    CO2 23  --  24  --   --   --   --    GLUCOSE 87  --  152*  --   --   --   --    BUN 6*  --  7*  --   --   --   --    CREATININE 0.45*  --  0.50  --   --   --   --    CALCIUM 9.0  --  8.2*  --   --   --   --     < > = values in this interval not displayed.     Recent Labs     04/20/23  0230   APTT 35.1   INR 1.08               Imaging  DX-ESOPHAGUS - [id number]   Final Result      1.  No evidence of tracheal aspiration.      CT-HEAD W/O   Final Result         1.  No acute intracranial abnormality is identified, there are nonspecific white matter changes, commonly associated with small vessel ischemic disease.  Associated mild cerebral atrophy is noted.   2.  Atherosclerosis.         DX-CHEST-PORTABLE (1 VIEW)   Final Result         1.  Left lower lobe infiltrate   2.  Atherosclerosis           Assessment/Plan  *  Hyponatremia- (present on admission)  Assessment & Plan  This has been a recurrent process for him in the setting of beer Poto myranda for which she was admitted 3 weeks ago.  Sodium on admission was severely low at 113. Slowly improving now up to 129. Since admission  Starting also back his salt tabs   Monitor sodium every 6 hrs       Bacteremia  Assessment & Plan  As above     Cellulitis- (present on admission)  Assessment & Plan  Left leg  One blood culture is positive for aerococcus viridans   On IV unasyn   Will repeat blood culture     DNR (do not resuscitate)- (present on admission)  Assessment & Plan  Per Mr. Bass's wishes    Pneumonia- (present on admission)  Assessment & Plan  LLL infiltrate seen on cxr  Likely aspiration given his altered sensorium  On unasyn     Sepsis (HCC)- (present on admission)  Assessment & Plan  Left leg cellulitis   also his blood cultures grew aerococcus viridans most likely contaminant   Will repeat Blood cultures        Acute on chronic respiratory failure with hypoxia (HCC)- (present on admission)  Assessment & Plan  Wean oxygen as able.  Monitor SpO2.  CXR reveals a LLL infiltrate query aspiration pneumonia  Speech therapy evaluating patient 4/21  Probable component of MICHOACANO    Alcohol dependence (HCC)- (present on admission)  Assessment & Plan  Excessive beer intake (but states only drinking three 12 oz beers/day)  CIWA protocol ordered for possible withdrawal  Magnesium, potassium, and phosphorus level in the morning and replace accordingly.  Multivitamins ordered  Cessation discussed    Primary hypertension- (present on admission)  Assessment & Plan  Continue outpatient Norvasc with holding parameters.     Obesity- (present on admission)  Assessment & Plan  BMI 38         VTE prophylaxis: Xarelto 10 mg daily as prophylaxis    I have performed a physical exam and reviewed and updated ROS and Plan today (4/22/2023). In review of yesterday's note (4/21/2023), there are no  changes except as documented above.

## 2023-04-22 NOTE — PROGRESS NOTES
Received bedside report from ALDEN mason, pt care assumed. VS stable, pt assessment complete. Pt A&Ox4, no c/o 0/10 level pain at this time. POC discussed with pt and verbalizes no questions. Pt denies any additional needs at this time. Bed locked and in lowest position, bed alarm off. Pt educated on fall risk and verbalized understanding, call light within reach, hourly rounding initiated.

## 2023-04-22 NOTE — CARE PLAN
The patient is Stable - Low risk of patient condition declining or worsening    Shift Goals  Clinical Goals: patient will remain stable  Patient Goals: comfort and sleep  Family Goals: marin    Progress made toward(s) clinical / shift goals:  yes    Patient is not progressing towards the following goals:

## 2023-04-22 NOTE — THERAPY
Physical Therapy   Initial Evaluation     Patient Name: Juan Manuel Bass  Age:  72 y.o., Sex:  male  Medical Record #: [id number]  Today's Date: 4/22/2023     Precautions  Precautions: Fall Risk;Swallow Precautions    Assessment  Patient is a 72 y.o. male with hx of chronic respiratory failure on 3L, HTN, and alcohol dependence admitted with dizziness, hyponatremia, and LLE cellulitis. PT eval complete, pt currently presents at his functional baseline and completed all mobility at SPV level with FWW. Pt's BP was stable at 120-130/80 in sitting and standing with no reports of dizziness. Anticipate that pt will be able to safely DC home with HHPT once medically cleared. Patient will not be actively followed for physical therapy services at this time, however may be seen if requested by physician for 1 more visit within 30 days to address any discharge or equipment needs. PT encouraged to ambulate with [facility] staff to prevent deconditioning.     Plan    Physical Therapy Initial Treatment Plan   Duration: Discharge Needs Only    DC Equipment Recommendations: None (has FWW at home)  Discharge Recommendations: Recommend home health for continued physical therapy services         Vitals   Pulse Oximetry 96 %   O2 (LPM) 3   O2 Delivery Device Silicone Nasal Cannula   Pain 0 - 10 Group   Therapist Pain Assessment   (no c/o pain)   Prior Living Situation   Housing / Facility Other (Comments)  (RV)   Steps Into Home 3   Steps In Home 0   Equipment Owned Front-Wheel Walker   Lives with - Patient's Self Care Capacity Alone and Able to Care For Self   Comments pt reports having a friend who does grocery shopping for him. also [place] he [place] walk with if he goes outside   Prior Level of Functional Mobility   Bed Mobility Independent   Transfer Status Independent   Ambulation Independent   Ambulation Distance household distances   Assistive Devices Used Front-Wheel Walker   Stairs Independent   History of Falls    History of Falls Yes   Date of Last Fall   (admission 2 weeks ago for GLF)   Cognition    Cognition / Consciousness WDL   Level of Consciousness Alert   Comments pleasant and participatory, receptive to education   Active ROM Lower Body    Active ROM Lower Body  WDL   Strength Lower Body   Lower Body Strength  WDL   Coordination Lower Body    Coordination Lower Body  WDL   Balance Assessment   Sitting Balance (Static) Fair +   Sitting Balance (Dynamic) Fair +   Standing Balance (Static) Fair   Standing Balance (Dynamic) Fair   Weight Shift Sitting Fair   Weight Shift Standing Fair   Comments FWW   Bed Mobility    Supine to Sit Supervised   Sit to Supine Supervised   Scooting Supervised   Gait Analysis   Gait Level Of Assist Supervised   Assistive Device Front Wheel Walker   Distance (Feet) 150   # of Times Distance was Traveled 1   Deviation Bradykinetic   # of Stairs Climbed 0  (NT, demonstrates strength to complete as needed)   Weight Bearing Status no restrictions   Functional Mobility   Sit to Stand Supervised   Bed, Chair, Wheelchair Transfer Supervised   Mobility FWW   How much difficulty does the patient currently have...   Turning over in bed (including adjusting bedclothes, sheets and blankets)? 3   Sitting down on and standing up from a chair with arms (e.g., wheelchair, bedside commode, etc.) 3   Moving from lying on back to sitting on the side of the bed? 3   How much help from another person does the patient currently need...   Moving to and from a bed to a chair (including a wheelchair)? 3   Need to walk in a hospital room? 3   Climbing 3-5 steps with a railing? 3   6 clicks Mobility Score 18   Activity Tolerance   Sitting Edge of Bed 15 min   Standing 10 min   Comments no overt pain, SOB, or fatigue   Education Group   Education Provided Role of Physical Therapist;Use of Assistive Device   Role of Physical Therapist Patient Response Patient;Acceptance;Explanation;Verbal Demonstration   Use of  Assistive Device Patient Response Patient;Acceptance;Explanation;Demonstration;Verbal Demonstration;Action Demonstration  (education throughout assessment on proper hand positioning and sequencing for transfers, safe mobility strategies, energy conservation, and increasing activity tolerance overall)   Physical Therapy Initial Treatment Plan    Duration Discharge Needs Only   Problem List    Problems None   Anticipated Discharge Equipment and Recommendations   DC Equipment Recommendations None  (has FWW at home)   Discharge Recommendations Recommend home health for continued physical therapy services   Interdisciplinary Plan of Care Collaboration   IDT Collaboration with  Nursing   Patient Position at End of Therapy In Bed;Bed Alarm On;Call Light within Reach;Tray Table within Reach;Phone within Reach   Collaboration Comments RN updated   Session Information   Date / Session Number  4/22- 1x only (dc needs)

## 2023-04-23 LAB
SODIUM SERPL-SCNC: 126 MMOL/L (ref 135–145)
SODIUM SERPL-SCNC: 129 MMOL/L (ref 135–145)
SODIUM SERPL-SCNC: 130 MMOL/L (ref 135–145)
SODIUM SERPL-SCNC: 130 MMOL/L (ref 135–145)

## 2023-04-23 PROCEDURE — 36415 COLL VENOUS BLD VENIPUNCTURE: CPT

## 2023-04-23 PROCEDURE — A9270 NON-COVERED ITEM OR SERVICE: HCPCS | Performed by: INTERNAL MEDICINE

## 2023-04-23 PROCEDURE — 700111 HCHG RX REV CODE 636 W/ 250 OVERRIDE (IP): Performed by: HOSPITALIST

## 2023-04-23 PROCEDURE — 700102 HCHG RX REV CODE 250 W/ 637 OVERRIDE(OP): Performed by: INTERNAL MEDICINE

## 2023-04-23 PROCEDURE — 700102 HCHG RX REV CODE 250 W/ 637 OVERRIDE(OP): Performed by: HOSPITALIST

## 2023-04-23 PROCEDURE — 770020 HCHG ROOM/CARE - TELE (206)

## 2023-04-23 PROCEDURE — A9270 NON-COVERED ITEM OR SERVICE: HCPCS | Performed by: NURSE PRACTITIONER

## 2023-04-23 PROCEDURE — 84295 ASSAY OF SERUM SODIUM: CPT | Mod: 91

## 2023-04-23 PROCEDURE — 700102 HCHG RX REV CODE 250 W/ 637 OVERRIDE(OP): Performed by: NURSE PRACTITIONER

## 2023-04-23 PROCEDURE — A9270 NON-COVERED ITEM OR SERVICE: HCPCS | Performed by: HOSPITALIST

## 2023-04-23 PROCEDURE — 700101 HCHG RX REV CODE 250: Performed by: HOSPITALIST

## 2023-04-23 PROCEDURE — 99232 SBSQ HOSP IP/OBS MODERATE 35: CPT | Performed by: INTERNAL MEDICINE

## 2023-04-23 RX ORDER — LIDOCAINE HYDROCHLORIDE 10 MG/ML
INJECTION, SOLUTION INFILTRATION; PERINEURAL
Status: DISPENSED
Start: 2023-04-23 | End: 2023-04-24

## 2023-04-23 RX ADMIN — DOCUSATE SODIUM 50 MG AND SENNOSIDES 8.6 MG 2 TABLET: 8.6; 5 TABLET, FILM COATED ORAL at 17:43

## 2023-04-23 RX ADMIN — LORAZEPAM 2 MG: 2 TABLET ORAL at 00:11

## 2023-04-23 RX ADMIN — LORAZEPAM 1 MG: 1 TABLET ORAL at 08:26

## 2023-04-23 RX ADMIN — Medication: at 05:19

## 2023-04-23 RX ADMIN — Medication 5 MG: at 21:56

## 2023-04-23 RX ADMIN — ACETAMINOPHEN 650 MG: 325 TABLET, FILM COATED ORAL at 05:34

## 2023-04-23 RX ADMIN — THERA TABS 1 TABLET: TAB at 05:20

## 2023-04-23 RX ADMIN — CEFTRIAXONE SODIUM 2000 MG: 10 INJECTION, POWDER, FOR SOLUTION INTRAVENOUS at 05:18

## 2023-04-23 RX ADMIN — FLUTICASONE PROPIONATE 88 MCG: 44 AEROSOL, METERED RESPIRATORY (INHALATION) at 21:56

## 2023-04-23 RX ADMIN — SODIUM CHLORIDE 1 G: 1 TABLET ORAL at 08:26

## 2023-04-23 RX ADMIN — FLUTICASONE PROPIONATE 88 MCG: 44 AEROSOL, METERED RESPIRATORY (INHALATION) at 05:20

## 2023-04-23 RX ADMIN — Medication 100 MG: at 05:20

## 2023-04-23 RX ADMIN — SODIUM CHLORIDE 1 G: 1 TABLET ORAL at 11:42

## 2023-04-23 RX ADMIN — RIVAROXABAN 10 MG: 10 TABLET, FILM COATED ORAL at 17:43

## 2023-04-23 RX ADMIN — Medication: at 17:44

## 2023-04-23 RX ADMIN — ACETAMINOPHEN 650 MG: 325 TABLET, FILM COATED ORAL at 11:42

## 2023-04-23 RX ADMIN — FOLIC ACID 1 MG: 1 TABLET ORAL at 05:20

## 2023-04-23 RX ADMIN — LOSARTAN POTASSIUM 50 MG: 50 TABLET, FILM COATED ORAL at 05:20

## 2023-04-23 RX ADMIN — OMEPRAZOLE 20 MG: 20 CAPSULE, DELAYED RELEASE ORAL at 05:20

## 2023-04-23 RX ADMIN — SODIUM CHLORIDE 1 G: 1 TABLET ORAL at 17:43

## 2023-04-23 ASSESSMENT — ENCOUNTER SYMPTOMS
SPEECH CHANGE: 0
SHORTNESS OF BREATH: 0
PALPITATIONS: 0
BACK PAIN: 0
DIARRHEA: 0
EYE DISCHARGE: 0
NERVOUS/ANXIOUS: 0
ABDOMINAL PAIN: 0
SENSORY CHANGE: 0
NAUSEA: 0
FEVER: 0
COUGH: 0
DIZZINESS: 0

## 2023-04-23 ASSESSMENT — LIFESTYLE VARIABLES
ORIENTATION AND CLOUDING OF SENSORIUM: ORIENTED AND CAN DO SERIAL ADDITIONS
PAROXYSMAL SWEATS: NO SWEAT VISIBLE
TOTAL SCORE: 5
PAROXYSMAL SWEATS: NO SWEAT VISIBLE
ANXIETY: MILDLY ANXIOUS
TOTAL SCORE: 2
TREMOR: NO TREMOR
TREMOR: TREMOR NOT VISIBLE BUT CAN BE FELT, FINGERTIP TO FINGERTIP
TOTAL SCORE: 8
VISUAL DISTURBANCES: NOT PRESENT
HEADACHE, FULLNESS IN HEAD: VERY MILD
TOTAL SCORE: 3
PAROXYSMAL SWEATS: NO SWEAT VISIBLE
NAUSEA AND VOMITING: NO NAUSEA AND NO VOMITING
ORIENTATION AND CLOUDING OF SENSORIUM: ORIENTED AND CAN DO SERIAL ADDITIONS
HEADACHE, FULLNESS IN HEAD: VERY MILD
ANXIETY: MILDLY ANXIOUS
HEADACHE, FULLNESS IN HEAD: NOT PRESENT
VISUAL DISTURBANCES: NOT PRESENT
NAUSEA AND VOMITING: NO NAUSEA AND NO VOMITING
ANXIETY: MILDLY ANXIOUS
HEADACHE, FULLNESS IN HEAD: MILD
AUDITORY DISTURBANCES: NOT PRESENT
ORIENTATION AND CLOUDING OF SENSORIUM: ORIENTED AND CAN DO SERIAL ADDITIONS
AGITATION: SOMEWHAT MORE THAN NORMAL ACTIVITY
AUDITORY DISTURBANCES: NOT PRESENT
ANXIETY: *
AUDITORY DISTURBANCES: NOT PRESENT
PAROXYSMAL SWEATS: BARELY PERCEPTIBLE SWEATING. PALMS MOIST
NAUSEA AND VOMITING: NO NAUSEA AND NO VOMITING
AGITATION: SOMEWHAT MORE THAN NORMAL ACTIVITY
VISUAL DISTURBANCES: NOT PRESENT
TREMOR: NO TREMOR
AGITATION: *
ORIENTATION AND CLOUDING OF SENSORIUM: ORIENTED AND CAN DO SERIAL ADDITIONS
AUDITORY DISTURBANCES: NOT PRESENT
TREMOR: TREMOR NOT VISIBLE BUT CAN BE FELT, FINGERTIP TO FINGERTIP
NAUSEA AND VOMITING: NO NAUSEA AND NO VOMITING
AGITATION: SOMEWHAT MORE THAN NORMAL ACTIVITY
VISUAL DISTURBANCES: VERY MILD SENSITIVITY
SUBSTANCE_ABUSE: 0

## 2023-04-23 ASSESSMENT — FIBROSIS 4 INDEX: FIB4 SCORE: 1.53

## 2023-04-23 NOTE — PROGRESS NOTES
Hospital Medicine Daily Progress Note    Date of Service  4/23/2023    Chief Complaint  Hyponatremia due to beer intake    Hospital Course  72 y.o. male who presented 4/20/2023 with dizziness.  Mr. Bass has a past medical history of hypertension, home oxygen dependency at 3 L, that was most recently admitted to our hospital from 4/1/2023 through 4/7/2023 with severe hyponatremia with initial sodium of 112 secondary to beer Poto myranda.  He was then readmitted again on 4/7/2023 through 4/12/2023 after a ground-level fall with head trauma.  He lives alone and called paramedics because of dizziness.  He was brought to the emergency room where his sodium found to be severely low at 113.  He was admitted to the intensive care unit and placed on normal saline and his sodium has come up to 120.  Found to have severe left lower extremity cellulitis initiated on IV Unasyn.  Chest x-ray revealed a left lower lobe opacity suspicious for aspiration pneumonia and this will also be treated with IV Unasyn.  He will be transferred to the [facility] in guarded condition.    Interval Problem Update  No acute events overnight  blood cultures one growing aerococcus viridans and the other coag negative staph, cont on Unasyn for his  left lower extremity cellulitis .  Repeat Bcx no growth to date   Na:129 . Cont salt tablets  close monitoring with q6hr sodium level checks     I have discussed this patient's plan of care and discharge plan at IDT rounds today with Case Management, Nursing, Nursing leadership, and other members of the IDT team.    Consults:  None     Code Status  DNAR/DNI    Disposition  Patient is not medically cleared for discharge.   Anticipate discharge to to home with close outpatient follow-up.  I have placed the appropriate orders for post-discharge needs.    Review of Systems  Review of Systems   Constitutional:  Positive for malaise/fatigue. Negative for fever.   Eyes:  Negative for discharge.   Respiratory:  Negative  for cough and shortness of breath.    Cardiovascular:  Positive for leg swelling. Negative for chest pain and palpitations.   Gastrointestinal:  Negative for abdominal pain, diarrhea and nausea.   Genitourinary:  Negative for dysuria and hematuria.   Musculoskeletal:  Negative for back pain and joint pain.   Neurological:  Negative for dizziness, sensory change and speech change.   Psychiatric/Behavioral:  Negative for substance abuse. The patient is not nervous/anxious.       Physical Exam  Temp:  [36.4 °C (97.5 °F)-37 °C (98.6 °F)] 36.8 °C (98.2 °F)  Pulse:  [] 102  Resp:  [18] 18  BP: ()/() 110/75  SpO2:  [95 %-98 %] 97 %    Physical Exam  Vitals reviewed.   Constitutional:       Appearance: He is obese. He is ill-appearing. He is not diaphoretic.   HENT:      Head: Normocephalic and atraumatic.      Nose: Nose normal.      Mouth/Throat:      Mouth: Mucous membranes are moist.      Pharynx: No oropharyngeal exudate.   Eyes:      General: No scleral icterus.        Right eye: No discharge.         Left eye: No discharge.      Extraocular Movements: Extraocular movements intact.      Conjunctiva/sclera: Conjunctivae normal.   Cardiovascular:      Rate and Rhythm: Normal rate and regular rhythm.      Pulses:           Radial pulses are 2+ on the right side and 2+ on the left side.        Dorsalis pedis pulses are 2+ on the right side and 2+ on the left side.      Heart sounds: No murmur heard.  Pulmonary:      Effort: Pulmonary effort is normal. No respiratory distress.      Breath sounds: Normal breath sounds. No wheezing or rales.   Abdominal:      General: Bowel sounds are normal. There is no distension.      Palpations: Abdomen is soft.   Musculoskeletal:         General: No swelling or tenderness.      Cervical back: Neck supple. No muscular tenderness.      Right lower leg: Edema present.      Left lower leg: Edema present.   Lymphadenopathy:      Cervical: No cervical adenopathy.   Skin:      Coloration: Skin is not jaundiced or pale.      Findings: Erythema present.      Comments: Thickened unkept toenails.   Neurological:      General: No focal deficit present.      Mental Status: He is alert and oriented to person, place, and time. Mental status is at baseline.      Cranial Nerves: No cranial nerve deficit.   Psychiatric:         Mood and Affect: Mood normal.         Behavior: Behavior normal.       Fluids    Intake/Output Summary (Last 24 hours) at 4/23/2023 1428  Last data filed at 4/23/2023 1300  Gross per 24 hour   Intake 1320 ml   Output 2075 ml   Net -755 ml       Laboratory  Recent Labs     04/21/23  0216   WBC 10.8   RBC 3.42*   HEMOGLOBIN 11.2*   HEMATOCRIT 30.8*   MCV 90.1   MCH 32.7   MCHC 36.4*   RDW 43.2   PLATELETCT 307   MPV 9.3     Recent Labs     04/21/23  0216 04/21/23  0624 04/23/23  0016 04/23/23  0420 04/23/23  1147   SODIUM 123*   < > 126* 129* 130*   POTASSIUM 3.8  --   --   --   --    CHLORIDE 88*  --   --   --   --    CO2 24  --   --   --   --    GLUCOSE 152*  --   --   --   --    BUN 7*  --   --   --   --    CREATININE 0.50  --   --   --   --    CALCIUM 8.2*  --   --   --   --     < > = values in this interval not displayed.                     Imaging  DX-ESOPHAGUS - [id number]   Final Result      1.  No evidence of tracheal aspiration.      CT-HEAD W/O   Final Result         1.  No acute intracranial abnormality is identified, there are nonspecific white matter changes, commonly associated with small vessel ischemic disease.  Associated mild cerebral atrophy is noted.   2.  Atherosclerosis.         DX-CHEST-PORTABLE (1 VIEW)   Final Result         1.  Left lower lobe infiltrate   2.  Atherosclerosis           Assessment/Plan  * Hyponatremia- (present on admission)  Assessment & Plan  This has been a recurrent process for him in the setting of beer Poto myranda for which she was admitted 3 weeks ago.  Sodium on admission was severely low at 113. Slowly improving now up to  129. Since admission  Starting also back his salt tabs   Monitor sodium every 6 hrs       Bacteremia  Assessment & Plan  As above     Cellulitis- (present on admission)  Assessment & Plan  Left leg  One blood culture is positive for aerococcus viridans   On IV unasyn   Will repeat blood culture     DNR (do not resuscitate)- (present on admission)  Assessment & Plan  Per Mr. Bass's wishes    Pneumonia- (present on admission)  Assessment & Plan  LLL infiltrate seen on cxr  Likely aspiration given his altered sensorium  On unasyn     Sepsis ([facility])- (present on admission)  Assessment & Plan  Left leg cellulitis   also his blood cultures grew aerococcus viridans most likely contaminant   Will repeat Blood cultures        Acute on chronic respiratory failure with hypoxia ([facility])- (present on admission)  Assessment & Plan  Wean oxygen as able.  Monitor SpO2.  CXR reveals a LLL infiltrate query aspiration pneumonia  Speech therapy evaluating patient 4/21  Probable component of MICHOACANO    Alcohol dependence ([facility])- (present on admission)  Assessment & Plan  Excessive beer intake (but states only drinking three 12 oz beers/day)  CIWA protocol ordered for possible withdrawal  Magnesium, potassium, and phosphorus level in the morning and replace accordingly.  Multivitamins ordered  Cessation discussed    Primary hypertension- (present on admission)  Assessment & Plan  Continue outpatient Norvasc with holding parameters.     Obesity- (present on admission)  Assessment & Plan  BMI 38         VTE prophylaxis: Xarelto 10 mg daily as prophylaxis    I have performed a physical exam and reviewed and updated ROS and Plan today (4/23/2023). In review of yesterday's note (4/22/2023), there are no changes except as documented above.

## 2023-04-23 NOTE — CARE PLAN
The patient is Stable - Low risk of patient condition declining or worsening    Shift Goals  Clinical Goals: serial Na+ checks; Neuro Q4h  Patient Goals: safety and comfort; go home  Family Goals: No family present    Problem: Knowledge Deficit - Standard  Goal: Patient and family/care givers will demonstrate understanding of plan of care, disease process/condition, diagnostic tests and medications  Outcome: Progressing     Problem: Optimal Care for Alcohol Withdrawal  Goal: Optimal Care for the alcohol withdrawal patient  Outcome: Progressing     Problem: Seizure Precautions  Goal: Implementation of seizure precautions  Outcome: Progressing     Problem: Hemodynamics  Goal: Patient's hemodynamics, fluid balance and neurologic status will be stable or improve  Outcome: Progressing     Problem: Fluid Volume  Goal: Fluid volume balance will be maintained  Outcome: Progressing     Problem: Urinary - Renal Perfusion  Goal: Ability to achieve and maintain adequate renal perfusion and functioning will improve  Outcome: Progressing     Problem: Skin Integrity  Goal: Skin integrity is maintained or improved  Outcome: Progressing     Problem: Fall Risk  Goal: Patient will remain free from falls  Outcome: Progressing     Problem: Pain - Standard  Goal: Alleviation of pain or a reduction in pain to the patient’s comfort goal  Outcome: Progressing       Progress made toward(s) clinical / shift goals: patient participates in the discussion of diagnosis and treatment plan; alcohol withdrawal treatment plan initiated; seizure precautions in place; patient is hemodynamically stable; FR in place; patient voids adequately; skin integrity measures in place; fall prevention measures in place; pain adequately managed with PRN regimen    Patient is not progressing towards the following goals:

## 2023-04-24 ENCOUNTER — PHARMACY VISIT (OUTPATIENT)
Dept: PHARMACY | Facility: MEDICAL CENTER | Age: 73
End: 2023-04-24
Payer: COMMERCIAL

## 2023-04-24 VITALS
SYSTOLIC BLOOD PRESSURE: 142 MMHG | HEIGHT: 67 IN | WEIGHT: 238.1 LBS | OXYGEN SATURATION: 96 % | RESPIRATION RATE: 18 BRPM | DIASTOLIC BLOOD PRESSURE: 81 MMHG | TEMPERATURE: 98.6 F | HEART RATE: 75 BPM | BODY MASS INDEX: 37.37 KG/M2

## 2023-04-24 LAB
ANION GAP SERPL CALC-SCNC: 10 MMOL/L (ref 7–16)
BUN SERPL-MCNC: 6 MG/DL (ref 8–22)
CALCIUM SERPL-MCNC: 8.3 MG/DL (ref 8.5–10.5)
CHLORIDE SERPL-SCNC: 98 MMOL/L (ref 96–112)
CO2 SERPL-SCNC: 23 MMOL/L (ref 20–33)
CREAT SERPL-MCNC: 0.44 MG/DL (ref 0.5–1.4)
ERYTHROCYTE [DISTWIDTH] IN BLOOD BY AUTOMATED COUNT: 47.5 FL (ref 35.9–50)
GFR SERPLBLD CREATININE-BSD FMLA CKD-EPI: 112 ML/MIN/1.73 M 2
GLUCOSE SERPL-MCNC: 114 MG/DL (ref 65–99)
HCT VFR BLD AUTO: 32.2 % (ref 42–52)
HGB BLD-MCNC: 10.7 G/DL (ref 14–18)
MCH RBC QN AUTO: 31.9 PG (ref 27–33)
MCHC RBC AUTO-ENTMCNC: 33.2 G/DL (ref 33.7–35.3)
MCV RBC AUTO: 96.1 FL (ref 81.4–97.8)
PLATELET # BLD AUTO: 270 K/UL (ref 164–446)
PMV BLD AUTO: 9.3 FL (ref 9–12.9)
POTASSIUM SERPL-SCNC: 4.2 MMOL/L (ref 3.6–5.5)
RBC # BLD AUTO: 3.35 M/UL (ref 4.7–6.1)
SODIUM SERPL-SCNC: 130 MMOL/L (ref 135–145)
SODIUM SERPL-SCNC: 131 MMOL/L (ref 135–145)
WBC # BLD AUTO: 6.5 K/UL (ref 4.8–10.8)

## 2023-04-24 PROCEDURE — 700111 HCHG RX REV CODE 636 W/ 250 OVERRIDE (IP): Performed by: HOSPITALIST

## 2023-04-24 PROCEDURE — 700102 HCHG RX REV CODE 250 W/ 637 OVERRIDE(OP): Performed by: INTERNAL MEDICINE

## 2023-04-24 PROCEDURE — RXMED WILLOW AMBULATORY MEDICATION CHARGE: Performed by: INTERNAL MEDICINE

## 2023-04-24 PROCEDURE — 99239 HOSP IP/OBS DSCHRG MGMT >30: CPT | Performed by: INTERNAL MEDICINE

## 2023-04-24 PROCEDURE — A9270 NON-COVERED ITEM OR SERVICE: HCPCS | Performed by: NURSE PRACTITIONER

## 2023-04-24 PROCEDURE — A9270 NON-COVERED ITEM OR SERVICE: HCPCS | Performed by: INTERNAL MEDICINE

## 2023-04-24 PROCEDURE — 700102 HCHG RX REV CODE 250 W/ 637 OVERRIDE(OP): Performed by: HOSPITALIST

## 2023-04-24 PROCEDURE — 80048 BASIC METABOLIC PNL TOTAL CA: CPT

## 2023-04-24 PROCEDURE — 36415 COLL VENOUS BLD VENIPUNCTURE: CPT

## 2023-04-24 PROCEDURE — 700102 HCHG RX REV CODE 250 W/ 637 OVERRIDE(OP): Performed by: NURSE PRACTITIONER

## 2023-04-24 PROCEDURE — 84295 ASSAY OF SERUM SODIUM: CPT

## 2023-04-24 PROCEDURE — A9270 NON-COVERED ITEM OR SERVICE: HCPCS | Performed by: HOSPITALIST

## 2023-04-24 PROCEDURE — 85027 COMPLETE CBC AUTOMATED: CPT

## 2023-04-24 RX ORDER — LOSARTAN POTASSIUM 100 MG/1
100 TABLET ORAL DAILY
Qty: 30 TABLET | Refills: 0 | Status: SHIPPED | OUTPATIENT
Start: 2023-04-25

## 2023-04-24 RX ORDER — LOSARTAN POTASSIUM 50 MG/1
50 TABLET ORAL ONCE
Status: COMPLETED | OUTPATIENT
Start: 2023-04-24 | End: 2023-04-24

## 2023-04-24 RX ORDER — AMOXICILLIN AND CLAVULANATE POTASSIUM 875; 125 MG/1; MG/1
1 TABLET, FILM COATED ORAL 2 TIMES DAILY
Qty: 12 TABLET | Refills: 0 | Status: ON HOLD | OUTPATIENT
Start: 2023-04-24 | End: 2023-05-01

## 2023-04-24 RX ORDER — LOSARTAN POTASSIUM 50 MG/1
100 TABLET ORAL
Status: DISCONTINUED | OUTPATIENT
Start: 2023-04-25 | End: 2023-04-24 | Stop reason: HOSPADM

## 2023-04-24 RX ADMIN — FLUTICASONE PROPIONATE 88 MCG: 44 AEROSOL, METERED RESPIRATORY (INHALATION) at 05:24

## 2023-04-24 RX ADMIN — LOSARTAN POTASSIUM 50 MG: 50 TABLET, FILM COATED ORAL at 09:43

## 2023-04-24 RX ADMIN — UMECLIDINIUM BROMIDE AND VILANTEROL TRIFENATATE 1 PUFF: 62.5; 25 POWDER RESPIRATORY (INHALATION) at 08:27

## 2023-04-24 RX ADMIN — SODIUM CHLORIDE 1 G: 1 TABLET ORAL at 08:20

## 2023-04-24 RX ADMIN — CEFTRIAXONE SODIUM 2000 MG: 10 INJECTION, POWDER, FOR SOLUTION INTRAVENOUS at 05:25

## 2023-04-24 RX ADMIN — THERA TABS 1 TABLET: TAB at 05:26

## 2023-04-24 RX ADMIN — Medication: at 07:20

## 2023-04-24 RX ADMIN — FOLIC ACID 1 MG: 1 TABLET ORAL at 05:25

## 2023-04-24 RX ADMIN — SODIUM CHLORIDE 1 G: 1 TABLET ORAL at 11:10

## 2023-04-24 RX ADMIN — Medication 100 MG: at 05:26

## 2023-04-24 RX ADMIN — LOSARTAN POTASSIUM 50 MG: 50 TABLET, FILM COATED ORAL at 05:26

## 2023-04-24 RX ADMIN — OMEPRAZOLE 20 MG: 20 CAPSULE, DELAYED RELEASE ORAL at 05:25

## 2023-04-24 RX ADMIN — DOCUSATE SODIUM 50 MG AND SENNOSIDES 8.6 MG 2 TABLET: 8.6; 5 TABLET, FILM COATED ORAL at 05:25

## 2023-04-24 ASSESSMENT — LIFESTYLE VARIABLES
TREMOR: NO TREMOR
VISUAL DISTURBANCES: NOT PRESENT
HEADACHE, FULLNESS IN HEAD: NOT PRESENT
AGITATION: NORMAL ACTIVITY
ANXIETY: MILDLY ANXIOUS
PAROXYSMAL SWEATS: NO SWEAT VISIBLE
ORIENTATION AND CLOUDING OF SENSORIUM: ORIENTED AND CAN DO SERIAL ADDITIONS
AUDITORY DISTURBANCES: NOT PRESENT
NAUSEA AND VOMITING: NO NAUSEA AND NO VOMITING
TOTAL SCORE: 1

## 2023-04-24 ASSESSMENT — PAIN DESCRIPTION - PAIN TYPE
TYPE: ACUTE PAIN
TYPE: ACUTE PAIN

## 2023-04-24 NOTE — FACE TO FACE
"Face to Face Note  -  Durable Medical Equipment    Kurt Calle M.D. - NPI: [id number]  I certify that this patient is under my care and that they had a durable medical equipment(DME)face to face encounter by myself that meets the physician DME face-to-face encounter requirements with this patient on:    Date of encounter:   Patient:                    MRN:                       YOB: 2023  Juan Manuel Bass  [id number]  1950     The encounter with the patient was in whole, or in part, for the following medical condition, which is the primary reason for durable medical equipment:  COPD    I certify that, based on my findings, the following durable medical equipment is medically necessary:    Oxygen   HOME O2 Saturation Measurements:(Values must be present for Home Oxygen orders)  Room air sat at rest: 90  Room air sat with amb: 85  With liters of O2: 3, O2 sat at rest with O2: 97  With Liters of O2: 3, O2 sat with amb with O2 : 94  Is the patient mobile?: Yes  If patient feels more short of breath, they can go up to 6 liters per minute and contact healthcare provider.    Supporting Symptoms: The patient requires supplemental oxygen, as the following interventions have been tried with limited or no improvement: \"Oral and/or IV steroids, \"Ambulation with oximetry, and \"Incentive spirometry.    My Clinical findings support the need for the above equipment due to:  Hypoxia  "

## 2023-04-24 NOTE — DISCHARGE PLANNING
Meds-to-Beds: Discharge prescription orders listed below delivered to patient's bedside. ALDEN Oneal notified. Patient counseled. Patient elected to have co-payment billed to patient account.       Current Outpatient Medications   Medication Sig Dispense Refill    [START ON 4/25/2023] losartan (COZAAR) 100 MG Tab Take 1 Tablet by mouth every day. 30 Tablet 0    amoxicillin-clavulanate (AUGMENTIN) 875-125 MG Tab Take 1 Tablet by mouth 2 times a day for 6 days. 12 Tablet 0      Jennie Maya, PharmD

## 2023-04-24 NOTE — PROGRESS NOTES
Patient is for discharge to home;picked up by [facility] via wheelchair.  All IV access removed, discharge paper and instructions provided. Meds to beds received. All questions by patient answered by RN.

## 2023-04-24 NOTE — DISCHARGE SUMMARY
Discharge Summary    CHIEF COMPLAINT ON ADMISSION  Chief Complaint   Patient presents with    Dizziness     Pt states having dizziness for the past 2 hrs, hx of HTN. Pt denies chest pain.        Reason for Admission  EMS     Admission Date  4/20/2023    CODE STATUS  DNAR/DNI    HPI & HOSPITAL COURSE  This is a 72 y.o. male with PMHX of hypertension, home oxygen dependency at 3 L, that was most recently admitted to our hospital from 4/1/2023 through 4/7/2023 with severe hyponatremia with initial sodium of 112 secondary to beer Poto myranda.  He was then readmitted again on 4/7/2023 through 4/12/2023 after a ground-level fall with head trauma who presented 4/20/2023 with dizziness. He was brought to the emergency room where his sodium found to be severely low at 113.  He was admitted to the intensive care unit and placed on normal saline and his sodium has come up to 120.  Found to have severe left lower extremity cellulitis initiated on IV Unasyn.  Chest x-ray revealed a left lower lobe opacity suspicious for aspiration pneumonia which was also treated with Unasyn. His cultures grew coag negative staph and also aerococcus viridans both contaminant. Repeat cultures have remained negative.   He was started back on his salt tabs , his hyponatremia has improved. Patient feeling better. Will arrange home health for patient and will discharge patient home today     The patient met 2-midnight criteria for an inpatient stay at the time of discharge.    Discharge Date  4/24/23    FOLLOW UP ITEMS POST DISCHARGE  PCP     DISCHARGE DIAGNOSES  Principal Problem:    Hyponatremia POA: Yes  Active Problems:    Obesity POA: Yes    Primary hypertension POA: Yes    Alcohol dependence (HCC) POA: Yes    Acute on chronic respiratory failure with hypoxia (HCC) POA: Yes    Sepsis (HCC) POA: Yes    Asthma POA: Yes    Pneumonia POA: Yes    DNR (do not resuscitate) POA: Yes    Cellulitis POA: Yes    Bacteremia POA: Unknown  Resolved Problems:     * No resolved hospital problems. *      FOLLOW UP  No future appointments.  Peterson Amin M.D.  [address] [id number] [phone]    Follow up        MEDICATIONS ON DISCHARGE     Medication List        START taking these medications        Instructions   amoxicillin-clavulanate 875-125 MG Tabs  Commonly known as: AUGMENTIN   Take 1 Tablet by mouth 2 times a day for 6 days.  Dose: 1 Tablet     losartan 100 MG Tabs  Start taking on: April 25, 2023  Commonly known as: COZAAR   Take 1 Tablet by mouth every day.  Dose: 100 mg            CONTINUE taking these medications        Instructions   albuterol 108 (90 Base) MCG/ACT Aers inhalation aerosol   Inhale 1-2 Puffs every four hours as needed for Shortness of Breath.  Dose: 1-2 Puff     pantoprazole 40 MG Tbec  Commonly known as: PROTONIX   Take 40 mg by mouth every day.  Dose: 40 mg     sodium chloride 1 GM Tabs  Commonly known as: SALT   Take 1 Tablet by mouth 3 times a day with meals.  Dose: 1 g     Trelegy Ellipta 100-62.5-25 MCG/ACT Aepb inhalation  Generic drug: fluticasone-umeclidin-vilant   Inhale 2-3 Inhalation every morning.  Dose: 2-3 Inhalation.            STOP taking these medications      amLODIPine 5 MG Tabs  Commonly known as: NORVASC              Allergies  Allergies   Allergen Reactions    Tetanus Toxoid Hives       DIET  Orders Placed This Encounter   Procedures    Diet Order Diet: Level 7 - Easy to Chew (reflux precautions); Liquid level: Level 0 - Thin; Fluid modifications: (optional): 1500 ml Fluid Restriction     Standing Status:   Standing     Number of Occurrences:   1     Order Specific Question:   Diet:     Answer:   Level 7 - Easy to Chew [22]     Comments:   reflux precautions     Order Specific Question:   Liquid level     Answer:   Level 0 - Thin     Order Specific Question:   Fluid modifications: (optional)     Answer:   1500 ml Fluid Restriction [9]       ACTIVITY  As tolerated.  Weight bearing as  tolerated    CONSULTATIONS  Critical care     PROCEDURES  None     LABORATORY  Lab Results   Component Value Date    SODIUM 130 (L) 04/24/2023    POTASSIUM 4.2 04/24/2023    CHLORIDE 98 04/24/2023    CO2 23 04/24/2023    GLUCOSE 114 (H) 04/24/2023    BUN 6 (L) 04/24/2023    CREATININE 0.44 (L) 04/24/2023        Lab Results   Component Value Date    WBC 6.5 04/24/2023    HEMOGLOBIN 10.7 (L) 04/24/2023    HEMATOCRIT 32.2 (L) 04/24/2023    PLATELETCT 270 04/24/2023        Total time of the discharge process exceeds 42 minutes.

## 2023-04-24 NOTE — DISCHARGE PLANNING
DC Transport Scheduled    Received request at: 4/24/2023 AT 1006    Transport Company Scheduled:  [facility]    Scheduled Date: 4/24/2023  Scheduled Time: 1500    Destination: Home at [address]     Notified care team of scheduled transport via Voalte.     If there are any changes needed to the DC transportation scheduled, please contact [facility] at ext. [phone] between the hours of 4197-0787 Mon-Fri. If outside those hours, contact the ED Case Manager at ext. [phone].

## 2023-04-24 NOTE — DISCHARGE PLANNING
0947  Received Choice form at 0939  Agency/Facility Name: [facility]    Referral sent per Choice form @ 0947     0958  Per Epic response [facility] accepted.     1007  Received Choice form at 0958  Agency/Facility Name: [facility]   Referral sent per Choice form @ 1007

## 2023-04-24 NOTE — DISCHARGE PLANNING
Case Management Discharge Planning    Admission Date: 4/20/2023  GMLOS: 5  ALOS: 4    6-Clicks ADL Score: 16  6-Clicks Mobility Score: 18  PT and/or OT Eval ordered: Yes  Post-acute Referrals Ordered: Yes  Post-acute Choice Obtained: Yes  Has referral(s) been sent to post-acute provider:  Yes      Anticipated Discharge Dispo: Discharge Disposition: D/T to SNF with Medicare cert in anticipation of skilled care (03)    DME Needed: Yes    DME Ordered: No    Action(s) Taken: Updated Provider/Nurse on Discharge Plan and Patient Conference,  Lsw spoke to team at 1st rounds. Pt can d/c home today w/ HH.    Lsw met w/ pt at bedside to acquire HH choice. Lsw verified home address and PCP on face sheet. Pt reports no close supports. He will need transport and O2 for transport home.     Pt uses a FWW and O2 which are BOTH at home w/o anyone to bring them here.    LSw completed transport for [facility] w/c as they can assist w/ getting pt into his home which is unlocked so he can get to his FWW w/o falling down.     Lsw faxed to [facility] w/ requested p/u this afternoon to place the other needs in place prior:   DME O2 walking assessment,   DME O2 order/f2f,   acceptance to HH w/ INS AUTH,   and an open spot for p/u by [facility] to get home safely today.       LSw faxed completed HH choice: 1-[facility], [facility], [facility].    Lsw noted pt has O2 DME in past Epic notes from [facility]. LSw asked for order/f2f as [facility] will require updated notes for pt prior to bringing a tank for his transport home.    LSw sent voalte to bedside RN, MD, and charge RN to update as above.    LSw completed O2 DME choice to resume w/ [facility] and faxed to [facility]. Lsw updated as above.    Escalations Completed: Provider    Medically Clear: Yes    Next Steps: LSw will continue to follow for DMe O2 order/f2f so choice can be faxed to [facility].    Barriers to Discharge: Pending Placement, Pending Insurance Authorization, and DME    Is the patient up for discharge  tomorrow: No    Addendum:  LSw asked to complete [facility] for $123.08. Lsw completed form and called RN manager Graham. Lsw left  requesting the ASF be accepted as above.     Lsw spoke to [facility] then spoke to pt w/ [facility] on speaker w/ voalte phone. Pt verified his home. Pt indicated they can come to his home tomorrow to p/u the extra regulators he has at home. Pt will speak to office when the call tomorrow to update his payment information.    [facility] will bring him a tank and regulator today.

## 2023-04-24 NOTE — DISCHARGE PLANNING
Case Management D/C Planning  LSw asked to acquire [employer].    LSw called all numbers and non work except for     [employer] at [phone].     LSw was forced to leave a VM and the name of phone owner was not in the VM. Unsure if this phone is even still this pers on's at this time.

## 2023-04-24 NOTE — DISCHARGE INSTRUCTIONS
Chronic Obstructive Pulmonary Disease (COPD) is a long-term, progressive lung disease that makes it harder to breathe. It includes chronic bronchitis, emphysema, and refractory (non-reversible) asthma. With COPD, the airways in your lungs become inflamed and thicken, and the tissue where oxygen is exchanged is destroyed. The flow of air in and out of your lungs decreases. When that happens, less oxygen gets into your body tissues, and it becomes harder to get rid of the waste gas carbon dioxide. As the disease gets worse, shortness of breath makes it harder to remain active. There is no cure for COPD, but it is preventable and treatable.    COPD Patient Discharge Instructions    Diet  Follow a low fat, low cholesterol, low salt diet unless instructed otherwise by your Doctor. Read the labels on the back of food products and track your intake of fat, cholesterol and salt.  No smoking  Discontinuing smoking will have the biggest impact on preventing progression of disease.  To participate in [facility]’s Quit Tobacco Program, call [phone] or visit [employer]/QuitTobacco  Oxygen  If your doctor has order that you wear oxygen at home, it is important to wear it as ordered.  Do not smoke, vape, or use e-cigarettes with oxygen on.  Store in an appropriate location: upright in its delarosa or laid flat down, away from open flames and stoves.   Do not use oil-based creams and moisturizers (ie: petroleum products, oil-based lip moisturizers) or aerosol sprays (ie: hair sprays or deodorants) when using your oxygen equipment.  Be careful with tubing placement to prevent yourself and others from tripping.  Medications  Refer to your personalized Action Plan to manage your symptoms.  Warning signs of an exacerbation  Breathing fast and shallow, worsening shortness of breath (like you just finished exercising)  Chest tightness  Increases in sputum production  Changes in sputum color  Lower oxygen levels than baseline  When  to call your doctor  If the warning signs of an exacerbation do not improve  Refer to your personalized Action Plan   Pulmonary Rehab  Your doctor has ordered you a referral to Pulmonary Rehab. Call [phone] to schedule an appointment  Attend your follow-up appointment with your PCP and/or Pulmonologist  Remote Monitoring: At the direction of the remote monitoring on-call provider, you may increase your oxygen by 2 liters above your baseline.     See the educational handout provided by your COPD Navigator for more information. This also explains more about COPD, symptoms of an exacerbation, and some of the tests that you have undergone.  Edema    Edema is when you have too much fluid in your body or under your skin. Edema may make your legs, feet, and ankles swell up. Swelling is also common in looser tissues, like around your eyes. This is a common condition. It gets more common as you get older. There are many possible causes of edema. Eating too much salt (sodium) and being on your feet or sitting for a long time can cause edema in your legs, feet, and ankles. Hot weather may make edema worse.  Edema is usually painless. Your skin may look swollen or shiny.  Follow these instructions at home:  Keep the swollen body part raised (elevated) above the level of your heart when you are sitting or lying down.  Do not sit still or stand for a long time.  Do not wear tight clothes. Do not wear garters on your upper legs.  Exercise your legs. This can help the swelling go down.  Wear elastic bandages or support stockings as told by your doctor.  Eat a low-salt (low-sodium) diet to reduce fluid as told by your doctor.  Depending on the cause of your swelling, you may need to limit how much fluid you drink (fluid restriction).  Take over-the-counter and prescription medicines only as told by your doctor.  Contact a doctor if:  Treatment is not working.  You have heart, liver, or kidney disease and have symptoms of  edema.  You have sudden and unexplained weight gain.  Get help right away if:  You have shortness of breath or chest pain.  You cannot breathe when you lie down.  You have pain, redness, or warmth in the swollen areas.  You have heart, liver, or kidney disease and get edema all of a sudden.  You have a fever and your symptoms get worse all of a sudden.  Summary  Edema is when you have too much fluid in your body or under your skin.  Edema may make your legs, feet, and ankles swell up. Swelling is also common in looser tissues, like around your eyes.  Raise (elevate) the swollen body part above the level of your heart when you are sitting or lying down.  Follow your doctor's instructions about diet and how much fluid you can drink (fluid restriction).  This information is not intended to replace advice given to you by your health care provider. Make sure you discuss any questions you have with your health care provider.  Document Released: 06/05/2009 Document Revised: 12/21/2018 Document Reviewed: 01/05/2018  [employer] Patient Education © 2020 [employer]  Community-Acquired Pneumonia, Adult  Pneumonia is an infection of the lungs. It causes swelling in the airways of the lungs. Mucus and fluid may also build up inside the airways.  One type of pneumonia can happen while a person is in a hospital. A different type can happen when a person is not in a hospital (community-acquired pneumonia).   What are the causes?    This condition is caused by germs (viruses, bacteria, or fungi). Some types of germs can be passed from one person to another. This can happen when you breathe in droplets from the cough or sneeze of an infected person.  What increases the risk?  You are more likely to develop this condition if you:  Have a long-term (chronic) disease, such as:  Chronic obstructive pulmonary disease (COPD).  Asthma.  Cystic fibrosis.  Congestive heart failure.  Diabetes.  Kidney disease.  Have HIV.  Have sickle cell  disease.  Have had your spleen removed.  Do not take good care of your teeth and mouth (poor dental hygiene).  Have a medical condition that increases the risk of breathing in droplets from your own mouth and nose.  Have a weakened body defense system (immune system).  Are a smoker.  Travel to areas where the germs that cause this illness are common.  Are around certain animals or the places they live.  What are the signs or symptoms?  A dry cough.  A wet (productive) cough.  Fever.  Sweating.  Chest pain. This often happens when breathing deeply or coughing.  Fast breathing or trouble breathing.  Shortness of breath.  Shaking chills.  Feeling tired (fatigue).  Muscle aches.  How is this treated?  Treatment for this condition depends on many things. Most adults can be treated at home. In some cases, treatment must happen in a hospital. Treatment may include:  Medicines given by mouth or through an IV tube.  Being given extra oxygen.  Respiratory therapy.  In rare cases, treatment for very bad pneumonia may include:  Using a machine to help you breathe.  Having a procedure to remove fluid from around your lungs.  Follow these instructions at home:  Medicines  Take over-the-counter and prescription medicines only as told by your doctor.  Only take cough medicine if you are losing sleep.  If you were prescribed an antibiotic medicine, take it as told by your doctor. Do not stop taking the antibiotic even if you start to feel better.  General instructions    Sleep with your head and neck raised (elevated). You can do this by sleeping in a recliner or by putting a few pillows under your head.  Rest as needed. Get at least 8 hours of sleep each night.  Drink enough water to keep your pee (urine) pale yellow.  Eat a healthy diet that includes plenty of vegetables, fruits, whole grains, low-fat dairy products, and lean protein.  Do not use any products that contain nicotine or tobacco. These include cigarettes,  e-cigarettes, and chewing tobacco. If you need help quitting, ask your doctor.  Keep all follow-up visits as told by your doctor. This is important.  How is this prevented?  A shot (vaccine) can help prevent pneumonia. Shots are often suggested for:  People older than 65 years of age.  People older than 19 years of age who:  Are having cancer treatment.  Have long-term (chronic) lung disease.  Have problems with their body's defense system.  You may also prevent pneumonia if you take these actions:  Get the flu (influenza) shot every year.  Go to the dentist as often as told.  Wash your hands often. If you cannot use soap and water, use hand .  Contact a doctor if:  You have a fever.  You lose sleep because your cough medicine does not help.  Get help right away if:  You are short of breath and it gets worse.  You have more chest pain.  Your sickness gets worse. This is very serious if:  You are an older adult.  Your body's defense system is weak.  You cough up blood.  Summary  Pneumonia is an infection of the lungs.  Most adults can be treated at home. Some will need treatment in a hospital.  Drink enough water to keep your pee pale yellow.  Get at least 8 hours of sleep each night.  This information is not intended to replace advice given to you by your health care provider. Make sure you discuss any questions you have with your health care provider.  Document Released: 06/05/2009 Document Revised: 04/08/2020 Document Reviewed: 08/15/2019  Elsevier Patient Education © 2020 [employer]  Hyponatremia  Hyponatremia is when the amount of salt (sodium) in your blood is too low. When salt levels are low, your body may take in extra water. This can cause swelling throughout the body. The swelling often affects the brain.  What are the causes?  This condition may be caused by:  Certain medical problems or conditions.  Vomiting a lot.  Having watery poop (diarrhea) often.  Certain medicines or illegal  drugs.  Not having enough water in the body (dehydration).  Drinking too much water.  Eating a diet that is low in salt.  Large burns on your body.  Too much sweating.  What increases the risk?  You are more likely to get this condition if you:  Have long-term (chronic) kidney disease.  Have heart failure.  Have a medical condition that causes you to have watery poop often.  Do very hard exercises.  Take medicines that affect the amount of salt is in your blood.  What are the signs or symptoms?  Symptoms of this condition include:  Headache.  Feeling like you may vomit (nausea).  Vomiting.  Being very tired (lethargic).  Muscle weakness and cramps.  Not wanting to eat as much as normal (loss of appetite).  Feeling weak or light-headed.  Severe symptoms of this condition include:  Confusion.  Feeling restless (agitation).  Having a fast heart rate.  Passing out (fainting).  Seizures.  Coma.  How is this treated?  Treatment for this condition depends on the cause. Treatment may include:  Getting fluids through an IV tube that is put into one of your veins.  Taking medicines to fix the salt levels in your blood. If medicines are causing the problem, your medicines will need to be changed.  Limiting how much water or fluid you take in.  Monitoring in the hospital to watch your symptoms.  Follow these instructions at home:    Take over-the-counter and prescription medicines only as told by your doctor. Many medicines can make this condition worse. Talk with your doctor about any medicines that you are taking.  Eat and drink exactly as you are told by your doctor.  Eat only the foods you are told to eat.  Limit how much fluid you take.  Do not drink alcohol.  Keep all follow-up visits as told by your doctor. This is important.  Contact a doctor if:  You feel more like you may vomit.  You feel more tired.  Your headache gets worse.  You feel more confused.  You feel weaker.  Your symptoms go away and then they come  back.  You have trouble following the diet instructions.  Get help right away if:  You have a seizure.  You pass out.  You keep having watery poop.  You keep vomiting.  Summary  Hyponatremia is when the amount of salt in your blood is too low.  When salt levels are low, you can have swelling throughout the body. The swelling mostly affects the brain.  Treatment depends on the cause. Treatment may include getting IV fluids, medicines, or not drinking as much fluid.  This information is not intended to replace advice given to you by your health care provider. Make sure you discuss any questions you have with your health care provider.  Document Released: 08/29/2012 Document Revised: 03/05/2020 Document Reviewed: 11/21/2019  [employer] Patient Education © 2020 [employer]  Chronic Obstructive Pulmonary Disease (COPD) is a long-term, progressive lung disease that makes it harder to breathe. It includes chronic bronchitis, emphysema, and refractory (non-reversible) asthma. With COPD, the airways in your lungs become inflamed and thicken, and the tissue where oxygen is exchanged is destroyed. The flow of air in and out of your lungs decreases. When that happens, less oxygen gets into your body tissues, and it becomes harder to get rid of the waste gas carbon dioxide. As the disease gets worse, shortness of breath makes it harder to remain active. There is no cure for COPD, but it is preventable and treatable.    COPD Patient Discharge Instructions    Diet  Follow a low fat, low cholesterol, low salt diet unless instructed otherwise by your Doctor. Read the labels on the back of food products and track your intake of fat, cholesterol and salt.  No smoking  Discontinuing smoking will have the biggest impact on preventing progression of disease.  To participate in [facility]’s Quit Tobacco Program, call 355-542-6713 or visit [employer]/QuitTobacco  Oxygen  If your doctor has order that you wear oxygen at home, it is  important to wear it as ordered.  Do not smoke, vape, or use e-cigarettes with oxygen on.  Store in an appropriate location: upright in its delarosa or laid flat down, away from open flames and stoves.   Do not use oil-based creams and moisturizers (ie: petroleum products, oil-based lip moisturizers) or aerosol sprays (ie: hair sprays or deodorants) when using your oxygen equipment.  Be careful with tubing placement to prevent yourself and others from tripping.  Medications  Refer to your personalized Action Plan to manage your symptoms.  Warning signs of an exacerbation  Breathing fast and shallow, worsening shortness of breath (like you just finished exercising)  Chest tightness  Increases in sputum production  Changes in sputum color  Lower oxygen levels than baseline  When to call your doctor  If the warning signs of an exacerbation do not improve  Refer to your personalized Action Plan   Pulmonary Rehab  Your doctor has ordered you a referral to Pulmonary Rehab. Call [phone] to schedule an appointment  Attend your follow-up appointment with your PCP and/or Pulmonologist  Remote Monitoring: At the direction of the remote monitoring on-call provider, you may increase your oxygen by 2 liters above your baseline.     See the educational handout provided by your COPD Navigator for more information. This also explains more about COPD, symptoms of an exacerbation, and some of the tests that you have undergone.

## 2023-04-24 NOTE — FACE TO FACE
Face to Face Supporting Documentation - Home Health    The encounter with this patient was in whole or in part the primary reason for home health admission.    Date of encounter:   Patient:                    MRN:                       YOB: 2023  Juan Manuel Bass  [id number]  1950     Home health to see patient for:  Skilled Nursing care for assessment, interventions & education    Skilled need for:  Comment: medication management     Skilled nursing interventions to include:  Comment: medication management     Homebound status evidenced by:  Need the aid of supportive devices such as crutches, canes, wheelchairs or walkers. Leaving home requires a considerable and taxing effort. There is a normal inability to leave the home.    Community Physician to provide follow up care: Peterson Amin M.D.     Optional Interventions? No      I certify the face to face encounter for this home health care referral meets the CMS requirements and the encounter/clinical assessment with the patient was, in whole, or in part, for the medical condition(s) listed above, which is the primary reason for home health care. Based on my clinical findings: the service(s) are medically necessary, support the need for home health care, and the homebound criteria are met.  I certify that this patient has had a face to face encounter by myself.  Kurt Calle M.D. - NPI: [id number]

## 2023-04-25 ENCOUNTER — APPOINTMENT (OUTPATIENT)
Dept: RADIOLOGY | Facility: MEDICAL CENTER | Age: 73
End: 2023-04-25
Payer: MEDICARE

## 2023-04-25 ENCOUNTER — APPOINTMENT (OUTPATIENT)
Dept: RADIOLOGY | Facility: MEDICAL CENTER | Age: 73
End: 2023-04-25
Attending: EMERGENCY MEDICINE
Payer: MEDICARE

## 2023-04-25 ENCOUNTER — APPOINTMENT (OUTPATIENT)
Dept: RADIOLOGY | Facility: MEDICAL CENTER | Age: 73
End: 2023-04-25
Attending: INTERNAL MEDICINE
Payer: MEDICARE

## 2023-04-25 ENCOUNTER — HOSPITAL ENCOUNTER (OUTPATIENT)
Facility: MEDICAL CENTER | Age: 73
End: 2023-04-26
Attending: EMERGENCY MEDICINE | Admitting: INTERNAL MEDICINE
Payer: MEDICARE

## 2023-04-25 DIAGNOSIS — R03.0 ELEVATED BLOOD PRESSURE READING: ICD-10-CM

## 2023-04-25 DIAGNOSIS — E87.1 HYPONATREMIA: ICD-10-CM

## 2023-04-25 DIAGNOSIS — R42 DIZZINESS: ICD-10-CM

## 2023-04-25 LAB
ALBUMIN SERPL BCP-MCNC: 3.7 G/DL (ref 3.2–4.9)
ALBUMIN/GLOB SERPL: 1.2 G/DL
ALP SERPL-CCNC: 78 U/L (ref 30–99)
ALT SERPL-CCNC: 25 U/L (ref 2–50)
ANION GAP SERPL CALC-SCNC: 10 MMOL/L (ref 7–16)
APPEARANCE UR: CLEAR
AST SERPL-CCNC: 25 U/L (ref 12–45)
BASOPHILS # BLD AUTO: 0.2 % (ref 0–1.8)
BASOPHILS # BLD: 0.01 K/UL (ref 0–0.12)
BILIRUB SERPL-MCNC: 0.6 MG/DL (ref 0.1–1.5)
BILIRUB UR QL STRIP.AUTO: NEGATIVE
BUN SERPL-MCNC: 5 MG/DL (ref 8–22)
CALCIUM ALBUM COR SERPL-MCNC: 9.3 MG/DL (ref 8.5–10.5)
CALCIUM SERPL-MCNC: 9.1 MG/DL (ref 8.5–10.5)
CHLORIDE SERPL-SCNC: 96 MMOL/L (ref 96–112)
CO2 SERPL-SCNC: 25 MMOL/L (ref 20–33)
COLOR UR: YELLOW
CREAT SERPL-MCNC: 0.52 MG/DL (ref 0.5–1.4)
EKG IMPRESSION: NORMAL
EKG IMPRESSION: NORMAL
EOSINOPHIL # BLD AUTO: 0.45 K/UL (ref 0–0.51)
EOSINOPHIL NFR BLD: 7.4 % (ref 0–6.9)
ERYTHROCYTE [DISTWIDTH] IN BLOOD BY AUTOMATED COUNT: 47.5 FL (ref 35.9–50)
GFR SERPLBLD CREATININE-BSD FMLA CKD-EPI: 107 ML/MIN/1.73 M 2
GLOBULIN SER CALC-MCNC: 3 G/DL (ref 1.9–3.5)
GLUCOSE SERPL-MCNC: 100 MG/DL (ref 65–99)
GLUCOSE UR STRIP.AUTO-MCNC: NEGATIVE MG/DL
HCT VFR BLD AUTO: 36.6 % (ref 42–52)
HGB BLD-MCNC: 12.4 G/DL (ref 14–18)
IMM GRANULOCYTES # BLD AUTO: 0.05 K/UL (ref 0–0.11)
IMM GRANULOCYTES NFR BLD AUTO: 0.8 % (ref 0–0.9)
KETONES UR STRIP.AUTO-MCNC: NEGATIVE MG/DL
LEUKOCYTE ESTERASE UR QL STRIP.AUTO: NEGATIVE
LIPASE SERPL-CCNC: 29 U/L (ref 11–82)
LYMPHOCYTES # BLD AUTO: 0.93 K/UL (ref 1–4.8)
LYMPHOCYTES NFR BLD: 15.2 % (ref 22–41)
MCH RBC QN AUTO: 32.7 PG (ref 27–33)
MCHC RBC AUTO-ENTMCNC: 33.9 G/DL (ref 33.7–35.3)
MCV RBC AUTO: 96.6 FL (ref 81.4–97.8)
MICRO URNS: NORMAL
MONOCYTES # BLD AUTO: 0.68 K/UL (ref 0–0.85)
MONOCYTES NFR BLD AUTO: 11.1 % (ref 0–13.4)
NEUTROPHILS # BLD AUTO: 3.98 K/UL (ref 1.82–7.42)
NEUTROPHILS NFR BLD: 65.3 % (ref 44–72)
NITRITE UR QL STRIP.AUTO: NEGATIVE
NRBC # BLD AUTO: 0 K/UL
NRBC BLD-RTO: 0 /100 WBC
PH UR STRIP.AUTO: 7.5 [PH] (ref 5–8)
PLATELET # BLD AUTO: 295 K/UL (ref 164–446)
PMV BLD AUTO: 8.8 FL (ref 9–12.9)
POTASSIUM SERPL-SCNC: 4.2 MMOL/L (ref 3.6–5.5)
PROT SERPL-MCNC: 6.7 G/DL (ref 6–8.2)
PROT UR QL STRIP: NEGATIVE MG/DL
RBC # BLD AUTO: 3.79 M/UL (ref 4.7–6.1)
RBC UR QL AUTO: NEGATIVE
SODIUM SERPL-SCNC: 131 MMOL/L (ref 135–145)
SP GR UR STRIP.AUTO: 1.01
TROPONIN T SERPL-MCNC: 17 NG/L (ref 6–19)
TROPONIN T SERPL-MCNC: 25 NG/L (ref 6–19)
UROBILINOGEN UR STRIP.AUTO-MCNC: 0.2 MG/DL
WBC # BLD AUTO: 6.1 K/UL (ref 4.8–10.8)

## 2023-04-25 PROCEDURE — 700102 HCHG RX REV CODE 250 W/ 637 OVERRIDE(OP): Performed by: INTERNAL MEDICINE

## 2023-04-25 PROCEDURE — 84484 ASSAY OF TROPONIN QUANT: CPT | Mod: 91

## 2023-04-25 PROCEDURE — G0378 HOSPITAL OBSERVATION PER HR: HCPCS

## 2023-04-25 PROCEDURE — 93005 ELECTROCARDIOGRAM TRACING: CPT | Performed by: EMERGENCY MEDICINE

## 2023-04-25 PROCEDURE — 99222 1ST HOSP IP/OBS MODERATE 55: CPT | Performed by: INTERNAL MEDICINE

## 2023-04-25 PROCEDURE — 83690 ASSAY OF LIPASE: CPT

## 2023-04-25 PROCEDURE — 80053 COMPREHEN METABOLIC PANEL: CPT

## 2023-04-25 PROCEDURE — 96374 THER/PROPH/DIAG INJ IV PUSH: CPT | Mod: XU

## 2023-04-25 PROCEDURE — 93010 ELECTROCARDIOGRAM REPORT: CPT | Performed by: INTERNAL MEDICINE

## 2023-04-25 PROCEDURE — 700111 HCHG RX REV CODE 636 W/ 250 OVERRIDE (IP): Performed by: INTERNAL MEDICINE

## 2023-04-25 PROCEDURE — A9270 NON-COVERED ITEM OR SERVICE: HCPCS | Performed by: INTERNAL MEDICINE

## 2023-04-25 PROCEDURE — 93005 ELECTROCARDIOGRAM TRACING: CPT

## 2023-04-25 PROCEDURE — 85025 COMPLETE CBC W/AUTO DIFF WBC: CPT

## 2023-04-25 PROCEDURE — 36415 COLL VENOUS BLD VENIPUNCTURE: CPT

## 2023-04-25 PROCEDURE — 70496 CT ANGIOGRAPHY HEAD: CPT

## 2023-04-25 PROCEDURE — 700117 HCHG RX CONTRAST REV CODE 255: Performed by: EMERGENCY MEDICINE

## 2023-04-25 PROCEDURE — 700111 HCHG RX REV CODE 636 W/ 250 OVERRIDE (IP)

## 2023-04-25 PROCEDURE — 96372 THER/PROPH/DIAG INJ SC/IM: CPT | Mod: XU

## 2023-04-25 PROCEDURE — 70551 MRI BRAIN STEM W/O DYE: CPT

## 2023-04-25 PROCEDURE — 81003 URINALYSIS AUTO W/O SCOPE: CPT

## 2023-04-25 PROCEDURE — 99285 EMERGENCY DEPT VISIT HI MDM: CPT

## 2023-04-25 PROCEDURE — 70498 CT ANGIOGRAPHY NECK: CPT

## 2023-04-25 RX ORDER — OMEPRAZOLE 20 MG/1
20 CAPSULE, DELAYED RELEASE ORAL DAILY
Status: DISCONTINUED | OUTPATIENT
Start: 2023-04-25 | End: 2023-04-26 | Stop reason: HOSPADM

## 2023-04-25 RX ORDER — ONDANSETRON 2 MG/ML
4 INJECTION INTRAMUSCULAR; INTRAVENOUS EVERY 4 HOURS PRN
Status: DISCONTINUED | OUTPATIENT
Start: 2023-04-25 | End: 2023-04-26 | Stop reason: HOSPADM

## 2023-04-25 RX ORDER — AMOXICILLIN 250 MG
2 CAPSULE ORAL 2 TIMES DAILY
Status: DISCONTINUED | OUTPATIENT
Start: 2023-04-26 | End: 2023-04-26 | Stop reason: HOSPADM

## 2023-04-25 RX ORDER — SODIUM CHLORIDE 1 G/1
1 TABLET ORAL
Status: DISCONTINUED | OUTPATIENT
Start: 2023-04-25 | End: 2023-04-26 | Stop reason: HOSPADM

## 2023-04-25 RX ORDER — ACETAMINOPHEN 325 MG/1
650 TABLET ORAL EVERY 6 HOURS PRN
Status: DISCONTINUED | OUTPATIENT
Start: 2023-04-25 | End: 2023-04-26 | Stop reason: HOSPADM

## 2023-04-25 RX ORDER — ONDANSETRON 4 MG/1
4 TABLET, ORALLY DISINTEGRATING ORAL EVERY 4 HOURS PRN
Status: DISCONTINUED | OUTPATIENT
Start: 2023-04-25 | End: 2023-04-26 | Stop reason: HOSPADM

## 2023-04-25 RX ORDER — LORAZEPAM 2 MG/ML
2 INJECTION INTRAMUSCULAR
Status: COMPLETED | OUTPATIENT
Start: 2023-04-25 | End: 2023-04-25

## 2023-04-25 RX ORDER — BISACODYL 10 MG
10 SUPPOSITORY, RECTAL RECTAL
Status: DISCONTINUED | OUTPATIENT
Start: 2023-04-25 | End: 2023-04-26 | Stop reason: HOSPADM

## 2023-04-25 RX ORDER — MECLIZINE HYDROCHLORIDE 25 MG/1
25 TABLET ORAL 3 TIMES DAILY PRN
Status: DISCONTINUED | OUTPATIENT
Start: 2023-04-25 | End: 2023-04-26 | Stop reason: HOSPADM

## 2023-04-25 RX ORDER — ACETAMINOPHEN 500 MG
500-1000 TABLET ORAL EVERY 6 HOURS PRN
COMMUNITY

## 2023-04-25 RX ORDER — OXYCODONE HYDROCHLORIDE 5 MG/1
5 TABLET ORAL
Status: DISCONTINUED | OUTPATIENT
Start: 2023-04-25 | End: 2023-04-26 | Stop reason: HOSPADM

## 2023-04-25 RX ORDER — MORPHINE SULFATE 4 MG/ML
2 INJECTION INTRAVENOUS
Status: DISCONTINUED | OUTPATIENT
Start: 2023-04-25 | End: 2023-04-26 | Stop reason: HOSPADM

## 2023-04-25 RX ORDER — ENOXAPARIN SODIUM 100 MG/ML
40 INJECTION SUBCUTANEOUS DAILY
Status: DISCONTINUED | OUTPATIENT
Start: 2023-04-25 | End: 2023-04-26 | Stop reason: HOSPADM

## 2023-04-25 RX ORDER — LOSARTAN POTASSIUM 50 MG/1
100 TABLET ORAL DAILY
Status: DISCONTINUED | OUTPATIENT
Start: 2023-04-25 | End: 2023-04-26 | Stop reason: HOSPADM

## 2023-04-25 RX ORDER — LABETALOL HYDROCHLORIDE 5 MG/ML
10 INJECTION, SOLUTION INTRAVENOUS EVERY 4 HOURS PRN
Status: DISCONTINUED | OUTPATIENT
Start: 2023-04-25 | End: 2023-04-26 | Stop reason: HOSPADM

## 2023-04-25 RX ORDER — ALBUTEROL SULFATE 90 UG/1
1-2 AEROSOL, METERED RESPIRATORY (INHALATION)
Status: DISCONTINUED | OUTPATIENT
Start: 2023-04-25 | End: 2023-04-26 | Stop reason: HOSPADM

## 2023-04-25 RX ORDER — FLUTICASONE PROPIONATE 44 UG/1
2 AEROSOL, METERED RESPIRATORY (INHALATION)
Status: DISCONTINUED | OUTPATIENT
Start: 2023-04-25 | End: 2023-04-26 | Stop reason: HOSPADM

## 2023-04-25 RX ORDER — OXYCODONE HYDROCHLORIDE 5 MG/1
2.5 TABLET ORAL
Status: DISCONTINUED | OUTPATIENT
Start: 2023-04-25 | End: 2023-04-26 | Stop reason: HOSPADM

## 2023-04-25 RX ORDER — POLYETHYLENE GLYCOL 3350 17 G/17G
1 POWDER, FOR SOLUTION ORAL
Status: DISCONTINUED | OUTPATIENT
Start: 2023-04-25 | End: 2023-04-26 | Stop reason: HOSPADM

## 2023-04-25 RX ORDER — AMOXICILLIN AND CLAVULANATE POTASSIUM 875; 125 MG/1; MG/1
1 TABLET, FILM COATED ORAL 2 TIMES DAILY
Status: DISCONTINUED | OUTPATIENT
Start: 2023-04-25 | End: 2023-04-26 | Stop reason: HOSPADM

## 2023-04-25 RX ADMIN — IOHEXOL 80 ML: 350 INJECTION, SOLUTION INTRAVENOUS at 03:50

## 2023-04-25 RX ADMIN — FLUTICASONE PROPIONATE 88 MCG: 44 AEROSOL, METERED RESPIRATORY (INHALATION) at 10:04

## 2023-04-25 RX ADMIN — AMOXICILLIN AND CLAVULANATE POTASSIUM 1 TABLET: 875; 125 TABLET, FILM COATED ORAL at 18:35

## 2023-04-25 RX ADMIN — SODIUM CHLORIDE 1 G: 1 TABLET ORAL at 12:20

## 2023-04-25 RX ADMIN — AMOXICILLIN AND CLAVULANATE POTASSIUM 1 TABLET: 875; 125 TABLET, FILM COATED ORAL at 09:25

## 2023-04-25 RX ADMIN — SODIUM CHLORIDE 1 G: 1 TABLET ORAL at 10:04

## 2023-04-25 RX ADMIN — SODIUM CHLORIDE 1 G: 1 TABLET ORAL at 18:35

## 2023-04-25 RX ADMIN — LOSARTAN POTASSIUM 100 MG: 50 TABLET, FILM COATED ORAL at 09:25

## 2023-04-25 RX ADMIN — LORAZEPAM 2 MG: 2 INJECTION INTRAMUSCULAR; INTRAVENOUS at 13:47

## 2023-04-25 RX ADMIN — ENOXAPARIN SODIUM 40 MG: 40 INJECTION SUBCUTANEOUS at 18:35

## 2023-04-25 RX ADMIN — UMECLIDINIUM BROMIDE AND VILANTEROL TRIFENATATE 1 PUFF: 62.5; 25 POWDER RESPIRATORY (INHALATION) at 10:04

## 2023-04-25 RX ADMIN — OMEPRAZOLE 20 MG: 20 CAPSULE, DELAYED RELEASE ORAL at 09:25

## 2023-04-25 ASSESSMENT — LIFESTYLE VARIABLES
HAVE YOU EVER FELT YOU SHOULD CUT DOWN ON YOUR DRINKING: NO
DOES PATIENT WANT TO STOP DRINKING: YES
TOTAL SCORE: 0
EVER HAD A DRINK FIRST THING IN THE MORNING TO STEADY YOUR NERVES TO GET RID OF A HANGOVER: NO
HAVE PEOPLE ANNOYED YOU BY CRITICIZING YOUR DRINKING: NO
EVER FELT BAD OR GUILTY ABOUT YOUR DRINKING: NO
CONSUMPTION TOTAL: POSITIVE
SUBSTANCE_ABUSE: 0
HOW MANY TIMES IN THE PAST YEAR HAVE YOU HAD 5 OR MORE DRINKS IN A DAY: 5
AVERAGE NUMBER OF DAYS PER WEEK YOU HAVE A DRINK CONTAINING ALCOHOL: 0
ALCOHOL_USE: NO
ON A TYPICAL DAY WHEN YOU DRINK ALCOHOL HOW MANY DRINKS DO YOU HAVE: 0
TOTAL SCORE: 0
TOTAL SCORE: 0

## 2023-04-25 ASSESSMENT — ENCOUNTER SYMPTOMS
BACK PAIN: 0
POLYDIPSIA: 0
COUGH: 0
DIZZINESS: 1
ORTHOPNEA: 0
CHILLS: 0
ABDOMINAL PAIN: 0
SHORTNESS OF BREATH: 0
BLURRED VISION: 0
SPEECH CHANGE: 0
HALLUCINATIONS: 0
WEIGHT LOSS: 0
DIARRHEA: 0
FOCAL WEAKNESS: 0
HEMOPTYSIS: 0
FLANK PAIN: 0
BRUISES/BLEEDS EASILY: 0
PALPITATIONS: 0
NERVOUS/ANXIOUS: 0
TREMORS: 0
NECK PAIN: 0
VOMITING: 0
SPUTUM PRODUCTION: 0
HEADACHES: 0
HEARTBURN: 0
PHOTOPHOBIA: 0
NAUSEA: 0
FEVER: 0
DOUBLE VISION: 0

## 2023-04-25 ASSESSMENT — FIBROSIS 4 INDEX
FIB4 SCORE: 1.22
FIB4 SCORE: 1.74

## 2023-04-25 ASSESSMENT — PATIENT HEALTH QUESTIONNAIRE - PHQ9
SUM OF ALL RESPONSES TO PHQ9 QUESTIONS 1 AND 2: 0
2. FEELING DOWN, DEPRESSED, IRRITABLE, OR HOPELESS: NOT AT ALL
1. LITTLE INTEREST OR PLEASURE IN DOING THINGS: NOT AT ALL

## 2023-04-25 NOTE — ED NOTES
Checked on bed, with unlabored respirations. Fall risk precautions in place. Gurney in low position, side rail up for pt safety.   Will continue to monitor for any complications. Call bell within reach

## 2023-04-25 NOTE — ED NOTES
Left a voicemail for Kennedi ( pt's partner) at [phone] to give an update of pt's stay in the ED per pt's request.
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
## 2023-04-25 NOTE — PROGRESS NOTES
Pt arrived to [facility] bed, pt Aox4, pleasant, cooperative, denies any pain, denies any dizziness. Negative neuro checks. Room being cleaned, pt told to call for assistance if needed. NSR with PACs on tele - possible ST elevations per tele monitor no ST elevations, notified MD

## 2023-04-25 NOTE — H&P
Hospital Medicine History & Physical Note    Date of Service  4/25/2023    Primary Care Physician  Peterson Amin M.D.      Code Status  DNR/DNI    Chief Complaint  Chief Complaint   Patient presents with    Dizziness     X 1 hour.        History of Presenting Illness  Juan Manuel Bass is a 72 y.o. male with past medical history of hypertension, COPD with chronic hypoxia on 3 L, recent admission for hyponatremia with sodium 113 and left lower extremity cellulitis, as well as aspiration pneumonia, discharged yesterday, who presented 4/25/2023 with complaints of dizziness.  He states that he was doing okay after discharge home until he developed sudden onset spinning sensation when he was just laying in his bed.  Dizziness appears to be exacerbated by change in position and movement.  No motor deficit noted on evaluation today.  Sodium 131.  CTA head and neck is negative.  Requested admission for MRI of the brain to rule out stroke in posterior circulation.    I discussed the plan of care with patient.    Review of Systems  Review of Systems   Constitutional:  Negative for chills, fever and weight loss.   HENT:  Negative for ear pain, hearing loss and tinnitus.    Eyes:  Negative for blurred vision, double vision and photophobia.   Respiratory:  Negative for cough, hemoptysis and sputum production.    Cardiovascular:  Negative for chest pain, palpitations and orthopnea.   Gastrointestinal:  Negative for heartburn, nausea and vomiting.   Genitourinary:  Negative for dysuria, flank pain, frequency and hematuria.   Musculoskeletal:  Negative for back pain, joint pain and neck pain.   Skin:  Negative for itching and rash.   Neurological:  Positive for dizziness. Negative for tremors, speech change, focal weakness and headaches.   Endo/Heme/Allergies:  Negative for environmental allergies and polydipsia. Does not bruise/bleed easily.   Psychiatric/Behavioral:  Negative for hallucinations and substance abuse. The patient is not  nervous/anxious.      Past Medical History   has a past medical history of Chronic airway obstruction, not elsewhere classified, High anion gap metabolic acidosis (4/1/2023), and Hypertension.    Surgical History   has no past surgical history on file.     Family History  family history includes Heart Disease in his father; No Known Problems in his mother.   Family history reviewed with patient. There is no family history that is pertinent to the chief complaint.     Social History   reports that he quit smoking about 3 years ago. His smoking use included cigarettes. He has a 4.00 pack-year smoking history. He has never used smokeless tobacco. He reports current alcohol use of about 21.0 oz per week. He reports that he does not currently use drugs.    Allergies  Allergies   Allergen Reactions    Tetanus Toxoid Hives       Medications  Prior to Admission Medications   Prescriptions Last Dose Informant Patient Reported? Taking?   Fluticasone-Umeclidin-Vilant (TRELEGY ELLIPTA) 100-62.5-25 MCG/INH AEROSOL POWDER, BREATH ACTIVATED inhalation  Patient's Home Pharmacy No No   Sig: Inhale 2-3 Inhalation every morning.   albuterol 108 (90 Base) MCG/ACT Aero Soln inhalation aerosol  Patient's Home Pharmacy No No   Sig: Inhale 1-2 Puffs every four hours as needed for Shortness of Breath.   amoxicillin-clavulanate (AUGMENTIN) 875-125 MG Tab   No No   Sig: Take 1 Tablet by mouth 2 times a day for 6 days.   losartan (COZAAR) 100 MG Tab   No No   Sig: Take 1 Tablet by mouth every day.   pantoprazole (PROTONIX) 40 MG Tablet Delayed Response  Patient's Home Pharmacy Yes No   Sig: Take 40 mg by mouth every day.   sodium chloride (SALT) 1 GM Tab  Historical No No   Sig: Take 1 Tablet by mouth 3 times a day with meals.      Facility-Administered Medications: None       Physical Exam  Temp:  [36.2 °C (97.2 °F)-37.1 °C (98.8 °F)] 36.2 °C (97.2 °F)  Pulse:  [66-85] 70  Resp:  [14-18] 14  BP: (146-158)/(68-87) 158/87  SpO2:  [94 %-100 %]  94 %  Blood Pressure : (!) 158/87   Temperature: 36.2 °C (97.2 °F)   Pulse: 70   Respiration: 14   Pulse Oximetry: 94 %       Physical Exam  Vitals and nursing note reviewed.   Constitutional:       General: He is not in acute distress.     Appearance: Normal appearance.   HENT:      Head: Normocephalic and atraumatic.      Nose: Nose normal.      Mouth/Throat:      Mouth: Mucous membranes are moist.   Eyes:      Extraocular Movements: Extraocular movements intact.      Pupils: Pupils are equal, round, and reactive to light.   Cardiovascular:      Rate and Rhythm: Normal rate and regular rhythm.   Pulmonary:      Effort: Pulmonary effort is normal.      Breath sounds: Normal breath sounds.   Abdominal:      General: Abdomen is flat. There is no distension.      Tenderness: There is no abdominal tenderness.   Musculoskeletal:         General: No swelling or deformity. Normal range of motion.      Cervical back: Normal range of motion and neck supple.   Skin:     General: Skin is warm and dry.   Neurological:      General: No focal deficit present.      Mental Status: He is alert and oriented to person, place, and time.   Psychiatric:         Mood and Affect: Mood normal.         Behavior: Behavior normal.       Laboratory:  Recent Labs     04/24/23 0010 04/25/23 0215   WBC 6.5 6.1   RBC 3.35* 3.79*   HEMOGLOBIN 10.7* 12.4*   HEMATOCRIT 32.2* 36.6*   MCV 96.1 96.6   MCH 31.9 32.7   MCHC 33.2* 33.9   RDW 47.5 47.5   PLATELETCT 270 295   MPV 9.3 8.8*     Recent Labs     04/24/23  0010 04/24/23  0556 04/25/23 0215   SODIUM 131* 130* 131*   POTASSIUM 4.2  --  4.2   CHLORIDE 98  --  96   CO2 23  --  25   GLUCOSE 114*  --  100*   BUN 6*  --  5*   CREATININE 0.44*  --  0.52   CALCIUM 8.3*  --  9.1     Recent Labs     04/24/23 0010 04/25/23  0215   ALTSGPT  --  25   ASTSGOT  --  25   ALKPHOSPHAT  --  78   TBILIRUBIN  --  0.6   LIPASE  --  29   GLUCOSE 114* 100*         No results for input(s): NTPROBNP in the last 72  hours.      Recent Labs     04/25/23  0215 04/25/23  0415   TROPONINT 25* 17       Imaging:  CT-CTA NECK WITH & W/O-POST PROCESSING   Final Result         1.  CT angiogram of the neck within normal limits.   2.  Cystic structure in the right neck soft tissues, of uncertain significance   3.  Scattered emphysema         CT-CTA HEAD WITH & W/O-POST PROCESS   Final Result         1.  No large vessel occlusion or aneurysm identified.          X-Ray:  I have personally reviewed the images and compared with prior images.    Assessment/Plan:  Justification for Admission Status  I anticipate this patient is appropriate for observation status at this time because dizziness    Patient will need a Med/Surg bed on EMERGENCY service .  The need is secondary to dizziness.    * Dizziness  Assessment & Plan  Patient presented with persistent vertigo onset today.  CTA head and neck negative for occlusion.  EKG is nonconcerning for ACS  Admitted for MRI of the brain to rule out stroke  Neurochecks every 4 hours  Fall precautions  Meclizine as needed    Chronic respiratory failure with hypoxia (HCC)- (present on admission)  Assessment & Plan  Continue supplemental oxygen    Gastroesophageal reflux disease without esophagitis- (present on admission)  Assessment & Plan  Continue PPI    Hyponatremia- (present on admission)  Assessment & Plan  Continue salt tabs    Primary hypertension- (present on admission)  Assessment & Plan  Continue losartan  Monitor blood pressure        VTE prophylaxis: enoxaparin ppx

## 2023-04-25 NOTE — ASSESSMENT & PLAN NOTE
Continue salt tabs  -4/25 patient is chronically low.   today which seems to be above his baseline.

## 2023-04-25 NOTE — ED NOTES
Pt able to walk with a steady gait in the room with RN on standby. Pt ambulatory satting at 95% at 2lpm via NC. Pt oxygen saturation 91-92% RA while walking. MD notified

## 2023-04-25 NOTE — ED NOTES
MRI made this pt aware that the pt is reporting claustrophobia while at MRI. Pt refusing to complete MRI.

## 2023-04-25 NOTE — ED NOTES
Med rec completed  Allergies reviewed    Pt states that he has not taken any of his medications besides Tylenol since being home

## 2023-04-25 NOTE — ASSESSMENT & PLAN NOTE
Patient presented with persistent vertigo onset today.  CTA head and neck negative for occlusion.  EKG is nonconcerning for ACS  Admitted for MRI of the brain to rule out stroke  Neurochecks every 4 hours  Fall precautions  Meclizine as needed  4/25 dizziness has improved significantly

## 2023-04-25 NOTE — PROGRESS NOTES
Hospital Medicine Daily Progress Note    Date of Service  4/25/2023    Chief Complaint  Juan Manuel Bass is a 72 y.o. male admitted 4/25/2023 with dizziness    Hospital Course  Juan Manuel Bass is a 72 y.o. male with past medical history of hypertension, COPD with chronic hypoxia on 3 L, recent admission for hyponatremia with sodium 113 and left lower extremity cellulitis, as well as aspiration pneumonia, discharged yesterday 4/24/23, who presented 4/25/2023 with complaints of dizziness.  He states that he was doing okay after discharge home until he developed sudden onset spinning sensation when he was just laying in his bed.  Dizziness appears to be exacerbated by change in position and movement.  No motor deficit noted on evaluation today.  Sodium 131.  CTA head and neck is negative. He was admitted for MRI of the brain to rule out stroke in posterior circulation.    Interval Problem Update  4/25 afebrile, vitals stable, on 2 L nasal cannula.  BP slightly elevated-we will continue to monitor.  Sodium slightly low at 131-patient has a low baseline sodium level.  First troponin slightly elevated but second 1 normal.  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  Patient is not having chest pain.  Dizziness has subsided significantly.  Awaiting MRI.    I have discussed this patient's plan of care and discharge plan at IDT rounds today with Case Management, Nursing, Nursing leadership, and other members of the IDT team.    Consultants/Specialty  none    Code Status  DNAR/DNI    Disposition  Patient is not medically cleared for discharge.   Anticipate discharge to to home with organized home healthcare and close outpatient follow-up.  I have placed the appropriate orders for post-discharge needs.    Review of Systems  Review of Systems   Constitutional:  Negative for chills, fever and malaise/fatigue.   Respiratory:  Negative for  cough and shortness of breath.    Cardiovascular:  Negative for chest pain, palpitations and leg swelling.   Gastrointestinal:  Negative for abdominal pain, diarrhea, heartburn, nausea and vomiting.   Genitourinary:  Negative for dysuria, frequency and urgency.   Neurological:  Positive for dizziness (improved). Negative for headaches.   All other systems reviewed and are negative.     Physical Exam  Temp:  [36.2 °C (97.2 °F)-37.1 °C (98.8 °F)] 36.8 °C (98.2 °F)  Pulse:  [66-85] 68  Resp:  [14-18] 15  BP: (139-158)/(68-88) 146/88  SpO2:  [94 %-100 %] 97 %    Physical Exam  Vitals and nursing note reviewed.   Constitutional:       Appearance: Normal appearance. He is not ill-appearing.   HENT:      Head: Normocephalic and atraumatic.      Jaw: There is normal jaw occlusion.      Right Ear: Hearing normal.      Left Ear: Hearing normal.      Nose: Nose normal.      Mouth/Throat:      Lips: Pink.      Mouth: Mucous membranes are moist.   Eyes:      Extraocular Movements: Extraocular movements intact.      Conjunctiva/sclera: Conjunctivae normal.      Pupils: Pupils are equal, round, and reactive to light.   Neck:      Vascular: No carotid bruit.   Cardiovascular:      Rate and Rhythm: Normal rate and regular rhythm.      Pulses: Normal pulses.      Heart sounds: Normal heart sounds, S1 normal and S2 normal.   Pulmonary:      Effort: Pulmonary effort is normal.      Breath sounds: Normal breath sounds and air entry. No stridor.   Abdominal:      General: Bowel sounds are normal.      Palpations: Abdomen is soft.      Tenderness: There is no abdominal tenderness.   Musculoskeletal:      Cervical back: Normal range of motion and neck supple.      Right lower leg: No edema.      Left lower leg: No edema.   Skin:     General: Skin is warm and dry.      Capillary Refill: Capillary refill takes less than 2 seconds.   Neurological:      General: No focal deficit present.      Mental Status: He is alert and oriented to person,  place, and time. Mental status is at baseline.      Sensory: Sensation is intact.      Motor: Motor function is intact.   Psychiatric:         Attention and Perception: Attention and perception normal.         Mood and Affect: Mood and affect normal.         Speech: Speech normal.         Behavior: Behavior normal. Behavior is cooperative.       Fluids    Intake/Output Summary (Last 24 hours) at 4/25/2023 1322  Last data filed at 4/25/2023 1200  Gross per 24 hour   Intake 200 ml   Output 500 ml   Net -300 ml       Laboratory  Recent Labs     04/24/23  0010 04/25/23  0215   WBC 6.5 6.1   RBC 3.35* 3.79*   HEMOGLOBIN 10.7* 12.4*   HEMATOCRIT 32.2* 36.6*   MCV 96.1 96.6   MCH 31.9 32.7   MCHC 33.2* 33.9   RDW 47.5 47.5   PLATELETCT 270 295   MPV 9.3 8.8*     Recent Labs     04/24/23  0010 04/24/23  0556 04/25/23 0215   SODIUM 131* 130* 131*   POTASSIUM 4.2  --  4.2   CHLORIDE 98  --  96   CO2 23  --  25   GLUCOSE 114*  --  100*   BUN 6*  --  5*   CREATININE 0.44*  --  0.52   CALCIUM 8.3*  --  9.1                   Imaging  CT-CTA NECK WITH & W/O-POST PROCESSING   Final Result         1.  CT angiogram of the neck within normal limits.   2.  Cystic structure in the right neck soft tissues, of uncertain significance   3.  Scattered emphysema         CT-CTA HEAD WITH & W/O-POST PROCESS   Final Result         1.  No large vessel occlusion or aneurysm identified.      MR-ABORTED MRI PROCEDURE    (Results Pending)   MR-BRAIN-W/O    (Results Pending)        Assessment/Plan  * Dizziness  Assessment & Plan  Patient presented with persistent vertigo onset today.  CTA head and neck negative for occlusion.  EKG is nonconcerning for ACS  Admitted for MRI of the brain to rule out stroke  Neurochecks every 4 hours  Fall precautions  Meclizine as needed  4/25 dizziness has improved significantly    Chronic respiratory failure with hypoxia (HCC)- (present on admission)  Assessment & Plan  Continue supplemental oxygen    Hyponatremia-  (present on admission)  Assessment & Plan  Continue salt tabs  -4/25 patient is chronically low.   today which seems to be above his baseline.    Primary hypertension- (present on admission)  Assessment & Plan  Continue losartan  Monitor blood pressure    Gastroesophageal reflux disease without esophagitis- (present on admission)  Assessment & Plan  Continue PPI         VTE prophylaxis: enoxaparin ppx    I have performed a physical exam and reviewed and updated ROS and Plan today (4/25/2023). In review of yesterday's note (4/24/2023), there are no changes except as documented above.
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
## 2023-04-25 NOTE — ED PROVIDER NOTES
"  ER Provider Note    Scribed for Joshua Campbell M.d. by Valeria Mejias. 4/25/2023  1:59 AM    Primary Care Provider: Peterson Amin M.D.    CHIEF COMPLAINT  Chief Complaint   Patient presents with    Dizziness     X 1 hour.      EXTERNAL RECORDS REVIEWED  Inpatient Notes admitted 4/20 for hyponatremia, dizziness    HPI/ROS  LIMITATION TO HISTORY   Select: : None  OUTSIDE HISTORIAN(S):  None    Juan Manuel Bass is a 72 y.o. male, with a history of COPD, who presents to the ED via EMS to bedside for a ground level fall secondary to sudden onset of dizziness. He describes feeling \"off\", but not necessarily like the room is spinning. He also notes that his entire body feels \"achy\" and he is more short of breath than usual. The patient reports that he was laying in bed when he started to feel dizzy. He attempted to sit up and get out of bed and he fell over, onto the floor. He has some bruising to the left eye, secondary to a prior fall. He was discharged yesterday for hyponatremia. He states that he usually gets dizzy when he becomes hyponatremic. He denies any symptoms of vomiting. He believes he is supposed to be on blood thinners, but does not remember which one. He is chronically on 4 L of oxygen daily, which he states is new, and he used to be on 3 L.    PAST MEDICAL HISTORY  Past Medical History:   Diagnosis Date    Chronic airway obstruction, not elsewhere classified     High anion gap metabolic acidosis 4/1/2023    Hypertension        SURGICAL HISTORY  History reviewed. No pertinent surgical history.    FAMILY HISTORY  Family History   Problem Relation Age of Onset    No Known Problems Mother     Heart Disease Father        SOCIAL HISTORY   reports that he quit smoking about 3 years ago. His smoking use included cigarettes. He has a 4.00 pack-year smoking history. He has never used smokeless tobacco. He reports current alcohol use of about 21.0 oz per week. He reports that he does not currently use " "drugs.    CURRENT MEDICATIONS  Previous Medications    ALBUTEROL 108 (90 BASE) MCG/ACT AERO SOLN INHALATION AEROSOL    Inhale 1-2 Puffs every four hours as needed for Shortness of Breath.    AMOXICILLIN-CLAVULANATE (AUGMENTIN) 875-125 MG TAB    Take 1 Tablet by mouth 2 times a day for 6 days.    FLUTICASONE-UMECLIDIN-VILANT (TRELEGY ELLIPTA) 100-62.5-25 MCG/INH AEROSOL POWDER, BREATH ACTIVATED INHALATION    Inhale 2-3 Inhalation every morning.    LOSARTAN (COZAAR) 100 MG TAB    Take 1 Tablet by mouth every day.    PANTOPRAZOLE (PROTONIX) 40 MG TABLET DELAYED RESPONSE    Take 40 mg by mouth every day.    SODIUM CHLORIDE (SALT) 1 GM TAB    Take 1 Tablet by mouth 3 times a day with meals.       ALLERGIES  Tetanus toxoid    PHYSICAL EXAM  BP (!) 146/68   Pulse 85   Temp 37.1 °C (98.8 °F) (Temporal)   Resp 18   Ht 1.702 m (5' 7\")   Wt 111 kg (245 lb)   SpO2 100%   BMI 38.37 kg/m²   Gen: Alert, no acute distress  HEENT: ATNC  Eyes: PERRL, EOMI, normal conjunctiva.   Neck: trachea midline  Resp: no respiratory distress, clear to auscultation  CV: No JVD. RRR  Abd: Soft. Non tender. non-distended  Ext: No deformities  Psych: normal mood  Neuro: Cranial nerves 2-12 intact. Stroke scale 0. Normal cerebellar function. speech fluent     DIAGNOSTIC STUDIES    Labs:   Results for orders placed or performed during the hospital encounter of 04/25/23   CBC WITH DIFFERENTIAL   Result Value Ref Range    WBC 6.1 4.8 - 10.8 K/uL    RBC 3.79 (L) 4.70 - 6.10 M/uL    Hemoglobin 12.4 (L) 14.0 - 18.0 g/dL    Hematocrit 36.6 (L) 42.0 - 52.0 %    MCV 96.6 81.4 - 97.8 fL    MCH 32.7 27.0 - 33.0 pg    MCHC 33.9 33.7 - 35.3 g/dL    RDW 47.5 35.9 - 50.0 fL    Platelet Count 295 164 - 446 K/uL    MPV 8.8 (L) 9.0 - 12.9 fL    Neutrophils-Polys 65.30 44.00 - 72.00 %    Lymphocytes 15.20 (L) 22.00 - 41.00 %    Monocytes 11.10 0.00 - 13.40 %    Eosinophils 7.40 (H) 0.00 - 6.90 %    Basophils 0.20 0.00 - 1.80 %    Immature Granulocytes 0.80 0.00 " - 0.90 %    Nucleated RBC 0.00 /100 WBC    Neutrophils (Absolute) 3.98 1.82 - 7.42 K/uL    Lymphs (Absolute) 0.93 (L) 1.00 - 4.80 K/uL    Monos (Absolute) 0.68 0.00 - 0.85 K/uL    Eos (Absolute) 0.45 0.00 - 0.51 K/uL    Baso (Absolute) 0.01 0.00 - 0.12 K/uL    Immature Granulocytes (abs) 0.05 0.00 - 0.11 K/uL    NRBC (Absolute) 0.00 K/uL   COMP METABOLIC PANEL   Result Value Ref Range    Sodium 131 (L) 135 - 145 mmol/L    Potassium 4.2 3.6 - 5.5 mmol/L    Chloride 96 96 - 112 mmol/L    Co2 25 20 - 33 mmol/L    Anion Gap 10.0 7.0 - 16.0    Glucose 100 (H) 65 - 99 mg/dL    Bun 5 (L) 8 - 22 mg/dL    Creatinine 0.52 0.50 - 1.40 mg/dL    Calcium 9.1 8.5 - 10.5 mg/dL    AST(SGOT) 25 12 - 45 U/L    ALT(SGPT) 25 2 - 50 U/L    Alkaline Phosphatase 78 30 - 99 U/L    Total Bilirubin 0.6 0.1 - 1.5 mg/dL    Albumin 3.7 3.2 - 4.9 g/dL    Total Protein 6.7 6.0 - 8.2 g/dL    Globulin 3.0 1.9 - 3.5 g/dL    A-G Ratio 1.2 g/dL   LIPASE   Result Value Ref Range    Lipase 29 11 - 82 U/L   TROPONIN   Result Value Ref Range    Troponin T 25 (H) 6 - 19 ng/L   URINALYSIS (UA)    Specimen: Urine   Result Value Ref Range    Color Yellow     Character Clear     Specific Gravity 1.007 <1.035    Ph 7.5 5.0 - 8.0    Glucose Negative Negative mg/dL    Ketones Negative Negative mg/dL    Protein Negative Negative mg/dL    Bilirubin Negative Negative    Urobilinogen, Urine 0.2 Negative    Nitrite Negative Negative    Leukocyte Esterase Negative Negative    Occult Blood Negative Negative    Micro Urine Req see below    ESTIMATED GFR   Result Value Ref Range    GFR (CKD-EPI) 107 >60 mL/min/1.73 m 2   CORRECTED CALCIUM   Result Value Ref Range    Correct Calcium 9.3 8.5 - 10.5 mg/dL   TROPONIN   Result Value Ref Range    Troponin T 17 6 - 19 ng/L   EKG   Result Value Ref Range    Report       [facility] Emergency Dept.    Test Date:  2023-04-25  Pt Name:    BRIDGETTE GARDENIA                  Department: ER  MRN:        [id number]                       Room:       [facility]  Gender:     Male                         Technician: [id number]  :        1950                   Requested By:ER TRIAGE PROTOCOL  Order #:    [id number]                    Reading MD: Joshua Campbell    Measurements  Intervals                                Axis  Rate:       71                           P:          -33  IN:         168                          QRS:        30  QRSD:       97                           T:          41  QT:         382  QTc:        416    Interpretive Statements  Sinus rhythm  Atrial premature complex  Abnormal R-wave progression, early transition  Compared to ECG 2023 02:17:20  No significant changes  Electronically Signed On 2023 2:16:00 PDT by Joshua Campbell       EKG:   I have independently interpreted this EKG       Radiology:   The attending emergency physician has independently interpreted the diagnostic imaging associated with this visit and am waiting the final reading from the radiologist.   Preliminary interpretation is a follows: CT head: No intracranial bleed  Radiologist interpretation:   CT-CTA NECK WITH & W/O-POST PROCESSING   Final Result         1.  CT angiogram of the neck within normal limits.   2.  Cystic structure in the right neck soft tissues, of uncertain significance   3.  Scattered emphysema         CT-CTA HEAD WITH & W/O-POST PROCESS   Final Result         1.  No large vessel occlusion or aneurysm identified.          COURSE & MEDICAL DECISION MAKING     ED Observation Status? Yes; I am placing the patient in to an observation status due to a diagnostic uncertainty as well as therapeutic intensity. Patient placed in observation status at 2:02 AM, 2023.     Observation plan is as follows: Follow-up labs and imaging, determination of need for possible surgery or hospitalization    Upon Reevaluation, the patient's condition has: not improved; and will be escalated to hospitalization.    Patient discharged from ED Observation  status at 6:02 AM (Time) 4/25/2023 (Date).     INITIAL ASSESSMENT, COURSE AND PLAN  Care Narrative: Patient presents with persistent dizziness with sudden onset.  This appears to be consistent with acute vestibular syndrome.  He has no observable nystagmus, unable to perform hints exam and to evaluate for possible peripheral cause.  CTA of the head and neck demonstrate no evidence of intracranial bleed, no evidence of flow-limiting lesions.  He is persistently reporting dizziness without any specific triggers.  This is concerning for possible posterior circulation stroke.  Patient does not feel comfortable returning home and believes he is high fall risk.  We will plan for hospitalization for further evaluation.  Thankfully, no evidence of hyponatremia at this point in time.        2:02 AM - Patient seen and examined at bedside. A thorough neurologic exam was performed at bedside. Discussed plan of care, including labs and imaging. Patient agrees to the plan of care. Ordered for CBC with differential, CMP, Lipase, Troponin, and UA to evaluate his symptoms.     6:00 AM Reassessed the patient on labs and imaging that has returned at this time, and informed him that everything that has returned has been non concerning at this time. Patient still complains of ongoing dizziness, which he is concerned about. He states that he was unable to walk to the bathroom. Discussed different plans of care, including admit to the hospital for MRI or out patient follow up with a neurologist. The patient would like to be hospitalized for an MRI.         DISPOSITION AND DISCUSSIONS  I have discussed management of the patient with the following physicians and CHASE's: Dr. Melgar    DISPOSITION:  Patient will be hospitalized by Dr. Melgar in guarded condition.    FINAL DIANGOSIS  1. Dizziness    2. Elevated blood pressure reading            IValeria (Tiffanie), markus scribing for, and in the presence of, Joshua Campbell,  M.D..    Electronically signed by: Valeria Mejias (Scrchioma), 4/25/2023    IJoshua M.D. personally performed the services described in this documentation, as scribed by Valeria Mejias in my presence, and it is both accurate and complete.    The note accurately reflects work and decisions made by me.  Joshua Campbell M.D.  4/25/2023  6:47 AM

## 2023-04-25 NOTE — PROGRESS NOTES
4 Eyes Skin Assessment Completed by ALDEN Reyes and ALDEN Hartmann.    Head Bruising face left side  Ears Redness and Blanchingscabs  Nose WDL  Mouth WDL  Neck WDL  Breast/Chest WDL  Shoulder Blades Redness bruising   Spine WDL  (R) Arm/Elbow/Hand Redness, Abrasion, Scab, and Weeping weeping wound right elbow  (L) Arm/Elbow/Hand Bruising, Abrasion, and Scab  Abdomen WDL  Groin WDL  Scrotum/Coccyx/Buttocks Redness  (R) Leg Redness, Abrasion, and Edema  (L) Leg Redness, Abrasion, and Edema  (R) Heel/Foot/Toe Edema  (L) Heel/Foot/Toe Edema          Devices In Places Tele Box, Pulse Ox, and Nasal Cannula      Interventions In Place NC W/Ear Foams    Possible Skin Injury Yes    Pictures Uploaded Into Epic Yes  Wound Consult Placed Yes  RN Wound Prevention Protocol Ordered No

## 2023-04-26 ENCOUNTER — PHARMACY VISIT (OUTPATIENT)
Dept: PHARMACY | Facility: MEDICAL CENTER | Age: 73
End: 2023-04-26
Payer: COMMERCIAL

## 2023-04-26 VITALS
HEIGHT: 68 IN | DIASTOLIC BLOOD PRESSURE: 85 MMHG | RESPIRATION RATE: 18 BRPM | BODY MASS INDEX: 36.02 KG/M2 | HEART RATE: 72 BPM | TEMPERATURE: 98.9 F | SYSTOLIC BLOOD PRESSURE: 162 MMHG | WEIGHT: 237.66 LBS | OXYGEN SATURATION: 98 %

## 2023-04-26 PROBLEM — E87.1 HYPONATREMIA: Status: RESOLVED | Noted: 2022-03-07 | Resolved: 2023-04-26

## 2023-04-26 LAB
ALBUMIN SERPL BCP-MCNC: 3.2 G/DL (ref 3.2–4.9)
ALBUMIN/GLOB SERPL: 1.2 G/DL
ALP SERPL-CCNC: 66 U/L (ref 30–99)
ALT SERPL-CCNC: 22 U/L (ref 2–50)
ANION GAP SERPL CALC-SCNC: 10 MMOL/L (ref 7–16)
AST SERPL-CCNC: 18 U/L (ref 12–45)
BASOPHILS # BLD AUTO: 0 % (ref 0–1.8)
BASOPHILS # BLD: 0 K/UL (ref 0–0.12)
BILIRUB SERPL-MCNC: 0.4 MG/DL (ref 0.1–1.5)
BUN SERPL-MCNC: 6 MG/DL (ref 8–22)
CALCIUM ALBUM COR SERPL-MCNC: 9 MG/DL (ref 8.5–10.5)
CALCIUM SERPL-MCNC: 8.4 MG/DL (ref 8.5–10.5)
CHLORIDE SERPL-SCNC: 97 MMOL/L (ref 96–112)
CO2 SERPL-SCNC: 23 MMOL/L (ref 20–33)
CREAT SERPL-MCNC: 0.5 MG/DL (ref 0.5–1.4)
EOSINOPHIL # BLD AUTO: 0.42 K/UL (ref 0–0.51)
EOSINOPHIL NFR BLD: 9.3 % (ref 0–6.9)
ERYTHROCYTE [DISTWIDTH] IN BLOOD BY AUTOMATED COUNT: 46.5 FL (ref 35.9–50)
GFR SERPLBLD CREATININE-BSD FMLA CKD-EPI: 108 ML/MIN/1.73 M 2
GLOBULIN SER CALC-MCNC: 2.6 G/DL (ref 1.9–3.5)
GLUCOSE SERPL-MCNC: 108 MG/DL (ref 65–99)
HCT VFR BLD AUTO: 33.9 % (ref 42–52)
HGB BLD-MCNC: 11.4 G/DL (ref 14–18)
IMM GRANULOCYTES # BLD AUTO: 0.02 K/UL (ref 0–0.11)
IMM GRANULOCYTES NFR BLD AUTO: 0.4 % (ref 0–0.9)
LYMPHOCYTES # BLD AUTO: 0.71 K/UL (ref 1–4.8)
LYMPHOCYTES NFR BLD: 15.7 % (ref 22–41)
MCH RBC QN AUTO: 32 PG (ref 27–33)
MCHC RBC AUTO-ENTMCNC: 33.6 G/DL (ref 33.7–35.3)
MCV RBC AUTO: 95.2 FL (ref 81.4–97.8)
MONOCYTES # BLD AUTO: 0.7 K/UL (ref 0–0.85)
MONOCYTES NFR BLD AUTO: 15.5 % (ref 0–13.4)
NEUTROPHILS # BLD AUTO: 2.68 K/UL (ref 1.82–7.42)
NEUTROPHILS NFR BLD: 59.1 % (ref 44–72)
NRBC # BLD AUTO: 0 K/UL
NRBC BLD-RTO: 0 /100 WBC
PLATELET # BLD AUTO: 250 K/UL (ref 164–446)
PMV BLD AUTO: 8.9 FL (ref 9–12.9)
POTASSIUM SERPL-SCNC: 4.3 MMOL/L (ref 3.6–5.5)
PROT SERPL-MCNC: 5.8 G/DL (ref 6–8.2)
RBC # BLD AUTO: 3.56 M/UL (ref 4.7–6.1)
SODIUM SERPL-SCNC: 130 MMOL/L (ref 135–145)
WBC # BLD AUTO: 4.5 K/UL (ref 4.8–10.8)

## 2023-04-26 PROCEDURE — 99239 HOSP IP/OBS DSCHRG MGMT >30: CPT | Performed by: INTERNAL MEDICINE

## 2023-04-26 PROCEDURE — G0378 HOSPITAL OBSERVATION PER HR: HCPCS

## 2023-04-26 PROCEDURE — 700102 HCHG RX REV CODE 250 W/ 637 OVERRIDE(OP): Performed by: INTERNAL MEDICINE

## 2023-04-26 PROCEDURE — 85025 COMPLETE CBC W/AUTO DIFF WBC: CPT

## 2023-04-26 PROCEDURE — A9270 NON-COVERED ITEM OR SERVICE: HCPCS | Performed by: INTERNAL MEDICINE

## 2023-04-26 PROCEDURE — 80053 COMPREHEN METABOLIC PANEL: CPT

## 2023-04-26 PROCEDURE — RXMED WILLOW AMBULATORY MEDICATION CHARGE: Performed by: NURSE PRACTITIONER

## 2023-04-26 RX ORDER — MECLIZINE HYDROCHLORIDE 25 MG/1
25 TABLET ORAL 3 TIMES DAILY PRN
Qty: 21 TABLET | Refills: 0 | Status: SHIPPED | OUTPATIENT
Start: 2023-04-26 | End: 2023-05-03

## 2023-04-26 RX ADMIN — AMOXICILLIN AND CLAVULANATE POTASSIUM 1 TABLET: 875; 125 TABLET, FILM COATED ORAL at 05:12

## 2023-04-26 RX ADMIN — OMEPRAZOLE 20 MG: 20 CAPSULE, DELAYED RELEASE ORAL at 06:00

## 2023-04-26 RX ADMIN — LOSARTAN POTASSIUM 100 MG: 50 TABLET, FILM COATED ORAL at 05:12

## 2023-04-26 RX ADMIN — ALBUTEROL SULFATE 2 PUFF: 90 AEROSOL, METERED RESPIRATORY (INHALATION) at 03:59

## 2023-04-26 RX ADMIN — FLUTICASONE PROPIONATE 88 MCG: 44 AEROSOL, METERED RESPIRATORY (INHALATION) at 08:28

## 2023-04-26 RX ADMIN — SODIUM CHLORIDE 1 G: 1 TABLET ORAL at 08:28

## 2023-04-26 RX ADMIN — DOCUSATE SODIUM 50 MG AND SENNOSIDES 8.6 MG 2 TABLET: 8.6; 5 TABLET, FILM COATED ORAL at 05:13

## 2023-04-26 RX ADMIN — SODIUM CHLORIDE 1 G: 1 TABLET ORAL at 12:50

## 2023-04-26 NOTE — RESPIRATORY CARE
COPD EDUCATION by COPD CLINICAL EDUCATOR  4/26/2023 at 8:27 AM by Melissa Galicia RRT     Patient reviewed by COPD education team. Bedside PFT done on 4/11/2023 indicates no obstructive ventilatory defect, patient quit smoking in 2020. Therefore the patient does not qualify for the COPD program.

## 2023-04-26 NOTE — CARE PLAN
The patient is Stable - Low risk of patient condition declining or worsening    Shift Goals  Clinical Goals: monitor dizziness and safety  Patient Goals: sleep  Family Goals: NA    Progress made toward(s) clinical / shift goals:  Continued education.  Patient calls prior to getting up.  Patient free from falls during shift.     Patient is not progressing towards the following goals:
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
## 2023-04-26 NOTE — DISCHARGE PLANNING
Per Gordo RN patient will need home O2 tank to go home on. Spoke with patient at bedside and patient does not know name of DME company. Chart reviewed and patient is in service with [employer]. Called and spoke with Tosha @ [facility] and she will send loner tank but will need patient to call her as patient just received loaner tank on 04/24/2023 and has regulators and loaner tanks that need to be picked up. [employer] form filled out and given to patient and updated patient that he will need to call [employer] and speak with Ania. CM dialed phone for patient to assist with patient calling [employer]. CM noted that at last admission patient was accepted to [facility]. CM called [facility] and patient is on service and since patient is OBS will not need order. Message sent to Maged GAMBOA to update.

## 2023-04-26 NOTE — FACE TO FACE
Face to Face Supporting Documentation - Home Health    The encounter with this patient was in whole or in part the primary reason for home health admission.    Date of encounter:   Patient:                    MRN:                       YOB: 2023  Juan Manuel Bass  [id number]  1950     Home health to see patient for:  Skilled Nursing care for assessment, interventions & education, Home health aide, Physical Therapy evaluation and treatment, and Occupational therapy evaluation and treatment    Skilled need for:  Recent Deterioration of Health Status debility    Skilled nursing interventions to include:  Comment: PT/OT and aide    Homebound status evidenced by:  Need the aid of supportive devices such as crutches, canes, wheelchairs or walkers or Needs the assistance of another person in order to leave the home. Leaving home requires a considerable and taxing effort. There is a normal inability to leave the home.    Community Physician to provide follow up care: Peterson Amin M.D.     Optional Interventions? No      I certify the face to face encounter for this home health care referral meets the CMS requirements and the encounter/clinical assessment with the patient was, in whole, or in part, for the medical condition(s) listed above, which is the primary reason for home health care. Based on my clinical findings: the service(s) are medically necessary, support the need for home health care, and the homebound criteria are met.  I certify that this patient has had a face to face encounter by myself.  MECHE Hobson - NPI: [id number]
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
## 2023-04-26 NOTE — DISCHARGE PLANNING
Received request for HHC. Spoke with patient at bedside and patient agreeable to HHC. Choice form filled out and signed by patient. Choice form faxed to Maged GAMBOA. Message sent to Maged GAMBOA to update.

## 2023-04-26 NOTE — DISCHARGE PLANNING
Updated by Gordo RUANO that patient states has no ride home and no money with him. Spoke with patient at bedside and per patient has no one to pick him up and no money or debit card with him. Verified address. Called and spoke with Kathy KEBEDE supervisor and she OK'd taxi voucher. Taxi Voucher given to Gordo RUANO.

## 2023-04-26 NOTE — DISCHARGE INSTRUCTIONS
Please call [phone] to schedule PCP appointment for patient.    Required specialty appointments include:       Discharge Instructions per Emil Dixon, TARIQRGalNGal    -Follow-up with PCP s/p hospitalization  -Utilize meclizine to help with vertigo  -Continue home medications  -Ambulate with your front wheel walker at all times    DIET: As tolerated    ACTIVITY: As tolerated    DIAGNOSIS: Dizziness    Return to ER if symptoms persist, chest pain, palpitations, shortness of breath, numbness, tingling, weakness, and high fevers.    Please note that this dictation was created using voice recognition software. I have made every reasonable attempt to correct obvious errors, but there may be errors of grammar and possibly content that I did not discover before finalizing the note.    Electronically signed by:  Dr. KATH Dixon, DNP, APRN, FNP-C  Hospitalist Services  [facility]  [phone]  [email or web address]  04/26/23                 [phone]

## 2023-04-28 ENCOUNTER — HOSPITAL ENCOUNTER (INPATIENT)
Facility: MEDICAL CENTER | Age: 73
LOS: 3 days | DRG: 189 | End: 2023-05-01
Attending: EMERGENCY MEDICINE | Admitting: INTERNAL MEDICINE
Payer: MEDICARE

## 2023-04-28 ENCOUNTER — APPOINTMENT (OUTPATIENT)
Dept: RADIOLOGY | Facility: MEDICAL CENTER | Age: 73
DRG: 189 | End: 2023-04-28
Attending: EMERGENCY MEDICINE
Payer: MEDICARE

## 2023-04-28 DIAGNOSIS — J44.1 COPD EXACERBATION (HCC): ICD-10-CM

## 2023-04-28 DIAGNOSIS — J44.1 ACUTE EXACERBATION OF CHRONIC OBSTRUCTIVE PULMONARY DISEASE (COPD) (HCC): ICD-10-CM

## 2023-04-28 DIAGNOSIS — J18.9 PNEUMONIA OF BOTH LOWER LOBES DUE TO INFECTIOUS ORGANISM: ICD-10-CM

## 2023-04-28 LAB
ANION GAP SERPL CALC-SCNC: 17 MMOL/L (ref 7–16)
BASOPHILS # BLD AUTO: 0.2 % (ref 0–1.8)
BASOPHILS # BLD: 0.01 K/UL (ref 0–0.12)
BUN SERPL-MCNC: 6 MG/DL (ref 8–22)
CALCIUM SERPL-MCNC: 8.5 MG/DL (ref 8.5–10.5)
CHLORIDE SERPL-SCNC: 93 MMOL/L (ref 96–112)
CO2 SERPL-SCNC: 20 MMOL/L (ref 20–33)
CREAT SERPL-MCNC: 0.57 MG/DL (ref 0.5–1.4)
EKG IMPRESSION: NORMAL
EOSINOPHIL # BLD AUTO: 0.19 K/UL (ref 0–0.51)
EOSINOPHIL NFR BLD: 3.4 % (ref 0–6.9)
ERYTHROCYTE [DISTWIDTH] IN BLOOD BY AUTOMATED COUNT: 45 FL (ref 35.9–50)
GFR SERPLBLD CREATININE-BSD FMLA CKD-EPI: 104 ML/MIN/1.73 M 2
GLUCOSE SERPL-MCNC: 97 MG/DL (ref 65–99)
HCT VFR BLD AUTO: 32.6 % (ref 42–52)
HGB BLD-MCNC: 11.1 G/DL (ref 14–18)
IMM GRANULOCYTES # BLD AUTO: 0.03 K/UL (ref 0–0.11)
IMM GRANULOCYTES NFR BLD AUTO: 0.5 % (ref 0–0.9)
LYMPHOCYTES # BLD AUTO: 1.01 K/UL (ref 1–4.8)
LYMPHOCYTES NFR BLD: 18.2 % (ref 22–41)
MCH RBC QN AUTO: 32.1 PG (ref 27–33)
MCHC RBC AUTO-ENTMCNC: 34 G/DL (ref 33.7–35.3)
MCV RBC AUTO: 94.2 FL (ref 81.4–97.8)
MONOCYTES # BLD AUTO: 0.79 K/UL (ref 0–0.85)
MONOCYTES NFR BLD AUTO: 14.2 % (ref 0–13.4)
NEUTROPHILS # BLD AUTO: 3.52 K/UL (ref 1.82–7.42)
NEUTROPHILS NFR BLD: 63.5 % (ref 44–72)
NRBC # BLD AUTO: 0 K/UL
NRBC BLD-RTO: 0 /100 WBC
PLATELET # BLD AUTO: 207 K/UL (ref 164–446)
PMV BLD AUTO: 9.2 FL (ref 9–12.9)
POTASSIUM SERPL-SCNC: 3.6 MMOL/L (ref 3.6–5.5)
RBC # BLD AUTO: 3.46 M/UL (ref 4.7–6.1)
SODIUM SERPL-SCNC: 130 MMOL/L (ref 135–145)
WBC # BLD AUTO: 5.6 K/UL (ref 4.8–10.8)

## 2023-04-28 PROCEDURE — 84145 PROCALCITONIN (PCT): CPT

## 2023-04-28 PROCEDURE — 80053 COMPREHEN METABOLIC PANEL: CPT

## 2023-04-28 PROCEDURE — 770006 HCHG ROOM/CARE - MED/SURG/GYN SEMI*

## 2023-04-28 PROCEDURE — 94640 AIRWAY INHALATION TREATMENT: CPT

## 2023-04-28 PROCEDURE — 80048 BASIC METABOLIC PNL TOTAL CA: CPT

## 2023-04-28 PROCEDURE — 85025 COMPLETE CBC W/AUTO DIFF WBC: CPT | Mod: 91

## 2023-04-28 PROCEDURE — 36415 COLL VENOUS BLD VENIPUNCTURE: CPT

## 2023-04-28 PROCEDURE — 87040 BLOOD CULTURE FOR BACTERIA: CPT

## 2023-04-28 PROCEDURE — A9270 NON-COVERED ITEM OR SERVICE: HCPCS | Performed by: EMERGENCY MEDICINE

## 2023-04-28 PROCEDURE — 99223 1ST HOSP IP/OBS HIGH 75: CPT | Mod: AI | Performed by: INTERNAL MEDICINE

## 2023-04-28 PROCEDURE — 700105 HCHG RX REV CODE 258: Performed by: EMERGENCY MEDICINE

## 2023-04-28 PROCEDURE — 700102 HCHG RX REV CODE 250 W/ 637 OVERRIDE(OP): Performed by: EMERGENCY MEDICINE

## 2023-04-28 PROCEDURE — 71045 X-RAY EXAM CHEST 1 VIEW: CPT

## 2023-04-28 PROCEDURE — 770001 HCHG ROOM/CARE - MED/SURG/GYN PRIV*

## 2023-04-28 PROCEDURE — 700111 HCHG RX REV CODE 636 W/ 250 OVERRIDE (IP): Performed by: EMERGENCY MEDICINE

## 2023-04-28 PROCEDURE — 93005 ELECTROCARDIOGRAM TRACING: CPT | Performed by: EMERGENCY MEDICINE

## 2023-04-28 PROCEDURE — 99285 EMERGENCY DEPT VISIT HI MDM: CPT

## 2023-04-28 PROCEDURE — 83735 ASSAY OF MAGNESIUM: CPT

## 2023-04-28 PROCEDURE — 700101 HCHG RX REV CODE 250: Performed by: INTERNAL MEDICINE

## 2023-04-28 PROCEDURE — 700101 HCHG RX REV CODE 250: Performed by: EMERGENCY MEDICINE

## 2023-04-28 PROCEDURE — 96365 THER/PROPH/DIAG IV INF INIT: CPT

## 2023-04-28 RX ORDER — IPRATROPIUM BROMIDE AND ALBUTEROL SULFATE 2.5; .5 MG/3ML; MG/3ML
3 SOLUTION RESPIRATORY (INHALATION)
Status: DISCONTINUED | OUTPATIENT
Start: 2023-04-28 | End: 2023-05-01 | Stop reason: HOSPADM

## 2023-04-28 RX ORDER — AZITHROMYCIN 250 MG/1
500 TABLET, FILM COATED ORAL ONCE
Status: COMPLETED | OUTPATIENT
Start: 2023-04-28 | End: 2023-04-28

## 2023-04-28 RX ORDER — HYDROMORPHONE HYDROCHLORIDE 1 MG/ML
0.25 INJECTION, SOLUTION INTRAMUSCULAR; INTRAVENOUS; SUBCUTANEOUS
Status: DISCONTINUED | OUTPATIENT
Start: 2023-04-28 | End: 2023-05-01 | Stop reason: HOSPADM

## 2023-04-28 RX ORDER — BISACODYL 10 MG
10 SUPPOSITORY, RECTAL RECTAL
Status: DISCONTINUED | OUTPATIENT
Start: 2023-04-28 | End: 2023-05-01 | Stop reason: HOSPADM

## 2023-04-28 RX ORDER — ONDANSETRON 4 MG/1
4 TABLET, ORALLY DISINTEGRATING ORAL EVERY 4 HOURS PRN
Status: DISCONTINUED | OUTPATIENT
Start: 2023-04-28 | End: 2023-05-01 | Stop reason: HOSPADM

## 2023-04-28 RX ORDER — IPRATROPIUM BROMIDE AND ALBUTEROL SULFATE 2.5; .5 MG/3ML; MG/3ML
3 SOLUTION RESPIRATORY (INHALATION)
Status: DISCONTINUED | OUTPATIENT
Start: 2023-04-28 | End: 2023-04-30

## 2023-04-28 RX ORDER — AMOXICILLIN 250 MG
2 CAPSULE ORAL 2 TIMES DAILY
Status: DISCONTINUED | OUTPATIENT
Start: 2023-04-28 | End: 2023-05-01 | Stop reason: HOSPADM

## 2023-04-28 RX ORDER — MECLIZINE HYDROCHLORIDE 25 MG/1
25 TABLET ORAL 3 TIMES DAILY PRN
Status: DISCONTINUED | OUTPATIENT
Start: 2023-04-28 | End: 2023-05-01 | Stop reason: HOSPADM

## 2023-04-28 RX ORDER — PANTOPRAZOLE SODIUM 40 MG/1
40 TABLET, DELAYED RELEASE ORAL DAILY
Status: DISCONTINUED | OUTPATIENT
Start: 2023-04-29 | End: 2023-04-29

## 2023-04-28 RX ORDER — PREDNISONE 20 MG/1
60 TABLET ORAL ONCE
Status: COMPLETED | OUTPATIENT
Start: 2023-04-28 | End: 2023-04-28

## 2023-04-28 RX ORDER — LOSARTAN POTASSIUM 50 MG/1
100 TABLET ORAL DAILY
Status: DISCONTINUED | OUTPATIENT
Start: 2023-04-29 | End: 2023-05-01 | Stop reason: HOSPADM

## 2023-04-28 RX ORDER — ONDANSETRON 2 MG/ML
4 INJECTION INTRAMUSCULAR; INTRAVENOUS EVERY 4 HOURS PRN
Status: DISCONTINUED | OUTPATIENT
Start: 2023-04-28 | End: 2023-05-01 | Stop reason: HOSPADM

## 2023-04-28 RX ORDER — ACETAMINOPHEN 325 MG/1
650 TABLET ORAL EVERY 6 HOURS PRN
Status: DISCONTINUED | OUTPATIENT
Start: 2023-04-28 | End: 2023-05-01 | Stop reason: HOSPADM

## 2023-04-28 RX ORDER — POLYETHYLENE GLYCOL 3350 17 G/17G
1 POWDER, FOR SOLUTION ORAL
Status: DISCONTINUED | OUTPATIENT
Start: 2023-04-28 | End: 2023-05-01 | Stop reason: HOSPADM

## 2023-04-28 RX ORDER — OXYCODONE HYDROCHLORIDE 5 MG/1
5 TABLET ORAL
Status: DISCONTINUED | OUTPATIENT
Start: 2023-04-28 | End: 2023-05-01 | Stop reason: HOSPADM

## 2023-04-28 RX ORDER — FLUTICASONE PROPIONATE 44 UG/1
2 AEROSOL, METERED RESPIRATORY (INHALATION)
Status: DISCONTINUED | OUTPATIENT
Start: 2023-04-29 | End: 2023-04-30

## 2023-04-28 RX ORDER — OXYCODONE HYDROCHLORIDE 5 MG/1
2.5 TABLET ORAL
Status: DISCONTINUED | OUTPATIENT
Start: 2023-04-28 | End: 2023-05-01 | Stop reason: HOSPADM

## 2023-04-28 RX ORDER — PREDNISONE 20 MG/1
40 TABLET ORAL DAILY
Status: DISCONTINUED | OUTPATIENT
Start: 2023-04-29 | End: 2023-04-30

## 2023-04-28 RX ORDER — GUAIFENESIN/DEXTROMETHORPHAN 100-10MG/5
10 SYRUP ORAL EVERY 6 HOURS PRN
Status: DISCONTINUED | OUTPATIENT
Start: 2023-04-28 | End: 2023-05-01 | Stop reason: HOSPADM

## 2023-04-28 RX ORDER — LABETALOL HYDROCHLORIDE 5 MG/ML
10 INJECTION, SOLUTION INTRAVENOUS EVERY 4 HOURS PRN
Status: DISCONTINUED | OUTPATIENT
Start: 2023-04-28 | End: 2023-05-01 | Stop reason: HOSPADM

## 2023-04-28 RX ORDER — ENOXAPARIN SODIUM 100 MG/ML
40 INJECTION SUBCUTANEOUS DAILY
Status: DISCONTINUED | OUTPATIENT
Start: 2023-04-28 | End: 2023-05-01 | Stop reason: HOSPADM

## 2023-04-28 RX ORDER — SODIUM CHLORIDE 1 G/1
1 TABLET ORAL
Status: DISCONTINUED | OUTPATIENT
Start: 2023-04-29 | End: 2023-04-29

## 2023-04-28 RX ADMIN — IPRATROPIUM BROMIDE AND ALBUTEROL SULFATE 3 ML: 2.5; .5 SOLUTION RESPIRATORY (INHALATION) at 23:06

## 2023-04-28 RX ADMIN — ALBUTEROL SULFATE 2.5 MG: 2.5 SOLUTION RESPIRATORY (INHALATION) at 20:11

## 2023-04-28 RX ADMIN — AMPICILLIN AND SULBACTAM 3 G: 1; 2 INJECTION, POWDER, FOR SOLUTION INTRAMUSCULAR; INTRAVENOUS at 23:15

## 2023-04-28 RX ADMIN — IPRATROPIUM BROMIDE 0.5 MG: 0.5 SOLUTION RESPIRATORY (INHALATION) at 20:12

## 2023-04-28 RX ADMIN — PREDNISONE 60 MG: 20 TABLET ORAL at 21:13

## 2023-04-28 RX ADMIN — AZITHROMYCIN MONOHYDRATE 500 MG: 250 TABLET ORAL at 23:15

## 2023-04-28 ASSESSMENT — ENCOUNTER SYMPTOMS
NERVOUS/ANXIOUS: 0
SHORTNESS OF BREATH: 1
DOUBLE VISION: 0
NAUSEA: 0
HEADACHES: 0
BRUISES/BLEEDS EASILY: 0
SPEECH CHANGE: 0
CHILLS: 0
BACK PAIN: 0
POLYDIPSIA: 0
WEIGHT LOSS: 0
FEVER: 0
PALPITATIONS: 0
TREMORS: 0
HALLUCINATIONS: 0
BLURRED VISION: 0
SPUTUM PRODUCTION: 0
FOCAL WEAKNESS: 0
HEARTBURN: 0
FLANK PAIN: 0
ORTHOPNEA: 0
HEMOPTYSIS: 0
PHOTOPHOBIA: 0
COUGH: 1
NECK PAIN: 0
VOMITING: 0

## 2023-04-28 ASSESSMENT — LIFESTYLE VARIABLES: SUBSTANCE_ABUSE: 0

## 2023-04-28 ASSESSMENT — FIBROSIS 4 INDEX
FIB4 SCORE: 1.11
FIB4 SCORE: 1.33

## 2023-04-29 ENCOUNTER — APPOINTMENT (OUTPATIENT)
Dept: RADIOLOGY | Facility: MEDICAL CENTER | Age: 73
DRG: 189 | End: 2023-04-29
Attending: FAMILY MEDICINE
Payer: MEDICARE

## 2023-04-29 PROBLEM — R73.9 HYPERGLYCEMIA: Status: ACTIVE | Noted: 2023-04-29

## 2023-04-29 PROBLEM — R91.8 OPACITY OF LUNG ON IMAGING STUDY: Status: ACTIVE | Noted: 2023-04-29

## 2023-04-29 PROBLEM — E83.42 HYPOMAGNESEMIA: Status: ACTIVE | Noted: 2023-04-29

## 2023-04-29 LAB
ALBUMIN SERPL BCP-MCNC: 3.5 G/DL (ref 3.2–4.9)
ALBUMIN/GLOB SERPL: 1.3 G/DL
ALP SERPL-CCNC: 67 U/L (ref 30–99)
ALT SERPL-CCNC: 21 U/L (ref 2–50)
ANION GAP SERPL CALC-SCNC: 11 MMOL/L (ref 7–16)
ANION GAP SERPL CALC-SCNC: 14 MMOL/L (ref 7–16)
AST SERPL-CCNC: 19 U/L (ref 12–45)
BASOPHILS # BLD AUTO: 0 % (ref 0–1.8)
BASOPHILS # BLD: 0 K/UL (ref 0–0.12)
BILIRUB SERPL-MCNC: 0.6 MG/DL (ref 0.1–1.5)
BUN SERPL-MCNC: 4 MG/DL (ref 8–22)
BUN SERPL-MCNC: 5 MG/DL (ref 8–22)
CALCIUM ALBUM COR SERPL-MCNC: 9 MG/DL (ref 8.5–10.5)
CALCIUM SERPL-MCNC: 8.6 MG/DL (ref 8.5–10.5)
CALCIUM SERPL-MCNC: 9 MG/DL (ref 8.5–10.5)
CHLORIDE SERPL-SCNC: 96 MMOL/L (ref 96–112)
CHLORIDE SERPL-SCNC: 97 MMOL/L (ref 96–112)
CO2 SERPL-SCNC: 20 MMOL/L (ref 20–33)
CO2 SERPL-SCNC: 23 MMOL/L (ref 20–33)
CREAT SERPL-MCNC: 0.51 MG/DL (ref 0.5–1.4)
CREAT SERPL-MCNC: 0.55 MG/DL (ref 0.5–1.4)
EOSINOPHIL # BLD AUTO: 0.19 K/UL (ref 0–0.51)
EOSINOPHIL NFR BLD: 3.3 % (ref 0–6.9)
ERYTHROCYTE [DISTWIDTH] IN BLOOD BY AUTOMATED COUNT: 44.2 FL (ref 35.9–50)
GFR SERPLBLD CREATININE-BSD FMLA CKD-EPI: 105 ML/MIN/1.73 M 2
GFR SERPLBLD CREATININE-BSD FMLA CKD-EPI: 107 ML/MIN/1.73 M 2
GLOBULIN SER CALC-MCNC: 2.7 G/DL (ref 1.9–3.5)
GLUCOSE SERPL-MCNC: 119 MG/DL (ref 65–99)
GLUCOSE SERPL-MCNC: 182 MG/DL (ref 65–99)
HCT VFR BLD AUTO: 32.3 % (ref 42–52)
HGB BLD-MCNC: 11.1 G/DL (ref 14–18)
IMM GRANULOCYTES # BLD AUTO: 0.07 K/UL (ref 0–0.11)
IMM GRANULOCYTES NFR BLD AUTO: 1.2 % (ref 0–0.9)
LYMPHOCYTES # BLD AUTO: 0.71 K/UL (ref 1–4.8)
LYMPHOCYTES NFR BLD: 12.5 % (ref 22–41)
MAGNESIUM SERPL-MCNC: 1.7 MG/DL (ref 1.5–2.5)
MCH RBC QN AUTO: 32.3 PG (ref 27–33)
MCHC RBC AUTO-ENTMCNC: 34.4 G/DL (ref 33.7–35.3)
MCV RBC AUTO: 93.9 FL (ref 81.4–97.8)
MONOCYTES # BLD AUTO: 0.45 K/UL (ref 0–0.85)
MONOCYTES NFR BLD AUTO: 7.9 % (ref 0–13.4)
NEUTROPHILS # BLD AUTO: 4.26 K/UL (ref 1.82–7.42)
NEUTROPHILS NFR BLD: 75.1 % (ref 44–72)
NRBC # BLD AUTO: 0 K/UL
NRBC BLD-RTO: 0 /100 WBC
NT-PROBNP SERPL IA-MCNC: 344 PG/ML (ref 0–125)
PHOSPHATE SERPL-MCNC: 3.1 MG/DL (ref 2.5–4.5)
PLATELET # BLD AUTO: 202 K/UL (ref 164–446)
PMV BLD AUTO: 9.5 FL (ref 9–12.9)
POTASSIUM SERPL-SCNC: 3.7 MMOL/L (ref 3.6–5.5)
POTASSIUM SERPL-SCNC: 4 MMOL/L (ref 3.6–5.5)
PROCALCITONIN SERPL-MCNC: 0.05 NG/ML
PROCALCITONIN SERPL-MCNC: <0.05 NG/ML
PROT SERPL-MCNC: 6.2 G/DL (ref 6–8.2)
RBC # BLD AUTO: 3.44 M/UL (ref 4.7–6.1)
SODIUM SERPL-SCNC: 130 MMOL/L (ref 135–145)
SODIUM SERPL-SCNC: 131 MMOL/L (ref 135–145)
WBC # BLD AUTO: 5.7 K/UL (ref 4.8–10.8)

## 2023-04-29 PROCEDURE — 700111 HCHG RX REV CODE 636 W/ 250 OVERRIDE (IP): Performed by: FAMILY MEDICINE

## 2023-04-29 PROCEDURE — 94669 MECHANICAL CHEST WALL OSCILL: CPT

## 2023-04-29 PROCEDURE — 700111 HCHG RX REV CODE 636 W/ 250 OVERRIDE (IP): Performed by: INTERNAL MEDICINE

## 2023-04-29 PROCEDURE — 94760 N-INVAS EAR/PLS OXIMETRY 1: CPT

## 2023-04-29 PROCEDURE — 700117 HCHG RX CONTRAST REV CODE 255: Performed by: FAMILY MEDICINE

## 2023-04-29 PROCEDURE — 700102 HCHG RX REV CODE 250 W/ 637 OVERRIDE(OP)

## 2023-04-29 PROCEDURE — 83880 ASSAY OF NATRIURETIC PEPTIDE: CPT

## 2023-04-29 PROCEDURE — 700101 HCHG RX REV CODE 250: Performed by: INTERNAL MEDICINE

## 2023-04-29 PROCEDURE — 84100 ASSAY OF PHOSPHORUS: CPT

## 2023-04-29 PROCEDURE — 71260 CT THORAX DX C+: CPT

## 2023-04-29 PROCEDURE — A9270 NON-COVERED ITEM OR SERVICE: HCPCS

## 2023-04-29 PROCEDURE — A9270 NON-COVERED ITEM OR SERVICE: HCPCS | Performed by: INTERNAL MEDICINE

## 2023-04-29 PROCEDURE — 94640 AIRWAY INHALATION TREATMENT: CPT

## 2023-04-29 PROCEDURE — 80048 BASIC METABOLIC PNL TOTAL CA: CPT

## 2023-04-29 PROCEDURE — 36415 COLL VENOUS BLD VENIPUNCTURE: CPT

## 2023-04-29 PROCEDURE — 99233 SBSQ HOSP IP/OBS HIGH 50: CPT | Performed by: FAMILY MEDICINE

## 2023-04-29 PROCEDURE — 770001 HCHG ROOM/CARE - MED/SURG/GYN PRIV*

## 2023-04-29 PROCEDURE — 770006 HCHG ROOM/CARE - MED/SURG/GYN SEMI*

## 2023-04-29 PROCEDURE — 84145 PROCALCITONIN (PCT): CPT

## 2023-04-29 PROCEDURE — 700102 HCHG RX REV CODE 250 W/ 637 OVERRIDE(OP): Performed by: INTERNAL MEDICINE

## 2023-04-29 RX ORDER — MAGNESIUM SULFATE HEPTAHYDRATE 40 MG/ML
2 INJECTION, SOLUTION INTRAVENOUS ONCE
Status: COMPLETED | OUTPATIENT
Start: 2023-04-29 | End: 2023-04-29

## 2023-04-29 RX ORDER — CHOLECALCIFEROL (VITAMIN D3) 125 MCG
5 CAPSULE ORAL ONCE
Status: COMPLETED | OUTPATIENT
Start: 2023-04-29 | End: 2023-04-29

## 2023-04-29 RX ORDER — OMEPRAZOLE 20 MG/1
20 CAPSULE, DELAYED RELEASE ORAL DAILY
Status: DISCONTINUED | OUTPATIENT
Start: 2023-04-29 | End: 2023-05-01 | Stop reason: HOSPADM

## 2023-04-29 RX ADMIN — SODIUM CHLORIDE 1 G: 1 TABLET ORAL at 10:18

## 2023-04-29 RX ADMIN — IOHEXOL 75 ML: 350 INJECTION, SOLUTION INTRAVENOUS at 17:45

## 2023-04-29 RX ADMIN — IPRATROPIUM BROMIDE AND ALBUTEROL SULFATE 3 ML: 2.5; .5 SOLUTION RESPIRATORY (INHALATION) at 22:49

## 2023-04-29 RX ADMIN — Medication 5 MG: at 23:29

## 2023-04-29 RX ADMIN — DOCUSATE SODIUM 50 MG AND SENNOSIDES 8.6 MG 2 TABLET: 8.6; 5 TABLET, FILM COATED ORAL at 17:42

## 2023-04-29 RX ADMIN — ENOXAPARIN SODIUM 40 MG: 40 INJECTION SUBCUTANEOUS at 17:43

## 2023-04-29 RX ADMIN — MAGNESIUM SULFATE HEPTAHYDRATE 2 G: 40 INJECTION, SOLUTION INTRAVENOUS at 10:17

## 2023-04-29 RX ADMIN — IPRATROPIUM BROMIDE AND ALBUTEROL SULFATE 3 ML: 2.5; .5 SOLUTION RESPIRATORY (INHALATION) at 18:28

## 2023-04-29 RX ADMIN — OXYCODONE 5 MG: 5 TABLET ORAL at 03:41

## 2023-04-29 RX ADMIN — PREDNISONE 40 MG: 20 TABLET ORAL at 05:02

## 2023-04-29 RX ADMIN — OMEPRAZOLE 20 MG: 20 CAPSULE, DELAYED RELEASE ORAL at 05:02

## 2023-04-29 RX ADMIN — ENOXAPARIN SODIUM 40 MG: 40 INJECTION SUBCUTANEOUS at 00:01

## 2023-04-29 RX ADMIN — FLUTICASONE PROPIONATE 88 MCG: 44 AEROSOL, METERED RESPIRATORY (INHALATION) at 10:42

## 2023-04-29 RX ADMIN — LOSARTAN POTASSIUM 100 MG: 50 TABLET, FILM COATED ORAL at 05:02

## 2023-04-29 RX ADMIN — IPRATROPIUM BROMIDE AND ALBUTEROL SULFATE 3 ML: 2.5; .5 SOLUTION RESPIRATORY (INHALATION) at 14:04

## 2023-04-29 RX ADMIN — DOCUSATE SODIUM 50 MG AND SENNOSIDES 8.6 MG 2 TABLET: 8.6; 5 TABLET, FILM COATED ORAL at 00:01

## 2023-04-29 RX ADMIN — IPRATROPIUM BROMIDE AND ALBUTEROL SULFATE 3 ML: 2.5; .5 SOLUTION RESPIRATORY (INHALATION) at 11:24

## 2023-04-29 RX ADMIN — IPRATROPIUM BROMIDE AND ALBUTEROL SULFATE 3 ML: 2.5; .5 SOLUTION RESPIRATORY (INHALATION) at 06:45

## 2023-04-29 ASSESSMENT — ENCOUNTER SYMPTOMS
FLANK PAIN: 0
NERVOUS/ANXIOUS: 1
COUGH: 1
FEVER: 0
ABDOMINAL PAIN: 0
DIAPHORESIS: 0
SORE THROAT: 0
HEARTBURN: 0
PALPITATIONS: 0
BLURRED VISION: 0
SENSORY CHANGE: 0
BACK PAIN: 0
VOMITING: 0
WHEEZING: 1
NECK PAIN: 0
FOCAL WEAKNESS: 0
DIARRHEA: 0
SHORTNESS OF BREATH: 1
DIZZINESS: 0
MYALGIAS: 0
NAUSEA: 0
CHILLS: 0
HEADACHES: 0
WEAKNESS: 1
SPEECH CHANGE: 0

## 2023-04-29 ASSESSMENT — COPD QUESTIONNAIRES
DURING THE PAST 4 WEEKS HOW MUCH DID YOU FEEL SHORT OF BREATH: SOME OF THE TIME
HAVE YOU SMOKED AT LEAST 100 CIGARETTES IN YOUR ENTIRE LIFE: YES
DO YOU EVER COUGH UP ANY MUCUS OR PHLEGM?: YES, A FEW DAYS A WEEK OR MONTH
COPD SCREENING SCORE: 7

## 2023-04-29 ASSESSMENT — LIFESTYLE VARIABLES
AVERAGE NUMBER OF DAYS PER WEEK YOU HAVE A DRINK CONTAINING ALCOHOL: 0
TOTAL SCORE: 0
HAVE PEOPLE ANNOYED YOU BY CRITICIZING YOUR DRINKING: NO
ALCOHOL_USE: NO
HOW MANY TIMES IN THE PAST YEAR HAVE YOU HAD 5 OR MORE DRINKS IN A DAY: 0
ON A TYPICAL DAY WHEN YOU DRINK ALCOHOL HOW MANY DRINKS DO YOU HAVE: 0
TOTAL SCORE: 0
EVER HAD A DRINK FIRST THING IN THE MORNING TO STEADY YOUR NERVES TO GET RID OF A HANGOVER: NO
HAVE YOU EVER FELT YOU SHOULD CUT DOWN ON YOUR DRINKING: NO
EVER FELT BAD OR GUILTY ABOUT YOUR DRINKING: NO
DOES PATIENT WANT TO STOP DRINKING: NO
TOTAL SCORE: 0
CONSUMPTION TOTAL: NEGATIVE

## 2023-04-29 ASSESSMENT — COGNITIVE AND FUNCTIONAL STATUS - GENERAL
DRESSING REGULAR UPPER BODY CLOTHING: A LITTLE
STANDING UP FROM CHAIR USING ARMS: A LITTLE
SUGGESTED CMS G CODE MODIFIER DAILY ACTIVITY: CK
CLIMB 3 TO 5 STEPS WITH RAILING: A LITTLE
EATING MEALS: A LITTLE
MOVING FROM LYING ON BACK TO SITTING ON SIDE OF FLAT BED: A LITTLE
HELP NEEDED FOR BATHING: A LITTLE
TOILETING: A LITTLE
MOBILITY SCORE: 20
DAILY ACTIVITIY SCORE: 18
SUGGESTED CMS G CODE MODIFIER MOBILITY: CJ
WALKING IN HOSPITAL ROOM: A LITTLE
DRESSING REGULAR LOWER BODY CLOTHING: A LITTLE
PERSONAL GROOMING: A LITTLE

## 2023-04-29 ASSESSMENT — PATIENT HEALTH QUESTIONNAIRE - PHQ9
2. FEELING DOWN, DEPRESSED, IRRITABLE, OR HOPELESS: NOT AT ALL
SUM OF ALL RESPONSES TO PHQ9 QUESTIONS 1 AND 2: 0
1. LITTLE INTEREST OR PLEASURE IN DOING THINGS: NOT AT ALL
2. FEELING DOWN, DEPRESSED, IRRITABLE, OR HOPELESS: NOT AT ALL
1. LITTLE INTEREST OR PLEASURE IN DOING THINGS: NOT AT ALL
SUM OF ALL RESPONSES TO PHQ9 QUESTIONS 1 AND 2: 0

## 2023-04-29 ASSESSMENT — PAIN DESCRIPTION - PAIN TYPE
TYPE: ACUTE PAIN

## 2023-04-29 NOTE — CARE PLAN
The patient is Stable - Low risk of patient condition declining or worsening    Shift Goals  Clinical Goals: abx, steroids, nebs, safety  Patient Goals: comfort    Progress made toward(s) clinical / shift goals:    Problem: Pain - Standard  Goal: Alleviation of pain or a reduction in pain to the patient’s comfort goal  Outcome: Progressing     Problem: Knowledge Deficit - Standard  Goal: Patient and family/care givers will demonstrate understanding of plan of care, disease process/condition, diagnostic tests and medications  Outcome: Progressing     Problem: Knowledge Deficit - COPD  Goal: Patient/significant other demonstrates understanding of disease process, utilization of the Action Plan, medications and discharge instruction  Outcome: Progressing     Problem: Risk for Infection - COPD  Goal: Patient will remain free from signs and symptoms of infection  Outcome: Progressing     Problem: Impaired Gas Exchange  Goal: Patient will demonstrate improved ventilation and adequate oxygenation and participate in treatment regimen within the level of ability/situation.  Outcome: Progressing     Problem: Self Care  Goal: Patient will have the ability to perform ADLs independently or with assistance (bathe, groom, dress, toilet and feed)  Outcome: Progressing     Problem: Fall Risk  Goal: Patient will remain free from falls  Outcome: Progressing

## 2023-04-29 NOTE — CARE PLAN
The patient is Stable - Low risk of patient condition declining or worsening    Shift Goals  Clinical Goals: safety  Patient Goals: rest    Progress made toward(s) clinical / shift goals:        Problem: Pain - Standard  Goal: Alleviation of pain or a reduction in pain to the patient’s comfort goal  Outcome: Progressing     Problem: Knowledge Deficit - Standard  Goal: Patient and family/care givers will demonstrate understanding of plan of care, disease process/condition, diagnostic tests and medications  Outcome: Progressing     Problem: Knowledge Deficit - COPD  Goal: Patient/significant other demonstrates understanding of disease process, utilization of the Action Plan, medications and discharge instruction  Outcome: Progressing     Problem: Risk for Infection - COPD  Goal: Patient will remain free from signs and symptoms of infection  Outcome: Progressing     Problem: Nutrition - Advanced  Goal: Patient will display progressive weight gain toward goal have adequate food and fluid intake  Outcome: Progressing     Problem: Ineffective Airway Clearance  Goal: Patient will maintain patent airway with clear/clearing breath sounds  Outcome: Progressing     Problem: Impaired Gas Exchange  Goal: Patient will demonstrate improved ventilation and adequate oxygenation and participate in treatment regimen within the level of ability/situation.  Outcome: Progressing     Problem: Risk for Aspiration  Goal: Patient's risk for aspiration will be absent or decrease  Outcome: Progressing     Problem: Self Care  Goal: Patient will have the ability to perform ADLs independently or with assistance (bathe, groom, dress, toilet and feed)  Outcome: Progressing     Problem: Fall Risk  Goal: Patient will remain free from falls  Outcome: Progressing

## 2023-04-29 NOTE — ED TRIAGE NOTES
Chief Complaint   Patient presents with    Shortness of Breath     BIBA for sudden onset of worsening shortness of breath. Hx COPD. Denies chest pain. Pt reports improved symptoms at this time. Recently here for fall.      /63   Pulse 77   Resp 20   Wt 104 kg (230 lb)   SpO2 98%   BMI 34.97 kg/m²

## 2023-04-29 NOTE — PROGRESS NOTES
[facility] Medicine Daily Progress Note    Date of Service  4/29/2023    Chief Complaint  Juan Manuel Bass is a 72 y.o. male admitted 4/28/2023 with COPD exacerbation    Hospital Course  Admitted with COPD exacerbation, acute on chronic respiratory failure.  Patient was started on prednisone, RT protocol.    Interval Problem Update  COPD exacerbation -less shortness of breath and wheezing  Resp failure - O2 2 lpm  HTN - sbp 117-137  Lung opacity - noted on CXR, procalcitonin negative  Hyponatremia - 130  Hyperglycemia -   Low magnesium    I have discussed this patient's plan of care and discharge plan at IDT rounds today with Case Management, Nursing, Nursing leadership, and other members of the IDT team.    Consultants/Specialty  None    Code Status  DNAR/DNI    Disposition  Patient is not medically cleared for discharge.   Anticipate discharge to to home with close outpatient follow-up.  I have placed the appropriate orders for post-discharge needs.    Review of Systems  Review of Systems   Constitutional:  Positive for malaise/fatigue. Negative for chills, diaphoresis and fever.   HENT:  Positive for hearing loss. Negative for congestion and sore throat.    Eyes:  Negative for blurred vision.   Respiratory:  Positive for cough, shortness of breath and wheezing.    Cardiovascular:  Positive for leg swelling. Negative for chest pain and palpitations.   Gastrointestinal:  Negative for abdominal pain, diarrhea, heartburn, nausea and vomiting.   Genitourinary:  Negative for dysuria, flank pain and hematuria.   Musculoskeletal:  Negative for back pain, joint pain, myalgias and neck pain.   Skin:  Negative for rash.   Neurological:  Positive for weakness. Negative for dizziness, sensory change, speech change, focal weakness and headaches.   Psychiatric/Behavioral:  The patient is nervous/anxious.       Physical Exam  Temp:  [36.3 °C (97.3 °F)-36.7 °C (98 °F)] 36.3 °C (97.3 °F)  Pulse:  [69-86] 79  Resp:  [16-21] 18  BP:  (117-137)/(61-69) 120/68  SpO2:  [93 %-99 %] 93 %    Physical Exam  Vitals and nursing note reviewed.   Constitutional:       Appearance: He is obese.   HENT:      Head: Normocephalic and atraumatic.      Nose: No congestion.      Mouth/Throat:      Mouth: Mucous membranes are moist.   Eyes:      Extraocular Movements: Extraocular movements intact.      Conjunctiva/sclera: Conjunctivae normal.   Cardiovascular:      Rate and Rhythm: Normal rate and regular rhythm.   Pulmonary:      Effort: Accessory muscle usage present.      Breath sounds: Wheezing present.   Abdominal:      General: There is no distension.      Tenderness: There is no abdominal tenderness. There is no guarding or rebound.   Musculoskeletal:      Cervical back: No tenderness.      Right lower leg: Edema present.      Left lower leg: Edema present.   Skin:     Comments: stasis changes both legs   Neurological:      General: No focal deficit present.      Mental Status: He is alert and oriented to person, place, and time.      Cranial Nerves: No cranial nerve deficit.   Psychiatric:         Mood and Affect: Mood is anxious.       Fluids    Intake/Output Summary (Last 24 hours) at 4/29/2023 1058  Last data filed at 4/29/2023 0837  Gross per 24 hour   Intake --   Output 2150 ml   Net -2150 ml       Laboratory  Recent Labs     04/28/23 1956 04/28/23 2307   WBC 5.6 5.7   RBC 3.46* 3.44*   HEMOGLOBIN 11.1* 11.1*   HEMATOCRIT 32.6* 32.3*   MCV 94.2 93.9   MCH 32.1 32.3   MCHC 34.0 34.4   RDW 45.0 44.2   PLATELETCT 207 202   MPV 9.2 9.5     Recent Labs     04/28/23 1956 04/28/23 2307 04/29/23  0840   SODIUM 130* 131* 130*   POTASSIUM 3.6 3.7 4.0   CHLORIDE 93* 97 96   CO2 20 20 23   GLUCOSE 97 119* 182*   BUN 6* 5* 4*   CREATININE 0.57 0.55 0.51   CALCIUM 8.5 8.6 9.0                   Imaging  DX-CHEST-LIMITED (1 VIEW)   Final Result      1.  Left greater than right base airspace disease.      CT-CHEST (THORAX) WITH    (Results Pending)         Assessment/Plan  * COPD exacerbation (HCC)- (present on admission)  Assessment & Plan  Prednisone, Flovent, RT protocol    Acute on chronic respiratory failure with hypoxia (HCC)- (present on admission)  Assessment & Plan  RT protocol    Opacity of lung on imaging study- (present on admission)  Assessment & Plan  Check CT chest    Hypomagnesemia- (present on admission)  Assessment & Plan  IV Mg 2 g  Follow level    Hyperglycemia- (present on admission)  Assessment & Plan  Check [id number]    Hyponatremia- (present on admission)  Assessment & Plan  Stop salt tabs  Free water restriction  Follow bmp    Primary hypertension- (present on admission)  Assessment & Plan  Losartan    Gastroesophageal reflux disease without esophagitis- (present on admission)  Assessment & Plan  Omeprazole    Obesity- (present on admission)  Assessment & Plan  Body mass index is 35.5 kg/m².         VTE prophylaxis: enoxaparin ppx    I have performed a physical exam and reviewed and updated ROS and Plan today (4/29/2023). In review of yesterday's note (4/28/2023), there are no changes except as documented above.

## 2023-04-29 NOTE — ED PROVIDER NOTES
ED Provider Note    CHIEF COMPLAINT  Chief Complaint   Patient presents with    Shortness of Breath     BIBA for sudden onset of worsening shortness of breath. Hx COPD. Denies chest pain. Pt reports improved symptoms at this time. Recently here for fall.        LIMITATION TO HISTORY   Select: None    HPI    Juan Manuel Bass is a 72 y.o. male who presents to the Emergency Department by ambulance for shortness of breath onset quite rapidly an hour before arrival.  This felt like a typical COPD exacerbation.  He denies increasing chronic cough or sputum.  No chest pain or fever.  He does not think he has an infection.  He has chronic left greater than right leg swelling.  He is never had a DVT PE heart failure.  His Trelegy today and albuterol 2 or 3 times without benefit.    OUTSIDE HISTORIAN(S):  Select: None    EXTERNAL RECORDS REVIEWED  Select: Hospitalist discharge summary reviewed from April 26 after a 1 day admission here for dizziness.  He has a history of hypertension and chronic hypoxia on 3 L.  He had a recent admission for hyponatremia with a sodium of 113 and left leg cellulitis with aspiration pneumonia.  He was discharged the day before his readmission for dizziness.  MRI brain was negative for posterior circulation stroke.  He was treated with meclizine.  He was discharged with a walker    REVIEW OF SYSTEMS  Pertinent positives include: Shortness of breath, chronic cough and sputum.  Pertinent negatives include: GI bleed, abdominal pain.      PAST MEDICAL HISTORY  Past Medical History:   Diagnosis Date    Chronic airway obstruction, not elsewhere classified     High anion gap metabolic acidosis 4/1/2023    Hypertension        FAMILY HISTORY  Family History   Problem Relation Age of Onset    No Known Problems Mother     Heart Disease Father        SOCIAL HISTORY  Social History     Tobacco Use    Smoking status: Former     Packs/day: 1.00     Years: 4.00     Pack years: 4.00     Types: Cigarettes     Quit  "date: 3/7/2020     Years since quitting: 3.1    Smokeless tobacco: Never   Vaping Use    Vaping Use: Never used   Substance Use Topics    Alcohol use: Yes     Alcohol/week: 21.0 oz     Types: 35 Cans of beer per week     Comment: 5-6 beers/day. Hasn't had any alcohol for \"weeks\"    Drug use: Not Currently     Social History     Substance and Sexual Activity   Drug Use Not Currently         CURRENT MEDICATIONS      meclizine (ANTIVERT) 25 MG Tab, Take 1 Tablet by mouth 3 times a day as needed for Dizziness or Vertigo for up to 7 days., Disp: 21 Tablet, Rfl: 0    acetaminophen (TYLENOL) 500 MG Tab, Take 500-1,000 mg by mouth every 6 hours as needed. Indications: Pain, Disp: , Rfl:     losartan (COZAAR) 100 MG Tab, Take 1 Tablet by mouth every day., Disp: 30 Tablet, Rfl: 0    amoxicillin-clavulanate (AUGMENTIN) 875-125 MG Tab, Take 1 Tablet by mouth 2 times a day for 6 days., Disp: 12 Tablet, Rfl: 0    sodium chloride (SALT) 1 GM Tab, Take 1 Tablet by mouth 3 times a day with meals., Disp: 90 Tablet, Rfl: 0    pantoprazole (PROTONIX) 40 MG Tablet Delayed Response, Take 40 mg by mouth every day., Disp: , Rfl:     albuterol 108 (90 Base) MCG/ACT Aero Soln inhalation aerosol, Inhale 1-2 Puffs every four hours as needed for Shortness of Breath., Disp: 1 Each, Rfl: 10    Fluticasone-Umeclidin-Vilant (TRELEGY ELLIPTA) 100-62.5-25 MCG/INH AEROSOL POWDER, BREATH ACTIVATED inhalation, Inhale 2-3 Inhalation every morning., Disp: 1 Each, Rfl: 10    ALLERGIES  Allergies   Allergen Reactions    Tetanus Toxoid Hives       PHYSICAL EXAM  VITAL SIGNS: /63   Pulse 77   Resp 20   Wt 104 kg (230 lb)   SpO2 98%   BMI 34.97 kg/m²   Reviewed and afebrile, no hypoxia on 2 L  Constitutional: Well developed, Well nourished, well-appearing, appears his age.  HENT: Normocephalic, atraumatic, bilateral external ears normal, No intraoral erythema, edema, exudate moist mucous membranes  Eyes: PERRLA, conjunctiva pink, no scleral icterus. "   Cardiovascular: Regular rate and rhythm. No murmurs, rubs or gallops.  1+ dependent edema bilateral but no calf tenderness  Respiratory: Very quiet diminished breath sounds, increased E to I ratio.  No definite rhonchi or wheezes.  Abdominal:  Abdomen soft, non-tender, non distended. No rebound, or guarding.    Skin: No erythema, no rash. No wounds or bruising.  Genitourinary: No costovertebral angle tenderness.   Musculoskeletal: no deformities.   Neurologic: Alert & oriented x 3, cranial nerves 2-12 intact by passive exam.  Moves 4 limbs with symmetric strength.  Psychiatric: Affect normal, Judgment normal, Mood normal.     LABS Ordered and Reviewed by Me:  Results for orders placed or performed during the hospital encounter of 04/28/23   CBC WITH DIFFERENTIAL   Result Value Ref Range    WBC 5.6 4.8 - 10.8 K/uL    RBC 3.46 (L) 4.70 - 6.10 M/uL    Hemoglobin 11.1 (L) 14.0 - 18.0 g/dL    Hematocrit 32.6 (L) 42.0 - 52.0 %    MCV 94.2 81.4 - 97.8 fL    MCH 32.1 27.0 - 33.0 pg    MCHC 34.0 33.7 - 35.3 g/dL    RDW 45.0 35.9 - 50.0 fL    Platelet Count 207 164 - 446 K/uL    MPV 9.2 9.0 - 12.9 fL    Neutrophils-Polys 63.50 44.00 - 72.00 %    Lymphocytes 18.20 (L) 22.00 - 41.00 %    Monocytes 14.20 (H) 0.00 - 13.40 %    Eosinophils 3.40 0.00 - 6.90 %    Basophils 0.20 0.00 - 1.80 %    Immature Granulocytes 0.50 0.00 - 0.90 %    Nucleated RBC 0.00 /100 WBC    Neutrophils (Absolute) 3.52 1.82 - 7.42 K/uL    Lymphs (Absolute) 1.01 1.00 - 4.80 K/uL    Monos (Absolute) 0.79 0.00 - 0.85 K/uL    Eos (Absolute) 0.19 0.00 - 0.51 K/uL    Baso (Absolute) 0.01 0.00 - 0.12 K/uL    Immature Granulocytes (abs) 0.03 0.00 - 0.11 K/uL    NRBC (Absolute) 0.00 K/uL   Basic Metabolic Panel   Result Value Ref Range    Sodium 130 (L) 135 - 145 mmol/L    Potassium 3.6 3.6 - 5.5 mmol/L    Chloride 93 (L) 96 - 112 mmol/L    Co2 20 20 - 33 mmol/L    Glucose 97 65 - 99 mg/dL    Bun 6 (L) 8 - 22 mg/dL    Creatinine 0.57 0.50 - 1.40 mg/dL    Calcium  8.5 8.5 - 10.5 mg/dL    Anion Gap 17.0 (H) 7.0 - 16.0       EKG Interpretation by me    Indication: Dyspnea    Rhythm: normal sinus   Rate: Normal at 73  Axis: normal  Ectopy: PACs  Conduction: normal  ST/T Waves: no acute change  Q Waves: none  R Wave progression: normal  Hypertrophy changes: Absent    Clinical Impression: Normal sinus rhythm with PACs    RADIOLOGY  I have independently interpreted the chest x-ray associated with this visit demonstrating no pulmonary edema.  I am awaiting the final reading from the radiologist.     Final Radiology Report  DX-CHEST-LIMITED (1 VIEW)   Final Result      1.  Left greater than right base airspace disease.        Radiologist interpretation have been reviewed by me.     ED COURSE:        INTERVENTIONS BY ME:  Medications   albuterol (PROVENTIL) 2.5mg/0.5ml nebulizer solution 2.5 mg (has no administration in time range)   ipratropium (ATROVENT) 0.02 % nebulizer solution 0.5 mg (has no administration in time range)   predniSONE (DELTASONE) tablet 60 mg (has no administration in time range)   Unasyn and azithromycin    Patient reassessed and response to intervention and said he felt better, he had better airflow but he still felt too dyspneic for discharge home.    I have discussed management of the patient with the following physicians and sources:   Dr. Melgar      MEDICAL DECISION MAKING:  PROBLEMS EVALUATED THIS VISIT:  This patient presents with cute onset of dyspnea today and has acute exacerbation of COPD that is moderate as well as early bibasilar pneumonia without sepsis or evidence of pulmonary edema or PE.    RISK:  Moderate given need for escalation of care to include admission       PLAN:  Admission for oxygen high-dose bronchodilators, steroids, antibiotics    CONDITION: Fair.     FINAL IMPRESSION  1. Acute exacerbation of chronic obstructive pulmonary disease (COPD) ([facility])    2. Pneumonia of both lower lobes due to infectious organism         Electronically  signed by: Deshawn Juárez M.D., 4/28/2023 7:59 PM

## 2023-04-29 NOTE — CARE PLAN
Problem: Bronchoconstriction  Goal: Improve in air movement and diminished wheezing  Description: Target End Date:  2 to 3 days    1.  Implement inhaled treatments  2.  Evaluate and manage medication effects  Outcome: Progressing   DUONEB Q4  Problem: Bronchopulmonary Hygiene  Goal: Increase mobilization of retained secretions  Description: Target End Date:  2 to 3 days    1.  Perform bronchopulmonary therapy as indicated by assessment  2.  Perform airway suctioning  3.  Perform actions to maintain patient airway  Outcome: Progressing   Flutter valve QID

## 2023-04-29 NOTE — PROGRESS NOTES
4 Eyes Skin Assessment Completed by ALDEN Munson and ALDEN Arias.    Head WDL  Ears Redness scab on inside of rt ear  Nose WDL  Mouth WDL  Neck WDL  Breast/Chest WDL  Shoulder Blades WDL  Spine WDL  (R) Arm/Elbow/Hand Bruising and Scab  (L) Arm/Elbow/Hand Bruising and Scab  Abdomen WDL  Groin Redness and Blanching  Scrotum/Coccyx/Buttocks WDL  (R) Leg Redness and Bruising  (L) Leg Scab, Bruising, and Edema  (R) Heel/Foot/Toe Discoloration and Scab  (L) Heel/Foot/Toe Discoloration and Scab          Devices In Places Nasal Cannula      Interventions In Place Gray Ear Foams    Possible Skin Injury No    Pictures Uploaded Into Epic N/A  Wound Consult Placed N/A  RN Wound Prevention Protocol Ordered No

## 2023-04-29 NOTE — H&P
Hospital Medicine History & Physical Note    Date of Service  4/28/2023    Primary Care Physician  Peterson Amin M.D.      Code Status  DNAR/DNI    Chief Complaint  Chief Complaint   Patient presents with    Shortness of Breath     BIBA for sudden onset of worsening shortness of breath. Hx COPD. Denies chest pain. Pt reports improved symptoms at this time. Recently here for fall.        History of Presenting Illness  72 y.o. male with past medical history of hypertension, COPD with chronic hypoxia on 3 L, recent admission for hyponatremia with sodium 113 and left lower extremity cellulitis, as well as aspiration pneumonia, most recently 4/25 through 4/26 with dizziness, presented with complaints of shortness of breath onset 1 hour prior to arrival, worsening of chronic cough today, dyspnea with exertion.  According to him albuterol was not helping, despite using it 3 times.  Patient is at baseline for oxygen, 2 L, but appears short of breath.  Chest x-ray showed left greater than right basilar airspace disease.  WBC is not elevated, afebrile.  In ED patient received treatment with prednisone 60 mg, DuoNeb and pending Unasyn and azithromycin for possible pneumonia.  I discussed the plan of care with patient.    Review of Systems  Review of Systems   Constitutional:  Negative for chills, fever and weight loss.   HENT:  Negative for ear pain, hearing loss and tinnitus.    Eyes:  Negative for blurred vision, double vision and photophobia.   Respiratory:  Positive for cough and shortness of breath. Negative for hemoptysis and sputum production.    Cardiovascular:  Negative for chest pain, palpitations and orthopnea.   Gastrointestinal:  Negative for heartburn, nausea and vomiting.   Genitourinary:  Negative for dysuria, flank pain, frequency and hematuria.   Musculoskeletal:  Positive for joint pain. Negative for back pain and neck pain.   Skin:  Negative for itching and rash.   Neurological:  Negative for tremors,  speech change, focal weakness and headaches.   Endo/Heme/Allergies:  Negative for environmental allergies and polydipsia. Does not bruise/bleed easily.   Psychiatric/Behavioral:  Negative for hallucinations and substance abuse. The patient is not nervous/anxious.      Past Medical History   has a past medical history of Chronic airway obstruction, not elsewhere classified, High anion gap metabolic acidosis (4/1/2023), and Hypertension.    Surgical History   has no past surgical history on file.     Family History  family history includes Heart Disease in his father; No Known Problems in his mother.   Family history reviewed with patient. There is no family history that is pertinent to the chief complaint.     Social History   reports that he quit smoking about 3 years ago. His smoking use included cigarettes. He has a 4.00 pack-year smoking history. He has never used smokeless tobacco. He reports current alcohol use of about 21.0 oz per week. He reports that he does not currently use drugs.    Allergies  Allergies   Allergen Reactions    Tetanus Toxoid Hives       Medications  Prior to Admission Medications   Prescriptions Last Dose Informant Patient Reported? Taking?   Fluticasone-Umeclidin-Vilant (TRELEGY ELLIPTA) 100-62.5-25 MCG/INH AEROSOL POWDER, BREATH ACTIVATED inhalation  Patient No No   Sig: Inhale 2-3 Inhalation every morning.   acetaminophen (TYLENOL) 500 MG Tab  Patient Yes No   Sig: Take 500-1,000 mg by mouth every 6 hours as needed. Indications: Pain   albuterol 108 (90 Base) MCG/ACT Aero Soln inhalation aerosol  Patient No No   Sig: Inhale 1-2 Puffs every four hours as needed for Shortness of Breath.   amoxicillin-clavulanate (AUGMENTIN) 875-125 MG Tab  Patient No No   Sig: Take 1 Tablet by mouth 2 times a day for 6 days.   losartan (COZAAR) 100 MG Tab  Patient No No   Sig: Take 1 Tablet by mouth every day.   meclizine (ANTIVERT) 25 MG Tab   No No   Sig: Take 1 Tablet by mouth 3 times a day as needed  for Dizziness or Vertigo for up to 7 days.   pantoprazole (PROTONIX) 40 MG Tablet Delayed Response  Patient Yes No   Sig: Take 40 mg by mouth every day.   sodium chloride (SALT) 1 GM Tab  Patient No No   Sig: Take 1 Tablet by mouth 3 times a day with meals.      Facility-Administered Medications: None       Physical Exam  Pulse:  [73-77] 73  Resp:  [16-21] 16  BP: (128-132)/(61-63) 132/63  SpO2:  [98 %-99 %] 98 %  Blood Pressure : 132/63       Pulse: 73   Respiration: 16   Pulse Oximetry: 98 %       Physical Exam  Vitals and nursing note reviewed.   Constitutional:       General: He is not in acute distress.     Appearance: Normal appearance.   HENT:      Head: Normocephalic and atraumatic.      Nose: Nose normal.      Mouth/Throat:      Mouth: Mucous membranes are moist.   Eyes:      Extraocular Movements: Extraocular movements intact.      Pupils: Pupils are equal, round, and reactive to light.   Cardiovascular:      Rate and Rhythm: Normal rate and regular rhythm.   Pulmonary:      Effort: Pulmonary effort is normal.      Breath sounds: Examination of the right-lower field reveals decreased breath sounds. Examination of the left-lower field reveals decreased breath sounds. Decreased breath sounds present.   Abdominal:      General: Abdomen is flat. There is no distension.      Tenderness: There is no abdominal tenderness.   Musculoskeletal:         General: No swelling or deformity. Normal range of motion.      Cervical back: Normal range of motion and neck supple.   Skin:     General: Skin is warm and dry.   Neurological:      General: No focal deficit present.      Mental Status: He is alert and oriented to person, place, and time.   Psychiatric:         Mood and Affect: Mood normal.         Behavior: Behavior normal.       Laboratory:  Recent Labs     04/26/23 0135 04/28/23 1956   WBC 4.5* 5.6   RBC 3.56* 3.46*   HEMOGLOBIN 11.4* 11.1*   HEMATOCRIT 33.9* 32.6*   MCV 95.2 94.2   MCH 32.0 32.1   MCHC 33.6*  34.0   RDW 46.5 45.0   PLATELETCT 250 207   MPV 8.9* 9.2     Recent Labs     04/26/23  0135 04/28/23 1956   SODIUM 130* 130*   POTASSIUM 4.3 3.6   CHLORIDE 97 93*   CO2 23 20   GLUCOSE 108* 97   BUN 6* 6*   CREATININE 0.50 0.57   CALCIUM 8.4* 8.5     Recent Labs     04/26/23 0135 04/28/23 1956   ALTSGPT 22  --    ASTSGOT 18  --    ALKPHOSPHAT 66  --    TBILIRUBIN 0.4  --    GLUCOSE 108* 97         No results for input(s): NTPROBNP in the last 72 hours.      No results for input(s): TROPONINT in the last 72 hours.    Imaging:  DX-CHEST-LIMITED (1 VIEW)   Final Result      1.  Left greater than right base airspace disease.          X-Ray:  I have personally reviewed the images and compared with prior images.    Assessment/Plan:  Justification for Admission Status  I anticipate this patient will require at least two midnights for appropriate medical management, necessitating inpatient admission because pneumonia, COPD    Patient will need a Med/Surg bed on MEDICAL service .  The need is secondary to pneumonia, COPD.    * COPD exacerbation (HCC)- (present on admission)  Assessment & Plan  Patient will be treated with prednisone and DuoNeb.  RT protocol  Continue supplemental oxygen  Wean down O2 as tolerated    Pneumonia- (present on admission)  Assessment & Plan  It is unclear if patient is experiencing new pneumonia.  He has been recently treated for pneumonia already.  Chest x-ray showed bibasilar infiltrates.  There is no fever or leukocytosis noted  We will check procalcitonin to decide whether we need to continue antibiotic that started in ED.    Gastroesophageal reflux disease without esophagitis- (present on admission)  Assessment & Plan  Continue PPI    Acute on chronic respiratory failure with hypoxia (HCC)- (present on admission)  Assessment & Plan  Continue supplemental oxygen  Wean down as tolerated.    Hyponatremia  Assessment & Plan  Appears chronic.  Sodium 130  Continue salt tabs.    Primary  hypertension- (present on admission)  Assessment & Plan  Continue losartan  Monitor blood pressure        VTE prophylaxis: enoxaparin ppx

## 2023-04-29 NOTE — PROGRESS NOTES
0000: received care of patient from ED. Assessment, admission and skin check complete. Evening meds given. Hourly rounding and fall precautions are in place. Patient currently resting with no complaints of pain at this time. Bedside table and call light are within reach.

## 2023-04-29 NOTE — ED NOTES
.Pt transferred out of ED at this time with transport via gurney. Pt is A&Ox4, with stable vital signs and no apparent distress upon transfer. Pt transferred 2L O2 with adequate O2 volume in bed tank. All paperwork and personal belongings sent with pt to [place] [id number].

## 2023-04-29 NOTE — ED NOTES
This RN agree with triage. Pt to anay, in gown and on monitor. Chrt up for ERP. Pt states he just started to feel nauseous. Denies any pain. Pt states felt SOB forr 25 minutes until able to get to phone to call EMS.  Pt states symtoms are feeling better at the moment. ERP at bedside. Blood drawn and sent to lab

## 2023-04-29 NOTE — ASSESSMENT & PLAN NOTE
It is unclear if patient is experiencing new pneumonia.  He has been recently treated for pneumonia already.  Chest x-ray showed bibasilar infiltrates.  There is no fever or leukocytosis noted  We will check procalcitonin to decide whether we need to continue antibiotic that started in ED.

## 2023-04-30 LAB
ANION GAP SERPL CALC-SCNC: 13 MMOL/L (ref 7–16)
BUN SERPL-MCNC: 7 MG/DL (ref 8–22)
CALCIUM SERPL-MCNC: 8.3 MG/DL (ref 8.5–10.5)
CHLORIDE SERPL-SCNC: 96 MMOL/L (ref 96–112)
CO2 SERPL-SCNC: 23 MMOL/L (ref 20–33)
CREAT SERPL-MCNC: 0.7 MG/DL (ref 0.5–1.4)
ERYTHROCYTE [DISTWIDTH] IN BLOOD BY AUTOMATED COUNT: 45.4 FL (ref 35.9–50)
EST. AVERAGE GLUCOSE BLD GHB EST-MCNC: 108 MG/DL
GFR SERPLBLD CREATININE-BSD FMLA CKD-EPI: 97 ML/MIN/1.73 M 2
GLUCOSE SERPL-MCNC: 120 MG/DL (ref 65–99)
HBA1C MFR BLD: 5.4 % (ref 4–5.6)
HCT VFR BLD AUTO: 31 % (ref 42–52)
HGB BLD-MCNC: 10.7 G/DL (ref 14–18)
MAGNESIUM SERPL-MCNC: 1.9 MG/DL (ref 1.5–2.5)
MCH RBC QN AUTO: 32.6 PG (ref 27–33)
MCHC RBC AUTO-ENTMCNC: 34.5 G/DL (ref 33.7–35.3)
MCV RBC AUTO: 94.5 FL (ref 81.4–97.8)
PLATELET # BLD AUTO: 187 K/UL (ref 164–446)
PMV BLD AUTO: 9.7 FL (ref 9–12.9)
POTASSIUM SERPL-SCNC: 4.1 MMOL/L (ref 3.6–5.5)
RBC # BLD AUTO: 3.28 M/UL (ref 4.7–6.1)
SODIUM SERPL-SCNC: 132 MMOL/L (ref 135–145)
WBC # BLD AUTO: 4.8 K/UL (ref 4.8–10.8)

## 2023-04-30 PROCEDURE — 94760 N-INVAS EAR/PLS OXIMETRY 1: CPT

## 2023-04-30 PROCEDURE — 83036 HEMOGLOBIN GLYCOSYLATED A1C: CPT

## 2023-04-30 PROCEDURE — 700102 HCHG RX REV CODE 250 W/ 637 OVERRIDE(OP): Performed by: NURSE PRACTITIONER

## 2023-04-30 PROCEDURE — 770006 HCHG ROOM/CARE - MED/SURG/GYN SEMI*

## 2023-04-30 PROCEDURE — A9270 NON-COVERED ITEM OR SERVICE: HCPCS | Performed by: NURSE PRACTITIONER

## 2023-04-30 PROCEDURE — 99232 SBSQ HOSP IP/OBS MODERATE 35: CPT | Performed by: FAMILY MEDICINE

## 2023-04-30 PROCEDURE — 700111 HCHG RX REV CODE 636 W/ 250 OVERRIDE (IP): Performed by: FAMILY MEDICINE

## 2023-04-30 PROCEDURE — 700102 HCHG RX REV CODE 250 W/ 637 OVERRIDE(OP): Performed by: INTERNAL MEDICINE

## 2023-04-30 PROCEDURE — A9270 NON-COVERED ITEM OR SERVICE: HCPCS | Performed by: INTERNAL MEDICINE

## 2023-04-30 PROCEDURE — 85027 COMPLETE CBC AUTOMATED: CPT

## 2023-04-30 PROCEDURE — 700111 HCHG RX REV CODE 636 W/ 250 OVERRIDE (IP): Performed by: INTERNAL MEDICINE

## 2023-04-30 PROCEDURE — 94640 AIRWAY INHALATION TREATMENT: CPT

## 2023-04-30 PROCEDURE — 80048 BASIC METABOLIC PNL TOTAL CA: CPT

## 2023-04-30 PROCEDURE — 83735 ASSAY OF MAGNESIUM: CPT

## 2023-04-30 PROCEDURE — 700101 HCHG RX REV CODE 250: Performed by: INTERNAL MEDICINE

## 2023-04-30 PROCEDURE — 94669 MECHANICAL CHEST WALL OSCILL: CPT

## 2023-04-30 RX ORDER — MAGNESIUM SULFATE HEPTAHYDRATE 40 MG/ML
2 INJECTION, SOLUTION INTRAVENOUS ONCE
Status: COMPLETED | OUTPATIENT
Start: 2023-04-30 | End: 2023-04-30

## 2023-04-30 RX ORDER — FLUTICASONE PROPIONATE 44 UG/1
2 AEROSOL, METERED RESPIRATORY (INHALATION) DAILY
Status: DISCONTINUED | OUTPATIENT
Start: 2023-05-01 | End: 2023-05-01 | Stop reason: HOSPADM

## 2023-04-30 RX ORDER — IPRATROPIUM BROMIDE AND ALBUTEROL SULFATE 2.5; .5 MG/3ML; MG/3ML
3 SOLUTION RESPIRATORY (INHALATION)
Status: DISCONTINUED | OUTPATIENT
Start: 2023-05-01 | End: 2023-05-01 | Stop reason: HOSPADM

## 2023-04-30 RX ORDER — METHYLPREDNISOLONE SODIUM SUCCINATE 40 MG/ML
40 INJECTION, POWDER, LYOPHILIZED, FOR SOLUTION INTRAMUSCULAR; INTRAVENOUS EVERY 8 HOURS
Status: DISCONTINUED | OUTPATIENT
Start: 2023-04-30 | End: 2023-05-01 | Stop reason: HOSPADM

## 2023-04-30 RX ORDER — CHOLECALCIFEROL (VITAMIN D3) 125 MCG
5 CAPSULE ORAL NIGHTLY PRN
Status: DISCONTINUED | OUTPATIENT
Start: 2023-04-30 | End: 2023-05-01 | Stop reason: HOSPADM

## 2023-04-30 RX ADMIN — IPRATROPIUM BROMIDE AND ALBUTEROL SULFATE 3 ML: 2.5; .5 SOLUTION RESPIRATORY (INHALATION) at 13:35

## 2023-04-30 RX ADMIN — ACETAMINOPHEN 650 MG: 325 TABLET, FILM COATED ORAL at 18:04

## 2023-04-30 RX ADMIN — Medication 5 MG: at 21:57

## 2023-04-30 RX ADMIN — LOSARTAN POTASSIUM 100 MG: 50 TABLET, FILM COATED ORAL at 05:18

## 2023-04-30 RX ADMIN — PREDNISONE 40 MG: 20 TABLET ORAL at 05:19

## 2023-04-30 RX ADMIN — METHYLPREDNISOLONE SODIUM SUCCINATE 40 MG: 40 INJECTION, POWDER, FOR SOLUTION INTRAMUSCULAR; INTRAVENOUS at 14:34

## 2023-04-30 RX ADMIN — IPRATROPIUM BROMIDE AND ALBUTEROL SULFATE 3 ML: 2.5; .5 SOLUTION RESPIRATORY (INHALATION) at 09:29

## 2023-04-30 RX ADMIN — MAGNESIUM SULFATE HEPTAHYDRATE 2 G: 40 INJECTION, SOLUTION INTRAVENOUS at 12:28

## 2023-04-30 RX ADMIN — OMEPRAZOLE 20 MG: 20 CAPSULE, DELAYED RELEASE ORAL at 05:19

## 2023-04-30 RX ADMIN — IPRATROPIUM BROMIDE AND ALBUTEROL SULFATE 3 ML: 2.5; .5 SOLUTION RESPIRATORY (INHALATION) at 19:48

## 2023-04-30 RX ADMIN — DOCUSATE SODIUM 50 MG AND SENNOSIDES 8.6 MG 2 TABLET: 8.6; 5 TABLET, FILM COATED ORAL at 05:19

## 2023-04-30 RX ADMIN — ENOXAPARIN SODIUM 40 MG: 40 INJECTION SUBCUTANEOUS at 16:11

## 2023-04-30 RX ADMIN — IPRATROPIUM BROMIDE AND ALBUTEROL SULFATE 3 ML: 2.5; .5 SOLUTION RESPIRATORY (INHALATION) at 06:24

## 2023-04-30 RX ADMIN — METHYLPREDNISOLONE SODIUM SUCCINATE 40 MG: 40 INJECTION, POWDER, FOR SOLUTION INTRAMUSCULAR; INTRAVENOUS at 21:58

## 2023-04-30 ASSESSMENT — ENCOUNTER SYMPTOMS
COUGH: 1
NECK PAIN: 0
CHILLS: 0
NERVOUS/ANXIOUS: 0
VOMITING: 0
SENSORY CHANGE: 0
FLANK PAIN: 0
DIARRHEA: 0
ABDOMINAL PAIN: 0
FEVER: 0
BLURRED VISION: 0
NAUSEA: 0
HEADACHES: 0
DIZZINESS: 0
SPEECH CHANGE: 0
HEARTBURN: 0
DIAPHORESIS: 0
SORE THROAT: 0
SHORTNESS OF BREATH: 1
WEAKNESS: 1
BACK PAIN: 0
FOCAL WEAKNESS: 0
WHEEZING: 1
MYALGIAS: 0
PALPITATIONS: 0

## 2023-04-30 ASSESSMENT — PAIN DESCRIPTION - PAIN TYPE
TYPE: ACUTE PAIN
TYPE: ACUTE PAIN

## 2023-04-30 NOTE — PROGRESS NOTES
Hospital Medicine Daily Progress Note    Date of Service  4/30/2023    Chief Complaint  Juan Manuel Bass is a 72 y.o. male admitted 4/28/2023 with COPD exacerbation    Hospital Course  Admitted with COPD exacerbation, Acute on chronic respiratory failure.  Patient was started on Prednisone, RT protocol.    Interval Problem Update  COPD exacerbation - tight air exchange  Resp failure - O2 2 lpm  HTN - sbp 119-129  Lung opacity - CT chest negative  Hyponatremia - 132  Hyperglycemia - hgba1c 5.4  Low magnesium    I have discussed this patient's plan of care and discharge plan at IDT rounds today with Case Management, Nursing, Nursing leadership, and other members of the IDT team.    Consultants/Specialty  None    Code Status  DNAR/DNI    Disposition  Patient is not medically cleared for discharge.   Anticipate discharge to to home with close outpatient follow-up.  I have placed the appropriate orders for post-discharge needs.    Review of Systems  Review of Systems   Constitutional:  Positive for malaise/fatigue. Negative for chills, diaphoresis and fever.   HENT:  Positive for hearing loss. Negative for congestion and sore throat.    Eyes:  Negative for blurred vision.   Respiratory:  Positive for cough, shortness of breath and wheezing.    Cardiovascular:  Positive for leg swelling. Negative for chest pain and palpitations.   Gastrointestinal:  Negative for abdominal pain, diarrhea, heartburn, nausea and vomiting.   Genitourinary:  Negative for dysuria, flank pain and hematuria.   Musculoskeletal:  Negative for back pain, joint pain, myalgias and neck pain.   Skin:  Negative for rash.   Neurological:  Positive for weakness. Negative for dizziness, sensory change, speech change, focal weakness and headaches.   Psychiatric/Behavioral:  The patient is not nervous/anxious.       Physical Exam  Temp:  [36.2 °C (97.1 °F)-36.8 °C (98.2 °F)] 36.8 °C (98.2 °F)  Pulse:  [69-98] 88  Resp:  [18-20] 20  BP: (119-129)/(59-73)  129/73  SpO2:  [95 %-98 %] 98 %    Physical Exam  Vitals and nursing note reviewed.   Constitutional:       Appearance: He is obese.   HENT:      Head: Normocephalic and atraumatic.      Nose: No congestion.      Mouth/Throat:      Mouth: Mucous membranes are moist.   Eyes:      Extraocular Movements: Extraocular movements intact.      Conjunctiva/sclera: Conjunctivae normal.   Cardiovascular:      Rate and Rhythm: Normal rate and regular rhythm.   Pulmonary:      Effort: Accessory muscle usage present.      Breath sounds: Decreased air movement present. Wheezing present.   Abdominal:      General: There is no distension.      Tenderness: There is no abdominal tenderness. There is no guarding or rebound.   Musculoskeletal:      Cervical back: No tenderness.      Right lower leg: Edema present.      Left lower leg: Edema present.   Skin:     Comments: stasis changes both legs   Neurological:      General: No focal deficit present.      Mental Status: He is alert and oriented to person, place, and time.      Cranial Nerves: No cranial nerve deficit.       Fluids    Intake/Output Summary (Last 24 hours) at 4/30/2023 1210  Last data filed at 4/30/2023 1200  Gross per 24 hour   Intake --   Output 650 ml   Net -650 ml         Laboratory  Recent Labs     04/28/23 1956 04/28/23  2307 04/30/23  0600   WBC 5.6 5.7 4.8   RBC 3.46* 3.44* 3.28*   HEMOGLOBIN 11.1* 11.1* 10.7*   HEMATOCRIT 32.6* 32.3* 31.0*   MCV 94.2 93.9 94.5   MCH 32.1 32.3 32.6   MCHC 34.0 34.4 34.5   RDW 45.0 44.2 45.4   PLATELETCT 207 202 187   MPV 9.2 9.5 9.7       Recent Labs     04/28/23 2307 04/29/23  0840 04/30/23  0600   SODIUM 131* 130* 132*   POTASSIUM 3.7 4.0 4.1   CHLORIDE 97 96 96   CO2 20 23 23   GLUCOSE 119* 182* 120*   BUN 5* 4* 7*   CREATININE 0.55 0.51 0.70   CALCIUM 8.6 9.0 8.3*                     Imaging  CT-CHEST (THORAX) WITH   Final Result      1.  Bilateral atelectasis or scarring again noted.      2.  Mild emphysema.      3.   Cardiomegaly with coronary artery calcifications.      4.  Right sided Bochdalek hernia containing fat.      Fleischner Society pulmonary nodule recommendations:   Not Applicable         DX-CHEST-LIMITED (1 VIEW)   Final Result      1.  Left greater than right base airspace disease.             Assessment/Plan  * COPD exacerbation (HCC)- (present on admission)  Assessment & Plan  Flovent, RT protocol  Change to IV Solumedrol    Acute on chronic respiratory failure with hypoxia (HCC)- (present on admission)  Assessment & Plan  RT protocol    Opacity of lung on imaging study- (present on admission)  Assessment & Plan  CT chest negative    Hypomagnesemia- (present on admission)  Assessment & Plan  IV Mg 2 g  Follow level    Hyperglycemia- (present on admission)  Assessment & Plan  hgba1c  5.4    Hyponatremia- (present on admission)  Assessment & Plan  Free water restriction  Follow bmp    Primary hypertension- (present on admission)  Assessment & Plan  Losartan    Gastroesophageal reflux disease without esophagitis- (present on admission)  Assessment & Plan  Omeprazole    Obesity- (present on admission)  Assessment & Plan  Body mass index is 35.5 kg/m².         VTE prophylaxis: enoxaparin ppx    I have performed a physical exam and reviewed and updated ROS and Plan today (4/30/2023). In review of yesterday's note (4/29/2023), there are no changes except as documented above.

## 2023-04-30 NOTE — CARE PLAN
The patient is Stable - Low risk of patient condition declining or worsening    Shift Goals  Clinical Goals: Respiratory tx, safety, rest, comfort  Patient Goals: rest, comfort  Family Goals: marin      Problem: Pain - Standard  Goal: Alleviation of pain or a reduction in pain to the patient’s comfort goal  Outcome: Progressing     Problem: Knowledge Deficit - Standard  Goal: Patient and family/care givers will demonstrate understanding of plan of care, disease process/condition, diagnostic tests and medications  Outcome: Progressing     Problem: Knowledge Deficit - COPD  Goal: Patient/significant other demonstrates understanding of disease process, utilization of the Action Plan, medications and discharge instruction  Outcome: Progressing     Problem: Risk for Infection - COPD  Goal: Patient will remain free from signs and symptoms of infection  Outcome: Progressing     Problem: Ineffective Airway Clearance  Goal: Patient will maintain patent airway with clear/clearing breath sounds  Outcome: Progressing     Problem: Impaired Gas Exchange  Goal: Patient will demonstrate improved ventilation and adequate oxygenation and participate in treatment regimen within the level of ability/situation.  Outcome: Progressing     Problem: Risk for Aspiration  Goal: Patient's risk for aspiration will be absent or decrease  Outcome: Progressing     Problem: Self Care  Goal: Patient will have the ability to perform ADLs independently or with assistance (bathe, groom, dress, toilet and feed)  Outcome: Progressing     Problem: Fall Risk  Goal: Patient will remain free from falls  Outcome: Progressing       Progress made toward(s) clinical / shift goals:  Patient is alert and oriented x 4. No complaint of pain, uses call light often, forgetful. 2 lpm of oxygen and sats at 95%. Urinal at bedside, RT checked for breathing treatment. Bed in locked, bed in lowest position, bed alarm in placed. Call light and personal items within reached.  Kept safe and monitored.     Patient is not progressing towards the following goals:

## 2023-04-30 NOTE — CARE PLAN
The patient is Stable - Low risk of patient condition declining or worsening    Shift Goals  Clinical Goals: Safety  Patient Goals: Comfort  Family Goals: ALEKSANDAR    Progress made toward(s) clinical / shift goals:  Bed alarm in use and reminding pt with rounding to use the call light. Staff present during mobilization.    Patient is not progressing towards the following goals:    N/A

## 2023-04-30 NOTE — PROGRESS NOTES
Patient is alert and oriented x 4. No complaint of pain, uses call light often, forgetful. 2 lpm of oxygen and sats at 95%. Urinal at bedside, RT checked for breathing treatment. Bed in locked, bed in lowest position, bed alarm in placed. Call light and personal items within reached. Kept safe and monitored.

## 2023-05-01 ENCOUNTER — PHARMACY VISIT (OUTPATIENT)
Dept: PHARMACY | Facility: MEDICAL CENTER | Age: 73
End: 2023-05-01
Payer: COMMERCIAL

## 2023-05-01 VITALS
BODY MASS INDEX: 35.5 KG/M2 | RESPIRATION RATE: 20 BRPM | HEART RATE: 78 BPM | SYSTOLIC BLOOD PRESSURE: 128 MMHG | TEMPERATURE: 97.7 F | OXYGEN SATURATION: 95 % | WEIGHT: 233.47 LBS | DIASTOLIC BLOOD PRESSURE: 68 MMHG

## 2023-05-01 LAB
ANION GAP SERPL CALC-SCNC: 10 MMOL/L (ref 7–16)
BUN SERPL-MCNC: 9 MG/DL (ref 8–22)
CALCIUM SERPL-MCNC: 9 MG/DL (ref 8.5–10.5)
CHLORIDE SERPL-SCNC: 98 MMOL/L (ref 96–112)
CO2 SERPL-SCNC: 23 MMOL/L (ref 20–33)
CREAT SERPL-MCNC: 0.54 MG/DL (ref 0.5–1.4)
GFR SERPLBLD CREATININE-BSD FMLA CKD-EPI: 105 ML/MIN/1.73 M 2
GLUCOSE SERPL-MCNC: 139 MG/DL (ref 65–99)
POTASSIUM SERPL-SCNC: 5.3 MMOL/L (ref 3.6–5.5)
SODIUM SERPL-SCNC: 131 MMOL/L (ref 135–145)

## 2023-05-01 PROCEDURE — 94640 AIRWAY INHALATION TREATMENT: CPT

## 2023-05-01 PROCEDURE — 700101 HCHG RX REV CODE 250: Performed by: FAMILY MEDICINE

## 2023-05-01 PROCEDURE — 80048 BASIC METABOLIC PNL TOTAL CA: CPT

## 2023-05-01 PROCEDURE — 94669 MECHANICAL CHEST WALL OSCILL: CPT

## 2023-05-01 PROCEDURE — 700102 HCHG RX REV CODE 250 W/ 637 OVERRIDE(OP): Performed by: INTERNAL MEDICINE

## 2023-05-01 PROCEDURE — 94664 DEMO&/EVAL PT USE INHALER: CPT

## 2023-05-01 PROCEDURE — 99239 HOSP IP/OBS DSCHRG MGMT >30: CPT | Performed by: FAMILY MEDICINE

## 2023-05-01 PROCEDURE — 700111 HCHG RX REV CODE 636 W/ 250 OVERRIDE (IP): Performed by: FAMILY MEDICINE

## 2023-05-01 PROCEDURE — A9270 NON-COVERED ITEM OR SERVICE: HCPCS | Performed by: INTERNAL MEDICINE

## 2023-05-01 PROCEDURE — RXMED WILLOW AMBULATORY MEDICATION CHARGE: Performed by: FAMILY MEDICINE

## 2023-05-01 RX ORDER — PREDNISONE 20 MG/1
40 TABLET ORAL DAILY
Qty: 6 TABLET | Refills: 0 | Status: SHIPPED | OUTPATIENT
Start: 2023-05-01 | End: 2023-05-04

## 2023-05-01 RX ADMIN — FLUTICASONE PROPIONATE 88 MCG: 44 AEROSOL, METERED RESPIRATORY (INHALATION) at 04:00

## 2023-05-01 RX ADMIN — DOCUSATE SODIUM 50 MG AND SENNOSIDES 8.6 MG 2 TABLET: 8.6; 5 TABLET, FILM COATED ORAL at 04:01

## 2023-05-01 RX ADMIN — IPRATROPIUM BROMIDE AND ALBUTEROL SULFATE 3 ML: 2.5; .5 SOLUTION RESPIRATORY (INHALATION) at 12:39

## 2023-05-01 RX ADMIN — IPRATROPIUM BROMIDE AND ALBUTEROL SULFATE 3 ML: 2.5; .5 SOLUTION RESPIRATORY (INHALATION) at 08:52

## 2023-05-01 RX ADMIN — OMEPRAZOLE 20 MG: 20 CAPSULE, DELAYED RELEASE ORAL at 04:01

## 2023-05-01 RX ADMIN — IPRATROPIUM BROMIDE AND ALBUTEROL SULFATE 3 ML: 2.5; .5 SOLUTION RESPIRATORY (INHALATION) at 08:50

## 2023-05-01 RX ADMIN — LOSARTAN POTASSIUM 100 MG: 50 TABLET, FILM COATED ORAL at 04:01

## 2023-05-01 RX ADMIN — METHYLPREDNISOLONE SODIUM SUCCINATE 40 MG: 40 INJECTION, POWDER, FOR SOLUTION INTRAMUSCULAR; INTRAVENOUS at 04:01

## 2023-05-01 NOTE — DOCUMENTATION QUERY
[facility]                                                                       Query Response Note      PATIENT:               BRIDGETTE VARNER  ACCT #:                  [id number]  MRN:                     [id number]  :                      1950  ADMIT DATE:       2023 7:34 PM  DISCH DATE:          RESPONDING  PROVIDER #:        [id number]           QUERY TEXT:    Pneumonia is documented in the H&P and dropped from the Problem List of subsequent Progress Notes.  Lab Results show:  Procal 0.05.     Please clarify status of this condition:       The patient's Clinical Indicators include:  H&P:  Pneumonia POA: unclear if pt is experiencing new pneumonia.  He has been recently treated for pneumonia already.  CXR showed bibasilar infiltrates.  There is no fever or leukocytosis noted. We will check procal to decided whether we need to continue antibiotic that started in ED   Procal 0.05,  Procal <0.05  Risk Factors: history of pneumonia, COPD   Treatment: Unasyn & Zithromax (dc'd ), CXR     Thank you,  Stiven Bolaños RN, BSN  Clinical   Connect via [employer]  Options provided:   -- Pneumonia is ruled out   -- Pneumonia is ruled in   -- Other explanation, please specify   -- Unable to determine      Query created by: Stiven Bolaños on 2023 10:28 AM    RESPONSE TEXT:    Pneumonia is ruled out          Electronically signed by:  FRANCISCO J WOODS MD 2023 12:56 PM

## 2023-05-01 NOTE — PROGRESS NOTES
Patient is alert and oriented x 4. No complaint of pain, uses call light often, forgetful. 2 lpm of oxygen and sats at 95%. Urinal at bedside, RT checked for breathing treatment. Sleeping medication given per request, see MAR. IV site patent, saline locked. Bed in locked, bed in lowest position, bed alarm in placed. Call light and personal items within reached. Kept safe and monitored.

## 2023-05-01 NOTE — RESPIRATORY CARE
COPD EDUCATION by COPD CLINICAL EDUCATOR  5/1/2023 at 10:04 AM by Cezar Holliday RRT     Patient seen for readmission education. Patient completed our program upon previous admissions, but needed a refresher on certain topics.  Has had several falls at home and attributes this to his being readmitted along with a low potasium level. Reviewed RT medications.      COPD Screen  COPD Risk Screening  Do you have a history of COPD?: Yes  Do you have a Pulmonologist?: No  COPD Population Screener  During the past 4 weeks, how much did you feel short of breath?: Some of the time  Do you ever cough up any mucus or phlegm?: Yes, a few days a week or month  In the past 12 months, you do less than you used to because of your breathing problems: Agree  Have you smoked at least 100 cigarettes in your entire life?: Yes  How old are you?: 60+  COPD Screening Score: 7  COPD Coordinator Recommended: Yes    COPD Assessment  COPD Clinical Specialists ONLY  COPD Education Initiated: Yes--Short Intervention ()  DME Company: [employer]  DME Equipment Type: 3 lpm  Physician Name: Peterson Amin M.D.  Pulmonologist Name: [facility]  Referrals Initiated: Yes  Pulmonary Rehab: Yes (per admit team)  Smoking Cessation: No (quit in 2020)  Hospice: N/A  Home Health Care:  (TBD)  [facility] Community Outreach: N/A  [facility]: N/A  Dispatch Health: Declined (Declines appointment assistance and [facility] program)  Private In-Home Care Agency: N/A  Is this a COPD exacerbation patient?: Yes (COPD Exacerbation orderset used; IP Pulmonary notified)  $ Demo/Eval of SVN's, MDI's and Aerosols: Yes (Reviewed RT medications)  (OP) Pulmonary Function Testing:  (4/10/23 PFT FEV1 57%; FEV1/FVC 76.)    PFT Results    Lab Results   Component Value Date    FEV1 (L) 1.54 04/10/2023    FEV1/FVC % 76.45 04/10/2023    FVC % Predicted 58 04/10/2023    FEV1 % Predicted 57 04/10/2023    FVC 2.01 04/10/2023    FEV1/FVC % Predicted 99 04/10/2023        Meds to Beds  Would the patient like to opt in for Bedside Medication Delivery at Discharge?: No

## 2023-05-01 NOTE — CARE PLAN
Problem: Knowledge Deficit - Standard  Goal: Patient and family/care givers will demonstrate understanding of plan of care, disease process/condition, diagnostic tests and medications  Outcome: Progressing     Problem: Impaired Gas Exchange  Goal: Patient will demonstrate improved ventilation and adequate oxygenation and participate in treatment regimen within the level of ability/situation.  Outcome: Progressing   The patient is Stable - Low risk of patient condition declining or worsening    Shift Goals  Clinical Goals: monitor o2  Patient Goals: monitor o2  Family Goals: marin    Progress made toward(s) clinical / shift goals:  pt updated on POC for today.  Pt at baseline O2 requirements.    Patient is not progressing towards the following goals:

## 2023-05-01 NOTE — DISCHARGE SUMMARY
Discharge Summary    CHIEF COMPLAINT ON ADMISSION  Chief Complaint   Patient presents with    Shortness of Breath     BIBA for sudden onset of worsening shortness of breath. Hx COPD. Denies chest pain. Pt reports improved symptoms at this time. Recently here for fall.        Reason for Admission  COPD exacerbation (HCC)     Admission Date  4/28/2023    CODE STATUS  DNAR/DNI    HPI & HOSPITAL COURSE  This is a 72 y.o. male here with  COPD exacerbation, Acute on chronic respiratory failure.  Patient was started on Prednisone, RT protocol.  He was very slow to respond to the oral prednisone, he was then placed with IV Solu-Medrol.  There was marked improvement of his symptoms.  He went back to his baseline oxygen supplementation.  There did not appear to be an acute infectious process and he was not placed on antibiotics.    Therefore, he is discharged in good and stable condition to home with close outpatient follow-up.    The patient met 2-midnight criteria for an inpatient stay at the time of discharge.    Discharge Date  5/1/2023    FOLLOW UP ITEMS POST DISCHARGE  Follow up as below    DISCHARGE DIAGNOSES  Principal Problem:    COPD exacerbation (HCC) POA: Yes  Active Problems:    Acute on chronic respiratory failure with hypoxia (HCC) POA: Yes    Primary hypertension POA: Yes    Hyponatremia POA: Yes    Hyperglycemia POA: Yes    Hypomagnesemia POA: Yes    Opacity of lung on imaging study POA: Yes    Obesity POA: Yes    Gastroesophageal reflux disease without esophagitis POA: Yes  Resolved Problems:    * No resolved hospital problems. *      FOLLOW UP  No future appointments.  Peterson Amin M.D.  [address] [place] [id number] [phone]    Follow up  PLease call to schedule your follow up appointment.      MEDICATIONS ON DISCHARGE     Medication List        START taking these medications        Instructions   predniSONE 20 MG Tabs  Commonly known as: DELTASONE   Take 2 Tablets by mouth every day  for 3 days.  Dose: 40 mg            CONTINUE taking these medications        Instructions   acetaminophen 500 MG Tabs  Commonly known as: TYLENOL   Take 500-1,000 mg by mouth every 6 hours as needed. Indications: Pain  Dose: 500-1,000 mg     albuterol 108 (90 Base) MCG/ACT Aers inhalation aerosol   Inhale 1-2 Puffs every four hours as needed for Shortness of Breath.  Dose: 1-2 Puff     losartan 100 MG Tabs  Commonly known as: COZAAR   Take 1 Tablet by mouth every day.  Dose: 100 mg     meclizine 25 MG Tabs  Commonly known as: ANTIVERT   Take 1 Tablet by mouth 3 times a day as needed for Dizziness or Vertigo for up to 7 days.  Dose: 25 mg     pantoprazole 40 MG Tbec  Commonly known as: PROTONIX   Take 40 mg by mouth every day.  Dose: 40 mg     Trelegy Ellipta 100-62.5-25 MCG/ACT Aepb inhalation  Generic drug: fluticasone-umeclidin-vilant   Inhale 2-3 Inhalation every morning.  Dose: 2-3 Inhalation.            STOP taking these medications      amoxicillin-clavulanate 875-125 MG Tabs  Commonly known as: AUGMENTIN     sodium chloride 1 GM Tabs  Commonly known as: SALT              Allergies  Allergies   Allergen Reactions    Tetanus Toxoid Hives       DIET  Orders Placed This Encounter   Procedures    Diet Order Diet: Regular; Fluid modifications: (optional): Free Water Restrictions Only     Standing Status:   Standing     Number of Occurrences:   1     Order Specific Question:   Diet:     Answer:   Regular [1]     Order Specific Question:   Fluid modifications: (optional)     Answer:   Free Water Restrictions Only [12]       ACTIVITY  As tolerated.  Weight bearing as tolerated    CONSULTATIONS  None    PROCEDURES  None    LABORATORY  Lab Results   Component Value Date    SODIUM 131 (L) 05/01/2023    POTASSIUM 5.3 05/01/2023    CHLORIDE 98 05/01/2023    CO2 23 05/01/2023    GLUCOSE 139 (H) 05/01/2023    BUN 9 05/01/2023    CREATININE 0.54 05/01/2023        Lab Results   Component Value Date    WBC 4.8 04/30/2023     HEMOGLOBIN 10.7 (L) 04/30/2023    HEMATOCRIT 31.0 (L) 04/30/2023    PLATELETCT 187 04/30/2023        Total time of the discharge process exceeds 35 minutes.

## 2023-05-01 NOTE — CARE PLAN
Problem: Bronchoconstriction  Goal: Improve in air movement and diminished wheezing  Description: Target End Date:  2 to 3 days    1.  Implement inhaled treatments  2.  Evaluate and manage medication effects  Outcome: Progressing  DUONEB QID      Problem: Bronchopulmonary Hygiene  Goal: Increase mobilization of retained secretions  Description: Target End Date:  2 to 3 days    1.  Perform bronchopulmonary therapy as indicated by assessment  2.  Perform airway suctioning  3.  Perform actions to maintain patient airway  Outcome: Progressing   Flutter QID

## 2023-05-01 NOTE — DISCHARGE PLANNING
Agency/Facility Name: [facility]  Spoke To: Tosha  Outcome: Pt has an excessive amount of portable tanks.  [facility] can't send a tank at this time.  Tosha spoke to POA and pt has 4 portable tanks and regulators at home.  [facility] delivered a portable to the pt on the 26th in room T217 CDU.  Pt's DPOA Kennedi should bring the pt a tank.  Tosha spoke to the DPOA today.

## 2023-05-01 NOTE — CARE PLAN
The patient is Stable - Low risk of patient condition declining or worsening    Shift Goals  Clinical Goals: Respiratorty Tx, safety, rest, comfort  Patient Goals: Rest, sleeps well  Family Goals: marin      Problem: Pain - Standard  Goal: Alleviation of pain or a reduction in pain to the patient’s comfort goal  Outcome: Progressing     Problem: Knowledge Deficit - Standard  Goal: Patient and family/care givers will demonstrate understanding of plan of care, disease process/condition, diagnostic tests and medications  Outcome: Progressing     Problem: Knowledge Deficit - COPD  Goal: Patient/significant other demonstrates understanding of disease process, utilization of the Action Plan, medications and discharge instruction  Outcome: Progressing     Problem: Ineffective Airway Clearance  Goal: Patient will maintain patent airway with clear/clearing breath sounds  Outcome: Progressing     Problem: Impaired Gas Exchange  Goal: Patient will demonstrate improved ventilation and adequate oxygenation and participate in treatment regimen within the level of ability/situation.  Outcome: Progressing     Problem: Risk for Aspiration  Goal: Patient's risk for aspiration will be absent or decrease  Outcome: Progressing     Problem: Self Care  Goal: Patient will have the ability to perform ADLs independently or with assistance (bathe, groom, dress, toilet and feed)  Outcome: Progressing     Problem: Fall Risk  Goal: Patient will remain free from falls  Outcome: Progressing       Progress made toward(s) clinical / shift goals:  Patient is alert and oriented x 4. No complaint of pain, uses call light often, forgetful. 2 lpm of oxygen and sats at 95%. Urinal at bedside, RT checked for breathing treatment. Sleeping medication given per request, see MAR. IV site patent, saline locked. Bed in locked, bed in lowest position, bed alarm in placed. Call light and personal items within reached. Kept safe and monitored.     Patient is not  progressing towards the following goals:

## 2023-05-05 NOTE — ASSESSMENT & PLAN NOTE
On 3 L at home  Continues to be very symptomatic with ambulation and at high risk for falls when he returns home.  Continue treatment with steroids and nebs in the hospital and monitor for clinical improvement  Pulmonology recommends sleep study   Spontaneous, unlabored and symmetrical 3 = A little assistance

## 2025-02-17 NOTE — WOUND TEAM
Advised about diet and exercise.   [facility]  Inpatient Services  Wound and Skin Care Brief Evaluation    Admission Date: 9/12/2022     Last order of IP CONSULT TO WOUND CARE was found on 9/13/2022 from Hospital Encounter on 9/12/2022     HPI, PMH, SH: Reviewed    Chief Complaint   Patient presents with    ALOC    T-5000 GLF     Per report, pt has had six unwitnessed falls in the past day    Head Injury     Pt reports head trauma when her fell. No evidence of head trauma observed    Urinary Pain     Diagnosis: Hyponatremia [E87.1]    Unit where seen by Wound Team: S168/02     Wound consult placed regarding bilateral arms. Chart and images reviewed. This discussed with bedside RN, Saira. This RN in to assess patient. Pt pleasant and agreeable. Right arm and left arm assesssed. Non-selectively debrided with NS/wound cleanser and gauze OR moist warm washcloth and no rinse foam soap. No pressure injuries or advanced wound care needs identified. Right arm has skin tears that were addressed with skin tear protocol.  Left forearm hypergranular tissue with bulbous area with drainage possible cancerous, xeroform and silicone adhesive foam placed at this time.  Hospitalist notified of findings on left forearm.  Wound consult completed. No further follow up unless indicated and consulted.               RSKIN:   CURRENTLY IN PLACE (X), APPLIED THIS VISIT (A), ORDERED (O):   Q shift Nitish:  X  Q shift pressure point assessments:  X    Surface/Positioning   Pressure redistribution mattress        x    Low Airloss          Bariatric foam      Bariatric BERTRAND     Waffle cushion        Waffle Overlay          Reposition q 2 hours      TAPs Turning system     Z [place] Pillow     Offloading/Redistribution NA  Sacral Mepilex (Silicone dressing)     Heel Mepilex (Silicone dressing)         Heel float boots (Prevalon boot)             Float Heels off Bed with Pillows           Respiratory   Silicone O2 tubing       x  Gray Foam Ear protectors   x  Cannula  fixation Device (Tender )          High flow offloading Clip    Elastic head band offloading device      Anchorfast                                                         Trach with Optifoam split foam             Containment/Moisture Prevention NA    Rectal tube or BMS    Purwick/Condom Cath        G[place]oa Catheter    Barrier wipes           Barrier paste       Antifungal tx      Interdry        Mobilization       Up to chair     x   Ambulate    x  PT/OT      Nutrition       Dietician        Diabetes Education      PO x    TF     TPN     NPO   # days     Other